# Patient Record
Sex: MALE | Race: BLACK OR AFRICAN AMERICAN | NOT HISPANIC OR LATINO | Employment: OTHER | URBAN - METROPOLITAN AREA
[De-identification: names, ages, dates, MRNs, and addresses within clinical notes are randomized per-mention and may not be internally consistent; named-entity substitution may affect disease eponyms.]

---

## 2017-02-03 ENCOUNTER — TELEPHONE (OUTPATIENT)
Dept: ENDOSCOPY | Facility: HOSPITAL | Age: 67
End: 2017-02-03

## 2017-02-03 NOTE — TELEPHONE ENCOUNTER
Received referral from [facility] to schedule the patient for a colonoscopy. He is currently taking Pradaxa. [facility] and spoke to [facility] Nandini Gasca. I asked her to fax back to me documentation from Dr. ROBERTA Sneed if the patient can stop Pradaxa for 2 days prior to colonoscopy. Provider her with my fax number and direct line.

## 2017-02-13 ENCOUNTER — TELEPHONE (OUTPATIENT)
Dept: ENDOSCOPY | Facility: HOSPITAL | Age: 67
End: 2017-02-13

## 2017-02-13 DIAGNOSIS — D64.9 ANEMIA, UNSPECIFIED TYPE: ICD-10-CM

## 2017-02-13 DIAGNOSIS — Z12.11 SPECIAL SCREENING FOR MALIGNANT NEOPLASMS, COLON: Primary | ICD-10-CM

## 2017-02-13 RX ORDER — DULOXETIN HYDROCHLORIDE 30 MG/1
30 CAPSULE, DELAYED RELEASE ORAL DAILY
COMMUNITY
End: 2017-08-30

## 2017-02-13 RX ORDER — ERGOCALCIFEROL 1.25 MG/1
50000 CAPSULE ORAL
Status: ON HOLD | COMMUNITY
End: 2017-09-21

## 2017-02-13 RX ORDER — MORPHINE SULFATE 30 MG/1
30 TABLET ORAL 2 TIMES DAILY
COMMUNITY
End: 2020-02-27

## 2017-02-13 RX ORDER — PREGABALIN 100 MG/1
100 CAPSULE ORAL 3 TIMES DAILY
COMMUNITY
End: 2017-08-30

## 2017-02-13 RX ORDER — TIOTROPIUM BROMIDE 18 UG/1
18 CAPSULE ORAL; RESPIRATORY (INHALATION) DAILY
COMMUNITY
End: 2023-06-23

## 2017-02-13 RX ORDER — AMITRIPTYLINE HYDROCHLORIDE 10 MG/1
10 TABLET, FILM COATED ORAL NIGHTLY PRN
COMMUNITY
End: 2017-08-30

## 2017-02-13 RX ORDER — CARVEDILOL 25 MG/1
25 TABLET ORAL 2 TIMES DAILY
COMMUNITY
End: 2023-06-23

## 2017-02-13 RX ORDER — DABIGATRAN ETEXILATE 150 MG/1
150 CAPSULE ORAL 2 TIMES DAILY
COMMUNITY
End: 2017-08-30

## 2017-02-13 RX ORDER — LIDOCAINE 50 MG/G
1 PATCH TOPICAL DAILY
COMMUNITY

## 2017-02-13 RX ORDER — PNV NO.95/FERROUS FUM/FOLIC AC 28MG-0.8MG
100 TABLET ORAL DAILY
COMMUNITY

## 2017-02-20 DIAGNOSIS — Z12.11 SPECIAL SCREENING FOR MALIGNANT NEOPLASMS, COLON: Primary | ICD-10-CM

## 2017-02-20 RX ORDER — POLYETHYLENE GLYCOL 3350, SODIUM SULFATE, SODIUM CHLORIDE, POTASSIUM CHLORIDE, SODIUM ASCORBATE, AND ASCORBIC ACID 7.5-2.691G
2000 KIT ORAL ONCE
Qty: 2000 ML | Refills: 0 | Status: SHIPPED | OUTPATIENT
Start: 2017-02-20 | End: 2017-02-20

## 2017-02-21 ENCOUNTER — TELEPHONE (OUTPATIENT)
Dept: ENDOSCOPY | Facility: HOSPITAL | Age: 67
End: 2017-02-21

## 2017-02-21 NOTE — TELEPHONE ENCOUNTER
Patient is scheduled for EGD & Colonoscopy 3/9/2017 with Dr. Trinidad.  Instructions sent via mail.  Prep used: Movi prep, rx mailed to pt.  Patient instructed hold hold Pradaxa x 3 days.

## 2017-03-09 ENCOUNTER — TELEPHONE (OUTPATIENT)
Dept: ENDOSCOPY | Facility: HOSPITAL | Age: 67
End: 2017-03-09

## 2017-03-09 NOTE — TELEPHONE ENCOUNTER
"[facility] reported that the [facility] "gave [facility] the wrong [facility] to take before procedure"  Patient removed from the scheduled for EGD & Colonoscopy @ 1300 on 3/9 with Dr. Trinidad. Attempted to contact patient in order to reschedule ordered Endoscopy procedure.  [phone] call back number provided.   "

## 2017-05-25 ENCOUNTER — TELEPHONE (OUTPATIENT)
Dept: ENDOSCOPY | Facility: HOSPITAL | Age: 67
End: 2017-05-25

## 2017-05-25 NOTE — TELEPHONE ENCOUNTER
Patient is rescheduled for EGD & Colonoscopy 7/25/2017 with Dr. Trinidad.  Instructions sent via mail.  Prep used: Moviprep.

## 2017-08-22 ENCOUNTER — TELEPHONE (OUTPATIENT)
Dept: GASTROENTEROLOGY | Facility: CLINIC | Age: 67
End: 2017-08-22

## 2017-08-22 NOTE — TELEPHONE ENCOUNTER
----- Message from Kaela Leary sent at 8/22/2017 12:00 PM CDT -----  Contact: [facility]ini Mendoza with [facility]  Ms. Delatorre would like to be called back regarding documents that she had sent to the office to have a colonoscopy scheduled for above patient.        Please call pt at [phone].        Thanks!

## 2017-08-30 ENCOUNTER — OFFICE VISIT (OUTPATIENT)
Dept: GASTROENTEROLOGY | Facility: CLINIC | Age: 67
End: 2017-08-30
Payer: OTHER GOVERNMENT

## 2017-08-30 VITALS
WEIGHT: 230.81 LBS | HEART RATE: 81 BPM | DIASTOLIC BLOOD PRESSURE: 72 MMHG | HEIGHT: 74 IN | SYSTOLIC BLOOD PRESSURE: 116 MMHG | BODY MASS INDEX: 29.62 KG/M2

## 2017-08-30 DIAGNOSIS — D50.0 IRON DEFICIENCY ANEMIA DUE TO CHRONIC BLOOD LOSS: Primary | ICD-10-CM

## 2017-08-30 PROCEDURE — 3008F BODY MASS INDEX DOCD: CPT | Mod: ,,, | Performed by: INTERNAL MEDICINE

## 2017-08-30 PROCEDURE — 1159F MED LIST DOCD IN RCRD: CPT | Mod: ,,, | Performed by: INTERNAL MEDICINE

## 2017-08-30 PROCEDURE — 1126F AMNT PAIN NOTED NONE PRSNT: CPT | Mod: ,,, | Performed by: INTERNAL MEDICINE

## 2017-08-30 PROCEDURE — 99213 OFFICE O/P EST LOW 20 MIN: CPT | Mod: PBBFAC | Performed by: INTERNAL MEDICINE

## 2017-08-30 PROCEDURE — 99999 PR PBB SHADOW E&M-EST. PATIENT-LVL III: CPT | Mod: PBBFAC,,, | Performed by: INTERNAL MEDICINE

## 2017-08-30 PROCEDURE — 3078F DIAST BP <80 MM HG: CPT | Mod: ,,, | Performed by: INTERNAL MEDICINE

## 2017-08-30 PROCEDURE — 99205 OFFICE O/P NEW HI 60 MIN: CPT | Mod: S$PBB,,, | Performed by: INTERNAL MEDICINE

## 2017-08-30 PROCEDURE — 3074F SYST BP LT 130 MM HG: CPT | Mod: ,,, | Performed by: INTERNAL MEDICINE

## 2017-08-30 NOTE — LETTER
August 30, 2017      Liu Padgett MD  [address] [facility] [address]           [facility] - Gastroenterology  [address]  [facility] [address]  Phone: [phone]  Fax: [phone]          Patient: Macario Dior Jr.   MR Number: [id number]   YOB: 1950   Date of Visit: 8/30/2017       Dear Dr. Liu Padgett:    Thank you for referring Macario Dior to me for evaluation. Attached you will find relevant portions of my assessment and plan of care.    If you have questions, please do not hesitate to call me. I look forward to following Macario Dior along with you.    Sincerely,    Stuart Trinidad MD    Enclosure  CC:  No Recipients    If you would like to receive this communication electronically, please contact [email or web address] or [phone] to request more information on [employer] access.    For providers and/or their staff who would like to refer a patient to [facility], please contact us through our one-stop-shop provider referral line, [facility], at [phone].    If you feel you have received this communication in error or would no longer like to receive these types of communications, please e-mail [email or web address]

## 2017-08-30 NOTE — PROGRESS NOTES
Subjective:       Patient ID: Macario Dior Jr. is a 67 y.o. male.    Chief Complaint: Other (discuss EGD/Colonoscopy)    HPI  Review of Systems   Constitutional: Negative for activity change, appetite change, chills, diaphoresis, fatigue, fever and unexpected weight change.   HENT: Negative for congestion, ear pain, mouth sores, rhinorrhea, sinus pressure, sneezing, sore throat, trouble swallowing and voice change.    Eyes: Negative for pain.   Respiratory: Negative for cough and shortness of breath.    Cardiovascular: Negative for chest pain, palpitations and leg swelling.   Endocrine: Positive for heat intolerance.   Genitourinary: Negative for difficulty urinating and flank pain.   Musculoskeletal: Positive for arthralgias, back pain and gait problem. Negative for joint swelling, myalgias and neck pain.   Skin: Negative for rash.   Neurological: Negative for dizziness, tremors, syncope, numbness and headaches.   Hematological: Negative for adenopathy. Does not bruise/bleed easily.   Psychiatric/Behavioral: Negative for agitation, behavioral problems, confusion, decreased concentration and dysphoric mood.       Objective:      Physical Exam   Constitutional: He is oriented to person, place, and time. He appears well-developed and well-nourished. No distress.   HENT:   Right Ear: External ear normal.   Left Ear: External ear normal.   Nose: Nose normal.   Mouth/Throat: Oropharynx is clear and moist. No oropharyngeal exudate.   Eyes: Conjunctivae are normal. Pupils are equal, round, and reactive to light. No scleral icterus.   Neck: Normal range of motion. Neck supple. No thyromegaly present.   Cardiovascular: Normal rate, normal heart sounds and intact distal pulses.  Exam reveals no gallop.    No murmur heard.  Pulmonary/Chest: Effort normal and breath sounds normal. He has no wheezes.   Abdominal: Soft. Normal appearance and bowel sounds are normal. He exhibits no distension, no fluid wave, no ascites and no  mass. There is no hepatosplenomegaly. There is no tenderness. There is no rigidity, no rebound, no guarding and no CVA tenderness.   Genitourinary:   Genitourinary Comments: recent   Musculoskeletal: Normal range of motion. He exhibits no edema or tenderness.   Lymphadenopathy:     He has no cervical adenopathy.   Neurological: He is alert and oriented to person, place, and time. No cranial nerve deficit.   Skin: Skin is warm. No rash noted. He is not diaphoretic.   Psychiatric: He has a normal mood and affect. His behavior is normal. Thought content normal.       Assessment:       1. Iron deficiency anemia due to chronic blood loss        Plan:         HISTORY OF PRESENT ILLNESS:  This is an urgent add-on appointment.  [facility].  It is my pleasure to find an add-on time for   him.  He is a 67-year-old gentleman with multiple significant medical problems.    He is referred for anemia.  Apparently, this is a new finding.  He says he has   never been anemic before.  July 20th hemoglobin was 12.4 with an MCV of 89.  I   do not see iron studies done during that time.  The patient denies any melena or   bright red blood per rectum.  He has never had any tarry stools.  He denies any   change in his bowel movements except for a spell in July.  Right now, his bowel   movements are normal and formed usually one per day.  He denies any abdominal   pain.  He does have occasional breakthrough reflux symptoms.    He has lost weight.  He lost about 20 pounds this summer, although that seems to   stabilize now.  He has a lot of back, hip and gait problems and he has noticed   new onset of sweating for no reason.    He was admitted to the hospital in June with diverticulitis, which presented   with abdominal pain.  He was treated with antibiotics.  In July, he had a 14-day   period where he had abdominal pain and diarrhea.  Apparently, he went to the   Emergency Room four [facility] at [facility] during these  14 days.  The pain and diarrhea   finally subsided.  He was not sure any particular treatment he got for that.    ASSESSMENT AND DECISION MAKING:  Anemia.  This is anemia of 12.4.  This is   normocytic.  I do not know for sure that this is iron deficiency anemia.  He   does have some degree of chronic kidney disease and he has multiple other   medical problems as noted in the chart, which could be factors for more of an   anemia of chronic disease.  However, given the abdominal pain, recent   diverticulitis, and weight loss, I think that a full GI workup should be done.    I recommended EGD and colonoscopy.  I reviewed the differential diagnosis of   things that can bleed in the GI tract, which includes some serious entities such   as gastric tumors, colon cancer, or peptic ulcer disease.  He will be higher   risk than usual because of his multiple medical problems and the past   diverticulitis and his chronic pain medicine use, although fortunately he is no   longer on anticoagulation.  He had been on that in the past for DVTs, but he is   off that now.    RECOMMENDATION:  1.  CBC, ferritin, iron, TIBC now.  2.  Proceed with EGD, colonoscopy.  I think it is safe to do the colonoscopy   now.  It is over two months after his diverticulitis episode.      DIANE  dd: 08/30/2017 13:54:35 (CDT)  td: 08/31/2017 04:45:41 (CDT)  Doc ID   #[id number]  Job ID #[id number]    CC:

## 2017-09-06 ENCOUNTER — TELEPHONE (OUTPATIENT)
Dept: ENDOSCOPY | Facility: HOSPITAL | Age: 67
End: 2017-09-06

## 2017-09-21 ENCOUNTER — ANESTHESIA EVENT (OUTPATIENT)
Dept: ENDOSCOPY | Facility: HOSPITAL | Age: 67
End: 2017-09-21
Payer: OTHER GOVERNMENT

## 2017-09-21 ENCOUNTER — ANESTHESIA (OUTPATIENT)
Dept: ENDOSCOPY | Facility: HOSPITAL | Age: 67
End: 2017-09-21
Payer: OTHER GOVERNMENT

## 2017-09-21 ENCOUNTER — SURGERY (OUTPATIENT)
Age: 67
End: 2017-09-21

## 2017-09-21 ENCOUNTER — HOSPITAL ENCOUNTER (OUTPATIENT)
Facility: HOSPITAL | Age: 67
Discharge: HOME | End: 2017-09-21
Attending: INTERNAL MEDICINE | Admitting: INTERNAL MEDICINE
Payer: OTHER GOVERNMENT

## 2017-09-21 VITALS
SYSTOLIC BLOOD PRESSURE: 150 MMHG | DIASTOLIC BLOOD PRESSURE: 69 MMHG | OXYGEN SATURATION: 98 % | RESPIRATION RATE: 16 BRPM | TEMPERATURE: 97 F | HEART RATE: 67 BPM | BODY MASS INDEX: 29.77 KG/M2 | WEIGHT: 232 LBS | RESPIRATION RATE: 21 BRPM | HEIGHT: 74 IN

## 2017-09-21 DIAGNOSIS — D62 ANEMIA ASSOCIATED WITH ACUTE BLOOD LOSS: Primary | ICD-10-CM

## 2017-09-21 LAB — POCT GLUCOSE: 98 MG/DL (ref 70–110)

## 2017-09-21 PROCEDURE — 27201012 HC FORCEPS, HOT/COLD, DISP: Performed by: INTERNAL MEDICINE

## 2017-09-21 PROCEDURE — C1773 RET DEV, INSERTABLE: HCPCS | Performed by: INTERNAL MEDICINE

## 2017-09-21 PROCEDURE — 45380 COLONOSCOPY AND BIOPSY: CPT | Performed by: INTERNAL MEDICINE

## 2017-09-21 PROCEDURE — 43239 EGD BIOPSY SINGLE/MULTIPLE: CPT | Mod: 51,,, | Performed by: INTERNAL MEDICINE

## 2017-09-21 PROCEDURE — 88342 IMHCHEM/IMCYTCHM 1ST ANTB: CPT | Mod: 26,,, | Performed by: PATHOLOGY

## 2017-09-21 PROCEDURE — 88305 TISSUE EXAM BY PATHOLOGIST: CPT | Mod: 26,,, | Performed by: PATHOLOGY

## 2017-09-21 PROCEDURE — 25000003 PHARM REV CODE 250: Performed by: INTERNAL MEDICINE

## 2017-09-21 PROCEDURE — 45380 COLONOSCOPY AND BIOPSY: CPT | Mod: ,,, | Performed by: INTERNAL MEDICINE

## 2017-09-21 PROCEDURE — D9220A PRA ANESTHESIA: Mod: ,,, | Performed by: ANESTHESIOLOGY

## 2017-09-21 PROCEDURE — 43239 EGD BIOPSY SINGLE/MULTIPLE: CPT | Performed by: INTERNAL MEDICINE

## 2017-09-21 PROCEDURE — 63600175 PHARM REV CODE 636 W HCPCS: Performed by: NURSE ANESTHETIST, CERTIFIED REGISTERED

## 2017-09-21 PROCEDURE — 37000008 HC ANESTHESIA 1ST 15 MINUTES: Performed by: INTERNAL MEDICINE

## 2017-09-21 PROCEDURE — 37000009 HC ANESTHESIA EA ADD 15 MINS: Performed by: INTERNAL MEDICINE

## 2017-09-21 PROCEDURE — 88305 TISSUE EXAM BY PATHOLOGIST: CPT | Performed by: PATHOLOGY

## 2017-09-21 RX ORDER — SODIUM CHLORIDE 9 MG/ML
INJECTION, SOLUTION INTRAVENOUS CONTINUOUS
Status: DISCONTINUED | OUTPATIENT
Start: 2017-09-21 | End: 2017-09-21 | Stop reason: HOSPADM

## 2017-09-21 RX ORDER — PROPOFOL 10 MG/ML
VIAL (ML) INTRAVENOUS CONTINUOUS PRN
Status: DISCONTINUED | OUTPATIENT
Start: 2017-09-21 | End: 2017-09-21

## 2017-09-21 RX ORDER — LIDOCAINE HCL/PF 100 MG/5ML
SYRINGE (ML) INTRAVENOUS
Status: DISCONTINUED | OUTPATIENT
Start: 2017-09-21 | End: 2017-09-21

## 2017-09-21 RX ORDER — PROPOFOL 10 MG/ML
VIAL (ML) INTRAVENOUS
Status: DISCONTINUED | OUTPATIENT
Start: 2017-09-21 | End: 2017-09-21

## 2017-09-21 RX ADMIN — SODIUM CHLORIDE: 0.9 INJECTION, SOLUTION INTRAVENOUS at 01:09

## 2017-09-21 RX ADMIN — SODIUM CHLORIDE: 900 INJECTION, SOLUTION INTRAVENOUS at 01:09

## 2017-09-21 RX ADMIN — PROPOFOL 20 MG: 10 INJECTION, EMULSION INTRAVENOUS at 01:09

## 2017-09-21 RX ADMIN — PROPOFOL 200 MCG/KG/MIN: 10 INJECTION, EMULSION INTRAVENOUS at 01:09

## 2017-09-21 RX ADMIN — PROPOFOL 100 MG: 10 INJECTION, EMULSION INTRAVENOUS at 01:09

## 2017-09-21 RX ADMIN — LIDOCAINE HYDROCHLORIDE 50 MG: 20 INJECTION, SOLUTION INTRAVENOUS at 01:09

## 2017-09-21 NOTE — ANESTHESIA RELEASE NOTE
"Anesthesia Release from PACU Note    Patient: Macario Dior Jr.    Procedure(s) Performed: Procedure(s) (LRB):  ESOPHAGOGASTRODUODENOSCOPY (EGD) (N/A)  COLONOSCOPY (N/A)    Anesthesia type: general    Post pain: Adequate analgesia    Post assessment: no apparent anesthetic complications, tolerated procedure well and no evidence of recall    Last Vitals:   Visit Vitals  BP (!) 150/69 (BP Location: Left arm, Patient Position: Sitting)   Pulse 67   Temp 36.2 °C (97.2 °F) (Temporal)   Resp 16   Ht 6' 2" (1.88 m)   Wt 105.2 kg (232 lb)   SpO2 98%   BMI 29.79 kg/m²       Post vital signs: stable    Level of consciousness: awake, alert  and oriented    Nausea/Vomiting: no nausea/no vomiting    Complications: none    Airway Patency: patent    Respiratory: unassisted    Cardiovascular: stable and blood pressure at baseline    Hydration: euvolemic  "

## 2017-09-21 NOTE — DISCHARGE INSTRUCTIONS
Upper GI Endoscopy     During endoscopy, a long, flexible tube is used to view the inside of your upper GI tract.      Upper GI endoscopy allows your healthcare provider to look directly into the beginning of your gastrointestinal (GI) tract. The esophagus, stomach, and duodenum (the first part of the small intestine) make up the upper GI tract.   Before the exam  Follow these and any other instructions you are given before your endoscopy. If you dont follow the healthcare providers instructions carefully, the test may need to be canceled or done over:  · Don't eat or drink anything after midnight the night before your exam. If your exam is in the afternoon, drink only clear liquids in the morning. Don't eat or drink anything for 8 hours before the exam. In some cases, you may be able to take medicines with sips of water until 2 hours before the procedure. Speak with your healthcare provider about this.   · Bring your X-rays and any other test results you have.  · Because you will be sedated, arrange for an adult to drive you home after the exam.  · Tell your healthcare provider before the exam if you are taking any medicines or have any medical problems.  The procedure  Here is what to expect:  · You will lie on the endoscopy table. Usually patients lie on the left side.  · You will be monitored and given oxygen.  · Your throat may be numbed with a spray or gargle. You are given medicine through an intravenous (IV) line that will help you relax and remain comfortable. You may be awake or asleep during the procedure.  · The healthcare provider will put the endoscope in your mouth and down your esophagus. It is thinner than most pieces of food that you swallow. It will not affect your breathing. The medicine helps keep you from gagging.  · Air is put into your GI tract to expand it. It can make you burp.  · During the procedure, the healthcare provider can take biopsies (tissue samples), remove abnormalities,  such as polyps, or treat abnormalities through a variety of devices placed through the endoscope. You will not feel this.   · The endoscope carries images of your upper GI tract to a video screen. If you are awake, you may be able to look at the images.  · After the procedure is done, you will rest for a time. An adult must drive you home.  When to call your healthcare provider  Contact your healthcare provider if you have:  · Black or tarry stools, or blood in your stool  · Fever  · Pain in your belly that does not go away  · Nausea and vomiting, or vomiting blood   Date Last Reviewed: 7/1/2016 © 2000-2017 [employer]. [address] [place] [address], [place] [address]. All rights reserved. This information is not intended as a substitute for professional medical care. Always follow your healthcare professional's instructions.        Colonoscopy     A camera attached to a flexible tube with a viewing lens is used to take video pictures.     Colonoscopy is a test to view the inside of your lower digestive tract (colon and rectum). Sometimes it can show the last part of the small intestine (ileum). During the test, small pieces of tissue may be removed for testing. This is called a biopsy. Small growths, such as polyps, may also be removed.   Why is colonoscopy done?  The test is done to help look for colon cancer. And it can help find the source of abdominal pain, bleeding, and changes in bowel habits. It may be needed once a year, depending on factors such as your:  · Age  · Health history  · Family health history  · Symptoms  · Results from any prior colonoscopy  Risks and possible complications  These include:  · Bleeding               · A puncture or tear in the colon   · Risks of anesthesia  · A cancer lesion not being seen  Getting ready   To prepare for the test:  · Talk with your healthcare provider about the risks of the test (see below). Also ask your healthcare provider about alternatives to the  test.  · Tell your healthcare provider about any medicines you take. Also tell him or her about any health conditions you may have.  · Make sure your rectum and colon are empty for the test. Follow the diet and bowel prep instructions exactly. If you dont, the test may need to be rescheduled.  · Plan for a friend or family member to drive you home after the test.     Colonoscopy provides an inside view of the entire colon.     You may discuss the results with your doctor right away or at a future visit.  During the test   The test is usually done in the hospital on an outpatient basis. This means you go home the same day. The procedure takes about 30 minutes. During that time:  · You are given relaxing (sedating) medicine through an IV line. You may be drowsy, or fully asleep.  · The healthcare provider will first give you a physical exam to check for anal and rectal problems.  · Then the anus is lubricated and the scope inserted.  · If you are awake, you may have a feeling similar to needing to have a bowel movement. You may also feel pressure as air is pumped into the colon. Its OK to pass gas during the procedure.  · Biopsy, polyp removal, or other treatments may be done during the test.  After the test   You may have gas right after the test. It can help to try to pass it to help prevent later bloating. Your healthcare provider may discuss the results with you right away. Or you may need to schedule a follow-up visit to talk about the results. After the test, you can go back to your normal eating and other activities. You may be tired from the sedation and need to rest for a few hours.  Date Last Reviewed: 11/1/2016 © 2000-2017 [employer]. [address] [place] [address]. All rights reserved. This information is not intended as a substitute for professional medical care. Always follow your healthcare professional's instructions.

## 2017-09-21 NOTE — ANESTHESIA PREPROCEDURE EVALUATION
09/21/2017  Macario Dior Jr. is a 67 y.o., male.    Pre-op Assessment    I have reviewed the Patient Summary Reports.      I have reviewed the Medications.     Review of Systems  Anesthesia Hx:  No problems with previous Anesthesia  History of prior surgery of interest to airway management or planning:  Denies Personal Hx of Anesthesia complications.   Social:  Non-Smoker, No Alcohol Use    Hematology/Oncology:     Oncology Normal    -- Anemia:   EENT/Dental:   Eyes: Eye Disease: Glaucoma:     Cardiovascular:   Hypertension  Denies Angina.  Deep Venous Thrombosis (DVT), Hx of DVT    Pulmonary:   Asthma Shortness of breath (with moderate exertion. Patient reports not worsening) Sleep Apnea, CPAP    Renal/:  Renal/ Normal   Kidney Function/Disease, Chronic Kidney Disease (CKD) , CKD Stage III (GFR 30-59)    Hepatic/GI:   Denies GERD. Denies Liver Disease.    Musculoskeletal:  Musculoskeletal General/Symptoms: limited ROM, joint pain.  Joint Disease:  Gout    Neurological:   Denies CVA. Denies Seizures.  Pain , onset is chronic , location of bilateral hips, GROIN, back, and neck , quality of aching/dull, throbbing, burning    Endocrine:  Endocrine Normal    Psych:  Psychiatric Normal           Physical Exam  General:  Well nourished Small beard    Airway/Jaw/Neck:  Airway Findings: Mouth Opening: Normal Tongue: Normal  General Airway Assessment: Adult  Mallampati: II  TM Distance: Normal, at least 6 cm  Jaw/Neck Findings:  Neck ROM: Decreased Lateral Motion, to the right      Dental:  Dental Findings: (multiple missing and broken teeth in gums. Nothing appears to be infected.) Periodontal disease, Severe   Chest/Lungs:  Chest/Lungs Findings: Clear to auscultation, Normal Respiratory Rate     Heart/Vascular:  Heart Findings: Rate: Normal  Rhythm: Regular Rhythm  Sounds: Normal        Mental Status:  Mental  Status Findings:  Alert and Oriented         ECHO 8/24/15-no report.Information listed in MD's note  Echo shows EF 55-60% with grade II diastolic dysfunction and mild pulmonic regurgitation.    DSE 8/24/15no report.Information listed in MD's note   bpm 85% of predicted, with normal resting BP and adequate respons to stress test.  Stress echo was normal after pharmacological stress.     Anesthesia Plan  Type of Anesthesia, risks & benefits discussed:  Anesthesia Type:  general  Patient's Preference: ga  Intra-op Monitoring Plan: standard ASA monitors  Intra-op Monitoring Plan Comments:   Post Op Pain Control Plan:   Post Op Pain Control Plan Comments:   Induction:   IV  Beta Blocker:  Patient is on a Beta-Blocker and has received one dose within the past 24 hours (No further documentation required).       Informed Consent: Patient understands risks and agrees with Anesthesia plan.  Questions answered. Anesthesia consent signed with patient.  ASA Score: 3     Day of Surgery Review of History & Physical:    H&P update referred to the provider.         Ready For Surgery From Anesthesia Perspective.

## 2017-09-21 NOTE — PATIENT INSTRUCTIONS
Discharge Summary/Instructions after an Endoscopic Procedure  Patient Name: Macario Dior  Patient MRN: [id number]  Patient YOB: 1950  Thursday, September 21, 2017  Stuart Trinidad MD  RESTRICTIONS:  During your procedure today, you received medications for sedation.  These   medications may affect your judgment, balance and coordination.  Therefore,   for 24 hours, you have the following restrictions:   - DO NOT drive a car, operate machinery, make legal/financial decisions,   sign important papers or drink alcohol.    ACTIVITY:  The following day: return to full activity including work, except no heavy   lifting, straining or running for 3 days if polyps were removed.  DIET:  Eat and drink normally unless instructed otherwise.  TREATMENT FOR COMMON SIDE EFFECTS:  - Mild abdominal pain, belching, bloating or excessive gas: rest, eat   lightly and use a heating pad.  - Sore Throat: treat with throat lozenges and/or gargle with warm salt   water.  SYMPTOMS TO WATCH FOR AND REPORT TO YOUR PHYSICIAN:  1. Abdominal pain or bloating, other than gas cramps.  2. Chest pain.  3. Back pain.  4. Chills or fever occurring within 24 hours after the procedure.  5. Rectal bleeding, which would show as bright red, maroon, or black stools.   (A tablespoon of blood from the rectum is not serious, especially if   hemorrhoids are present.)  6. Vomiting.  7. Weakness or dizziness.  8. Because air was used during the procedure, expelling large amounts of air   from your rectum or belching is normal.  9. If a bowel prep was taken, you may not have a bowel movement for 1-3   days.  This is normal.  GO DIRECTLY TO THE EMERGENCY ROOM IF YOU HAVE ANY OF THE FOLLOWING:   Difficulty breathing   Chills and/or fever over 101 F   Persistent vomiting and/or vomiting blood   Severe abdominal pain   Severe chest pain   Black, tarry stools   Bleeding- more than one tablespoon  Your doctor recommends these additional instructions:  If any  biopsies were taken, your doctors clinic will call you in 1 to 2   weeks with any results.  You have a contact number available for emergencies.  The signs and symptoms   of potential delayed complications were discussed with you.  You may return   to normal activities tomorrow.  Written discharge instructions were   provided to you.   You are being discharged to home.   Resume your previous diet.   Continue your present medications.   We are waiting for your pathology results.   Your physician has recommended a repeat colonoscopy in five years for   surveillance.   Return to your GI clinic at appointment to be scheduled.  For questions, problems or results please call your physician - Stuart Trinidad MD at Work:  [phone].  [facility], EMERGENCY ROOM PHONE NUMBER: [phone]  IF A COMPLICATION OR EMERGENCY SITUATION ARISES AND YOU ARE UNABLE TO REACH   YOUR PHYSICIAN - GO DIRECTLY TO THE EMERGENCY ROOM.  Stuart Trinidad MD  9/21/2017 2:12:02 PM  This report has been verified and signed electronically.

## 2017-09-21 NOTE — PATIENT INSTRUCTIONS
Discharge Summary/Instructions after an Endoscopic Procedure  Patient Name: Macario Dior  Patient MRN: [id number]  Patient YOB: 1950  Thursday, September 21, 2017  Stuart Trinidad MD  RESTRICTIONS:  During your procedure today, you received medications for sedation.  These   medications may affect your judgment, balance and coordination.  Therefore,   for 24 hours, you have the following restrictions:   - DO NOT drive a car, operate machinery, make legal/financial decisions,   sign important papers or drink alcohol.    ACTIVITY:  The following day: return to full activity including work, except no heavy   lifting, straining or running for 3 days if polyps were removed.  DIET:  Eat and drink normally unless instructed otherwise.  TREATMENT FOR COMMON SIDE EFFECTS:  - Mild abdominal pain, belching, bloating or excessive gas: rest, eat   lightly and use a heating pad.  - Sore Throat: treat with throat lozenges and/or gargle with warm salt   water.  SYMPTOMS TO WATCH FOR AND REPORT TO YOUR PHYSICIAN:  1. Abdominal pain or bloating, other than gas cramps.  2. Chest pain.  3. Back pain.  4. Chills or fever occurring within 24 hours after the procedure.  5. Rectal bleeding, which would show as bright red, maroon, or black stools.   (A tablespoon of blood from the rectum is not serious, especially if   hemorrhoids are present.)  6. Vomiting.  7. Weakness or dizziness.  8. Because air was used during the procedure, expelling large amounts of air   from your rectum or belching is normal.  9. If a bowel prep was taken, you may not have a bowel movement for 1-3   days.  This is normal.  GO DIRECTLY TO THE EMERGENCY ROOM IF YOU HAVE ANY OF THE FOLLOWING:   Difficulty breathing   Chills and/or fever over 101 F   Persistent vomiting and/or vomiting blood   Severe abdominal pain   Severe chest pain   Black, tarry stools   Bleeding- more than one tablespoon  Your doctor recommends these additional instructions:  If any  biopsies were taken, your doctors clinic will call you in 1 to 2   weeks with any results.  You have a contact number available for emergencies.  The signs and symptoms   of potential delayed complications were discussed with you.  You may return   to normal activities tomorrow.  Written discharge instructions were   provided to you.   You are being discharged to home.   Resume your previous diet.   Continue your present medications.   We are waiting for your pathology results.   Return to your GI clinic as previously scheduled.  For questions, problems or results please call your physician - Stuart Trinidad MD at Work:  [phone].  [facility], EMERGENCY ROOM PHONE NUMBER: [phone]  IF A COMPLICATION OR EMERGENCY SITUATION ARISES AND YOU ARE UNABLE TO REACH   YOUR PHYSICIAN - GO DIRECTLY TO THE EMERGENCY ROOM.  Stuart Trinidad MD  9/21/2017 1:53:54 PM  This report has been verified and signed electronically.

## 2017-09-21 NOTE — TRANSFER OF CARE
"Anesthesia Transfer of Care Note    Patient: Macario Dior Jr.    Procedure(s) Performed: Procedure(s) (LRB):  ESOPHAGOGASTRODUODENOSCOPY (EGD) (N/A)  COLONOSCOPY (N/A)    Patient location: [facility]    Anesthesia Type: general    Transport from OR: Transported from OR on room air with adequate spontaneous ventilation    Post pain: adequate analgesia    Post assessment: no apparent anesthetic complications and tolerated procedure well    Post vital signs: stable    Level of consciousness: sedated    Nausea/Vomiting: no nausea/vomiting    Complications: none    Transfer of care protocol was followed      Last vitals:   Visit Vitals  BP (!) 112/54 (BP Location: Left arm, Patient Position: Lying)   Pulse 70   Temp 36.2 °C (97.2 °F) (Temporal)   Resp 16   Ht 6' 2" (1.88 m)   Wt 105.2 kg (232 lb)   SpO2 97%   BMI 29.79 kg/m²     "

## 2017-09-21 NOTE — H&P (VIEW-ONLY)
Subjective:       Patient ID: Macario Dior Jr. is a 67 y.o. male.    Chief Complaint: Other (discuss EGD/Colonoscopy)    HPI  Review of Systems   Constitutional: Negative for activity change, appetite change, chills, diaphoresis, fatigue, fever and unexpected weight change.   HENT: Negative for congestion, ear pain, mouth sores, rhinorrhea, sinus pressure, sneezing, sore throat, trouble swallowing and voice change.    Eyes: Negative for pain.   Respiratory: Negative for cough and shortness of breath.    Cardiovascular: Negative for chest pain, palpitations and leg swelling.   Endocrine: Positive for heat intolerance.   Genitourinary: Negative for difficulty urinating and flank pain.   Musculoskeletal: Positive for arthralgias, back pain and gait problem. Negative for joint swelling, myalgias and neck pain.   Skin: Negative for rash.   Neurological: Negative for dizziness, tremors, syncope, numbness and headaches.   Hematological: Negative for adenopathy. Does not bruise/bleed easily.   Psychiatric/Behavioral: Negative for agitation, behavioral problems, confusion, decreased concentration and dysphoric mood.       Objective:      Physical Exam   Constitutional: He is oriented to person, place, and time. He appears well-developed and well-nourished. No distress.   HENT:   Right Ear: External ear normal.   Left Ear: External ear normal.   Nose: Nose normal.   Mouth/Throat: Oropharynx is clear and moist. No oropharyngeal exudate.   Eyes: Conjunctivae are normal. Pupils are equal, round, and reactive to light. No scleral icterus.   Neck: Normal range of motion. Neck supple. No thyromegaly present.   Cardiovascular: Normal rate, normal heart sounds and intact distal pulses.  Exam reveals no gallop.    No murmur heard.  Pulmonary/Chest: Effort normal and breath sounds normal. He has no wheezes.   Abdominal: Soft. Normal appearance and bowel sounds are normal. He exhibits no distension, no fluid wave, no ascites and no  mass. There is no hepatosplenomegaly. There is no tenderness. There is no rigidity, no rebound, no guarding and no CVA tenderness.   Genitourinary:   Genitourinary Comments: recent   Musculoskeletal: Normal range of motion. He exhibits no edema or tenderness.   Lymphadenopathy:     He has no cervical adenopathy.   Neurological: He is alert and oriented to person, place, and time. No cranial nerve deficit.   Skin: Skin is warm. No rash noted. He is not diaphoretic.   Psychiatric: He has a normal mood and affect. His behavior is normal. Thought content normal.       Assessment:       1. Iron deficiency anemia due to chronic blood loss        Plan:         HISTORY OF PRESENT ILLNESS:  This is an urgent add-on appointment.  [facility].  It is my pleasure to find an add-on time for   him.  He is a 67-year-old gentleman with multiple significant medical problems.    He is referred for anemia.  Apparently, this is a new finding.  He says he has   never been anemic before.  July 20th hemoglobin was 12.4 with an MCV of 89.  I   do not see iron studies done during that time.  The patient denies any melena or   bright red blood per rectum.  He has never had any tarry stools.  He denies any   change in his bowel movements except for a spell in July.  Right now, his bowel   movements are normal and formed usually one per day.  He denies any abdominal   pain.  He does have occasional breakthrough reflux symptoms.    He has lost weight.  He lost about 20 pounds this summer, although that seems to   stabilize now.  He has a lot of back, hip and gait problems and he has noticed   new onset of sweating for no reason.    He was admitted to the hospital in June with diverticulitis, which presented   with abdominal pain.  He was treated with antibiotics.  In July, he had a 14-day   period where he had abdominal pain and diarrhea.  Apparently, he went to the   Emergency Room four [facility] at [facility] during these  14 days.  The pain and diarrhea   finally subsided.  He was not sure any particular treatment he got for that.    ASSESSMENT AND DECISION MAKING:  Anemia.  This is anemia of 12.4.  This is   normocytic.  I do not know for sure that this is iron deficiency anemia.  He   does have some degree of chronic kidney disease and he has multiple other   medical problems as noted in the chart, which could be factors for more of an   anemia of chronic disease.  However, given the abdominal pain, recent   diverticulitis, and weight loss, I think that a full GI workup should be done.    I recommended EGD and colonoscopy.  I reviewed the differential diagnosis of   things that can bleed in the GI tract, which includes some serious entities such   as gastric tumors, colon cancer, or peptic ulcer disease.  He will be higher   risk than usual because of his multiple medical problems and the past   diverticulitis and his chronic pain medicine use, although fortunately he is no   longer on anticoagulation.  He had been on that in the past for DVTs, but he is   off that now.    RECOMMENDATION:  1.  CBC, ferritin, iron, TIBC now.  2.  Proceed with EGD, colonoscopy.  I think it is safe to do the colonoscopy   now.  It is over two months after his diverticulitis episode.      DIANE  dd: 08/30/2017 13:54:35 (CDT)  td: 08/31/2017 04:45:41 (CDT)  Doc ID   #[id number]  Job ID #[id number]    CC:

## 2017-09-21 NOTE — ANESTHESIA POSTPROCEDURE EVALUATION
"Anesthesia Post Evaluation    Patient: Macario Dior Jr.    Procedure(s) Performed: Procedure(s) (LRB):  ESOPHAGOGASTRODUODENOSCOPY (EGD) (N/A)  COLONOSCOPY (N/A)    Final Anesthesia Type: general  Patient location during evaluation: GI PACU  Patient participation: Yes- Able to Participate  Level of consciousness: awake and alert and oriented  Post-procedure vital signs: reviewed and stable  Pain management: adequate  Airway patency: patent  PONV status at discharge: No PONV  Anesthetic complications: no      Cardiovascular status: blood pressure returned to baseline and stable  Respiratory status: unassisted, spontaneous ventilation and room air  Hydration status: euvolemic  Follow-up not needed.        Visit Vitals  BP (!) 150/69 (BP Location: Left arm, Patient Position: Sitting)   Pulse 67   Temp 36.2 °C (97.2 °F) (Temporal)   Resp 16   Ht 6' 2" (1.88 m)   Wt 105.2 kg (232 lb)   SpO2 98%   BMI 29.79 kg/m²       Pain/Doris Score: Pain Assessment Performed: Yes (9/21/2017  2:45 PM)  Presence of Pain: denies (9/21/2017  2:45 PM)  Doris Score: 10 (9/21/2017  2:30 PM)      "

## 2017-09-28 ENCOUNTER — TELEPHONE (OUTPATIENT)
Dept: ENDOSCOPY | Facility: HOSPITAL | Age: 67
End: 2017-09-28

## 2017-09-28 ENCOUNTER — TELEPHONE (OUTPATIENT)
Dept: GASTROENTEROLOGY | Facility: CLINIC | Age: 67
End: 2017-09-28

## 2017-09-28 NOTE — TELEPHONE ENCOUNTER
----- Message from Nicole Angulo sent at 9/28/2017 12:53 PM CDT -----  Contact: Pt:[phone]  Pt received called from Fanta Parks and the pt states he would like to know what the call was in regards to. Pt states he would like to speak with the nurse.

## 2017-10-03 ENCOUNTER — TELEPHONE (OUTPATIENT)
Dept: GASTROENTEROLOGY | Facility: CLINIC | Age: 67
End: 2017-10-03

## 2017-10-03 NOTE — TELEPHONE ENCOUNTER
----- Message from Stuart Trinidad MD sent at 10/3/2017  3:01 PM CDT -----  Please call, everything was ok with the biopsies  The stomach had some mild inflammation, but no bacteria  The colon polyp wasn't even precancerous  Good news

## 2017-12-14 ENCOUNTER — TELEPHONE (OUTPATIENT)
Dept: ORTHOPEDICS | Facility: CLINIC | Age: 67
End: 2017-12-14

## 2017-12-14 NOTE — TELEPHONE ENCOUNTER
----- Message from Marianne Trinidad sent at 12/14/2017  2:00 PM CST -----  Contact: [facility] Dior pt [facility] nurse   Mar[facility] Dior pt [facility] nurse is requesting a call back in regards to scheduling an appt         Marium Dior pt [facility] nurse can be contacted at [phone] ext [phone]      We spoke  =ep Lo p/o THR needs follow up  appt booked for 1/11 @ 1 :20 pm

## 2018-01-11 ENCOUNTER — HOSPITAL ENCOUNTER (OUTPATIENT)
Dept: RADIOLOGY | Facility: HOSPITAL | Age: 68
Discharge: HOME | End: 2018-01-11
Attending: ORTHOPAEDIC SURGERY
Payer: COMMERCIAL

## 2018-01-11 ENCOUNTER — OFFICE VISIT (OUTPATIENT)
Dept: ORTHOPEDICS | Facility: CLINIC | Age: 68
End: 2018-01-11
Payer: COMMERCIAL

## 2018-01-11 VITALS — BODY MASS INDEX: 29.6 KG/M2 | HEIGHT: 74 IN | WEIGHT: 230.63 LBS

## 2018-01-11 DIAGNOSIS — Z96.649 HISTORY OF TOTAL HIP REPLACEMENT, UNSPECIFIED LATERALITY: ICD-10-CM

## 2018-01-11 DIAGNOSIS — Z96.649 HISTORY OF TOTAL HIP REPLACEMENT, UNSPECIFIED LATERALITY: Primary | ICD-10-CM

## 2018-01-11 PROCEDURE — 99999 PR PBB SHADOW E&M-EST. PATIENT-LVL III: CPT | Mod: PBBFAC,,, | Performed by: ORTHOPAEDIC SURGERY

## 2018-01-11 PROCEDURE — 99213 OFFICE O/P EST LOW 20 MIN: CPT | Mod: PBBFAC,25 | Performed by: ORTHOPAEDIC SURGERY

## 2018-01-11 PROCEDURE — 73502 X-RAY EXAM HIP UNI 2-3 VIEWS: CPT | Mod: 26,RT,, | Performed by: RADIOLOGY

## 2018-01-11 PROCEDURE — 99213 OFFICE O/P EST LOW 20 MIN: CPT | Mod: S$PBB,,, | Performed by: ORTHOPAEDIC SURGERY

## 2018-01-11 PROCEDURE — 73502 X-RAY EXAM HIP UNI 2-3 VIEWS: CPT | Mod: TC,RT

## 2018-01-11 NOTE — PROGRESS NOTES
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  He developed heterotopic ossification.  He's had pain that shoots up and down his leg.  It's from his buttocks to his heel.  Pain seems to be worse when he is walking.    [facility]    Review of systems:  No recent fevers.  No drainage from the wound.  No major sensory or motor changes.    Reviewed past medical history, medications and ALLERGIES.    Vital signs are stable.  Patient is alert and oriented ×3.  Patient walks with a slight antalgic gait.  There is some tenderness to the lower lumbar spine.  Despite the heterotopic bone the hips have a functional range of motion.  Both knees have no swelling.  Range of motion 0-130   Radiographs total hip in excellent position with moderate heterotopic bone in the abductors.    Impression I think the patient has some pain from his heterotopic bone.    He also has some component of sciatica.    He will continue to do physical therapy and work with this..

## 2019-01-17 ENCOUNTER — OFFICE VISIT (OUTPATIENT)
Dept: ORTHOPEDICS | Facility: CLINIC | Age: 69
End: 2019-01-17
Payer: OTHER GOVERNMENT

## 2019-01-17 ENCOUNTER — HOSPITAL ENCOUNTER (OUTPATIENT)
Dept: RADIOLOGY | Facility: HOSPITAL | Age: 69
Discharge: HOME | End: 2019-01-17
Attending: ORTHOPAEDIC SURGERY
Payer: OTHER GOVERNMENT

## 2019-01-17 VITALS — BODY MASS INDEX: 29.52 KG/M2 | WEIGHT: 230 LBS | HEIGHT: 74 IN

## 2019-01-17 DIAGNOSIS — Z96.641 HISTORY OF TOTAL RIGHT HIP REPLACEMENT: Primary | ICD-10-CM

## 2019-01-17 DIAGNOSIS — Z96.641 HISTORY OF TOTAL RIGHT HIP REPLACEMENT: ICD-10-CM

## 2019-01-17 PROCEDURE — 73502 X-RAY EXAM HIP UNI 2-3 VIEWS: CPT | Mod: TC,RT

## 2019-01-17 PROCEDURE — 99999 PR PBB SHADOW E&M-EST. PATIENT-LVL III: ICD-10-PCS | Mod: PBBFAC,,, | Performed by: ORTHOPAEDIC SURGERY

## 2019-01-17 PROCEDURE — 99213 OFFICE O/P EST LOW 20 MIN: CPT | Mod: PBBFAC,25 | Performed by: ORTHOPAEDIC SURGERY

## 2019-01-17 PROCEDURE — 99999 PR PBB SHADOW E&M-EST. PATIENT-LVL III: CPT | Mod: PBBFAC,,, | Performed by: ORTHOPAEDIC SURGERY

## 2019-01-17 PROCEDURE — 99213 PR OFFICE/OUTPT VISIT, EST, LEVL III, 20-29 MIN: ICD-10-PCS | Mod: S$PBB,,, | Performed by: ORTHOPAEDIC SURGERY

## 2019-01-17 PROCEDURE — 73502 XR HIP 2 VIEW RIGHT: ICD-10-PCS | Mod: 26,RT,, | Performed by: RADIOLOGY

## 2019-01-17 PROCEDURE — 73502 X-RAY EXAM HIP UNI 2-3 VIEWS: CPT | Mod: 26,RT,, | Performed by: RADIOLOGY

## 2019-01-17 PROCEDURE — 99213 OFFICE O/P EST LOW 20 MIN: CPT | Mod: S$PBB,,, | Performed by: ORTHOPAEDIC SURGERY

## 2019-01-17 RX ORDER — KETOCONAZOLE 20 MG/G
CREAM TOPICAL 2 TIMES DAILY
COMMUNITY

## 2019-01-17 RX ORDER — LATANOPROST 50 UG/ML
1 SOLUTION/ DROPS OPHTHALMIC NIGHTLY
COMMUNITY

## 2019-01-17 NOTE — PROGRESS NOTES
HPI:    Patient is here today for a chief complaint of right hip pain and the numbness in his feet.  He had a right total hip done by me in 2015.  No immediate complications.  Did heal with some heterotopic bone formation.  But patient has been able to weightbear.    Duration: 12 months  Intensity: mild  Character of pain: burning  Location: He reports that the pain is negative  For groin pain.    Past Medical History:   Diagnosis Date    Arthritis     Asthma     chronic    CKD (chronic kidney disease), stage III     patient denies    Demyelinating neuropathy     polyneuropathy    Diabetes     diet management    Gout     History of blood clots 2013    lower part of both legs    HLD (hyperlipidemia)     Hypertension     Iron deficiency anemia     BUZZ (obstructive sleep apnea)     CPAP          Current Outpatient Medications:     (Magic mouthwash) 1:1:1 Benadryl 12.5mg/5ml liq, aluminum & magnesium hydroxide-simehticone (Maalox), lidocaine viscous 2%, Swish and spit 10 mLs every 4 (four) hours as needed. for mouth sores, Disp: 1 Bottle, Rfl: 0    albuterol (PROVENTIL) 2.5 mg /3 mL (0.083 %) nebulizer solution, Take 2.5 mg by nebulization every 6 (six) hours as needed for Wheezing., Disp: , Rfl:     albuterol 90 mcg/actuation inhaler, Inhale 2 puffs into the lungs every 6 (six) hours as needed for Wheezing. , Disp: , Rfl:     amlodipine (NORVASC) 10 MG tablet, Take 10 mg by mouth once daily., Disp: , Rfl:     atorvastatin (LIPITOR) 40 MG tablet, Take 40 mg by mouth once daily. Take one-half tablet daily, Disp: , Rfl:     budesonide-formoterol 160-4.5 mcg (SYMBICORT) 160-4.5 mcg/actuation HFAA, Inhale 2 puffs into the lungs every 12 (twelve) hours. , Disp: , Rfl:     carvedilol (COREG) 25 MG tablet, Take 25 mg by mouth 2 (two) times daily., Disp: , Rfl:     cyanocobalamin (VITAMIN B-12) 100 MCG tablet, Take 100 mcg by mouth once daily. Take two tablets, Disp: , Rfl:     ketoconazole (NIZORAL) 2 %  "cream, Apply topically 2 (two) times daily., Disp: , Rfl:     latanoprost 0.005 % ophthalmic solution, Place 1 drop into both eyes every evening., Disp: , Rfl:     lidocaine (LIDODERM) 5 %, Place 1 patch onto the skin once daily. Wear for 12 hours, then remove. Do not apply a new patch for at least 12 hours., Disp: , Rfl:     lisinopril (PRINIVIL,ZESTRIL) 40 MG tablet, Take 40 mg by mouth once daily., Disp: , Rfl:     oxycodone (OXY-IR) 5 mg Cap, Take 2 capsules (10 mg total) by mouth every 6 (six) hours as needed., Disp: 120 capsule, Rfl: 0    tiotropium (SPIRIVA) 18 mcg inhalation capsule, Inhale 18 mcg into the lungs once daily. Controller, Disp: , Rfl:     allopurinol (ZYLOPRIM) 300 MG tablet, Take 300 mg by mouth once daily., Disp: , Rfl:     ferrous sulfate 325 mg (65 mg iron) Tab tablet, Take 325 mg by mouth once daily., Disp: , Rfl:     morphine (MSIR) 30 MG tablet, Take 30 mg by mouth 2 (two) times daily., Disp: , Rfl:      Review of patient's allergies indicates:   Allergen Reactions    Azithromycin      Muscles tense up    Acetaminophen Rash     Feels like needles are sticking him per pt        Exam:  Ht 6' 2" (1.88 m)   Wt 104.3 kg (230 lb)   BMI 29.53 kg/m²   Vital signs are stable.  Patient ambulates with a walker.  He has supple range of motion of both hips and knees.  Pedal pulses are strong.  Patient describes decreased sensation on the bottom of his feet.      Radiograph     Right total hip in excellent position with some heterotopic bone formation.    Plan:  Patient has diffuse neuropathy.  Right total hip is stable and there is no limitations to activities on the hip.  "

## 2019-01-18 ENCOUNTER — TELEPHONE (OUTPATIENT)
Dept: ORTHOPEDICS | Facility: CLINIC | Age: 69
End: 2019-01-18

## 2019-01-18 NOTE — TELEPHONE ENCOUNTER
----- Message from Argelia Winston sent at 1/18/2019  9:14 AM CST -----  Contact: [place]  Please call [place] at [phone]    Fax#[phone]    Need copies of the office notes and xray reports for 01/17/19    Thank you

## 2019-07-30 ENCOUNTER — LAB VISIT (OUTPATIENT)
Dept: LAB | Facility: HOSPITAL | Age: 69
End: 2019-07-30
Attending: ORTHOPAEDIC SURGERY
Payer: COMMERCIAL

## 2019-07-30 ENCOUNTER — OFFICE VISIT (OUTPATIENT)
Dept: ORTHOPEDICS | Facility: CLINIC | Age: 69
End: 2019-07-30
Payer: COMMERCIAL

## 2019-07-30 ENCOUNTER — TELEPHONE (OUTPATIENT)
Dept: ORTHOPEDICS | Facility: CLINIC | Age: 69
End: 2019-07-30

## 2019-07-30 VITALS — WEIGHT: 229.38 LBS | HEIGHT: 74 IN | BODY MASS INDEX: 29.44 KG/M2

## 2019-07-30 DIAGNOSIS — G89.29 CHRONIC HIP PAIN AFTER TOTAL REPLACEMENT OF HIP JOINT: ICD-10-CM

## 2019-07-30 DIAGNOSIS — Z96.649 CHRONIC HIP PAIN AFTER TOTAL REPLACEMENT OF HIP JOINT: ICD-10-CM

## 2019-07-30 DIAGNOSIS — M25.559 CHRONIC HIP PAIN AFTER TOTAL REPLACEMENT OF HIP JOINT: ICD-10-CM

## 2019-07-30 DIAGNOSIS — G89.29 CHRONIC HIP PAIN AFTER TOTAL REPLACEMENT OF HIP JOINT: Primary | ICD-10-CM

## 2019-07-30 DIAGNOSIS — Z96.649 CHRONIC HIP PAIN AFTER TOTAL REPLACEMENT OF HIP JOINT: Primary | ICD-10-CM

## 2019-07-30 DIAGNOSIS — M25.559 CHRONIC HIP PAIN AFTER TOTAL REPLACEMENT OF HIP JOINT: Primary | ICD-10-CM

## 2019-07-30 LAB
CRP SERPL-MCNC: 5.4 MG/L (ref 0–8.2)
ERYTHROCYTE [SEDIMENTATION RATE] IN BLOOD BY WESTERGREN METHOD: 42 MM/HR (ref 0–23)

## 2019-07-30 PROCEDURE — 99214 OFFICE O/P EST MOD 30 MIN: CPT | Mod: S$PBB,,, | Performed by: ORTHOPAEDIC SURGERY

## 2019-07-30 PROCEDURE — 86140 C-REACTIVE PROTEIN: CPT

## 2019-07-30 PROCEDURE — 99999 PR PBB SHADOW E&M-EST. PATIENT-LVL II: CPT | Mod: PBBFAC,,, | Performed by: ORTHOPAEDIC SURGERY

## 2019-07-30 PROCEDURE — 85652 RBC SED RATE AUTOMATED: CPT

## 2019-07-30 PROCEDURE — 36415 COLL VENOUS BLD VENIPUNCTURE: CPT

## 2019-07-30 PROCEDURE — 99214 PR OFFICE/OUTPT VISIT, EST, LEVL IV, 30-39 MIN: ICD-10-PCS | Mod: S$PBB,,, | Performed by: ORTHOPAEDIC SURGERY

## 2019-07-30 PROCEDURE — 99999 PR PBB SHADOW E&M-EST. PATIENT-LVL II: ICD-10-PCS | Mod: PBBFAC,,, | Performed by: ORTHOPAEDIC SURGERY

## 2019-07-30 PROCEDURE — 99212 OFFICE O/P EST SF 10 MIN: CPT | Mod: PBBFAC | Performed by: ORTHOPAEDIC SURGERY

## 2019-07-30 NOTE — TELEPHONE ENCOUNTER
----- Message from Candida Villagomez sent at 7/30/2019  3:55 PM CDT -----  Contact: [phone]  Pt stated that he would like for his MRI to be scheduled after 08/05 because he is out of town until that day         We spoke  He wants late at night  After 8/5  I will call he later     
no

## 2019-07-30 NOTE — PROGRESS NOTES
HPI:    Patient is here today for a chief complaint of right hip pain.   Status post right total hip done 2015 for avascular necrosis.  Patient has developed heterotopic bone.  Patient has difficulty weight-bearing.  Also has some low back issues.  The epidural injection seemed to help his back but hip pain is gotten worse.    Duration: 2 months  Intensity: severe  Character of pain: sharp  Location: He reports that the pain is positive  For groin pain.    Past Medical History:   Diagnosis Date    Arthritis     Asthma     chronic    CKD (chronic kidney disease), stage III     patient denies    Demyelinating neuropathy     polyneuropathy    Diabetes     diet management    Gout     History of blood clots 2013    lower part of both legs    HLD (hyperlipidemia)     Hypertension     Iron deficiency anemia     BUZZ (obstructive sleep apnea)     CPAP          Current Outpatient Medications:     (Magic mouthwash) 1:1:1 Benadryl 12.5mg/5ml liq, aluminum & magnesium hydroxide-simehticone (Maalox), lidocaine viscous 2%, Swish and spit 10 mLs every 4 (four) hours as needed. for mouth sores, Disp: 1 Bottle, Rfl: 0    albuterol (PROVENTIL) 2.5 mg /3 mL (0.083 %) nebulizer solution, Take 2.5 mg by nebulization every 6 (six) hours as needed for Wheezing., Disp: , Rfl:     albuterol 90 mcg/actuation inhaler, Inhale 2 puffs into the lungs every 6 (six) hours as needed for Wheezing. , Disp: , Rfl:     allopurinol (ZYLOPRIM) 300 MG tablet, Take 300 mg by mouth once daily., Disp: , Rfl:     amlodipine (NORVASC) 10 MG tablet, Take 10 mg by mouth once daily., Disp: , Rfl:     atorvastatin (LIPITOR) 40 MG tablet, Take 40 mg by mouth once daily. Take one-half tablet daily, Disp: , Rfl:     budesonide-formoterol 160-4.5 mcg (SYMBICORT) 160-4.5 mcg/actuation HFAA, Inhale 2 puffs into the lungs every 12 (twelve) hours. , Disp: , Rfl:     carvedilol (COREG) 25 MG tablet, Take 25 mg by mouth 2 (two) times daily., Disp: , Rfl:  "    cyanocobalamin (VITAMIN B-12) 100 MCG tablet, Take 100 mcg by mouth once daily. Take two tablets, Disp: , Rfl:     ferrous sulfate 325 mg (65 mg iron) Tab tablet, Take 325 mg by mouth once daily., Disp: , Rfl:     ketoconazole (NIZORAL) 2 % cream, Apply topically 2 (two) times daily., Disp: , Rfl:     latanoprost 0.005 % ophthalmic solution, Place 1 drop into both eyes every evening., Disp: , Rfl:     lidocaine (LIDODERM) 5 %, Place 1 patch onto the skin once daily. Wear for 12 hours, then remove. Do not apply a new patch for at least 12 hours., Disp: , Rfl:     lisinopril (PRINIVIL,ZESTRIL) 40 MG tablet, Take 40 mg by mouth once daily., Disp: , Rfl:     morphine (MSIR) 30 MG tablet, Take 30 mg by mouth 2 (two) times daily., Disp: , Rfl:     oxycodone (OXY-IR) 5 mg Cap, Take 2 capsules (10 mg total) by mouth every 6 (six) hours as needed., Disp: 120 capsule, Rfl: 0    tiotropium (SPIRIVA) 18 mcg inhalation capsule, Inhale 18 mcg into the lungs once daily. Controller, Disp: , Rfl:      Review of patient's allergies indicates:   Allergen Reactions    Azithromycin      Muscles tense up    Acetaminophen Rash     Feels like needles are sticking him per pt        Exam:  Ht 6' 2" (1.88 m)   Wt 104.1 kg (229 lb 6.2 oz)   BMI 29.45 kg/m²   Vital signs are stable.  Patient is alert and oriented x3.  Patient is ambulating with an antalgic gait.  Patient has tenderness in his lower lumbar spine.  He has slight decreased range of motion of the hip but the rotation of the hip is still good.  Patient has moderate edema in the lower extremities.  Radiograph right total hip in excellent position.  Moderately severe heterotopic bone formation.    Plan:  Will get a CRP and sed rate today to make sure there is no infection.  May need to start the patient on some strong anti-inflammatories.  May consider repeating the MRI of the lumbar spine.  "

## 2019-07-31 ENCOUNTER — TELEPHONE (OUTPATIENT)
Dept: ORTHOPEDICS | Facility: CLINIC | Age: 69
End: 2019-07-31

## 2019-07-31 DIAGNOSIS — M53.9 DORSOPATHIES: ICD-10-CM

## 2019-08-07 ENCOUNTER — HOSPITAL ENCOUNTER (OUTPATIENT)
Dept: RADIOLOGY | Facility: HOSPITAL | Age: 69
Discharge: HOME | End: 2019-08-07
Attending: ORTHOPAEDIC SURGERY
Payer: COMMERCIAL

## 2019-08-07 DIAGNOSIS — M53.9 DORSOPATHIES: ICD-10-CM

## 2019-08-07 PROCEDURE — 72148 MRI LUMBAR SPINE WITHOUT CONTRAST: ICD-10-PCS | Mod: 26,,, | Performed by: RADIOLOGY

## 2019-08-07 PROCEDURE — 72148 MRI LUMBAR SPINE W/O DYE: CPT | Mod: TC

## 2019-08-07 PROCEDURE — 72148 MRI LUMBAR SPINE W/O DYE: CPT | Mod: 26,,, | Performed by: RADIOLOGY

## 2019-08-15 ENCOUNTER — TELEPHONE (OUTPATIENT)
Dept: ORTHOPEDICS | Facility: CLINIC | Age: 69
End: 2019-08-15

## 2019-08-15 NOTE — TELEPHONE ENCOUNTER
----- Message from Argelia Winston sent at 8/15/2019 12:21 PM CDT -----  Contact: patient   Please call pt at [phone]    Patient would like to get his MRI scan results and a f/u [facility] app[facility]    Is there another [facility] authorization in [facility]?    Thank you   we spoke   He wants the MRI results  It was of the lumbar spine not hip , he wants Dr [facility] to call him   I have everything waiting for his return  On Monday  , Mr Dior aware

## 2019-08-19 ENCOUNTER — TELEPHONE (OUTPATIENT)
Dept: ORTHOPEDICS | Facility: CLINIC | Age: 69
End: 2019-08-19

## 2019-08-19 DIAGNOSIS — G89.29 CHRONIC LOW BACK PAIN, UNSPECIFIED BACK PAIN LATERALITY, WITH SCIATICA PRESENCE UNSPECIFIED: Primary | ICD-10-CM

## 2019-08-19 DIAGNOSIS — M54.5 CHRONIC LOW BACK PAIN, UNSPECIFIED BACK PAIN LATERALITY, WITH SCIATICA PRESENCE UNSPECIFIED: Primary | ICD-10-CM

## 2019-08-19 NOTE — TELEPHONE ENCOUNTER
Dr [facility] called & Spoke with Mr Barby  He wants him to have his L4=L5 injected    I will set up & confirm with the pt

## 2019-08-21 ENCOUNTER — TELEPHONE (OUTPATIENT)
Dept: ORTHOPEDICS | Facility: CLINIC | Age: 69
End: 2019-08-21

## 2019-08-21 NOTE — TELEPHONE ENCOUNTER
We spoke   I asked if he was taking any blood thinners   He said YES  Xarelto 20 mg  One every day he had forgotten to mention that last time he was here !

## 2019-08-21 NOTE — TELEPHONE ENCOUNTER
We spoke  [facility] wants pain management to help him   appt booked with Dr Vasquez for 9/17 @ 9:15 am   appt slip sent

## 2019-08-27 ENCOUNTER — TELEPHONE (OUTPATIENT)
Dept: PAIN MEDICINE | Facility: CLINIC | Age: 69
End: 2019-08-27

## 2019-08-27 NOTE — TELEPHONE ENCOUNTER
My name is Staff, I am contacting you from [facility] pain [facility] regarding your appointment scheduled for 08.28.19, with Dr. Vasquez, just confirming you will be able to make it.    If you feel you need to reschedule or canceled please give our office a call so we can better assist you.      Staff requesting patient to arrive 15 mins ahead of schedule appointment time.    Pt verbalized understanding and has confirmed appt

## 2019-08-28 ENCOUNTER — OFFICE VISIT (OUTPATIENT)
Dept: PAIN MEDICINE | Facility: CLINIC | Age: 69
End: 2019-08-28
Attending: ANESTHESIOLOGY
Payer: COMMERCIAL

## 2019-08-28 VITALS
BODY MASS INDEX: 29.29 KG/M2 | DIASTOLIC BLOOD PRESSURE: 109 MMHG | SYSTOLIC BLOOD PRESSURE: 196 MMHG | TEMPERATURE: 99 F | WEIGHT: 228.19 LBS | HEART RATE: 72 BPM | HEIGHT: 74 IN

## 2019-08-28 DIAGNOSIS — M25.551 RIGHT HIP PAIN: ICD-10-CM

## 2019-08-28 DIAGNOSIS — M87.059 AVASCULAR NECROSIS OF FEMORAL HEAD, UNSPECIFIED LATERALITY: Primary | ICD-10-CM

## 2019-08-28 DIAGNOSIS — Z96.641 HISTORY OF RIGHT HIP REPLACEMENT: ICD-10-CM

## 2019-08-28 PROCEDURE — 99999 PR PBB SHADOW E&M-EST. PATIENT-LVL III: CPT | Mod: PBBFAC,,, | Performed by: ANESTHESIOLOGY

## 2019-08-28 PROCEDURE — 99204 OFFICE O/P NEW MOD 45 MIN: CPT | Mod: S$PBB,,, | Performed by: ANESTHESIOLOGY

## 2019-08-28 PROCEDURE — 99999 PR PBB SHADOW E&M-EST. PATIENT-LVL III: ICD-10-PCS | Mod: PBBFAC,,, | Performed by: ANESTHESIOLOGY

## 2019-08-28 PROCEDURE — 99204 PR OFFICE/OUTPT VISIT, NEW, LEVL IV, 45-59 MIN: ICD-10-PCS | Mod: S$PBB,,, | Performed by: ANESTHESIOLOGY

## 2019-08-28 PROCEDURE — 99213 OFFICE O/P EST LOW 20 MIN: CPT | Mod: PBBFAC | Performed by: ANESTHESIOLOGY

## 2019-08-28 RX ORDER — ERGOCALCIFEROL 1.25 MG/1
50000 CAPSULE ORAL
COMMUNITY

## 2019-08-28 RX ORDER — WARFARIN SODIUM 5 MG/1
TABLET ORAL
COMMUNITY
End: 2020-02-27

## 2019-08-28 NOTE — PROGRESS NOTES
"Subjective:      Patient ID: Macario Dior Jr. is a 69 y.o. male.    Chief Complaint: No chief complaint on file.    Referred by: [facility], John L. Jr., MD     Macario Dior is a 69 y.o. Man with PMH DVT s/p right DHRUV that presents today as a new consult for low back pain.  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 Since 2015 DHRUV, he initially had improved but progressive pain in the right hip and low back.  In the last 6 months, he has had 4 back injections from Dr. De Los Santos that lasted for 2 weeks each.  1 month ago, he had a Lumbar ABIODUN that provided 100% relief that lasted 5 days for the low back but the pain changed to radiate to the right hip.  Since the ABIODUN relief stopped, the pain has "worked its way back up to the low back."  The pain is described as intense, sharp, and shoots up from the right hip into the low back to the right periscapular area. The shooting pain is worse than the low back pain. The pain is interfering with everything, including walking, sitting, and putting on his shoes.  Oxycodone 10mg Q6H provides relief. Lidocaine patches and TENS provide minimal relief when he wakes up.  He did physical therapy for 1 year with minimal relief.  He is unable to regularly exercise secondary to pain. He is radha to get 4 hours of uninterrupted sleep per night.  He wakes up with stiffness in his low back despite $4,000 mattresses.  He has weakness and buckling in the right lower extremity.  He endorses worsening falls due to weakness "seems like the leg is not there." He says " I need the bone removed" referring to his right hip. [facility] is a  [facility] He denies incontinence and saddle anesthesia.        Interventional Pain History  Lumbar ABIODUN 100% relief 5 days - Dr. De Los Santos   Lumbar TPIs 0% relief - Dr. De Los Santos     Past Medical History:   Diagnosis Date    Arthritis     Asthma     chronic    CKD (chronic kidney disease), stage III     patient denies    " Demyelinating neuropathy     polyneuropathy    Diabetes     diet management    Gout     History of blood clots 2013    lower part of both legs    HLD (hyperlipidemia)     Hypertension     Iron deficiency anemia     BUZZ (obstructive sleep apnea)     CPAP       Past Surgical History:   Procedure Laterality Date    COLONOSCOPY N/A 9/21/2017    Performed by Stuart [facility]idad [facility] at [facility] ENDO (4TH FLR)    COLONOSCOPY N/A 11/7/2016    Performed by Shar Weiss MD at [facility] ENDO (4TH FLR)    ESOPHAGOGASTRODUODENOSCOPY (EGD) N/A 9/21/2017    Performed by Stuart Trinidad [facility] at [facility] ENDO (4TH FLR)    ESOPHAGOGASTRODUODENOSCOPY (EGD) N/A 11/7/2016    Performed by Shar Weiss MD at [facility] ENDO (4TH FLR)    H[facility]GERY Right 2008    [facility]    HIP SURGERY Right 8-31-15    [facility]    LEG SURGERY Left 2012    fibula and tibia    REPLACEMENT-HIP-TOTAL WITH NAVIGATION Right 8/31/2015    Performed by [facility] at [facility] OR 2ND FLR    SHOULDER SURGERY Right 1989    torn ligament       Review of patient's allergies indicates:   Allergen Reactions    Azithromycin      Muscles tense up    Acetaminophen Rash     Feels like needles are sticking him per pt       Current Outpatient Medications   Medication Sig Dispense Refill    (Magic mouthwash) 1:1:1 Benadryl 12.5mg/5ml liq, aluminum & magnesium hydroxide-simehticone (Maalox), lidocaine viscous 2% Swish and spit 10 mLs every 4 (four) hours as needed. for mouth sores 1 Bottle 0    albuterol (PROVENTIL) 2.5 mg /3 mL (0.083 %) nebulizer solution Take 2.5 mg by nebulization every 6 (six) hours as needed for Wheezing.      albuterol 90 mcg/actuation inhaler Inhale 2 puffs into the lungs every 6 (six) hours as needed for Wheezing.       amlodipine (NORVASC) 10 MG tablet Take 10 mg by mouth once daily.      atorvastatin (LIPITOR) 40 MG tablet Take 40 mg by mouth once daily. Take one-half tablet daily      budesonide-formoterol 160-4.5 mcg  (SYMBICORT) 160-4.5 mcg/actuation HFAA Inhale 2 puffs into the lungs every 12 (twelve) hours.       carvedilol (COREG) 25 MG tablet Take 25 mg by mouth 2 (two) times daily.      cyanocobalamin (VITAMIN B-12) 100 MCG tablet Take 100 mcg by mouth once daily. Take two tablets      ketoconazole (NIZORAL) 2 % cream Apply topically 2 (two) times daily.      latanoprost 0.005 % ophthalmic solution Place 1 drop into both eyes every evening.      lidocaine (LIDODERM) 5 % Place 1 patch onto the skin once daily. Wear for 12 hours, then remove. Do not apply a new patch for at least 12 hours.      oxycodone (OXY-IR) 5 mg Cap Take 2 capsules (10 mg total) by mouth every 6 (six) hours as needed. 120 capsule 0    allopurinol (ZYLOPRIM) 300 MG tablet Take 300 mg by mouth once daily.      ergocalciferol (ERGOCALCIFEROL) 50,000 unit Cap Take 50,000 Units by mouth.      ferrous sulfate 325 mg (65 mg iron) Tab tablet Take 325 mg by mouth once daily.      lisinopril (PRINIVIL,ZESTRIL) 40 MG tablet Take 40 mg by mouth once daily.      morphine (MSIR) 30 MG tablet Take 30 mg by mouth 2 (two) times daily.      tiotropium (SPIRIVA) 18 mcg inhalation capsule Inhale 18 mcg into the lungs once daily. Controller      warfarin (COUMADIN) 5 MG tablet Take by mouth.       No current facility-administered medications for this visit.        Family History   Problem Relation Age of Onset    Cancer Mother         ovarian    Cancer Sister         breast    Cancer Sister         breast       Social History     Socioeconomic History    Marital status: Single     Spouse name: Not on file    Number of children: Not on file    Years of education: Not on file    Highest education level: Not on file   Occupational History    Not on file   Social Needs    Financial resource strain: Not on file    Food insecurity:     Worry: Not on file     Inability: Not on file    Transportation needs:     Medical: Not on file     Non-medical: Not on  "file   Tobacco Use    Smoking status: Former Smoker     Types: Cigars     Last attempt to quit: 8/13/2009     Years since quitting: 10.0    Smokeless tobacco: Never Used   Substance and Sexual Activity    Alcohol use: No     Alcohol/week: 0.0 oz    Drug use: No    Sexual activity: Not Currently   Lifestyle    Physical activity:     Days per week: Not on file     Minutes per session: Not on file    Stress: Not on file   Relationships    Social connections:     Talks on phone: Not on file     Gets together: Not on file     Attends Advent service: Not on file     Active member of club or organization: Not on file     Attends meetings of clubs or organizations: Not on file     Relationship status: Not on file   Other Topics Concern    Not on file   Social History Narrative    Not on file           Review of Systems   Constitution: Positive for weight loss. Negative for chills, decreased appetite, fever and night sweats.   Eyes: Negative for double vision, vision loss in left eye, vision loss in right eye and visual disturbance.   Cardiovascular: Negative for chest pain.   Respiratory: Negative for shortness of breath.    Hematologic/Lymphatic: Does not bruise/bleed easily.        Coumadin 5mg   Skin: Negative for rash.   Musculoskeletal: Positive for back pain, falls, muscle cramps, muscle weakness and stiffness. Negative for joint swelling, myalgias and neck pain.   Gastrointestinal: Negative for abdominal pain, constipation and diarrhea.   Genitourinary: Negative for bladder incontinence.   Neurological: Positive for focal weakness, headaches and numbness. Negative for paresthesias and weakness.   Psychiatric/Behavioral: Negative for altered mental status, depression and suicidal ideas. The patient is not nervous/anxious.            Objective:   BP (!) 196/109   Pulse 72   Temp 98.7 °F (37.1 °C)   Ht 6' 2" (1.88 m)   Wt 103.5 kg (228 lb 2.8 oz)   BMI 29.30 kg/m²   Pain Disability Index Review:  Last " 3 PDI Scores 2019   Pain Disability Index (PDI) 0     Normocephalic.  Atraumatic.  Affect appropriate.  Breathing unlabored.  Extra ocular muscles intact.         General    Constitutional: He appears well-developed and well-nourished.   HENT:   Head: Atraumatic.   Eyes: EOM are normal.   Cardiovascular: Intact distal pulses.    Pulmonary/Chest: Effort normal.   Neurological: He is alert.     General Musculoskeletal Exam   Gait: normal     Right Ankle/Foot Exam     Tests   Heel Walk: unable to perform  Tiptoe Walk: unable to perform    Left Ankle/Foot Exam     Tests   Heel Walk: able to perform  Tiptoe Walk: able to perform      Right Hip Exam     Inspection   Scars: present    Tenderness   The patient tender to palpation of the SI joint and piriformis.    Tests   Pain w/ forced internal rotation (LILIA): absent  Susan: negative  Log Roll: positive    Comments:  Pain with lumbar Flexion > extension, hip ER > IR  Special tests difficult to test due to diffuse right hip pain  Back (L-Spine & T-Spine) / Neck (C-Spine) Exam     Tenderness Right paramedian tenderness of the Lower L-Spine.     Back (L-Spine & T-Spine) Range of Motion   Extension: normal   Flexion: abnormal   Lateral bend right: abnormal   Lateral bend left: normal   Rotation right: abnormal   Rotation left: normal     Spinal Sensation   Right Side Sensation  L-Spine Level: normal  Left Side Sensation  L-Spine Level: normal    Back (L-Spine & T-Spine) Tests   Right Side Tests  Femoral Stretch: negative  Left Side Tests  Femoral Stretch: negative    Comments:  Myofascial pain right rhomboids      Muscle Strength   Right Lower Extremity   Hip Abduction: 4/5   Hip Flexion: 4/5   Quadriceps:  4/5   Hamstrin/5   Anterior tibial:  5/5/5  Gastrocsoleus:  5/5/5  EHL:  5/5  Left Lower Extremity   Hip Abduction: 5/5   Hip Flexion: 5/5   Quadriceps:  5/5   Hamstrin/5   Anterior tibial:  /5/5   Gastrocsoleus:  5/5/5  EHL:  5/5    Reflexes     Left  Side  Quadriceps:  1+  Achilles:  1+  Left Kathleen's Sign:  Absent  Babinski Sign:  absent  Ankle Clonus:  absent    Right Side   Quadriceps:  1+  Achilles:  1+  Right Kathleen's Sign:  absent  Babinski Sign:  absent  Ankle Clonus:  absent        Assessment:       Encounter Diagnoses   Name Primary?    Avascular necrosis of femoral head, unspecified laterality Yes    History of right hip replacement     Right hip pain          Plan:   We discussed with the patient the assessment and recommendations. The following is the plan we agreed on:        Diagnoses and all orders for this visit:    Avascular necrosis of femoral head, unspecified laterality    History of right hip replacement    Right hip pain    1. Scheduled right obturator and femoral nerve block fluoroscopy  2. Patient will call to schedule cooled RFA if blocks diagnostic  3. Discussed additional treatment options for Heterotopic ossification including radiation and surgery  4. Patient is on coumadin 5mg. Patient instructed to hold coumadin due to bleeding risk  5. Resume PT once hip pain is improved  6. RTC 2 weeks after last procedure    Jonny Garcia MD  [employer] PM&R PGY-2       I have personally taken the history and examined this patient and agree with the resident's note as stated above.

## 2019-08-28 NOTE — LETTER
August 30, 2019      [facility] L. [facility] Jr., MD  [address]  [facility] [address]           [facility] [place] [facility] [address]  [address] [place] [address]  [facility] [address]  Phone: [phone]  Fax: [phone]          Patient: Macario Dior Jr.   MR Number: [id number]   YOB: 1950   Date of Visit: 8/28/2019       Dear Dr. [facility] L. [facility] Jr.:    Thank you for referring Macario Dior to me for evaluation. Attached you will find relevant portions of my assessment and plan of care.    If you have questions, please do not hesitate to call me. I look forward to following Macario Dior along with you.    Sincerely,    Moy Vasquez MD    Enclosure  CC:  No Recipients    If you would like to receive this communication electronically, please contact [email or web address] or [phone] to request more information on [employer] access.    For providers and/or their staff who would like to refer a patient to [facility], please contact us through our one-stop-shop provider referral line, [facility], at [phone].    If you feel you have received this communication in error or would no longer like to receive these types of communications, please e-mail [email or web address]

## 2019-08-29 ENCOUNTER — TELEPHONE (OUTPATIENT)
Dept: PAIN MEDICINE | Facility: CLINIC | Age: 69
End: 2019-08-29

## 2019-08-29 NOTE — TELEPHONE ENCOUNTER
----- Message from Daly Garcia sent at 8/29/2019 10:48 AM CDT -----  Contact: ANTELMO FORBES JR. [[id number]]  Name of Who is Calling : ANTELMO FORBES JR. [[id number]]    What is the request in detail :     Patient is requesting a call from staff in regards to a procedure and talking with his PCP about his blood thinner medication  .....Please contact to further discuss and advise.    Can the clinic reply by [facility] : phone call only    What Number to Call Back : [phone]

## 2019-08-29 NOTE — TELEPHONE ENCOUNTER
Staff contacted and spoke with patient in regard to his message.    Staff informed patient that the scheduling department will contact him as soon as they get the clearance to hold off on his anticoagulant to be schedule to have injection with provider Dr. Vasquez      Pt verbalized understanding.

## 2019-09-03 ENCOUNTER — TELEPHONE (OUTPATIENT)
Dept: PAIN MEDICINE | Facility: CLINIC | Age: 69
End: 2019-09-03

## 2019-09-03 NOTE — TELEPHONE ENCOUNTER
Staff contacted and spoke with patient in regards to his message.    Staff tried to retrieve information of pt PCP who controls his anticoagulant     Patient stated he will give information to schedulers since she is on hold on the other line.    Staff informed pt they will make note of it.    Pt verbalized understanding.

## 2019-09-03 NOTE — TELEPHONE ENCOUNTER
----- Message from Beatrice Anders sent at 9/3/2019  9:16 AM CDT -----  Contact: ANTELMO FORBES JR. [[id number]]  Type:  Patient Returning Call    Who Called: ANTELMO FORBES JR. [[id number]]    Who Left Message for Patient:     Does the patient know what this is regarding?: yes    Best Call Back Number: [phone]    Additional Information:

## 2019-09-16 ENCOUNTER — TELEPHONE (OUTPATIENT)
Dept: PAIN MEDICINE | Facility: CLINIC | Age: 69
End: 2019-09-16

## 2019-09-16 NOTE — TELEPHONE ENCOUNTER
----- Message from Daly Garcia sent at 9/16/2019 10:56 AM CDT -----  Contact: ANTELMO FORBES JR. [[id number]]   Name of Who is Calling : ANTELMO FORBES JR. [[id number]]    What is the request in detail :     Patient is requesting a call from staff in regards to scheduling an appointment for injections for his back      ..... Please contact to further discuss and advise    Can the clinic reply by [facility] : phone call only    What Number to Call Back  : [phone]

## 2019-09-16 NOTE — TELEPHONE ENCOUNTER
Macario Watson Jr. [phone]  3 minutes ago (11:41 AM)     Returned pts call, pt stated pt would call back shortly, pt given direct # to scheduling [phone]. Clearance for Xarelto has not been received [facility].    Outgoing call

## 2019-10-22 RX ORDER — RIVAROXABAN 20 MG/1
20 TABLET, FILM COATED ORAL
COMMUNITY
End: 2023-06-23

## 2019-10-23 ENCOUNTER — HOSPITAL ENCOUNTER (OUTPATIENT)
Facility: OTHER | Age: 69
Discharge: HOME | End: 2019-10-23
Attending: ANESTHESIOLOGY | Admitting: ANESTHESIOLOGY
Payer: OTHER GOVERNMENT

## 2019-10-23 VITALS
RESPIRATION RATE: 16 BRPM | OXYGEN SATURATION: 97 % | DIASTOLIC BLOOD PRESSURE: 105 MMHG | HEART RATE: 60 BPM | SYSTOLIC BLOOD PRESSURE: 224 MMHG

## 2019-10-23 DIAGNOSIS — G89.29 CHRONIC PAIN: ICD-10-CM

## 2019-10-23 DIAGNOSIS — M16.9 OSTEOARTHRITIS OF HIP, UNSPECIFIED LATERALITY, UNSPECIFIED OSTEOARTHRITIS TYPE: ICD-10-CM

## 2019-10-23 DIAGNOSIS — G89.4 CHRONIC PAIN SYNDROME: Primary | ICD-10-CM

## 2019-10-23 LAB — POCT GLUCOSE: 92 MG/DL (ref 70–110)

## 2019-10-23 PROCEDURE — 25000003 PHARM REV CODE 250: Performed by: ANESTHESIOLOGY

## 2019-10-23 PROCEDURE — 64450 NJX AA&/STRD OTHER PN/BRANCH: CPT

## 2019-10-23 PROCEDURE — 64450 PR NERVE BLOCK INJ, ANES/STEROID, OTHER PERIPHERAL: ICD-10-PCS | Mod: RT,,, | Performed by: ANESTHESIOLOGY

## 2019-10-23 PROCEDURE — 64450 NJX AA&/STRD OTHER PN/BRANCH: CPT | Mod: RT,,, | Performed by: ANESTHESIOLOGY

## 2019-10-23 PROCEDURE — 64447 NJX AA&/STRD FEMORAL NRV IMG: CPT | Performed by: ANESTHESIOLOGY

## 2019-10-23 RX ORDER — BUPIVACAINE HYDROCHLORIDE 2.5 MG/ML
INJECTION, SOLUTION EPIDURAL; INFILTRATION; INTRACAUDAL
Status: DISCONTINUED | OUTPATIENT
Start: 2019-10-23 | End: 2019-10-23 | Stop reason: HOSPADM

## 2019-10-23 RX ORDER — LIDOCAINE HYDROCHLORIDE 10 MG/ML
INJECTION INFILTRATION; PERINEURAL
Status: DISCONTINUED | OUTPATIENT
Start: 2019-10-23 | End: 2019-10-23 | Stop reason: HOSPADM

## 2019-10-23 RX ORDER — SODIUM CHLORIDE 9 MG/ML
500 INJECTION, SOLUTION INTRAVENOUS CONTINUOUS
Status: DISCONTINUED | OUTPATIENT
Start: 2019-10-23 | End: 2019-10-23 | Stop reason: HOSPADM

## 2019-10-23 NOTE — H&P
HPI  Patient presenting for Procedure(s) (LRB):  BLOCK, NERVE, Obturator and Femoral cleared to hold xarelto 3 days pt. said he's not taking coumadin (Right)     Patient on Anti-coagulation No    No health changes since previous encounter    Past Medical History:   Diagnosis Date    Arthritis     Asthma     chronic    CKD (chronic kidney disease), stage III     patient denies    Demyelinating neuropathy     polyneuropathy    Diabetes     diet management    Gout     History of blood clots 2013    lower part of both legs    HLD (hyperlipidemia)     Hypertension     Iron deficiency anemia     BUZZ (obstructive sleep apnea)     CPAP     Past Surgical History:   Procedure Laterality Date    COLONOSCOPY N/A 11/7/2016    Procedure: COLONOSCOPY;  Surgeon: Shar Weiss MD;  Location: [facility] ENDO ([address] [place] [address]);  Service: Endoscopy;  Laterality: N/A;  [facility] Referral. Okay to hold Coumadin 5 days prior to procedure. See Referral scaned in media tab on 10/20/16.    COLONOSCOPY N/A 9/21/2017    Procedure: COLONOSCOPY;  Surgeon: Stuart Trinidad MD;  Location: [facility] ([address] [place] [address]);  Service: Endoscopy;  Laterality: N/A;  [facility] Olivarez-see scanned docs under media tab          9/6/17-[facility] and last [facility] visit scanned into chart    HIP SURGERY Right 2008    expl[facility]atory at [facility]    HIP SURGERY Right 8-31-15    [facility]    LEG SURGERY Left 2012    fibula and tibia    SHOULDER SURGERY Right 1989    torn ligament     Review of patient's allergies indicates:   Allergen Reactions    Azithromycin      Muscles tense up    Acetaminophen Rash     Feels like needles are sticking him per pt      No current facility-administered medications for this encounter.        PMHx, PSHx, Allergies, Medications reviewed in epic    ROS negative except pain complaints in HPI    OBJECTIVE:    There were no vitals taken for this visit.    PHYSICAL EXAMINATION:    GENERAL: Well appearing, in no  acute distress, alert and oriented x3.  PSYCH:  Mood and affect appropriate.  SKIN: Skin color, texture, turgor normal, no rashes or lesions which will impact the procedure.  CV: RRR with palpation of the radial artery.  PULM: No evidence of respiratory difficulty, symmetric chest rise. Clear to auscultation.  NEURO: Cranial nerves grossly intact.    Plan:    Proceed with procedure as planned Procedure(s) (LRB):  BLOCK, NERVE, Obturator and Femoral cleared to hold xarelto 3 days pt. said he's not taking coumadin (Right)    Waldemar Yung  10/23/2019

## 2019-10-23 NOTE — DISCHARGE INSTRUCTIONS

## 2019-10-23 NOTE — DISCHARGE SUMMARY
Discharge Note  Short Stay      SUMMARY     Admit Date: 10/23/2019    Attending Physician: Moy Vasquez      Discharge Physician: Moy Vasquez      Discharge Date: 10/23/2019 10:02 AM    Procedure(s) (LRB):  BLOCK, NERVE, Obturator and Femoral cleared to hold xarelto 3 days pt. said he's not taking coumadin (Right)    Final Diagnosis: Right hip pain [M25.551]    Disposition: Home or self care    Patient Instructions:   Current Discharge Medication List      CONTINUE these medications which have NOT CHANGED    Details   rivaroxaban (XARELTO ORAL) Take 10 mg by mouth.      (Magic mouthwash) 1:1:1 Benadryl 12.5mg/5ml liq, aluminum & magnesium hydroxide-simehticone (Maalox), lidocaine viscous 2% Swish and spit 10 mLs every 4 (four) hours as needed. for mouth sores  Qty: 1 Bottle, Refills: 0      albuterol (PROVENTIL) 2.5 mg /3 mL (0.083 %) nebulizer solution Take 2.5 mg by nebulization every 6 (six) hours as needed for Wheezing.      albuterol 90 mcg/actuation inhaler Inhale 2 puffs into the lungs every 6 (six) hours as needed for Wheezing.       allopurinol (ZYLOPRIM) 300 MG tablet Take 300 mg by mouth once daily.      amlodipine (NORVASC) 10 MG tablet Take 10 mg by mouth once daily.      atorvastatin (LIPITOR) 40 MG tablet Take 40 mg by mouth once daily. Take one-half tablet daily      budesonide-formoterol 160-4.5 mcg (SYMBICORT) 160-4.5 mcg/actuation HFAA Inhale 2 puffs into the lungs every 12 (twelve) hours.       carvedilol (COREG) 25 MG tablet Take 25 mg by mouth 2 (two) times daily.      cyanocobalamin (VITAMIN B-12) 100 MCG tablet Take 100 mcg by mouth once daily. Take two tablets      ergocalciferol (ERGOCALCIFEROL) 50,000 unit Cap Take 50,000 Units by mouth.      ferrous sulfate 325 mg (65 mg iron) Tab tablet Take 325 mg by mouth once daily.      ketoconazole (NIZORAL) 2 % cream Apply topically 2 (two) times daily.      latanoprost 0.005 % ophthalmic solution Place 1 drop into both eyes every evening.       lidocaine (LIDODERM) 5 % Place 1 patch onto the skin once daily. Wear for 12 hours, then remove. Do not apply a new patch for at least 12 hours.      lisinopril (PRINIVIL,ZESTRIL) 40 MG tablet Take 40 mg by mouth once daily.      morphine (MSIR) 30 MG tablet Take 30 mg by mouth 2 (two) times daily.      oxycodone (OXY-IR) 5 mg Cap Take 2 capsules (10 mg total) by mouth every 6 (six) hours as needed.  Qty: 120 capsule, Refills: 0      tiotropium (SPIRIVA) 18 mcg inhalation capsule Inhale 18 mcg into the lungs once daily. Controller      warfarin (COUMADIN) 5 MG tablet Take by mouth.                 Discharge Diagnosis: Right hip pain [M25.551]  Condition on Discharge: Stable with no complications to procedure   Diet on Discharge: Same as before.  Activity: as per instruction sheet.  Discharge to: Home with a responsible adult.  Follow up: 2-4 weeks       Please call my office or pager at [phone] if experienced any weakness or loss of sensation, fever > 101.5, pain uncontrolled with oral medications, persistent nausea/vomiting/or diarrhea, redness or drainage from the incisions, or any other worrisome concerns. If physician on call was not reached or could not communicate with our office for any reason please go to the nearest emergency department

## 2019-10-23 NOTE — OP NOTE
"Block of Femoral and Obturator Nerves Under Fluoroscopy   Time-out taken to identify patient and procedure side prior to starting the procedure.   I attest that I have reviewed the patient's home medications prior to the procedure and no contraindication have been identified. I  re-evaluated the patient after the patient was positioned for the procedure in the procedure room immediately before the procedural time-out. The vital signs are current and represent the current state of the patient which has not significantly changed since the preprocedure assessment.    Date of Service: 10/23/2019      PCP: [facility]  Referring Physician:     PROCEDURE:  Right  femoral and obturator nerve blocks    REASON FOR PROCEDURE: Right hip pain [M25.551]  1. Chronic pain syndrome    2. Osteoarthritis of hip, unspecified laterality, unspecified osteoarthritis type    3. Chronic pain      POSTPROCEDURE DIAGNOSIS:   Right hip pain [M25.551]    1. Chronic pain syndrome    2. Osteoarthritis of hip, unspecified laterality, unspecified osteoarthritis type    3. Chronic pain           PHYSICIAN: Moy Vasquez MD  ASSISTANTS: Waldemar Yung MD, fellow       MEDICATIONS INJECTED: Preservative free Bupivacaine 0.25%. Of that, 1ml injected at each nerve    LOCAL ANESTHETIC USED: Xylocaine 1% with Bupivacaine 0.25% and Sodium Bicarbonate 1ml.     SEDATION MEDICATIONS: None    ESTIMATED BLOOD LOSS: None    COMPLICATIONS: None.    TECHNIQUE: Laying in a supine position, the patient was prepped and draped in the usual sterile fashion using ChloraPrep and fenestrated drape. The hip joint was visualized under fluoroscopic guidance. Local anesthetic was given using the 27-gauge 1.5" needle after raising a wheal. The 3.5" 26-gauge needle was introduced from the lateral aspect utilizing live fluoroscopy and advanced to the femoral nerve superior to the acetabulum at the 12 o'clock position. Medication was then injected " "slowly. Another 3.5" 26-gauge needle was directed to the obturator nerve inferior and medial to the acetabulum directly lateral the ischial foramen. Medication was injected. The same was repeated on the contralateral side.The patient tolerated the procedure well.    PAIN BEFORE THE PROCEDURE: 5/10.    PAIN AFTER THE PROCEDURE: 0/10    The patient was monitored after the procedure. Sensory and motor exam in both lower extremities grossly intact for bilateral femoral and obturator nerves. Patient was given post procedure and discharge instructions to follow at home. We will see the patient back in two weeks or the patient may call to inform of status. The patient was discharged in a stable condition.     "

## 2020-01-23 ENCOUNTER — HOSPITAL ENCOUNTER (OUTPATIENT)
Dept: RADIOLOGY | Facility: HOSPITAL | Age: 70
Discharge: HOME | End: 2020-01-23
Attending: ORTHOPAEDIC SURGERY
Payer: OTHER GOVERNMENT

## 2020-01-23 ENCOUNTER — OFFICE VISIT (OUTPATIENT)
Dept: ORTHOPEDICS | Facility: CLINIC | Age: 70
End: 2020-01-23
Payer: OTHER GOVERNMENT

## 2020-01-23 VITALS
WEIGHT: 244.38 LBS | BODY MASS INDEX: 32.39 KG/M2 | SYSTOLIC BLOOD PRESSURE: 127 MMHG | DIASTOLIC BLOOD PRESSURE: 72 MMHG | HEART RATE: 72 BPM | HEIGHT: 73 IN

## 2020-01-23 DIAGNOSIS — Z96.641 HISTORY OF TOTAL RIGHT HIP REPLACEMENT: Primary | ICD-10-CM

## 2020-01-23 DIAGNOSIS — Z96.641 HISTORY OF TOTAL RIGHT HIP REPLACEMENT: ICD-10-CM

## 2020-01-23 DIAGNOSIS — M53.9 DORSOPATHIES: ICD-10-CM

## 2020-01-23 PROCEDURE — 1125F PR PAIN SEVERITY QUANTIFIED, PAIN PRESENT: ICD-10-PCS | Mod: ,,, | Performed by: ORTHOPAEDIC SURGERY

## 2020-01-23 PROCEDURE — 99213 OFFICE O/P EST LOW 20 MIN: CPT | Mod: PBBFAC,25 | Performed by: ORTHOPAEDIC SURGERY

## 2020-01-23 PROCEDURE — 99999 PR PBB SHADOW E&M-EST. PATIENT-LVL III: ICD-10-PCS | Mod: PBBFAC,,, | Performed by: ORTHOPAEDIC SURGERY

## 2020-01-23 PROCEDURE — 73502 X-RAY EXAM HIP UNI 2-3 VIEWS: CPT | Mod: 26,RT,, | Performed by: RADIOLOGY

## 2020-01-23 PROCEDURE — 99999 PR PBB SHADOW E&M-EST. PATIENT-LVL III: CPT | Mod: PBBFAC,,, | Performed by: ORTHOPAEDIC SURGERY

## 2020-01-23 PROCEDURE — 1125F AMNT PAIN NOTED PAIN PRSNT: CPT | Mod: ,,, | Performed by: ORTHOPAEDIC SURGERY

## 2020-01-23 PROCEDURE — 1159F MED LIST DOCD IN RCRD: CPT | Mod: ,,, | Performed by: ORTHOPAEDIC SURGERY

## 2020-01-23 PROCEDURE — 99214 PR OFFICE/OUTPT VISIT, EST, LEVL IV, 30-39 MIN: ICD-10-PCS | Mod: S$PBB,,, | Performed by: ORTHOPAEDIC SURGERY

## 2020-01-23 PROCEDURE — 1159F PR MEDICATION LIST DOCUMENTED IN MEDICAL RECORD: ICD-10-PCS | Mod: ,,, | Performed by: ORTHOPAEDIC SURGERY

## 2020-01-23 PROCEDURE — 99214 OFFICE O/P EST MOD 30 MIN: CPT | Mod: S$PBB,,, | Performed by: ORTHOPAEDIC SURGERY

## 2020-01-23 PROCEDURE — 73502 X-RAY EXAM HIP UNI 2-3 VIEWS: CPT | Mod: TC,RT

## 2020-01-23 PROCEDURE — 73502 XR HIP 2 VIEW RIGHT: ICD-10-PCS | Mod: 26,RT,, | Performed by: RADIOLOGY

## 2020-01-23 NOTE — PROGRESS NOTES
HPI:    Patient is here today for a chief complaint of right hip pain. And leg pain.  Patient had a right total hip done 08/31/2015 for avascular necrosis.  Patient did well for the 1st year and then started to have problems.  Some of the problems revolved around the fact that he developed heterotopic ossification of the hip.  But he is describing a lot of pain that sounds neuropathic and not related to the stiffness associated with heterotopic bone formation.  He has a pain that goes all the way from the heel all the way up to his buttocks and back.  Has a burning pain and problems around the hip itself.  He has pain in the low back itself.  He has had injections which help with different things.  The hip injection lasted for about 5 days and relieve some of the true hip pain.  That he developed a lot of back pain. Back injection helped the back for just a short period also.  He has some renal problems as well as a previous history of some neuropathy.  This patient has a very complicated medical history with some renal problems a vast occur necrosis and nerve issues.    Duration: 48 months  Intensity: severe  Character of pain: sharp  Location: He reports that the pain is negative  For groin pain.    Past Medical History:   Diagnosis Date    Arthritis     Asthma     chronic    CKD (chronic kidney disease), stage III     patient denies    Demyelinating neuropathy     polyneuropathy    Diabetes     diet management    Gout     History of blood clots 2013    lower part of both legs    HLD (hyperlipidemia)     Hypertension     Iron deficiency anemia     BUZZ (obstructive sleep apnea)     CPAP          Current Outpatient Medications:     (Magic mouthwash) 1:1:1 Benadryl 12.5mg/5ml liq, aluminum & magnesium hydroxide-simehticone (Maalox), lidocaine viscous 2%, Swish and spit 10 mLs every 4 (four) hours as needed. for mouth sores, Disp: 1 Bottle, Rfl: 0    albuterol (PROVENTIL) 2.5 mg /3 mL (0.083 %) nebulizer  solution, Take 2.5 mg by nebulization every 6 (six) hours as needed for Wheezing., Disp: , Rfl:     albuterol 90 mcg/actuation inhaler, Inhale 2 puffs into the lungs every 6 (six) hours as needed for Wheezing. , Disp: , Rfl:     allopurinol (ZYLOPRIM) 300 MG tablet, Take 300 mg by mouth once daily., Disp: , Rfl:     amlodipine (NORVASC) 10 MG tablet, Take 10 mg by mouth once daily., Disp: , Rfl:     atorvastatin (LIPITOR) 40 MG tablet, Take 40 mg by mouth once daily. Take one-half tablet daily, Disp: , Rfl:     budesonide-formoterol 160-4.5 mcg (SYMBICORT) 160-4.5 mcg/actuation HFAA, Inhale 2 puffs into the lungs every 12 (twelve) hours. , Disp: , Rfl:     carvedilol (COREG) 25 MG tablet, Take 25 mg by mouth 2 (two) times daily., Disp: , Rfl:     cyanocobalamin (VITAMIN B-12) 100 MCG tablet, Take 100 mcg by mouth once daily. Take two tablets, Disp: , Rfl:     ergocalciferol (ERGOCALCIFEROL) 50,000 unit Cap, Take 50,000 Units by mouth., Disp: , Rfl:     ferrous sulfate 325 mg (65 mg iron) Tab tablet, Take 325 mg by mouth once daily., Disp: , Rfl:     ketoconazole (NIZORAL) 2 % cream, Apply topically 2 (two) times daily., Disp: , Rfl:     latanoprost 0.005 % ophthalmic solution, Place 1 drop into both eyes every evening., Disp: , Rfl:     lidocaine (LIDODERM) 5 %, Place 1 patch onto the skin once daily. Wear for 12 hours, then remove. Do not apply a new patch for at least 12 hours., Disp: , Rfl:     lisinopril (PRINIVIL,ZESTRIL) 40 MG tablet, Take 40 mg by mouth once daily., Disp: , Rfl:     morphine (MSIR) 30 MG tablet, Take 30 mg by mouth 2 (two) times daily., Disp: , Rfl:     oxycodone (OXY-IR) 5 mg Cap, Take 2 capsules (10 mg total) by mouth every 6 (six) hours as needed., Disp: 120 capsule, Rfl: 0    rivaroxaban (XARELTO ORAL), Take 10 mg by mouth., Disp: , Rfl:     tiotropium (SPIRIVA) 18 mcg inhalation capsule, Inhale 18 mcg into the lungs once daily. Controller, Disp: , Rfl:     warfarin  "(COUMADIN) 5 MG tablet, Take by mouth., Disp: , Rfl:      Review of patient's allergies indicates:   Allergen Reactions    Azithromycin      Muscles tense up    Acetaminophen Rash     Feels like needles are sticking him per pt        Exam:  /72 (BP Location: Left arm, Patient Position: Sitting)   Pulse 72   Ht 6' 1" (1.854 m)   Wt 110.9 kg (244 lb 6.1 oz)   BMI 32.24 kg/m²   Vital signs are stable.  Patient ambulates without any walking aids.  He has guarded range of motion of his lumbar spine and back.  Straight leg raise is positive.  Patient has some discomfort with range of motion of the hip.  No major motor deficits.    Radiograph     Total hip in excellent position no signs of loosening or failure.  Moderate heterotopic bone formation in the abductors.    Plan:  The patient is having a lot of nerve issues that would not be explained by the heterotopic bone formation that we see in his hip.  I would like to work up his neuropathy with a repeat MRI of the lumbar spine and a neurology consult.  He may need actual EMG and nerve conduction tests.  "

## 2020-01-24 ENCOUNTER — TELEPHONE (OUTPATIENT)
Dept: ORTHOPEDICS | Facility: CLINIC | Age: 70
End: 2020-01-24

## 2020-01-24 NOTE — TELEPHONE ENCOUNTER
I spoke with Neuro regarding an appt , none available \through July  The  will message both  Aurelia Kauffman & Thomas Mandel , the Nurse will  call the patient with an  appointment ....  I will inform the patient of the above

## 2020-01-24 NOTE — TELEPHONE ENCOUNTER
----- Message from Tayla Montero sent at 1/24/2020  9:40 AM CST -----  Contact: gayathri   tel:   [phone]  Caller says he is returning your call.    Msg. Given to pt. Again.   Stated he understands.    Also having the MRI done tomorrow.      Did you get this approved?     Will be coming for this around 1:45 to the [facility] on [address].    Pt.says he would like to discuss this further with you ref. The preparations.                 We spoke  I had to cx the MRI for tomorrow    I cant have him seen YET  by neuro ,  I'm asking for an appointment , none are available via Epic,   No need to do the MRi so soon    He understands   [facility]

## 2020-01-29 DIAGNOSIS — G62.9 NEUROPATHY: Primary | ICD-10-CM

## 2020-02-27 ENCOUNTER — OFFICE VISIT (OUTPATIENT)
Dept: NEUROLOGY | Facility: CLINIC | Age: 70
End: 2020-02-27
Payer: COMMERCIAL

## 2020-02-27 VITALS — BODY MASS INDEX: 33.9 KG/M2 | WEIGHT: 255.75 LBS | HEIGHT: 73 IN

## 2020-02-27 DIAGNOSIS — M25.551 ACUTE PAIN OF RIGHT HIP: ICD-10-CM

## 2020-02-27 DIAGNOSIS — M79.605 PAIN IN BOTH LOWER EXTREMITIES: Primary | ICD-10-CM

## 2020-02-27 DIAGNOSIS — M79.604 PAIN IN BOTH LOWER EXTREMITIES: Primary | ICD-10-CM

## 2020-02-27 PROCEDURE — 99999 PR PBB SHADOW E&M-EST. PATIENT-LVL IV: ICD-10-PCS | Mod: PBBFAC,,, | Performed by: NEUROMUSCULOSKELETAL MEDICINE & OMM

## 2020-02-27 PROCEDURE — 99214 OFFICE O/P EST MOD 30 MIN: CPT | Mod: PBBFAC,PO | Performed by: NEUROMUSCULOSKELETAL MEDICINE & OMM

## 2020-02-27 PROCEDURE — 99205 OFFICE O/P NEW HI 60 MIN: CPT | Mod: S$PBB,,, | Performed by: NEUROMUSCULOSKELETAL MEDICINE & OMM

## 2020-02-27 PROCEDURE — 99205 PR OFFICE/OUTPT VISIT, NEW, LEVL V, 60-74 MIN: ICD-10-PCS | Mod: S$PBB,,, | Performed by: NEUROMUSCULOSKELETAL MEDICINE & OMM

## 2020-02-27 PROCEDURE — 99999 PR PBB SHADOW E&M-EST. PATIENT-LVL IV: CPT | Mod: PBBFAC,,, | Performed by: NEUROMUSCULOSKELETAL MEDICINE & OMM

## 2020-02-27 RX ORDER — CLONIDINE 0.1 MG/24H
1 PATCH, EXTENDED RELEASE TRANSDERMAL
COMMUNITY
End: 2022-12-19

## 2020-02-27 RX ORDER — LEVETIRACETAM 500 MG/1
500 TABLET ORAL NIGHTLY
Qty: 30 TABLET | Refills: 1 | OUTPATIENT
Start: 2020-02-27 | End: 2020-03-28

## 2020-02-27 RX ORDER — CARBOXYMETHYLCELLULOSE SODIUM 10 MG/ML
0.4 GEL OPHTHALMIC 4 TIMES DAILY PRN
COMMUNITY

## 2020-02-27 RX ORDER — VALSARTAN 160 MG/1
160 TABLET ORAL DAILY
COMMUNITY
End: 2022-12-19

## 2020-02-27 RX ORDER — LEVETIRACETAM 500 MG/1
500 TABLET ORAL NIGHTLY
Qty: 30 TABLET | Refills: 1 | Status: SHIPPED | OUTPATIENT
Start: 2020-02-27 | End: 2022-12-19

## 2020-02-27 RX ORDER — LEVETIRACETAM 500 MG/1
500 TABLET ORAL NIGHTLY
Qty: 30 TABLET | Refills: 1 | Status: SHIPPED | OUTPATIENT
Start: 2020-02-27 | End: 2020-03-28

## 2020-02-27 NOTE — LETTER
February 27, 2020      [facility]  [address] [address]           [place] - Neurology  [address]  [facility] [address]  Phone: [phone]          Patient: Macario Dior Jr.   MR Number: [id number]   YOB: 1950   Date of Visit: 2/27/2020       Dear Dr. [facility].:    Thank you for referring Macario Dior to me for evaluation. Attached you will find relevant portions of my assessment and plan of care.    If you have questions, please do not hesitate to call me. I look forward to following Macario Dior along with you.    Sincerely,    Prieto Cabrera MD    Enclosure  CC:  No Recipients    If you would like to receive this communication electronically, please contact [email or web address] or [phone] to request more information on [employer] access.    For providers and/or their staff who would like to refer a patient to [facility], please contact us through our one-stop-shop provider referral line, [facility], at [phone].    If you feel you have received this communication in error or would no longer like to receive these types of communications, please e-mail [email or web address]

## 2020-02-27 NOTE — PROGRESS NOTES
This note was dictated with   Macario Dior Jr.  1950  Review of patient's allergies indicates:   Allergen Reactions    Azithromycin      Muscles tense up    Acetaminophen Rash     Feels like needles are sticking him per pt     [unfilled]    Past Medical History:   Diagnosis Date    Arthritis     Asthma     chronic    CKD (chronic kidney disease), stage III     patient denies    Demyelinating neuropathy     polyneuropathy    Diabetes     diet management    Gout     History of blood clots 2013    lower part of both legs    HLD (hyperlipidemia)     Hypertension     Iron deficiency anemia     BUZZ (obstructive sleep apnea)     CPAP     Social History     Socioeconomic History    Marital status: Single     Spouse name: Not on file    Number of children: Not on file    Years of education: Not on file    Highest education level: Not on file   Occupational History    Not on file   Social Needs    Financial resource strain: Not on file    Food insecurity:     Worry: Not on file     Inability: Not on file    Transportation needs:     Medical: Not on file     Non-medical: Not on file   Tobacco Use    Smoking status: Former Smoker     Types: Cigars     Last attempt to quit: 8/13/2009     Years since quitting: 10.5    Smokeless tobacco: Never Used   Substance and Sexual Activity    Alcohol use: No     Alcohol/week: 0.0 standard drinks    Drug use: No    Sexual activity: Not Currently   Lifestyle    Physical activity:     Days per week: Not on file     Minutes per session: Not on file    Stress: Not on file   Relationships    Social connections:     Talks on phone: Not on file     Gets together: Not on file     Attends Gnosticism service: Not on file     Active member of club or organization: Not on file     Attends meetings of clubs or organizations: Not on file     Relationship status: Not on file   Other Topics Concern    Not on file   Social History Narrative    Not on file     Family  History   Problem Relation Age of Onset    Cancer Mother         ovarian    Cancer Sister         breast    Cancer Sister         breast       Review of systems:  Constitutional-negative  Eyes-negative  ENT, mouth-negative  Cardiovascular-negative  Respiratory-negative  GI-negative  - negative  Musculoskeletal-negative  Skin-negative  Neurologic-negative  Psychiatric-negative  Endocrine-negative  Hematology/lymph nodes-negative  Allergies/immunology-negative  Macario Barby Blankenship  1950  Review of patient's allergies indicates:   Allergen Reactions    Azithromycin      Muscles tense up    Acetaminophen Rash     Feels like needles are sticking him per pt     [unfilled]    Past Medical History:   Diagnosis Date    Arthritis     Asthma     chronic    CKD (chronic kidney disease), stage III     patient denies    Demyelinating neuropathy     polyneuropathy    Diabetes     diet management    Gout     History of blood clots 2013    lower part of both legs    HLD (hyperlipidemia)     Hypertension     Iron deficiency anemia     BUZZ (obstructive sleep apnea)     CPAP     Social History     Socioeconomic History    Marital status: Single     Spouse name: Not on file    Number of children: Not on file    Years of education: Not on file    Highest education level: Not on file   Occupational History    Not on file   Social Needs    Financial resource strain: Not on file    Food insecurity:     Worry: Not on file     Inability: Not on file    Transportation needs:     Medical: Not on file     Non-medical: Not on file   Tobacco Use    Smoking status: Former Smoker     Types: Cigars     Last attempt to quit: 8/13/2009     Years since quitting: 10.5    Smokeless tobacco: Never Used   Substance and Sexual Activity    Alcohol use: No     Alcohol/week: 0.0 standard drinks    Drug use: No    Sexual activity: Not Currently   Lifestyle    Physical activity:     Days per week: Not on file     Minutes  per session: Not on file    Stress: Not on file   Relationships    Social connections:     Talks on phone: Not on file     Gets together: Not on file     Attends Temple service: Not on file     Active member of club or organization: Not on file     Attends meetings of clubs or organizations: Not on file     Relationship status: Not on file   Other Topics Concern    Not on file   Social History Narrative    Not on file     Family History   Problem Relation Age of Onset    Cancer Mother         ovarian    Cancer Sister         breast    Cancer Sister         breast       Review of systems:  Constitutional-negative  Eyes-negative  ENT, mouth-negative  Cardiovascular-negative  Respiratory-negative  GI-negative  - negative  Musculoskeletal-negative  Skin-negative  Neurologic-negative  Psychiatric-negative  Endocrine-negative  Hematology/lymph nodes-negative  Allergies/immunology-negative  Gen. Appearance: Well-developed with no obvious deformities  Carotid arteries symmetrical pulses  Peripheral vascular shows symmetrical pulses with 1-2 +  edema     This note was dictated with Modal Fluency a word recognition program. There are occasionally word recognition errors which are missed on review.  Social History :  Patient is retired [employer]   Present history:    This is a 69-year-old  male who presents with a 3 year history of numbness, tingling, burning, and swelling of the legs and feet.  He had a right hip replacement 3 years ago for avascular necrosis and did well for 8 months.  He then developed pins and needles with burning pain in the feet as well as swelling in the legs. He describes the pain is 10 had a 10 with medication requirements of oxycodone 4 times a day.  Patient was tried on gabapentin however had allergic reaction to that.  He denies any diabetes or alcohol abuse.  He reportedly had venous studies which were negative. He had a left tibial/fibula fracture about 6 years  ago.  He does admit to balance problems.  Two months ago he was referred to Pain Management where he had multiple injections over.  It time he had MRI of lumbar spine on 8-7-19 which showed mild  degenerative arthritis and degenerative disc disease.  Patient has right hip pain.     Neurological Exam:  Mental status-alert and oriented to person, place, and time; attention span and concentration is good. Fund of knowledge-patient is aware of current events and able to give detailed history of the current problem.recent and remote memory seems intact. Language function is normal with no evidence of aphasia  Cranial nerves:Visual acuity to hand chart -normal; visual fields to confrontation normal;pupils were equal and reactive to light ;no evidence of ptosis ;  funduscopic examination was normal with sharp disc margins. external ocular movements were full with no nystagmus. Facial sensation to pinprick : normal ; corneal reflexes intact; Facial muscles were symmetrical. Hearing is unimpaired symmetrical finger rub; Tongue movements - normal ; palate movements - normal ;Swallowing unimpaired. Shoulder shrug was intact with good strength Speech was normal  Motor examination: Upper : normal                                      Lower extremities - Normal;muscle tone was normal ;                  Right-handed  Sensory examination:   Upper; normal pinprick and soft touch ;   Lower extremities - normal and symmetrical.   Vibration sense:  Absent @ toes  Deep tendon reflexes: upper extremities :0+ symmetrical ;     lower extremities KJ- 1 +; AJ - 0+ Both plantar responses were flexor  Cerebellar examination upper: Normal finger to nose and rapid alternating movements  Gait:  Limbs from hip pain;       Romberg test:  Positive       Tandem gait: Normal    Involuntary movements: Negative  TMJ - no tenderness  Cervical examination: Full range of motion with no pain Cervical tenderness :negative  Lumbar examination: Low back  tenderness-negative    ; hip pain on hip maneuvering              Sciatic notchtenderness-negative            Straight leg raising : negative    Impression:  Right hip pain status post hip replacement secondary to avascular necrosis; numbness with pain/pins and needles in the feet; peripheral edema in the legs    Recommendations/Plan :  Will try a course of Keppra 500 mg HS particularly since he is having trouble sleeping at night.  I have explained that the Keppra will make him drowsy at night.  This will be an attempt to reduce his narcotic use.  EMG nerve conduction study lower extremity

## 2020-03-10 ENCOUNTER — PROCEDURE VISIT (OUTPATIENT)
Dept: NEUROLOGY | Facility: CLINIC | Age: 70
End: 2020-03-10
Payer: COMMERCIAL

## 2020-03-10 DIAGNOSIS — M54.16 LUMBAR RADICULOPATHY, RIGHT: ICD-10-CM

## 2020-03-10 DIAGNOSIS — M79.605 PAIN IN BOTH LOWER EXTREMITIES: ICD-10-CM

## 2020-03-10 DIAGNOSIS — M79.604 PAIN IN BOTH LOWER EXTREMITIES: ICD-10-CM

## 2020-03-10 DIAGNOSIS — M79.606 LEG PAIN, ANTERIOR, UNSPECIFIED LATERALITY: ICD-10-CM

## 2020-03-10 PROCEDURE — 95909 NRV CNDJ TST 5-6 STUDIES: CPT | Mod: PBBFAC | Performed by: NEUROMUSCULOSKELETAL MEDICINE & OMM

## 2020-03-10 PROCEDURE — 95886 MUSC TEST DONE W/N TEST COMP: CPT | Mod: 26,S$PBB,, | Performed by: NEUROMUSCULOSKELETAL MEDICINE & OMM

## 2020-03-10 PROCEDURE — 95909 NRV CNDJ TST 5-6 STUDIES: CPT | Mod: 26,S$PBB,, | Performed by: NEUROMUSCULOSKELETAL MEDICINE & OMM

## 2020-03-10 PROCEDURE — 95909 PR NERVE CONDUCTION STUDY; 5-6 STUDIES: ICD-10-PCS | Mod: 26,S$PBB,, | Performed by: NEUROMUSCULOSKELETAL MEDICINE & OMM

## 2020-03-10 PROCEDURE — 95885 PR MUSC TST DONE W/NERV TST LIM: ICD-10-PCS | Mod: 26,59,S$PBB, | Performed by: NEUROMUSCULOSKELETAL MEDICINE & OMM

## 2020-03-10 PROCEDURE — 95885 MUSC TST DONE W/NERV TST LIM: CPT | Mod: 26,59,S$PBB, | Performed by: NEUROMUSCULOSKELETAL MEDICINE & OMM

## 2020-03-10 PROCEDURE — 95886 MUSC TEST DONE W/N TEST COMP: CPT | Mod: PBBFAC,PO | Performed by: NEUROMUSCULOSKELETAL MEDICINE & OMM

## 2020-03-10 PROCEDURE — 95886 PR EMG COMPLETE, W/ NERVE CONDUCTION STUDIES, 5+ MUSCLES: ICD-10-PCS | Mod: 26,S$PBB,, | Performed by: NEUROMUSCULOSKELETAL MEDICINE & OMM

## 2020-03-10 PROCEDURE — 95910 NRV CNDJ TEST 7-8 STUDIES: CPT | Mod: PBBFAC,PO | Performed by: NEUROMUSCULOSKELETAL MEDICINE & OMM

## 2020-04-08 ENCOUNTER — OFFICE VISIT (OUTPATIENT)
Dept: NEUROLOGY | Facility: CLINIC | Age: 70
End: 2020-04-08
Payer: COMMERCIAL

## 2020-04-08 DIAGNOSIS — G89.29 OTHER CHRONIC PAIN: Primary | ICD-10-CM

## 2020-04-08 DIAGNOSIS — R60.1 GENERALIZED EDEMA: ICD-10-CM

## 2020-04-08 DIAGNOSIS — M79.605 PAIN IN BOTH LOWER EXTREMITIES: ICD-10-CM

## 2020-04-08 DIAGNOSIS — Z96.641 STATUS POST RIGHT HIP REPLACEMENT: ICD-10-CM

## 2020-04-08 DIAGNOSIS — M54.16 LUMBAR RADICULOPATHY, RIGHT: ICD-10-CM

## 2020-04-08 DIAGNOSIS — M79.604 PAIN IN BOTH LOWER EXTREMITIES: ICD-10-CM

## 2020-04-08 PROCEDURE — 99442 PR PHYSICIAN TELEPHONE EVALUATION 11-20 MIN: CPT | Mod: 95,,, | Performed by: NEUROMUSCULOSKELETAL MEDICINE & OMM

## 2020-04-08 PROCEDURE — 99442 PR PHYSICIAN TELEPHONE EVALUATION 11-20 MIN: ICD-10-PCS | Mod: 95,,, | Performed by: NEUROMUSCULOSKELETAL MEDICINE & OMM

## 2020-04-08 NOTE — PROGRESS NOTES
This note was dictated with Modal Fluency a word recognition program. There are occasionally word recognition errors which are missed on review.    Telephone interview  Social History :  Patient is retired [employer]   Present history:  patient presents for follow-up for his leg pain.  EMG nerve conduction study was limited due to peripheral edema and absent sensory responses, however there was suspicion of chronic L5-S1 radiculopathy    MRI lumbar spine on has not been repeated yet with previous study 7-31-19 showing lumbar disc disease with evidence of progression.    Patient complains of persistent numbness in the feet with pain down the leg.  He questions whether the hip replacement surgery may have caused some pressure on 1 of the nerves.  Previous epidural injections have not been beneficial.  He tried Keppra 500 mg at night for the nerve pain with questionable results of improvement.  At this point patient wants to have a repeat MRI.  Previous note:  2-27-20  This is a 69-year-old  male who presents with a 3 year history of numbness, tingling, burning, and swelling of the legs and feet.  He had a right hip replacement 3 years ago for avascular necrosis and did well for 8 months.  He then developed pins and needles with burning pain in the feet as well as swelling in the legs. He describes the pain is 10 had a 10 with medication requirements of oxycodone 4 times a day.  Patient was tried on gabapentin however had allergic reaction to that.  He denies any diabetes or alcohol abuse.  He reportedly had venous studies which were negative. He had a left tibial/fibula fracture about 6 years ago.  He does admit to balance problems.  Two months ago he was referred to Pain Management where he had multiple injections over.  It time he had MRI of lumbar spine on 8-7-19 which showed mild  degenerative arthritis and degenerative disc disease.  Patient has right hip pain.      Neurological  Exam:       Impression:   lumbar disc disease; suspicion for L5-S1 lumbar radiculopathy on EMG; Right hip pain status post hip replacement secondary to avascular necrosis; numbness with pain/pins and needles in the feet; peripheral edema in the legs     Recommendations/Plan :   MRI lumbar spine; follow-up with pain management; follow-up 4 months

## 2020-05-18 ENCOUNTER — TELEPHONE (OUTPATIENT)
Dept: NEUROLOGY | Facility: CLINIC | Age: 70
End: 2020-05-18

## 2020-05-18 ENCOUNTER — HOSPITAL ENCOUNTER (OUTPATIENT)
Dept: RADIOLOGY | Facility: HOSPITAL | Age: 70
Discharge: HOME | End: 2020-05-18
Attending: NEUROMUSCULOSKELETAL MEDICINE & OMM
Payer: COMMERCIAL

## 2020-05-18 DIAGNOSIS — M79.604 PAIN IN BOTH LOWER EXTREMITIES: ICD-10-CM

## 2020-05-18 DIAGNOSIS — M54.16 LUMBAR RADICULOPATHY, RIGHT: ICD-10-CM

## 2020-05-18 DIAGNOSIS — M79.605 PAIN IN BOTH LOWER EXTREMITIES: ICD-10-CM

## 2020-05-18 PROCEDURE — 72148 MRI LUMBAR SPINE W/O DYE: CPT | Mod: TC

## 2020-05-18 PROCEDURE — 72148 MRI LUMBAR SPINE WITHOUT CONTRAST: ICD-10-PCS | Mod: 26,,, | Performed by: RADIOLOGY

## 2020-05-18 PROCEDURE — 72148 MRI LUMBAR SPINE W/O DYE: CPT | Mod: 26,,, | Performed by: RADIOLOGY

## 2020-05-18 NOTE — TELEPHONE ENCOUNTER
Spoke to Shala in the imaging center and she stated that the MRI Tech received the new orders they were requesting so patient is in the exam room.

## 2020-05-18 NOTE — TELEPHONE ENCOUNTER
----- Message from Kinza Ramon sent at 5/18/2020  2:07 PM CDT -----  Contact: pt @ [phone]  asking to speak with someone in Dr. Cabrera's office regarding the MRI orders that are there, says the additional MRI (other areas) are needed. Please call.

## 2020-05-19 ENCOUNTER — TELEPHONE (OUTPATIENT)
Dept: NEUROLOGY | Facility: CLINIC | Age: 70
End: 2020-05-19

## 2020-05-19 NOTE — TELEPHONE ENCOUNTER
----- Message from Prieto Cabrera MD sent at 5/19/2020  4:15 PM CDT -----   MRI lumbar spine shows significant degenerative arthritis  ----- Message -----  From: Interface, Rad Results In  Sent: 5/18/2020   5:01 PM CDT  To: Prieto Cabrera MD

## 2020-05-19 NOTE — TELEPHONE ENCOUNTER
Spoke with patient and informed him that his MRI of the Lumbar Spine shows significant degenerative arthritis per .

## 2020-05-21 ENCOUNTER — TELEPHONE (OUTPATIENT)
Dept: PAIN MEDICINE | Facility: CLINIC | Age: 70
End: 2020-05-21

## 2020-05-21 ENCOUNTER — TELEPHONE (OUTPATIENT)
Dept: ORTHOPEDICS | Facility: CLINIC | Age: 70
End: 2020-05-21

## 2020-05-21 DIAGNOSIS — M54.50 LOW BACK PAIN, UNSPECIFIED BACK PAIN LATERALITY, UNSPECIFIED CHRONICITY, UNSPECIFIED WHETHER SCIATICA PRESENT: Primary | ICD-10-CM

## 2020-05-21 NOTE — TELEPHONE ENCOUNTER
"Spoke with patient in regards to his appointment    This is Tico the RMA of Dr. Moy Vasquez MD at [facility].     This message is for patient in regards to his appointment 5/27/20 at 11:30a       Staff informed patient of the policy in place by [facility] for the safety of all patients and staff members.   Staff also informed patient that all visitors will not be allowed to accompany patient during their appointment with provider Dr. Vasquez and any visitors will have to remain inside his/her vehicle until patient appointment is over and patient must wear a face mask the whole time here at [facility].    Upon arriving patient must contact clinic at this number [phone] to notify staff that they have arrive, Staff will give the patient the "okay" to come up or wait inside their vehicle until clinic is ready for the next patient to enter inside the building      Pt verbalized understanding.  "

## 2020-05-21 NOTE — TELEPHONE ENCOUNTER
We spoke  [facility] L5-S1   I booked him to see Christina Wed. May 27 @ 11:30am

## 2020-05-27 ENCOUNTER — OFFICE VISIT (OUTPATIENT)
Dept: PAIN MEDICINE | Facility: CLINIC | Age: 70
End: 2020-05-27
Attending: ANESTHESIOLOGY
Payer: COMMERCIAL

## 2020-05-27 VITALS
RESPIRATION RATE: 18 BRPM | WEIGHT: 249 LBS | TEMPERATURE: 98 F | HEART RATE: 78 BPM | BODY MASS INDEX: 33 KG/M2 | DIASTOLIC BLOOD PRESSURE: 115 MMHG | OXYGEN SATURATION: 100 % | HEIGHT: 73 IN | SYSTOLIC BLOOD PRESSURE: 240 MMHG

## 2020-05-27 DIAGNOSIS — G89.29 OTHER CHRONIC PAIN: ICD-10-CM

## 2020-05-27 DIAGNOSIS — M47.26 OSTEOARTHRITIS OF SPINE WITH RADICULOPATHY, LUMBAR REGION: ICD-10-CM

## 2020-05-27 DIAGNOSIS — Z96.649 HIP JOINT REPLACEMENT BY OTHER MEANS: ICD-10-CM

## 2020-05-27 DIAGNOSIS — M54.16 LUMBAR RADICULOPATHY, RIGHT: Primary | ICD-10-CM

## 2020-05-27 PROCEDURE — 99214 OFFICE O/P EST MOD 30 MIN: CPT | Mod: S$PBB,,, | Performed by: ANESTHESIOLOGY

## 2020-05-27 PROCEDURE — 99999 PR PBB SHADOW E&M-EST. PATIENT-LVL III: ICD-10-PCS | Mod: PBBFAC,,, | Performed by: ANESTHESIOLOGY

## 2020-05-27 PROCEDURE — 99999 PR PBB SHADOW E&M-EST. PATIENT-LVL III: CPT | Mod: PBBFAC,,, | Performed by: ANESTHESIOLOGY

## 2020-05-27 PROCEDURE — 99213 OFFICE O/P EST LOW 20 MIN: CPT | Mod: PBBFAC | Performed by: ANESTHESIOLOGY

## 2020-05-27 PROCEDURE — 99214 PR OFFICE/OUTPT VISIT, EST, LEVL IV, 30-39 MIN: ICD-10-PCS | Mod: S$PBB,,, | Performed by: ANESTHESIOLOGY

## 2020-05-27 NOTE — PROGRESS NOTES
"Subjective:      Patient ID: Macario Dior Jr. is a 70 y.o. male.    Chief Complaint: No chief complaint on file.    Referred by: No ref. provider found     Interval History 05/27/2020  S/P Right Femoral / Obturator Nerve Block in 10/2019 -- 50% relief for 2 days --  lost to follow up after that  S/p Right DHRUV 08/2015 -- doing well for 1.5 years  Pain is located at right hip joint. Occasionally radiates to right thigh.   Aggravated standing, walking, laying down.   Alleviated by Oxycodone    Taking Oxycodone 5 mg QID -- with relief  Took Gabapentin with relief in the past but had adverse effects.  Never tried Lyrica     Macario Dior is a 69 y.o. Man with PMH DVT s/p right DHRUV that presents today as a new consult for low back pain.  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 Since 2015 DHRUV, he initially had improved but progressive pain in the right hip and low back.  In the last 6 months, he has had 4 back injections from Dr. De Los Santos that lasted for 2 weeks each.  1 month ago, he had a Lumbar ABIODUN that provided 100% relief that lasted 5 days for the low back but the pain changed to radiate to the right hip.  Since the ABIODUN relief stopped, the pain has "worked its way back up to the low back."  The pain is described as intense, sharp, and shoots up from the right hip into the low back to the right periscapular area. The shooting pain is worse than the low back pain. The pain is interfering with everything, including walking, sitting, and putting on his shoes.  Oxycodone 10mg Q6H provides relief. Lidocaine patches and TENS provide minimal relief when he wakes up.  He did physical therapy for 1 year with minimal relief.  He is unable to regularly exercise secondary to pain. He is radha to get 4 hours of uninterrupted sleep per night.  He wakes up with stiffness in his low back despite $4,000 mattresses.  He has weakness and buckling in the right lower extremity.  He endorses worsening falls due to " "weakness "seems like the leg is not there." He says " I need the bone removed" referring to his right hip. [facility] is a  [facility] He denies incontinence and saddle anesthesia.      Medications:    Interventional Pain History  Lumbar ABIODUN 100% relief 5 days - Dr. De Los Santos   Lumbar TPIs 0% relief - Dr. De Los Santos   Right Femoral / Obturator Nerve Block - 10/2019    Imaging:  LUMBAR MRI 04/2020  T12-L1: No significant spinal canal stenosis or neural foraminal narrowing.    L1-L2:  No significant spinal canal stenosis or neural foraminal narrowing.    L2-L3: Mild diffuse posterior disc bulge and bilateral facet arthropathy contribute to mild bilateral neural foraminal narrowing.  No significant spinal canal stenosis.    L3-L4: Mild diffuse posterior disc bulge, buckling of the ligamentum flavum, and bilateral facet arthropathy contribute moderate right/mild left neural foraminal narrowing.  No significant spinal canal stenosis.    L4-L5: Diffuse posterior disc bulge, buckling of the ligamentum flavum, and bilateral facet arthropathy contribute to mild effacement of the thecal sac without significant canal stenosis, however there is moderate right, mild left neural foraminal narrowing.    L5-S1: Mild diffuse posterior disc bulge and bilateral facet arthropathy contribute to mild bilateral neural foraminal narrowing.    Paraspinous soft tissues: Atrophic right kidney.  Few, poorly visualized T2 hyperintense foci at the left kidney possibly representing cysts.    Past Medical History:   Diagnosis Date    Arthritis     Asthma     chronic    CKD (chronic kidney disease), stage III     patient denies    Demyelinating neuropathy     polyneuropathy    Diabetes     diet management    Gout     History of blood clots 2013    lower part of both legs    HLD (hyperlipidemia)     Hypertension     Iron deficiency anemia     BUZZ (obstructive sleep apnea)     CPAP       Past Surgical History: "   Procedure Laterality Date    COLONOSCOPY N/A 11/7/2016    Procedure: COLONOSCOPY;  Surgeon: Shar Weiss MD;  Location: [facility] ENDO (4TH FLR);  Service: Endoscopy;  Laterality: N/A;  [facility] Referral. Okay to hold Coumadin 5 days prior to procedure. See Referral scaned in media tab on 10/20/16.    COLONOSCOPY N/A 9/21/2017    Procedure: COLONOSCOPY;  Surgeon: Stuart Trinidad MD;  Location: [facility] ENDO (4TH FLR);  Service: Endoscopy;  Laterality: N/A;  [facility]-see scanned docs under media tab          9/6/17-[facility] and last clinic visit scanned into chart    HIP SURGERY Right 2008    [facility]    HIP SURGERY Right 8-31-15    THR    INJECTION OF ANESTHETIC AGENT AROUND NERVE Right 10/23/2019    Procedure: BLOCK, NERVE, Obturator and Femoral cleared to hold xarelto 3 days pt. said he's not taking coumadin;  Surgeon: Moy Vasquez MD;  Location: [facility];  Service: Pain Management;  Laterality: Right;    LEG SURGERY Left 2012    fibula and tibia    SHOULDER SURGERY Right 1989    torn ligament       Review of patient's allergies indicates:   Allergen Reactions    Azithromycin      Muscles tense up    Acetaminophen Rash     Feels like needles are sticking him per pt       Current Outpatient Medications   Medication Sig Dispense Refill    (Magic mouthwash) 1:1:1 Benadryl 12.5mg/5ml liq, aluminum & magnesium hydroxide-simehticone (Maalox), lidocaine viscous 2% Swish and spit 10 mLs every 4 (four) hours as needed. for mouth sores 1 Bottle 0    albuterol (PROVENTIL) 2.5 mg /3 mL (0.083 %) nebulizer solution Take 2.5 mg by nebulization every 6 (six) hours as needed for Wheezing.      albuterol 90 mcg/actuation inhaler Inhale 2 puffs into the lungs every 6 (six) hours as needed for Wheezing.       amlodipine (NORVASC) 10 MG tablet Take 10 mg by mouth once daily.      atorvastatin (LIPITOR) 80 MG tablet Take 80 mg by mouth once daily. Take  one-half tablet daily      budesonide-formoterol 160-4.5 mcg (SYMBICORT) 160-4.5 mcg/actuation HFAA Inhale 2 puffs into the lungs every 12 (twelve) hours.       carboxymethyl-gly-vwvq00-MN 0.5-1-0.5 % Dpet Place 1 drop into both eyes 4 (four) times daily.      carboxymethylcellulose sodium 1 % DpGe Place 0.4 mLs into both eyes 4 (four) times daily as needed.      carvedilol (COREG) 25 MG tablet Take 25 mg by mouth 2 (two) times daily.      CLOBETASOL PROPIONATE, BULK, MISC Apply topically once daily.      cloNIDine 0.1 mg/24 hr td ptwk (CATAPRES) 0.1 mg/24 hr Place 1 patch onto the skin every 7 days.      cyanocobalamin (VITAMIN B-12) 100 MCG tablet Take 100 mcg by mouth once daily. Take two tablets      ergocalciferol (ERGOCALCIFEROL) 50,000 unit Cap Take 50,000 Units by mouth.      ketoconazole (NIZORAL) 2 % cream Apply topically 2 (two) times daily.      latanoprost 0.005 % ophthalmic solution Place 1 drop into both eyes every evening.      levETIRAcetam (KEPPRA) 500 MG Tab Take 1 tablet (500 mg total) by mouth nightly. 30 tablet 1    lidocaine (LIDODERM) 5 % Place 1 patch onto the skin once daily. Wear for 12 hours, then remove. Do not apply a new patch for at least 12 hours.      oxycodone (OXY-IR) 5 mg Cap Take 2 capsules (10 mg total) by mouth every 6 (six) hours as needed. 120 capsule 0    rivaroxaban (XARELTO) 20 mg Tab Take 20 mg by mouth.       tiotropium (SPIRIVA) 18 mcg inhalation capsule Inhale 18 mcg into the lungs once daily. Controller      valsartan (DIOVAN) 160 MG tablet Take 160 mg by mouth once daily.       No current facility-administered medications for this visit.        Family History   Problem Relation Age of Onset    Cancer Mother         ovarian    Cancer Sister         breast    Cancer Sister         breast       Social History     Socioeconomic History    Marital status: Single     Spouse name: Not on file    Number of children: Not on file    Years of education:  Not on file    Highest education level: Not on file   Occupational History    Not on file   Social Needs    Financial resource strain: Not on file    Food insecurity:     Worry: Not on file     Inability: Not on file    Transportation needs:     Medical: Not on file     Non-medical: Not on file   Tobacco Use    Smoking status: Former Smoker     Types: Cigars     Last attempt to quit: 8/13/2009     Years since quitting: 10.7    Smokeless tobacco: Never Used   Substance and Sexual Activity    Alcohol use: No     Alcohol/week: 0.0 standard drinks    Drug use: No    Sexual activity: Not Currently   Lifestyle    Physical activity:     Days per week: Not on file     Minutes per session: Not on file    Stress: Not on file   Relationships    Social connections:     Talks on phone: Not on file     Gets together: Not on file     Attends [employer] service: Not on file     Active member of club or organization: Not on file     Attends meetings of clubs or organizations: Not on file     Relationship status: Not on file   Other Topics Concern    Not on file   Social History Narrative    Not on file           Review of Systems   Constitution: Positive for weight loss. Negative for chills, decreased appetite, fever and night sweats.   Eyes: Negative for double vision, vision loss in left eye, vision loss in right eye and visual disturbance.   Cardiovascular: Negative for chest pain.   Respiratory: Negative for shortness of breath.    Hematologic/Lymphatic: Does not bruise/bleed easily.        Coumadin 5mg   Skin: Negative for rash.   Musculoskeletal: Positive for back pain, falls, muscle cramps, muscle weakness and stiffness. Negative for joint swelling, myalgias and neck pain.   Gastrointestinal: Negative for abdominal pain, constipation and diarrhea.   Genitourinary: Negative for bladder incontinence.   Neurological: Positive for focal weakness, headaches and numbness. Negative for paresthesias and weakness.    Psychiatric/Behavioral: Negative for altered mental status, depression and suicidal ideas. The patient is not nervous/anxious.            Objective:   There were no vitals taken for this visit.  Pain Disability Index Review:  Last 3 PDI Scores 8/28/2019   Pain Disability Index (PDI) 0     Normocephalic.  Atraumatic.  Affect appropriate.  Breathing unlabored.  Extra ocular muscles intact.      General    Constitutional: He appears well-developed and well-nourished.   HENT:   Head: Atraumatic.   Eyes: EOM are normal.   Neck:   Right TRAPEZIUS TRIGGER POINT PALPABLE  MYOFASCIAL BUNDLE   Cardiovascular: Intact distal pulses.    Pulmonary/Chest: Effort normal.   Neurological: He is alert.     General Musculoskeletal Exam   Gait: normal     Right Ankle/Foot Exam     Tests   Heel Walk: unable to perform  Tiptoe Walk: unable to perform    Left Ankle/Foot Exam     Tests   Heel Walk: able to perform  Tiptoe Walk: able to perform      Right Hip Exam     Inspection   Scars: present    Tenderness   The patient tender to palpation of the SI joint and piriformis.    Tests   Pain w/ forced internal rotation (LILIA): present  Pain w/ forced external rotation (FADIR): present  Susan: negative  Stinchfield test: positive  Log Roll: positive    Comments:   Tenderness with palpation of right ASIS and Right Lumbar facet joint    Pain with lumbar Flexion > extension, hip IR>>ER    Special tests difficult to test due to diffuse right hip pain  Back (L-Spine & T-Spine) / Neck (C-Spine) Exam     Tenderness Right paramedian tenderness of the Lower L-Spine.     Back (L-Spine & T-Spine) Range of Motion   Extension: normal   Flexion: abnormal   Lateral bend right: abnormal   Lateral bend left: normal   Rotation right: abnormal   Rotation left: normal     Spinal Sensation   Right Side Sensation  L-Spine Level: normal  Left Side Sensation  L-Spine Level: normal    Back (L-Spine & T-Spine) Tests   Right Side Tests  Femoral Stretch: negative  Left  Side Tests  Femoral Stretch: negative    Comments:  Myofascial pain right rhomboids      Muscle Strength   Right Lower Extremity   Hip Abduction: 4/5   Hip Flexion: 4/5   Quadriceps:  4/5   Hamstrin/5   Anterior tibial:  5/5/5  Gastrocsoleus:  5/5/5  EHL:  5/5  Left Lower Extremity   Hip Abduction: 5/5   Hip Flexion: 5/5   Quadriceps:  5/5   Hamstrin/5   Anterior tibial:  5/5/5   Gastrocsoleus:  5/5/5  EHL:  5/5    Reflexes     Left Side  Quadriceps:  1+  Achilles:  1+  Left Kathleen's Sign:  Absent  Babinski Sign:  absent  Ankle Clonus:  absent    Right Side   Quadriceps:  1+  Achilles:  1+  Right Kathleen's Sign:  absent  Babinski Sign:  absent  Ankle Clonus:  absent        Assessment:       Encounter Diagnoses   Name Primary?    Lumbar radiculopathy, right Yes    Other chronic pain     Hip joint replacement by other means     Osteoarthritis of spine with radiculopathy, lumbar region      Patient with multifocal disease processes of lumbar spine and right hip  Mild relief after Femoral / Obturator Block. Patient is not interested in performing RFA of right hip.  Discussed L3,L4,L5 MEDIAL BRANCH BLOCK for FACET ARTHROPATHY with aim of performing RFA in future  Also discussed TAP block.  Patient is amenable to trying MBB in near future and assess pain score afterwards.       Plan:   We discussed with the patient the assessment and recommendations. The following is the plan we agreed on:        Diagnoses and all orders for this visit:    Lumbar radiculopathy, right    Other chronic pain    Hip joint replacement by other means    Osteoarthritis of spine with radiculopathy, lumbar region         1. Schedule for L3, L4, L5 MBB -- If positive twice, would recommend RFA  2. Consider TAP block for Right Inguinal Pain  3. Consider hip nerves RFA  4. Patient is on Xarelto -- Patient instructed to hold Xarelto 2 days prior to procedure due to bleeding risk  5. I have stressed the importance of physical activity  and a home exercise plan to help with pain and improve health.  6. RTC 2 weeks after last procedure    Onel Ayala MD  05/27/2020  11:54 AM  I have personally taken the history and examined this patient and agree with the resident's note as stated above.

## 2020-05-28 ENCOUNTER — TELEPHONE (OUTPATIENT)
Dept: PAIN MEDICINE | Facility: CLINIC | Age: 70
End: 2020-05-28

## 2020-07-06 ENCOUNTER — HOSPITAL ENCOUNTER (OUTPATIENT)
Facility: OTHER | Age: 70
Discharge: HOME | End: 2020-07-06
Attending: ANESTHESIOLOGY | Admitting: ANESTHESIOLOGY
Payer: COMMERCIAL

## 2020-07-06 VITALS
HEIGHT: 73 IN | OXYGEN SATURATION: 99 % | DIASTOLIC BLOOD PRESSURE: 93 MMHG | WEIGHT: 243 LBS | BODY MASS INDEX: 32.2 KG/M2 | HEART RATE: 58 BPM | SYSTOLIC BLOOD PRESSURE: 221 MMHG | TEMPERATURE: 98 F | RESPIRATION RATE: 18 BRPM

## 2020-07-06 DIAGNOSIS — M47.816 LUMBAR SPONDYLOSIS: Primary | ICD-10-CM

## 2020-07-06 PROCEDURE — 25000003 PHARM REV CODE 250: Performed by: ANESTHESIOLOGY

## 2020-07-06 PROCEDURE — 64494 INJ PARAVERT F JNT L/S 2 LEV: CPT | Mod: RT,,, | Performed by: ANESTHESIOLOGY

## 2020-07-06 PROCEDURE — 64494 PR INJ DX/THER AGNT PARAVERT FACET JOINT,IMG GUIDE,LUMBAR/SAC, 2ND LEVEL: ICD-10-PCS | Mod: RT,,, | Performed by: ANESTHESIOLOGY

## 2020-07-06 PROCEDURE — 64493 INJ PARAVERT F JNT L/S 1 LEV: CPT | Mod: RT,,, | Performed by: ANESTHESIOLOGY

## 2020-07-06 PROCEDURE — 64493 INJ PARAVERT F JNT L/S 1 LEV: CPT

## 2020-07-06 PROCEDURE — 64494 INJ PARAVERT F JNT L/S 2 LEV: CPT

## 2020-07-06 PROCEDURE — 64493 PR INJ DX/THER AGNT PARAVERT FACET JOINT,IMG GUIDE,LUMBAR/SAC,1ST LVL: ICD-10-PCS | Mod: RT,,, | Performed by: ANESTHESIOLOGY

## 2020-07-06 RX ORDER — BUPIVACAINE HYDROCHLORIDE 2.5 MG/ML
INJECTION, SOLUTION EPIDURAL; INFILTRATION; INTRACAUDAL
Status: DISCONTINUED | OUTPATIENT
Start: 2020-07-06 | End: 2020-07-06 | Stop reason: HOSPADM

## 2020-07-06 RX ORDER — LIDOCAINE HYDROCHLORIDE 10 MG/ML
INJECTION INFILTRATION; PERINEURAL
Status: DISCONTINUED | OUTPATIENT
Start: 2020-07-06 | End: 2020-07-06 | Stop reason: HOSPADM

## 2020-07-06 NOTE — DISCHARGE SUMMARY
Discharge Note  Short Stay      SUMMARY     Admit Date: 7/6/2020    Attending Physician: Moy Vasquez      Discharge Physician: Moy Vasquez      Discharge Date: 7/6/2020 11:56 AM    Procedure(s) (LRB):  BLOCK, NERVE, RIGHT MBB L3,L4,L5 (Right)    Final Diagnosis: Lumbar spondylosis [M47.816]    Disposition: Home or self care    Patient Instructions:   Current Discharge Medication List      CONTINUE these medications which have NOT CHANGED    Details   (Magic mouthwash) 1:1:1 Benadryl 12.5mg/5ml liq, aluminum & magnesium hydroxide-simehticone (Maalox), lidocaine viscous 2% Swish and spit 10 mLs every 4 (four) hours as needed. for mouth sores  Qty: 1 Bottle, Refills: 0      albuterol (PROVENTIL) 2.5 mg /3 mL (0.083 %) nebulizer solution Take 2.5 mg by nebulization every 6 (six) hours as needed for Wheezing.      albuterol 90 mcg/actuation inhaler Inhale 2 puffs into the lungs every 6 (six) hours as needed for Wheezing.       amlodipine (NORVASC) 10 MG tablet Take 10 mg by mouth once daily.      atorvastatin (LIPITOR) 80 MG tablet Take 80 mg by mouth once daily. Take one-half tablet daily      budesonide-formoterol 160-4.5 mcg (SYMBICORT) 160-4.5 mcg/actuation HFAA Inhale 2 puffs into the lungs every 12 (twelve) hours.       carboxymethyl-gly-tsgi85-MQ 0.5-1-0.5 % Dpet Place 1 drop into both eyes 4 (four) times daily.      carboxymethylcellulose sodium 1 % DpGe Place 0.4 mLs into both eyes 4 (four) times daily as needed.      carvedilol (COREG) 25 MG tablet Take 25 mg by mouth 2 (two) times daily.      CLOBETASOL PROPIONATE, BULK, MISC Apply topically once daily.      cloNIDine 0.1 mg/24 hr td ptwk (CATAPRES) 0.1 mg/24 hr Place 1 patch onto the skin every 7 days.      cyanocobalamin (VITAMIN B-12) 100 MCG tablet Take 100 mcg by mouth once daily. Take two tablets      ergocalciferol (ERGOCALCIFEROL) 50,000 unit Cap Take 50,000 Units by mouth.      ketoconazole (NIZORAL) 2 % cream Apply topically 2 (two) times daily.       latanoprost 0.005 % ophthalmic solution Place 1 drop into both eyes every evening.      levETIRAcetam (KEPPRA) 500 MG Tab Take 1 tablet (500 mg total) by mouth nightly.  Qty: 30 tablet, Refills: 1      lidocaine (LIDODERM) 5 % Place 1 patch onto the skin once daily. Wear for 12 hours, then remove. Do not apply a new patch for at least 12 hours.      oxycodone (OXY-IR) 5 mg Cap Take 2 capsules (10 mg total) by mouth every 6 (six) hours as needed.  Qty: 120 capsule, Refills: 0      rivaroxaban (XARELTO) 20 mg Tab Take 20 mg by mouth.       tiotropium (SPIRIVA) 18 mcg inhalation capsule Inhale 18 mcg into the lungs once daily. Controller      valsartan (DIOVAN) 160 MG tablet Take 160 mg by mouth once daily.                 Discharge Diagnosis: Lumbar spondylosis [M47.816]  Condition on Discharge: Stable with no complications to procedure   Diet on Discharge: Same as before.  Activity: as per instruction sheet.  Discharge to: Home with a responsible adult.  Follow up: 2-4 weeks       Please call my office or pager at [phone] if experienced any weakness or loss of sensation, fever > 101.5, pain uncontrolled with oral medications, persistent nausea/vomiting/or diarrhea, redness or drainage from the incisions, or any other worrisome concerns. If physician on call was not reached or could not communicate with our office for any reason please go to the nearest emergency department

## 2020-07-06 NOTE — OP NOTE
LUMBAR Medial Branch Block Under Fluoroscopy  Time-out taken to identify patient and procedure side prior to starting the procedure.   I attest that I have reviewed the patient's home medications prior to the procedure and no contraindication have been identified. I  re-evaluated the patient after the patient was positioned for the procedure in the procedure room immediately before the procedural time-out. The vital signs are current and represent the current state of the patient which has not significantly changed since the preprocedure assessment.            Date of Service: 07/06/2020    PCP[facility]    Referring Physician:                                                           PROCEDURE:  Right  L3,4,5 medial branch block    REASON FOR PROCEDURE: Lumbar spondylosis [M47.816]  1. Lumbar spondylosis      POSTPROCEDURE DIAGNOSIS:   Lumbar spondylosis [M47.816]    1. Lumbar spondylosis           PHYSICIAN: Moy Vasquez MD  ASSISTANTS: Anil Lechuga MD Resident        MEDICATIONS INJECTED: Bupivicaine 25% 1.5ml at each level      LOCAL ANESTHETIC USED: Xylocaine 1% 9ml with Sodium Bicarbonate 1ml.  3ml each site.    SEDATION MEDICATIONS: None    ESTIMATED BLOOD LOSS:  None.    COMPLICATIONS:  None.    TECHNIQUE: Laying in a prone position, the patient was prepped and draped in the usual sterile fashion using ChloraPrep and fenestrated drape.  The level was determined under fluoroscopic guidance.  Local anesthetic was given by going down to the hub of the 27-gauge 1.25in needle and raising a wheel.  A 25-gauge 3.5inch needle was introduced to the anatomic local of the medial branch at the lateral mass of all levels as stated above utilizing live fluoroscopy. Medication was then injected slowly. The patient tolerated the procedure well.     PAIN BEFORE THE PROCEDURE:  6/10.    PAIN AFTER THE PROCEDURE:  0/10.    The patient was monitored after the procedure.  Patient was given  post procedure and discharge instructions to follow at home.  We will see the patient back in two weeks or the patient may call to inform of status. The patient was discharged in a stable condition

## 2020-07-06 NOTE — H&P
HPI  Patient presenting for Procedure(s) (LRB):  BLOCK, NERVE, RIGHT MBB L3,L5,L5 (Right)     Patient on Anti-coagulation: - Yes, took Xarelto yesterday    No health changes since previous encounter    Past Medical History:   Diagnosis Date    Arthritis     Asthma     chronic    CKD (chronic kidney disease), stage III     patient denies    Demyelinating neuropathy     polyneuropathy    Diabetes     diet management    Gout     History of blood clots 2013    lower part of both legs    HLD (hyperlipidemia)     Hypertension     Iron deficiency anemia     BUZZ (obstructive sleep apnea)     CPAP     Past Surgical History:   Procedure Laterality Date    COLONOSCOPY N/A 11/7/2016    Procedure: COLONOSCOPY;  Surgeon: Shar Weiss MD;  Location: [facility] ([address] [place] [address]);  Service: Endoscopy;  Laterality: N/A;  [facility] Referral. Okay to hold Coumadin 5 days prior to procedure. See Referral scaned in media tab on 10/20/16.    COLONOSCOPY N/A 9/21/2017    Procedure: COLONOSCOPY;  Surgeon: Stuart Trinidad MD;  Location: [address]);  Service: Endoscopy;  Laterality: N/A;  [facility] Olivarez-see scanned docs under media tab          9/6/17-current [facility] and last clinic visit scanned into chart    HIP SURGERY Right 2008    exploratory at [facility]    HIP SURGERY Right 8-31-15    THR    INJECTION OF ANESTHETIC AGENT AROUND NERVE Right 10/23/2019    Procedure: BLOCK, NERVE, Obturator and Femoral cleared to hold xarelto 3 days pt. said he's not taking coumadin;  Surgeon: Moy Vasquez MD;  Location: [facility] PAIN MGT;  Service: Pain Management;  Laterality: Right;    LEG SURGERY Left 2012    fibula and tibia    SHOULDER SURGERY Right 1989    torn ligament     Review of patient's allergies indicates:   Allergen Reactions    Azithromycin      Muscles tense up    Acetaminophen Rash     Feels like needles are sticking him per pt      No current facility-administered medications for  this encounter.        PMHx, PSHx, Allergies, Medications reviewed in epic    ROS negative except pain complaints in HPI    OBJECTIVE:    There were no vitals taken for this visit.    PHYSICAL EXAMINATION:    GENERAL: Well appearing, in no acute distress, alert and oriented x3.  PSYCH:  Mood and affect appropriate.  SKIN: Skin color, texture, turgor normal, no rashes or lesions which will impact the procedure.  CV: RRR with palpation of the radial artery.  PULM: No evidence of respiratory difficulty, symmetric chest rise. Clear to auscultation.  NEURO: Cranial nerves grossly intact.    Plan:    Proceed with procedure as planned Procedure(s) (LRB):  BLOCK, NERVE, RIGHT MBB L3,L5,L5 (Right)    *Use 25g needle    Anil Lechuga  07/06/2020

## 2020-07-06 NOTE — DISCHARGE INSTRUCTIONS

## 2020-07-16 ENCOUNTER — OFFICE VISIT (OUTPATIENT)
Dept: PAIN MEDICINE | Facility: CLINIC | Age: 70
End: 2020-07-16
Attending: ANESTHESIOLOGY
Payer: COMMERCIAL

## 2020-07-16 DIAGNOSIS — M47.816 FACET ARTHRITIS, DEGENERATIVE, LUMBAR SPINE: ICD-10-CM

## 2020-07-16 DIAGNOSIS — M87.059 AVASCULAR NECROSIS OF FEMORAL HEAD, UNSPECIFIED LATERALITY: ICD-10-CM

## 2020-07-16 DIAGNOSIS — M54.16 LUMBAR RADICULOPATHY, RIGHT: ICD-10-CM

## 2020-07-16 DIAGNOSIS — M47.816 LUMBAR SPONDYLOSIS: Primary | ICD-10-CM

## 2020-07-16 PROCEDURE — 99443 PR PHYSICIAN TELEPHONE EVALUATION 21-30 MIN: CPT | Mod: 95,,, | Performed by: ANESTHESIOLOGY

## 2020-07-16 PROCEDURE — 99443 PR PHYSICIAN TELEPHONE EVALUATION 21-30 MIN: ICD-10-PCS | Mod: 95,,, | Performed by: ANESTHESIOLOGY

## 2020-07-27 DIAGNOSIS — M54.16 LUMBAR RADICULOPATHY: Primary | ICD-10-CM

## 2020-07-30 ENCOUNTER — HOSPITAL ENCOUNTER (OUTPATIENT)
Facility: OTHER | Age: 70
Discharge: HOME | End: 2020-07-30
Attending: ANESTHESIOLOGY | Admitting: ANESTHESIOLOGY
Payer: COMMERCIAL

## 2020-07-30 VITALS
RESPIRATION RATE: 18 BRPM | HEIGHT: 73 IN | WEIGHT: 245 LBS | TEMPERATURE: 99 F | BODY MASS INDEX: 32.47 KG/M2 | OXYGEN SATURATION: 98 % | SYSTOLIC BLOOD PRESSURE: 208 MMHG | DIASTOLIC BLOOD PRESSURE: 97 MMHG | HEART RATE: 61 BPM

## 2020-07-30 DIAGNOSIS — G89.29 CHRONIC PAIN: ICD-10-CM

## 2020-07-30 DIAGNOSIS — M47.816 LUMBAR SPONDYLOSIS: ICD-10-CM

## 2020-07-30 PROCEDURE — 64494 PR INJ DX/THER AGNT PARAVERT FACET JOINT,IMG GUIDE,LUMBAR/SAC, 2ND LEVEL: ICD-10-PCS | Mod: RT,,, | Performed by: ANESTHESIOLOGY

## 2020-07-30 PROCEDURE — 64494 INJ PARAVERT F JNT L/S 2 LEV: CPT | Mod: RT | Performed by: ANESTHESIOLOGY

## 2020-07-30 PROCEDURE — 25000003 PHARM REV CODE 250: Performed by: ANESTHESIOLOGY

## 2020-07-30 PROCEDURE — 64493 INJ PARAVERT F JNT L/S 1 LEV: CPT | Mod: RT | Performed by: ANESTHESIOLOGY

## 2020-07-30 PROCEDURE — 64493 PR INJ DX/THER AGNT PARAVERT FACET JOINT,IMG GUIDE,LUMBAR/SAC,1ST LVL: ICD-10-PCS | Mod: RT,,, | Performed by: ANESTHESIOLOGY

## 2020-07-30 PROCEDURE — 64494 INJ PARAVERT F JNT L/S 2 LEV: CPT | Mod: RT,,, | Performed by: ANESTHESIOLOGY

## 2020-07-30 PROCEDURE — 64493 INJ PARAVERT F JNT L/S 1 LEV: CPT | Mod: RT,,, | Performed by: ANESTHESIOLOGY

## 2020-07-30 RX ORDER — SODIUM CHLORIDE 9 MG/ML
500 INJECTION, SOLUTION INTRAVENOUS CONTINUOUS
Status: DISCONTINUED | OUTPATIENT
Start: 2020-07-30 | End: 2020-07-30 | Stop reason: HOSPADM

## 2020-07-30 RX ORDER — BUPIVACAINE HYDROCHLORIDE 2.5 MG/ML
INJECTION, SOLUTION EPIDURAL; INFILTRATION; INTRACAUDAL
Status: DISCONTINUED | OUTPATIENT
Start: 2020-07-30 | End: 2020-07-30 | Stop reason: HOSPADM

## 2020-07-30 RX ORDER — LIDOCAINE HYDROCHLORIDE 10 MG/ML
INJECTION INFILTRATION; PERINEURAL
Status: DISCONTINUED | OUTPATIENT
Start: 2020-07-30 | End: 2020-07-30 | Stop reason: HOSPADM

## 2020-07-30 NOTE — H&P
HPI  Patient presenting for Procedure(s) (LRB):  BLOCK, NERVE RIGHT L3, L4, L5 2ND (Right)     Patient on Anti-coagulation Yes, Xarelto last dose 7/26/20    No health changes since previous encounter    Past Medical History:   Diagnosis Date    Arthritis     Asthma     chronic    CKD (chronic kidney disease), stage III     patient denies    Demyelinating neuropathy     polyneuropathy    Gout     History of blood clots 2013    lower part of both legs    HLD (hyperlipidemia)     Hypertension     Iron deficiency anemia     BUZZ (obstructive sleep apnea)     CPAP     Past Surgical History:   Procedure Laterality Date    COLONOSCOPY N/A 11/7/2016    Procedure: COLONOSCOPY;  Surgeon: Shar Weiss MD;  [facility]  Service: Endoscopy;  Laterality: N/A;  [facility] Referral. Okay to hold Coumadin 5 days prior to procedure. See Referral scaned in media tab on 10/20/16.    COLONOSCOPY N/A 9/21/2017    Procedure: COLONOSCOPY;  Surgeon: Stuart Trinidad MD;  Location: [facility] ENDO ([address] [place] [address]);  Service: Endoscopy;  Laterality: N/A;  [facility] Olivarez-see scanned docs under media tab          9/6/17-current [facility] authorization and last clinic visit scanned into chart    HIP SURGERY Right 2008    expl[facility]atory at [facility]    HIP SURGERY Right 8-31-15    THR    INJECTION OF ANESTHETIC AGENT AROUND NERVE Right 10/23/2019    Procedure: BLOCK, NERVE, Obturator and Femoral cleared to hold xarelto 3 days pt. said he's not taking coumadin;  Surgeon: Moy Vasquez MD;  Location: [facility]IN [facility]GT;  Service: Pain Management;  Laterality: Right;    INJECTION OF ANESTHETIC AGENT AROUND NERVE Right 7/6/2020    Procedure: BLOCK, NERVE, RIGHT MBB L3,L5,L5;  Surgeon: Moy Vasquez MD;  Location: [facility];  Service: Pain Management;  Laterality: Right;  BLOCK, NERVE, RIGHT MBB L3,L5,L5    LEG SURGERY Left 2012    fibula and tibia    SHOULDER SURGERY Right 1989    torn ligament     Review of  patient's allergies indicates:   Allergen Reactions    Azithromycin      Muscles tense up    Acetaminophen Rash     Feels like needles are sticking him per pt      Current Facility-Administered Medications   Medication    0.9%  NaCl infusion       PMHx, PSHx, Allergies, Medications reviewed in epic    ROS negative except pain complaints in HPI    OBJECTIVE:    There were no vitals taken for this visit.    PHYSICAL EXAMINATION:    GENERAL: Well appearing, in no acute distress, alert and oriented x3.  PSYCH:  Mood and affect appropriate.  SKIN: Skin color, texture, turgor normal, no rashes or lesions which will impact the procedure.  CV: RRR with palpation of the radial artery.  PULM: No evidence of respiratory difficulty, symmetric chest rise. Clear to auscultation.  NEURO: Cranial nerves grossly intact.    Plan:    Proceed with procedure as planned Procedure(s) (LRB):  BLOCK, NERVE RIGHT L3, L4, L5 2ND (Right)    Long Torres  07/30/2020

## 2020-07-30 NOTE — DISCHARGE SUMMARY
Discharge Note  Short Stay      SUMMARY     Admit Date: 7/30/2020    Attending Physician: Moy Vasquez      Discharge Physician: Moy Vasquez      Discharge Date: 7/30/2020 12:20 PM    Procedure(s) (LRB):  BLOCK, NERVE RIGHT L3, L4, L5 2ND (Right)    Final Diagnosis: Lumbar radiculopathy [M54.16]    Disposition: Home or self care    Patient Instructions:   Current Discharge Medication List      CONTINUE these medications which have NOT CHANGED    Details   (Magic mouthwash) 1:1:1 Benadryl 12.5mg/5ml liq, aluminum & magnesium hydroxide-simehticone (Maalox), lidocaine viscous 2% Swish and spit 10 mLs every 4 (four) hours as needed. for mouth sores  Qty: 1 Bottle, Refills: 0      albuterol (PROVENTIL) 2.5 mg /3 mL (0.083 %) nebulizer solution Take 2.5 mg by nebulization every 6 (six) hours as needed for Wheezing.      albuterol 90 mcg/actuation inhaler Inhale 2 puffs into the lungs every 6 (six) hours as needed for Wheezing.       amlodipine (NORVASC) 10 MG tablet Take 10 mg by mouth once daily.      atorvastatin (LIPITOR) 80 MG tablet Take 80 mg by mouth once daily. Take one-half tablet daily      budesonide-formoterol 160-4.5 mcg (SYMBICORT) 160-4.5 mcg/actuation HFAA Inhale 2 puffs into the lungs every 12 (twelve) hours.       carboxymethyl-gly-affv46-VP 0.5-1-0.5 % Dpet Place 1 drop into both eyes 4 (four) times daily.      carboxymethylcellulose sodium 1 % DpGe Place 0.4 mLs into both eyes 4 (four) times daily as needed.      carvedilol (COREG) 25 MG tablet Take 25 mg by mouth 2 (two) times daily.      CLOBETASOL PROPIONATE, BULK, MISC Apply topically once daily.      cloNIDine 0.1 mg/24 hr td ptwk (CATAPRES) 0.1 mg/24 hr Place 1 patch onto the skin every 7 days.      cyanocobalamin (VITAMIN B-12) 100 MCG tablet Take 100 mcg by mouth once daily. Take two tablets      ergocalciferol (ERGOCALCIFEROL) 50,000 unit Cap Take 50,000 Units by mouth.      ketoconazole (NIZORAL) 2 % cream Apply topically 2 (two) times daily.       latanoprost 0.005 % ophthalmic solution Place 1 drop into both eyes every evening.      levETIRAcetam (KEPPRA) 500 MG Tab Take 1 tablet (500 mg total) by mouth nightly.  Qty: 30 tablet, Refills: 1      lidocaine (LIDODERM) 5 % Place 1 patch onto the skin once daily. Wear for 12 hours, then remove. Do not apply a new patch for at least 12 hours.      oxycodone (OXY-IR) 5 mg Cap Take 2 capsules (10 mg total) by mouth every 6 (six) hours as needed.  Qty: 120 capsule, Refills: 0      rivaroxaban (XARELTO) 20 mg Tab Take 20 mg by mouth.       tiotropium (SPIRIVA) 18 mcg inhalation capsule Inhale 18 mcg into the lungs once daily. Controller      valsartan (DIOVAN) 160 MG tablet Take 160 mg by mouth once daily.                 Discharge Diagnosis: Lumbar radiculopathy [M54.16]  Condition on Discharge: Stable with no complications to procedure   Diet on Discharge: Same as before.  Activity: as per instruction sheet.  Discharge to: Home with a responsible adult.  Follow up: 2-4 weeks       Please call my office or pager at [phone] if experienced any weakness or loss of sensation, fever > 101.5, pain uncontrolled with oral medications, persistent nausea/vomiting/or diarrhea, redness or drainage from the incisions, or any other worrisome concerns. If physician on call was not reached or could not communicate with our office for any reason please go to the nearest emergency department

## 2020-07-30 NOTE — OP NOTE
LUMBAR Medial Branch Block Under Fluoroscopy  Time-out taken to identify patient and procedure side prior to starting the procedure.   I attest that I have reviewed the patient's home medications prior to the procedure and no contraindication have been identified. I  re-evaluated the patient after the patient was positioned for the procedure in the procedure room immediately before the procedural time-out. The vital signs are current and represent the current state of the patient which has not significantly changed since the preprocedure assessment.            Date of Service: 07/30/2020    PCP: [facility]    Referring Physician:                                                           PROCEDURE:  Right L3/L4/L5 medial branch block    REASON FOR PROCEDURE: Lumbar radiculopathy [M54.16]  1. Lumbar spondylosis    2. Chronic pain      POSTPROCEDURE DIAGNOSIS:   Lumbar radiculopathy [M54.16]    1. Lumbar spondylosis    2. Chronic pain           PHYSICIAN: Moy Vasquez MD  ASSISTANTS:   Long Torres MD Resident      MEDICATIONS INJECTED: Bupivicaine 25% 1.5ml at each level      LOCAL ANESTHETIC USED: Xylocaine 1% 9ml with Sodium Bicarbonate 1ml.  3ml each site.    SEDATION MEDICATIONS: None    ESTIMATED BLOOD LOSS:  None.    COMPLICATIONS:  None.    TECHNIQUE: Laying in a prone position, the patient was prepped and draped in the usual sterile fashion using ChloraPrep and fenestrated drape.  The level was determined under fluoroscopic guidance.  Local anesthetic was given by going down to the hub of the 27-gauge 1.25in needle and raising a wheel.  A 22-gauge 3.5inch needle was introduced to the anatomic local of the medial branch at the lateral mass of all levels as stated above utilizing live fluoroscopy. Medication was then injected slowly. The patient tolerated the procedure well.     PAIN BEFORE THE PROCEDURE:  4/10.    PAIN AFTER THE PROCEDURE:  0/10.    The patient was monitored  after the procedure.  Patient was given post procedure and discharge instructions to follow at home.  We will see the patient back in two weeks or the patient may call to inform of status. The patient was discharged in a stable condition

## 2020-07-30 NOTE — DISCHARGE INSTRUCTIONS

## 2020-08-07 ENCOUNTER — TELEPHONE (OUTPATIENT)
Dept: PAIN MEDICINE | Facility: CLINIC | Age: 70
End: 2020-08-07

## 2020-08-07 NOTE — TELEPHONE ENCOUNTER
Staff spoke with patient regarding appointment 8/10/20 at 2:20pm with Florentino Calderon Np as in office visit and to inform patient of direct line to call before entering the building also to arrive 15 minutes early but we ask that patient come alone unless its an essential visitor patient verbalized understanding.

## 2020-08-10 ENCOUNTER — OFFICE VISIT (OUTPATIENT)
Dept: PAIN MEDICINE | Facility: CLINIC | Age: 70
End: 2020-08-10
Payer: COMMERCIAL

## 2020-08-10 VITALS
WEIGHT: 249 LBS | SYSTOLIC BLOOD PRESSURE: 185 MMHG | RESPIRATION RATE: 18 BRPM | HEIGHT: 73 IN | HEART RATE: 73 BPM | DIASTOLIC BLOOD PRESSURE: 86 MMHG | BODY MASS INDEX: 33 KG/M2 | TEMPERATURE: 99 F

## 2020-08-10 DIAGNOSIS — Z96.641 HISTORY OF RIGHT HIP REPLACEMENT: ICD-10-CM

## 2020-08-10 DIAGNOSIS — M25.551 RIGHT HIP PAIN: Primary | ICD-10-CM

## 2020-08-10 DIAGNOSIS — M47.816 LUMBAR SPONDYLOSIS: ICD-10-CM

## 2020-08-10 PROCEDURE — 99214 OFFICE O/P EST MOD 30 MIN: CPT | Mod: PBBFAC | Performed by: NURSE PRACTITIONER

## 2020-08-10 PROCEDURE — 99999 PR PBB SHADOW E&M-EST. PATIENT-LVL IV: CPT | Mod: PBBFAC,,, | Performed by: NURSE PRACTITIONER

## 2020-08-10 PROCEDURE — 99999 PR PBB SHADOW E&M-EST. PATIENT-LVL IV: ICD-10-PCS | Mod: PBBFAC,,, | Performed by: NURSE PRACTITIONER

## 2020-08-10 PROCEDURE — 99213 OFFICE O/P EST LOW 20 MIN: CPT | Mod: S$PBB,,, | Performed by: NURSE PRACTITIONER

## 2020-08-10 PROCEDURE — 99213 PR OFFICE/OUTPT VISIT, EST, LEVL III, 20-29 MIN: ICD-10-PCS | Mod: S$PBB,,, | Performed by: NURSE PRACTITIONER

## 2020-08-10 RX ORDER — PREGABALIN 75 MG/1
75 CAPSULE ORAL 2 TIMES DAILY
Qty: 60 CAPSULE | Refills: 0 | Status: SHIPPED | OUTPATIENT
Start: 2020-08-10 | End: 2020-10-20 | Stop reason: SDUPTHER

## 2020-08-10 NOTE — PROGRESS NOTES
"Subjective:      Patient ID: Macario Dior Jr. is a 70 y.o. male.    Chief Complaint: No chief complaint on file.    Referred by: No ref. provider found     Interval History 8/11/2020:  The patient presents for follow up of lower back pain. Pt over interval had 2 MBB of right L3,4,5 with >90% relief of both procedures. Pt states + a.m. stiffness, states when he has MBB of lumbar he notices his hip pain more and vise versa when he has hip block. Lumbar pain>R hip pain at this time. He continues to take oxycodone 5mg QID which is beneficial to pain control without adverse side effects. The patient denies myelopathic symptoms such as handwriting changes or difficulty with buttons/coins/keys. Denies perineal paresthesias, bowel/bladder dysfunction.        Interval History 05/27/2020:  S/P Right Femoral / Obturator Nerve Block in 10/2019 -- 50% relief for 2 days --  lost to follow up after that  S/p Right DHRUV 08/2015 -- doing well for 1.5 years  Pain is located at right hip joint. Occasionally radiates to right thigh.   Aggravated standing, walking, laying down.   Alleviated by Oxycodone      HPI:  Macario Dior is a 69 y.o. Man with PMH DVT s/p right DHRUV that presents today as a new consult for low back pain.  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 Since 2015 DHRUV, he initially had improved but progressive pain in the right hip and low back.  In the last 6 months, he has had 4 back injections from Dr. De Los Santos that lasted for 2 weeks each.  1 month ago, he had a Lumbar ABIODUN that provided 100% relief that lasted 5 days for the low back but the pain changed to radiate to the right hip.  Since the ABIODUN relief stopped, the pain has "worked its way back up to the low back."  The pain is described as intense, sharp, and shoots up from the right hip into the low back to the right periscapular area. The shooting pain is worse than the low back pain. The pain is interfering with everything, including walking, " "sitting, and putting on his shoes.  Oxycodone 10mg Q6H provides relief. Lidocaine patches and TENS provide minimal relief when he wakes up.  He did physical therapy for 1 year with minimal relief.  He is unable to regularly exercise secondary to pain. He is radha to get 4 hours of uninterrupted sleep per night.  He wakes up with stiffness in his low back despite $4,000 mattresses.  He has weakness and buckling in the right lower extremity.  He endorses worsening falls due to weakness "seems like the leg is not there." He says " I need the bone removed" referring to his right hip. [facility] is a  [facility] He denies incontinence and saddle anesthesia.      Medications:    Taking Oxycodone 5 mg QID -- with relief  Took Gabapentin with relief in the past but had adverse effects.  Never tried Lyrica     Interventional Pain History  Lumbar ABIODUN 100% relief 5 days - Dr. De Los Santos   Lumbar TPIs 0% relief - Dr. De Los Santos   Right Femoral / Obturator Nerve Block - 10/2019  7/6/2020 Right L3,4,5 RFA  7/30/2020 Right L3,4,5 RFA    Imaging:  LUMBAR MRI 04/2020  T12-L1: No significant spinal canal stenosis or neural foraminal narrowing.    L1-L2:  No significant spinal canal stenosis or neural foraminal narrowing.    L2-L3: Mild diffuse posterior disc bulge and bilateral facet arthropathy contribute to mild bilateral neural foraminal narrowing.  No significant spinal canal stenosis.    L3-L4: Mild diffuse posterior disc bulge, buckling of the ligamentum flavum, and bilateral facet arthropathy contribute moderate right/mild left neural foraminal narrowing.  No significant spinal canal stenosis.    L4-L5: Diffuse posterior disc bulge, buckling of the ligamentum flavum, and bilateral facet arthropathy contribute to mild effacement of the thecal sac without significant canal stenosis, however there is moderate right, mild left neural foraminal narrowing.    L5-S1: Mild diffuse posterior disc bulge and bilateral " facet arthropathy contribute to mild bilateral neural foraminal narrowing.    Paraspinous soft tissues: Atrophic right kidney.  Few, poorly visualized T2 hyperintense foci at the left kidney possibly representing cysts.    Past Medical History:   Diagnosis Date    Arthritis     Asthma     chronic    CKD (chronic kidney disease), stage III     patient denies    Demyelinating neuropathy     polyneuropathy    Gout     History of blood clots 2013    lower part of both legs    HLD (hyperlipidemia)     Hypertension     Iron deficiency anemia     BUZZ (obstructive sleep apnea)     CPAP       Past Surgical History:   Procedure Laterality Date    COLONOSCOPY N/A 11/7/2016    Procedure: COLONOSCOPY;  Surgeon: Shar Weiss MD;  Location: [facility] ([address] [place] [address]);  Service: Endoscopy;  Laterality: N/A;  [facility] Referral. Okay to hold Coumadin 5 days prior to procedure. See Referral scaned in media tab on 10/20/16.    COLONOSCOPY N/A 9/21/2017    Procedure: COLONOSCOPY;  Surgeon: Stuart Trinidad MD;  [facility]  Service: Endoscopy;  Laterality: N/A;  [facility]-see scanned docs under media tab          9/6/17-current [facility] authorization and last clinic visit scanned into chart    HIP SURGERY Right 2008    exploratory at [facility]    HIP SURGERY Right 8-31-15    [facility]    INJECTION OF ANESTHETIC AGENT AROUND NERVE Right 10/23/2019    Procedure: BLOCK, NERVE, Obturator and Femoral cleared to hold xarelto 3 days pt. said he's not taking coumadin;  Surgeon: Moy Vasquez MD;  Location: [facility];  Service: Pain Management;  Laterality: Right;    INJECTION OF ANESTHETIC AGENT AROUND NERVE Right 7/6/2020    Procedure: BLOCK, NERVE, RIGHT MBB L3,L5,L5;  Surgeon: Moy Vasquez MD;  Location: [facility];  Service: Pain Management;  Laterality: Right;  BLOCK, NERVE, RIGHT MBB L3,L5,L5    INJECTION OF ANESTHETIC AGENT AROUND NERVE Right 7/30/2020    Procedure: BLOCK, NERVE  RIGHT L3, L4, L5 2ND;  Surgeon: Moy Vasquez MD;  Location: [facility];  Service: Pain Management;  Laterality: Right;  NEEDS CONSENT, XARELTO    LEG SURGERY Left 2012    fibula and tibia    SHOULDER SURGERY Right 1989    torn ligament       Review of patient's allergies indicates:   Allergen Reactions    Azithromycin      Muscles tense up    Acetaminophen Rash     Feels like needles are sticking him per pt       Current Outpatient Medications   Medication Sig Dispense Refill    (Magic mouthwash) 1:1:1 Benadryl 12.5mg/5ml liq, aluminum & magnesium hydroxide-simehticone (Maalox), lidocaine viscous 2% Swish and spit 10 mLs every 4 (four) hours as needed. for mouth sores 1 Bottle 0    albuterol (PROVENTIL) 2.5 mg /3 mL (0.083 %) nebulizer solution Take 2.5 mg by nebulization every 6 (six) hours as needed for Wheezing.      albuterol 90 mcg/actuation inhaler Inhale 2 puffs into the lungs every 6 (six) hours as needed for Wheezing.       amlodipine (NORVASC) 10 MG tablet Take 10 mg by mouth once daily.      atorvastatin (LIPITOR) 80 MG tablet Take 80 mg by mouth once daily. Take one-half tablet daily      budesonide-formoterol 160-4.5 mcg (SYMBICORT) 160-4.5 mcg/actuation HFAA Inhale 2 puffs into the lungs every 12 (twelve) hours.       carboxymethyl-gly-trun92-ZR 0.5-1-0.5 % Dpet Place 1 drop into both eyes 4 (four) times daily.      carboxymethylcellulose sodium 1 % DpGe Place 0.4 mLs into both eyes 4 (four) times daily as needed.      carvedilol (COREG) 25 MG tablet Take 25 mg by mouth 2 (two) times daily.      CLOBETASOL PROPIONATE, BULK, MISC Apply topically once daily.      cloNIDine 0.1 mg/24 hr td ptwk (CATAPRES) 0.1 mg/24 hr Place 1 patch onto the skin every 7 days.      cyanocobalamin (VITAMIN B-12) 100 MCG tablet Take 100 mcg by mouth once daily. Take two tablets      ergocalciferol (ERGOCALCIFEROL) 50,000 unit Cap Take 50,000 Units by mouth.      ketoconazole (NIZORAL) 2 % cream Apply  topically 2 (two) times daily.      latanoprost 0.005 % ophthalmic solution Place 1 drop into both eyes every evening.      levETIRAcetam (KEPPRA) 500 MG Tab Take 1 tablet (500 mg total) by mouth nightly. 30 tablet 1    lidocaine (LIDODERM) 5 % Place 1 patch onto the skin once daily. Wear for 12 hours, then remove. Do not apply a new patch for at least 12 hours.      oxycodone (OXY-IR) 5 mg Cap Take 2 capsules (10 mg total) by mouth every 6 (six) hours as needed. 120 capsule 0    rivaroxaban (XARELTO) 20 mg Tab Take 20 mg by mouth.       tiotropium (SPIRIVA) 18 mcg inhalation capsule Inhale 18 mcg into the lungs once daily. Controller      valsartan (DIOVAN) 160 MG tablet Take 160 mg by mouth once daily.       No current facility-administered medications for this visit.        Family History   Problem Relation Age of Onset    Cancer Mother         ovarian    Cancer Sister         breast    Cancer Sister         breast       Social History     Socioeconomic History    Marital status: Single     Spouse name: Not on file    Number of children: Not on file    Years of education: Not on file    Highest education level: Not on file   Occupational History    Not on file   Social Needs    Financial resource strain: Not on file    Food insecurity     Worry: Not on file     Inability: Not on file    Transportation needs     Medical: Not on file     Non-medical: Not on file   Tobacco Use    Smoking status: Former Smoker     Types: Cigars     Quit date: 2009     Years since quittin.0    Smokeless tobacco: Never Used   Substance and Sexual Activity    Alcohol use: No     Alcohol/week: 0.0 standard drinks    Drug use: No    Sexual activity: Not Currently   Lifestyle    Physical activity     Days per week: Not on file     Minutes per session: Not on file    Stress: Not on file   Relationships    Social connections     Talks on phone: Not on file     Gets together: Not on file     Attends  "Islam service: Not on file     Active member of club or organization: Not on file     Attends meetings of clubs or organizations: Not on file     Relationship status: Not on file   Other Topics Concern    Not on file   Social History Narrative    Not on file           Review of Systems   Constitution: Positive for weight loss. Negative for chills, decreased appetite, fever and night sweats.   Eyes: Negative for double vision, vision loss in left eye, vision loss in right eye and visual disturbance.   Cardiovascular: Negative for chest pain.   Respiratory: Negative for shortness of breath.    Hematologic/Lymphatic: Does not bruise/bleed easily.        Coumadin 5mg   Skin: Negative for rash.   Musculoskeletal: Positive for back pain, falls, muscle cramps, muscle weakness and stiffness. Negative for joint swelling, myalgias and neck pain.   Gastrointestinal: Negative for abdominal pain, constipation and diarrhea.   Genitourinary: Negative for bladder incontinence.   Neurological: Positive for focal weakness, headaches and numbness. Negative for paresthesias and weakness.   Psychiatric/Behavioral: Negative for altered mental status, depression and suicidal ideas. The patient is not nervous/anxious.            Objective:   BP (!) 185/86   Pulse 73   Temp 98.9 °F (37.2 °C)   Resp 18   Ht 6' 1" (1.854 m)   Wt 112.9 kg (249 lb)   BMI 32.85 kg/m²   Pain Disability Index Review:  Last 3 PDI Scores 8/28/2019   Pain Disability Index (PDI) 0     Normocephalic.  Atraumatic.  Affect appropriate.  Breathing unlabored.  Extra ocular muscles intact.      General    Constitutional: He appears well-developed and well-nourished.   HENT:   Head: Atraumatic.   Eyes: EOM are normal.   Neck:   Right TRAPEZIUS TRIGGER POINT PALPABLE  MYOFASCIAL BUNDLE   Cardiovascular: Intact distal pulses.    Pulmonary/Chest: Effort normal.   Neurological: He is alert.     General Musculoskeletal Exam   Gait: normal     Right Ankle/Foot Exam "     Tests   Heel Walk: unable to perform  Tiptoe Walk: unable to perform    Left Ankle/Foot Exam     Tests   Heel Walk: able to perform  Tiptoe Walk: able to perform      Right Hip Exam     Inspection   Scars: present    Tenderness   The patient tender to palpation of the SI joint and piriformis.    Tests   Pain w/ forced internal rotation (LILIA): present  Pain w/ forced external rotation (FADIR): present  Susan: negative  Stinchfield test: positive  Log Roll: positive    Comments:   Tenderness with palpation of right ASIS and Right Lumbar facet joint    Pain with lumbar Flexion > extension, hip IR>>ER    Special tests difficult to test due to diffuse right hip pain  Back (L-Spine & T-Spine) / Neck (C-Spine) Exam     Tenderness Right paramedian tenderness of the Lower L-Spine.     Back (L-Spine & T-Spine) Range of Motion   Extension: normal   Flexion: abnormal   Lateral bend right: abnormal   Lateral bend left: normal   Rotation right: abnormal   Rotation left: normal     Spinal Sensation   Right Side Sensation  L-Spine Level: normal  Left Side Sensation  L-Spine Level: normal    Back (L-Spine & T-Spine) Tests   Right Side Tests  Femoral Stretch: negative  Left Side Tests  Femoral Stretch: negative    Comments:  Myofascial pain right rhomboids      Muscle Strength   Right Lower Extremity   Hip Abduction: 4/5   Hip Flexion: 4/5   Quadriceps:  4/5   Hamstrin/5   Anterior tibial:  5/5/5  Gastrocsoleus:  5/5/5  EHL:  5/5  Left Lower Extremity   Hip Abduction: 5/5   Hip Flexion: 5/5   Quadriceps:  5/5   Hamstrin/5   Anterior tibial:  5/5/5   Gastrocsoleus:  5/5/5  EHL:  5/5    Reflexes     Left Side  Quadriceps:  1+  Achilles:  1+  Left Kathleen's Sign:  Absent  Babinski Sign:  absent  Ankle Clonus:  absent    Right Side   Quadriceps:  1+  Achilles:  1+  Right Kathleen's Sign:  absent  Babinski Sign:  absent  Ankle Clonus:  absent        Assessment:       Encounter Diagnoses   Name Primary?    Right hip pain Yes     Lumbar spondylosis     History of right hip replacement      Patient with multifocal disease processes of lumbar spine and right hip  Mild relief after Femoral / Obturator Block. Patient is not interested in performing RFA of right hip.  Discussed L3,L4,L5 MEDIAL BRANCH BLOCK for FACET ARTHROPATHY with aim of performing RFA in future  Also discussed TAP block.  Patient is amenable to trying MBB in near future and assess pain score afterwards.       Plan:   We discussed with the patient the assessment and recommendations. The following is the plan we agreed on:        Diagnoses and all orders for this visit:    Right hip pain    Lumbar spondylosis    History of right hip replacement    Other orders  -     pregabalin (LYRICA) 75 MG capsule; Take 1 capsule (75 mg total) by mouth 2 (two) times daily.       -Prior records reviewed  - He is s/p X2 MBB to Right L3,4,5 with >90% relief  - Schedule RFA of Right L3,4,5  - Start Lyrica 75mg BID  - Continue Oxycodone 5mg IR as it is beneficial without SE  - Consider R hip RFA next to address hip pain with + diagnostic Femoral/Obturator block.   - Consider TAP block for Right Inguinal Pain if needed  - Patient is on Xarelto -- Patient instructed to hold Xarelto 2 days prior to procedure due to bleeding risk  - I have stressed the importance of physical activity and a home exercise plan to help with pain and improve health.  - Patient can continue with medications for now since they are providing benefits, using them appropriately, and without side effects.  - Counseled patient regarding the importance of constant sleeping habits.    Florentino Calderon,  08/10/2020  11:54 AM

## 2020-08-25 ENCOUNTER — TELEPHONE (OUTPATIENT)
Dept: PAIN MEDICINE | Facility: CLINIC | Age: 70
End: 2020-08-25

## 2020-09-10 ENCOUNTER — TELEPHONE (OUTPATIENT)
Dept: PAIN MEDICINE | Facility: CLINIC | Age: 70
End: 2020-09-10

## 2020-09-10 NOTE — TELEPHONE ENCOUNTER
----- Message from Radha Montero sent at 9/10/2020 11:39 AM CDT -----   Name of Who is Calling:     What is the request in detail: patient request call back in reference to nerve procedure  scheduling or scheduled date Please contact to further discuss and advise      Can the clinic reply by [facility]: no     What Number to Call Back if not in [facility]:  [phone]

## 2020-10-05 ENCOUNTER — HOSPITAL ENCOUNTER (OUTPATIENT)
Facility: OTHER | Age: 70
Discharge: HOME | End: 2020-10-05
Attending: ANESTHESIOLOGY | Admitting: ANESTHESIOLOGY
Payer: OTHER GOVERNMENT

## 2020-10-05 VITALS
SYSTOLIC BLOOD PRESSURE: 177 MMHG | TEMPERATURE: 99 F | RESPIRATION RATE: 15 BRPM | DIASTOLIC BLOOD PRESSURE: 87 MMHG | WEIGHT: 240 LBS | OXYGEN SATURATION: 99 % | BODY MASS INDEX: 30.8 KG/M2 | HEART RATE: 64 BPM | HEIGHT: 74 IN

## 2020-10-05 DIAGNOSIS — M47.816 LUMBAR SPONDYLOSIS: Primary | ICD-10-CM

## 2020-10-05 DIAGNOSIS — G89.29 CHRONIC PAIN: ICD-10-CM

## 2020-10-05 PROCEDURE — 25000003 PHARM REV CODE 250: Performed by: ANESTHESIOLOGY

## 2020-10-05 PROCEDURE — 64635 DESTROY LUMB/SAC FACET JNT: CPT | Mod: RT,,, | Performed by: ANESTHESIOLOGY

## 2020-10-05 PROCEDURE — 64635 PR DESTROY LUMB/SAC FACET JNT: ICD-10-PCS | Mod: RT,,, | Performed by: ANESTHESIOLOGY

## 2020-10-05 PROCEDURE — 64635 DESTROY LUMB/SAC FACET JNT: CPT | Mod: RT | Performed by: ANESTHESIOLOGY

## 2020-10-05 PROCEDURE — 64636 DESTROY L/S FACET JNT ADDL: CPT | Mod: RT,,, | Performed by: ANESTHESIOLOGY

## 2020-10-05 PROCEDURE — 63600175 PHARM REV CODE 636 W HCPCS: Performed by: ANESTHESIOLOGY

## 2020-10-05 PROCEDURE — 64636 DESTROY L/S FACET JNT ADDL: CPT | Mod: RT | Performed by: ANESTHESIOLOGY

## 2020-10-05 PROCEDURE — 64636 PR DESTROY L/S FACET JNT ADDL: ICD-10-PCS | Mod: RT,,, | Performed by: ANESTHESIOLOGY

## 2020-10-05 RX ORDER — BUPIVACAINE HYDROCHLORIDE 2.5 MG/ML
INJECTION, SOLUTION EPIDURAL; INFILTRATION; INTRACAUDAL
Status: DISCONTINUED | OUTPATIENT
Start: 2020-10-05 | End: 2020-10-05 | Stop reason: HOSPADM

## 2020-10-05 RX ORDER — SODIUM CHLORIDE 9 MG/ML
500 INJECTION, SOLUTION INTRAVENOUS CONTINUOUS
Status: DISCONTINUED | OUTPATIENT
Start: 2020-10-05 | End: 2020-10-05 | Stop reason: HOSPADM

## 2020-10-05 RX ORDER — LIDOCAINE HYDROCHLORIDE 10 MG/ML
INJECTION INFILTRATION; PERINEURAL
Status: DISCONTINUED | OUTPATIENT
Start: 2020-10-05 | End: 2020-10-05 | Stop reason: HOSPADM

## 2020-10-05 RX ORDER — FENTANYL CITRATE 50 UG/ML
INJECTION, SOLUTION INTRAMUSCULAR; INTRAVENOUS
Status: DISCONTINUED | OUTPATIENT
Start: 2020-10-05 | End: 2020-10-05 | Stop reason: HOSPADM

## 2020-10-05 RX ORDER — TRIAMCINOLONE ACETONIDE 40 MG/ML
INJECTION, SUSPENSION INTRA-ARTICULAR; INTRAMUSCULAR
Status: DISCONTINUED | OUTPATIENT
Start: 2020-10-05 | End: 2020-10-05 | Stop reason: HOSPADM

## 2020-10-05 RX ORDER — MIDAZOLAM HYDROCHLORIDE 1 MG/ML
INJECTION INTRAMUSCULAR; INTRAVENOUS
Status: DISCONTINUED | OUTPATIENT
Start: 2020-10-05 | End: 2020-10-05 | Stop reason: HOSPADM

## 2020-10-05 RX ADMIN — SODIUM CHLORIDE 500 ML: 0.9 INJECTION, SOLUTION INTRAVENOUS at 01:10

## 2020-10-05 NOTE — OP NOTE
LUMBAR Medial Branch Radiofrequency Ablation Under Fluoroscopy  Time-out taken to identify patient and procedure side prior to starting the procedure.   I attest that I have reviewed the patient's home medications prior to the procedure and no contraindication have been identified. I  re-evaluated the patient after the patient was positioned for the procedure in the procedure room immediately before the procedural time-out. The vital signs are current and represent the current state of the patient which has not significantly changed since the preprocedure assessment.                   Date of Service: 10/05/2020    PCP: [facility]    Referring Physician:                                                PROCEDURE:  right l3, 4, & 5 medial branch radiofrequency ablation    REASON FOR PROCEDURE: Lumbar spondylosis [M47.816]  1. Lumbar spondylosis    2. Chronic pain      POSTPROCEDURE DIAGNOSIS:   Lumbar spondylosis [M47.816]    1. Lumbar spondylosis    2. Chronic pain           PHYSICIAN: Moy Vasquez MD  ASSISTANTS: Patric Alan MD [facility] pain fellow       MEDICATIONS INJECTED:  0.5 ml of Kenalog 40mg/ml, and Bupivacaine 0.25% 10ml. Of that, 1.5-3ml injected per level before & after the ablation.    LOCAL ANESTHETIC USED: Xylocaine 1% 9ml with Sodium Bicarbonate 1ml.  3ml each site.  SEDATION MEDICATIONS: local/IV sedation: Versed 2 mg and fentanyl 100 mcg IV.  Conscious sedation ordered by MD.  Patient reevaluated and sedation administered by MD and monitored by RN.  Total sedation time was less than 15 min. (See nurse documentation and case log for sedation time)    ESTIMATED BLOOD LOSS:  None.    COMPLICATIONS:  None.    TECHNIQUE:   Laying in a prone position, the patient was prepped and draped in the usual sterile fashion using ChloraPrep and fenestrated drape.  The level was determined under fluoroscopic guidance.  Local anesthetic was given by going down to the hub of the  27-gauge 1.25in needle and raising a wheel.  The 20-gauge needle was introduced to the anatomic local of the median branch at the lateral mass of all levels as stated above utilizing live fluoroscopy. Motor stimulation done to confirm no motor nerve ablation takes place up to 2 Volt 2Hz..  Sensory stimulation done to detect similarities in pain location 1.5 Volts 50Hz.. Medication was then injected slowly.  Ablation then done per level utilizing [employer] radiofrequency generator 80°C for 90 seconds. The patient tolerated the procedure well.     PAIN BEFORE THE PROCEDURE:  10/10.    PAIN AFTER THE PROCEDURE: 0/10.    The patient was monitored after the procedure.  Patient was given post procedure and discharge instructions to follow at home.  We will see the patient back in two weeks or the patient may call to inform of status. The patient was discharged in a stable condition

## 2020-10-05 NOTE — DISCHARGE INSTRUCTIONS

## 2020-10-05 NOTE — H&P
HPI  Patient presenting for Procedure(s) (LRB):  RADIOFREQUENCY ABLATION RIGHT L3,4,5 (Right)     Patient on Anti-coagulation Yes   Last dose of eliquis on 10/1/2020    No health changes since previous encounter    Past Medical History:   Diagnosis Date    Arthritis     Asthma     chronic    CKD (chronic kidney disease), stage III     patient denies    Demyelinating neuropathy     polyneuropathy    Gout     History of blood clots 2013    lower part of both legs    HLD (hyperlipidemia)     Hypertension     Iron deficiency anemia     BUZZ (obstructive sleep apnea)     CPAP     Past Surgical History:   Procedure Laterality Date    COLONOSCOPY N/A 11/7/2016    Procedure: COLONOSCOPY;  Surgeon: Shar Weiss MD;  [facility]  Service: Endoscopy;  Laterality: N/A;  [facility] Referral. Okay to hold Coumadin 5 days prior to procedure. See Referral scaned in media tab on 10/20/16.    COLONOSCOPY N/A 9/21/2017    Procedure: COLONOSCOPY;  Surgeon: Stuart Trinidad MD;  Location: [facility] ([address] [place] [address]);  Service: Endoscopy;  Laterality: N/A;  [facility] Olivarez-see scanned docs under media tab          9/6/17-current [facility] authorization and last clinic visit scanned into chart    HIP SURGERY Right 2008    expl[facility]atory at [facility]    HIP SURGERY Right 8-31-15    [facility]    INJECTION OF ANESTHETIC AGENT AROUND NERVE Right 10/23/2019    Procedure: BLOCK, NERVE, Obturator and Femoral cleared to hold xarelto 3 days pt. said he's not taking coumadin;  Surgeon: Moy Vasquez MD;  Location: [facility]IN [facility];  Service: Pain Management;  Laterality: Right;    INJECTION OF ANESTHETIC AGENT AROUND NERVE Right 7/6/2020    Procedure: BLOCK, NERVE, RIGHT MBB L3,L5,L5;  Surgeon: Moy Vasquez MD;  Location: [facility];  Service: Pain Management;  Laterality: Right;  BLOCK, NERVE, RIGHT MBB L3,L5,L5    INJECTION OF ANESTHETIC AGENT AROUND NERVE Right 7/30/2020    Procedure: BLOCK, NERVE RIGHT L3,  "L4, L5 2ND;  Surgeon: Moy Vasquez MD;  Location: [facility];  Service: Pain Management;  Laterality: Right;  NEEDS CONSENT, XARELTO    LEG SURGERY Left 2012    fibula and tibia    SHOULDER SURGERY Right 1989    torn ligament     Review of patient's allergies indicates:   Allergen Reactions    Azithromycin      Muscles tense up    Acetaminophen Rash     Feels like needles are sticking him per pt      Current Facility-Administered Medications   Medication    0.9%  NaCl infusion       PMHx, PSHx, Allergies, Medications reviewed in epic    ROS negative except pain complaints in HPI    OBJECTIVE:    BP (!) 196/79 (BP Location: Right arm, Patient Position: Sitting)   Pulse 71   Temp 98.6 °F (37 °C) (Oral)   Resp 20   Ht 6' 2" (1.88 m)   Wt 108.9 kg (240 lb)   BMI 30.81 kg/m²     PHYSICAL EXAMINATION:    GENERAL: Well appearing, in no acute distress, alert and oriented x3.  PSYCH:  Mood and affect appropriate.  SKIN: Skin color, texture, turgor normal, no rashes or lesions which will impact the procedure.  CV: RRR with palpation of the radial artery.  PULM: No evidence of respiratory difficulty, symmetric chest rise. Clear to auscultation.  NEURO: Cranial nerves grossly intact.    Plan:    Proceed with procedure as planned Procedure(s) (LRB):  RADIOFREQUENCY ABLATION RIGHT L3,4,5 (Right)    Patric Alan  10/05/2020            "

## 2020-10-05 NOTE — DISCHARGE SUMMARY
Discharge Note  Short Stay      SUMMARY     Admit Date: 10/5/2020    Attending Physician: Moy Vasquez      Discharge Physician: Moy Vasquez      Discharge Date: 10/5/2020 2:30 PM    Procedure(s) (LRB):  RADIOFREQUENCY ABLATION RIGHT L3,4,5 (Right)    Final Diagnosis: Lumbar spondylosis [M47.816]    Disposition: Home or self care    Patient Instructions:   Current Discharge Medication List      CONTINUE these medications which have NOT CHANGED    Details   (Magic mouthwash) 1:1:1 Benadryl 12.5mg/5ml liq, aluminum & magnesium hydroxide-simehticone (Maalox), lidocaine viscous 2% Swish and spit 10 mLs every 4 (four) hours as needed. for mouth sores  Qty: 1 Bottle, Refills: 0      albuterol (PROVENTIL) 2.5 mg /3 mL (0.083 %) nebulizer solution Take 2.5 mg by nebulization every 6 (six) hours as needed for Wheezing.      albuterol 90 mcg/actuation inhaler Inhale 2 puffs into the lungs every 6 (six) hours as needed for Wheezing.       amlodipine (NORVASC) 10 MG tablet Take 10 mg by mouth once daily.      atorvastatin (LIPITOR) 80 MG tablet Take 80 mg by mouth once daily. Take one-half tablet daily      budesonide-formoterol 160-4.5 mcg (SYMBICORT) 160-4.5 mcg/actuation HFAA Inhale 2 puffs into the lungs every 12 (twelve) hours.       carboxymethyl-[id number]-FI 0.5-1-0.5 % Dpet Place 1 drop into both eyes 4 (four) times daily.      carboxymethylcellulose sodium 1 % DpGe Place 0.4 mLs into both eyes 4 (four) times daily as needed.      carvedilol (COREG) 25 MG tablet Take 25 mg by mouth 2 (two) times daily.      CLOBETASOL PROPIONATE, BULK, MISC Apply topically once daily.      cloNIDine 0.1 mg/24 hr td ptwk (CATAPRES) 0.1 mg/24 hr Place 1 patch onto the skin every 7 days.      cyanocobalamin (VITAMIN B-12) 100 MCG tablet Take 100 mcg by mouth once daily. Take two tablets      ergocalciferol (ERGOCALCIFEROL) 50,000 unit Cap Take 50,000 Units by mouth.      ketoconazole (NIZORAL) 2 % cream Apply topically 2 (two) times daily.       latanoprost 0.005 % ophthalmic solution Place 1 drop into both eyes every evening.      levETIRAcetam (KEPPRA) 500 MG Tab Take 1 tablet (500 mg total) by mouth nightly.  Qty: 30 tablet, Refills: 1      lidocaine (LIDODERM) 5 % Place 1 patch onto the skin once daily. Wear for 12 hours, then remove. Do not apply a new patch for at least 12 hours.      oxycodone (OXY-IR) 5 mg Cap Take 2 capsules (10 mg total) by mouth every 6 (six) hours as needed.  Qty: 120 capsule, Refills: 0      pregabalin (LYRICA) 75 MG capsule Take 1 capsule (75 mg total) by mouth 2 (two) times daily.  Qty: 60 capsule, Refills: 0      rivaroxaban (XARELTO) 20 mg Tab Take 20 mg by mouth.       tiotropium (SPIRIVA) 18 mcg inhalation capsule Inhale 18 mcg into the lungs once daily. Controller      valsartan (DIOVAN) 160 MG tablet Take 160 mg by mouth once daily.                 Discharge Diagnosis: Lumbar spondylosis [M47.816]  Condition on Discharge: Stable with no complications to procedure   Diet on Discharge: Same as before.  Activity: as per instruction sheet.  Discharge to: Home with a responsible adult.  Follow up: 2-4 weeks       Please call my office or pager at [phone] if experienced any weakness or loss of sensation, fever > 101.5, pain uncontrolled with oral medications, persistent nausea/vomiting/or diarrhea, redness or drainage from the incisions, or any other worrisome concerns. If physician on call was not reached or could not communicate with our office for any reason please go to the nearest emergency department

## 2020-10-16 ENCOUNTER — TELEPHONE (OUTPATIENT)
Dept: PAIN MEDICINE | Facility: CLINIC | Age: 70
End: 2020-10-16

## 2020-10-16 NOTE — TELEPHONE ENCOUNTER
"This message is for patient in regards to his/her appointment 10/19/20 at 11 am .      [facility] Policy: For the safety of all patients and staff members.     Patient Visitor policy: Due to social distancing and limited seating staff are requesting patient to arrive to their schedule appointments alone. If patient do not need assistance with their visit, we're asking all visitors to remain outside the waiting area.    Upon arriving to facility; patient will have temperature taking and are required to wear a face mask, if patient do not have a face mask one will be provided. Upon arriving patient we ask patients to contact clinic at this number [phone] to notify staff that they have arrived or they may do do by utilizing the Mobile checked in [place](if patient have patient portal; clinical staff will send a message through there letting them know it's okay to proceed to their visit). Staff will give the patient the "okay" to come up or wait inside their vehicle until clinic is ready for patient to be seen by Florentino Calderon Np If you have any questions or concerns please contact [phone]        "

## 2020-10-19 ENCOUNTER — TELEPHONE (OUTPATIENT)
Dept: PAIN MEDICINE | Facility: CLINIC | Age: 70
End: 2020-10-19

## 2020-10-19 NOTE — TELEPHONE ENCOUNTER
"This message is for patient in regards to his/her appointment 10/20/20 at 3 pm.      [facility] Policy: For the safety of all patients and staff members.     Patient Visitor policy: Due to social distancing and limited seating staff are requesting patient to arrive to their schedule appointments alone. If patient do not need assistance with their visit, we're asking all visitors to remain outside the waiting area.    Upon arriving to facility; patient will have temperature taking and are required to wear a face mask, if patient do not have a face mask one will be provided. Upon arriving patient we ask patients to contact clinic at this number [phone] to notify staff that they have arrived or they may do do by utilizing the Mobile checked in [place](if patient have patient portal; clinical staff will send a message through there letting them know it's okay to proceed to their visit). Staff will give the patient the "okay" to come up or wait inside their vehicle until clinic is ready for patient to be seen by Florentino Calderon Np If you have any questions or concerns please contact [phone]        "

## 2020-10-20 ENCOUNTER — OFFICE VISIT (OUTPATIENT)
Dept: PAIN MEDICINE | Facility: CLINIC | Age: 70
End: 2020-10-20
Payer: OTHER GOVERNMENT

## 2020-10-20 VITALS
HEIGHT: 74 IN | HEART RATE: 67 BPM | DIASTOLIC BLOOD PRESSURE: 87 MMHG | OXYGEN SATURATION: 100 % | TEMPERATURE: 98 F | SYSTOLIC BLOOD PRESSURE: 157 MMHG | RESPIRATION RATE: 18 BRPM | BODY MASS INDEX: 32.25 KG/M2 | WEIGHT: 251.31 LBS

## 2020-10-20 DIAGNOSIS — M47.816 FACET ARTHRITIS, DEGENERATIVE, LUMBAR SPINE: ICD-10-CM

## 2020-10-20 DIAGNOSIS — M87.059 AVASCULAR NECROSIS OF FEMORAL HEAD, UNSPECIFIED LATERALITY: ICD-10-CM

## 2020-10-20 DIAGNOSIS — M54.16 LUMBAR RADICULOPATHY: Primary | ICD-10-CM

## 2020-10-20 DIAGNOSIS — M54.16 LUMBAR RADICULOPATHY, RIGHT: ICD-10-CM

## 2020-10-20 DIAGNOSIS — Z96.649 HIP JOINT REPLACEMENT BY OTHER MEANS: ICD-10-CM

## 2020-10-20 DIAGNOSIS — M47.26 OSTEOARTHRITIS OF SPINE WITH RADICULOPATHY, LUMBAR REGION: ICD-10-CM

## 2020-10-20 PROCEDURE — 99999 PR PBB SHADOW E&M-EST. PATIENT-LVL V: CPT | Mod: PBBFAC,,, | Performed by: NURSE PRACTITIONER

## 2020-10-20 PROCEDURE — 99999 PR PBB SHADOW E&M-EST. PATIENT-LVL V: ICD-10-PCS | Mod: PBBFAC,,, | Performed by: NURSE PRACTITIONER

## 2020-10-20 PROCEDURE — 99213 PR OFFICE/OUTPT VISIT, EST, LEVL III, 20-29 MIN: ICD-10-PCS | Mod: S$PBB,,, | Performed by: NURSE PRACTITIONER

## 2020-10-20 PROCEDURE — 99215 OFFICE O/P EST HI 40 MIN: CPT | Mod: PBBFAC | Performed by: NURSE PRACTITIONER

## 2020-10-20 PROCEDURE — 99213 OFFICE O/P EST LOW 20 MIN: CPT | Mod: S$PBB,,, | Performed by: NURSE PRACTITIONER

## 2020-10-20 RX ORDER — PREGABALIN 75 MG/1
75 CAPSULE ORAL 2 TIMES DAILY
Qty: 60 CAPSULE | Refills: 0 | Status: SHIPPED | OUTPATIENT
Start: 2020-10-20 | End: 2020-12-04

## 2020-10-20 NOTE — PROGRESS NOTES
Subjective:      Patient ID: Macario Dior Jr. is a 70 y.o. male.    Chief Complaint: No chief complaint on file.    Referred by: No ref. provider found     Interval History 10/20/2020:  The patient presents follow-up for lower back pain.  He has had near resolution since right-sided L 3, 4, 5 RFA.  He states he is doing well.  He continues to take minimum the medication to address pain.  He is currently taking oxycodone 5 mg q.i.d. as well as Lyrica 75 b.i.d..  He finds he is beneficial without adverse side effects.The patient denies myelopathic symptoms such as handwriting changes or difficulty with buttons/coins/keys. Denies perineal paresthesias, bowel/bladder dysfunction.    Interval History 8/11/2020:  The patient presents for follow up of lower back pain. Pt over interval had 2 MBB of right L3,4,5 with >90% relief of both procedures. Pt states + a.m. stiffness, states when he has MBB of lumbar he notices his hip pain more and vise versa when he has hip block. Lumbar pain>R hip pain at this time. He continues to take oxycodone 5mg QID which is beneficial to pain control without adverse side effects. The patient denies myelopathic symptoms such as handwriting changes or difficulty with buttons/coins/keys. Denies perineal paresthesias, bowel/bladder dysfunction.        Interval History 05/27/2020:  S/P Right Femoral / Obturator Nerve Block in 10/2019 -- 50% relief for 2 days --  lost to follow up after that  S/p Right DHRUV 08/2015 -- doing well for 1.5 years  Pain is located at right hip joint. Occasionally radiates to right thigh.   Aggravated standing, walking, laying down.   Alleviated by Oxycodone      HPI:  Macario Dior is a 69 y.o. Man with PMH DVT s/p right DHRUV that presents today as a new consult for low back pain.  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 Since 2015 DHRUV, he initially had improved but progressive pain in the right hip and low back.  In the last 6 months, he has had  "4 back injections from Dr. De Los Santos that lasted for 2 weeks each.  1 month ago, he had a Lumbar ABIODUN that provided 100% relief that lasted 5 days for the low back but the pain changed to radiate to the right hip.  Since the ABIODUN relief stopped, the pain has "worked its way back up to the low back."  The pain is described as intense, sharp, and shoots up from the right hip into the low back to the right periscapular area. The shooting pain is worse than the low back pain. The pain is interfering with everything, including walking, sitting, and putting on his shoes.  Oxycodone 10mg Q6H provides relief. Lidocaine patches and TENS provide minimal relief when he wakes up.  He did physical therapy for 1 year with minimal relief.  He is unable to regularly exercise secondary to pain. He is radha to get 4 hours of uninterrupted sleep per night.  He wakes up with stiffness in his low back despite $4,000 mattresses.  He has weakness and buckling in the right lower extremity.  He endorses worsening falls due to weakness "seems like the leg is not there." He says " I need the bone removed" referring to his right hip. [facility] is a  [facility] He denies incontinence and saddle anesthesia.      Medications:    Taking Oxycodone 5 mg QID -- with relief  Took Gabapentin with relief in the past but had adverse effects.  Never tried Lyrica     Interventional Pain History  Lumbar ABIODUN 100% relief 5 days - Dr. De Los Santos   Lumbar TPIs 0% relief - Dr. De Los Santos   Right Femoral / Obturator Nerve Block - 10/2019  7/6/2020 Right L3,4,5 RFA  7/30/2020 Right L3,4,5 RFA    Imaging:  LUMBAR MRI 04/2020  T12-L1: No significant spinal canal stenosis or neural foraminal narrowing.    L1-L2:  No significant spinal canal stenosis or neural foraminal narrowing.    L2-L3: Mild diffuse posterior disc bulge and bilateral facet arthropathy contribute to mild bilateral neural foraminal narrowing.  No significant spinal canal " stenosis.    L3-L4: Mild diffuse posterior disc bulge, buckling of the ligamentum flavum, and bilateral facet arthropathy contribute moderate right/mild left neural foraminal narrowing.  No significant spinal canal stenosis.    L4-L5: Diffuse posterior disc bulge, buckling of the ligamentum flavum, and bilateral facet arthropathy contribute to mild effacement of the thecal sac without significant canal stenosis, however there is moderate right, mild left neural foraminal narrowing.    L5-S1: Mild diffuse posterior disc bulge and bilateral facet arthropathy contribute to mild bilateral neural foraminal narrowing.    Paraspinous soft tissues: Atrophic right kidney.  Few, poorly visualized T2 hyperintense foci at the left kidney possibly representing cysts.    Past Medical History:   Diagnosis Date    Arthritis     Asthma     chronic    CKD (chronic kidney disease), stage III     patient denies    Demyelinating neuropathy     polyneuropathy    Gout     History of blood clots 2013    lower part of both legs    HLD (hyperlipidemia)     Hypertension     Iron deficiency anemia     BUZZ (obstructive sleep apnea)     CPAP       Past Surgical History:   Procedure Laterality Date    COLONOSCOPY N/A 11/7/2016    Procedure: COLONOSCOPY;  Surgeon: Shar Weiss MD;  Location: [facility] ([address] [place] [address]);  Service: Endoscopy;  Laterality: N/A;  [facility] Referral. Okay to hold Coumadin 5 days prior to procedure. See Referral scaned in media tab on 10/20/16.    COLONOSCOPY N/A 9/21/2017    Procedure: COLONOSCOPY;  Surgeon: Stuart Trinidad MD;  Location: [facility] ([address] [place] [address]);  Service: Endoscopy;  Laterality: N/A;  [facility] Olivarez-see scanned docs under media tab          9/6/17-current [facility] authorization and last clinic visit scanned into chart    HIP SURGERY Right 2008    exploratory at [facility]    HIP SURGERY Right 8-31-15    [facility]    INJECTION OF ANESTHETIC AGENT AROUND NERVE Right 10/23/2019     Procedure: BLOCK, NERVE, Obturator and Femoral cleared to hold xarelto 3 days pt. said he's not taking coumadin;  Surgeon: Moy Vasquez MD;  [facility]: [facility];  Service: Pain Management;  Laterality: Right;    INJECTION OF ANESTHETIC AGENT AROUND NERVE Right 7/6/2020    Procedure: BLOCK, NERVE, RIGHT MBB L3,L5,L5;  Surgeon: Moy Vasquez MD;  [facility]: [facility];  Service: Pain Management;  Laterality: Right;  BLOCK, NERVE, RIGHT MBB L3,L5,L5    INJECTION OF ANESTHETIC AGENT AROUND NERVE Right 7/30/2020    Procedure: BLOCK, NERVE RIGHT L3, L4, L5 2ND;  Surgeon: Moy Vasquez MD;  [facility]: [facility];  Service: Pain Management;  Laterality: Right;  NEEDS CONSENT, XARELTO    LEG SURGERY Left 2012    fibula and tibia    RADIOFREQUENCY ABLATION Right 10/5/2020    Procedure: RADIOFREQUENCY ABLATION RIGHT L3,4,5;  Surgeon: Moy Vasquez MD;  [facility];  Service: Pain Management;  Laterality: Right;  RADIOFREQUENCY ABLATION RIGHT L3,4,5  ok to hold xarelto, scanned in Media    SHOULDER SURGERY Right 1989    torn ligament       Review of patient's allergies indicates:   Allergen Reactions    Azithromycin      Muscles tense up    Acetaminophen Rash     Feels like needles are sticking him per pt       Current Outpatient Medications   Medication Sig Dispense Refill    (Magic mouthwash) 1:1:1 Benadryl 12.5mg/5ml liq, aluminum & magnesium hydroxide-simehticone (Maalox), lidocaine viscous 2% Swish and spit 10 mLs every 4 (four) hours as needed. for mouth sores 1 Bottle 0    albuterol (PROVENTIL) 2.5 mg /3 mL (0.083 %) nebulizer solution Take 2.5 mg by nebulization every 6 (six) hours as needed for Wheezing.      albuterol 90 mcg/actuation inhaler Inhale 2 puffs into the lungs every 6 (six) hours as needed for Wheezing.       amlodipine (NORVASC) 10 MG tablet Take 10 mg by mouth once daily.      atorvastatin (LIPITOR) 80 MG tablet Take 80 mg by mouth once daily. Take one-half tablet daily       budesonide-formoterol 160-4.5 mcg (SYMBICORT) 160-4.5 mcg/actuation HFAA Inhale 2 puffs into the lungs every 12 (twelve) hours.       carboxymethyl-gly-[id number]-CF 0.5-1-0.5 % Dpet Place 1 drop into both eyes 4 (four) times daily.      carboxymethylcellulose sodium 1 % DpGe Place 0.4 mLs into both eyes 4 (four) times daily as needed.      carvedilol (COREG) 25 MG tablet Take 25 mg by mouth 2 (two) times daily.      CLOBETASOL PROPIONATE, BULK, MISC Apply topically once daily.      cloNIDine 0.1 mg/24 hr td ptwk (CATAPRES) 0.1 mg/24 hr Place 1 patch onto the skin every 7 days.      ergocalciferol (ERGOCALCIFEROL) 50,000 unit Cap Take 50,000 Units by mouth.      ketoconazole (NIZORAL) 2 % cream Apply topically 2 (two) times daily.      latanoprost 0.005 % ophthalmic solution Place 1 drop into both eyes every evening.      lidocaine (LIDODERM) 5 % Place 1 patch onto the skin once daily. Wear for 12 hours, then remove. Do not apply a new patch for at least 12 hours.      oxycodone (OXY-IR) 5 mg Cap Take 2 capsules (10 mg total) by mouth every 6 (six) hours as needed. 120 capsule 0    rivaroxaban (XARELTO) 20 mg Tab Take 20 mg by mouth.       tiotropium (SPIRIVA) 18 mcg inhalation capsule Inhale 18 mcg into the lungs once daily. Controller      valsartan (DIOVAN) 160 MG tablet Take 160 mg by mouth once daily.      cyanocobalamin (VITAMIN B-12) 100 MCG tablet Take 100 mcg by mouth once daily. Take two tablets      levETIRAcetam (KEPPRA) 500 MG Tab Take 1 tablet (500 mg total) by mouth nightly. 30 tablet 1    pregabalin (LYRICA) 75 MG capsule Take 1 capsule (75 mg total) by mouth 2 (two) times daily. 60 capsule 0     No current facility-administered medications for this visit.        Family History   Problem Relation Age of Onset    Cancer Mother         ovarian    Cancer Sister         breast    Cancer Sister         breast       Social History     Socioeconomic History    Marital status: Single      Spouse name: Not on file    Number of children: Not on file    Years of education: Not on file    Highest education level: Not on file   Occupational History    Not on file   Social Needs    Financial resource strain: Not on file    Food insecurity     Worry: Not on file     Inability: Not on file    Transportation needs     Medical: Not on file     Non-medical: Not on file   Tobacco Use    Smoking status: Former Smoker     Types: Cigars     Quit date: 2009     Years since quittin.2    Smokeless tobacco: Never Used   Substance and Sexual Activity    Alcohol use: No     Alcohol/week: 0.0 standard drinks    Drug use: No    Sexual activity: Not Currently   Lifestyle    Physical activity     Days per week: Not on file     Minutes per session: Not on file    Stress: Not on file   Relationships    Social connections     Talks on phone: Not on file     Gets together: Not on file     Attends Denominational service: Not on file     Active member of club or organization: Not on file     Attends meetings of clubs or organizations: Not on file     Relationship status: Not on file   Other Topics Concern    Not on file   Social History Narrative    Not on file           Review of Systems   Constitution: Positive for weight loss. Negative for chills, decreased appetite, fever and night sweats.   Eyes: Negative for double vision, vision loss in left eye, vision loss in right eye and visual disturbance.   Cardiovascular: Negative for chest pain.   Respiratory: Negative for shortness of breath.    Hematologic/Lymphatic: Does not bruise/bleed easily.        Coumadin 5mg   Skin: Negative for rash.   Musculoskeletal: Positive for back pain, falls, muscle cramps, muscle weakness and stiffness. Negative for joint swelling, myalgias and neck pain.   Gastrointestinal: Negative for abdominal pain, constipation and diarrhea.   Genitourinary: Negative for bladder incontinence.   Neurological: Positive for focal weakness,  "headaches and numbness. Negative for paresthesias and weakness.   Psychiatric/Behavioral: Negative for altered mental status, depression and suicidal ideas. The patient is not nervous/anxious.            Objective:   BP (!) 157/87   Pulse 67   Temp 98 °F (36.7 °C)   Resp 18   Ht 6' 2" (1.88 m)   Wt 114 kg (251 lb 5.2 oz)   SpO2 100%   BMI 32.27 kg/m²   Pain Disability Index Review:  Last 3 PDI Scores 10/20/2020 8/10/2020 8/28/2019   Pain Disability Index (PDI) 16 57 0     PHYSICAL EXAMINATION:  Voice normal.  Normal affect without evidence of distress.      Prior Physical Exam  Normocephalic.  Atraumatic.  Affect appropriate.  Breathing unlabored.  Extra ocular muscles intact.      General    Constitutional: He appears well-developed and well-nourished.   HENT:   Head: Atraumatic.   Eyes: EOM are normal.   Neck:   Right TRAPEZIUS TRIGGER POINT PALPABLE  MYOFASCIAL BUNDLE   Cardiovascular: Intact distal pulses.    Pulmonary/Chest: Effort normal.   Neurological: He is alert.     General Musculoskeletal Exam   Gait: normal     Right Ankle/Foot Exam     Tests   Heel Walk: unable to perform  Tiptoe Walk: unable to perform    Left Ankle/Foot Exam     Tests   Heel Walk: able to perform  Tiptoe Walk: able to perform      Right Hip Exam     Inspection   Scars: present    Tenderness   The patient tender to palpation of the SI joint and piriformis.    Tests   Pain w/ forced internal rotation (LILIA): present  Pain w/ forced external rotation (FADIR): present  Susan: negative  Stinchfield test: positive  Log Roll: positive    Comments:   Tenderness with palpation of right ASIS and Right Lumbar facet joint    Pain with lumbar Flexion > extension, hip IR>>ER    Special tests difficult to test due to diffuse right hip pain  Back (L-Spine & T-Spine) / Neck (C-Spine) Exam     Tenderness Right paramedian tenderness of the Lower L-Spine.     Back (L-Spine & T-Spine) Range of Motion   Extension: normal   Flexion: abnormal "   Lateral bend right: abnormal   Lateral bend left: normal   Rotation right: abnormal   Rotation left: normal     Spinal Sensation   Right Side Sensation  L-Spine Level: normal  Left Side Sensation  L-Spine Level: normal    Back (L-Spine & T-Spine) Tests   Right Side Tests  Femoral Stretch: negative  Left Side Tests  Femoral Stretch: negative    Comments:  Myofascial pain right rhomboids      Muscle Strength   Right Lower Extremity   Hip Abduction: 4/5   Hip Flexion: 4/5   Quadriceps:  4/5   Hamstrin/5   Anterior tibial:  5/5   Gastrocsoleus:  5/5   EHL:  5/5  Left Lower Extremity   Hip Abduction: 5/5   Hip Flexion: 5/5   Quadriceps:  5/5   Hamstrin/5   Anterior tibial:  5/5   Gastrocsoleus:  5/5   EHL:  5/5    Reflexes     Left Side  Achilles:  1+  Left Kathleen's Sign:  Absent  Babinski Sign:  absent  Ankle Clonus:  absent  Quadriceps:  1+    Right Side   Achilles:  1+  Right Kathleen's Sign:  absent  Babinski Sign:  absent  Ankle Clonus:  absent  Quadriceps:  1+        Assessment:       Encounter Diagnoses   Name Primary?    Lumbar radiculopathy Yes    Avascular necrosis of femoral head, unspecified laterality     Osteoarthritis of spine with radiculopathy, lumbar region     Hip joint replacement by other means     Lumbar radiculopathy, right     Facet arthritis, degenerative, lumbar spine        Plan:   We discussed with the patient the assessment and recommendations. The following is the plan we agreed on:        Diagnoses and all orders for this visit:    Lumbar radiculopathy    Avascular necrosis of femoral head, unspecified laterality    Osteoarthritis of spine with radiculopathy, lumbar region    Hip joint replacement by other means    Lumbar radiculopathy, right    Facet arthritis, degenerative, lumbar spine       -Prior records reviewed  - He is most recently s/p   RFA of Right L3,4,5 with near resolution of pain, can repeat as needed.   - ContinueLyrica 75mg BID  - Continue Oxycodone 5mg IR  as it is beneficial without SE  - Consider R hip RFA next to address hip pain with + diagnostic Femoral/Obturator block.   - Consider TAP block for Right Inguinal Pain if needed  - Patient is on Xarelto -- Patient instructed to hold Xarelto 2 days prior to procedure due to bleeding risk  - I have stressed the importance of physical activity and a home exercise plan to help with pain and improve health.  - Patient can continue with medications for now since they are providing benefits, using them appropriately, and without side effects.  - Counseled patient regarding the importance of constant sleeping habits.  - RTC PRN  Florentino Calderon,  10/23/2020  11:54 AM

## 2020-11-16 ENCOUNTER — TELEPHONE (OUTPATIENT)
Dept: PAIN MEDICINE | Facility: CLINIC | Age: 70
End: 2020-11-16

## 2020-11-16 NOTE — TELEPHONE ENCOUNTER
"This message is for patient in regards to his/her appointment 11/17/20 at 2:20pm.      [facility] Policy: For the safety of all patients and staff members.     Patient Visitor policy: Due to social distancing and limited seating staff are requesting patient to arrive to their schedule appointments alone. If patient do not need assistance with their visit, we're asking all visitors to remain outside the waiting area.    Upon arriving to facility; patient will have temperature taking and are required to wear a face mask, if patient do not have a face mask one will be provided. Upon arriving patient we ask patients to contact clinic at this number [phone] to notify staff that they have arrived or they may do do by utilizing the Mobile checked in [place](if patient have patient portal; clinical staff will send a message through there letting them know it's okay to proceed to their visit). Staff will give the patient the "okay" to come up or wait inside their vehicle until clinic is ready for patient to be seen by Florentino Calderon Np If you have any questions or concerns please contact [phone]        "

## 2020-11-18 ENCOUNTER — TELEPHONE (OUTPATIENT)
Dept: PAIN MEDICINE | Facility: CLINIC | Age: 70
End: 2020-11-18

## 2020-11-19 NOTE — TELEPHONE ENCOUNTER
Staff spoke with patient appointment was rescheduled on 12/3/20 at 3 pm with Florentino sharp. Patient verbalized understanding .

## 2020-12-03 ENCOUNTER — TELEPHONE (OUTPATIENT)
Dept: PAIN MEDICINE | Facility: CLINIC | Age: 70
End: 2020-12-03

## 2020-12-03 NOTE — TELEPHONE ENCOUNTER
"This message is for patient in regards to his/her appointment 12/4/20 at 3 pm.      [facility] Policy: For the safety of all patients and staff members.     Patient Visitor policy: Due to social distancing and limited seating staff are requesting patient to arrive to their schedule appointments alone. If patient do not need assistance with their visit, we're asking all visitors to remain outside the waiting area.    Upon arriving to facility; patient will have temperature taking and are required to wear a face mask, if patient do not have a face mask one will be provided. Upon arriving patient we ask patients to contact clinic at this number [phone] to notify staff that they have arrived or they may do do by utilizing the Mobile checked in [place](if patient have patient portal; clinical staff will send a message through there letting them know it's okay to proceed to their visit). Staff will give the patient the "okay" to come up or wait inside their vehicle until clinic is ready for patient to be seen by Florentino Calderon Np If you have any questions or concerns please contact [phone]        "

## 2020-12-04 ENCOUNTER — OFFICE VISIT (OUTPATIENT)
Dept: PAIN MEDICINE | Facility: CLINIC | Age: 70
End: 2020-12-04
Payer: COMMERCIAL

## 2020-12-04 VITALS
BODY MASS INDEX: 33.1 KG/M2 | TEMPERATURE: 98 F | SYSTOLIC BLOOD PRESSURE: 137 MMHG | RESPIRATION RATE: 18 BRPM | WEIGHT: 257.94 LBS | HEART RATE: 67 BPM | DIASTOLIC BLOOD PRESSURE: 79 MMHG | OXYGEN SATURATION: 100 % | HEIGHT: 74 IN

## 2020-12-04 DIAGNOSIS — G89.29 OTHER CHRONIC PAIN: ICD-10-CM

## 2020-12-04 DIAGNOSIS — G89.4 CHRONIC PAIN SYNDROME: ICD-10-CM

## 2020-12-04 DIAGNOSIS — M47.816 LUMBAR SPONDYLOSIS: Primary | ICD-10-CM

## 2020-12-04 DIAGNOSIS — M54.16 LUMBAR RADICULOPATHY: ICD-10-CM

## 2020-12-04 DIAGNOSIS — Z96.649 HIP JOINT REPLACEMENT BY OTHER MEANS: ICD-10-CM

## 2020-12-04 DIAGNOSIS — M54.16 LUMBAR RADICULOPATHY, RIGHT: ICD-10-CM

## 2020-12-04 PROCEDURE — 99214 OFFICE O/P EST MOD 30 MIN: CPT | Mod: PBBFAC | Performed by: NURSE PRACTITIONER

## 2020-12-04 PROCEDURE — 99213 PR OFFICE/OUTPT VISIT, EST, LEVL III, 20-29 MIN: ICD-10-PCS | Mod: S$PBB,,, | Performed by: NURSE PRACTITIONER

## 2020-12-04 PROCEDURE — 99999 PR PBB SHADOW E&M-EST. PATIENT-LVL IV: CPT | Mod: PBBFAC,,, | Performed by: NURSE PRACTITIONER

## 2020-12-04 PROCEDURE — 99999 PR PBB SHADOW E&M-EST. PATIENT-LVL IV: ICD-10-PCS | Mod: PBBFAC,,, | Performed by: NURSE PRACTITIONER

## 2020-12-04 PROCEDURE — 99213 OFFICE O/P EST LOW 20 MIN: CPT | Mod: S$PBB,,, | Performed by: NURSE PRACTITIONER

## 2020-12-04 RX ORDER — BACLOFEN 10 MG/1
10 TABLET ORAL 3 TIMES DAILY
Qty: 90 TABLET | Refills: 1 | Status: SHIPPED | OUTPATIENT
Start: 2020-12-04 | End: 2022-12-19

## 2020-12-04 NOTE — H&P (VIEW-ONLY)
Subjective:      Patient ID: Macario Dior Jr. is a 70 y.o. male.    Chief Complaint: No chief complaint on file.    Referred by: No ref. provider found     Interval History 12/4/2020:  Pt presents for follow up of lower back pain. He continues to have benefit from RFA of right lower lumbar. He is doing well with med mgt of oxycodone and lyrica. He states night time leg cramping. The patient denies myelopathic symptoms such as handwriting changes or difficulty with buttons/coins/keys. Denies perineal paresthesias, bowel/bladder dysfunction.      Interval History 10/20/2020:  The patient presents follow-up for lower back pain.  He has had near resolution since right-sided L 3, 4, 5 RFA.  He states he is doing well.  He continues to take minimum the medication to address pain.  He is currently taking oxycodone 5 mg q.i.d. as well as Lyrica 75 b.i.d..  He finds he is beneficial without adverse side effects.The patient denies myelopathic symptoms such as handwriting changes or difficulty with buttons/coins/keys. Denies perineal paresthesias, bowel/bladder dysfunction.    Interval History 8/11/2020:  The patient presents for follow up of lower back pain. Pt over interval had 2 MBB of right L3,4,5 with >90% relief of both procedures. Pt states + a.m. stiffness, states when he has MBB of lumbar he notices his hip pain more and vise versa when he has hip block. Lumbar pain>R hip pain at this time. He continues to take oxycodone 5mg QID which is beneficial to pain control without adverse side effects. The patient denies myelopathic symptoms such as handwriting changes or difficulty with buttons/coins/keys. Denies perineal paresthesias, bowel/bladder dysfunction.        Interval History 05/27/2020:  S/P Right Femoral / Obturator Nerve Block in 10/2019 -- 50% relief for 2 days --  lost to follow up after that  S/p Right DHRUV 08/2015 -- doing well for 1.5 years  Pain is located at right hip joint. Occasionally radiates to right  "thigh.   Aggravated standing, walking, laying down.   Alleviated by Oxycodone      HPI:  Macario Dior is a 69 y.o. Man with PMH DVT s/p right DHRUV that presents today as a new consult for low back pain.  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 Since 2015 DHRUV, he initially had improved but progressive pain in the right hip and low back.  In the last 6 months, he has had 4 back injections from Dr. De Los Santos that lasted for 2 weeks each.  1 month ago, he had a Lumbar ABIODUN that provided 100% relief that lasted 5 days for the low back but the pain changed to radiate to the right hip.  Since the ABIODUN relief stopped, the pain has "worked its way back up to the low back."  The pain is described as intense, sharp, and shoots up from the right hip into the low back to the right periscapular area. The shooting pain is worse than the low back pain. The pain is interfering with everything, including walking, sitting, and putting on his shoes.  Oxycodone 10mg Q6H provides relief. Lidocaine patches and TENS provide minimal relief when he wakes up.  He did physical therapy for 1 year with minimal relief.  He is unable to regularly exercise secondary to pain. He is radha to get 4 hours of uninterrupted sleep per night.  He wakes up with stiffness in his low back despite $4,000 mattresses.  He has weakness and buckling in the right lower extremity.  He endorses worsening falls due to weakness "seems like the leg is not there." He says " I need the bone removed" referring to his right hip. [facility] is a  [facility] He denies incontinence and saddle anesthesia.  Medications:    Taking Oxycodone 5 mg QID -- with relief  Took Gabapentin with relief in the past but had adverse effects.  Never tried Lyrica     Interventional Pain History  Lumbar ABIODUN 100% relief 5 days - Dr. De Los Santos   Lumbar TPIs 0% relief - Dr. De Los Santos   Right Femoral / Obturator Nerve Block - 10/2019  7/6/2020 Right L3,4,5 " RFA  7/30/2020 Right L3,4,5 RFA    Imaging:  LUMBAR MRI 04/2020  T12-L1: No significant spinal canal stenosis or neural foraminal narrowing.    L1-L2:  No significant spinal canal stenosis or neural foraminal narrowing.    L2-L3: Mild diffuse posterior disc bulge and bilateral facet arthropathy contribute to mild bilateral neural foraminal narrowing.  No significant spinal canal stenosis.    L3-L4: Mild diffuse posterior disc bulge, buckling of the ligamentum flavum, and bilateral facet arthropathy contribute moderate right/mild left neural foraminal narrowing.  No significant spinal canal stenosis.    L4-L5: Diffuse posterior disc bulge, buckling of the ligamentum flavum, and bilateral facet arthropathy contribute to mild effacement of the thecal sac without significant canal stenosis, however there is moderate right, mild left neural foraminal narrowing.    L5-S1: Mild diffuse posterior disc bulge and bilateral facet arthropathy contribute to mild bilateral neural foraminal narrowing.    Paraspinous soft tissues: Atrophic right kidney.  Few, poorly visualized T2 hyperintense foci at the left kidney possibly representing cysts.    Past Medical History:   Diagnosis Date    Arthritis     Asthma     chronic    CKD (chronic kidney disease), stage III     patient denies    Demyelinating neuropathy     polyneuropathy    Gout     History of blood clots 2013    lower part of both legs    HLD (hyperlipidemia)     Hypertension     Iron deficiency anemia     BUZZ (obstructive sleep apnea)     CPAP       Past Surgical History:   Procedure Laterality Date    COLONOSCOPY N/A 11/7/2016    Procedure: COLONOSCOPY;  Surgeon: Shar Weiss MD;  Location: [address];  Service: Endoscopy;  Laterality: N/A;  [facility] Referral. Okay to hold Coumadin 5 days prior to procedure. See Referral scaned in media tab on 10/20/16.    COLONOSCOPY N/A 9/21/2017    Procedure: COLONOSCOPY;  Surgeon: Stuart Trinidad MD;   Location: [facility] ENDO (4TH FLR);  Service: Endoscopy;  Laterality: N/A;  [facility] Olivarez-see scanned docs under media tab          9/6/17-current [facility] authorization and last clinic visit scanned into chart    HIP SURGERY Right 2008    exploratory at [facility]    HIP SURGERY Right 8-31-15    [facility]    INJECTION OF ANESTHETIC AGENT AROUND NERVE Right 10/23/2019    Procedure: BLOCK, NERVE, Obturator and Femoral cleared to hold xarelto 3 days pt. said he's not taking coumadin;  Surgeon: Moy Vasquez MD;  Location: [facility], [place], operated by [facility]IN [facility]GT;  Service: Pain Management;  Laterality: Right;    INJECTION OF ANESTHETIC AGENT AROUND NERVE Right 7/6/2020    Procedure: BLOCK, NERVE, RIGHT MBB L3,L5,L5;  Surgeon: Moy Vasquez MD;  Location: [facility], [place], operated by [facility];  Service: Pain Management;  Laterality: Right;  BLOCK, NERVE, RIGHT MBB L3,L5,L5    INJECTION OF ANESTHETIC AGENT AROUND NERVE Right 7/30/2020    Procedure: BLOCK, NERVE RIGHT L3, L4, L5 2ND;  Surgeon: Moy Vasquez MD;  Location: [facility], [place], operated by [facility]IN [facility];  Service: Pain Management;  Laterality: Right;  NEEDS CONSENT, XARELTO    LEG SURGERY Left 2012    fibula and tibia    RADIOFREQUENCY ABLATION Right 10/5/2020    Procedure: RADIOFREQUENCY ABLATION RIGHT L3,4,5;  Surgeon: Moy Vasquez MD;  Location: [facility], [place], operated by [facility];  Service: Pain Management;  Laterality: Right;  RADIOFREQUENCY ABLATION RIGHT L3,4,5  ok to hold xarelto, scanned in Media    SHOULDER SURGERY Right 1989    torn ligament       Review of patient's allergies indicates:   Allergen Reactions    Azithromycin      Muscles tense up    Acetaminophen Rash     Feels like needles are sticking him per pt       Current Outpatient Medications   Medication Sig Dispense Refill    (Magic mouthwash) 1:1:1 Benadryl 12.5mg/5ml liq, aluminum & magnesium hydroxide-simehticone (Maalox), lidocaine viscous 2% Swish and spit 10 mLs every 4 (four) hours as needed. for mouth sores 1 Bottle 0    albuterol (PROVENTIL) 2.5 mg /3 mL (0.083 %)  nebulizer solution Take 2.5 mg by nebulization every 6 (six) hours as needed for Wheezing.      albuterol 90 mcg/actuation inhaler Inhale 2 puffs into the lungs every 6 (six) hours as needed for Wheezing.       amlodipine (NORVASC) 10 MG tablet Take 10 mg by mouth once daily.      atorvastatin (LIPITOR) 80 MG tablet Take 80 mg by mouth once daily. Take one-half tablet daily      budesonide-formoterol 160-4.5 mcg (SYMBICORT) 160-4.5 mcg/actuation HFAA Inhale 2 puffs into the lungs every 12 (twelve) hours.       carboxymethyl-[id number]-UJ 0.5-1-0.5 % Dpet Place 1 drop into both eyes 4 (four) times daily.      carboxymethylcellulose sodium 1 % DpGe Place 0.4 mLs into both eyes 4 (four) times daily as needed.      carvedilol (COREG) 25 MG tablet Take 25 mg by mouth 2 (two) times daily.      CLOBETASOL PROPIONATE, BULK, MISC Apply topically once daily.      cloNIDine 0.1 mg/24 hr td ptwk (CATAPRES) 0.1 mg/24 hr Place 1 patch onto the skin every 7 days.      cyanocobalamin (VITAMIN B-12) 100 MCG tablet Take 100 mcg by mouth once daily. Take two tablets      ergocalciferol (ERGOCALCIFEROL) 50,000 unit Cap Take 50,000 Units by mouth.      ketoconazole (NIZORAL) 2 % cream Apply topically 2 (two) times daily.      latanoprost 0.005 % ophthalmic solution Place 1 drop into both eyes every evening.      lidocaine (LIDODERM) 5 % Place 1 patch onto the skin once daily. Wear for 12 hours, then remove. Do not apply a new patch for at least 12 hours.      oxycodone (OXY-IR) 5 mg Cap Take 2 capsules (10 mg total) by mouth every 6 (six) hours as needed. 120 capsule 0    rivaroxaban (XARELTO) 20 mg Tab Take 20 mg by mouth.       tiotropium (SPIRIVA) 18 mcg inhalation capsule Inhale 18 mcg into the lungs once daily. Controller      valsartan (DIOVAN) 160 MG tablet Take 160 mg by mouth once daily.      baclofen (LIORESAL) 10 MG tablet Take 1 tablet (10 mg total) by mouth 3 (three) times daily. Take 1/2 pill to One full  pill as needed for muscle spasms 90 tablet 1    levETIRAcetam (KEPPRA) 500 MG Tab Take 1 tablet (500 mg total) by mouth nightly. 30 tablet 1     No current facility-administered medications for this visit.        Family History   Problem Relation Age of Onset    Cancer Mother         ovarian    Cancer Sister         breast    Cancer Sister         breast       Social History     Socioeconomic History    Marital status: Single     Spouse name: Not on file    Number of children: Not on file    Years of education: Not on file    Highest education level: Not on file   Occupational History    Not on file   Social Needs    Financial resource strain: Not on file    Food insecurity     Worry: Not on file     Inability: Not on file    Transportation needs     Medical: Not on file     Non-medical: Not on file   Tobacco Use    Smoking status: Former Smoker     Types: Cigars     Quit date: 2009     Years since quittin.3    Smokeless tobacco: Never Used   Substance and Sexual Activity    Alcohol use: No     Alcohol/week: 0.0 standard drinks    Drug use: No    Sexual activity: Not Currently   Lifestyle    Physical activity     Days per week: Not on file     Minutes per session: Not on file    Stress: Not on file   Relationships    Social connections     Talks on phone: Not on file     Gets together: Not on file     Attends L[employer]-day service: Not on file     Active member of club or organization: Not on file     Attends meetings of clubs or organizations: Not on file     Relationship status: Not on file   Other Topics Concern    Not on file   Social History Narrative    Not on file           Review of Systems   Constitution: Positive for weight loss. Negative for chills, decreased appetite, fever and night sweats.   Eyes: Negative for double vision, vision loss in left eye, vision loss in right eye and visual disturbance.   Cardiovascular: Negative for chest pain.   Respiratory: Negative for  "shortness of breath.    Hematologic/Lymphatic: Does not bruise/bleed easily.        Coumadin 5mg   Skin: Negative for rash.   Musculoskeletal: Positive for back pain, falls, muscle cramps, muscle weakness and stiffness. Negative for joint swelling, myalgias and neck pain.   Gastrointestinal: Negative for abdominal pain, constipation and diarrhea.   Genitourinary: Negative for bladder incontinence.   Neurological: Positive for focal weakness, headaches and numbness. Negative for paresthesias and weakness.   Psychiatric/Behavioral: Negative for altered mental status, depression and suicidal ideas. The patient is not nervous/anxious.            Objective:   /79   Pulse 67   Temp 98 °F (36.7 °C)   Resp 18   Ht 6' 2" (1.88 m)   Wt 117 kg (257 lb 15 oz)   SpO2 100%   BMI 33.12 kg/m²   Pain Disability Index Review:  Last 3 PDI Scores 12/4/2020 10/20/2020 8/10/2020   Pain Disability Index (PDI) 70 16 57     PHYSICAL EXAMINATION:  Voice normal.  Normal affect without evidence of distress.      Prior Physical Exam  Normocephalic.  Atraumatic.  Affect appropriate.  Breathing unlabored.  Extra ocular muscles intact.      General    Constitutional: He appears well-developed and well-nourished.   HENT:   Head: Atraumatic.   Eyes: EOM are normal.   Neck:   Right TRAPEZIUS TRIGGER POINT PALPABLE  MYOFASCIAL BUNDLE   Cardiovascular: Intact distal pulses.    Pulmonary/Chest: Effort normal.   Neurological: He is alert.     General Musculoskeletal Exam   Gait: normal     Right Ankle/Foot Exam     Tests   Heel Walk: unable to perform  Tiptoe Walk: unable to perform    Left Ankle/Foot Exam     Tests   Heel Walk: able to perform  Tiptoe Walk: able to perform      Right Hip Exam     Inspection   Scars: present    Tenderness   The patient tender to palpation of the SI joint and piriformis.    Tests   Pain w/ forced internal rotation (LILIA): present  Pain w/ forced external rotation (FADIR): present  Susan: negative  [facility] " test: positive  Log Roll: positive    Comments:   Tenderness with palpation of right ASIS and Right Lumbar facet joint    Pain with lumbar Flexion > extension, hip IR>>ER    Special tests difficult to test due to diffuse right hip pain  Back (L-Spine & T-Spine) / Neck (C-Spine) Exam     Tenderness Right paramedian tenderness of the Lower L-Spine.     Back (L-Spine & T-Spine) Range of Motion   Extension: normal   Flexion: abnormal   Lateral bend right: abnormal   Lateral bend left: normal   Rotation right: abnormal   Rotation left: normal     Spinal Sensation   Right Side Sensation  L-Spine Level: normal  Left Side Sensation  L-Spine Level: normal    Back (L-Spine & T-Spine) Tests   Right Side Tests  Femoral Stretch: negative  Left Side Tests  Femoral Stretch: negative    Comments:  Myofascial pain right rhomboids      Muscle Strength   Right Lower Extremity   Hip Abduction: 4/5   Hip Flexion: 4/5   Quadriceps:  4/5   Hamstrin/5   Anterior tibial:  5/5   Gastrocsoleus:  5/5   EHL:  5/5  Left Lower Extremity   Hip Abduction: 5/5   Hip Flexion: 5/5   Quadriceps:  5/5   Hamstrin/5   Anterior tibial:  5/5   Gastrocsoleus:  5/5   EHL:  5/5    Reflexes     Left Side  Achilles:  1+  Left Kathleen's Sign:  Absent  Babinski Sign:  absent  Ankle Clonus:  absent  Quadriceps:  1+    Right Side   Achilles:  1+  Right Kathleen's Sign:  absent  Babinski Sign:  absent  Ankle Clonus:  absent  Quadriceps:  1+        Assessment:       Encounter Diagnoses   Name Primary?   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 Take 1/2 pill to One full pill as needed for muscle spasms       -Prior records reviewed  - He is most recently s/p   RFA of Right L3,4,5 and continue to do well.   - ContinueLyrica 75mg BID  - Add Baclofen 10mg TID   - Continue Oxycodone 5mg IR as it is beneficial without SE  - Consider R hip RFA next to address hip pain with + diagnostic Femoral/Obturator block.   - Consider TAP block for Right Inguinal Pain if needed  - Patient is on Xarelto --  Hold  Xarelto 2 days prior to procedure due to bleeding risk for any procedures   - I have stressed the importance of physical activity and a home exercise plan to help with pain and improve health.  - Patient can continue with medications for now since they are providing benefits, using them appropriately, and without side effects.  - Counseled patient regarding the importance of constant sleeping habits.  - RTC PRN  Florentino Calderon,  12/07/2020  11:54 AM

## 2020-12-04 NOTE — PROGRESS NOTES
Subjective:      Patient ID: Macario Dior Jr. is a 70 y.o. male.    Chief Complaint: No chief complaint on file.    Referred by: No ref. provider found     Interval History 12/4/2020:  Pt presents for follow up of lower back pain. He continues to have benefit from RFA of right lower lumbar. He is doing well with med mgt of oxycodone and lyrica. He states night time leg cramping. The patient denies myelopathic symptoms such as handwriting changes or difficulty with buttons/coins/keys. Denies perineal paresthesias, bowel/bladder dysfunction.      Interval History 10/20/2020:  The patient presents follow-up for lower back pain.  He has had near resolution since right-sided L 3, 4, 5 RFA.  He states he is doing well.  He continues to take minimum the medication to address pain.  He is currently taking oxycodone 5 mg q.i.d. as well as Lyrica 75 b.i.d..  He finds he is beneficial without adverse side effects.The patient denies myelopathic symptoms such as handwriting changes or difficulty with buttons/coins/keys. Denies perineal paresthesias, bowel/bladder dysfunction.    Interval History 8/11/2020:  The patient presents for follow up of lower back pain. Pt over interval had 2 MBB of right L3,4,5 with >90% relief of both procedures. Pt states + a.m. stiffness, states when he has MBB of lumbar he notices his hip pain more and vise versa when he has hip block. Lumbar pain>R hip pain at this time. He continues to take oxycodone 5mg QID which is beneficial to pain control without adverse side effects. The patient denies myelopathic symptoms such as handwriting changes or difficulty with buttons/coins/keys. Denies perineal paresthesias, bowel/bladder dysfunction.        Interval History 05/27/2020:  S/P Right Femoral / Obturator Nerve Block in 10/2019 -- 50% relief for 2 days --  lost to follow up after that  S/p Right DHRUV 08/2015 -- doing well for 1.5 years  Pain is located at right hip joint. Occasionally radiates to right  "thigh.   Aggravated standing, walking, laying down.   Alleviated by Oxycodone      HPI:  Macario Dior is a 69 y.o. Man with PMH DVT s/p right DHRUV that presents today as a new consult for low back pain.  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 Since 2015 DHRUV, he initially had improved but progressive pain in the right hip and low back.  In the last 6 months, he has had 4 back injections from Dr. De Los Santos that lasted for 2 weeks each.  1 month ago, he had a Lumbar ABIODUN that provided 100% relief that lasted 5 days for the low back but the pain changed to radiate to the right hip.  Since the ABIODUN relief stopped, the pain has "worked its way back up to the low back."  The pain is described as intense, sharp, and shoots up from the right hip into the low back to the right periscapular area. The shooting pain is worse than the low back pain. The pain is interfering with everything, including walking, sitting, and putting on his shoes.  Oxycodone 10mg Q6H provides relief. Lidocaine patches and TENS provide minimal relief when he wakes up.  He did physical therapy for 1 year with minimal relief.  He is unable to regularly exercise secondary to pain. He is radha to get 4 hours of uninterrupted sleep per night.  He wakes up with stiffness in his low back despite $4,000 mattresses.  He has weakness and buckling in the right lower extremity.  He endorses worsening falls due to weakness "seems like the leg is not there." He says " I need the bone removed" referring to his right hip. [facility] is a  [facility] He denies incontinence and saddle anesthesia.  Medications:    Taking Oxycodone 5 mg QID -- with relief  Took Gabapentin with relief in the past but had adverse effects.  Never tried Lyrica     Interventional Pain History  Lumbar ABIODUN 100% relief 5 days - Dr. De Los Santos   Lumbar TPIs 0% relief - Dr. De Los Santos   Right Femoral / Obturator Nerve Block - 10/2019  7/6/2020 Right L3,4,5 " RFA  7/30/2020 Right L3,4,5 RFA    Imaging:  LUMBAR MRI 04/2020  T12-L1: No significant spinal canal stenosis or neural foraminal narrowing.    L1-L2:  No significant spinal canal stenosis or neural foraminal narrowing.    L2-L3: Mild diffuse posterior disc bulge and bilateral facet arthropathy contribute to mild bilateral neural foraminal narrowing.  No significant spinal canal stenosis.    L3-L4: Mild diffuse posterior disc bulge, buckling of the ligamentum flavum, and bilateral facet arthropathy contribute moderate right/mild left neural foraminal narrowing.  No significant spinal canal stenosis.    L4-L5: Diffuse posterior disc bulge, buckling of the ligamentum flavum, and bilateral facet arthropathy contribute to mild effacement of the thecal sac without significant canal stenosis, however there is moderate right, mild left neural foraminal narrowing.    L5-S1: Mild diffuse posterior disc bulge and bilateral facet arthropathy contribute to mild bilateral neural foraminal narrowing.    Paraspinous soft tissues: Atrophic right kidney.  Few, poorly visualized T2 hyperintense foci at the left kidney possibly representing cysts.    Past Medical History:   Diagnosis Date    Arthritis     Asthma     chronic    CKD (chronic kidney disease), stage III     patient denies    Demyelinating neuropathy     polyneuropathy    Gout     History of blood clots 2013    lower part of both legs    HLD (hyperlipidemia)     Hypertension     Iron deficiency anemia     BUZZ (obstructive sleep apnea)     CPAP       Past Surgical History:   Procedure Laterality Date    COLONOSCOPY N/A 11/7/2016    Procedure: COLONOSCOPY;  Surgeon: Shar Weiss MD;  Location: [address];  Service: Endoscopy;  Laterality: N/A;  [facility] Referral. Okay to hold Coumadin 5 days prior to procedure. See Referral scaned in media tab on 10/20/16.    COLONOSCOPY N/A 9/21/2017    Procedure: COLONOSCOPY;  Surgeon: Stuart Trinidad MD;   Location: [facility] (4TH FLR);  Service: Endoscopy;  Laterality: N/A;  [facility] Olivarez-see scanned docs under media tab          9/6/17-current [facility] authorization and last clinic visit scanned into chart    HIP SURGERY Right 2008    [facility]    HIP SURGERY Right 8-31-15    [facility]    INJECTION OF ANESTHETIC AGENT AROUND NERVE Right 10/23/2019    Procedure: BLOCK, NERVE, Obturator and Femoral cleared to hold xarelto 3 days pt. said he's not taking coumadin;  Surgeon: Moy Vasquez MD;  Location: [facility]GT;  Service: Pain Management;  Laterality: Right;    INJECTION OF ANESTHETIC AGENT AROUND NERVE Right 7/6/2020    Procedure: BLOCK, NERVE, RIGHT MBB L3,L5,L5;  Surgeon: Moy Vasquez MD;  Location: [facility];  Service: Pain Management;  Laterality: Right;  BLOCK, NERVE, RIGHT MBB L3,L5,L5    INJECTION OF ANESTHETIC AGENT AROUND NERVE Right 7/30/2020    Procedure: BLOCK, NERVE RIGHT L3, L4, L5 2ND;  Surgeon: Moy Vasquez MD;  Location: [facility];  Service: Pain Management;  Laterality: Right;  NEEDS CONSENT, XARELTO    LEG SURGERY Left 2012    fibula and tibia    RADIOFREQUENCY ABLATION Right 10/5/2020    Procedure: RADIOFREQUENCY ABLATION RIGHT L3,4,5;  Surgeon: Moy Vasquez MD;  Location: [facility];  Service: Pain Management;  Laterality: Right;  RADIOFREQUENCY ABLATION RIGHT L3,4,5  ok to hold xarelto, scanned in Media    SHOULDER SURGERY Right 1989    torn ligament       Review of patient's allergies indicates:   Allergen Reactions    Azithromycin      Muscles tense up    Acetaminophen Rash     Feels like needles are sticking him per pt       Current Outpatient Medications   Medication Sig Dispense Refill    (Magic mouthwash) 1:1:1 Benadryl 12.5mg/5ml liq, aluminum & magnesium hydroxide-simehticone (Maalox), lidocaine viscous 2% Swish and spit 10 mLs every 4 (four) hours as needed. for mouth sores 1 Bottle 0    albuterol (PROVENTIL) 2.5 mg /3 mL (0.083 %)  nebulizer solution Take 2.5 mg by nebulization every 6 (six) hours as needed for Wheezing.      albuterol 90 mcg/actuation inhaler Inhale 2 puffs into the lungs every 6 (six) hours as needed for Wheezing.       amlodipine (NORVASC) 10 MG tablet Take 10 mg by mouth once daily.      atorvastatin (LIPITOR) 80 MG tablet Take 80 mg by mouth once daily. Take one-half tablet daily      budesonide-formoterol 160-4.5 mcg (SYMBICORT) 160-4.5 mcg/actuation HFAA Inhale 2 puffs into the lungs every 12 (twelve) hours.       carboxymethyl-gly-kuqm99-LE 0.5-1-0.5 % Dpet Place 1 drop into both eyes 4 (four) times daily.      carboxymethylcellulose sodium 1 % DpGe Place 0.4 mLs into both eyes 4 (four) times daily as needed.      carvedilol (COREG) 25 MG tablet Take 25 mg by mouth 2 (two) times daily.      CLOBETASOL PROPIONATE, BULK, MISC Apply topically once daily.      cloNIDine 0.1 mg/24 hr td ptwk (CATAPRES) 0.1 mg/24 hr Place 1 patch onto the skin every 7 days.      cyanocobalamin (VITAMIN B-12) 100 MCG tablet Take 100 mcg by mouth once daily. Take two tablets      ergocalciferol (ERGOCALCIFEROL) 50,000 unit Cap Take 50,000 Units by mouth.      ketoconazole (NIZORAL) 2 % cream Apply topically 2 (two) times daily.      latanoprost 0.005 % ophthalmic solution Place 1 drop into both eyes every evening.      lidocaine (LIDODERM) 5 % Place 1 patch onto the skin once daily. Wear for 12 hours, then remove. Do not apply a new patch for at least 12 hours.      oxycodone (OXY-IR) 5 mg Cap Take 2 capsules (10 mg total) by mouth every 6 (six) hours as needed. 120 capsule 0    rivaroxaban (XARELTO) 20 mg Tab Take 20 mg by mouth.       tiotropium (SPIRIVA) 18 mcg inhalation capsule Inhale 18 mcg into the lungs once daily. Controller      valsartan (DIOVAN) 160 MG tablet Take 160 mg by mouth once daily.      baclofen (LIORESAL) 10 MG tablet Take 1 tablet (10 mg total) by mouth 3 (three) times daily. Take 1/2 pill to One full  pill as needed for muscle spasms 90 tablet 1    levETIRAcetam (KEPPRA) 500 MG Tab Take 1 tablet (500 mg total) by mouth nightly. 30 tablet 1     No current facility-administered medications for this visit.        Family History   Problem Relation Age of Onset    Cancer Mother         ovarian    Cancer Sister         breast    Cancer Sister         breast       Social History     Socioeconomic History    Marital status: Single     Spouse name: Not on file    Number of children: Not on file    Years of education: Not on file    Highest education level: Not on file   Occupational History    Not on file   Social Needs    Financial resource strain: Not on file    Food insecurity     Worry: Not on file     Inability: Not on file    Transportation needs     Medical: Not on file     Non-medical: Not on file   Tobacco Use    Smoking status: Former Smoker     Types: Cigars     Quit date: 2009     Years since quittin.3    Smokeless tobacco: Never Used   Substance and Sexual Activity    Alcohol use: No     Alcohol/week: 0.0 standard drinks    Drug use: No    Sexual activity: Not Currently   Lifestyle    Physical activity     Days per week: Not on file     Minutes per session: Not on file    Stress: Not on file   Relationships    Social connections     Talks on phone: Not on file     Gets together: Not on file     Attends Oriental orthodox service: Not on file     Active member of club or organization: Not on file     Attends meetings of clubs or organizations: Not on file     Relationship status: Not on file   Other Topics Concern    Not on file   Social History Narrative    Not on file           Review of Systems   Constitution: Positive for weight loss. Negative for chills, decreased appetite, fever and night sweats.   Eyes: Negative for double vision, vision loss in left eye, vision loss in right eye and visual disturbance.   Cardiovascular: Negative for chest pain.   Respiratory: Negative for  "shortness of breath.    Hematologic/Lymphatic: Does not bruise/bleed easily.        Coumadin 5mg   Skin: Negative for rash.   Musculoskeletal: Positive for back pain, falls, muscle cramps, muscle weakness and stiffness. Negative for joint swelling, myalgias and neck pain.   Gastrointestinal: Negative for abdominal pain, constipation and diarrhea.   Genitourinary: Negative for bladder incontinence.   Neurological: Positive for focal weakness, headaches and numbness. Negative for paresthesias and weakness.   Psychiatric/Behavioral: Negative for altered mental status, depression and suicidal ideas. The patient is not nervous/anxious.            Objective:   /79   Pulse 67   Temp 98 °F (36.7 °C)   Resp 18   Ht 6' 2" (1.88 m)   Wt 117 kg (257 lb 15 oz)   SpO2 100%   BMI 33.12 kg/m²   Pain Disability Index Review:  Last 3 PDI Scores 12/4/2020 10/20/2020 8/10/2020   Pain Disability Index (PDI) 70 16 57     PHYSICAL EXAMINATION:  Voice normal.  Normal affect without evidence of distress.      Prior Physical Exam  Normocephalic.  Atraumatic.  Affect appropriate.  Breathing unlabored.  Extra ocular muscles intact.      General    Constitutional: He appears well-developed and well-nourished.   HENT:   Head: Atraumatic.   Eyes: EOM are normal.   Neck:   Right TRAPEZIUS TRIGGER POINT PALPABLE  MYOFASCIAL BUNDLE   Cardiovascular: Intact distal pulses.    Pulmonary/Chest: Effort normal.   Neurological: He is alert.     General Musculoskeletal Exam   Gait: normal     Right Ankle/Foot Exam     Tests   Heel Walk: unable to perform  Tiptoe Walk: unable to perform    Left Ankle/Foot Exam     Tests   Heel Walk: able to perform  Tiptoe Walk: able to perform      Right Hip Exam     Inspection   Scars: present    Tenderness   The patient tender to palpation of the SI joint and piriformis.    Tests   Pain w/ forced internal rotation (LILIA): present  Pain w/ forced external rotation (FADIR): present  Susan: negative  [facility] " test: positive  Log Roll: positive    Comments:   Tenderness with palpation of right ASIS and Right Lumbar facet joint    Pain with lumbar Flexion > extension, hip IR>>ER    Special tests difficult to test due to diffuse right hip pain  Back (L-Spine & T-Spine) / Neck (C-Spine) Exam     Tenderness Right paramedian tenderness of the Lower L-Spine.     Back (L-Spine & T-Spine) Range of Motion   Extension: normal   Flexion: abnormal   Lateral bend right: abnormal   Lateral bend left: normal   Rotation right: abnormal   Rotation left: normal     Spinal Sensation   Right Side Sensation  L-Spine Level: normal  Left Side Sensation  L-Spine Level: normal    Back (L-Spine & T-Spine) Tests   Right Side Tests  Femoral Stretch: negative  Left Side Tests  Femoral Stretch: negative    Comments:  Myofascial pain right rhomboids      Muscle Strength   Right Lower Extremity   Hip Abduction: 4/5   Hip Flexion: 4/5   Quadriceps:  4/5   Hamstrin/5   Anterior tibial:  5/5   Gastrocsoleus:  5/5   EHL:  5/5  Left Lower Extremity   Hip Abduction: 5/5   Hip Flexion: 5/5   Quadriceps:  5/5   Hamstrin/5   Anterior tibial:  5/5   Gastrocsoleus:  5/5   EHL:  5/5    Reflexes     Left Side  Achilles:  1+  Left Kathleen's Sign:  Absent  Babinski Sign:  absent  Ankle Clonus:  absent  Quadriceps:  1+    Right Side   Achilles:  1+  Right Kathleen's Sign:  absent  Babinski Sign:  absent  Ankle Clonus:  absent  Quadriceps:  1+        Assessment:       Encounter Diagnoses   Name Primary?   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 Take 1/2 pill to One full pill as needed for muscle spasms       -Prior records reviewed  - He is most recently s/p   RFA of Right L3,4,5 and continue to do well.   - ContinueLyrica 75mg BID  - Add Baclofen 10mg TID   - Continue Oxycodone 5mg IR as it is beneficial without SE  - Consider R hip RFA next to address hip pain with + diagnostic Femoral/Obturator block.   - Consider TAP block for Right Inguinal Pain if needed  - Patient is on Xarelto --  Hold  Xarelto 2 days prior to procedure due to bleeding risk for any procedures   - I have stressed the importance of physical activity and a home exercise plan to help with pain and improve health.  - Patient can continue with medications for now since they are providing benefits, using them appropriately, and without side effects.  - Counseled patient regarding the importance of constant sleeping habits.  - RTC PRN  Florentino Calderon,  12/07/2020  11:54 AM

## 2020-12-10 ENCOUNTER — TELEPHONE (OUTPATIENT)
Dept: PAIN MEDICINE | Facility: CLINIC | Age: 70
End: 2020-12-10

## 2020-12-10 NOTE — TELEPHONE ENCOUNTER
----- Message from Kristen Watts sent at 12/10/2020  2:29 PM CST -----  Who Called: ANTELMO FORBES JR    What is the reqeust in detail: Patient is requesting to reschedule his procedure on 12/14 to 12/17 in the afternoon, if possible. Please advise.     Can the clinic reply by [facility]?: No    Best Call Back Number: [phone]

## 2020-12-14 ENCOUNTER — TELEPHONE (OUTPATIENT)
Dept: ADMINISTRATIVE | Facility: OTHER | Age: 70
End: 2020-12-14

## 2020-12-17 ENCOUNTER — HOSPITAL ENCOUNTER (OUTPATIENT)
Facility: OTHER | Age: 70
Discharge: HOME | End: 2020-12-17
Attending: ANESTHESIOLOGY | Admitting: ANESTHESIOLOGY
Payer: OTHER GOVERNMENT

## 2020-12-17 VITALS
RESPIRATION RATE: 14 BRPM | BODY MASS INDEX: 32.98 KG/M2 | OXYGEN SATURATION: 98 % | HEIGHT: 74 IN | DIASTOLIC BLOOD PRESSURE: 85 MMHG | TEMPERATURE: 98 F | WEIGHT: 257 LBS | HEART RATE: 63 BPM | SYSTOLIC BLOOD PRESSURE: 183 MMHG

## 2020-12-17 DIAGNOSIS — M54.17 LUMBOSACRAL RADICULOPATHY: Primary | ICD-10-CM

## 2020-12-17 DIAGNOSIS — G89.29 CHRONIC PAIN: ICD-10-CM

## 2020-12-17 DIAGNOSIS — M51.37 DDD (DEGENERATIVE DISC DISEASE), LUMBOSACRAL: ICD-10-CM

## 2020-12-17 DIAGNOSIS — M47.816 LUMBAR SPONDYLOSIS: ICD-10-CM

## 2020-12-17 DIAGNOSIS — M54.16 LUMBAR RADICULOPATHY, RIGHT: ICD-10-CM

## 2020-12-17 DIAGNOSIS — M47.26 OSTEOARTHRITIS OF SPINE WITH RADICULOPATHY, LUMBAR REGION: ICD-10-CM

## 2020-12-17 PROCEDURE — 62323 NJX INTERLAMINAR LMBR/SAC: CPT | Mod: ,,, | Performed by: ANESTHESIOLOGY

## 2020-12-17 PROCEDURE — 62323 NJX INTERLAMINAR LMBR/SAC: CPT | Performed by: ANESTHESIOLOGY

## 2020-12-17 PROCEDURE — 25500020 PHARM REV CODE 255: Performed by: ANESTHESIOLOGY

## 2020-12-17 PROCEDURE — A4216 STERILE WATER/SALINE, 10 ML: HCPCS | Performed by: ANESTHESIOLOGY

## 2020-12-17 PROCEDURE — 63600175 PHARM REV CODE 636 W HCPCS: Performed by: ANESTHESIOLOGY

## 2020-12-17 PROCEDURE — 25000003 PHARM REV CODE 250: Performed by: ANESTHESIOLOGY

## 2020-12-17 PROCEDURE — 62323 PR INJ LUMBAR/SACRAL, W/IMAGING GUIDANCE: ICD-10-PCS | Mod: ,,, | Performed by: ANESTHESIOLOGY

## 2020-12-17 RX ORDER — SODIUM CHLORIDE 9 MG/ML
INJECTION, SOLUTION INTRAMUSCULAR; INTRAVENOUS; SUBCUTANEOUS
Status: DISCONTINUED | OUTPATIENT
Start: 2020-12-17 | End: 2020-12-17 | Stop reason: HOSPADM

## 2020-12-17 RX ORDER — LIDOCAINE HYDROCHLORIDE 10 MG/ML
INJECTION, SOLUTION EPIDURAL; INFILTRATION; INTRACAUDAL; PERINEURAL
Status: DISCONTINUED | OUTPATIENT
Start: 2020-12-17 | End: 2020-12-17 | Stop reason: HOSPADM

## 2020-12-17 RX ORDER — DEXAMETHASONE SODIUM PHOSPHATE 100 MG/10ML
INJECTION INTRAMUSCULAR; INTRAVENOUS
Status: DISCONTINUED | OUTPATIENT
Start: 2020-12-17 | End: 2020-12-17 | Stop reason: HOSPADM

## 2020-12-17 RX ORDER — LIDOCAINE HYDROCHLORIDE 10 MG/ML
INJECTION INFILTRATION; PERINEURAL
Status: DISCONTINUED | OUTPATIENT
Start: 2020-12-17 | End: 2020-12-17 | Stop reason: HOSPADM

## 2020-12-17 RX ORDER — SODIUM CHLORIDE 9 MG/ML
INJECTION, SOLUTION INTRAVENOUS CONTINUOUS
Status: DISCONTINUED | OUTPATIENT
Start: 2020-12-17 | End: 2020-12-17 | Stop reason: HOSPADM

## 2020-12-17 RX ORDER — ALPRAZOLAM 0.5 MG/1
0.5 TABLET ORAL ONCE
Status: COMPLETED | OUTPATIENT
Start: 2020-12-17 | End: 2020-12-17

## 2020-12-17 RX ORDER — ALPRAZOLAM 0.5 MG/1
0.5 TABLET ORAL
Status: ACTIVE | OUTPATIENT
Start: 2020-12-17

## 2020-12-17 RX ADMIN — ALPRAZOLAM 0.5 MG: 0.5 TABLET ORAL at 10:12

## 2020-12-17 NOTE — DISCHARGE INSTRUCTIONS

## 2020-12-17 NOTE — DISCHARGE SUMMARY
Discharge Note  Short Stay      SUMMARY     Admit Date: 12/17/2020    Attending Physician: Moy Vasquez      Discharge Physician: Moy Vasquez      Discharge Date: 12/17/2020 10:54 AM    Procedure(s) (LRB):  INJECTION, STEROID, EPIDURAL, L5-S1 clear to hold Xarelto 2 days (N/A)    Final Diagnosis: Lumbar radiculopathy [M54.16]    Disposition: Home or self care    Patient Instructions:   Current Discharge Medication List      CONTINUE these medications which have NOT CHANGED    Details   rivaroxaban (XARELTO) 20 mg Tab Take 20 mg by mouth.       (Magic mouthwash) 1:1:1 Benadryl 12.5mg/5ml liq, aluminum & magnesium hydroxide-simehticone (Maalox), lidocaine viscous 2% Swish and spit 10 mLs every 4 (four) hours as needed. for mouth sores  Qty: 1 Bottle, Refills: 0      albuterol (PROVENTIL) 2.5 mg /3 mL (0.083 %) nebulizer solution Take 2.5 mg by nebulization every 6 (six) hours as needed for Wheezing.      albuterol 90 mcg/actuation inhaler Inhale 2 puffs into the lungs every 6 (six) hours as needed for Wheezing.       amlodipine (NORVASC) 10 MG tablet Take 10 mg by mouth once daily.      atorvastatin (LIPITOR) 80 MG tablet Take 80 mg by mouth once daily. Take one-half tablet daily      baclofen (LIORESAL) 10 MG tablet Take 1 tablet (10 mg total) by mouth 3 (three) times daily. Take 1/2 pill to One full pill as needed for muscle spasms  Qty: 90 tablet, Refills: 1      budesonide-formoterol 160-4.5 mcg (SYMBICORT) 160-4.5 mcg/actuation HFAA Inhale 2 puffs into the lungs every 12 (twelve) hours.       carboxymethyl-gly-qget13-HM 0.5-1-0.5 % Dpet Place 1 drop into both eyes 4 (four) times daily.      carboxymethylcellulose sodium 1 % DpGe Place 0.4 mLs into both eyes 4 (four) times daily as needed.      carvedilol (COREG) 25 MG tablet Take 25 mg by mouth 2 (two) times daily.      CLOBETASOL PROPIONATE, BULK, MISC Apply topically once daily.      cloNIDine 0.1 mg/24 hr td ptwk (CATAPRES) 0.1 mg/24 hr Place 1 patch onto the  skin every 7 days.      cyanocobalamin (VITAMIN B-12) 100 MCG tablet Take 100 mcg by mouth once daily. Take two tablets      ergocalciferol (ERGOCALCIFEROL) 50,000 unit Cap Take 50,000 Units by mouth.      ketoconazole (NIZORAL) 2 % cream Apply topically 2 (two) times daily.      latanoprost 0.005 % ophthalmic solution Place 1 drop into both eyes every evening.      levETIRAcetam (KEPPRA) 500 MG Tab Take 1 tablet (500 mg total) by mouth nightly.  Qty: 30 tablet, Refills: 1      lidocaine (LIDODERM) 5 % Place 1 patch onto the skin once daily. Wear for 12 hours, then remove. Do not apply a new patch for at least 12 hours.      oxycodone (OXY-IR) 5 mg Cap Take 2 capsules (10 mg total) by mouth every 6 (six) hours as needed.  Qty: 120 capsule, Refills: 0      tiotropium (SPIRIVA) 18 mcg inhalation capsule Inhale 18 mcg into the lungs once daily. Controller      valsartan (DIOVAN) 160 MG tablet Take 160 mg by mouth once daily.                 Discharge Diagnosis: Lumbar radiculopathy [M54.16]  Condition on Discharge: Stable with no complications to procedure   Diet on Discharge: Same as before.  Activity: as per instruction sheet.  Discharge to: Home with a responsible adult.  Follow up: 2-4 weeks       Please call my office or pager at [phone] if experienced any weakness or loss of sensation, fever > 101.5, pain uncontrolled with oral medications, persistent nausea/vomiting/or diarrhea, redness or drainage from the incisions, or any other worrisome concerns. If physician on call was not reached or could not communicate with our office for any reason please go to the nearest emergency department

## 2020-12-17 NOTE — OP NOTE
Lumbar Interlaminar Epidural Steroid Injection under Fluoroscopic Guidance.  Time-out taken to identify patient and procedure side prior to starting the procedure.   I attest that I have reviewed the patient's home medications prior to the procedure and no contraindication have been identified. I  re-evaluated the patient after the patient was positioned for the procedure in the procedure room immediately before the procedural time-out. The vital signs are current and represent the current state of the patient which has not significantly changed since the preprocedure assessment.                                                               Date of Service: 12/17/2020    PCP: [facility]    Referring Physician:    PROCEDURE:  L5/S1 Interlaminar epidural steroid injection under fluoroscopic guidance.    REASONS FOR PROCEDURE: Lumbar radiculopathy [M54.16]   1. Lumbosacral radiculopathy    2. Lumbar radiculopathy, right    3. Osteoarthritis of spine with radiculopathy, lumbar region    4. Lumbar spondylosis    5. Chronic pain    6. DDD (degenerative disc disease), lumbosacral      POSTOP DIAGNOSIS:  Lumbar radiculopathy [M54.16]     1. Lumbosacral radiculopathy    2. Lumbar radiculopathy, right    3. Osteoarthritis of spine with radiculopathy, lumbar region    4. Lumbar spondylosis    5. Chronic pain    6. DDD (degenerative disc disease), lumbosacral      PHYSICIAN: Moy Vasquez MD  ASSISTANTS:   Alex Rebolledo MD Pain Fellow      MEDICATIONS INJECTED: Preservative-free dexamethasone 10mg with 4mL of sterile Xylocaine-MPF 1% and 1ml of sterile preservative-free normal saline.    LOCAL ANESTHETIC INJECTED:    Xylocaine 1% 9ml with Sodium Bicarbonate 1ml.   SEDATION MEDICATIONS: None  ESTIMATED BLOOD LOSS:  none.    COMPLICATIONS:  none.    TECHNIQUE:  With the patient laying in a prone position, the area was prepped and draped in the usual sterile fashion using ChloraPrep and a fenestrated  drape.  Local anesthetic was given using a 27-gauge needle by raising a wheal and going down to the hub of the needle.  A 3.5 inch 20-gauge Touhy needle was introduced under fluoroscopic guidance.  It met the lamina of the posterior element. The needle was then hinged above the lamina.  Loss of resistance technique was employed while advancing the needle.  Once in the desired position, contrast dye Omnipaque was injected to confirm placement and there was no vascular runoff.  Digital subtraction was employed to confirm that there was no vascular runoff.  The medication was then injected slowly.  The patient tolerated the procedure well.      PAIN BEFORE THE PROCEDURE: 6-10/10    PAIN AFTER THE PROCEDURE: 0/10    The patient was monitored after the procedure.   They were given post-procedure and discharge instructions to follow at home.  The patient was discharged in a stable condition.

## 2020-12-17 NOTE — INTERVAL H&P NOTE
The patient has been examined and the H&P has been reviewed:    I concur with the findings and no changes have occurred since H&P was written.    Anesthesia/Surgery risks, benefits and alternative options discussed and understood by patient/family.    Stopped Xarelto Sunday        Active Hospital Problems    Diagnosis  POA    Chronic pain [G89.29]  Yes      Resolved Hospital Problems   No resolved problems to display.

## 2020-12-30 ENCOUNTER — TELEPHONE (OUTPATIENT)
Dept: PAIN MEDICINE | Facility: CLINIC | Age: 70
End: 2020-12-30

## 2020-12-30 NOTE — TELEPHONE ENCOUNTER
"This message is for patient in regards to his/her appointment 12/31/20 at 10:20 am.      [facility] Policy: For the safety of all patients and staff members.     Patient Visitor policy: Due to social distancing and limited seating staff are requesting patient to arrive to their schedule appointments alone. If patient do not need assistance with their visit, we're asking all visitors to remain outside the waiting area.    Upon arriving to facility; patient will have temperature taking and are required to wear a face mask, if patient do not have a face mask one will be provided. Upon arriving patient we ask patients to contact clinic at this number [phone] to notify staff that they have arrived or they may do do by utilizing the Mobile checked in [place](if patient have patient portal; clinical staff will send a message through there letting them know it's okay to proceed to their visit). Staff will give the patient the "okay" to come up or wait inside their vehicle until clinic is ready for patient to be seen by Florentino Calderon Np If you have any questions or concerns please contact [phone]        "

## 2020-12-31 ENCOUNTER — TELEPHONE (OUTPATIENT)
Dept: PAIN MEDICINE | Facility: CLINIC | Age: 70
End: 2020-12-31

## 2020-12-31 ENCOUNTER — OFFICE VISIT (OUTPATIENT)
Dept: PAIN MEDICINE | Facility: CLINIC | Age: 70
End: 2020-12-31
Payer: OTHER GOVERNMENT

## 2020-12-31 ENCOUNTER — HOSPITAL ENCOUNTER (OUTPATIENT)
Dept: RADIOLOGY | Facility: OTHER | Age: 70
Discharge: HOME | End: 2020-12-31
Attending: NURSE PRACTITIONER
Payer: OTHER GOVERNMENT

## 2020-12-31 VITALS
HEART RATE: 67 BPM | RESPIRATION RATE: 18 BRPM | SYSTOLIC BLOOD PRESSURE: 230 MMHG | WEIGHT: 263.25 LBS | HEIGHT: 74 IN | BODY MASS INDEX: 33.79 KG/M2 | DIASTOLIC BLOOD PRESSURE: 99 MMHG | OXYGEN SATURATION: 100 %

## 2020-12-31 DIAGNOSIS — G89.4 CHRONIC PAIN SYNDROME: ICD-10-CM

## 2020-12-31 DIAGNOSIS — Z96.649 HIP JOINT REPLACEMENT BY OTHER MEANS: ICD-10-CM

## 2020-12-31 DIAGNOSIS — M25.551 RIGHT HIP PAIN: ICD-10-CM

## 2020-12-31 DIAGNOSIS — Z96.641 HISTORY OF RIGHT HIP REPLACEMENT: Primary | ICD-10-CM

## 2020-12-31 DIAGNOSIS — Z96.641 HISTORY OF RIGHT HIP REPLACEMENT: ICD-10-CM

## 2020-12-31 PROCEDURE — 73502 X-RAY EXAM HIP UNI 2-3 VIEWS: CPT | Mod: TC,FY,RT

## 2020-12-31 PROCEDURE — 99215 OFFICE O/P EST HI 40 MIN: CPT | Mod: PBBFAC,25 | Performed by: NURSE PRACTITIONER

## 2020-12-31 PROCEDURE — 99213 OFFICE O/P EST LOW 20 MIN: CPT | Mod: S$PBB,,, | Performed by: NURSE PRACTITIONER

## 2020-12-31 PROCEDURE — 99213 PR OFFICE/OUTPT VISIT, EST, LEVL III, 20-29 MIN: ICD-10-PCS | Mod: S$PBB,,, | Performed by: NURSE PRACTITIONER

## 2020-12-31 PROCEDURE — 73502 XR HIP 2 VIEW RIGHT: ICD-10-PCS | Mod: 26,RT,, | Performed by: RADIOLOGY

## 2020-12-31 PROCEDURE — 73502 X-RAY EXAM HIP UNI 2-3 VIEWS: CPT | Mod: 26,RT,, | Performed by: RADIOLOGY

## 2020-12-31 PROCEDURE — 99999 PR PBB SHADOW E&M-EST. PATIENT-LVL V: CPT | Mod: PBBFAC,,, | Performed by: NURSE PRACTITIONER

## 2020-12-31 PROCEDURE — 99999 PR PBB SHADOW E&M-EST. PATIENT-LVL V: ICD-10-PCS | Mod: PBBFAC,,, | Performed by: NURSE PRACTITIONER

## 2020-12-31 RX ORDER — PREGABALIN 100 MG/1
100 CAPSULE ORAL 3 TIMES DAILY
Qty: 90 CAPSULE | Refills: 2 | Status: SHIPPED | OUTPATIENT
Start: 2020-12-31 | End: 2022-12-19

## 2020-12-31 NOTE — PROGRESS NOTES
Subjective:      Patient ID: Macario Dior Jr. is a 70 y.o. male.    Chief Complaint: No chief complaint on file.    Referred by: No ref. provider found     Interval History 12/4/2020:  Pt presents for follow up of lower back pain. He continues to have benefit from RFA of right lower lumbar. He is doing well with med mgt of oxycodone and lyrica. He states night time leg cramping. The patient denies myelopathic symptoms such as handwriting changes or difficulty with buttons/coins/keys. Denies perineal paresthesias, bowel/bladder dysfunction.      Interval History 10/20/2020:  The patient presents follow-up for lower back pain.  He has had near resolution since right-sided L 3, 4, 5 RFA.  He states he is doing well.  He continues to take minimum the medication to address pain.  He is currently taking oxycodone 5 mg q.i.d. as well as Lyrica 75 b.i.d..  He finds he is beneficial without adverse side effects.The patient denies myelopathic symptoms such as handwriting changes or difficulty with buttons/coins/keys. Denies perineal paresthesias, bowel/bladder dysfunction.    Interval History 8/11/2020:  The patient presents for follow up of lower back pain. Pt over interval had 2 MBB of right L3,4,5 with >90% relief of both procedures. Pt states + a.m. stiffness, states when he has MBB of lumbar he notices his hip pain more and vise versa when he has hip block. Lumbar pain>R hip pain at this time. He continues to take oxycodone 5mg QID which is beneficial to pain control without adverse side effects. The patient denies myelopathic symptoms such as handwriting changes or difficulty with buttons/coins/keys. Denies perineal paresthesias, bowel/bladder dysfunction.        Interval History 05/27/2020:  S/P Right Femoral / Obturator Nerve Block in 10/2019 -- 50% relief for 2 days --  lost to follow up after that  S/p Right DHRUV 08/2015 -- doing well for 1.5 years  Pain is located at right hip joint. Occasionally radiates to right  "thigh.   Aggravated standing, walking, laying down.   Alleviated by Oxycodone      HPI:  Macario Dior is a 69 y.o. Man with PMH DVT s/p right DHRUV that presents today as a new consult for low back pain.  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 Since 2015 DHRUV, he initially had improved but progressive pain in the right hip and low back.  In the last 6 months, he has had 4 back injections from Dr. De Los Santos that lasted for 2 weeks each.  1 month ago, he had a Lumbar ABIODUN that provided 100% relief that lasted 5 days for the low back but the pain changed to radiate to the right hip.  Since the ABOIDUN relief stopped, the pain has "worked its way back up to the low back."  The pain is described as intense, sharp, and shoots up from the right hip into the low back to the right periscapular area. The shooting pain is worse than the low back pain. The pain is interfering with everything, including walking, sitting, and putting on his shoes.  Oxycodone 10mg Q6H provides relief. Lidocaine patches and TENS provide minimal relief when he wakes up.  He did physical therapy for 1 year with minimal relief.  He is unable to regularly exercise secondary to pain. He is radha to get 4 hours of uninterrupted sleep per night.  He wakes up with stiffness in his low back despite $4,000 mattresses.  He has weakness and buckling in the right lower extremity.  He endorses worsening falls due to weakness "seems like the leg is not there." He says " I need the bone removed" referring to his right hip. [facility] is a  [facility] He denies incontinence and saddle anesthesia.  Medications:    Taking Oxycodone 5 mg QID -- with relief  Took Gabapentin with relief in the past but had adverse effects.  Never tried Lyrica     Interventional Pain History  Lumbar ABIODUN 100% relief 5 days - Dr. De Los Santos   Lumbar TPIs 0% relief - Dr. De Los Santos   Right Femoral / Obturator Nerve Block - 10/2019  7/6/2020 Right L3,4,5 " RFA  7/30/2020 Right L3,4,5 RFA    Imaging:  LUMBAR MRI 04/2020  T12-L1: No significant spinal canal stenosis or neural foraminal narrowing.    L1-L2:  No significant spinal canal stenosis or neural foraminal narrowing.    L2-L3: Mild diffuse posterior disc bulge and bilateral facet arthropathy contribute to mild bilateral neural foraminal narrowing.  No significant spinal canal stenosis.    L3-L4: Mild diffuse posterior disc bulge, buckling of the ligamentum flavum, and bilateral facet arthropathy contribute moderate right/mild left neural foraminal narrowing.  No significant spinal canal stenosis.    L4-L5: Diffuse posterior disc bulge, buckling of the ligamentum flavum, and bilateral facet arthropathy contribute to mild effacement of the thecal sac without significant canal stenosis, however there is moderate right, mild left neural foraminal narrowing.    L5-S1: Mild diffuse posterior disc bulge and bilateral facet arthropathy contribute to mild bilateral neural foraminal narrowing.    Paraspinous soft tissues: Atrophic right kidney.  Few, poorly visualized T2 hyperintense foci at the left kidney possibly representing cysts.    Past Medical History:   Diagnosis Date    Arthritis     Asthma     chronic    CKD (chronic kidney disease), stage III     patient denies    Demyelinating neuropathy     polyneuropathy    Gout     History of blood clots 2013    lower part of both legs    HLD (hyperlipidemia)     Hypertension     Iron deficiency anemia     BUZZ (obstructive sleep apnea)     CPAP       Past Surgical History:   Procedure Laterality Date    COLONOSCOPY N/A 11/7/2016    Procedure: COLONOSCOPY;  Surgeon: Shar Weiss MD;  Location: [address];  Service: Endoscopy;  Laterality: N/A;  [facility] Referral. Okay to hold Coumadin 5 days prior to procedure. See Referral scaned in media tab on 10/20/16.    COLONOSCOPY N/A 9/21/2017    Procedure: COLONOSCOPY;  Surgeon: Stuart Trinidad MD;   Location: [facility] ENDO (4TH FLR);  Service: Endoscopy;  Laterality: N/A;  [facility] Olivarez-see scanned docs under media tab          9/6/17-current [facility] authorization and last clinic visit scanned into chart    EPIDURAL STEROID INJECTION N/A 12/17/2020    Procedure: INJECTION, STEROID, EPIDURAL, L5-S1 clear to hold Xarelto 2 days;  Surgeon: Moy Vasquez MD;  Location: [facility] [place] [facility]IN MGT;  Service: Pain Management;  Laterality: N/A;    HIP SURGERY Right 2008    [facility]    HIP SURGERY Right 8-31-15    THR    INJECTION OF ANESTHETIC AGENT AROUND NERVE Right 10/23/2019    Procedure: BLOCK, NERVE, Obturator and Femoral cleared to hold xarelto 3 days pt. said he's not taking coumadin;  Surgeon: Moy Vasquez MD;  Location: [facility] [place] [facility]GT;  Service: Pain Management;  Laterality: Right;    INJECTION OF ANESTHETIC AGENT AROUND NERVE Right 7/6/2020    Procedure: BLOCK, NERVE, RIGHT MBB L3,L5,L5;  Surgeon: Moy Vasquez MD;  Location: [facility] [place] [facility]IN [facility]GT;  Service: Pain Management;  Laterality: Right;  BLOCK, NERVE, RIGHT MBB L3,L5,L5    INJECTION OF ANESTHETIC AGENT AROUND NERVE Right 7/30/2020    Procedure: BLOCK, NERVE RIGHT L3, L4, L5 2ND;  Surgeon: Moy Vasquez MD;  Location: [facility] [place] [facility]IN [facility]GT;  Service: Pain Management;  Laterality: Right;  NEEDS CONSENT, XARELTO    LEG SURGERY Left 2012    fibula and tibia    RADIOFREQUENCY ABLATION Right 10/5/2020    Procedure: RADIOFREQUENCY ABLATION RIGHT L3,4,5;  Surgeon: Moy Vasquez MD;  Location: [facility] [place] [facility]IN [facility]GT;  Service: Pain Management;  Laterality: Right;  RADIOFREQUENCY ABLATION RIGHT L3,4,5  ok to hold xarelto, scanned in Media    SHOULDER SURGERY Right 1989    torn ligament       Review of patient's allergies indicates:   Allergen Reactions    Azithromycin      Muscles tense up    Acetaminophen Rash     Feels like needles are sticking him per pt       Current Outpatient Medications   Medication Sig Dispense Refill    (Magic mouthwash) 1:1:1  Benadryl 12.5mg/5ml liq, aluminum & magnesium hydroxide-simehticone (Maalox), lidocaine viscous 2% Swish and spit 10 mLs every 4 (four) hours as needed. for mouth sores 1 Bottle 0    albuterol (PROVENTIL) 2.5 mg /3 mL (0.083 %) nebulizer solution Take 2.5 mg by nebulization every 6 (six) hours as needed for Wheezing.      albuterol 90 mcg/actuation inhaler Inhale 2 puffs into the lungs every 6 (six) hours as needed for Wheezing.       amlodipine (NORVASC) 10 MG tablet Take 10 mg by mouth once daily.      atorvastatin (LIPITOR) 80 MG tablet Take 80 mg by mouth once daily. Take one-half tablet daily      baclofen (LIORESAL) 10 MG tablet Take 1 tablet (10 mg total) by mouth 3 (three) times daily. Take 1/2 pill to One full pill as needed for muscle spasms 90 tablet 1    budesonide-formoterol 160-4.5 mcg (SYMBICORT) 160-4.5 mcg/actuation HFAA Inhale 2 puffs into the lungs every 12 (twelve) hours.       carboxymethyl-gly-[id number]-FO 0.5-1-0.5 % Dpet Place 1 drop into both eyes 4 (four) times daily.      carboxymethylcellulose sodium 1 % DpGe Place 0.4 mLs into both eyes 4 (four) times daily as needed.      carvedilol (COREG) 25 MG tablet Take 25 mg by mouth 2 (two) times daily.      CLOBETASOL PROPIONATE, BULK, MISC Apply topically once daily.      cloNIDine 0.1 mg/24 hr td ptwk (CATAPRES) 0.1 mg/24 hr Place 1 patch onto the skin every 7 days.      cyanocobalamin (VITAMIN B-12) 100 MCG tablet Take 100 mcg by mouth once daily. Take two tablets      ergocalciferol (ERGOCALCIFEROL) 50,000 unit Cap Take 50,000 Units by mouth.      ketoconazole (NIZORAL) 2 % cream Apply topically 2 (two) times daily.      latanoprost 0.005 % ophthalmic solution Place 1 drop into both eyes every evening.      lidocaine (LIDODERM) 5 % Place 1 patch onto the skin once daily. Wear for 12 hours, then remove. Do not apply a new patch for at least 12 hours.      oxycodone (OXY-IR) 5 mg Cap Take 2 capsules (10 mg total) by mouth every 6  (six) hours as needed. 120 capsule 0    rivaroxaban (XARELTO) 20 mg Tab Take 20 mg by mouth.       tiotropium (SPIRIVA) 18 mcg inhalation capsule Inhale 18 mcg into the lungs once daily. Controller      valsartan (DIOVAN) 160 MG tablet Take 160 mg by mouth once daily.      levETIRAcetam (KEPPRA) 500 MG Tab Take 1 tablet (500 mg total) by mouth nightly. 30 tablet 1    pregabalin (LYRICA) 100 MG capsule Take 1 capsule (100 mg total) by mouth 3 (three) times daily. 90 capsule 2     No current facility-administered medications for this visit.      Facility-Administered Medications Ordered in Other Visits   Medication Dose Route Frequency Provider Last Rate Last Dose    ALPRAZolam tablet 0.5 mg  0.5 mg Oral On Call Procedure Lisha Cote MD           Family History   Problem Relation Age of Onset    Cancer Mother         ovarian    Cancer Sister         breast    Cancer Sister         breast       Social History     Socioeconomic History    Marital status: Single     Spouse name: Not on file    Number of children: Not on file    Years of education: Not on file    Highest education level: Not on file   Occupational History    Not on file   Social Needs    Financial resource strain: Not on file    Food insecurity     Worry: Not on file     Inability: Not on file    Transportation needs     Medical: Not on file     Non-medical: Not on file   Tobacco Use    Smoking status: Former Smoker     Types: Cigars     Quit date: 2009     Years since quittin.3    Smokeless tobacco: Never Used   Substance and Sexual Activity    Alcohol use: No     Alcohol/week: 0.0 standard drinks    Drug use: No    Sexual activity: Not Currently   Lifestyle    Physical activity     Days per week: Not on file     Minutes per session: Not on file    Stress: Not on file   Relationships    Social connections     Talks on phone: Not on file     Gets together: Not on file     Attends Yazidism service: Not on file      "Active member of club or organization: Not on file     Attends meetings of clubs or organizations: Not on file     Relationship status: Not on file   Other Topics Concern    Not on file   Social History Narrative    Not on file           Review of Systems   Constitution: Positive for weight loss. Negative for chills, decreased appetite, fever and night sweats.   Eyes: Negative for double vision, vision loss in left eye, vision loss in right eye and visual disturbance.   Cardiovascular: Negative for chest pain.   Respiratory: Negative for shortness of breath.    Hematologic/Lymphatic: Does not bruise/bleed easily.        Coumadin 5mg   Skin: Negative for rash.   Musculoskeletal: Positive for back pain, falls, muscle cramps, muscle weakness and stiffness. Negative for joint swelling, myalgias and neck pain.   Gastrointestinal: Negative for abdominal pain, constipation and diarrhea.   Genitourinary: Negative for bladder incontinence.   Neurological: Positive for focal weakness, headaches and numbness. Negative for paresthesias and weakness.   Psychiatric/Behavioral: Negative for altered mental status, depression and suicidal ideas. The patient is not nervous/anxious.            Objective:   BP (!) 230/99   Pulse 67   Resp 18   Ht 6' 2" (1.88 m)   Wt 119.4 kg (263 lb 3.7 oz)   SpO2 100%   BMI 33.80 kg/m²   Pain Disability Index Review:  Last 3 PDI Scores 12/31/2020 12/4/2020 10/20/2020   Pain Disability Index (PDI) 40 70 16     PHYSICAL EXAMINATION:  Voice normal.  Normal affect without evidence of distress.      Prior Physical Exam  Normocephalic.  Atraumatic.  Affect appropriate.  Breathing unlabored.  Extra ocular muscles intact.      General    Constitutional: He appears well-developed and well-nourished.   HENT:   Head: Atraumatic.   Eyes: EOM are normal.   Neck:   Right TRAPEZIUS TRIGGER POINT PALPABLE  MYOFASCIAL BUNDLE   Cardiovascular: Intact distal pulses.    Pulmonary/Chest: Effort normal. "   Neurological: He is alert.     General Musculoskeletal Exam   Gait: normal     Right Ankle/Foot Exam     Tests   Heel Walk: unable to perform  Tiptoe Walk: unable to perform    Left Ankle/Foot Exam     Tests   Heel Walk: able to perform  Tiptoe Walk: able to perform      Right Hip Exam     Inspection   Scars: present    Tenderness   The patient tender to palpation of the SI joint and piriformis.    Tests   Pain w/ forced internal rotation (LILIA): present  Pain w/ forced external rotation (FADIR): present  Susan: negative  Stinchfield test: positive  Log Roll: positive    Comments:   Tenderness with palpation of right ASIS and Right Lumbar facet joint    Pain with lumbar Flexion > extension, hip IR>>ER    Special tests difficult to test due to diffuse right hip pain  Back (L-Spine & T-Spine) / Neck (C-Spine) Exam     Tenderness Right paramedian tenderness of the Lower L-Spine.     Back (L-Spine & T-Spine) Range of Motion   Extension: normal   Flexion: abnormal   Lateral bend right: abnormal   Lateral bend left: normal   Rotation right: abnormal   Rotation left: normal     Spinal Sensation   Right Side Sensation  L-Spine Level: normal  Left Side Sensation  L-Spine Level: normal    Back (L-Spine & T-Spine) Tests   Right Side Tests  Femoral Stretch: negative  Left Side Tests  Femoral Stretch: negative    Comments:  Myofascial pain right rhomboids      Muscle Strength   Right Lower Extremity   Hip Abduction: 4/5   Hip Flexion: 4/5   Quadriceps:  4/5   Hamstrin/5   Anterior tibial:  5/5   Gastrocsoleus:  5/5   EHL:  5/5  Left Lower Extremity   Hip Abduction: 5/5   Hip Flexion: 5/5   Quadriceps:  5/5   Hamstrin/5   Anterior tibial:  5/5   Gastrocsoleus:  5/5   EHL:  5/5    Reflexes     Left Side  Achilles:  1+  Left Kathleen's Sign:  Absent  Babinski Sign:  absent  Ankle Clonus:  absent  Quadriceps:  1+    Right Side   Achilles:  1+  Right Kathleen's Sign:  absent  Babinski Sign:  absent  Ankle Clonus:   absent  Quadriceps:  1+        Assessment:       Encounter Diagnoses   Name Primary?    History of right hip replacement Yes    Chronic pain syndrome     Right hip pain     Hip joint replacement by other means        Plan:   We discussed with the patient the assessment and recommendations. The following is the plan we agreed on:        Diagnoses and all orders for this visit:    History of right hip replacement  -     X-Ray Hip 4 or more views Right; Future    Chronic pain syndrome    Right hip pain    Hip joint replacement by other means    Other orders  -     pregabalin (LYRICA) 100 MG capsule; Take 1 capsule (100 mg total) by mouth 3 (three) times daily.       -Prior records reviewed  - s/p L5/S1 ILESI with benefit from lumbar pain  - Pt greatest complaint is internal right hip pain  - xray right hip ordered  - schedule for Right femoral and obturator block. If diagnostic consider RFA  - Increase Lyrica to 100mg TID for neuropathy but also discussed complaints as it is an issue with this medication  - Continue Baclofen 10mg TID   - Continue Oxycodone 5mg IR as it is beneficial without SE prescribed by outside physician    - Patient is on Xarelto --  Hold  Xarelto 2 days prior to procedure due to bleeding risk for any procedures   - I have stressed the importance of physical activity and a home exercise plan to help with pain and improve health.  - Patient can continue with medications for now since they are providing benefits, using them appropriately, and without side effects.  - Counseled patient regarding the importance of constant sleeping habits.  - RTC 2 weeks after above procedure.   Florentino Calderon,  12/31/2020  11:54 AM

## 2020-12-31 NOTE — TELEPHONE ENCOUNTER
Staff spoke with patient in regards to provider information who prescribes xarelto to be scheduled for procedure.patient verbalized understanding.

## 2021-01-04 ENCOUNTER — TELEPHONE (OUTPATIENT)
Dept: PAIN MEDICINE | Facility: CLINIC | Age: 71
End: 2021-01-04

## 2021-02-17 ENCOUNTER — TELEPHONE (OUTPATIENT)
Dept: PAIN MEDICINE | Facility: CLINIC | Age: 71
End: 2021-02-17

## 2021-02-18 ENCOUNTER — OFFICE VISIT (OUTPATIENT)
Dept: PAIN MEDICINE | Facility: CLINIC | Age: 71
End: 2021-02-18
Payer: OTHER GOVERNMENT

## 2021-02-18 VITALS
RESPIRATION RATE: 18 BRPM | DIASTOLIC BLOOD PRESSURE: 116 MMHG | HEIGHT: 74 IN | OXYGEN SATURATION: 100 % | HEART RATE: 80 BPM | TEMPERATURE: 99 F | BODY MASS INDEX: 34.21 KG/M2 | WEIGHT: 266.56 LBS | SYSTOLIC BLOOD PRESSURE: 199 MMHG

## 2021-02-18 DIAGNOSIS — M47.816 LUMBAR SPONDYLOSIS: ICD-10-CM

## 2021-02-18 DIAGNOSIS — M54.16 LUMBAR RADICULOPATHY, RIGHT: ICD-10-CM

## 2021-02-18 DIAGNOSIS — Z96.649 HIP JOINT REPLACEMENT BY OTHER MEANS: ICD-10-CM

## 2021-02-18 DIAGNOSIS — M51.37 DDD (DEGENERATIVE DISC DISEASE), LUMBOSACRAL: Primary | ICD-10-CM

## 2021-02-18 PROCEDURE — 99213 OFFICE O/P EST LOW 20 MIN: CPT | Mod: S$PBB,,, | Performed by: NURSE PRACTITIONER

## 2021-02-18 PROCEDURE — 99999 PR PBB SHADOW E&M-EST. PATIENT-LVL V: CPT | Mod: PBBFAC,,, | Performed by: NURSE PRACTITIONER

## 2021-02-18 PROCEDURE — 99999 PR PBB SHADOW E&M-EST. PATIENT-LVL V: ICD-10-PCS | Mod: PBBFAC,,, | Performed by: NURSE PRACTITIONER

## 2021-02-18 PROCEDURE — 99215 OFFICE O/P EST HI 40 MIN: CPT | Mod: PBBFAC | Performed by: NURSE PRACTITIONER

## 2021-02-18 PROCEDURE — 99213 PR OFFICE/OUTPT VISIT, EST, LEVL III, 20-29 MIN: ICD-10-PCS | Mod: S$PBB,,, | Performed by: NURSE PRACTITIONER

## 2021-05-13 ENCOUNTER — TELEPHONE (OUTPATIENT)
Dept: NEUROLOGY | Facility: CLINIC | Age: 71
End: 2021-05-13

## 2021-05-27 ENCOUNTER — TELEPHONE (OUTPATIENT)
Dept: NEUROLOGY | Facility: CLINIC | Age: 71
End: 2021-05-27

## 2021-06-01 ENCOUNTER — OFFICE VISIT (OUTPATIENT)
Dept: NEUROLOGY | Facility: CLINIC | Age: 71
End: 2021-06-01
Payer: OTHER GOVERNMENT

## 2021-06-01 VITALS
DIASTOLIC BLOOD PRESSURE: 76 MMHG | SYSTOLIC BLOOD PRESSURE: 159 MMHG | BODY MASS INDEX: 33.6 KG/M2 | WEIGHT: 261.69 LBS | HEART RATE: 77 BPM

## 2021-06-01 DIAGNOSIS — M79.605 LEG PAIN, BILATERAL: Primary | ICD-10-CM

## 2021-06-01 DIAGNOSIS — M79.604 LEG PAIN, BILATERAL: Primary | ICD-10-CM

## 2021-06-01 PROCEDURE — 99214 OFFICE O/P EST MOD 30 MIN: CPT | Mod: PBBFAC,PO | Performed by: NEUROMUSCULOSKELETAL MEDICINE & OMM

## 2021-06-01 PROCEDURE — 99999 PR PBB SHADOW E&M-EST. PATIENT-LVL IV: ICD-10-PCS | Mod: PBBFAC,,, | Performed by: NEUROMUSCULOSKELETAL MEDICINE & OMM

## 2021-06-01 PROCEDURE — 99214 OFFICE O/P EST MOD 30 MIN: CPT | Mod: S$PBB,,, | Performed by: NEUROMUSCULOSKELETAL MEDICINE & OMM

## 2021-06-01 PROCEDURE — 99214 PR OFFICE/OUTPT VISIT, EST, LEVL IV, 30-39 MIN: ICD-10-PCS | Mod: S$PBB,,, | Performed by: NEUROMUSCULOSKELETAL MEDICINE & OMM

## 2021-06-01 PROCEDURE — 99999 PR PBB SHADOW E&M-EST. PATIENT-LVL IV: CPT | Mod: PBBFAC,,, | Performed by: NEUROMUSCULOSKELETAL MEDICINE & OMM

## 2021-06-03 ENCOUNTER — HOSPITAL ENCOUNTER (OUTPATIENT)
Dept: RADIOLOGY | Facility: HOSPITAL | Age: 71
Discharge: HOME | End: 2021-06-03
Attending: ORTHOPAEDIC SURGERY
Payer: OTHER GOVERNMENT

## 2021-06-03 ENCOUNTER — TELEPHONE (OUTPATIENT)
Dept: ORTHOPEDICS | Facility: CLINIC | Age: 71
End: 2021-06-03

## 2021-06-03 DIAGNOSIS — M51.36 DDD (DEGENERATIVE DISC DISEASE), LUMBAR: ICD-10-CM

## 2021-06-03 DIAGNOSIS — M51.34 DDD (DEGENERATIVE DISC DISEASE), THORACIC: ICD-10-CM

## 2021-06-03 DIAGNOSIS — M50.30 DDD (DEGENERATIVE DISC DISEASE), CERVICAL: ICD-10-CM

## 2021-06-03 PROCEDURE — 72070 X-RAY EXAM THORAC SPINE 2VWS: CPT | Mod: 26,,, | Performed by: RADIOLOGY

## 2021-06-03 PROCEDURE — 72070 XR THORACIC SPINE AP LATERAL: ICD-10-PCS | Mod: 26,,, | Performed by: RADIOLOGY

## 2021-06-03 PROCEDURE — 72110 X-RAY EXAM L-2 SPINE 4/>VWS: CPT | Mod: 26,,, | Performed by: RADIOLOGY

## 2021-06-03 PROCEDURE — 72050 X-RAY EXAM NECK SPINE 4/5VWS: CPT | Mod: TC

## 2021-06-03 PROCEDURE — 72110 XR LUMBAR SPINE AP AND LAT WITH FLEX/EXT: ICD-10-PCS | Mod: 26,,, | Performed by: RADIOLOGY

## 2021-06-03 PROCEDURE — 72050 XR CERVICAL SPINE AP LAT WITH FLEX EXTEN: ICD-10-PCS | Mod: 26,,, | Performed by: RADIOLOGY

## 2021-06-03 PROCEDURE — 72110 X-RAY EXAM L-2 SPINE 4/>VWS: CPT | Mod: TC

## 2021-06-03 PROCEDURE — 72070 X-RAY EXAM THORAC SPINE 2VWS: CPT | Mod: TC

## 2021-06-03 PROCEDURE — 72050 X-RAY EXAM NECK SPINE 4/5VWS: CPT | Mod: 26,,, | Performed by: RADIOLOGY

## 2021-06-04 ENCOUNTER — OFFICE VISIT (OUTPATIENT)
Dept: ORTHOPEDICS | Facility: CLINIC | Age: 71
End: 2021-06-04
Payer: OTHER GOVERNMENT

## 2021-06-04 VITALS — BODY MASS INDEX: 33.49 KG/M2 | HEIGHT: 74 IN | WEIGHT: 260.94 LBS

## 2021-06-04 DIAGNOSIS — M79.604 LEG PAIN, BILATERAL: ICD-10-CM

## 2021-06-04 DIAGNOSIS — M79.605 LEG PAIN, BILATERAL: ICD-10-CM

## 2021-06-04 DIAGNOSIS — R26.89 BALANCE PROBLEMS: Primary | ICD-10-CM

## 2021-06-04 PROCEDURE — 99214 OFFICE O/P EST MOD 30 MIN: CPT | Mod: S$PBB,,, | Performed by: ORTHOPAEDIC SURGERY

## 2021-06-04 PROCEDURE — 99214 PR OFFICE/OUTPT VISIT, EST, LEVL IV, 30-39 MIN: ICD-10-PCS | Mod: S$PBB,,, | Performed by: ORTHOPAEDIC SURGERY

## 2021-06-04 PROCEDURE — 99999 PR PBB SHADOW E&M-EST. PATIENT-LVL IV: ICD-10-PCS | Mod: PBBFAC,,, | Performed by: ORTHOPAEDIC SURGERY

## 2021-06-04 PROCEDURE — 99214 OFFICE O/P EST MOD 30 MIN: CPT | Mod: PBBFAC | Performed by: ORTHOPAEDIC SURGERY

## 2021-06-04 PROCEDURE — 99999 PR PBB SHADOW E&M-EST. PATIENT-LVL IV: CPT | Mod: PBBFAC,,, | Performed by: ORTHOPAEDIC SURGERY

## 2021-06-07 ENCOUNTER — TELEPHONE (OUTPATIENT)
Dept: ORTHOPEDICS | Facility: CLINIC | Age: 71
End: 2021-06-07

## 2021-06-14 ENCOUNTER — HOSPITAL ENCOUNTER (OUTPATIENT)
Dept: RADIOLOGY | Facility: HOSPITAL | Age: 71
Discharge: HOME | End: 2021-06-14
Attending: ORTHOPAEDIC SURGERY
Payer: OTHER GOVERNMENT

## 2021-06-14 DIAGNOSIS — R26.89 BALANCE PROBLEMS: ICD-10-CM

## 2021-06-14 PROCEDURE — 72146 MRI CHEST SPINE W/O DYE: CPT | Mod: 26,,, | Performed by: RADIOLOGY

## 2021-06-14 PROCEDURE — 72141 MRI NECK SPINE W/O DYE: CPT | Mod: 26,,, | Performed by: RADIOLOGY

## 2021-06-14 PROCEDURE — 72146 MRI THORACIC SPINE WITHOUT CONTRAST: ICD-10-PCS | Mod: 26,,, | Performed by: RADIOLOGY

## 2021-06-14 PROCEDURE — 72141 MRI CERVICAL SPINE WITHOUT CONTRAST: ICD-10-PCS | Mod: 26,,, | Performed by: RADIOLOGY

## 2021-06-14 PROCEDURE — 72146 MRI CHEST SPINE W/O DYE: CPT | Mod: TC

## 2021-06-14 PROCEDURE — 72141 MRI NECK SPINE W/O DYE: CPT | Mod: TC

## 2021-06-15 ENCOUNTER — OFFICE VISIT (OUTPATIENT)
Dept: ORTHOPEDICS | Facility: CLINIC | Age: 71
End: 2021-06-15
Payer: OTHER GOVERNMENT

## 2021-06-15 VITALS — HEIGHT: 74 IN | WEIGHT: 260.81 LBS | BODY MASS INDEX: 33.47 KG/M2

## 2021-06-15 DIAGNOSIS — M79.604 LEG PAIN, BILATERAL: Primary | ICD-10-CM

## 2021-06-15 DIAGNOSIS — M79.605 LEG PAIN, BILATERAL: Primary | ICD-10-CM

## 2021-06-15 PROCEDURE — 99999 PR PBB SHADOW E&M-EST. PATIENT-LVL III: ICD-10-PCS | Mod: PBBFAC,,, | Performed by: ORTHOPAEDIC SURGERY

## 2021-06-15 PROCEDURE — 99213 PR OFFICE/OUTPT VISIT, EST, LEVL III, 20-29 MIN: ICD-10-PCS | Mod: S$PBB,,, | Performed by: ORTHOPAEDIC SURGERY

## 2021-06-15 PROCEDURE — 99213 OFFICE O/P EST LOW 20 MIN: CPT | Mod: PBBFAC | Performed by: ORTHOPAEDIC SURGERY

## 2021-06-15 PROCEDURE — 99999 PR PBB SHADOW E&M-EST. PATIENT-LVL III: CPT | Mod: PBBFAC,,, | Performed by: ORTHOPAEDIC SURGERY

## 2021-06-15 PROCEDURE — 99213 OFFICE O/P EST LOW 20 MIN: CPT | Mod: S$PBB,,, | Performed by: ORTHOPAEDIC SURGERY

## 2021-06-21 DIAGNOSIS — M79.604 PAIN IN BOTH LOWER EXTREMITIES: Primary | ICD-10-CM

## 2021-06-21 DIAGNOSIS — M79.605 PAIN IN BOTH LOWER EXTREMITIES: Primary | ICD-10-CM

## 2021-06-28 ENCOUNTER — OFFICE VISIT (OUTPATIENT)
Dept: VASCULAR SURGERY | Facility: CLINIC | Age: 71
End: 2021-06-28
Attending: SURGERY
Payer: OTHER GOVERNMENT

## 2021-06-28 ENCOUNTER — HOSPITAL ENCOUNTER (OUTPATIENT)
Dept: VASCULAR SURGERY | Facility: CLINIC | Age: 71
Discharge: HOME | End: 2021-06-28
Attending: SURGERY
Payer: OTHER GOVERNMENT

## 2021-06-28 VITALS
BODY MASS INDEX: 34.77 KG/M2 | HEIGHT: 73 IN | TEMPERATURE: 99 F | SYSTOLIC BLOOD PRESSURE: 179 MMHG | DIASTOLIC BLOOD PRESSURE: 81 MMHG | HEART RATE: 66 BPM | WEIGHT: 262.38 LBS

## 2021-06-28 DIAGNOSIS — I87.001 POST-THROMBOTIC SYNDROME OF RIGHT LOWER EXTREMITY: ICD-10-CM

## 2021-06-28 DIAGNOSIS — M79.605 PAIN IN BOTH LOWER EXTREMITIES: ICD-10-CM

## 2021-06-28 DIAGNOSIS — M79.605 PAIN IN BOTH LOWER EXTREMITIES: Primary | ICD-10-CM

## 2021-06-28 DIAGNOSIS — M79.604 PAIN IN BOTH LOWER EXTREMITIES: Primary | ICD-10-CM

## 2021-06-28 DIAGNOSIS — M79.604 PAIN IN BOTH LOWER EXTREMITIES: ICD-10-CM

## 2021-06-28 PROCEDURE — 99204 PR OFFICE/OUTPT VISIT, NEW, LEVL IV, 45-59 MIN: ICD-10-PCS | Mod: S$PBB,,, | Performed by: SURGERY

## 2021-06-28 PROCEDURE — 93923 UPR/LXTR ART STDY 3+ LVLS: CPT | Mod: PBBFAC | Performed by: SURGERY

## 2021-06-28 PROCEDURE — 93923 PR NON-INVASIVE PHYSIOLOGIC STUDY EXTREMITY 3 LEVELS: ICD-10-PCS | Mod: 26,S$PBB,, | Performed by: SURGERY

## 2021-06-28 PROCEDURE — 99999 PR PBB SHADOW E&M-EST. PATIENT-LVL IV: ICD-10-PCS | Mod: PBBFAC,,, | Performed by: SURGERY

## 2021-06-28 PROCEDURE — 93923 UPR/LXTR ART STDY 3+ LVLS: CPT | Mod: 26,S$PBB,, | Performed by: SURGERY

## 2021-06-28 PROCEDURE — 99214 OFFICE O/P EST MOD 30 MIN: CPT | Mod: PBBFAC,25 | Performed by: SURGERY

## 2021-06-28 PROCEDURE — 99204 OFFICE O/P NEW MOD 45 MIN: CPT | Mod: S$PBB,,, | Performed by: SURGERY

## 2021-06-28 PROCEDURE — 99999 PR PBB SHADOW E&M-EST. PATIENT-LVL IV: CPT | Mod: PBBFAC,,, | Performed by: SURGERY

## 2021-06-28 RX ORDER — CHLORTHALIDONE 25 MG/1
TABLET ORAL
COMMUNITY
Start: 2021-01-05 | End: 2022-12-19

## 2021-06-28 RX ORDER — LORATADINE 10 MG/1
TABLET ORAL
COMMUNITY
Start: 2021-05-25 | End: 2022-12-19

## 2021-06-28 RX ORDER — HYDRALAZINE HYDROCHLORIDE 50 MG/1
TABLET, FILM COATED ORAL
COMMUNITY
Start: 2021-03-18

## 2021-06-28 RX ORDER — CYCLOSPORINE 0.5 MG/ML
EMULSION OPHTHALMIC
COMMUNITY
Start: 2021-01-20

## 2021-06-28 RX ORDER — CHLORHEXIDINE GLUCONATE ORAL RINSE 1.2 MG/ML
SOLUTION DENTAL
COMMUNITY
Start: 2021-01-08

## 2021-06-28 RX ORDER — FINASTERIDE 5 MG/1
TABLET, FILM COATED ORAL
COMMUNITY
Start: 2021-01-05

## 2021-06-28 RX ORDER — ASPIRIN 81 MG/1
81 TABLET ORAL DAILY
COMMUNITY
Start: 2021-06-28 | End: 2022-12-19

## 2022-06-03 NOTE — TELEPHONE ENCOUNTER
[facility] and [facility]. (Scanned into chart under media tab along with last clinic visit).  Reviewed medical history, allergies and medications.  Scheduled for 9/21/17 at 1:30 pm with Dr Trinidad.  Patient informed me that he already had Moviprep from previously scheduled colonoscopy.  Went over prep instructions. Stated understanding.  Instructions mailed to address in chart.      
no

## 2022-08-29 ENCOUNTER — TELEPHONE (OUTPATIENT)
Dept: ORTHOPEDICS | Facility: CLINIC | Age: 72
End: 2022-08-29
Payer: OTHER GOVERNMENT

## 2022-08-29 DIAGNOSIS — M51.36 DDD (DEGENERATIVE DISC DISEASE), LUMBAR: Primary | ICD-10-CM

## 2022-08-29 NOTE — PROGRESS NOTES
"DATE: 8/30/2022  PATIENT: Macario Dior Jr.    Attending Physician: Brady Melo M.D.    HISTORY:  Macario Dior Jr. is a 72 y.o. male who returns to me today for follow up.  He was last seen by Katarina on 6/15/21.  Today he is doing well but notes continued right leg pain and bilateral foot numbness. He states the pain in his right leg has worsened over the past 6 months. The patient had a right DHRUV in 2015. He currently treats his pain with Oxycodone and Lyrica.    The patient denies myelopathic symptoms such as handwriting changes or difficulty with buttons/coins/keys. Denies perineal paresthesias, bowel/bladder dysfunction.    PMH/PSH/FamHx/SocHx:  Unchanged from prior visit    ROS:  REVIEW OF SYSTEMS:  Constitution: Negative. Negative for chills, fever and night sweats.   HENT: Negative for congestion and headaches.    Eyes: Negative for blurred vision, left vision loss and right vision loss.   Cardiovascular: Negative for chest pain and syncope.   Respiratory: Negative for cough and shortness of breath.    Endocrine: Negative for polydipsia, polyphagia and polyuria.   Hematologic/Lymphatic: Negative for bleeding problem. Does not bruise/bleed easily.   Skin: Negative for dry skin, itching and rash.   Musculoskeletal: Negative for falls and muscle weakness.   Gastrointestinal: Negative for abdominal pain and bowel incontinence.   Allergic/Immunologic: Negative for hives and persistent infections.  Genitourinary: Negative for urinary retention/incontinence and nocturia.   Neurological: negative for disturbances in coordination, no myelopathic symptoms such as handwriting changes or difficulty with buttons, coins, keys or small objects. No loss of balance and seizures.   Psychiatric/Behavioral: Negative for depression. The patient does not have insomnia.   Denies perineal paresthesias, bowel or bladder incontinence    EXAM:  Ht 6' 1" (1.854 m)   Wt 112.5 kg (247 lb 14.5 oz)   BMI 32.71 kg/m²     My " "physical examination was notable for the following findings:     Normal station and gait, no difficulty with toe or heel walk.   Dorsal lumbar skin negative for rashes, lesions, hairy patches and surgical scars. There is mild lumbar tenderness to palpation.  Lumbar range of motion is acceptable.  Global saggital and coronal spinal balance acceptable, not significant for scoliosis and kyphosis.  No pain with the range of motion of the bilateral hips. No trochanteric tenderness to palpation.  Bilateral lower extremities warm and well perfused, dorsalis pedis pulses 2+ bilaterally.  Normal strength and tone in all major motor groups in the bilateral lower extremities. Normal sensation to light touch in the L2-S1 dermatomes bilaterally.  Deep tendon reflexes symmetric 2+ in the bilateral lower extremities.  Negative Babinski bilaterally. Straight leg raise negative bilaterally.      IMAGING:    Today I personally reviewed AP, Lat and Flex/Ex  upright C-spine films that demonstrate moderate disc space narrowing and degenerative changes without instability     Thoracic AP lat films show mild dextro convex curvature.     AP, Lat and Flex/Ex upright lumbar spine films demonstrate mild levoconvex curvature. Mild disc space narrowing and degenerative changes without instability.     MRI Lumbar (5/18/20) demonstrates diffuse disc bulge at L4-L5 resulting in moderate right and mild left neural foraminal narrowing without stenosis. Mild disc bulge at L5-S1 resulting in mild bilateral neural foraminal narrowing.     MRI cervical and thoracic largely unremarkable, no significant spinal cord compression.      Right leg EMG "suggests right L5-S1 radiculopathy".    Body mass index is 32.71 kg/m².    No results found for: HGBA1C      ASSESSMENT/PLAN:    Macario was seen today for leg pain, low-back pain and shoulder pain.    Diagnoses and all orders for this visit:    Lumbar radiculopathy, chronic  -     MRI Lumbar Spine Without " Contrast; Future    History of total hip arthroplasty, right      Today we discussed at length all of the different treatment options including anti-inflammatories, acetaminophen, rest, ice, heat, physical therapy including strengthening and stretching exercises, home exercises, ROM, aerobic conditioning, aqua therapy, other modalities including ultrasound, massage, and dry needling, epidural steroid injections and finally surgical intervention.    Lumbar MRI ordered, I will call with results.  He is scheduled to see ortho joint for his bilateral hip pain.

## 2022-08-29 NOTE — TELEPHONE ENCOUNTER
Spoke with daughter and informed her of doctor appointment and xray times and she agreed verbally to an understanding.

## 2022-08-30 ENCOUNTER — OFFICE VISIT (OUTPATIENT)
Dept: ORTHOPEDICS | Facility: CLINIC | Age: 72
End: 2022-08-30
Payer: OTHER GOVERNMENT

## 2022-08-30 ENCOUNTER — HOSPITAL ENCOUNTER (OUTPATIENT)
Dept: RADIOLOGY | Facility: HOSPITAL | Age: 72
Discharge: HOME | End: 2022-08-30
Attending: REGISTERED NURSE
Payer: OTHER GOVERNMENT

## 2022-08-30 VITALS — BODY MASS INDEX: 32.86 KG/M2 | WEIGHT: 247.94 LBS | HEIGHT: 73 IN

## 2022-08-30 DIAGNOSIS — M51.36 DDD (DEGENERATIVE DISC DISEASE), LUMBAR: ICD-10-CM

## 2022-08-30 DIAGNOSIS — M54.16 LUMBAR RADICULOPATHY, CHRONIC: Primary | ICD-10-CM

## 2022-08-30 DIAGNOSIS — Z96.641 HISTORY OF TOTAL HIP ARTHROPLASTY, RIGHT: ICD-10-CM

## 2022-08-30 PROCEDURE — 99215 OFFICE O/P EST HI 40 MIN: CPT | Mod: PBBFAC | Performed by: REGISTERED NURSE

## 2022-08-30 PROCEDURE — 99999 PR PBB SHADOW E&M-EST. PATIENT-LVL V: ICD-10-PCS | Mod: PBBFAC,,, | Performed by: REGISTERED NURSE

## 2022-08-30 PROCEDURE — 72110 X-RAY EXAM L-2 SPINE 4/>VWS: CPT | Mod: 26,,, | Performed by: RADIOLOGY

## 2022-08-30 PROCEDURE — 99999 PR PBB SHADOW E&M-EST. PATIENT-LVL V: CPT | Mod: PBBFAC,,, | Performed by: REGISTERED NURSE

## 2022-08-30 PROCEDURE — 99213 OFFICE O/P EST LOW 20 MIN: CPT | Mod: S$PBB,,, | Performed by: REGISTERED NURSE

## 2022-08-30 PROCEDURE — 99213 PR OFFICE/OUTPT VISIT, EST, LEVL III, 20-29 MIN: ICD-10-PCS | Mod: S$PBB,,, | Performed by: REGISTERED NURSE

## 2022-08-30 PROCEDURE — 72110 X-RAY EXAM L-2 SPINE 4/>VWS: CPT | Mod: TC

## 2022-08-30 PROCEDURE — 72110 XR LUMBAR SPINE AP AND LAT WITH FLEX/EXT: ICD-10-PCS | Mod: 26,,, | Performed by: RADIOLOGY

## 2022-09-09 ENCOUNTER — OFFICE VISIT (OUTPATIENT)
Dept: ORTHOPEDICS | Facility: CLINIC | Age: 72
End: 2022-09-09
Payer: OTHER GOVERNMENT

## 2022-09-09 ENCOUNTER — TELEPHONE (OUTPATIENT)
Dept: ORTHOPEDICS | Facility: CLINIC | Age: 72
End: 2022-09-09

## 2022-09-09 ENCOUNTER — HOSPITAL ENCOUNTER (OUTPATIENT)
Dept: RADIOLOGY | Facility: HOSPITAL | Age: 72
Discharge: HOME | End: 2022-09-09
Attending: PHYSICIAN ASSISTANT
Payer: OTHER GOVERNMENT

## 2022-09-09 VITALS — WEIGHT: 251.31 LBS | HEIGHT: 73 IN | BODY MASS INDEX: 33.31 KG/M2

## 2022-09-09 DIAGNOSIS — Z96.641 STATUS POST RIGHT HIP REPLACEMENT: Primary | ICD-10-CM

## 2022-09-09 DIAGNOSIS — Z96.641 HISTORY OF TOTAL HIP ARTHROPLASTY, RIGHT: ICD-10-CM

## 2022-09-09 DIAGNOSIS — Z96.641 PRESENCE OF RIGHT ARTIFICIAL HIP JOINT: ICD-10-CM

## 2022-09-09 DIAGNOSIS — Z96.641 HISTORY OF TOTAL HIP ARTHROPLASTY, RIGHT: Primary | ICD-10-CM

## 2022-09-09 DIAGNOSIS — M89.8X9 HETEROTOPIC OSSIFICATION OF BONE: ICD-10-CM

## 2022-09-09 DIAGNOSIS — M25.551 RIGHT HIP PAIN: ICD-10-CM

## 2022-09-09 PROCEDURE — 73502 X-RAY EXAM HIP UNI 2-3 VIEWS: CPT | Mod: TC,RT

## 2022-09-09 PROCEDURE — 73502 X-RAY EXAM HIP UNI 2-3 VIEWS: CPT | Mod: 26,RT,, | Performed by: RADIOLOGY

## 2022-09-09 PROCEDURE — 99215 OFFICE O/P EST HI 40 MIN: CPT | Mod: PBBFAC | Performed by: PHYSICIAN ASSISTANT

## 2022-09-09 PROCEDURE — 99999 PR PBB SHADOW E&M-EST. PATIENT-LVL V: CPT | Mod: PBBFAC,,, | Performed by: PHYSICIAN ASSISTANT

## 2022-09-09 PROCEDURE — 99214 PR OFFICE/OUTPT VISIT, EST, LEVL IV, 30-39 MIN: ICD-10-PCS | Mod: S$PBB,,, | Performed by: PHYSICIAN ASSISTANT

## 2022-09-09 PROCEDURE — 73502 XR HIP WITH PELVIS WHEN PERFORMED, 2 OR 3  VIEWS RIGHT: ICD-10-PCS | Mod: 26,RT,, | Performed by: RADIOLOGY

## 2022-09-09 PROCEDURE — 99999 PR PBB SHADOW E&M-EST. PATIENT-LVL V: ICD-10-PCS | Mod: PBBFAC,,, | Performed by: PHYSICIAN ASSISTANT

## 2022-09-09 PROCEDURE — 99214 OFFICE O/P EST MOD 30 MIN: CPT | Mod: S$PBB,,, | Performed by: PHYSICIAN ASSISTANT

## 2022-09-09 NOTE — PROGRESS NOTES
"  SUBJECTIVE:     Chief Complaint : right hip pain    History of Present Illness:  Macario Dior Jr. is a 72 y.o. male with pmh significant for CKD 1.5/55, DVT on Xarelto, gout, buzz, htn seen in clinic today with a chief complaint of atraumatic right hip pain x 3 years. Pt had R DHRUV by Dr. [facility] in 2015 for AVN. Pain is located in groin and c-distribution. He c/o difficulty getting on shoes and socks secondary to stiffness and pain in hip. Pain is constant but worse with movement. He would like to play with grandchildren but cannot due to pain. He also has chronic LBP with neuropathy and has been followed by pain management. Has had several ESIs and nerve ablation. He was recently seen by ortho spine NP and MRI ordered. Pt takes oxycodone four times a day. He has also tried PT which made pain worse. Pt denies fevers/chills, recent infection. He uses cane for ambulation. Has difficulty walking more than 2 blocks.     Past Medical History:   Diagnosis Date    Arthritis     Asthma     chronic    CKD (chronic kidney disease), stage III     patient denies    Demyelinating neuropathy     polyneuropathy    Gout     History of blood clots 2013    lower part of both legs    HLD (hyperlipidemia)     Hypertension     Iron deficiency anemia     BUZZ (obstructive sleep apnea)     CPAP       Review of Systems:  Constitutional: no fever or chills  ENT: no nasal congestion or sore throat  Respiratory: no cough or shortness of breath  Cardiovascular: no chest pain or palpitations  Gastrointestinal: no nausea or vomiting, tolerating diet  Genitourinary: no hematuria or dysuria  Integument/Breast: no rash or pruritis  Hematologic/Lymphatic: no easy bruising or lymphadenopathy  Musculoskeletal: see HPI  Neurological: no seizures or tremors  Behavioral/Psych: no auditory or visual hallucinations    OBJECTIVE:     PHYSICAL EXAM:  Height 6' 1" (1.854 m), weight 114 kg (251 lb 4.8 oz).   General Appearance: WDWN, NAD  Gait: " Abnormal  Neuro/Psych: Mood & affect appropriate  Lungs: Respirations equal and unlabored.   CV: 2+ bilateral upper and lower extremity pulses.   Skin: Intact throughout LE  Extremities: No LE edema    Right Hip Examination:  Skin: intact with no abnormality, + scar   Tenderness:None  ROM: pain with PROM    Flexion:100   Internal Rotation:10    External Rotation:20      Muscle Strength:    Abduction:5/5    Adduction:5/5    Flexion: 5/5    Left Hip Examination:  Tenderness:None  ROM: minimal pain with PROM    Flexion:120   Internal Rotation:20    External Rotation:50     Muscle Strength:    Abduction:5/5    Adduction:5/5    Flexion: 5/5    RADIOGRAPHS: AP, lateral hip x-rays ordered and images reviewed today by me reveals right total hip replacement with heterotopic ossification , AVN of left femoral head     ASSESSMENT/PLAN:   Painful right total hip, presence of HO   AVN left hip   Lumbar radiculopathy   - Pt understands that his pain is multifactorial and a significant amount is coming from lumbar spine. He does have stiffness and pain related to hip replacement that he would like to have addressed. He will continue to f/u with ortho spine and pain management.   - CRP, ESR   - CT hip   - F/u with Dr. Sinha following CT   - I called pt with results of labs and plan but he did not answer. Left VM asking him to return my call.

## 2022-09-13 ENCOUNTER — HOSPITAL ENCOUNTER (OUTPATIENT)
Dept: RADIOLOGY | Facility: HOSPITAL | Age: 72
Discharge: HOME | End: 2022-09-13
Attending: PHYSICIAN ASSISTANT
Payer: OTHER GOVERNMENT

## 2022-09-13 DIAGNOSIS — Z96.641 PRESENCE OF RIGHT ARTIFICIAL HIP JOINT: ICD-10-CM

## 2022-09-13 DIAGNOSIS — Z96.641 STATUS POST RIGHT HIP REPLACEMENT: ICD-10-CM

## 2022-09-13 DIAGNOSIS — M25.551 RIGHT HIP PAIN: ICD-10-CM

## 2022-09-13 PROCEDURE — 73700 CT HIP WITHOUT CONTRAST RIGHT: ICD-10-PCS | Mod: 26,RT,, | Performed by: RADIOLOGY

## 2022-09-13 PROCEDURE — 73700 CT LOWER EXTREMITY W/O DYE: CPT | Mod: TC,RT

## 2022-09-13 PROCEDURE — 73700 CT LOWER EXTREMITY W/O DYE: CPT | Mod: 26,RT,, | Performed by: RADIOLOGY

## 2022-09-15 ENCOUNTER — TELEPHONE (OUTPATIENT)
Dept: BARIATRICS | Facility: CLINIC | Age: 72
End: 2022-09-15
Payer: OTHER GOVERNMENT

## 2022-09-15 NOTE — TELEPHONE ENCOUNTER
Notified patient of the date & time of financial phone call appt.  Pt aware appt is a telephone call.    Dashboard updated

## 2022-09-16 DIAGNOSIS — E66.9 OBESITY (BMI 30.0-34.9): Primary | ICD-10-CM

## 2022-09-16 NOTE — PROGRESS NOTES
Established Patient - Audio Only Telehealth Visit     The patient location is: home  The chief complaint leading to consultation is: MRI results  Visit type: Virtual visit with audio only (telephone)  Total time spent with patient: 10min     The reason for the audio only service rather than synchronous audio and video virtual visit was related to technical difficulties or patient preference/necessity.     Each patient to whom I provide medical services by telemedicine is:  (1) informed of the relationship between the physician and patient and the respective role of any other health care provider with respect to management of the patient; and (2) notified that they may decline to receive medical services by telemedicine and may withdraw from such care at any time. Patient verbally consented to receive this service via voice-only telephone call.    DATE: 9/19/2022  PATIENT: Macario Dior Jr.    Attending Physician: Brady Melo M.D.    HISTORY:  Macario Dior Jr. is a 72 y.o. male who returns to me today for MRI results.  He was last seen by me 8/30/2022.  Today he is doing well but notes continued low back pain with bilateral foot numbness.    The Patient denies myelopathic symptoms such as handwriting changes or difficulty with buttons/coins/keys. Denies perineal paresthesias, bowel/bladder dysfunction.    PMH/PSH/FamHx/SocHx:  Unchanged from prior visit    ROS:  REVIEW OF SYSTEMS:  Constitution: Negative. Negative for chills, fever and night sweats.   HENT: Negative for congestion and headaches.    Eyes: Negative for blurred vision, left vision loss and right vision loss.   Cardiovascular: Negative for chest pain and syncope.   Respiratory: Negative for cough and shortness of breath.    Endocrine: Negative for polydipsia, polyphagia and polyuria.   Hematologic/Lymphatic: Negative for bleeding problem. Does not bruise/bleed easily.   Skin: Negative for dry skin, itching and rash.   Musculoskeletal: Negative  "for falls and muscle weakness.   Gastrointestinal: Negative for abdominal pain and bowel incontinence.   Allergic/Immunologic: Negative for hives and persistent infections.  Genitourinary: Negative for urinary retention/incontinence and nocturia.   Neurological: negative for disturbances in coordination, no myelopathic symptoms such as handwriting changes or difficulty with buttons, coins, keys or small objects. No loss of balance and seizures.   Psychiatric/Behavioral: Negative for depression. The patient does not have insomnia.   Denies perineal paresthesias, bowel or bladder incontinence    EXAM:  Ht 6' 1" (1.854 m)   Wt 114 kg (251 lb 4.8 oz)   BMI 33.16 kg/m²     Neuro and physical exam stable.    IMAGING:    Today I personally reviewed AP, Lat and Flex/Ex  upright C-spine films that demonstrate moderate disc space narrowing and degenerative changes without instability     Thoracic AP lat films show mild dextro convex curvature.     AP, Lat and Flex/Ex upright lumbar spine films demonstrate mild levoconvex curvature. Mild disc space narrowing and degenerative changes without instability.     MRI Lumbar shows mild bilateral neural foraminal from L3 to S1.      MRI cervical and thoracic largely unremarkable, no significant spinal cord compression.     Body mass index is 33.16 kg/m².    No results found for: HGBA1C      ASSESSMENT/PLAN:    Macario was seen today for low-back pain, hip pain and establish care.    Diagnoses and all orders for this visit:    Type 2 diabetes mellitus with stage 3a chronic kidney disease, unspecified whether long term insulin use  -     HEMOGLOBIN A1C; Future    Today we discussed at length all of the different treatment options including anti-inflammatories, acetaminophen, rest, ice, heat, physical therapy including strengthening and stretching exercises, home exercises, ROM, aerobic conditioning, aqua therapy, other modalities including ultrasound, massage, and dry needling, " epidural steroid injections and finally surgical intervention.    The patient will be scheduled in the clinic with pain management for low back pain. He may follow up with me as needed.      This service was not originating from a related E/M service provided within the previous 7 days nor will  to an E/M service or procedure within the next 24 hours or my soonest available appointment.  Prevailing standard of care was able to be met in this audio-only visit.

## 2022-09-17 ENCOUNTER — HOSPITAL ENCOUNTER (OUTPATIENT)
Dept: RADIOLOGY | Facility: HOSPITAL | Age: 72
Discharge: HOME | End: 2022-09-17
Attending: REGISTERED NURSE
Payer: OTHER GOVERNMENT

## 2022-09-17 DIAGNOSIS — M54.16 LUMBAR RADICULOPATHY, CHRONIC: ICD-10-CM

## 2022-09-17 PROCEDURE — 72148 MRI LUMBAR SPINE W/O DYE: CPT | Mod: TC

## 2022-09-17 PROCEDURE — 72148 MRI LUMBAR SPINE WITHOUT CONTRAST: ICD-10-PCS | Mod: 26,,, | Performed by: RADIOLOGY

## 2022-09-17 PROCEDURE — 72148 MRI LUMBAR SPINE W/O DYE: CPT | Mod: 26,,, | Performed by: RADIOLOGY

## 2022-09-19 ENCOUNTER — TELEPHONE (OUTPATIENT)
Dept: BARIATRICS | Facility: CLINIC | Age: 72
End: 2022-09-19
Payer: OTHER GOVERNMENT

## 2022-09-19 ENCOUNTER — OFFICE VISIT (OUTPATIENT)
Dept: ORTHOPEDICS | Facility: CLINIC | Age: 72
End: 2022-09-19
Payer: OTHER GOVERNMENT

## 2022-09-19 ENCOUNTER — LAB VISIT (OUTPATIENT)
Dept: LAB | Facility: HOSPITAL | Age: 72
End: 2022-09-19
Payer: OTHER GOVERNMENT

## 2022-09-19 VITALS — HEIGHT: 73 IN | WEIGHT: 251.31 LBS | BODY MASS INDEX: 33.31 KG/M2

## 2022-09-19 DIAGNOSIS — E11.22 TYPE 2 DIABETES MELLITUS WITH STAGE 3A CHRONIC KIDNEY DISEASE, UNSPECIFIED WHETHER LONG TERM INSULIN USE: ICD-10-CM

## 2022-09-19 DIAGNOSIS — E11.22 TYPE 2 DIABETES MELLITUS WITH STAGE 3A CHRONIC KIDNEY DISEASE, UNSPECIFIED WHETHER LONG TERM INSULIN USE: Primary | ICD-10-CM

## 2022-09-19 DIAGNOSIS — N18.31 TYPE 2 DIABETES MELLITUS WITH STAGE 3A CHRONIC KIDNEY DISEASE, UNSPECIFIED WHETHER LONG TERM INSULIN USE: Primary | ICD-10-CM

## 2022-09-19 DIAGNOSIS — N18.31 TYPE 2 DIABETES MELLITUS WITH STAGE 3A CHRONIC KIDNEY DISEASE, UNSPECIFIED WHETHER LONG TERM INSULIN USE: ICD-10-CM

## 2022-09-19 LAB
ESTIMATED AVG GLUCOSE: 146 MG/DL (ref 68–131)
HBA1C MFR BLD: 6.7 % (ref 4–5.6)

## 2022-09-19 PROCEDURE — 36415 COLL VENOUS BLD VENIPUNCTURE: CPT | Performed by: REGISTERED NURSE

## 2022-09-19 PROCEDURE — 99213 OFFICE O/P EST LOW 20 MIN: CPT | Mod: 95,,, | Performed by: REGISTERED NURSE

## 2022-09-19 PROCEDURE — 99213 PR OFFICE/OUTPT VISIT, EST, LEVL III, 20-29 MIN: ICD-10-PCS | Mod: 95,,, | Performed by: REGISTERED NURSE

## 2022-09-19 PROCEDURE — 83036 HEMOGLOBIN GLYCOSYLATED A1C: CPT | Performed by: REGISTERED NURSE

## 2022-09-19 NOTE — TELEPHONE ENCOUNTER
----- Message from Khadijah Calderon sent at 9/19/2022  3:50 PM CDT -----  Contact: [phone]  Pt is calling to make next appt following his financial appt please call and adv [phone]

## 2022-09-22 ENCOUNTER — TELEPHONE (OUTPATIENT)
Dept: PAIN MEDICINE | Facility: CLINIC | Age: 72
End: 2022-09-22
Payer: OTHER GOVERNMENT

## 2022-09-22 NOTE — TELEPHONE ENCOUNTER
This message is for patient in regards to his/her appointment 9/23/22 at 1:20 p.m.   With Florentino Calderon, [facility] Policy: For the safety of all patients and staff members.   During this visit we're informing all patients and visitors to wear face mask at all time here at [facility].  If you have any questions or concerns please contact [phone]

## 2022-09-23 ENCOUNTER — TELEPHONE (OUTPATIENT)
Dept: ORTHOPEDICS | Facility: CLINIC | Age: 72
End: 2022-09-23
Payer: OTHER GOVERNMENT

## 2022-09-23 ENCOUNTER — OFFICE VISIT (OUTPATIENT)
Dept: PAIN MEDICINE | Facility: CLINIC | Age: 72
End: 2022-09-23
Payer: OTHER GOVERNMENT

## 2022-09-23 VITALS — RESPIRATION RATE: 18 BRPM | HEIGHT: 73 IN | TEMPERATURE: 97 F | BODY MASS INDEX: 29.82 KG/M2 | WEIGHT: 225 LBS

## 2022-09-23 DIAGNOSIS — M87.059 AVASCULAR NECROSIS OF FEMORAL HEAD, UNSPECIFIED LATERALITY: ICD-10-CM

## 2022-09-23 DIAGNOSIS — M54.16 LUMBAR RADICULOPATHY, RIGHT: ICD-10-CM

## 2022-09-23 DIAGNOSIS — G89.4 CHRONIC PAIN SYNDROME: ICD-10-CM

## 2022-09-23 DIAGNOSIS — M47.26 OSTEOARTHRITIS OF SPINE WITH RADICULOPATHY, LUMBAR REGION: Primary | ICD-10-CM

## 2022-09-23 PROCEDURE — 99214 PR OFFICE/OUTPT VISIT, EST, LEVL IV, 30-39 MIN: ICD-10-PCS | Mod: S$PBB,,, | Performed by: NURSE PRACTITIONER

## 2022-09-23 PROCEDURE — 99999 PR PBB SHADOW E&M-EST. PATIENT-LVL V: ICD-10-PCS | Mod: PBBFAC,,, | Performed by: NURSE PRACTITIONER

## 2022-09-23 PROCEDURE — 99999 PR PBB SHADOW E&M-EST. PATIENT-LVL V: CPT | Mod: PBBFAC,,, | Performed by: NURSE PRACTITIONER

## 2022-09-23 PROCEDURE — 99214 OFFICE O/P EST MOD 30 MIN: CPT | Mod: S$PBB,,, | Performed by: NURSE PRACTITIONER

## 2022-09-23 PROCEDURE — 99215 OFFICE O/P EST HI 40 MIN: CPT | Mod: PBBFAC | Performed by: NURSE PRACTITIONER

## 2022-09-23 RX ORDER — AMITRIPTYLINE HYDROCHLORIDE 25 MG/1
25 TABLET, FILM COATED ORAL NIGHTLY PRN
Qty: 30 TABLET | Refills: 5 | Status: SHIPPED | OUTPATIENT
Start: 2022-09-23 | End: 2022-09-23

## 2022-09-23 RX ORDER — AMITRIPTYLINE HYDROCHLORIDE 10 MG/1
10 TABLET, FILM COATED ORAL NIGHTLY PRN
Qty: 30 TABLET | Refills: 5 | Status: SHIPPED | OUTPATIENT
Start: 2022-09-23 | End: 2022-09-23

## 2022-09-23 RX ORDER — AMITRIPTYLINE HYDROCHLORIDE 10 MG/1
10 TABLET, FILM COATED ORAL NIGHTLY PRN
Qty: 30 TABLET | Refills: 5 | Status: SHIPPED | OUTPATIENT
Start: 2022-09-23 | End: 2022-12-16

## 2022-09-23 NOTE — PROGRESS NOTES
Subjective:      Patient ID: Macario Dior Jr. is a 72 y.o. male.    Chief Complaint: Low-back Pain, Hip Pain (right), and Leg Pain (Right leg weak)    Referred by: No ref. provider found   Interval History 9/23/2022:  The Pt presents for delayed follow He has continued lumbar complaints but focal right hip pain that continues. His R hip pain has continued and he was unaware he was supposed to see Dr Sinha today to address this. He can continue to see us at any time but will reach out to Dr Sinha's office to get him continued care regarding his greatest pain generator at this time.     Interval History 2/18/2021:  The patient presents for follow up of diffuse pain complaints. Pt canceled prior Hip block. State he states lyrica increase to 100mg has helped but neuropathy but also states he is also having stent placed to R leg where pain is greater due to insuffiencey. He continues to take Oxycodone QID. He denies any adverse SE of medications. Denies new areas of pain.      Interval History 12/4/2020:  Pt presents for follow up of lower back pain. He continues to have benefit from RFA of right lower lumbar. He is doing well with med mgt of oxycodone and lyrica. He states night time leg cramping. The patient denies myelopathic symptoms such as handwriting changes or difficulty with buttons/coins/keys. Denies perineal paresthesias, bowel/bladder dysfunction.      Interval History 10/20/2020:  The patient presents follow-up for lower back pain.  He has had near resolution since right-sided L 3, 4, 5 RFA.  He states he is doing well.  He continues to take minimum the medication to address pain.  He is currently taking oxycodone 5 mg q.i.d. as well as Lyrica 75 b.i.d..  He finds he is beneficial without adverse side effects.The patient denies myelopathic symptoms such as handwriting changes or difficulty with buttons/coins/keys. Denies perineal paresthesias, bowel/bladder dysfunction.    Interval History 8/11/2020:  The  "patient presents for follow up of lower back pain. Pt over interval had 2 MBB of right L3,4,5 with >90% relief of both procedures. Pt states + a.m. stiffness, states when he has MBB of lumbar he notices his hip pain more and vise versa when he has hip block. Lumbar pain>R hip pain at this time. He continues to take oxycodone 5mg QID which is beneficial to pain control without adverse side effects. The patient denies myelopathic symptoms such as handwriting changes or difficulty with buttons/coins/keys. Denies perineal paresthesias, bowel/bladder dysfunction.        Interval History 05/27/2020:  S/P Right Femoral / Obturator Nerve Block in 10/2019 -- 50% relief for 2 days --  lost to follow up after that  S/p Right DHRUV 08/2015 -- doing well for 1.5 years  Pain is located at right hip joint. Occasionally radiates to right thigh.   Aggravated standing, walking, laying down.   Alleviated by Oxycodone      HPI:  Macario Dior is a 69 y.o. Man with PMH DVT s/p right DHRUV that presents today as a new consult for low back pain.  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 Since 2015 DHRUV, he initially had improved but progressive pain in the right hip and low back.  In the last 6 months, he has had 4 back injections from Dr. De Los Santos that lasted for 2 weeks each.  1 month ago, he had a Lumbar ABIODUN that provided 100% relief that lasted 5 days for the low back but the pain changed to radiate to the right hip.  Since the ABIODUN relief stopped, the pain has "worked its way back up to the low back."  The pain is described as intense, sharp, and shoots up from the right hip into the low back to the right periscapular area. The shooting pain is worse than the low back pain. The pain is interfering with everything, including walking, sitting, and putting on his shoes.  Oxycodone 10mg Q6H provides relief. Lidocaine patches and TENS provide minimal relief when he wakes up.  He did physical therapy for 1 year with minimal " "relief.  He is unable to regularly exercise secondary to pain. He is radha to get 4 hours of uninterrupted sleep per night.  He wakes up with stiffness in his low back despite $4,000 mattresses.  He has weakness and buckling in the right lower extremity.  He endorses worsening falls due to weakness "seems like the leg is not there." He says " I need the bone removed" referring to his right hip. [facility] is a  [facility] He denies incontinence and saddle anesthesia.  Medications:    Taking Oxycodone 5 mg QID -- with relief  Took Gabapentin with relief in the past but had adverse effects.  Never tried Lyrica     Interventional Pain History  Lumbar ABIODUN 100% relief 5 days - Dr. De Los Santos   Lumbar TPIs 0% relief - Dr. De Los Santos   Right Femoral / Obturator Nerve Block - 10/2019  7/6/2020 Right L3,4,5 RFA  7/30/2020 Right L3,4,5 RFA    Imaging:  LUMBAR MRI 04/2020  T12-L1: No significant spinal canal stenosis or neural foraminal narrowing.    L1-L2:  No significant spinal canal stenosis or neural foraminal narrowing.    L2-L3: Mild diffuse posterior disc bulge and bilateral facet arthropathy contribute to mild bilateral neural foraminal narrowing.  No significant spinal canal stenosis.    L3-L4: Mild diffuse posterior disc bulge, buckling of the ligamentum flavum, and bilateral facet arthropathy contribute moderate right/mild left neural foraminal narrowing.  No significant spinal canal stenosis.    L4-L5: Diffuse posterior disc bulge, buckling of the ligamentum flavum, and bilateral facet arthropathy contribute to mild effacement of the thecal sac without significant canal stenosis, however there is moderate right, mild left neural foraminal narrowing.    L5-S1: Mild diffuse posterior disc bulge and bilateral facet arthropathy contribute to mild bilateral neural foraminal narrowing.    Paraspinous soft tissues: Atrophic right kidney.  Few, poorly visualized T2 hyperintense foci at the left kidney " possibly representing cysts.    Past Medical History:   Diagnosis Date    Arthritis     Asthma     chronic    CKD (chronic kidney disease), stage III     patient denies    Demyelinating neuropathy     polyneuropathy    Gout     History of blood clots 2013    lower part of both legs    HLD (hyperlipidemia)     Hypertension     Iron deficiency anemia     BUZZ (obstructive sleep apnea)     CPAP       Past Surgical History:   Procedure Laterality Date    COLONOSCOPY N/A 11/7/2016    Procedure: COLONOSCOPY;  Surgeon: Shar Weiss MD;  Location: [facility] ENDO (4TH FLR);  Service: Endoscopy;  Laterality: N/A;  [facility] Referral. Okay to hold Coumadin 5 days prior to procedure. See Referral scaned in media tab on 10/20/16.    COLONOSCOPY N/A 9/21/2017    Procedure: COLONOSCOPY;  Surgeon: Stuart Trinidad MD;  Location: [facility] ENDO (4TH FLR);  Service: Endoscopy;  Laterality: N/A;  [facility] Olivarez-see scanned docs under media tab          9/6/17-current [facility] authorization and last clinic visit scanned into chart    EPIDURAL STEROID INJECTION N/A 12/17/2020    Procedure: INJECTION, STEROID, EPIDURAL, L5-S1 clear to hold Xarelto 2 days;  Surgeon: Moy Vasquez MD;  Location: [facility] PAIN MGT;  Service: Pain Management;  Laterality: N/A;    HIP SURGERY Right 2008    exploratory at [facility]    HIP SURGERY Right 8-31-15    THR    INJECTION OF ANESTHETIC AGENT AROUND NERVE Right 10/23/2019    Procedure: BLOCK, NERVE, Obturator and Femoral cleared to hold xarelto 3 days pt. said he's not taking coumadin;  Surgeon: Moy Vasquez MD;  Location: [facility] PAIN MGT;  Service: Pain Management;  Laterality: Right;    INJECTION OF ANESTHETIC AGENT AROUND NERVE Right 7/6/2020    Procedure: BLOCK, NERVE, RIGHT MBB L3,L5,L5;  Surgeon: Moy Vasquez MD;  Location: [facility] PAIN MGT;  Service: Pain Management;  Laterality: Right;  BLOCK, NERVE, RIGHT MBB L3,L5,L5    INJECTION OF ANESTHETIC AGENT AROUND NERVE Right 7/30/2020    Procedure:  BLOCK, NERVE RIGHT L3, L4, L5 2ND;  Surgeon: Moy Vasquez MD;  Location: [facility] PAIN MGT;  Service: Pain Management;  Laterality: Right;  NEEDS CONSENT, XARELTO    LEG SURGERY Left 2012    fibula and tibia    RADIOFREQUENCY ABLATION Right 10/5/2020    Procedure: RADIOFREQUENCY ABLATION RIGHT L3,4,5;  Surgeon: Moy Vasquez MD;  Location: [facility] PAIN MGT;  Service: Pain Management;  Laterality: Right;  RADIOFREQUENCY ABLATION RIGHT L3,4,5  ok to hold xarelto, scanned in Media    SHOULDER SURGERY Right 1989    torn ligament       Review of patient's allergies indicates:   Allergen Reactions    Amlodipine Swelling    Lisinopril Tinitus and Swelling    Pregabalin Swelling    Azithromycin      Muscles tense up    Levetiracetam Other (See Comments)    Acetaminophen Rash     Feels like needles are sticking him per pt       Current Outpatient Medications   Medication Sig Dispense Refill    (Magic mouthwash) 1:1:1 Benadryl 12.5mg/5ml liq, aluminum & magnesium hydroxide-simehticone (Maalox), lidocaine viscous 2% Swish and spit 10 mLs every 4 (four) hours as needed. for mouth sores 1 Bottle 0    albuterol (PROVENTIL) 2.5 mg /3 mL (0.083 %) nebulizer solution Take 2.5 mg by nebulization every 6 (six) hours as needed for Wheezing.      albuterol 90 mcg/actuation inhaler Inhale 2 puffs into the lungs every 6 (six) hours as needed for Wheezing.       budesonide-formoterol 160-4.5 mcg (SYMBICORT) 160-4.5 mcg/actuation HFAA Inhale 2 puffs into the lungs every 12 (twelve) hours.       camphor-menthol 0.5-0.5% (SARNA) lotion APPLY MODERATE AMOUNT TOPICALLY TWICE A DAY      carboxymethyl-gly-cnrk29-QB 0.5-1-0.5 % Dpet Place 1 drop into both eyes 4 (four) times daily.      carboxymethylcellulose sodium 1 % DpGe Place 0.4 mLs into both eyes 4 (four) times daily as needed.      carvedilol (COREG) 25 MG tablet Take 25 mg by mouth 2 (two) times daily.      chlorhexidine (PERIDEX) 0.12 % solution RINSE 1 CAPFUL BY MOUTH ONCE DAILY AS NEEDED SWISH  FOR 30 SECONDS AFTER,AND SPIT OUT      chlorthalidone (HYGROTEN) 25 MG Tab TAKE ONE TABLET BY MOUTH ONCE DAILY FOR HIGH BLOOD PRESSURE.      cyanocobalamin (VITAMIN B-12) 100 MCG tablet Take 100 mcg by mouth once daily. Take two tablets      cycloSPORINE (RESTASIS) 0.05 % ophthalmic emulsion INSTILL 0.4ML IN EACH EYE EVERY 12 HOURS      ergocalciferol (ERGOCALCIFEROL) 50,000 unit Cap Take 50,000 Units by mouth.      ketoconazole (NIZORAL) 2 % cream Apply topically 2 (two) times daily.      latanoprost 0.005 % ophthalmic solution Place 1 drop into both eyes every evening.      lidocaine (LIDODERM) 5 % Place 1 patch onto the skin once daily. Wear for 12 hours, then remove. Do not apply a new patch for at least 12 hours.      mineral oil/petrolatum,white (WHITE PETROLATUM-MINERAL OIL OPHT) APPLY THIN RIBBON TO EACH EYE AT BEDTIME FOR DRY EYES      oxycodone (OXY-IR) 5 mg Cap Take 2 capsules (10 mg total) by mouth every 6 (six) hours as needed. 120 capsule 0    rivaroxaban (XARELTO) 20 mg Tab Take 20 mg by mouth.       valsartan (DIOVAN) 160 MG tablet Take 160 mg by mouth once daily.      amitriptyline (ELAVIL) 10 MG tablet Take 1 tablet (10 mg total) by mouth nightly as needed for Insomnia. 30 tablet 5    amlodipine (NORVASC) 10 MG tablet Take 10 mg by mouth once daily.      aspirin (ECOTRIN) 81 MG EC tablet Take 1 tablet (81 mg total) by mouth once daily. (Patient not taking: Reported on 9/23/2022)      atorvastatin (LIPITOR) 80 MG tablet Take 80 mg by mouth once daily. Take one-half tablet daily      baclofen (LIORESAL) 10 MG tablet Take 1 tablet (10 mg total) by mouth 3 (three) times daily. Take 1/2 pill to One full pill as needed for muscle spasms 90 tablet 1    CLOBETASOL PROPIONATE, BULK, MISC Apply topically once daily.      cloNIDine 0.1 mg/24 hr td ptwk (CATAPRES) 0.1 mg/24 hr Place 1 patch onto the skin every 7 days.      finasteride (PROSCAR) 5 mg tablet TAKE ONE TABLET BY MOUTH ONCE DAILY FOR PROSTATE       hydrALAZINE (APRESOLINE) 50 MG tablet TAKE ONE TABLET BY MOUTH THREE TIMES A DAY FOR HEART/BLOOD PRESSURE      levETIRAcetam (KEPPRA) 500 MG Tab Take 1 tablet (500 mg total) by mouth nightly. 30 tablet 1    loratadine (CLARITIN) 10 mg tablet TAKE ONE TABLET BY MOUTH ONCE DAILY FOR ALLERGIES BEFORE MORNING DOSE APIXABAN, FOR APIXABAN INDUCED ITCHING      pregabalin (LYRICA) 100 MG capsule Take 1 capsule (100 mg total) by mouth 3 (three) times daily. 90 capsule 2    tiotropium (SPIRIVA) 18 mcg inhalation capsule Inhale 18 mcg into the lungs once daily. Controller       No current facility-administered medications for this visit.     Facility-Administered Medications Ordered in Other Visits   Medication Dose Route Frequency Provider Last Rate Last Admin    ALPRAZolam tablet 0.5 mg  0.5 mg Oral On Call Procedure Lisha Cote MD           Family History   Problem Relation Age of Onset    Cancer Mother         ovarian    Cancer Sister         breast    Cancer Sister         breast       Social History     Socioeconomic History    Marital status: Single   Tobacco Use    Smoking status: Former     Types: Cigars     Quit date: 2009     Years since quittin.1    Smokeless tobacco: Never   Substance and Sexual Activity    Alcohol use: No     Alcohol/week: 0.0 standard drinks    Drug use: No    Sexual activity: Not Currently           Review of Systems   Constitution: Positive for weight loss. Negative for chills, decreased appetite, fever and night sweats.   Eyes: Negative for double vision, vision loss in left eye, vision loss in right eye and visual disturbance.   Cardiovascular: Negative for chest pain.   Respiratory: Negative for shortness of breath.    Hematologic/Lymphatic: Does not bruise/bleed easily.        Coumadin 5mg   Skin: Negative for rash.   Musculoskeletal: Positive for back pain, falls, muscle cramps, muscle weakness and stiffness. Negative for joint swelling, myalgias and neck pain.  "  Gastrointestinal: Negative for abdominal pain, constipation and diarrhea.   Genitourinary: Negative for bladder incontinence.   Neurological: Positive for focal weakness, headaches and numbness. Negative for paresthesias and weakness.   Psychiatric/Behavioral: Negative for altered mental status, depression and suicidal ideas. The patient is not nervous/anxious.            Objective:   Temp 97.4 °F (36.3 °C) (Oral)   Resp 18   Ht 6' 1" (1.854 m)   Wt 102.1 kg (225 lb)   BMI 29.69 kg/m²   Pain Disability Index Review:  Last 3 PDI Scores 9/23/2022 2/18/2021 12/31/2020   Pain Disability Index (PDI) 40 60 40     GEN:  Well developed, well nourished.  No acute distress.   HEENT:  No trauma.  Mucous membranes moist.  Nares patent bilaterally.  PSYCH: Normal affect. Thought content appropriate.  CHEST:  Breathing symmetric.  No audible wheezing.  ABD: Soft, non-distended.  SKIN:  Warm, pink, dry.  No rash on exposed areas.    EXT:  No cyanosis, clubbing, or edema.  No color change or changes in nail or hair growth.  NEURO/MUSCULOSKELETAL:  Fully alert, oriented, and appropriate. Speech normal eliceo. No cranial nerve deficits.   Gait: Antalgic.  No focal motor deficits.         Assessment:       Encounter Diagnoses   Name Primary?    Osteoarthritis of spine with radiculopathy, lumbar region Yes    Chronic pain syndrome     Lumbar radiculopathy, right     Avascular necrosis of femoral head, unspecified laterality          Plan:   We discussed with the patient the assessment and recommendations. The following is the plan we agreed on:        Macario was seen today for low-back pain, hip pain and leg pain.    Diagnoses and all orders for this visit:    Osteoarthritis of spine with radiculopathy, lumbar region    Chronic pain syndrome    Lumbar radiculopathy, right    Avascular necrosis of femoral head, unspecified laterality    Other orders  -     Discontinue: amitriptyline (ELAVIL) 25 MG tablet; Take 1 tablet (25 mg " total) by mouth nightly as needed for Insomnia.  -     Discontinue: amitriptyline (ELAVIL) 10 MG tablet; Take 1 tablet (10 mg total) by mouth nightly as needed for Insomnia.  -     amitriptyline (ELAVIL) 10 MG tablet; Take 1 tablet (10 mg total) by mouth nightly as needed for Insomnia.       -Prior records reviewed  - He has had delayed FU and continued diffuse pain.   - Greatest pain generator at this time is his hip and he was unaware he was scheduled to see Dr Sinha today  - Will reach out to Dr Sinha's office to coordinate rescheduling.   - Can return at any time, at one time he was s/f femoral/obturator block but was canceled, can consider this again.   - Can continue medications prescirbed  - Refilled Elavil 10mg for him today   - Patient is on Xarelto --  Hold  Xarelto 2 days prior to procedure due to bleeding risk for any procedures   - RTC PRN    Florentino Calderon,  9/23/2022      I spent a total of 30 minutes on the day of the visit.  This includes face to face time and non-face to face time preparing to see the patient by reviewing previous labs/imaging, obtaining and/or reviewing separately obtained history, documenting clinical information in the electronic or other health record, independently interpreting results and communicating results to the patient/family/caregiver.

## 2022-09-23 NOTE — TELEPHONE ENCOUNTER
Appt has already been rescheduled    ----- Message from Florentino Calderon NP sent at 9/23/2022  2:26 PM CDT -----  Good Afternoon. I have Mr Dior here in our office and he was unaware he had an apt with Dr Sinha today at 2:15. Can you please reschedule him with Dr Sinha. Thanks, Florentino

## 2022-09-26 ENCOUNTER — TELEPHONE (OUTPATIENT)
Dept: ORTHOPEDICS | Facility: CLINIC | Age: 72
End: 2022-09-26
Payer: OTHER GOVERNMENT

## 2022-09-27 ENCOUNTER — OFFICE VISIT (OUTPATIENT)
Dept: ORTHOPEDICS | Facility: CLINIC | Age: 72
End: 2022-09-27
Payer: OTHER GOVERNMENT

## 2022-09-27 VITALS — BODY MASS INDEX: 29.83 KG/M2 | WEIGHT: 225.06 LBS | HEIGHT: 73 IN

## 2022-09-27 DIAGNOSIS — M61.50 POSTOPERATIVE HETEROTOPIC OSSIFICATION: ICD-10-CM

## 2022-09-27 DIAGNOSIS — M96.89 POSTOPERATIVE HETEROTOPIC OSSIFICATION: ICD-10-CM

## 2022-09-27 DIAGNOSIS — Z96.641 STATUS POST RIGHT HIP REPLACEMENT: Primary | ICD-10-CM

## 2022-09-27 PROCEDURE — 99213 OFFICE O/P EST LOW 20 MIN: CPT | Mod: S$PBB,,, | Performed by: ORTHOPAEDIC SURGERY

## 2022-09-27 PROCEDURE — 99213 PR OFFICE/OUTPT VISIT, EST, LEVL III, 20-29 MIN: ICD-10-PCS | Mod: S$PBB,,, | Performed by: ORTHOPAEDIC SURGERY

## 2022-09-27 PROCEDURE — 99999 PR PBB SHADOW E&M-EST. PATIENT-LVL IV: ICD-10-PCS | Mod: PBBFAC,,, | Performed by: ORTHOPAEDIC SURGERY

## 2022-09-27 PROCEDURE — 99999 PR PBB SHADOW E&M-EST. PATIENT-LVL IV: CPT | Mod: PBBFAC,,, | Performed by: ORTHOPAEDIC SURGERY

## 2022-09-27 PROCEDURE — 99214 OFFICE O/P EST MOD 30 MIN: CPT | Mod: PBBFAC | Performed by: ORTHOPAEDIC SURGERY

## 2022-10-01 PROBLEM — M96.89 POSTOPERATIVE HETEROTOPIC OSSIFICATION: Status: ACTIVE | Noted: 2022-10-01

## 2022-10-01 PROBLEM — M61.50 POSTOPERATIVE HETEROTOPIC OSSIFICATION: Status: ACTIVE | Noted: 2022-10-01

## 2022-10-01 NOTE — PROGRESS NOTES
Subjective:      Patient ID: Macario Dior Jr. is a 72 y.o. male.    Chief Complaint:   Pain of the Right Hip    HPI  Macario Dior Jr. is a 72 y.o. male with pmh significant for CKD 1.5/55, DVT on Xarelto, gout, hattie, htn seen in clinic today with a chief complaint of atraumatic right hip pain x 3 years. Pt had R DHRUV by Dr. [facility] in 2015 for AVN. Pain is located in groin and c-distribution. He c/o difficulty getting on shoes and socks secondary to stiffness and pain in hip. Pain is constant but worse with movement. He would like to play with grandchildren but cannot due to pain. He also has chronic LBP with neuropathy and has been followed by pain management. Has had several ESIs and nerve ablation. He was recently seen by ortho spine NP and MRI ordered. Pt takes oxycodone four times a day. He has also tried PT which made pain worse. Pt denies fevers/chills, recent infection. He uses cane for ambulation. Has difficulty walking more than 2 blocks.       Objective:        Ortho/SPM Exam  Positive C sign, positive Stinchfield sign.  No tenderness at the greater trochanter or iliotibial band.  No tenderness at the SI joint.  The patient has pain in the groin with passive flexion and internal rotation of the hip which is very limited.  Knee benign without tenderness or effusion, with normal range of motion.  Skin intact.  Distal neurovascular intact.  Flip test negative.          IMAGING:  X-rays show grade 4 heterotopic ossification lateral to the hip joint.  Assessment:   Heterotopic ossification, postoperative, right DHRUV      Plan:   Excision of heterotopic bone.  The surgical process of HO excision was discussed in detail with the patient including a detailed discussion of the procedure itself (including visual model, x-ray review). The typical perioperative and postoperative course was discussed and perioperative risks were discussed to the patient's satisfaction.  Risks and complications discussed included but  were not limited to the risks of anesthetic complications, infection, bleeding, wound healing complications, instability,neurologic dysfunction including numbness,  DVT, pulmonary embolism, perioperative medical risks (cardiac, pulmonary, renal, neurologic), and death and the patient elects to proceed. We will initiate pre-operative medical evaluation and clearance and set a provisional date for surgical intervention according to the patient's schedule.  The patient expressed understanding and would like to proceed with surgery.       The patient's pathophysiology was explained in detail with reference to x-rays, models, other visual aids as appropriate.  Treatment options were discussed in detail.  Questions were invited and answered to the patient's satisfaction.      Prieto Sinha MD  Orthopedic Surgery

## 2022-10-14 ENCOUNTER — TELEPHONE (OUTPATIENT)
Dept: SPINE | Facility: CLINIC | Age: 72
End: 2022-10-14
Payer: OTHER GOVERNMENT

## 2022-10-14 NOTE — TELEPHONE ENCOUNTER
----- Message from Minerva Terrell sent at 10/14/2022 10:51 AM CDT -----  Regarding: injection  Name of Who is Calling:  ANTELMO FORBES JR. [[id number]]        What is the request in detail: Pt would like to schedule an appt to get an injection. Please assist   He would like to try to get scheduled between 10/17-20 bc [place] be going [place]. Please assist.         Can the clinic reply by [facility] no:          What Number to Call Back if not in [facility]:[phone]

## 2022-10-17 ENCOUNTER — TELEPHONE (OUTPATIENT)
Dept: PAIN MEDICINE | Facility: CLINIC | Age: 72
End: 2022-10-17
Payer: OTHER GOVERNMENT

## 2022-10-17 NOTE — TELEPHONE ENCOUNTER
----- Message from Lucien Sparks sent at 10/17/2022  1:15 PM CDT -----    Name of Who is Calling: ANTELMO FORBES JR. [[id number]]      What is the request in detail: Pt is requesting a call back because he hasn't heard anything back from office about his epidural. [place] He is trying to get this done by the end of the week.       Can the clinic reply by [facility]: yes      What Number to Call Back if not in [facility]: [phone]

## 2022-10-17 NOTE — TELEPHONE ENCOUNTER
Patient is requesting to be scheduled for a procedure but there are no orders.   Last treatment plan as follows:  Plan:   We discussed with the patient the assessment and recommendations. The following is the plan we agreed on:         Macario was seen today for low-back pain, hip pain and leg pain.     Diagnoses and all orders for this visit:     Osteoarthritis of spine with radiculopathy, lumbar region     Chronic pain syndrome     Lumbar radiculopathy, right     Avascular necrosis of femoral head, unspecified laterality

## 2022-11-02 DIAGNOSIS — M89.8X9 HETEROTOPIC OSSIFICATION OF BONE: Primary | ICD-10-CM

## 2022-11-02 DIAGNOSIS — Z96.641 HISTORY OF TOTAL HIP ARTHROPLASTY, RIGHT: ICD-10-CM

## 2022-11-14 ENCOUNTER — TELEPHONE (OUTPATIENT)
Dept: ORTHOPEDICS | Facility: CLINIC | Age: 72
End: 2022-11-14
Payer: OTHER GOVERNMENT

## 2022-11-14 NOTE — TELEPHONE ENCOUNTER
I called the patient today regarding his surgery. I left a message for the patient to call me back. I left my name and phone number.

## 2022-11-22 ENCOUNTER — OFFICE VISIT (OUTPATIENT)
Dept: PAIN MEDICINE | Facility: CLINIC | Age: 72
End: 2022-11-22
Payer: OTHER GOVERNMENT

## 2022-11-22 VITALS
WEIGHT: 255 LBS | HEART RATE: 77 BPM | DIASTOLIC BLOOD PRESSURE: 71 MMHG | RESPIRATION RATE: 20 BRPM | BODY MASS INDEX: 33.8 KG/M2 | HEIGHT: 73 IN | SYSTOLIC BLOOD PRESSURE: 122 MMHG | TEMPERATURE: 98 F

## 2022-11-22 DIAGNOSIS — M25.551 RIGHT HIP PAIN: ICD-10-CM

## 2022-11-22 DIAGNOSIS — G89.4 CHRONIC PAIN SYNDROME: ICD-10-CM

## 2022-11-22 DIAGNOSIS — M87.059 AVASCULAR NECROSIS OF FEMORAL HEAD, UNSPECIFIED LATERALITY: ICD-10-CM

## 2022-11-22 DIAGNOSIS — M47.26 OSTEOARTHRITIS OF SPINE WITH RADICULOPATHY, LUMBAR REGION: ICD-10-CM

## 2022-11-22 DIAGNOSIS — M79.18 MYOFASCIAL PAIN ON RIGHT SIDE: Primary | ICD-10-CM

## 2022-11-22 PROCEDURE — 99214 OFFICE O/P EST MOD 30 MIN: CPT | Mod: 25,S$PBB,, | Performed by: NURSE PRACTITIONER

## 2022-11-22 PROCEDURE — 99215 OFFICE O/P EST HI 40 MIN: CPT | Mod: PBBFAC,25 | Performed by: NURSE PRACTITIONER

## 2022-11-22 PROCEDURE — 20553 NJX 1/MLT TRIGGER POINTS 3/>: CPT | Mod: PBBFAC | Performed by: NURSE PRACTITIONER

## 2022-11-22 PROCEDURE — 99999 PR PBB SHADOW E&M-EST. PATIENT-LVL V: ICD-10-PCS | Mod: PBBFAC,,, | Performed by: NURSE PRACTITIONER

## 2022-11-22 PROCEDURE — 99999 PR PBB SHADOW E&M-EST. PATIENT-LVL V: CPT | Mod: PBBFAC,,, | Performed by: NURSE PRACTITIONER

## 2022-11-22 PROCEDURE — 99214 PR OFFICE/OUTPT VISIT, EST, LEVL IV, 30-39 MIN: ICD-10-PCS | Mod: 25,S$PBB,, | Performed by: NURSE PRACTITIONER

## 2022-11-22 PROCEDURE — 20553 NJX 1/MLT TRIGGER POINTS 3/>: CPT | Mod: S$PBB,,, | Performed by: NURSE PRACTITIONER

## 2022-11-22 PROCEDURE — 20553 PR INJECT TRIGGER POINTS, > 3: ICD-10-PCS | Mod: S$PBB,,, | Performed by: NURSE PRACTITIONER

## 2022-11-22 RX ORDER — BUPIVACAINE HYDROCHLORIDE 2.5 MG/ML
9 INJECTION, SOLUTION EPIDURAL; INFILTRATION; INTRACAUDAL
Status: COMPLETED | OUTPATIENT
Start: 2022-11-22 | End: 2022-11-22

## 2022-11-22 RX ORDER — DEXAMETHASONE SODIUM PHOSPHATE 4 MG/ML
4 INJECTION, SOLUTION INTRA-ARTICULAR; INTRALESIONAL; INTRAMUSCULAR; INTRAVENOUS; SOFT TISSUE
Status: COMPLETED | OUTPATIENT
Start: 2022-11-22 | End: 2022-11-22

## 2022-11-22 RX ADMIN — DEXAMETHASONE SODIUM PHOSPHATE 4 MG: 4 INJECTION, SOLUTION INTRAMUSCULAR; INTRAVENOUS at 12:11

## 2022-11-22 RX ADMIN — BUPIVACAINE HYDROCHLORIDE 22.5 MG: 2.5 INJECTION, SOLUTION EPIDURAL; INFILTRATION; INTRACAUDAL; PERINEURAL at 12:11

## 2022-11-22 NOTE — PROGRESS NOTES
Subjective:      Patient ID: Macario Dior Jr. is a 72 y.o. male.    Chief Complaint: Low-back Pain    Referred by: No ref. provider found     Interval History 11/22/2022:  Mr Dior presents for follow up evaluation of chronic R hip pain and awaiting surgery. He is having acute R sided lumbar myofascial pain and requesting TPI to address symptoms as this has provided benefit in past and will be going out of town. He has no s/s concerning for cauda equina syndrome. He denies SE of his medication regimen.     Interval History 9/23/2022:  The Pt presents for delayed follow He has continued lumbar complaints but focal right hip pain that continues. His R hip pain has continued and he was unaware he was supposed to see Dr Sinha today to address this. He can continue to see us at any time but will reach out to Dr Sinha's office to get him continued care regarding his greatest pain generator at this time.     Interval History 2/18/2021:  The patient presents for follow up of diffuse pain complaints. Pt canceled prior Hip block. State he states lyrica increase to 100mg has helped but neuropathy but also states he is also having stent placed to R leg where pain is greater due to insuffiencey. He continues to take Oxycodone QID. He denies any adverse SE of medications. Denies new areas of pain.      Interval History 12/4/2020:  Pt presents for follow up of lower back pain. He continues to have benefit from RFA of right lower lumbar. He is doing well with med mgt of oxycodone and lyrica. He states night time leg cramping. The patient denies myelopathic symptoms such as handwriting changes or difficulty with buttons/coins/keys. Denies perineal paresthesias, bowel/bladder dysfunction.      Interval History 10/20/2020:  The patient presents follow-up for lower back pain.  He has had near resolution since right-sided L 3, 4, 5 RFA.  He states he is doing well.  He continues to take minimum the medication to address pain.  He is  "currently taking oxycodone 5 mg q.i.d. as well as Lyrica 75 b.i.d..  He finds he is beneficial without adverse side effects.The patient denies myelopathic symptoms such as handwriting changes or difficulty with buttons/coins/keys. Denies perineal paresthesias, bowel/bladder dysfunction.    Interval History 8/11/2020:  The patient presents for follow up of lower back pain. Pt over interval had 2 MBB of right L3,4,5 with >90% relief of both procedures. Pt states + a.m. stiffness, states when he has MBB of lumbar he notices his hip pain more and vise versa when he has hip block. Lumbar pain>R hip pain at this time. He continues to take oxycodone 5mg QID which is beneficial to pain control without adverse side effects. The patient denies myelopathic symptoms such as handwriting changes or difficulty with buttons/coins/keys. Denies perineal paresthesias, bowel/bladder dysfunction.        Interval History 05/27/2020:  S/P Right Femoral / Obturator Nerve Block in 10/2019 -- 50% relief for 2 days --  lost to follow up after that  S/p Right DHRUV 08/2015 -- doing well for 1.5 years  Pain is located at right hip joint. Occasionally radiates to right thigh.   Aggravated standing, walking, laying down.   Alleviated by Oxycodone      HPI:  Macario Dior is a 69 y.o. Man with PMH DVT s/p right DHRUV that presents today as a new consult for low back pain.  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 Since 2015 DHRUV, he initially had improved but progressive pain in the right hip and low back.  In the last 6 months, he has had 4 back injections from Dr. De Los Santos that lasted for 2 weeks each.  1 month ago, he had a Lumbar ABIODUN that provided 100% relief that lasted 5 days for the low back but the pain changed to radiate to the right hip.  Since the ABIODUN relief stopped, the pain has "worked its way back up to the low back."  The pain is described as intense, sharp, and shoots up from the right hip into the low back to the " "right periscapular area. The shooting pain is worse than the low back pain. The pain is interfering with everything, including walking, sitting, and putting on his shoes.  Oxycodone 10mg Q6H provides relief. Lidocaine patches and TENS provide minimal relief when he wakes up.  He did physical therapy for 1 year with minimal relief.  He is unable to regularly exercise secondary to pain. He is radha to get 4 hours of uninterrupted sleep per night.  He wakes up with stiffness in his low back despite $4,000 mattresses.  He has weakness and buckling in the right lower extremity.  He endorses worsening falls due to weakness "seems like the leg is not there." He says " I need the bone removed" referring to his right hip. [facility] is a  [facility] He denies incontinence and saddle anesthesia.  Medications:    Taking Oxycodone 5 mg QID -- with relief  Took Gabapentin with relief in the past but had adverse effects.  Never tried Lyrica     Interventional Pain History  Lumbar ABIODUN 100% relief 5 days - Dr. De Los Santos   Lumbar TPIs 0% relief - Dr. De Los Santos   Right Femoral / Obturator Nerve Block - 10/2019  7/6/2020 Right L3,4,5 RFA  7/30/2020 Right L3,4,5 RFA    Imaging:  LUMBAR MRI 04/2020  T12-L1: No significant spinal canal stenosis or neural foraminal narrowing.    L1-L2:  No significant spinal canal stenosis or neural foraminal narrowing.    L2-L3: Mild diffuse posterior disc bulge and bilateral facet arthropathy contribute to mild bilateral neural foraminal narrowing.  No significant spinal canal stenosis.    L3-L4: Mild diffuse posterior disc bulge, buckling of the ligamentum flavum, and bilateral facet arthropathy contribute moderate right/mild left neural foraminal narrowing.  No significant spinal canal stenosis.    L4-L5: Diffuse posterior disc bulge, buckling of the ligamentum flavum, and bilateral facet arthropathy contribute to mild effacement of the thecal sac without significant canal " stenosis, however there is moderate right, mild left neural foraminal narrowing.    L5-S1: Mild diffuse posterior disc bulge and bilateral facet arthropathy contribute to mild bilateral neural foraminal narrowing.    Paraspinous soft tissues: Atrophic right kidney.  Few, poorly visualized T2 hyperintense foci at the left kidney possibly representing cysts.    Past Medical History:   Diagnosis Date    Arthritis     Asthma     chronic    CKD (chronic kidney disease), stage III     patient denies    Demyelinating neuropathy     polyneuropathy    Gout     History of blood clots 2013    lower part of both legs    HLD (hyperlipidemia)     Hypertension     Iron deficiency anemia     BUZZ (obstructive sleep apnea)     CPAP       Past Surgical History:   Procedure Laterality Date    COLONOSCOPY N/A 11/7/2016    Procedure: COLONOSCOPY;  Surgeon: Shar Weiss MD;  Location: [facility] ([address] [place] [address]);  Service: Endoscopy;  Laterality: N/A;  [facility] Referral. Okay to hold Coumadin 5 days prior to procedure. See Referral scaned in media tab on 10/20/16.    COLONOSCOPY N/A 9/21/2017    Procedure: COLONOSCOPY;  Surgeon: Stuart Trinidad MD;  Location: [facility] ([address] [place] [address]);  Service: Endoscopy;  Laterality: N/A;  [facility] Olivarez-see scanned docs under media tab          9/6/17-current [facility] authorization and last clinic visit scanned into chart    EPIDURAL STEROID INJECTION N/A 12/17/2020    Procedure: INJECTION, STEROID, EPIDURAL, L5-S1 clear to hold Xarelto 2 days;  Surgeon: Moy Vasquez MD;  Location: [facility];  Service: Pain Management;  Laterality: N/A;    HIP SURGERY Right 2008    exploratory at [facility]    HIP SURGERY Right 8-31-15    THR    INJECTION OF ANESTHETIC AGENT AROUND NERVE Right 10/23/2019    Procedure: BLOCK, NERVE, Obturator and Femoral cleared to hold xarelto 3 days pt. said he's not taking coumadin;  Surgeon: Moy Vasquez MD;  Location: [facility];  Service: Pain Management;   Laterality: Right;    INJECTION OF ANESTHETIC AGENT AROUND NERVE Right 7/6/2020    Procedure: BLOCK, NERVE, RIGHT MBB L3,L5,L5;  Surgeon: Moy Vasquez MD;  Location: [facility];  Service: Pain Management;  Laterality: Right;  BLOCK, NERVE, RIGHT MBB L3,L5,L5    INJECTION OF ANESTHETIC AGENT AROUND NERVE Right 7/30/2020    Procedure: BLOCK, NERVE RIGHT L3, L4, L5 2ND;  Surgeon: Moy Vasquez MD;  Location: [facility];  Service: Pain Management;  Laterality: Right;  NEEDS CONSENT, XARELTO    LEG SURGERY Left 2012    fibula and tibia    RADIOFREQUENCY ABLATION Right 10/5/2020    Procedure: RADIOFREQUENCY ABLATION RIGHT L3,4,5;  Surgeon: Moy Vasquez MD;  Location: [facility];  Service: Pain Management;  Laterality: Right;  RADIOFREQUENCY ABLATION RIGHT L3,4,5  ok to hold xarelto, scanned in Media    SHOULDER SURGERY Right 1989    torn ligament       Review of patient's allergies indicates:   Allergen Reactions    Amlodipine Swelling    Lisinopril Tinitus and Swelling    Pregabalin Swelling    Azithromycin      Muscles tense up    Levetiracetam Other (See Comments)    Acetaminophen Rash     Feels like needles are sticking him per pt       Current Outpatient Medications   Medication Sig Dispense Refill    (Magic mouthwash) 1:1:1 Benadryl 12.5mg/5ml liq, aluminum & magnesium hydroxide-simehticone (Maalox), lidocaine viscous 2% Swish and spit 10 mLs every 4 (four) hours as needed. for mouth sores 1 Bottle 0    albuterol (PROVENTIL) 2.5 mg /3 mL (0.083 %) nebulizer solution Take 2.5 mg by nebulization every 6 (six) hours as needed for Wheezing.      albuterol 90 mcg/actuation inhaler Inhale 2 puffs into the lungs every 6 (six) hours as needed for Wheezing.       amlodipine (NORVASC) 10 MG tablet Take 10 mg by mouth once daily.      atorvastatin (LIPITOR) 80 MG tablet Take 80 mg by mouth once daily. Take one-half tablet daily      carvedilol (COREG) 25 MG tablet Take 25 mg by mouth 2 (two) times daily.       chlorthalidone (HYGROTEN) 25 MG Tab TAKE ONE TABLET BY MOUTH ONCE DAILY FOR HIGH BLOOD PRESSURE.      oxycodone (OXY-IR) 5 mg Cap Take 2 capsules (10 mg total) by mouth every 6 (six) hours as needed. 120 capsule 0    rivaroxaban (XARELTO) 20 mg Tab Take 20 mg by mouth.       valsartan (DIOVAN) 160 MG tablet Take 160 mg by mouth once daily.      amitriptyline (ELAVIL) 10 MG tablet Take 1 tablet (10 mg total) by mouth nightly as needed for Insomnia. 30 tablet 5    aspirin (ECOTRIN) 81 MG EC tablet Take 1 tablet (81 mg total) by mouth once daily. (Patient not taking: Reported on 9/23/2022)      baclofen (LIORESAL) 10 MG tablet Take 1 tablet (10 mg total) by mouth 3 (three) times daily. Take 1/2 pill to One full pill as needed for muscle spasms 90 tablet 1    budesonide-formoterol 160-4.5 mcg (SYMBICORT) 160-4.5 mcg/actuation HFAA Inhale 2 puffs into the lungs every 12 (twelve) hours.       camphor-menthol 0.5-0.5% (SARNA) lotion APPLY MODERATE AMOUNT TOPICALLY TWICE A DAY      carboxymethyl-gly-bzxk99-QA 0.5-1-0.5 % Dpet Place 1 drop into both eyes 4 (four) times daily.      carboxymethylcellulose sodium 1 % DpGe Place 0.4 mLs into both eyes 4 (four) times daily as needed.      chlorhexidine (PERIDEX) 0.12 % solution RINSE 1 CAPFUL BY MOUTH ONCE DAILY AS NEEDED SWISH FOR 30 SECONDS AFTER,AND SPIT OUT      CLOBETASOL PROPIONATE, BULK, MISC Apply topically once daily.      cloNIDine 0.1 mg/24 hr td ptwk (CATAPRES) 0.1 mg/24 hr Place 1 patch onto the skin every 7 days.      cyanocobalamin (VITAMIN B-12) 100 MCG tablet Take 100 mcg by mouth once daily. Take two tablets      cycloSPORINE (RESTASIS) 0.05 % ophthalmic emulsion INSTILL 0.4ML IN EACH EYE EVERY 12 HOURS      ergocalciferol (ERGOCALCIFEROL) 50,000 unit Cap Take 50,000 Units by mouth.      finasteride (PROSCAR) 5 mg tablet TAKE ONE TABLET BY MOUTH ONCE DAILY FOR PROSTATE      hydrALAZINE (APRESOLINE) 50 MG tablet TAKE ONE TABLET BY MOUTH THREE TIMES A DAY FOR  HEART/BLOOD PRESSURE      ketoconazole (NIZORAL) 2 % cream Apply topically 2 (two) times daily.      latanoprost 0.005 % ophthalmic solution Place 1 drop into both eyes every evening.      levETIRAcetam (KEPPRA) 500 MG Tab Take 1 tablet (500 mg total) by mouth nightly. 30 tablet 1    lidocaine (LIDODERM) 5 % Place 1 patch onto the skin once daily. Wear for 12 hours, then remove. Do not apply a new patch for at least 12 hours.      loratadine (CLARITIN) 10 mg tablet TAKE ONE TABLET BY MOUTH ONCE DAILY FOR ALLERGIES BEFORE MORNING DOSE APIXABAN, FOR APIXABAN INDUCED ITCHING      mineral oil/petrolatum,white (WHITE PETROLATUM-MINERAL OIL OPHT) APPLY THIN RIBBON TO EACH EYE AT BEDTIME FOR DRY EYES      pregabalin (LYRICA) 100 MG capsule Take 1 capsule (100 mg total) by mouth 3 (three) times daily. 90 capsule 2    tiotropium (SPIRIVA) 18 mcg inhalation capsule Inhale 18 mcg into the lungs once daily. Controller       No current facility-administered medications for this visit.     Facility-Administered Medications Ordered in Other Visits   Medication Dose Route Frequency Provider Last Rate Last Admin    ALPRAZolam tablet 0.5 mg  0.5 mg Oral On Call Procedure Lisha Cote MD           Family History   Problem Relation Age of Onset    Cancer Mother         ovarian    Cancer Sister         breast    Cancer Sister         breast       Social History     Socioeconomic History    Marital status: Single   Tobacco Use    Smoking status: Former     Types: Cigars     Quit date: 2009     Years since quittin.2    Smokeless tobacco: Never   Substance and Sexual Activity    Alcohol use: No     Alcohol/week: 0.0 standard drinks    Drug use: No    Sexual activity: Not Currently           Review of Systems   Constitution: Positive for weight loss. Negative for chills, decreased appetite, fever and night sweats.   Eyes: Negative for double vision, vision loss in left eye, vision loss in right eye and visual disturbance.  "  Cardiovascular: Negative for chest pain.   Respiratory: Negative for shortness of breath.    Hematologic/Lymphatic: Does not bruise/bleed easily.        Coumadin 5mg   Skin: Negative for rash.   Musculoskeletal: Positive for back pain, falls, muscle cramps, muscle weakness and stiffness. Negative for joint swelling, myalgias and neck pain.   Gastrointestinal: Negative for abdominal pain, constipation and diarrhea.   Genitourinary: Negative for bladder incontinence.   Neurological: Positive for focal weakness, headaches and numbness. Negative for paresthesias and weakness.   Psychiatric/Behavioral: Negative for altered mental status, depression and suicidal ideas. The patient is not nervous/anxious.            Objective:   /71   Pulse 77   Temp 98.3 °F (36.8 °C) (Oral)   Resp 20   Ht 6' 1" (1.854 m)   Wt 115.7 kg (255 lb)   BMI 33.64 kg/m²   Pain Disability Index Review:  Last 3 PDI Scores 11/22/2022 9/23/2022 2/18/2021   Pain Disability Index (PDI) 55 40 60     GEN:  Well developed, well nourished.  No acute distress.   HEENT:  No trauma.  Mucous membranes moist.  Nares patent bilaterally.  PSYCH: Normal affect. Thought content appropriate.  CHEST:  Breathing symmetric.  No audible wheezing.  ABD: Soft, non-distended.  SKIN:  Warm, pink, dry.  No rash on exposed areas.    EXT:  No cyanosis, clubbing, or edema.  No color change or changes in nail or hair growth.  NEURO/MUSCULOSKELETAL:  Fully alert, oriented, and appropriate. Speech normal eliceo. No cranial nerve deficits.   Gait: Antalgic.  No focal motor deficits.         Assessment:       Encounter Diagnoses   Name Primary?    Myofascial pain on right side Yes    Avascular necrosis of femoral head, unspecified laterality     Osteoarthritis of spine with radiculopathy, lumbar region     Chronic pain syndrome     Right hip pain            Plan:   We discussed with the patient the assessment and recommendations. The following is the plan we agreed " on:        Macario was seen today for low-back pain.    Diagnoses and all orders for this visit:    Myofascial pain on right side    Avascular necrosis of femoral head, unspecified laterality    Osteoarthritis of spine with radiculopathy, lumbar region    Chronic pain syndrome    Right hip pain    Other orders  -     dexAMETHasone injection 4 mg  -     BUPivacaine (PF) 0.25% (2.5 mg/ml) injection 22.5 mg         -Prior records reviewed  - Pt having acute myofascial pain to R lumbar and requesting injection (TPI) to get him through trip he will be   - TPI done today to address acute pain  - Can return at any time, at one time he was s/f femoral/obturator block but was canceled, can consider this again.   - Can continue medications prescirbed   - Patient is on Xarelto --  Hold  Xarelto 2 days prior to procedure due to bleeding risk for any procedures   - RTC PRN    Florentino Calderon,  9/23/2022      I spent a total of 30 minutes on the day of the visit.  This includes face to face time and non-face to face time preparing to see the patient by reviewing previous labs/imaging, obtaining and/or reviewing separately obtained history, documenting clinical information in the electronic or other health record, independently interpreting results and communicating results to the patient/family/caregiver.    Trigger Point Injection:   The procedure was discussed with the patient including complications of nerve damage,  bleeding, infection, and failure of pain relief.   Trigger points were identified by palpation and marked. Alcohol prep of sites done. A mixture of 9mL 0.25% bupivacaine +4mg Dexamethasone was prepared (10 mL total).   A 27-gauge needle was advanced to the point of maximal tenderness, and medication was injected after negative aspiration. All sites done in the same manner. Patient tolerated the procedure well and without complications. Sites injected included: R lumbar paraspinal muscles (6 sites total). The  patient was observed for 30 minutes after the procedure.  enies any adverse effects including dizziness or shortness or breath.

## 2022-12-14 PROBLEM — G60.9 IDIOPATHIC PERIPHERAL NEUROPATHY: Status: ACTIVE | Noted: 2022-12-14

## 2022-12-14 PROBLEM — Z86.718 PERSONAL HISTORY OF OTHER VENOUS THROMBOSIS AND EMBOLISM: Status: ACTIVE | Noted: 2022-12-14

## 2022-12-14 PROBLEM — J45.30 MILD PERSISTENT ASTHMA, UNCOMPLICATED: Status: ACTIVE | Noted: 2022-12-14

## 2022-12-14 PROBLEM — E11.9 DIABETES MELLITUS TYPE 2 WITHOUT RETINOPATHY: Status: ACTIVE | Noted: 2022-12-14

## 2022-12-14 PROBLEM — D64.9 ANEMIA: Status: ACTIVE | Noted: 2022-12-14

## 2022-12-14 PROBLEM — K63.5 POLYP OF COLON: Status: ACTIVE | Noted: 2022-12-14

## 2022-12-16 ENCOUNTER — OFFICE VISIT (OUTPATIENT)
Dept: NEUROLOGY | Facility: CLINIC | Age: 72
End: 2022-12-16
Payer: OTHER GOVERNMENT

## 2022-12-16 ENCOUNTER — TELEPHONE (OUTPATIENT)
Dept: NEUROLOGY | Facility: CLINIC | Age: 72
End: 2022-12-16
Payer: OTHER GOVERNMENT

## 2022-12-16 VITALS
BODY MASS INDEX: 31.81 KG/M2 | HEIGHT: 73 IN | HEART RATE: 82 BPM | DIASTOLIC BLOOD PRESSURE: 76 MMHG | SYSTOLIC BLOOD PRESSURE: 136 MMHG | WEIGHT: 240 LBS

## 2022-12-16 DIAGNOSIS — M47.26 OSTEOARTHRITIS OF SPINE WITH RADICULOPATHY, LUMBAR REGION: ICD-10-CM

## 2022-12-16 DIAGNOSIS — I10 ESSENTIAL HYPERTENSION: ICD-10-CM

## 2022-12-16 PROCEDURE — 99999 PR PBB SHADOW E&M-EST. PATIENT-LVL V: CPT | Mod: PBBFAC,,, | Performed by: PHYSICIAN ASSISTANT

## 2022-12-16 PROCEDURE — 99214 PR OFFICE/OUTPT VISIT, EST, LEVL IV, 30-39 MIN: ICD-10-PCS | Mod: S$PBB,,, | Performed by: PHYSICIAN ASSISTANT

## 2022-12-16 PROCEDURE — 99999 PR PBB SHADOW E&M-EST. PATIENT-LVL V: ICD-10-PCS | Mod: PBBFAC,,, | Performed by: PHYSICIAN ASSISTANT

## 2022-12-16 PROCEDURE — 99215 OFFICE O/P EST HI 40 MIN: CPT | Mod: PBBFAC | Performed by: PHYSICIAN ASSISTANT

## 2022-12-16 PROCEDURE — 99214 OFFICE O/P EST MOD 30 MIN: CPT | Mod: S$PBB,,, | Performed by: PHYSICIAN ASSISTANT

## 2022-12-16 RX ORDER — EPLERENONE 25 MG/1
1 TABLET, FILM COATED ORAL DAILY
COMMUNITY
Start: 2022-11-17

## 2022-12-16 NOTE — PROGRESS NOTES
[facility] - NEUROLOGY 7TH FL [facility], [place]    Date: 12/16/22  Patient Name: Macario Dior Jr.   MRN: [id number]   PCP: [facility]  Referring Provider: No ref. provider found    Chief Complaint: Neuropathy  Subjective:       HPI:   Mr. Macario Dior Jr. is a 72 y.o. male with idiopathic peripheral neuropathy, lumbar radiculopathy, HTN, CKD stage 3, DM2, and DVT history  presenting for evaluation of neuropathy. His current therapy regimen is  Lyrica 100mg TID. He states that he has pain in bilateral feet and then notes that his pain runs up his right leg and into his low back. He also notes that he has pain in his right collarbone. He notes that he has had an EMG in the past which revealed L5-S1 radiculopathy done by Dr. Cabrera on 3/10/20. He previously saw pain management and received a steroid injection. He states this did not relieve his symptoms. He has previously had radiofrequency ablation as well which resolved his symptoms for approx. 2 weeks but then his symptoms returned. He states he has been dealing with this pain for 3 years.         Tried and Failed-   Amitriptyline  Gabapentin   Radiofrequency ablation      PAST MEDICAL HISTORY:  Past Medical History:   Diagnosis Date    Arthritis     Asthma     chronic    CKD (chronic kidney disease), stage III     patient denies    Demyelinating neuropathy     polyneuropathy    Diabetes mellitus type 2 without retinopathy 12/14/2022    Gout     History of blood clots 2013    lower part of both legs    HLD (hyperlipidemia)     Hypertension     Iron deficiency anemia     BUZZ (obstructive sleep apnea)     CPAP       PAST SURGICAL HISTORY:  Past Surgical History:   Procedure Laterality Date    COLONOSCOPY N/A 11/7/2016    Procedure: COLONOSCOPY;  Surgeon: Shar Weiss MD;  Location: [facility] ([address] [place] [address]);  Service: Endoscopy;  Laterality: N/A;  [facility] Referral. Okay to hold Coumadin 5 days  prior to procedure. See Referral scaned in media tab on 10/20/16.    COLONOSCOPY N/A 9/21/2017    Procedure: COLONOSCOPY;  Surgeon: Stuart Trinidad MD;  Location: [facility] ENDO (4TH FLR);  Service: Endoscopy;  Laterality: N/A;  [facility] Olivarez-see scanned docs under media tab          9/6/17-current [facility] authorization and last clinic visit scanned into chart    EPIDURAL STEROID INJECTION N/A 12/17/2020    Procedure: INJECTION, STEROID, EPIDURAL, L5-S1 clear to hold Xarelto 2 days;  Surgeon: Moy Vasquez MD;  Location: [facility] PAIN MGT;  Service: Pain Management;  Laterality: N/A;    HIP SURGERY Right 2008    exploratory at [facility]    HIP SURGERY Right 8-31-15    THR    INJECTION OF ANESTHETIC AGENT AROUND NERVE Right 10/23/2019    Procedure: BLOCK, NERVE, Obturator and Femoral cleared to hold xarelto 3 days pt. said he's not taking coumadin;  Surgeon: Moy Vasquez MD;  Location: [facility] PAIN MGT;  Service: Pain Management;  Laterality: Right;    INJECTION OF ANESTHETIC AGENT AROUND NERVE Right 7/6/2020    Procedure: BLOCK, NERVE, RIGHT MBB L3,L5,L5;  Surgeon: Moy Vasquez MD;  Location: [facility] PAIN MGT;  Service: Pain Management;  Laterality: Right;  BLOCK, NERVE, RIGHT MBB L3,L5,L5    INJECTION OF ANESTHETIC AGENT AROUND NERVE Right 7/30/2020    Procedure: BLOCK, NERVE RIGHT L3, L4, L5 2ND;  Surgeon: Moy Vasquez MD;  Location: [facility] PAIN MGT;  Service: Pain Management;  Laterality: Right;  NEEDS CONSENT, XARELTO    LEG SURGERY Left 2012    fibula and tibia    RADIOFREQUENCY ABLATION Right 10/5/2020    Procedure: RADIOFREQUENCY ABLATION RIGHT L3,4,5;  Surgeon: Moy Vasquez MD;  Location: [facility] PAIN MGT;  Service: Pain Management;  Laterality: Right;  RADIOFREQUENCY ABLATION RIGHT L3,4,5  ok to hold xarelto, scanned in Media    SHOULDER SURGERY Right 1989    torn ligament       CURRENT MEDS:  Current Outpatient Medications   Medication Sig Dispense Refill    (Magic mouthwash) 1:1:1 Benadryl 12.5mg/5ml liq,  aluminum & magnesium hydroxide-simehticone (Maalox), lidocaine viscous 2% Swish and spit 10 mLs every 4 (four) hours as needed. for mouth sores 1 Bottle 0    albuterol (PROVENTIL) 2.5 mg /3 mL (0.083 %) nebulizer solution Take 2.5 mg by nebulization every 6 (six) hours as needed for Wheezing.      albuterol 90 mcg/actuation inhaler Inhale 2 puffs into the lungs every 6 (six) hours as needed for Wheezing.       amlodipine (NORVASC) 10 MG tablet Take 10 mg by mouth once daily.      aspirin (ECOTRIN) 81 MG EC tablet Take 1 tablet (81 mg total) by mouth once daily. (Patient not taking: Reported on 9/23/2022)      atorvastatin (LIPITOR) 80 MG tablet Take 80 mg by mouth once daily. Take one-half tablet daily      baclofen (LIORESAL) 10 MG tablet Take 1 tablet (10 mg total) by mouth 3 (three) times daily. Take 1/2 pill to One full pill as needed for muscle spasms 90 tablet 1    budesonide-formoterol 160-4.5 mcg (SYMBICORT) 160-4.5 mcg/actuation HFAA Inhale 2 puffs into the lungs every 12 (twelve) hours.       camphor-menthol 0.5-0.5% (SARNA) lotion APPLY MODERATE AMOUNT TOPICALLY TWICE A DAY      carboxymethyl-gly-viia60-TB 0.5-1-0.5 % Dpet Place 1 drop into both eyes 4 (four) times daily.      carboxymethylcellulose sodium 1 % DpGe Place 0.4 mLs into both eyes 4 (four) times daily as needed.      carvedilol (COREG) 25 MG tablet Take 25 mg by mouth 2 (two) times daily.      chlorhexidine (PERIDEX) 0.12 % solution RINSE 1 CAPFUL BY MOUTH ONCE DAILY AS NEEDED SWISH FOR 30 SECONDS AFTER,AND SPIT OUT      chlorthalidone (HYGROTEN) 25 MG Tab TAKE ONE TABLET BY MOUTH ONCE DAILY FOR HIGH BLOOD PRESSURE.      CLOBETASOL PROPIONATE, BULK, MISC Apply topically once daily.      cloNIDine 0.1 mg/24 hr td ptwk (CATAPRES) 0.1 mg/24 hr Place 1 patch onto the skin every 7 days.      cyanocobalamin (VITAMIN B-12) 100 MCG tablet Take 100 mcg by mouth once daily. Take two tablets      cycloSPORINE (RESTASIS) 0.05 % ophthalmic emulsion  INSTILL 0.4ML IN EACH EYE EVERY 12 HOURS      eplerenone (INSPRA) 25 MG Tab Take 1 tablet by mouth once daily.      ergocalciferol (ERGOCALCIFEROL) 50,000 unit Cap Take 50,000 Units by mouth.      finasteride (PROSCAR) 5 mg tablet TAKE ONE TABLET BY MOUTH ONCE DAILY FOR PROSTATE      hydrALAZINE (APRESOLINE) 50 MG tablet TAKE ONE TABLET BY MOUTH THREE TIMES A DAY FOR HEART/BLOOD PRESSURE      ketoconazole (NIZORAL) 2 % cream Apply topically 2 (two) times daily.      latanoprost 0.005 % ophthalmic solution Place 1 drop into both eyes every evening.      levETIRAcetam (KEPPRA) 500 MG Tab Take 1 tablet (500 mg total) by mouth nightly. 30 tablet 1    lidocaine (LIDODERM) 5 % Place 1 patch onto the skin once daily. Wear for 12 hours, then remove. Do not apply a new patch for at least 12 hours.      loratadine (CLARITIN) 10 mg tablet TAKE ONE TABLET BY MOUTH ONCE DAILY FOR ALLERGIES BEFORE MORNING DOSE APIXABAN, FOR APIXABAN INDUCED ITCHING      mineral oil/petrolatum,white (WHITE PETROLATUM-MINERAL OIL OPHT) APPLY THIN RIBBON TO EACH EYE AT BEDTIME FOR DRY EYES      oxycodone (OXY-IR) 5 mg Cap Take 2 capsules (10 mg total) by mouth every 6 (six) hours as needed. 120 capsule 0    pregabalin (LYRICA) 100 MG capsule Take 1 capsule (100 mg total) by mouth 3 (three) times daily. 90 capsule 2    rivaroxaban (XARELTO) 20 mg Tab Take 20 mg by mouth.       tiotropium (SPIRIVA) 18 mcg inhalation capsule Inhale 18 mcg into the lungs once daily. Controller      valsartan (DIOVAN) 160 MG tablet Take 160 mg by mouth once daily.       No current facility-administered medications for this visit.     Facility-Administered Medications Ordered in Other Visits   Medication Dose Route Frequency Provider Last Rate Last Admin    ALPRAZolam tablet 0.5 mg  0.5 mg Oral On Call Procedure Lisha Cote MD           ALLERGIES:  Review of patient's allergies indicates:   Allergen Reactions    Amlodipine Swelling    Lisinopril Tinitus and Swelling  "   Pregabalin Swelling    Azithromycin      Muscles tense up    Levetiracetam Other (See Comments)    Acetaminophen Rash     Feels like needles are sticking him per pt       FAMILY HISTORY:  Family History   Problem Relation Age of Onset    Cancer Mother         ovarian    Cancer Sister         breast    Cancer Sister         breast       SOCIAL HISTORY:  Social History     Tobacco Use    Smoking status: Former     Types: Cigars     Quit date: 2009     Years since quittin.3    Smokeless tobacco: Never   Substance Use Topics    Alcohol use: No     Alcohol/week: 0.0 standard drinks    Drug use: No       Review of Systems:  ROS         Objective:     Vitals:    22 1519   BP: 136/76   Pulse: 82   Weight: 108.9 kg (240 lb)   Height: 6' 1" (1.854 m)         Lab Results   Component Value Date    WBC 8.11 2016    HGB 11.1 (L) 2016    HCT 34.9 (L) 2016     2016    ALT 13 2016    AST 17 2016     2016    K 5.0 2016     2016    CREATININE 1.5 (H) 2016    BUN 11 2016    CO2 22 (L) 2016    HGBA1C 6.7 (H) 2022          ? ?    Declined exam today       Assessment:   Macario Dior Jr. is a 72 y.o. male presenting for evaluation of neuropathy.   Plan:   The patient would like to follow up after his surgery and see how his symptoms are controlled following the surgery.     Set patient up for follow up after his surgery   Problem List Items Addressed This Visit          Neuro    Osteoarthritis of spine with radiculopathy, lumbar region    Current Assessment & Plan     Set to get orthopedic surgery in  will follow up after this            Cardiac/Vascular    Essential hypertension    Current Assessment & Plan     Discussed with patient importance of management of hypertension due to secondary stroke risk. Patient is on appropriate therapy currently managed by PCP.                 I spent a total of 30 minutes on the day " of the visit. This includes face to face time and non-face to face time preparing to see the patient (eg, review of tests), obtaining and/or reviewing separately obtained history, documenting clinical information in the electronic or other health record, independently interpreting results and communicating results to the patient/family/caregiver, or care coordinator.    Vijaya Davis PA-C  Supervising physician Corwin Kauffman MD was available for all questions during this exam.  [facility]

## 2022-12-16 NOTE — TELEPHONE ENCOUNTER
Called patient to offer earlier 12/16 appointment time of 2:00 PM per Vijaya's request. Patient stated he is not able to come earlier. I told patient his appointment will remain at 3:00 PM. Patient acknowledged.

## 2022-12-18 NOTE — ASSESSMENT & PLAN NOTE
Unprovoked event?? Right DVT in 2000, 2008, and 2013- per old Vascular surgery.  Reports previous vascular procedure to right leg?- [facility].   Treated with Xarelto 20 mg daily  [facility]  Increased risk of thrombosis in the perioperative period.

## 2022-12-18 NOTE — ASSESSMENT & PLAN NOTE
Location: back and right hip; bilateral foot R>L  Treated with: Oxycodone  Followed per pain management

## 2022-12-18 NOTE — ASSESSMENT & PLAN NOTE
Current BP not at goal today; 146/82.    Taking: hydralazine/carvedilol/amlodipine  Patient reports home BP readings of: 134-135/60s-70s - daily  Keeping a healthy weight/BMI can help with better BP control     Lifestyle changes to reduce systolic BP:   exercise 30 minutes per day,  5 days per week or 150 minutes weekly; sodium reduction and avoidance of high salt foods such as processed meats, frozen meals and  fast foods.       I recommend monitoring BP during perioperative period as uncontrolled pain can elevate blood pressure.

## 2022-12-18 NOTE — ASSESSMENT & PLAN NOTE
No loss of bladder or bowel control    Denies N/T to buttocks, perineum and inner surfaces of the thighs (saddle anesthesia)    History of multiple ESIs and RFAs  Followed per Pain Management   Recent imaging: Xray L-Spine 8/30/22  COMPARISON:  06/03/2021     FINDINGS:  Vertebral bodies are intact.  Disc spaces are maintained.  No instability in flexion and extension.  No significant bony abnormalities seen     Impression:     See above

## 2022-12-18 NOTE — ASSESSMENT & PLAN NOTE
Not currently on medications   Most recent A1c is   Average accuchecks are      . No regular accuchecks.       Reports peripheral neuropathy- bilateral foot.   Denies visual changes. Up to date with eye exams.  Micro changes: Denies- Retinopathy, Nephropathy   Macro changes: Denies-  Carotid, Coronary  Peripheral disease      Maintain healthy weight. Exercise at least 150 minutes weekly. Encouraged diet rich in nutrients such as fruits, vegetables, and whole grains; reduce sugar intake from cakes, candy, and sugared drinks.

## 2022-12-18 NOTE — ASSESSMENT & PLAN NOTE
Treated with  albuterol inhaler/Symbicort/Spiriva-- all prn ;  controlled.  Last episode: 4 years ago  Reports multiple hospitalizations; denies intubations  [facility]      I recommend possible use of inhaled bronchodilators if patient develops perioperative bronchospasm.

## 2022-12-18 NOTE — ASSESSMENT & PLAN NOTE
No changes in urine volume.   Most recent GFR:  31.2  BUN= 15    Cr = 2.2  Creatinine [facility] in September 2022- 1.9  Encouraged to avoid NSAIDs, reduce salt intake, maintain adequate hydration, and exercise.     Tylenol as needed for pain    I  suggest monitoring renal function, input and output status yulissa-operatively. I  suggest avoiding nephrotoxic medication including NSAIDs, COX2 inhibitors, intravenous contrast agent,avoiding hypotension to prevent further renal impairment.

## 2022-12-18 NOTE — ASSESSMENT & PLAN NOTE
Current labs:  Hgb-      Hct-      Symptoms of weakness, fatigue, exercise intolerance or CP/SOB?  Recommend monitoring during perioperative period.

## 2022-12-19 ENCOUNTER — OFFICE VISIT (OUTPATIENT)
Dept: INTERNAL MEDICINE | Facility: CLINIC | Age: 72
End: 2022-12-19
Payer: OTHER GOVERNMENT

## 2022-12-19 ENCOUNTER — OFFICE VISIT (OUTPATIENT)
Dept: ORTHOPEDICS | Facility: CLINIC | Age: 72
End: 2022-12-19
Payer: OTHER GOVERNMENT

## 2022-12-19 VITALS
DIASTOLIC BLOOD PRESSURE: 82 MMHG | SYSTOLIC BLOOD PRESSURE: 146 MMHG | WEIGHT: 250 LBS | HEART RATE: 72 BPM | HEIGHT: 73 IN | BODY MASS INDEX: 33.13 KG/M2 | TEMPERATURE: 98 F | OXYGEN SATURATION: 96 %

## 2022-12-19 VITALS — BODY MASS INDEX: 33.31 KG/M2 | HEIGHT: 73 IN | WEIGHT: 251.31 LBS

## 2022-12-19 DIAGNOSIS — Z79.01 LONG TERM (CURRENT) USE OF ANTICOAGULANTS: ICD-10-CM

## 2022-12-19 DIAGNOSIS — N18.30 STAGE 3 CHRONIC KIDNEY DISEASE, UNSPECIFIED WHETHER STAGE 3A OR 3B CKD: ICD-10-CM

## 2022-12-19 DIAGNOSIS — R60.1 GENERALIZED EDEMA: ICD-10-CM

## 2022-12-19 DIAGNOSIS — E11.9 DIABETES MELLITUS TYPE 2 WITHOUT RETINOPATHY: ICD-10-CM

## 2022-12-19 DIAGNOSIS — K63.5 POLYP OF COLON, UNSPECIFIED PART OF COLON, UNSPECIFIED TYPE: ICD-10-CM

## 2022-12-19 DIAGNOSIS — I10 ESSENTIAL HYPERTENSION: ICD-10-CM

## 2022-12-19 DIAGNOSIS — G89.29 OTHER CHRONIC PAIN: ICD-10-CM

## 2022-12-19 DIAGNOSIS — M79.604 PAIN OF RIGHT LOWER EXTREMITY: ICD-10-CM

## 2022-12-19 DIAGNOSIS — D64.9 ANEMIA, UNSPECIFIED TYPE: ICD-10-CM

## 2022-12-19 DIAGNOSIS — M89.8X9 HETEROTOPIC OSSIFICATION OF BONE: Primary | ICD-10-CM

## 2022-12-19 DIAGNOSIS — E66.9 CLASS 1 OBESITY WITH SERIOUS COMORBIDITY AND BODY MASS INDEX (BMI) OF 33.0 TO 33.9 IN ADULT, UNSPECIFIED OBESITY TYPE: ICD-10-CM

## 2022-12-19 DIAGNOSIS — M96.89 POSTOPERATIVE HETEROTOPIC OSSIFICATION: ICD-10-CM

## 2022-12-19 DIAGNOSIS — M61.50 POSTOPERATIVE HETEROTOPIC OSSIFICATION: ICD-10-CM

## 2022-12-19 DIAGNOSIS — Z01.818 PREOPERATIVE EXAMINATION: Primary | ICD-10-CM

## 2022-12-19 DIAGNOSIS — M47.26 OSTEOARTHRITIS OF SPINE WITH RADICULOPATHY, LUMBAR REGION: ICD-10-CM

## 2022-12-19 DIAGNOSIS — G47.33 OSA (OBSTRUCTIVE SLEEP APNEA): ICD-10-CM

## 2022-12-19 DIAGNOSIS — J45.30 MILD PERSISTENT ASTHMA, UNCOMPLICATED: ICD-10-CM

## 2022-12-19 PROBLEM — E66.811 CLASS 1 OBESITY WITH SERIOUS COMORBIDITY AND BODY MASS INDEX (BMI) OF 33.0 TO 33.9 IN ADULT: Status: ACTIVE | Noted: 2022-12-19

## 2022-12-19 PROCEDURE — 99214 OFFICE O/P EST MOD 30 MIN: CPT | Mod: PBBFAC,27 | Performed by: NURSE PRACTITIONER

## 2022-12-19 PROCEDURE — 99999 PR PBB SHADOW E&M-EST. PATIENT-LVL V: ICD-10-PCS | Mod: PBBFAC,,, | Performed by: NURSE PRACTITIONER

## 2022-12-19 PROCEDURE — 99999 PR PBB SHADOW E&M-EST. PATIENT-LVL IV: CPT | Mod: PBBFAC,,, | Performed by: NURSE PRACTITIONER

## 2022-12-19 PROCEDURE — 99213 PR OFFICE/OUTPT VISIT, EST, LEVL III, 20-29 MIN: ICD-10-PCS | Mod: S$PBB,,, | Performed by: NURSE PRACTITIONER

## 2022-12-19 PROCEDURE — 99215 OFFICE O/P EST HI 40 MIN: CPT | Mod: PBBFAC | Performed by: NURSE PRACTITIONER

## 2022-12-19 PROCEDURE — 99205 OFFICE O/P NEW HI 60 MIN: CPT | Mod: S$PBB,,, | Performed by: NURSE PRACTITIONER

## 2022-12-19 PROCEDURE — 99999 PR PBB SHADOW E&M-EST. PATIENT-LVL V: CPT | Mod: PBBFAC,,, | Performed by: NURSE PRACTITIONER

## 2022-12-19 PROCEDURE — 99213 OFFICE O/P EST LOW 20 MIN: CPT | Mod: S$PBB,,, | Performed by: NURSE PRACTITIONER

## 2022-12-19 PROCEDURE — 99205 PR OFFICE/OUTPT VISIT, NEW, LEVL V, 60-74 MIN: ICD-10-PCS | Mod: S$PBB,,, | Performed by: NURSE PRACTITIONER

## 2022-12-19 PROCEDURE — 99999 PR PBB SHADOW E&M-EST. PATIENT-LVL IV: ICD-10-PCS | Mod: PBBFAC,,, | Performed by: NURSE PRACTITIONER

## 2022-12-19 NOTE — OUTPATIENT SUBJECTIVE & OBJECTIVE
Outpatient Subjective & Objective      Chief Complaint: Preoperative evaulation, perioperative medical management, and complication reduction plan.     Functional Capacity: No regular exercise regimen. Parked in garage and walked to POC without CP/SOB.       Anesthesia issues: None    Difficulty mouth opening: No    Steroid use in the last 12 months:  No    Dental Issues: dentures- upper and lower    Family anesthesia difficulty: None       Family Hx of Thrombosis: None    Past Medical History:   Diagnosis Date    Arthritis     Asthma     chronic    CKD (chronic kidney disease), stage III     patient denies    Deep vein thrombosis     Demyelinating neuropathy     polyneuropathy    Diabetes mellitus type 2 without retinopathy 12/14/2022    Gout     History of blood clots 2013    lower part of both legs    HLD (hyperlipidemia)     Hypertension     Iron deficiency anemia     BUZZ (obstructive sleep apnea)     CPAP    Pulmonary embolism          Past Medical History Pertinent Negatives:   Diagnosis Date Noted    Anxiety 12/19/2022    CHF (congestive heart failure) 12/19/2022    Coronary artery disease 12/19/2022    Depression 12/19/2022    GERD (gastroesophageal reflux disease) 12/19/2022    Myocardial infarction 12/19/2022    Seizures 12/19/2022    Stroke 12/19/2022    Thyroid disease 12/19/2022         Past Surgical History:   Procedure Laterality Date    COLONOSCOPY N/A 11/7/2016    Procedure: COLONOSCOPY;  Surgeon: Shar Weiss MD;  Location: [address]);  Service: Endoscopy;  Laterality: N/A;  [facility] Referral. Okay to hold Coumadin 5 days prior to procedure. See Referral scaned in media tab on 10/20/16.    COLONOSCOPY N/A 9/21/2017    Procedure: COLONOSCOPY;  Surgeon: Stuart Trinidad MD;  Location: [address]);  Service: Endoscopy;  Laterality: N/A;  [facility] Olivarez-see scanned docs under media tab          9/6/17-current [facility] authorization and last clinic visit  scanned into chart    EPIDURAL STEROID INJECTION N/A 12/17/2020    Procedure: INJECTION, STEROID, EPIDURAL, L5-S1 clear to hold Xarelto 2 days;  Surgeon: Moy Vasquez MD;  Location: [facility];  Service: Pain Management;  Laterality: N/A;    HIP SURGERY Right 2008    exploratory at [facility]    HIP SURGERY Right 8-31-15    THR    INJECTION OF ANESTHETIC AGENT AROUND NERVE Right 10/23/2019    Procedure: BLOCK, NERVE, Obturator and Femoral cleared to hold xarelto 3 days pt. said he's not taking coumadin;  Surgeon: Moy Vasquez MD;  Location: [facility];  Service: Pain Management;  Laterality: Right;    INJECTION OF ANESTHETIC AGENT AROUND NERVE Right 7/6/2020    Procedure: BLOCK, NERVE, RIGHT MBB L3,L5,L5;  Surgeon: Moy Vasquez MD;  Location: [facility];  Service: Pain Management;  Laterality: Right;  BLOCK, NERVE, RIGHT MBB L3,L5,L5    INJECTION OF ANESTHETIC AGENT AROUND NERVE Right 7/30/2020    Procedure: BLOCK, NERVE RIGHT L3, L4, L5 2ND;  Surgeon: Moy Vasquez MD;  Location: [facility];  Service: Pain Management;  Laterality: Right;  NEEDS CONSENT, XARELTO    LEG SURGERY Left 2012    fibula and tibia    RADIOFREQUENCY ABLATION Right 10/5/2020    Procedure: RADIOFREQUENCY ABLATION RIGHT L3,4,5;  Surgeon: Moy Vasquez MD;  Location: [facility];  Service: Pain Management;  Laterality: Right;  RADIOFREQUENCY ABLATION RIGHT L3,4,5  ok to hold xarelto, scanned in Media    SHOULDER SURGERY Right 1989    torn ligament       Review of Systems   Constitutional:  Negative for chills, fatigue, fever and unexpected weight change.   HENT:  Negative for dental problem, hearing loss, postnasal drip, rhinorrhea, sore throat, tinnitus and trouble swallowing.    Eyes:  Negative for photophobia, pain, discharge and visual disturbance.   Respiratory:  Negative for apnea, cough, chest tightness, shortness of breath and wheezing.    Cardiovascular:  Negative for chest pain, palpitations and leg swelling.  "  Gastrointestinal:  Negative for abdominal pain, blood in stool, constipation, nausea and vomiting.        Denies Fatty liver, Hepatitis   Endocrine: Negative for cold intolerance and heat intolerance.   Genitourinary:  Negative for decreased urine volume, difficulty urinating, dysuria, frequency, hematuria and urgency.   Musculoskeletal:  Positive for arthralgias (right hip) and back pain. Negative for neck pain and neck stiffness.   Skin:  Negative for rash and wound.   Neurological:  Positive for numbness (bilateral foot). Negative for dizziness, tremors, seizures, syncope, weakness and headaches.   Hematological:  Negative for adenopathy. Does not bruise/bleed easily.   Psychiatric/Behavioral:  Negative for confusion, sleep disturbance and suicidal ideas.             VITALS  Visit Vitals  BP (!) 146/82 (BP Location: Right arm, Patient Position: Sitting)   Pulse 72   Temp 98 °F (36.7 °C) (Oral)   Ht 6' 1" (1.854 m)   Wt 113.4 kg (250 lb)   SpO2 96%   BMI 32.98 kg/m²          Physical Exam  Vitals reviewed.   Constitutional:       General: He is not in acute distress.     Appearance: He is well-developed. He is obese.   HENT:      Head: Normocephalic.      Nose: Nose normal.      Mouth/Throat:      Pharynx: No oropharyngeal exudate.   Eyes:      General:         Right eye: No discharge.         Left eye: No discharge.      Conjunctiva/sclera: Conjunctivae normal.      Pupils: Pupils are equal, round, and reactive to light.   Neck:      Thyroid: No thyromegaly.      Vascular: No carotid bruit or JVD.      Trachea: No tracheal deviation.   Cardiovascular:      Rate and Rhythm: Normal rate and regular rhythm.      Pulses:           Carotid pulses are 2+ on the right side and 2+ on the left side.       Dorsalis pedis pulses are 2+ on the right side and 2+ on the left side.        Posterior tibial pulses are 2+ on the right side and 2+ on the left side.      Heart sounds: Normal heart sounds. No murmur heard.     " Comments: compression stockings - BLE  Pulmonary:      Effort: Pulmonary effort is normal. No respiratory distress.      Breath sounds: Normal breath sounds. No stridor. No wheezing, rhonchi or rales.   Abdominal:      General: Bowel sounds are normal. There is no distension.      Palpations: Abdomen is soft.      Tenderness: There is no abdominal tenderness. There is no guarding.      Hernia: A hernia is present. Hernia is present in the umbilical area (small).   Musculoskeletal:      Cervical back: Normal range of motion. No pain with movement.      Right lower le+ Pitting Edema present.      Left lower le+ Pitting Edema present.   Lymphadenopathy:      Cervical: No cervical adenopathy.   Skin:     General: Skin is warm and dry.      Capillary Refill: Capillary refill takes less than 2 seconds.      Findings: No erythema or rash.   Neurological:      Mental Status: He is alert and oriented to person, place, and time.      Coordination: Coordination normal.        Significant Labs:  Lab Results   Component Value Date    WBC 6.04 2022    HGB 14.0 2022    HCT 43.1 2022     2022    ALT 13 2016    AST 17 2016     2022    K 4.5 2022     2022    CREATININE 2.2 (H) 2022    BUN 15 2022    CO2 24 2022    INR 1.1 2022    HGBA1C 6.7 (H) 2022       Diagnostic Studies: No relevant studies.    EKG:   Normal sinus rhythm   Left anterior fascicular block   T wave abnormality, consider anterolateral ischemia   Abnormal ECG   When compared with ECG of 20-AUG-2015 11:28,   No significant change was found   Confirmed by Rhea Marcos MD ([id number]) on 2022 4:33:38 PM     2D ECHO: 3/23/21  TTE:      Nuclear Stress Test 2020              Imaging   MRI L-Spine 22  FINDINGS:  Alignment: Normal.     Vertebrae: Normal vertebral body heights.  Several osseous vertebral body hemangiomas.  No abnormal signal..     Discs: Normal  height and signal.     Cord: Normal cord signal.  Conus terminates at L1-L2     Degenerative findings:     T12-L1: No significant spinal canal stenosis neural foraminal narrowing     L1-L2: No significant spinal canal stenosis or neural foraminal narrowing.     L2-L3: Mild diffuse disc bulge, bilateral facet arthropathy, and ligamentum flavum buckling.  Partial effacement of the thecal sac without significant spinal canal narrowing.  Mild bilateral neural foraminal narrowing     L3-L4: Mild diffuse disc bulge, bilateral facet arthropathy, and ligamentum flavum buckling.  Flattening of the anterior thecal sac with no more than mild spinal canal stenosis.  Mild bilateral neural foraminal narrowing.     L4-L5: Mild diffuse disc bulge, bilateral facet arthropathy, and ligamentum flavum hypertrophy.  Mild spinal canal stenosis.  Mild bilateral neural foraminal narrowing.     L5-S1: Bilateral facet arthropathy.  Mild bilateral foraminal narrowing.     Paraspinal muscles & soft tissues: Atrophic right kidney.  T2 hypertense signal within left kidney could represent a cyst.  Mild posterior subcutaneous tissue edema.     Impression:     Multilevel lumbar spondylosis as detailed level by level above, mild bilateral neural foraminal from L3 to S1.  No more than mild spinal canal stenosis at L3-L4 and L4-L5.     Electronically signed by resident: Stacey House  Date:                                            09/17/2022  Time:                                           12:53     Electronically signed by: Prabhu Rodriguez MD  Date:                                            09/17/2022  Time:                                           13:25        DESTINY 8/11/21        MRI C-Spine 6/14/21    FINDINGS:  Skull base and craniocervical junction: Normal.     C1-C2: Dens is intact.  Pre dens space is maintained.     Alignment: Normal.     Vertebrae: Vertebral heights are well maintained with no evidence of infiltrative process or acute  fractures.     Discs: Multilevel degenerative disease.     Cord: No signal abnormality is identified.     Degenerative findings:     C2-C3: No spinal canal stenosis or neural foraminal narrowing.     C3-C4: Posterior disc osteophyte complex and bilateral uncovertebral spurring resulting in mild spinal canal stenosis and moderate to severe bilateral neural foraminal narrowing.     C4-C5: Posterior disc osteophyte complex and bilateral uncovertebral spurring resulting in mild spinal canal stenosis and moderate left mild right neural foraminal narrowing     C5-C6: Posterior disc osteophyte complex and bilateral uncovertebral spurring resulting in mild spinal canal stenosis and mild bilateral neural foraminal narrowing.     C6-C7: Posterior disc osteophyte complex and bilateral uncovertebral spurring resulting in mild spinal canal stenosis and mild bilateral neural foraminal narrowing, left more than right.     C7-T1: No spinal canal stenosis or neural foraminal narrowing.     No spinal canal stenosis or neural foraminal narrowing throughout the thoracic spine.     Paraspinal muscles & soft tissues: Unremarkable.     Impression:     Multilevel spondylosis of the cervical spine, most prominent at the level of C3-C4, as above.     Electronically signed by resident: Tae Akers  Date:                                            06/14/2021  Time:                                           15:00     Electronically signed by: Prabhu Rodriguez MD  Date:                                            06/14/2021  Time:                                           15:40      Carotid US 8/11/21          Active Cardiac Conditions: None      Revised Cardiac Risk Index   High -Risk Surgery  Intraperitoneal; Intrathoracic; suprainguinal vascular Yes- + 1 No- 0   History of Ischemic Heart Disease   (Hx of MI/positive exercise test/current chest pain due to ischemia/use of nitrate therapy/EKG with pathological Q waves) Yes- + 1 No- 0    History of CHF  (Pulmonary edema/bilateral rales or S3 gallop/PND/CXR showing pulmonary vascular redistribution) Yes- + 1 No- 0   History of CVA   (Prior stroke or TIA) Yes- + 1 No- 0   Pre-operative treatment with insulin Yes- + 1 No- 0   Pre-operative creatinine > 2mg/dl Yes- + 1 No- 0   Total: 1      Risk Status:  Estimated risk of cardiac complications after non-cardiac surgery using the Revised Cardiac Risk Index for Preoperative risk is 3.9 %      ARISCAT ([place]) risk index: Low: 1.6% risk of post-op pulmonary complications; Intermediate: 13.3% risk of post-op pulmonary complications if surgery is > 3 hours.     American Society of Anesthesiologists Physical Status classification (ASA): 3    [place] respiratory failure index: 0.5 %         No further cardiac workup needed prior to surgery.    Outpatient Subjective & Objective

## 2022-12-19 NOTE — DISCHARGE INSTRUCTIONS
Your surgery has been scheduled for:______1/4/22____________________________________    You should report to:  ____[facility], located on the [place] side of the first floor of the           [facility] ([phone])  __X__[facility], located on the [facility] side of the            Second floor of the [facility] ([phone])  ____3rd Floor [facility] located on the [facility] side of the [facility] [phone]  ____[facility]: located at [address] [facility] [place] [address]. [address].     Please Note   Tell your doctor if you take Aspirin, products containing Aspirin, herbal medications  or blood thinners, such as Coumadin, Ticlid, or Plavix.  (Consult your provider regarding holding or stopping before surgery).  Arrange for someone to drive you home following surgery.  You will not be allowed to leave the surgical facility alone or drive yourself home following sedation and anesthesia.    Before Surgery  Stop taking all herbal medications, vitamins, and supplements 7 days prior to surgery  No Motrin/Advil (Ibuprofen) 7 days before surgery  No Aleve (Naproxen) 7 days before surgery  Stop Taking Asprin, products containing Aspirin __7___days before surgery   No Goody's/BC Powder 7 days before surgery  Refrain from drinking alcoholic beverages for 24 hours before and after surgery  Stop or limit smoking at least 24 hours prior to surgery  You may take Tylenol for pain    Night before Surgery  Do not eat or drink after midnight  Take a shower or bath (shower is recommended).  Bathe with Hibiclens soap or an antibacterial soap from the neck down.  If not supplied by your surgeon, hibiclens soap will need to be purchased over the counter in pharmacy.  Rinse soap off thoroughly.  Shampoo your hair with your regular shampoo    The Day of Surgery  Take another bath or shower with hibiclens or  any antibacterial soap, to reduce the chance of infection.  Take heart and blood pressure medications with a small sip of water, as advised by the perioperative team.  Do not take fluid pills  You may brush your teeth and rinse your mouth, but do not swallow any additional water.   Do not apply perfumes, powder, body lotions or deodorant on the day of surgery.  Nail polish should be removed.  Do not wear makeup or moisturizer  Wear comfortable clothes, such as a button front shirt and loose fitting pants.  Leave all jewelry, including body piercings, and valuables at home.    Bring any devices you will neeed after surgery such as crutches or canes.  If you have sleep apnea, please bring your CPAP machine  In the event that your physical condition changes including the onset of a cold or respiratory illness, or if you have to delay or cancel your surgery, please notify your surgeon.

## 2022-12-19 NOTE — PRE-PROCEDURE INSTRUCTIONS
Hard copy of medication and surgery instructions given to patient. Will call patient to see if he has any questions about instructions.

## 2022-12-19 NOTE — PROGRESS NOTES
[facility]  Progress Note    Patient Name: Macario Dior Jr.  MRN: [id number]  Date of Evaluation- 01/03/2023  PCP- [facility] cases for Macario Dior Jr. [[id number]]       Case ID Status Date Time Anselmo Procedure Provider Location    [id number] [facility] 1/4/2023  8:00  EXCISION, LESION, FEMUR: POSTERIOR APPROACH: RIGHT HIP: EXISION OF HETERTOPIC BONE: NO IMPLANTS NEEDED Prieto Sinha MD [[id number]] NOM OR 2ND FLR            HPI:  This is a 72 y.o. male  who presents today for a preoperative evaluation in preparation for an Orthopedic  procedure.  Scheduled for  excision of right femur lesion.  Surgery is indicated for heterotopic ossification of bone.   Patient is new to me.  Details of current problem: The duration of problem is  3 years. 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 Pain is becoming progressively worse. He has tried PT without relief.   Reports symptoms of  constant aching pain to right lateral hip radiating around to right lower back.   Aggravating factors include:  walking, laying down, sitting, and decreased ADLs.    Relieving factors are  Oxycodone daily and lidocaine patch prn.   Reports pain: 10/10 to right hip ; uses cane for assistance with mobility.    The history has been obtained by speaking with the patient and reviewing the electronic medical record and/or outside health information. Significant health conditions for the perioperative period are discussed below in assessment and plan.     Patient reports current health status to be Good.  Denies any new symptoms before surgery.        Subjective/ Objective:     Chief Complaint: Preoperative evaulation, perioperative medical management, and complication reduction plan.     Functional Capacity: No regular exercise regimen. Parked in garage and walked to POC without CP/SOB.       Anesthesia issues: None    Difficulty mouth opening: No    Steroid use in the last 12 months:  No    Dental  Issues: dentures- upper and lower    Family anesthesia difficulty: None       Family Hx of Thrombosis: None    Past Medical History:   Diagnosis Date    Arthritis     Asthma     chronic    CKD (chronic kidney disease), stage III     patient denies    Deep vein thrombosis     Demyelinating neuropathy     polyneuropathy    Diabetes mellitus type 2 without retinopathy 12/14/2022    Gout     History of blood clots 2013    lower part of both legs    HLD (hyperlipidemia)     Hypertension     Iron deficiency anemia     BUZZ (obstructive sleep apnea)     CPAP    Pulmonary embolism          Past Medical History Pertinent Negatives:   Diagnosis Date Noted    Anxiety 12/19/2022    CHF (congestive heart failure) 12/19/2022    Coronary artery disease 12/19/2022    Depression 12/19/2022    GERD (gastroesophageal reflux disease) 12/19/2022    Myocardial infarction 12/19/2022    Seizures 12/19/2022    Stroke 12/19/2022    Thyroid disease 12/19/2022         Past Surgical History:   Procedure Laterality Date    COLONOSCOPY N/A 11/7/2016    Procedure: COLONOSCOPY;  Surgeon: Shar Weiss MD;  Location: [address]);  Service: Endoscopy;  Laterality: N/A;  [facility] Referral. Okay to hold Coumadin 5 days prior to procedure. See Referral scaned in media tab on 10/20/16.    COLONOSCOPY N/A 9/21/2017    Procedure: COLONOSCOPY;  Surgeon: Stuart Trinidad MD;  Location: [facility] ([address] [place] [address]);  Service: Endoscopy;  Laterality: N/A;  [facility] Olivarez-see scanned docs under media tab          9/6/17-current [facility] authorization and last clinic visit scanned into chart    EPIDURAL STEROID INJECTION N/A 12/17/2020    Procedure: INJECTION, STEROID, EPIDURAL, L5-S1 clear to hold Xarelto 2 days;  Surgeon: Moy Vasquez MD;  Location: [facility];  Service: Pain Management;  Laterality: N/A;    HIP SURGERY Right 2008    exploratory at [facility]    HIP SURGERY Right 8-31-15    THR    INJECTION OF ANESTHETIC AGENT AROUND  NERVE Right 10/23/2019    Procedure: BLOCK, NERVE, Obturator and Femoral cleared to hold xarelto 3 days pt. said he's not taking coumadin;  Surgeon: Moy Vasquez MD;  Location: [facility] PAIN [facility]GT;  Service: Pain Management;  Laterality: Right;    INJECTION OF ANESTHETIC AGENT AROUND NERVE Right 7/6/2020    Procedure: BLOCK, NERVE, RIGHT MBB L3,L5,L5;  Surgeon: Moy Vasquez MD;  Location: [facility] PAIN [facility]GT;  Service: Pain Management;  Laterality: Right;  BLOCK, NERVE, RIGHT MBB L3,L5,L5    INJECTION OF ANESTHETIC AGENT AROUND NERVE Right 7/30/2020    Procedure: BLOCK, NERVE RIGHT L3, L4, L5 2ND;  Surgeon: Moy Vasquez MD;  Location: [facility] PAIN [facility]GT;  Service: Pain Management;  Laterality: Right;  NEEDS CONSENT, XARELTO    LEG SURGERY Left 2012    fibula and tibia    RADIOFREQUENCY ABLATION Right 10/5/2020    Procedure: RADIOFREQUENCY ABLATION RIGHT L3,4,5;  Surgeon: Moy Vasquez MD;  Location: [facility] PAIN [facility]GT;  Service: Pain Management;  Laterality: Right;  RADIOFREQUENCY ABLATION RIGHT L3,4,5  ok to hold xarelto, scanned in Media    SHOULDER SURGERY Right 1989    torn ligament       Review of Systems   Constitutional:  Negative for chills, fatigue, fever and unexpected weight change.   HENT:  Negative for dental problem, hearing loss, postnasal drip, rhinorrhea, sore throat, tinnitus and trouble swallowing.    Eyes:  Negative for photophobia, pain, discharge and visual disturbance.   Respiratory:  Negative for apnea, cough, chest tightness, shortness of breath and wheezing.    Cardiovascular:  Negative for chest pain, palpitations and leg swelling.   Gastrointestinal:  Negative for abdominal pain, blood in stool, constipation, nausea and vomiting.        Denies Fatty liver, Hepatitis   Endocrine: Negative for cold intolerance and heat intolerance.   Genitourinary:  Negative for decreased urine volume, difficulty urinating, dysuria, frequency, hematuria and urgency.   Musculoskeletal:  Positive for arthralgias (right hip) and  "back pain. Negative for neck pain and neck stiffness.   Skin:  Negative for rash and wound.   Neurological:  Positive for numbness (bilateral foot). Negative for dizziness, tremors, seizures, syncope, weakness and headaches.   Hematological:  Negative for adenopathy. Does not bruise/bleed easily.   Psychiatric/Behavioral:  Negative for confusion, sleep disturbance and suicidal ideas.             VITALS  Visit Vitals  BP (!) 146/82 (BP Location: Right arm, Patient Position: Sitting)   Pulse 72   Temp 98 °F (36.7 °C) (Oral)   Ht 6' 1" (1.854 m)   Wt 113.4 kg (250 lb)   SpO2 96%   BMI 32.98 kg/m²          Physical Exam  Vitals reviewed.   Constitutional:       General: He is not in acute distress.     Appearance: He is well-developed. He is obese.   HENT:      Head: Normocephalic.      Nose: Nose normal.      Mouth/Throat:      Pharynx: No oropharyngeal exudate.   Eyes:      General:         Right eye: No discharge.         Left eye: No discharge.      Conjunctiva/sclera: Conjunctivae normal.      Pupils: Pupils are equal, round, and reactive to light.   Neck:      Thyroid: No thyromegaly.      Vascular: No carotid bruit or JVD.      Trachea: No tracheal deviation.   Cardiovascular:      Rate and Rhythm: Normal rate and regular rhythm.      Pulses:           Carotid pulses are 2+ on the right side and 2+ on the left side.       Dorsalis pedis pulses are 2+ on the right side and 2+ on the left side.        Posterior tibial pulses are 2+ on the right side and 2+ on the left side.      Heart sounds: Normal heart sounds. No murmur heard.     Comments: compression stockings - BLE  Pulmonary:      Effort: Pulmonary effort is normal. No respiratory distress.      Breath sounds: Normal breath sounds. No stridor. No wheezing, rhonchi or rales.   Abdominal:      General: Bowel sounds are normal. There is no distension.      Palpations: Abdomen is soft.      Tenderness: There is no abdominal tenderness. There is no guarding.      " Hernia: A hernia is present. Hernia is present in the umbilical area (small).   Musculoskeletal:      Cervical back: Normal range of motion. No pain with movement.      Right lower le+ Pitting Edema present.      Left lower le+ Pitting Edema present.   Lymphadenopathy:      Cervical: No cervical adenopathy.   Skin:     General: Skin is warm and dry.      Capillary Refill: Capillary refill takes less than 2 seconds.      Findings: No erythema or rash.   Neurological:      Mental Status: He is alert and oriented to person, place, and time.      Coordination: Coordination normal.        Significant Labs:  Lab Results   Component Value Date    WBC 6.04 2022    HGB 14.0 2022    HCT 43.1 2022     2022    ALT 13 2016    AST 17 2016     2022    K 4.5 2022     2022    CREATININE 2.2 (H) 2022    BUN 15 2022    CO2 24 2022    INR 1.1 2022    HGBA1C 6.7 (H) 2022       Diagnostic Studies: No relevant studies.    EKG:   Normal sinus rhythm   Left anterior fascicular block   T wave abnormality, consider anterolateral ischemia   Abnormal ECG   When compared with ECG of 20-AUG-2015 11:28,   No significant change was found   Confirmed by Rhea Marcos MD ([id number]) on 2022 4:33:38 PM     2D ECHO: 3/23/21  TTE:      Nuclear Stress Test 2020              Imaging   MRI L-Spine 22  FINDINGS:  Alignment: Normal.     Vertebrae: Normal vertebral body heights.  Several osseous vertebral body hemangiomas.  No abnormal signal..     Discs: Normal height and signal.     Cord: Normal cord signal.  Conus terminates at L1-L2     Degenerative findings:     T12-L1: No significant spinal canal stenosis neural foraminal narrowing     L1-L2: No significant spinal canal stenosis or neural foraminal narrowing.     L2-L3: Mild diffuse disc bulge, bilateral facet arthropathy, and ligamentum flavum buckling.  Partial effacement of the thecal  sac without significant spinal canal narrowing.  Mild bilateral neural foraminal narrowing     L3-L4: Mild diffuse disc bulge, bilateral facet arthropathy, and ligamentum flavum buckling.  Flattening of the anterior thecal sac with no more than mild spinal canal stenosis.  Mild bilateral neural foraminal narrowing.     L4-L5: Mild diffuse disc bulge, bilateral facet arthropathy, and ligamentum flavum hypertrophy.  Mild spinal canal stenosis.  Mild bilateral neural foraminal narrowing.     L5-S1: Bilateral facet arthropathy.  Mild bilateral foraminal narrowing.     Paraspinal muscles & soft tissues: Atrophic right kidney.  T2 hypertense signal within left kidney could represent a cyst.  Mild posterior subcutaneous tissue edema.     Impression:     Multilevel lumbar spondylosis as detailed level by level above, mild bilateral neural foraminal from L3 to S1.  No more than mild spinal canal stenosis at L3-L4 and L4-L5.     Electronically signed by resident: Stacey House  Date:                                            09/17/2022  Time:                                           12:53     Electronically signed by: Prabhu Rodriguez MD  Date:                                            09/17/2022  Time:                                           13:25        DESTINY 8/11/21        MRI C-Spine 6/14/21    FINDINGS:  Skull base and craniocervical junction: Normal.     C1-C2: Dens is intact.  Pre dens space is maintained.     Alignment: Normal.     Vertebrae: Vertebral heights are well maintained with no evidence of infiltrative process or acute fractures.     Discs: Multilevel degenerative disease.     Cord: No signal abnormality is identified.     Degenerative findings:     C2-C3: No spinal canal stenosis or neural foraminal narrowing.     C3-C4: Posterior disc osteophyte complex and bilateral uncovertebral spurring resulting in mild spinal canal stenosis and moderate to severe bilateral neural foraminal narrowing.     C4-C5:  Posterior disc osteophyte complex and bilateral uncovertebral spurring resulting in mild spinal canal stenosis and moderate left mild right neural foraminal narrowing     C5-C6: Posterior disc osteophyte complex and bilateral uncovertebral spurring resulting in mild spinal canal stenosis and mild bilateral neural foraminal narrowing.     C6-C7: Posterior disc osteophyte complex and bilateral uncovertebral spurring resulting in mild spinal canal stenosis and mild bilateral neural foraminal narrowing, left more than right.     C7-T1: No spinal canal stenosis or neural foraminal narrowing.     No spinal canal stenosis or neural foraminal narrowing throughout the thoracic spine.     Paraspinal muscles & soft tissues: Unremarkable.     Impression:     Multilevel spondylosis of the cervical spine, most prominent at the level of C3-C4, as above.     Electronically signed by resident: Tae Akers  Date:                                            06/14/2021  Time:                                           15:00     Electronically signed by: Prabhu Rodriguez MD  Date:                                            06/14/2021  Time:                                           15:40      Carotid  8/11/21          Active Cardiac Conditions: None      Revised Cardiac Risk Index   High -Risk Surgery  Intraperitoneal; Intrathoracic; suprainguinal vascular Yes- + 1 No- 0   History of Ischemic Heart Disease   (Hx of MI/positive exercise test/current chest pain due to ischemia/use of nitrate therapy/EKG with pathological Q waves) Yes- + 1 No- 0   History of CHF  (Pulmonary edema/bilateral rales or S3 gallop/PND/CXR showing pulmonary vascular redistribution) Yes- + 1 No- 0   History of CVA   (Prior stroke or TIA) Yes- + 1 No- 0   Pre-operative treatment with insulin Yes- + 1 No- 0   Pre-operative creatinine > 2mg/dl Yes- + 1 No- 0   Total: 1      Risk Status:  Estimated risk of cardiac complications after non-cardiac surgery using the  Revised Cardiac Risk Index for Preoperative risk is 3.9 %      ARISCAT (Canet) risk index: Low: 1.6% risk of post-op pulmonary complications; Intermediate: 13.3% risk of post-op pulmonary complications if surgery is > 3 hours.     American Society of Anesthesiologists Physical Status classification (ASA): 3    Bill respiratory failure index: 0.5 %         No further cardiac workup needed prior to surgery.          Orders Placed This Encounter    Basic Metabolic Panel    EKG 12-lead           Assessment/Plan:     Chronic pain  Location: back and right hip; bilateral foot R>L  Treated with: Oxycodone  Followed per pain management    Osteoarthritis of spine with radiculopathy, lumbar region  No loss of bladder or bowel control    Denies N/T to buttocks, perineum and inner surfaces of the thighs (saddle anesthesia)    History of multiple ESIs and RFAs  Followed per Pain Management   Recent imaging: Xray L-Spine 8/30/22  COMPARISON:  06/03/2021     FINDINGS:  Vertebral bodies are intact.  Disc spaces are maintained.  No instability in flexion and extension.  No significant bony abnormalities seen     Impression:     See above       Mild persistent asthma, uncomplicated  Treated with  albuterol inhaler/Symbicort/Spiriva-- all prn ;  controlled.  Last episode: 4 years ago  Reports multiple hospitalizations; denies intubations  [facility]      I recommend possible use of inhaled bronchodilators if patient develops perioperative bronchospasm.     Essential hypertension  Current BP not at goal today; 146/82.    Taking: hydralazine/carvedilol/amlodipine  Patient reports home BP readings of: 134-135/60s-70s - daily  Keeping a healthy weight/BMI can help with better BP control     Lifestyle changes to reduce systolic BP:   exercise 30 minutes per day,  5 days per week or 150 minutes weekly; sodium reduction and avoidance of high salt foods such as processed meats, frozen meals and  fast foods.       I  recommend monitoring BP during perioperative period as uncontrolled pain can elevate blood pressure.           CKD (chronic kidney disease) stage 3, GFR 30-59 ml/min   No changes in urine volume.   Most recent GFR:  31.2  BUN= 15    Cr = 2.2  Creatinine [facility] records in September 2022- 1.9  Encouraged to avoid NSAIDs, reduce salt intake, maintain adequate hydration, and exercise.     Tylenol as needed for pain    I  suggest monitoring renal function, input and output status yulissa-operatively. I  suggest avoiding nephrotoxic medication including NSAIDs, COX2 inhibitors, intravenous contrast agent,avoiding hypotension to prevent further renal impairment.         Long term (current) use of anticoagulants  Currently on Xarelto for history of DVT  Will request [facility] from [facility]    DVT (deep venous thrombosis)  Unprovoked event?? Right DVT in 2000, 2008, and 2013- per old Vascular surgery.  Reports previous vascular procedure to right leg?- will request [facility] notes.   Treated with Xarelto 20 mg daily  [facility]  Increased risk of thrombosis in the perioperative period.     Anemia  Current labs:  Hgb-      Hct-      Symptoms of weakness, fatigue, exercise intolerance or CP/SOB?  Recommend monitoring during perioperative period.     Diabetes mellitus type 2 without retinopathy  Not currently on medications   Most recent A1c is   Average accuchecks are      . No regular accuchecks.       Reports peripheral neuropathy- bilateral foot.   Denies visual changes. Up to date with eye exams.  Micro changes: Denies- Retinopathy, Nephropathy   Macro changes: Denies-  Carotid, Coronary  Peripheral disease      Maintain healthy weight. Exercise at least 150 minutes weekly. Encouraged diet rich in nutrients such as fruits, vegetables, and whole grains; reduce sugar intake from cakes, candy, and sugared drinks.    Polyp of colon  Colonoscopy in 2017- benign polyp      Postoperative  heterotopic ossification  Scheduled with Dr. Sinha on 1/4/22 for excision of right hip lesion on 1/4/23.    Edema   I suggest avoidance of added salt and voidance of NSAID's- unless advised or ordered. I recommend limb elevation and mario hose use during perioperative period.    BUZZ (obstructive sleep apnea)  CPAP compliance: Yes.     Encouraged weight loss, increased exercise.  Recommend caution with sedating medication that can cause respiratory depression.         Class 1 obesity with serious comorbidity and body mass index (BMI) of 33.0 to 33.9 in adult  Patient would benefit from weight loss and has not set goals to achieve success.   Lifestyle changes should be made by eating healthy, exercising at least 150 minutes weekly, and avoiding sedentary behavior.         Discussion/Management of Perioperative Care    Thromboembolic prophylaxis (VTE) Care: Risk factors for thrombosis include: age, decreased mobility, obesity, previous history of thrombosis, and surgical procedure.  I recommend prophylaxis of thromboembolism with the use of compression stockings/pneumatic devices, and/or pharmacologic agents. The benefits should outweigh the risks for pharmacologic prophylaxis in the perioperative period. I also encourage early ambulation if not contraindicated during the post-operative period.    Risk factors for post-operative pulmonary complications include:age > 65 years, diabetes mellitus, HTN, pre-existing renal disease, and vascular disease. To reduce the risk of pulmonary complications, prophylactic recommendations include: incentive spirometry use/deep breathing, end tidal carbon dioxide monitoring, and pain control.    Risk factors for renal complications include: Pre-existing renal disease, age, diabetes mellitus, HTN, and vascular disease. To reduce the risk of postoperative renal complications, I recommend the patient maintain adequate fluid volume status by drinking 2 liters of water daily.  Avoid/reduce  NSAIDS and FISHER-2 inhibitors use as well as IV contrast for renal protection.    I recommend the use of appropriate prophylactic antibiotics to reduce the risk of surgical site infections.    Delirium risk factors include advanced age, decreased mobility, and opioid use. I recommend to avoid/reduce use of benzodiazepine use (not for patients who take on a regular basis), anticholinergics, Benadryl,  and agents that may cause postoperative serotonin syndrome.  Controlled pain can decrease the risk for postop delirium and since opioids are used for postoperative pain control, I suggest using the lowest dose for the shortest amount of time necessary for pain management.     The patient is at an increased risk for urinary retention due to : possible regional anesthesia and advanced age. I recommend to avoid/decrease the use of benzodiazepines, anticholinergics, and Benadryl in the perioperative period. I also recommend using opioids for the shortest period of time if possible.          This visit was focused on Preoperative evaluation, Perioperative Medical management, complication reduction plans. I suggest that the patient follows up with primary care or relevant sub specialists for ongoing health care.    I appreciate the opportunity to be involved in this patients care. Please feel free to contact me if there were any questions about this consultation.    Patient is ready for surgery.   [facility]im[facility]- note scanned in media along with EKG/Stress test/Echo/DESTINY results/old labs.  Cr- 2.2 - discussed with patient- admits to not drinking enough water. He prefers tea/juices. Patient will maintain adequate hydration with water today. He denies NSAID use. [facility] and has upcoming visit soon.     Emeli Hampton NP  Perioperative Medicine  [facility]

## 2022-12-19 NOTE — ASSESSMENT & PLAN NOTE
CPAP compliance: Yes.     Encouraged weight loss, increased exercise.  Recommend caution with sedating medication that can cause respiratory depression.

## 2022-12-19 NOTE — ASSESSMENT & PLAN NOTE
I suggest avoidance of added salt and voidance of NSAID's- unless advised or ordered. I recommend limb elevation and mario hose use during perioperative period.

## 2022-12-19 NOTE — HPI
This is a 72 y.o. male  who presents today for a preoperative evaluation in preparation for an Orthopedic  procedure.  Scheduled for  excision of right femur lesion.  Surgery is indicated for heterotopic ossification of bone.   Patient is new to me.  Details of current problem: The duration of problem is  3 years. 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 Pain is becoming progressively worse. He has tried PT without relief.   Reports symptoms of  constant aching pain to right lateral hip radiating around to right lower back.   Aggravating factors include:  walking, laying down, sitting, and decreased ADLs.    Relieving factors are  Oxycodone daily and lidocaine patch prn.   Reports pain: 10/10 to right hip ; uses cane for assistance with mobility.    The history has been obtained by speaking with the patient and reviewing the electronic medical record and/or outside health information. Significant health conditions for the perioperative period are discussed below in assessment and plan.     Patient reports current health status to be Good.  Denies any new symptoms before surgery.

## 2022-12-21 ENCOUNTER — HOSPITAL ENCOUNTER (OUTPATIENT)
Dept: CARDIOLOGY | Facility: CLINIC | Age: 72
Discharge: HOME | End: 2022-12-21
Payer: OTHER GOVERNMENT

## 2022-12-21 DIAGNOSIS — M79.604 PAIN OF RIGHT LOWER EXTREMITY: Primary | ICD-10-CM

## 2022-12-21 DIAGNOSIS — I10 ESSENTIAL HYPERTENSION: ICD-10-CM

## 2022-12-21 PROCEDURE — 93005 ELECTROCARDIOGRAM TRACING: CPT | Mod: PBBFAC | Performed by: INTERNAL MEDICINE

## 2022-12-21 PROCEDURE — 93010 EKG 12-LEAD: ICD-10-PCS | Mod: S$PBB,,, | Performed by: INTERNAL MEDICINE

## 2022-12-21 PROCEDURE — 93010 ELECTROCARDIOGRAM REPORT: CPT | Mod: S$PBB,,, | Performed by: INTERNAL MEDICINE

## 2022-12-21 RX ORDER — MUPIROCIN 20 MG/G
OINTMENT TOPICAL
Status: CANCELLED | OUTPATIENT
Start: 2022-12-21

## 2022-12-21 RX ORDER — SODIUM CHLORIDE 9 MG/ML
INJECTION, SOLUTION INTRAVENOUS CONTINUOUS
Status: CANCELLED | OUTPATIENT
Start: 2022-12-21

## 2022-12-21 NOTE — H&P
CC:  Right hip pain    Macario Dior Jr. is a 72 y.o. male with Right hip pain.  Pain is worse with activity and weight bearing.  Patient has experienced interference of activities of daily living due to increased pain and decreased range of motion. Xray reveals heterotopic bone around the right hip. Patient has failed non-operative treatment including NSAIDs, as well as greater than 3 months of activity modification. Macario Dior Jr. ambulates independently.     Relevant medical conditions of significance in perioperative period:  S/p hip replacement with heterotopic ossification of bone  DVT- on medication  CKD- monitored by primary care  HTN- no medication managed by pcp  Lumbar radiculopathy- on medication managed by pcp  DM- on medication managed by pcp      Past Medical History:   Diagnosis Date    Arthritis     Asthma     chronic    CKD (chronic kidney disease), stage III     patient denies    Deep vein thrombosis     Demyelinating neuropathy     polyneuropathy    Diabetes mellitus type 2 without retinopathy 12/14/2022    Gout     History of blood clots 2013    lower part of both legs    HLD (hyperlipidemia)     Hypertension     Iron deficiency anemia     BUZZ (obstructive sleep apnea)     CPAP    Pulmonary embolism        Past Surgical History:   Procedure Laterality Date    COLONOSCOPY N/A 11/7/2016    Procedure: COLONOSCOPY;  Surgeon: Shar Weiss MD;  Location: [address]);  Service: Endoscopy;  Laterality: N/A;  [facility] Referral. Okay to hold Coumadin 5 days prior to procedure. See Referral scaned in media tab on 10/20/16.    COLONOSCOPY N/A 9/21/2017    Procedure: COLONOSCOPY;  Surgeon: Stuart Trinidad MD;  Location: [address]);  Service: Endoscopy;  Laterality: N/A;  [facility] Olivarez-see scanned docs under media tab          9/6/17-current [facility] authorization and last clinic visit scanned into chart    EPIDURAL STEROID INJECTION N/A 12/17/2020     Procedure: INJECTION, STEROID, EPIDURAL, L5-S1 clear to hold Xarelto 2 days;  Surgeon: Moy Vasquez MD;  Location: [facility] PA[facility];  Service: Pain Management;  Laterality: N/A;    HIP SURGERY Right 2008    exploratory at [facility]    HIP SURGERY Right 8-31-15    THR    INJECTION OF ANESTHETIC AGENT AROUND NERVE Right 10/23/2019    Procedure: BLOCK, NERVE, Obturator and Femoral cleared to hold xarelto 3 days pt. said he's not taking coumadin;  Surgeon: Moy Vasquez MD;  Location: [facility]GT;  Service: Pain Management;  Laterality: Right;    INJECTION OF ANESTHETIC AGENT AROUND NERVE Right 7/6/2020    Procedure: BLOCK, NERVE, RIGHT MBB L3,L5,L5;  Surgeon: Moy Vasquez MD;  Location: [facility]GT;  Service: Pain Management;  Laterality: Right;  BLOCK, NERVE, RIGHT MBB L3,L5,L5    INJECTION OF ANESTHETIC AGENT AROUND NERVE Right 7/30/2020    Procedure: BLOCK, NERVE RIGHT L3, L4, L5 2ND;  Surgeon: Moy Vasquez MD;  Location: [facility]IN [facility]GT;  Service: Pain Management;  Laterality: Right;  NEEDS CONSENT, XARELTO    LEG SURGERY Left 2012    fibula and tibia    RADIOFREQUENCY ABLATION Right 10/5/2020    Procedure: RADIOFREQUENCY ABLATION RIGHT L3,4,5;  Surgeon: Moy Vasquez MD;  Location: [facility];  Service: Pain Management;  Laterality: Right;  RADIOFREQUENCY ABLATION RIGHT L3,4,5  ok to hold xarelto, scanned in Media    SHOULDER SURGERY Right 1989    torn ligament       Family History   Problem Relation Age of Onset    Cancer Mother         ovarian    Cancer Father     Cancer Sister         breast    Cancer Sister         breast    No Known Problems Brother     No Known Problems Daughter     No Known Problems Daughter     No Known Problems Daughter     No Known Problems Son     No Known Problems Son        Review of patient's allergies indicates:   Allergen Reactions    Amlodipine Swelling    Lisinopril Tinitus and Swelling    Pregabalin Swelling    Azithromycin      Muscles tense up    Levetiracetam Other (See  Comments)    Acetaminophen Rash     Feels like needles are sticking him per pt         Current Outpatient Medications:     albuterol (PROVENTIL) 2.5 mg /3 mL (0.083 %) nebulizer solution, Take 2.5 mg by nebulization every 6 (six) hours as needed for Wheezing., Disp: , Rfl:     albuterol 90 mcg/actuation inhaler, Inhale 2 puffs into the lungs every 6 (six) hours as needed for Wheezing. , Disp: , Rfl:     amlodipine (NORVASC) 10 MG tablet, Take 10 mg by mouth once daily., Disp: , Rfl:     atorvastatin (LIPITOR) 80 MG tablet, Take 80 mg by mouth once daily. Take one-half tablet daily, Disp: , Rfl:     budesonide-formoterol 160-4.5 mcg (SYMBICORT) 160-4.5 mcg/actuation HFAA, Inhale 2 puffs into the lungs every 12 (twelve) hours. as needed, Disp: , Rfl:     camphor-menthol 0.5-0.5% (SARNA) lotion, APPLY MODERATE AMOUNT TOPICALLY TWICE A DAY, Disp: , Rfl:     carboxymethyl-gly-mbhu45-N[place] 0.5-1-0.5 % Dpet, Place 1 drop into both eyes 4 (four) times daily., Disp: , Rfl:     carboxymethylcellulose sodium 1 % DpGe, Place 0.4 mLs into both eyes 4 (four) times daily as needed., Disp: , Rfl:     carvedilol (COREG) 25 MG tablet, Take 25 mg by mouth 2 (two) times daily., Disp: , Rfl:     chlorhexidine (PERIDEX) 0.12 % solution, RINSE 1 CAPFUL BY MOUTH ONCE DAILY AS NEEDED SWISH FOR 30 SECONDS AFTER,AND SPIT OUT, Disp: , Rfl:     CLOBETASOL PROPIONATE, BULK, MISC, Apply topically once daily., Disp: , Rfl:     cyanocobalamin (VITAMIN B-12) 100 MCG tablet, Take 100 mcg by mouth once daily. Take two tablets, Disp: , Rfl:     cycloSPORINE (RESTASIS) 0.05 % ophthalmic emulsion, INSTILL 0.4ML IN EACH EYE EVERY 12 HOURS, Disp: , Rfl:     eplerenone (INSPRA) 25 MG Tab, Take 1 tablet by mouth once daily., Disp: , Rfl:     ergocalciferol (ERGOCALCIFEROL) 50,000 unit Cap, Take 50,000 Units by mouth., Disp: , Rfl:     finasteride (PROSCAR) 5 mg tablet, TAKE ONE TABLET BY MOUTH ONCE DAILY FOR PROSTATE, Disp: , Rfl:     hydrALAZINE (APRESOLINE)  "50 MG tablet, TAKE ONE TABLET BY MOUTH THREE TIMES A DAY FOR HEART/BLOOD PRESSURE, Disp: , Rfl:     ketoconazole (NIZORAL) 2 % cream, Apply topically 2 (two) times daily., Disp: , Rfl:     latanoprost 0.005 % ophthalmic solution, Place 1 drop into both eyes every evening., Disp: , Rfl:     lidocaine (LIDODERM) 5 %, Place 1 patch onto the skin once daily. Wear for 12 hours, then remove. Do not apply a new patch for at least 12 hours., Disp: , Rfl:     mineral oil/petrolatum,white (WHITE PETROLATUM-MINERAL OIL OPHT), APPLY THIN RIBBON TO EACH EYE AT BEDTIME FOR DRY EYES, Disp: , Rfl:     oxycodone (OXY-IR) 5 mg Cap, Take 2 capsules (10 mg total) by mouth every 6 (six) hours as needed., Disp: 120 capsule, Rfl: 0    rivaroxaban (XARELTO) 20 mg Tab, Take 20 mg by mouth. , Disp: , Rfl:     tiotropium (SPIRIVA) 18 mcg inhalation capsule, Inhale 18 mcg into the lungs once daily. Controller  prn, Disp: , Rfl:   No current facility-administered medications for this visit.    Facility-Administered Medications Ordered in Other Visits:     ALPRAZolam tablet 0.5 mg, 0.5 mg, Oral, On Call Procedure, Lisha Cote MD    Review of Systems:   Constitutional: no fever or chills  Eyes: no visual changes  ENT: no nasal congestion or sore throat  Respiratory: no cough or shortness of breath  Cardiovascular: no chest pain or palpitations  Gastrointestinal: no nausea or vomiting, tolerating diet  Genitourinary: no hematuria or dysuria  Integument/Breast: no rash or pruritis  Hematologic/Lymphatic: no easy bruising or lymphadenopathy  Musculoskeletal: positive for hip pain  Neurological: no seizures or tremors  Behavioral/Psych: no auditory or visual hallucinations  Endocrine: no heat or cold intolerance    PE:  Ht 6' 1" (1.854 m)   Wt 114 kg (251 lb 4.8 oz)   BMI 33.16 kg/m²   General: Pleasant, cooperative, NAD   Gait: antalgic  HEENT: NCAT, sclera nonicteric   Lungs: Respirations are clear, equal and unlabored.   CV: S1S2; 2+ " bilateral upper and lower extremity pulses.   Skin: Intact throughout LE with no rashes, erythema, or lesions  Extremities: No LE edema, NVI lower extremities    Right Hip Exam:  90 degrees flexion  0 degrees extension   20 degrees internal rotation  20 degrees external rotation  30 degrees abduction  30 degrees adduction  There is pain with passive range of motion.     Radiographs: Radiographs reveal right hip prosthesis in proper position and alignment.     Diagnosis: heterotopic ossification, right hip    Plan: excision of heterotopic bone    Due to the serious nature of total joint infection and the high prevalence of community acquired MRSA, vancomycin will be used perioperatively.

## 2022-12-21 NOTE — PROGRESS NOTES
Macario Dior Jr. is a 72 y.o. year old here today for pre surgery optimization visit  in preparation for a Right hip excision of heterotopic bone to be performed by Dr. Sinha  on 1/4/2023.  he was last seen and treated in the clinic on 9/27/2022. he will be medically optimized by the pre op center. There has been no significant change in medical status since last visit. No fever, chills, malaise, or unexplained weight change.      Allergies, Medications, past medical and surgical history reviewed.    Focused examination performed.    Patient declined to see surgeon today. All questions answered. Patient encouraged to call with questions. Contact information given.     Pre, yulissa, and post operative procedures and expectations discussed. Goals of successful surgery reviewed and include manageable pain levels, surgical site free of infection, medication management, and ambulation with PT/OT assistance. Healthy weight management discussed with patient and caregiver who were receptive to eduction of healthy diet and activity. No other necessary lifestyle changes identified. Educated patient about signs and symptoms of infection, medication management, anticoagulation therapy, risk of tobacco and alcohol use, and self-care to promote healing. Surgical guide given for future reference. Hibiclens given to patient with instructions. All questions were answered.     Macario Dior Jr. verbalized an understanding to the education and goals. Patient has displayed readiness to engage in care and is ready to proceed with surgery.  Patient reports his family is able and ready to provide assistance at home after discharge.    Surgical consents signed.    Macario Dior JrJillian will contact us if there are any questions, concerns, or changes in medical status prior to surgery.         Patient has discussed discharge planning with surgeon. Patient will be discharged to home following surgery.       20 minutes of time was spent on  patient education, review of records, templating, H&P, , appointment scheduling and optimizing patient for surgery.

## 2022-12-28 ENCOUNTER — TELEPHONE (OUTPATIENT)
Dept: ORTHOPEDICS | Facility: CLINIC | Age: 72
End: 2022-12-28
Payer: OTHER GOVERNMENT

## 2022-12-28 NOTE — TELEPHONE ENCOUNTER
Spoke with patient who states that his medical equipment was delivered to his home last week. Patient states that someone called [facility]. Explained to him that I didn't know who that could be as I didn't see that anyone documented that they had called.     ----- Message from Larisa Rasmussen sent at 12/28/2022 10:31 AM CST -----  Contact: pt  Pt calling in regards to [facility] info for his Records , Freddy  [phone] for Pt records       Confirmed patient's contact info below:  Contact Name: Macario Dior  Phone Number: [phone]

## 2022-12-29 ENCOUNTER — ANESTHESIA EVENT (OUTPATIENT)
Dept: SURGERY | Facility: HOSPITAL | Age: 72
End: 2022-12-29
Payer: OTHER GOVERNMENT

## 2022-12-29 ENCOUNTER — TELEPHONE (OUTPATIENT)
Dept: PREADMISSION TESTING | Facility: HOSPITAL | Age: 72
End: 2022-12-29
Payer: OTHER GOVERNMENT

## 2022-12-29 NOTE — TELEPHONE ENCOUNTER
Spoke to patient, scheduled BMP lab 12/30/22@3pm . Also, informed patient the nurse at his Cardiologist office is aware he needs Xarelto instructions for upcoming surgery tomorrow at his appointment. Also, told him we need the instructions in writing and they are to fax them to pre op. Patient verbalized understanding.

## 2022-12-30 ENCOUNTER — LAB VISIT (OUTPATIENT)
Dept: LAB | Facility: HOSPITAL | Age: 72
End: 2022-12-30
Payer: OTHER GOVERNMENT

## 2022-12-30 DIAGNOSIS — I10 ESSENTIAL HYPERTENSION: ICD-10-CM

## 2022-12-30 LAB
ANION GAP SERPL CALC-SCNC: 12 MMOL/L (ref 8–16)
BUN SERPL-MCNC: 15 MG/DL (ref 8–23)
CALCIUM SERPL-MCNC: 8.7 MG/DL (ref 8.7–10.5)
CHLORIDE SERPL-SCNC: 105 MMOL/L (ref 95–110)
CO2 SERPL-SCNC: 24 MMOL/L (ref 23–29)
CREAT SERPL-MCNC: 2.2 MG/DL (ref 0.5–1.4)
EST. GFR  (NO RACE VARIABLE): 31.2 ML/MIN/1.73 M^2
GLUCOSE SERPL-MCNC: 103 MG/DL (ref 70–110)
POTASSIUM SERPL-SCNC: 4.5 MMOL/L (ref 3.5–5.1)
SODIUM SERPL-SCNC: 141 MMOL/L (ref 136–145)

## 2022-12-30 PROCEDURE — 36415 COLL VENOUS BLD VENIPUNCTURE: CPT | Performed by: NURSE PRACTITIONER

## 2022-12-30 PROCEDURE — 80048 BASIC METABOLIC PNL TOTAL CA: CPT | Performed by: NURSE PRACTITIONER

## 2023-01-03 ENCOUNTER — TELEPHONE (OUTPATIENT)
Dept: ORTHOPEDICS | Facility: CLINIC | Age: 73
End: 2023-01-03
Payer: OTHER GOVERNMENT

## 2023-01-03 NOTE — ANESTHESIA PREPROCEDURE EVALUATION
[facility]  Anesthesia Pre-Operative Evaluation         Patient Name: Macario Dior Jr.  YOB: 1950  MRN: [id number]    SUBJECTIVE:     Pre-operative evaluation for Procedure(s) (LRB):  EXCISION, LESION, FEMUR: POSTERIOR APPROACH: RIGHT HIP: EXISION OF HETERTOPIC BONE: NO IMPLANTS NEEDED (Right)     01/03/2023    Macario Dior Jr. is a 72 y.o. male w/ a significant PMHx of  HTN, DVT on xarelto, CKD3, DM2, asthma, BUZZ. Who presents with R hip heterotrophic ossification prompting above procedure.     Patient now presents for the above procedure(s).      LDA: None documented.       Prev airway: None documented.    Drips: None documented.      Patient Active Problem List   Diagnosis    Aseptic necrosis of bone, site unspecified    Hip joint replacement by other means    Long term (current) use of anticoagulants    Deep vein thrombosis prophylaxis    Status post hip replacement right 8/31/2015    DVT (deep venous thrombosis)    Periorbital cellulitis of left eye    Preseptal cellulitis of left eye    Essential hypertension    CKD (chronic kidney disease) stage 3, GFR 30-59 ml/min    Edema    Anemia associated with acute blood loss    Chronic pain    Pain in both lower extremities    Leg pain, anterior, unspecified laterality    Lumbar radiculopathy, right    Osteoarthritis of spine with radiculopathy, lumbar region    Lumbar spondylosis    Postoperative heterotopic ossification    Anemia    Diabetes mellitus type 2 without retinopathy    Idiopathic peripheral neuropathy    Mild persistent asthma, uncomplicated    Personal history of other venous thrombosis and embolism    Polyp of colon    BUZZ (obstructive sleep apnea)    Class 1 obesity with serious comorbidity and body mass index (BMI) of 33.0 to 33.9 in adult       Review of patient's allergies indicates:   Allergen Reactions    Amlodipine Swelling    Lisinopril Tinitus and Swelling    Pregabalin Swelling     Azithromycin      Muscles tense up    Levetiracetam Other (See Comments)    Acetaminophen Rash     Feels like needles are sticking him per pt       Current Inpatient Medications:      Current Facility-Administered Medications on File Prior to Encounter   Medication Dose Route Frequency Provider Last Rate Last Admin    ALPRAZolam tablet 0.5 mg  0.5 mg Oral On Call Procedure Lisha Cote MD         Current Outpatient Medications on File Prior to Encounter   Medication Sig Dispense Refill    albuterol (PROVENTIL) 2.5 mg /3 mL (0.083 %) nebulizer solution Take 2.5 mg by nebulization every 6 (six) hours as needed for Wheezing.      albuterol 90 mcg/actuation inhaler Inhale 2 puffs into the lungs every 6 (six) hours as needed for Wheezing.       amlodipine (NORVASC) 10 MG tablet Take 10 mg by mouth once daily.      atorvastatin (LIPITOR) 80 MG tablet Take 80 mg by mouth once daily. Take one-half tablet daily      budesonide-formoterol 160-4.5 mcg (SYMBICORT) 160-4.5 mcg/actuation HFAA Inhale 2 puffs into the lungs every 12 (twelve) hours. as needed      camphor-menthol 0.5-0.5% (SARNA) lotion APPLY MODERATE AMOUNT TOPICALLY TWICE A DAY      carboxymethyl-gly-phzc27-WS 0.5-1-0.5 % Dpet Place 1 drop into both eyes 4 (four) times daily.      carboxymethylcellulose sodium 1 % DpGe Place 0.4 mLs into both eyes 4 (four) times daily as needed.      carvedilol (COREG) 25 MG tablet Take 25 mg by mouth 2 (two) times daily.      chlorhexidine (PERIDEX) 0.12 % solution RINSE 1 CAPFUL BY MOUTH ONCE DAILY AS NEEDED SWISH FOR 30 SECONDS AFTER,AND SPIT OUT      CLOBETASOL PROPIONATE, BULK, MISC Apply topically once daily.      cyanocobalamin (VITAMIN B-12) 100 MCG tablet Take 100 mcg by mouth once daily. Take two tablets      cycloSPORINE (RESTASIS) 0.05 % ophthalmic emulsion INSTILL 0.4ML IN EACH EYE EVERY 12 HOURS      ergocalciferol (ERGOCALCIFEROL) 50,000 unit Cap Take 50,000 Units by mouth.       finasteride (PROSCAR) 5 mg tablet TAKE ONE TABLET BY MOUTH ONCE DAILY FOR PROSTATE      hydrALAZINE (APRESOLINE) 50 MG tablet TAKE ONE TABLET BY MOUTH THREE TIMES A DAY FOR HEART/BLOOD PRESSURE      ketoconazole (NIZORAL) 2 % cream Apply topically 2 (two) times daily.      latanoprost 0.005 % ophthalmic solution Place 1 drop into both eyes every evening.      lidocaine (LIDODERM) 5 % Place 1 patch onto the skin once daily. Wear for 12 hours, then remove. Do not apply a new patch for at least 12 hours.      mineral oil/petrolatum,white (WHITE PETROLATUM-MINERAL OIL OPHT) APPLY THIN RIBBON TO EACH EYE AT BEDTIME FOR DRY EYES      oxycodone (OXY-IR) 5 mg Cap Take 2 capsules (10 mg total) by mouth every 6 (six) hours as needed. 120 capsule 0    rivaroxaban (XARELTO) 20 mg Tab Take 20 mg by mouth.       tiotropium (SPIRIVA) 18 mcg inhalation capsule Inhale 18 mcg into the lungs once daily. Controller   prn         Past Surgical History:   Procedure Laterality Date    COLONOSCOPY N/A 11/7/2016    Procedure: COLONOSCOPY;  Surgeon: Shar Weiss MD;  Location: [address]);  Service: Endoscopy;  Laterality: N/A;  [facility] Referral. Okay to hold Coumadin 5 days prior to procedure. See Referral scaned in media tab on 10/20/16.    COLONOSCOPY N/A 9/21/2017    Procedure: COLONOSCOPY;  Surgeon: Stuart Trinidad MD;  Location: [address]);  Service: Endoscopy;  Laterality: N/A;  [facility] Olivarez-see scanned docs under media tab          9/6/17-current [facility] authorization and last clinic visit scanned into chart    EPIDURAL STEROID INJECTION N/A 12/17/2020    Procedure: INJECTION, STEROID, EPIDURAL, L5-S1 clear to hold Xarelto 2 days;  Surgeon: Moy Vasquez MD;  Location: [facility] PAIN T;  Service: Pain Management;  Laterality: N/A;    HIP SURGERY Right 2008    expl[facility]atory at [facility]    HIP SURGERY Right 8-31-15    THR    INJECTION OF ANESTHETIC AGENT AROUND NERVE Right  10/23/2019    Procedure: BLOCK, NERVE, Obturator and Femoral cleared to hold xarelto 3 days pt. said he's not taking coumadin;  Surgeon: Moy Vasquez MD;  Location: [facility] PAIN MGT;  Service: Pain Management;  Laterality: Right;    INJECTION OF ANESTHETIC AGENT AROUND NERVE Right 2020    Procedure: BLOCK, NERVE, RIGHT MBB L3,L5,L5;  Surgeon: Moy Vasquez MD;  Location: [facility] PAIN MGT;  Service: Pain Management;  Laterality: Right;  BLOCK, NERVE, RIGHT MBB L3,L5,L5    INJECTION OF ANESTHETIC AGENT AROUND NERVE Right 2020    Procedure: BLOCK, NERVE RIGHT L3, L4, L5 2ND;  Surgeon: Moy Vasquez MD;  Location: [facility] PAIN MGT;  Service: Pain Management;  Laterality: Right;  NEEDS CONSENT, XARELTO    LEG SURGERY Left     fibula and tibia    RADIOFREQUENCY ABLATION Right 10/5/2020    Procedure: RADIOFREQUENCY ABLATION RIGHT L3,4,5;  Surgeon: Moy Vasquez MD;  Location: [facility]IN MGT;  Service: Pain Management;  Laterality: Right;  RADIOFREQUENCY ABLATION RIGHT L3,4,5  ok to hold xarelto, scanned in Media    SHOULDER SURGERY Right     torn ligament       Social History     Socioeconomic History    Marital status: Single   Tobacco Use    Smoking status: Former     Types: Cigars     Quit date: 2009     Years since quittin.4    Smokeless tobacco: Never   Substance and Sexual Activity    Alcohol use: No     Alcohol/week: 0.0 standard drinks    Drug use: No    Sexual activity: Not Currently       OBJECTIVE:     Vital Signs Range (Last 24H):  BP: ()/()   Arterial Line BP: ()/()       Significant Labs:  Lab Results   Component Value Date    WBC 6.04 2022    HGB 14.0 2022    HCT 43.1 2022     2022    ALT 13 2016    AST 17 2016     2022    K 4.5 2022     2022    CREATININE 2.2 (H) 2022    BUN 15 2022    CO2 24 2022    INR 1.1 2022    HGBA1C 6.7 (H) 2022       Diagnostic Studies: No relevant  studies.    EKG:   Results for orders placed or performed during the hospital encounter of 12/21/22   EKG 12-lead    Collection Time: 12/21/22  3:17 PM    Narrative    Test Reason : I10,    Vent. Rate : 072 BPM     Atrial Rate : 072 BPM     P-R Int : 196 ms          QRS Dur : 102 ms      QT Int : 372 ms       P-R-T Axes : 064 -54 037 degrees     QTc Int : 407 ms    Normal sinus rhythm  Left anterior fascicular block  T wave abnormality, consider anterolateral ischemia  Abnormal ECG  When compared with ECG of 20-AUG-2015 11:28,  No significant change was found  Confirmed by Rhea Marcos MD ([id number]) on 12/21/2022 4:33:38 PM    Referred By: GUS BYRNES           Confirmed By:Rhea Marcos MD       2D ECHO:  TTE:  No results found for this or any previous visit.    LESLY:  No results found for this or any previous visit.    ASSESSMENT/PLAN:         Pre-op Assessment    I have reviewed the Patient Summary Reports.       I have reviewed the Medications.     Review of Systems  Anesthesia Hx:  No problems with previous Anesthesia  History of prior surgery of interest to airway management or planning:  Denies Personal Hx of Anesthesia complications.   Social:  Non-Smoker, No Alcohol Use    Hematology/Oncology:     Oncology Normal    -- Anemia:   EENT/Dental:   Eyes: Eye Disease: Glaucoma:     Cardiovascular:   Hypertension  Denies Angina.  Deep Venous Thrombosis (DVT), Hx of DVT    Pulmonary:   Asthma Shortness of breath (with moderate exertion. Patient reports not worsening) Sleep Apnea, CPAP    Renal/:  Renal/ Normal   Kidney Function/Disease, Chronic Kidney Disease (CKD) , CKD Stage III (GFR 30-59)    Hepatic/GI:   Denies GERD. Denies Liver Disease.    Musculoskeletal:  Musculoskeletal General/Symptoms: limited ROM, joint pain.  Joint Disease:  Gout    Neurological:   Denies CVA. Denies Seizures.  Pain , onset is chronic , location of bilateral hips, GROIN, back, and neck , quality of aching/dull, throbbing, burning     Endocrine:  Endocrine Normal    Psych:  Psychiatric Normal              Anesthesia Plan  Type of Anesthesia, risks & benefits discussed:    Anesthesia Type: CSE, Regional, Gen Supraglottic Airway  Intra-op Monitoring Plan: Standard ASA Monitors  Post Op Pain Control Plan: multimodal analgesia and IV/PO Opioids PRN  Induction:  IV  Airway Plan: Direct, Post-Induction  Informed Consent: Informed consent signed with the Patient and all parties understand the risks and agree with anesthesia plan.  All questions answered.   ASA Score: 3  Day of Surgery Review of History & Physical: H&P Update referred to the surgeon/provider.    Ready For Surgery From Anesthesia Perspective.     .

## 2023-01-03 NOTE — TELEPHONE ENCOUNTER
I called the patient today regarding surgery on 01/04/2023 with Dr. Prieto Sinha. I informed the patient that his surgery will be at  [facility] ([address] [place] [id number]). I informed the patient they must arrive at 9:00am and their surgery will start at 11:00am.     I reminded the patient about his COVID-19 swab test. The patient has received the COVID-19 vaccine and records of the vaccine are in the patient's chart. Therefore this patient does not need to complete a pre-procedural Covid-19 test..    I reminded the patient that they cannot eat or drink after midnight, the night before surgery.     I reminded the patient to be careful of their skin over the next few days to make sure they do not get any cuts, scratches or scrapes.     I informed the patient that he will do a myChart Virtual Visit - Audio with our Orthopedic Nurse Navigator on 01/06/2023. I informed the patient to log into their virtual appointment 15 minutes before their scheduled time.    The patient verbalized understanding and has no further questions.

## 2023-01-04 ENCOUNTER — ANESTHESIA (OUTPATIENT)
Dept: SURGERY | Facility: HOSPITAL | Age: 73
End: 2023-01-04
Payer: OTHER GOVERNMENT

## 2023-01-04 ENCOUNTER — HOSPITAL ENCOUNTER (OUTPATIENT)
Facility: HOSPITAL | Age: 73
Discharge: HOME | End: 2023-01-05
Attending: ORTHOPAEDIC SURGERY | Admitting: ORTHOPAEDIC SURGERY
Payer: OTHER GOVERNMENT

## 2023-01-04 DIAGNOSIS — M96.89 POSTOPERATIVE HETEROTOPIC OSSIFICATION: Primary | ICD-10-CM

## 2023-01-04 DIAGNOSIS — M89.8X9 HETEROTOPIC OSSIFICATION OF BONE: ICD-10-CM

## 2023-01-04 DIAGNOSIS — M61.50 POSTOPERATIVE HETEROTOPIC OSSIFICATION: Primary | ICD-10-CM

## 2023-01-04 LAB — POCT GLUCOSE: 165 MG/DL (ref 70–110)

## 2023-01-04 PROCEDURE — 63600175 PHARM REV CODE 636 W HCPCS: Performed by: STUDENT IN AN ORGANIZED HEALTH CARE EDUCATION/TRAINING PROGRAM

## 2023-01-04 PROCEDURE — 01210 ANES OPEN PX HIP JOINT NOS: CPT | Performed by: ORTHOPAEDIC SURGERY

## 2023-01-04 PROCEDURE — 25000003 PHARM REV CODE 250

## 2023-01-04 PROCEDURE — 25000003 PHARM REV CODE 250: Performed by: STUDENT IN AN ORGANIZED HEALTH CARE EDUCATION/TRAINING PROGRAM

## 2023-01-04 PROCEDURE — 71000033 HC RECOVERY, INTIAL HOUR: Performed by: ORTHOPAEDIC SURGERY

## 2023-01-04 PROCEDURE — 63600175 PHARM REV CODE 636 W HCPCS: Performed by: ANESTHESIOLOGY

## 2023-01-04 PROCEDURE — 71000016 HC POSTOP RECOV ADDL HR: Performed by: ORTHOPAEDIC SURGERY

## 2023-01-04 PROCEDURE — 63600175 PHARM REV CODE 636 W HCPCS: Performed by: NURSE PRACTITIONER

## 2023-01-04 PROCEDURE — 71000039 HC RECOVERY, EACH ADD'L HOUR: Performed by: ORTHOPAEDIC SURGERY

## 2023-01-04 PROCEDURE — 63600175 PHARM REV CODE 636 W HCPCS: Performed by: ORTHOPAEDIC SURGERY

## 2023-01-04 PROCEDURE — 63600175 PHARM REV CODE 636 W HCPCS

## 2023-01-04 PROCEDURE — 37000009 HC ANESTHESIA EA ADD 15 MINS: Performed by: ORTHOPAEDIC SURGERY

## 2023-01-04 PROCEDURE — D9220A PRA ANESTHESIA: ICD-10-PCS | Mod: ,,, | Performed by: ANESTHESIOLOGY

## 2023-01-04 PROCEDURE — 94761 N-INVAS EAR/PLS OXIMETRY MLT: CPT

## 2023-01-04 PROCEDURE — 36000706: Performed by: ORTHOPAEDIC SURGERY

## 2023-01-04 PROCEDURE — 25000003 PHARM REV CODE 250: Performed by: NURSE PRACTITIONER

## 2023-01-04 PROCEDURE — D9220A PRA ANESTHESIA: Mod: ,,, | Performed by: ANESTHESIOLOGY

## 2023-01-04 PROCEDURE — 25000003 PHARM REV CODE 250: Performed by: ANESTHESIOLOGY

## 2023-01-04 PROCEDURE — 37000008 HC ANESTHESIA 1ST 15 MINUTES: Performed by: ORTHOPAEDIC SURGERY

## 2023-01-04 PROCEDURE — 27299 UNLISTED PX PELVIS/HIP JOINT: CPT | Mod: RT,,, | Performed by: ORTHOPAEDIC SURGERY

## 2023-01-04 PROCEDURE — 71000015 HC POSTOP RECOV 1ST HR: Performed by: ORTHOPAEDIC SURGERY

## 2023-01-04 PROCEDURE — 27299: ICD-10-PCS | Mod: RT,,, | Performed by: ORTHOPAEDIC SURGERY

## 2023-01-04 PROCEDURE — 27201423 OPTIME MED/SURG SUP & DEVICES STERILE SUPPLY: Performed by: ORTHOPAEDIC SURGERY

## 2023-01-04 PROCEDURE — 36000707: Performed by: ORTHOPAEDIC SURGERY

## 2023-01-04 RX ORDER — HYDROMORPHONE HYDROCHLORIDE 1 MG/ML
0.2 INJECTION, SOLUTION INTRAMUSCULAR; INTRAVENOUS; SUBCUTANEOUS EVERY 5 MIN PRN
Status: COMPLETED | OUTPATIENT
Start: 2023-01-04 | End: 2023-01-04

## 2023-01-04 RX ORDER — KETAMINE HCL IN 0.9 % NACL 50 MG/5 ML
SYRINGE (ML) INTRAVENOUS
Status: DISCONTINUED | OUTPATIENT
Start: 2023-01-04 | End: 2023-01-04

## 2023-01-04 RX ORDER — METHOCARBAMOL 500 MG/1
500 TABLET, FILM COATED ORAL 4 TIMES DAILY
Status: DISCONTINUED | OUTPATIENT
Start: 2023-01-04 | End: 2023-01-05 | Stop reason: HOSPADM

## 2023-01-04 RX ORDER — DIPHENHYDRAMINE HYDROCHLORIDE 50 MG/ML
25 INJECTION INTRAMUSCULAR; INTRAVENOUS EVERY 6 HOURS PRN
Status: DISCONTINUED | OUTPATIENT
Start: 2023-01-04 | End: 2023-01-05 | Stop reason: HOSPADM

## 2023-01-04 RX ORDER — OXYCODONE HYDROCHLORIDE 10 MG/1
10 TABLET ORAL EVERY 6 HOURS PRN
Status: DISCONTINUED | OUTPATIENT
Start: 2023-01-04 | End: 2023-01-04

## 2023-01-04 RX ORDER — MUPIROCIN 20 MG/G
OINTMENT TOPICAL
Status: DISCONTINUED | OUTPATIENT
Start: 2023-01-04 | End: 2023-01-04 | Stop reason: HOSPADM

## 2023-01-04 RX ORDER — MIDAZOLAM HYDROCHLORIDE 1 MG/ML
INJECTION, SOLUTION INTRAMUSCULAR; INTRAVENOUS
Status: DISCONTINUED | OUTPATIENT
Start: 2023-01-04 | End: 2023-01-04

## 2023-01-04 RX ORDER — NICARDIPINE HYDROCHLORIDE 2.5 MG/ML
INJECTION INTRAVENOUS
Status: DISCONTINUED | OUTPATIENT
Start: 2023-01-04 | End: 2023-01-04

## 2023-01-04 RX ORDER — ONDANSETRON 8 MG/1
8 TABLET, ORALLY DISINTEGRATING ORAL EVERY 8 HOURS PRN
Status: DISCONTINUED | OUTPATIENT
Start: 2023-01-04 | End: 2023-01-05 | Stop reason: HOSPADM

## 2023-01-04 RX ORDER — LABETALOL HYDROCHLORIDE 5 MG/ML
20 INJECTION, SOLUTION INTRAVENOUS ONCE
Status: DISCONTINUED | OUTPATIENT
Start: 2023-01-04 | End: 2023-01-04

## 2023-01-04 RX ORDER — ONDANSETRON 4 MG/1
4 TABLET, ORALLY DISINTEGRATING ORAL 2 TIMES DAILY
Qty: 14 TABLET | Refills: 0 | Status: SHIPPED | OUTPATIENT
Start: 2023-01-04 | End: 2023-06-23

## 2023-01-04 RX ORDER — DIPHENHYDRAMINE HYDROCHLORIDE 50 MG/ML
25 INJECTION INTRAMUSCULAR; INTRAVENOUS EVERY 6 HOURS
Status: DISCONTINUED | OUTPATIENT
Start: 2023-01-04 | End: 2023-01-04

## 2023-01-04 RX ORDER — METHOCARBAMOL 500 MG/1
500 TABLET, FILM COATED ORAL ONCE
Status: COMPLETED | OUTPATIENT
Start: 2023-01-04 | End: 2023-01-04

## 2023-01-04 RX ORDER — AMLODIPINE BESYLATE 10 MG/1
10 TABLET ORAL DAILY
Status: DISCONTINUED | OUTPATIENT
Start: 2023-01-04 | End: 2023-01-05 | Stop reason: HOSPADM

## 2023-01-04 RX ORDER — DEXMEDETOMIDINE HYDROCHLORIDE 100 UG/ML
INJECTION, SOLUTION INTRAVENOUS
Status: DISCONTINUED | OUTPATIENT
Start: 2023-01-04 | End: 2023-01-04

## 2023-01-04 RX ORDER — ASPIRIN 81 MG/1
81 TABLET ORAL 2 TIMES DAILY
Qty: 60 TABLET | Refills: 0 | Status: SHIPPED | OUTPATIENT
Start: 2023-01-04 | End: 2023-06-23

## 2023-01-04 RX ORDER — NICARDIPINE HYDROCHLORIDE 0.2 MG/ML
INJECTION INTRAVENOUS CONTINUOUS PRN
Status: DISCONTINUED | OUTPATIENT
Start: 2023-01-04 | End: 2023-01-04

## 2023-01-04 RX ORDER — SODIUM CHLORIDE 0.9 % (FLUSH) 0.9 %
10 SYRINGE (ML) INJECTION
Status: DISCONTINUED | OUTPATIENT
Start: 2023-01-04 | End: 2023-01-04 | Stop reason: HOSPADM

## 2023-01-04 RX ORDER — DOCUSATE SODIUM 100 MG/1
100 CAPSULE, LIQUID FILLED ORAL 2 TIMES DAILY
Qty: 60 CAPSULE | Refills: 0 | Status: SHIPPED | OUTPATIENT
Start: 2023-01-04 | End: 2023-06-23

## 2023-01-04 RX ORDER — LABETALOL HCL 20 MG/4 ML
SYRINGE (ML) INTRAVENOUS
Status: COMPLETED
Start: 2023-01-04 | End: 2023-01-04

## 2023-01-04 RX ORDER — ATORVASTATIN CALCIUM 40 MG/1
80 TABLET, FILM COATED ORAL DAILY
Status: DISCONTINUED | OUTPATIENT
Start: 2023-01-04 | End: 2023-01-05 | Stop reason: HOSPADM

## 2023-01-04 RX ORDER — TRANEXAMIC ACID 100 MG/ML
INJECTION, SOLUTION INTRAVENOUS
Status: DISCONTINUED | OUTPATIENT
Start: 2023-01-04 | End: 2023-01-04

## 2023-01-04 RX ORDER — OXYCODONE HYDROCHLORIDE 10 MG/1
10 TABLET ORAL EVERY 4 HOURS PRN
Qty: 42 TABLET | Refills: 0 | Status: SHIPPED | OUTPATIENT
Start: 2023-01-04

## 2023-01-04 RX ORDER — DEXAMETHASONE SODIUM PHOSPHATE 4 MG/ML
INJECTION, SOLUTION INTRA-ARTICULAR; INTRALESIONAL; INTRAMUSCULAR; INTRAVENOUS; SOFT TISSUE
Status: DISCONTINUED | OUTPATIENT
Start: 2023-01-04 | End: 2023-01-04

## 2023-01-04 RX ORDER — LIDOCAINE HYDROCHLORIDE 10 MG/ML
1 INJECTION, SOLUTION EPIDURAL; INFILTRATION; INTRACAUDAL; PERINEURAL ONCE AS NEEDED
Status: DISCONTINUED | OUTPATIENT
Start: 2023-01-04 | End: 2023-01-05 | Stop reason: HOSPADM

## 2023-01-04 RX ORDER — ALBUTEROL SULFATE 90 UG/1
2 AEROSOL, METERED RESPIRATORY (INHALATION) EVERY 6 HOURS PRN
Status: DISCONTINUED | OUTPATIENT
Start: 2023-01-04 | End: 2023-01-05 | Stop reason: HOSPADM

## 2023-01-04 RX ORDER — ACETAMINOPHEN 325 MG/1
650 TABLET ORAL EVERY 8 HOURS PRN
Status: DISCONTINUED | OUTPATIENT
Start: 2023-01-04 | End: 2023-01-04

## 2023-01-04 RX ORDER — OXYCODONE HYDROCHLORIDE 5 MG/1
5 TABLET ORAL EVERY 6 HOURS PRN
Status: DISCONTINUED | OUTPATIENT
Start: 2023-01-04 | End: 2023-01-04

## 2023-01-04 RX ORDER — FENTANYL CITRATE 50 UG/ML
INJECTION, SOLUTION INTRAMUSCULAR; INTRAVENOUS
Status: DISCONTINUED | OUTPATIENT
Start: 2023-01-04 | End: 2023-01-04

## 2023-01-04 RX ORDER — HYDRALAZINE HYDROCHLORIDE 20 MG/ML
10 INJECTION INTRAMUSCULAR; INTRAVENOUS EVERY 10 MIN PRN
Status: DISCONTINUED | OUTPATIENT
Start: 2023-01-04 | End: 2023-01-05

## 2023-01-04 RX ORDER — LABETALOL HYDROCHLORIDE 5 MG/ML
10 INJECTION, SOLUTION INTRAVENOUS
Status: COMPLETED | OUTPATIENT
Start: 2023-01-04 | End: 2023-01-04

## 2023-01-04 RX ORDER — LABETALOL HCL 20 MG/4 ML
10 SYRINGE (ML) INTRAVENOUS EVERY 10 MIN PRN
Status: DISCONTINUED | OUTPATIENT
Start: 2023-01-04 | End: 2023-01-05 | Stop reason: HOSPADM

## 2023-01-04 RX ORDER — ACETAMINOPHEN 500 MG
1000 TABLET ORAL EVERY 6 HOURS
Status: DISCONTINUED | OUTPATIENT
Start: 2023-01-04 | End: 2023-01-05 | Stop reason: HOSPADM

## 2023-01-04 RX ORDER — OXYCODONE HYDROCHLORIDE 5 MG/1
5 TABLET ORAL EVERY 6 HOURS PRN
Status: DISCONTINUED | OUTPATIENT
Start: 2023-01-04 | End: 2023-01-05 | Stop reason: HOSPADM

## 2023-01-04 RX ORDER — CARVEDILOL 25 MG/1
25 TABLET ORAL 2 TIMES DAILY
Status: DISCONTINUED | OUTPATIENT
Start: 2023-01-04 | End: 2023-01-05 | Stop reason: HOSPADM

## 2023-01-04 RX ORDER — FENTANYL CITRATE 50 UG/ML
INJECTION, SOLUTION INTRAMUSCULAR; INTRAVENOUS
Status: COMPLETED
Start: 2023-01-04 | End: 2023-01-04

## 2023-01-04 RX ORDER — BUDESONIDE AND FORMOTEROL FUMARATE DIHYDRATE 160; 4.5 UG/1; UG/1
2 AEROSOL RESPIRATORY (INHALATION) EVERY 12 HOURS
Status: DISCONTINUED | OUTPATIENT
Start: 2023-01-04 | End: 2023-01-05

## 2023-01-04 RX ORDER — FENTANYL CITRATE 50 UG/ML
25 INJECTION, SOLUTION INTRAMUSCULAR; INTRAVENOUS EVERY 5 MIN PRN
Status: COMPLETED | OUTPATIENT
Start: 2023-01-04 | End: 2023-01-04

## 2023-01-04 RX ORDER — SODIUM CHLORIDE 9 MG/ML
INJECTION, SOLUTION INTRAVENOUS CONTINUOUS
Status: DISCONTINUED | OUTPATIENT
Start: 2023-01-04 | End: 2023-01-05 | Stop reason: HOSPADM

## 2023-01-04 RX ORDER — PROPOFOL 10 MG/ML
VIAL (ML) INTRAVENOUS
Status: DISCONTINUED | OUTPATIENT
Start: 2023-01-04 | End: 2023-01-04

## 2023-01-04 RX ORDER — LABETALOL HYDROCHLORIDE 5 MG/ML
10 INJECTION, SOLUTION INTRAVENOUS ONCE
Status: COMPLETED | OUTPATIENT
Start: 2023-01-04 | End: 2023-01-04

## 2023-01-04 RX ORDER — CEFAZOLIN SODIUM 1 G/3ML
INJECTION, POWDER, FOR SOLUTION INTRAMUSCULAR; INTRAVENOUS
Status: DISCONTINUED | OUTPATIENT
Start: 2023-01-04 | End: 2023-01-04

## 2023-01-04 RX ORDER — TALC
6 POWDER (GRAM) TOPICAL NIGHTLY PRN
Status: DISCONTINUED | OUTPATIENT
Start: 2023-01-04 | End: 2023-01-05 | Stop reason: HOSPADM

## 2023-01-04 RX ORDER — CARVEDILOL 12.5 MG/1
25 TABLET ORAL 2 TIMES DAILY
Status: DISCONTINUED | OUTPATIENT
Start: 2023-01-04 | End: 2023-01-04

## 2023-01-04 RX ORDER — LABETALOL HCL 20 MG/4 ML
SYRINGE (ML) INTRAVENOUS
Status: DISPENSED
Start: 2023-01-04 | End: 2023-01-05

## 2023-01-04 RX ORDER — HYDRALAZINE HYDROCHLORIDE 20 MG/ML
10 INJECTION INTRAMUSCULAR; INTRAVENOUS EVERY 6 HOURS PRN
Status: DISCONTINUED | OUTPATIENT
Start: 2023-01-04 | End: 2023-01-05

## 2023-01-04 RX ORDER — ACETAMINOPHEN 500 MG
1000 TABLET ORAL EVERY 8 HOURS PRN
Qty: 60 TABLET | Refills: 0 | Status: SHIPPED | OUTPATIENT
Start: 2023-01-04 | End: 2023-06-23

## 2023-01-04 RX ORDER — SODIUM CHLORIDE 0.9 % (FLUSH) 0.9 %
10 SYRINGE (ML) INJECTION
Status: DISCONTINUED | OUTPATIENT
Start: 2023-01-04 | End: 2023-01-05 | Stop reason: HOSPADM

## 2023-01-04 RX ORDER — LABETALOL HYDROCHLORIDE 5 MG/ML
10 INJECTION, SOLUTION INTRAVENOUS ONCE
Status: DISCONTINUED | OUTPATIENT
Start: 2023-01-04 | End: 2023-01-04

## 2023-01-04 RX ORDER — METHOCARBAMOL 750 MG/1
750 TABLET, FILM COATED ORAL 3 TIMES DAILY
Qty: 30 TABLET | Refills: 0 | Status: SHIPPED | OUTPATIENT
Start: 2023-01-04 | End: 2023-02-16 | Stop reason: SDUPTHER

## 2023-01-04 RX ORDER — PROPOFOL 10 MG/ML
VIAL (ML) INTRAVENOUS CONTINUOUS PRN
Status: DISCONTINUED | OUTPATIENT
Start: 2023-01-04 | End: 2023-01-04

## 2023-01-04 RX ORDER — LIDOCAINE HYDROCHLORIDE AND EPINEPHRINE 15; 5 MG/ML; UG/ML
INJECTION, SOLUTION EPIDURAL
Status: DISCONTINUED | OUTPATIENT
Start: 2023-01-04 | End: 2023-01-04

## 2023-01-04 RX ORDER — LABETALOL HYDROCHLORIDE 5 MG/ML
20 INJECTION, SOLUTION INTRAVENOUS ONCE
Status: COMPLETED | OUTPATIENT
Start: 2023-01-04 | End: 2023-01-04

## 2023-01-04 RX ORDER — OXYCODONE HYDROCHLORIDE 10 MG/1
10 TABLET ORAL EVERY 6 HOURS PRN
Status: DISCONTINUED | OUTPATIENT
Start: 2023-01-04 | End: 2023-01-05 | Stop reason: HOSPADM

## 2023-01-04 RX ORDER — VANCOMYCIN HYDROCHLORIDE 1 G/20ML
INJECTION, POWDER, LYOPHILIZED, FOR SOLUTION INTRAVENOUS
Status: DISCONTINUED | OUTPATIENT
Start: 2023-01-04 | End: 2023-01-04 | Stop reason: HOSPADM

## 2023-01-04 RX ADMIN — MUPIROCIN: 20 OINTMENT TOPICAL at 11:01

## 2023-01-04 RX ADMIN — NICARDIPINE HYDROCHLORIDE 400 MCG: 25 INJECTION INTRAVENOUS at 01:01

## 2023-01-04 RX ADMIN — HYDROMORPHONE HYDROCHLORIDE 0.2 MG: 1 INJECTION, SOLUTION INTRAMUSCULAR; INTRAVENOUS; SUBCUTANEOUS at 03:01

## 2023-01-04 RX ADMIN — OXYCODONE HYDROCHLORIDE 10 MG: 10 TABLET ORAL at 06:01

## 2023-01-04 RX ADMIN — RIVAROXABAN 20 MG: 20 TABLET, FILM COATED ORAL at 07:01

## 2023-01-04 RX ADMIN — HYDRALAZINE HYDROCHLORIDE 10 MG: 20 INJECTION, SOLUTION INTRAMUSCULAR; INTRAVENOUS at 05:01

## 2023-01-04 RX ADMIN — DEXAMETHASONE SODIUM PHOSPHATE 8 MG: 4 INJECTION, SOLUTION INTRAMUSCULAR; INTRAVENOUS at 01:01

## 2023-01-04 RX ADMIN — LABETALOL HYDROCHLORIDE 10 MG: 5 INJECTION, SOLUTION INTRAVENOUS at 05:01

## 2023-01-04 RX ADMIN — FENTANYL CITRATE 25 MCG: 50 INJECTION INTRAMUSCULAR; INTRAVENOUS at 04:01

## 2023-01-04 RX ADMIN — CARVEDILOL 25 MG: 25 TABLET, FILM COATED ORAL at 05:01

## 2023-01-04 RX ADMIN — MIDAZOLAM HYDROCHLORIDE 2 MG: 1 INJECTION, SOLUTION INTRAMUSCULAR; INTRAVENOUS at 12:01

## 2023-01-04 RX ADMIN — CEFAZOLIN 2 G: 2 INJECTION, POWDER, FOR SOLUTION INTRAMUSCULAR; INTRAVENOUS at 10:01

## 2023-01-04 RX ADMIN — FENTANYL CITRATE 25 MCG: 50 INJECTION INTRAMUSCULAR; INTRAVENOUS at 05:01

## 2023-01-04 RX ADMIN — SODIUM CHLORIDE: 0.9 INJECTION, SOLUTION INTRAVENOUS at 12:01

## 2023-01-04 RX ADMIN — FENTANYL CITRATE 25 MCG: 50 INJECTION INTRAMUSCULAR; INTRAVENOUS at 02:01

## 2023-01-04 RX ADMIN — VANCOMYCIN HYDROCHLORIDE 1500 MG: 1.5 INJECTION, POWDER, LYOPHILIZED, FOR SOLUTION INTRAVENOUS at 11:01

## 2023-01-04 RX ADMIN — ATORVASTATIN CALCIUM 80 MG: 40 TABLET, FILM COATED ORAL at 06:01

## 2023-01-04 RX ADMIN — MEPIVACAINE HYDROCHLORIDE 3 ML: 15 INJECTION, SOLUTION EPIDURAL; INFILTRATION at 12:01

## 2023-01-04 RX ADMIN — SODIUM CHLORIDE, SODIUM GLUCONATE, SODIUM ACETATE, POTASSIUM CHLORIDE, MAGNESIUM CHLORIDE, SODIUM PHOSPHATE, DIBASIC, AND POTASSIUM PHOSPHATE: .53; .5; .37; .037; .03; .012; .00082 INJECTION, SOLUTION INTRAVENOUS at 01:01

## 2023-01-04 RX ADMIN — LIDOCAINE HYDROCHLORIDE,EPINEPHRINE BITARTRATE 5 ML: 15; .005 INJECTION, SOLUTION EPIDURAL; INFILTRATION; INTRACAUDAL; PERINEURAL at 01:01

## 2023-01-04 RX ADMIN — METHOCARBAMOL 500 MG: 500 TABLET ORAL at 07:01

## 2023-01-04 RX ADMIN — Medication 20 MG: at 01:01

## 2023-01-04 RX ADMIN — CEFAZOLIN 2 G: 330 INJECTION, POWDER, FOR SOLUTION INTRAMUSCULAR; INTRAVENOUS at 12:01

## 2023-01-04 RX ADMIN — HYDRALAZINE HYDROCHLORIDE 10 MG: 20 INJECTION, SOLUTION INTRAMUSCULAR; INTRAVENOUS at 04:01

## 2023-01-04 RX ADMIN — PROPOFOL 100 MCG/KG/MIN: 10 INJECTION, EMULSION INTRAVENOUS at 12:01

## 2023-01-04 RX ADMIN — Medication 50 MG: at 12:01

## 2023-01-04 RX ADMIN — OXYCODONE 5 MG: 5 TABLET ORAL at 03:01

## 2023-01-04 RX ADMIN — FENTANYL CITRATE 25 MCG: 50 INJECTION INTRAMUSCULAR; INTRAVENOUS at 06:01

## 2023-01-04 RX ADMIN — HYDRALAZINE HYDROCHLORIDE 10 MG: 20 INJECTION INTRAMUSCULAR; INTRAVENOUS at 07:01

## 2023-01-04 RX ADMIN — Medication 20 MG: at 12:01

## 2023-01-04 RX ADMIN — DEXMEDETOMIDINE HYDROCHLORIDE 12 MCG: 100 INJECTION, SOLUTION INTRAVENOUS at 01:01

## 2023-01-04 RX ADMIN — NICARDIPINE HYDROCHLORIDE 200 MCG: 25 INJECTION INTRAVENOUS at 01:01

## 2023-01-04 RX ADMIN — NICARDIPINE HYDROCHLORIDE 2.5 MG/HR: 0.2 INJECTION, SOLUTION INTRAVENOUS at 01:01

## 2023-01-04 RX ADMIN — LABETALOL HYDROCHLORIDE 20 MG: 5 INJECTION, SOLUTION INTRAVENOUS at 06:01

## 2023-01-04 RX ADMIN — ACETAMINOPHEN 1000 MG: 500 TABLET ORAL at 04:01

## 2023-01-04 RX ADMIN — Medication 10 MG: at 01:01

## 2023-01-04 RX ADMIN — TRANEXAMIC ACID 1000 MG: 100 INJECTION, SOLUTION INTRAVENOUS at 12:01

## 2023-01-04 RX ADMIN — SODIUM CHLORIDE: 9 INJECTION, SOLUTION INTRAVENOUS at 11:01

## 2023-01-04 RX ADMIN — GLYCOPYRROLATE 0.2 MG: 0.2 INJECTION, SOLUTION INTRAMUSCULAR; INTRAVENOUS at 12:01

## 2023-01-04 RX ADMIN — FENTANYL CITRATE 50 MCG: 50 INJECTION, SOLUTION INTRAMUSCULAR; INTRAVENOUS at 12:01

## 2023-01-04 RX ADMIN — METHOCARBAMOL 500 MG: 500 TABLET ORAL at 04:01

## 2023-01-04 RX ADMIN — SODIUM CHLORIDE, SODIUM GLUCONATE, SODIUM ACETATE, POTASSIUM CHLORIDE, MAGNESIUM CHLORIDE, SODIUM PHOSPHATE, DIBASIC, AND POTASSIUM PHOSPHATE: .53; .5; .37; .037; .03; .012; .00082 INJECTION, SOLUTION INTRAVENOUS at 12:01

## 2023-01-04 NOTE — ANESTHESIA PROCEDURE NOTES
CSE    Patient location during procedure: OR  Start time: 1/4/2023 12:30 PM  Timeout: 1/4/2023 12:30 PM  End time: 1/4/2023 12:40 PM      Staffing  Authorizing Provider: Sondra Fitzpatrick MD  Performing Provider: Nabeel Alcaraz MD    Preanesthetic Checklist  Completed: patient identified, IV checked, site marked, risks and benefits discussed, surgical consent, monitors and equipment checked, pre-op evaluation and timeout performed  CSE  Patient position: sitting  Prep: ChloraPrep  Patient monitoring: heart rate, continuous pulse ox and frequent blood pressure checks  Approach: midline  Spinal Needle  Needle type: Yoana   Needle gauge: 25 G  Needle length: 5 in  Epidural Needle  Injection technique: SANAM air  Needle type: Tuohy   Needle gauge: 17 G  Needle length: 3.5 in  Needle insertion depth: 8 cm  Location: L3-4  Needle localization: anatomical landmarks   Catheter  Catheter type: Digital Folio  Catheter size: 20 G  Catheter at skin depth: 12 cm  Test dose: lidocaine 1.5% with Epi 1-to-200,000  Test dose: 5 mL  Additional Documentation: incremental injection, negative aspiration for heme and negative test dose  Assessment  Intrathecal Medications:   administered: primary anesthetic mcg of    Medications:    Medications: Intrathecal - mepivacaine 15 mg/mL (1.5 %) injection - Intrathecal   3 mL - 1/4/2023 12:41:00 PM

## 2023-01-04 NOTE — TRANSFER OF CARE
"Anesthesia Transfer of Care Note    Patient: Macario Dior Jr.    Procedure(s) Performed: Procedure(s) (LRB):  EXCISION, LESION, FEMUR: POSTERIOR APPROACH (Right)    Patient location: PACU    Anesthesia Type: CSE    Transport from OR: Transported from OR on 6-10 L/min O2 by face mask with adequate spontaneous ventilation    Post pain: adequate analgesia    Post assessment: no apparent anesthetic complications and tolerated procedure well    Post vital signs: stable    Level of consciousness: awake and responds to stimulation    Nausea/Vomiting: no nausea/vomiting    Complications: none    Transfer of care protocol was followed      Last vitals:   Visit Vitals  BP (!) 166/79 (BP Location: Left arm, Patient Position: Lying)   Pulse 60   Temp 36.7 °C (98.1 °F) (Oral)   Resp 16   Ht 6' 1" (1.854 m)   Wt 113.4 kg (250 lb)   SpO2 98%   BMI 32.98 kg/m²     "

## 2023-01-04 NOTE — PT/OT/SLP PROGRESS
Occupational Therapy      Patient Name:  Macario Dior JrJillian   MRN:  [id number]    Patient not seen today secondary to /91 with RN reporting a systolic goal of <150. Will follow-up on 1/5 for evaluation.    1/4/2023

## 2023-01-04 NOTE — PLAN OF CARE
[facility] (2nd Fl)    HOME HEALTH ORDERS  FACE TO FACE ENCOUNTER    Patient Name: Macario Dior Jr.  YOB: 1950    PCP: [facility]   PCP Address: [address] [place] [address] / [facility] [address]  PCP Phone Number: [phone]  PCP Fax: [phone]    Encounter Date: 01/04/2023    Admit to Home Health    Diagnoses:  Active Hospital Problems    Diagnosis  POA    *Postoperative heterotopic ossification [M96.89, M61.50]  Yes      Resolved Hospital Problems   No resolved problems to display.       Future Appointments   Date Time Provider Department Center   1/5/2023 12:00 PM SIMULATION, RADIATION [facility]   1/6/2023  2:00 PM TELEMED NURSE, [facility] ORTHO [facility]TH[facility] [address]   1/11/2023 10:00 AM Marianne Astorga MD [facility]   1/19/2023  4:15 PM Jenise Charles NP [facility] ORTHO [address]           I have seen and examined this patient face to face today. My clinical findings that support the need for the home health skilled services and home bound status are the following:  Weakness/numbness causing balance and gait disturbance due to Joint Replacement making it taxing to leave home.  Requiring assistive device to leave home due to unsteady gait caused by heterotopic ossification around DHRUV.    Allergies:  Review of patient's allergies indicates:   Allergen Reactions    Amlodipine Swelling    Lisinopril Tinitus and Swelling    Pregabalin Swelling    Azithromycin      Muscles tense up    Levetiracetam Other (See Comments)    Acetaminophen Rash     Feels like needles are sticking him per pt       Diet: renal diet    Activities: activity as tolerated    Home Health Admitting Clinician:   SN/PT to complete comprehensive assessment including routine vital signs. Instruct on disease process and s/s of complications to report to MD. Follow specific home health arthoplasty protocol. Review/verify medication list sent home with the patient at time of  discharge  and instruct patient/caregiver as needed. If coumadin ordered, coumadin clinic to manage INR with INR draws 2x per week with a goal to maintain INR between 1.8 and 2.2. Frequency may be adjusted depending on start of care date.    Notify MD if SBP > 160 or < 90; DBP > 90 or < 50; HR > 120 or < 50; Temp > 101    Home Medical Equipment:  Walker, 3-1 bedside commode, transfer tub bench    CONSULTS:    Physical Therapy may admit if patient not on coumadin, PT to perform comprehensive assessment if performing admit visit and generate therapy plan of care. Evaluate for home safety and equipment needs; Establish/upgrade home exercise program. Perform/instruct on therapeutic exercises, gait training, transfer training, and Range of Motion.      OTHER: (only select if patient needs other therapy disciplines)  Occupational Therapy to evaluate and treat. Evaluate home environment for safety and equipment needs. Perform/Instruct on transfers, ADL training, ROM, and therapeutic exercises.    MISCELLANEOUS CARE:  Routine Skin for Bedridden Patients: Instruct patient/caregiver to apply moisture barrier cream to all skin folds and wet areas in perineal area daily and after baths and all bowel movements.    WOUND CARE ORDERS:  Assess Surgical Incision/DSRG each TX  Aquacel AG drsg applied post-op leave on 14 days post op. Call MD if any drainage reaches border to border of drsg horizontally, s/s of infection, temp >101, induration, swelling or redness.  If dressing is removed per MD order, then apply island dressing, change/teach caregiver to perform daily dressing change if island dressing present.    Medications: Review discharge medications with patient and family and provide education.      Current Discharge Medication List        CONTINUE these medications which have NOT CHANGED    Details   amlodipine (NORVASC) 10 MG tablet Take 10 mg by mouth once daily.      atorvastatin (LIPITOR) 80 MG tablet Take 80 mg by mouth  once daily. Take one-half tablet daily      carvedilol (COREG) 25 MG tablet Take 25 mg by mouth 2 (two) times daily.      finasteride (PROSCAR) 5 mg tablet TAKE ONE TABLET BY MOUTH ONCE DAILY FOR PROSTATE      hydrALAZINE (APRESOLINE) 50 MG tablet TAKE ONE TABLET BY MOUTH THREE TIMES A DAY FOR HEART/BLOOD PRESSURE      oxycodone (OXY-IR) 5 mg Cap Take 2 capsules (10 mg total) by mouth every 6 (six) hours as needed.  Qty: 120 capsule, Refills: 0      albuterol (PROVENTIL) 2.5 mg /3 mL (0.083 %) nebulizer solution Take 2.5 mg by nebulization every 6 (six) hours as needed for Wheezing.      albuterol 90 mcg/actuation inhaler Inhale 2 puffs into the lungs every 6 (six) hours as needed for Wheezing.       budesonide-formoterol 160-4.5 mcg (SYMBICORT) 160-4.5 mcg/actuation HFAA Inhale 2 puffs into the lungs every 12 (twelve) hours. as needed      camphor-menthol 0.5-0.5% (SARNA) lotion APPLY MODERATE AMOUNT TOPICALLY TWICE A DAY      carboxymethyl-[id number]-OP 0.5-1-0.5 % Dpet Place 1 drop into both eyes 4 (four) times daily.      carboxymethylcellulose sodium 1 % DpGe Place 0.4 mLs into both eyes 4 (four) times daily as needed.      chlorhexidine (PERIDEX) 0.12 % solution RINSE 1 CAPFUL BY MOUTH ONCE DAILY AS NEEDED SWISH FOR 30 SECONDS AFTER,AND SPIT OUT      CLOBETASOL PROPIONATE, BULK, MISC Apply topically once daily.      cyanocobalamin (VITAMIN B-12) 100 MCG tablet Take 100 mcg by mouth once daily. Take two tablets      cycloSPORINE (RESTASIS) 0.05 % ophthalmic emulsion INSTILL 0.4ML IN EACH EYE EVERY 12 HOURS      eplerenone (INSPRA) 25 MG Tab Take 1 tablet by mouth once daily.      ergocalciferol (ERGOCALCIFEROL) 50,000 unit Cap Take 50,000 Units by mouth.      ketoconazole (NIZORAL) 2 % cream Apply topically 2 (two) times daily.      latanoprost 0.005 % ophthalmic solution Place 1 drop into both eyes every evening.      lidocaine (LIDODERM) 5 % Place 1 patch onto the skin once daily. Wear for 12 hours, then  remove. Do not apply a new patch for at least 12 hours.      mineral oil/petrolatum,white (WHITE PETROLATUM-MINERAL OIL OPHT) APPLY THIN RIBBON TO EACH EYE AT BEDTIME FOR DRY EYES      rivaroxaban (XARELTO) 20 mg Tab Take 20 mg by mouth.       tiotropium (SPIRIVA) 18 mcg inhalation capsule Inhale 18 mcg into the lungs once daily. Controller   prn             I certify that this patient is confined to his home and needs physical therapy and occupational therapy.

## 2023-01-04 NOTE — HPI
CC:  Right hip pain     Macario Dior Jr. is a 72 y.o. male with Right hip pain.  Pain is worse with activity and weight bearing.  Patient has experienced interference of activities of daily living due to increased pain and decreased range of motion. Xray reveals heterotopic bone around the right hip. Patient has failed non-operative treatment including NSAIDs, as well as greater than 3 months of activity modification. Macario Dior Jr. ambulates independently.      Relevant medical conditions of significance in perioperative period:  S/p hip replacement with heterotopic ossification of bone  DVT- on medication  CKD- monitored by primary care  HTN- no medication managed by pcp  Lumbar radiculopathy- on medication managed by pcp  DM- on medication managed by pcp

## 2023-01-04 NOTE — PT/OT/SLP PROGRESS
Physical Therapy      Patient Name:  Macario Dior Jr.   MRN:  [id number]    Patient not seen today secondary to Nurse/ ELEAZAR hold (Attempted to see pt but /91 w/ RN hold 2/2 pt BP parameters SBP <150). Will follow-up as appropriate.  Vanita Cason, PT

## 2023-01-04 NOTE — H&P
CC:  Right hip pain     Macario Dior Jr. is a 72 y.o. male with Right hip pain.  Pain is worse with activity and weight bearing.  Patient has experienced interference of activities of daily living due to increased pain and decreased range of motion. Xray reveals heterotopic bone around the right hip. Patient has failed non-operative treatment including NSAIDs, as well as greater than 3 months of activity modification. Macario Dior Jr. ambulates independently.      Relevant medical conditions of significance in perioperative period:  S/p hip replacement with heterotopic ossification of bone  DVT- on medication  CKD- monitored by primary care  HTN- no medication managed by pcp  Lumbar radiculopathy- on medication managed by pcp  DM- on medication managed by pcp             Past Medical History:   Diagnosis Date    Arthritis      Asthma       chronic    CKD (chronic kidney disease), stage III       patient denies    Deep vein thrombosis      Demyelinating neuropathy       polyneuropathy    Diabetes mellitus type 2 without retinopathy 12/14/2022    Gout      History of blood clots 2013     lower part of both legs    HLD (hyperlipidemia)      Hypertension      Iron deficiency anemia      BUZZ (obstructive sleep apnea)       CPAP    Pulmonary embolism                 Past Surgical History:   Procedure Laterality Date    COLONOSCOPY N/A 11/7/2016     Procedure: COLONOSCOPY;  Surgeon: Shar Weiss MD;  Location: [address]);  Service: Endoscopy;  Laterality: N/A;  [facility] Referral. Okay to hold Coumadin 5 days prior to procedure. See Referral scaned in media tab on 10/20/16.    COLONOSCOPY N/A 9/21/2017     Procedure: COLONOSCOPY;  Surgeon: Stuart Trinidad MD;  Location: [address]);  Service: Endoscopy;  Laterality: N/A;  [facility] Olivarez-see scanned docs under media tab          9/6/17-current [facility] authorization and last clinic visit scanned into chart    EPIDURAL STEROID  INJECTION N/A 12/17/2020     Procedure: INJECTION, STEROID, EPIDURAL, L5-S1 clear to hold Xarelto 2 days;  Surgeon: Moy Vasquez MD;  Location: [facility];  Service: Pain Management;  Laterality: N/A;    HIP SURGERY Right 2008     expl[facility] at [facility]    HIP SURGERY Right 8-31-15     THR    INJECTION OF ANESTHETIC AGENT AROUND NERVE Right 10/23/2019     Procedure: BLOCK, NERVE, Obturator and Femoral cleared to hold xarelto 3 days pt. said he's not taking coumadin;  Surgeon: Moy Vasquez MD;  Location: [facility];  Service: Pain Management;  Laterality: Right;    INJECTION OF ANESTHETIC AGENT AROUND NERVE Right 7/6/2020     Procedure: BLOCK, NERVE, RIGHT MBB L3,L5,L5;  Surgeon: Moy Vasquez MD;  Location: [facility];  Service: Pain Management;  Laterality: Right;  BLOCK, NERVE, RIGHT MBB L3,L5,L5    INJECTION OF ANESTHETIC AGENT AROUND NERVE Right 7/30/2020     Procedure: BLOCK, NERVE RIGHT L3, L4, L5 2ND;  Surgeon: Moy Vasquez MD;  Location: [facility];  Service: Pain Management;  Laterality: Right;  NEEDS CONSENT, XARELTO    LEG SURGERY Left 2012     fibula and tibia    RADIOFREQUENCY ABLATION Right 10/5/2020     Procedure: RADIOFREQUENCY ABLATION RIGHT L3,4,5;  Surgeon: Moy Vasquez MD;  Location: [facility];  Service: Pain Management;  Laterality: Right;  RADIOFREQUENCY ABLATION RIGHT L3,4,5  ok to hold xarelto, scanned in Media    SHOULDER SURGERY Right 1989     torn ligament               Family History   Problem Relation Age of Onset    Cancer Mother           ovarian    Cancer Father      Cancer Sister           breast    Cancer Sister           breast    No Known Problems Brother      No Known Problems Daughter      No Known Problems Daughter      No Known Problems Daughter      No Known Problems Son      No Known Problems Son                 Review of patient's allergies indicates:   Allergen Reactions    Amlodipine Swelling    Lisinopril Tinitus and Swelling    Pregabalin Swelling     Azithromycin         Muscles tense up    Levetiracetam Other (See Comments)    Acetaminophen Rash       Feels like needles are sticking him per pt            Current Outpatient Medications:     albuterol (PROVENTIL) 2.5 mg /3 mL (0.083 %) nebulizer solution, Take 2.5 mg by nebulization every 6 (six) hours as needed for Wheezing., Disp: , Rfl:     albuterol 90 mcg/actuation inhaler, Inhale 2 puffs into the lungs every 6 (six) hours as needed for Wheezing. , Disp: , Rfl:     amlodipine (NORVASC) 10 MG tablet, Take 10 mg by mouth once daily., Disp: , Rfl:     atorvastatin (LIPITOR) 80 MG tablet, Take 80 mg by mouth once daily. Take one-half tablet daily, Disp: , Rfl:     budesonide-formoterol 160-4.5 mcg (SYMBICORT) 160-4.5 mcg/actuation HFAA, Inhale 2 puffs into the lungs every 12 (twelve) hours. as needed, Disp: , Rfl:     camphor-menthol 0.5-0.5% (SARNA) lotion, APPLY MODERATE AMOUNT TOPICALLY TWICE A DAY, Disp: , Rfl:     carboxymethyl-gly-truo53-KK 0.5-1-0.5 % Dpet, Place 1 drop into both eyes 4 (four) times daily., Disp: , Rfl:     carboxymethylcellulose sodium 1 % DpGe, Place 0.4 mLs into both eyes 4 (four) times daily as needed., Disp: , Rfl:     carvedilol (COREG) 25 MG tablet, Take 25 mg by mouth 2 (two) times daily., Disp: , Rfl:     chlorhexidine (PERIDEX) 0.12 % solution, RINSE 1 CAPFUL BY MOUTH ONCE DAILY AS NEEDED SWISH FOR 30 SECONDS AFTER,AND SPIT OUT, Disp: , Rfl:     CLOBETASOL PROPIONATE, BULK, MISC, Apply topically once daily., Disp: , Rfl:     cyanocobalamin (VITAMIN B-12) 100 MCG tablet, Take 100 mcg by mouth once daily. Take two tablets, Disp: , Rfl:     cycloSPORINE (RESTASIS) 0.05 % ophthalmic emulsion, INSTILL 0.4ML IN EACH EYE EVERY 12 HOURS, Disp: , Rfl:     eplerenone (INSPRA) 25 MG Tab, Take 1 tablet by mouth once daily., Disp: , Rfl:     ergocalciferol (ERGOCALCIFEROL) 50,000 unit Cap, Take 50,000 Units by mouth., Disp: , Rfl:     finasteride (PROSCAR) 5 mg tablet, TAKE ONE TABLET BY  "MOUTH ONCE DAILY FOR PROSTATE, Disp: , Rfl:     hydrALAZINE (APRESOLINE) 50 MG tablet, TAKE ONE TABLET BY MOUTH THREE TIMES A DAY FOR HEART/BLOOD PRESSURE, Disp: , Rfl:     ketoconazole (NIZORAL) 2 % cream, Apply topically 2 (two) times daily., Disp: , Rfl:     latanoprost 0.005 % ophthalmic solution, Place 1 drop into both eyes every evening., Disp: , Rfl:     lidocaine (LIDODERM) 5 %, Place 1 patch onto the skin once daily. Wear for 12 hours, then remove. Do not apply a new patch for at least 12 hours., Disp: , Rfl:     mineral oil/petrolatum,white (WHITE PETROLATUM-MINERAL OIL OPHT), APPLY THIN RIBBON TO EACH EYE AT BEDTIME FOR DRY EYES, Disp: , Rfl:     oxycodone (OXY-IR) 5 mg Cap, Take 2 capsules (10 mg total) by mouth every 6 (six) hours as needed., Disp: 120 capsule, Rfl: 0    rivaroxaban (XARELTO) 20 mg Tab, Take 20 mg by mouth. , Disp: , Rfl:     tiotropium (SPIRIVA) 18 mcg inhalation capsule, Inhale 18 mcg into the lungs once daily. Controller  prn, Disp: , Rfl:   No current facility-administered medications for this visit.     Facility-Administered Medications Ordered in Other Visits:     ALPRAZolam tablet 0.5 mg, 0.5 mg, Oral, On Call Procedure, Lisha Cote MD     Review of Systems:   Constitutional: no fever or chills  Eyes: no visual changes  ENT: no nasal congestion or sore throat  Respiratory: no cough or shortness of breath  Cardiovascular: no chest pain or palpitations  Gastrointestinal: no nausea or vomiting, tolerating diet  Genitourinary: no hematuria or dysuria  Integument/Breast: no rash or pruritis  Hematologic/Lymphatic: no easy bruising or lymphadenopathy  Musculoskeletal: positive for hip pain  Neurological: no seizures or tremors  Behavioral/Psych: no auditory or visual hallucinations  Endocrine: no heat or cold intolerance     PE:  Ht 6' 1" (1.854 m)   Wt 114 kg (251 lb 4.8 oz)   BMI 33.16 kg/m²   General: Pleasant, cooperative, NAD   Gait: antalgic  HEENT: NCAT, sclera " nonicteric   Lungs: Respirations are clear, equal and unlabored.   CV: S1S2; 2+ bilateral upper and lower extremity pulses.   Skin: Intact throughout LE with no rashes, erythema, or lesions  Extremities: No LE edema, NVI lower extremities     Right Hip Exam:  90 degrees flexion  0 degrees extension   0 degrees internal rotation  10 degrees external rotation  20 degrees abduction  10 degrees adduction  There is pain with passive range of motion.      Radiographs: Radiographs reveal right hip prosthesis in proper position and alignment.      Diagnosis: heterotopic ossification, right hip     Plan: excision of heterotopic bone     Due to the serious nature of total joint infection and the high prevalence of community acquired MRSA, vancomycin will be used perioperatively.

## 2023-01-04 NOTE — ASSESSMENT & PLAN NOTE
Macario Dior is a 73 y/o male s/p HO resection from right hip on 01/04/23.      Pain control: multimodal  PT/OT: WBAT RLE, needs to be evaluated today prior to DC  DVT PPx: restarted on home xarelto 20mg QD, SCDs at all times when not ambulating  Abx: postop Ancef  Labs: AM Hbg      Dispo: dc home with outpatient PT

## 2023-01-04 NOTE — OP NOTE
Bharath Right DHRUV posterior  OPERATIVE NOTE    Date of Operation:   1/4/2023    Providers Performing:   Surgeon:  Prieto Sinha MD  Assistant: Raghavendra Talbot MD    Operative Procedure:   Excision of postoperative heterotopic ossification, right hip    Preoperative Diagnosis:   Postoperative heterotopic ossification, right DHRUV    Postoperative Diagnosis:   SAME    Components Used:     * No implants in log *    Anesthesia:   Spinal+catheter  PENG    Estimated Blood Loss:   200 cc    Specimens:   None    Complications:   None    Indications:   The patient has longstanding hip pain secondary to the above diagnosis. They have failed conservative management which includes anti-inflammatory medications and activity modification. We reviewed the risks and benefits of the  hip replacement with the patient prior to surgery including risk of infection, blood clot, dislocation, continued pain, limping, and injury to nerves and vessels.  After discussing the risks and benefits of the procedure they would like to proceed with surgery.     Operative Notes:   The patient was identified in the pre-operative holding area and the operative site was marked.  Prophylactic antibiotics were administered intravenously prior to skin incision.  The patient was positioned operative side up on the operating table.  The pelvis was secured to the operating table between hip positioners.  A time out was performed and the operative lower extremity was prepped and draped in the usual sterile fashion.     A posterior approach was utilized to expose the hip joint.  The skin was sharply incised down to and through the iliotibial band and gluteus hill fascia.  Deep Charnley retractors were placed and the trochanteric bursa was incised.  The posterior aspect of the greater trochanter and abductor mechanism were identified and heterotopic bone was subperiosteally exposed from posterior to anterior.  We used a combination of osteotomes and rongeurs  to remove the abnormal bone deep to the greater trochanter and up to the acetabular rim.  We found improved range of motion after this bone was removed.  Hemostasis was maintained with Bovie and Aquamantys.  The wound was thoroughly irrigated with pulse lavage, and soft tissue posteriorly was reapproximated to close dead space, again checking the tension on the sciatic nerve.  The sciatic nerve was palpated and it did not feel to be under undue tension.  Vancomycin powder 1 g was placed in the joint.  The iliotibial band and the gluteus hill fascia were closed with interrupted figure-8 sutures of #1 vicryl.  The skin was then closed with interrupted inverted 2-0 vicryl and staples.  The final instrument count was correct and a sterile Aquacel bandage applied.  The patient was taken to recovery in a satisfactory condition.    There were no complications or evidence of intraoperative fracture. All counts were correct.    The first-assistant was critical to all steps of the operation, including retraction and leg stabilization during exposure and bone preparation, as well as the deep and superficial wound closure.  Dr. Sinha performed and/or supervised the key portions of this surgical procedure,.    Postoperative Plan:  -patient will be weightbearing as tolerated with a walker with posterior hip precautions  -restart preoperative Xarelto for DVT prophylaxis along with SCDs and early ambulation  -24 hours of IV antibiotics    Signed by: Prieto Sinha MD

## 2023-01-05 ENCOUNTER — APPOINTMENT (OUTPATIENT)
Dept: RADIATION THERAPY | Facility: HOSPITAL | Age: 73
End: 2023-01-05
Payer: OTHER GOVERNMENT

## 2023-01-05 VITALS
RESPIRATION RATE: 18 BRPM | SYSTOLIC BLOOD PRESSURE: 180 MMHG | BODY MASS INDEX: 33.13 KG/M2 | TEMPERATURE: 98 F | HEIGHT: 73 IN | HEART RATE: 78 BPM | DIASTOLIC BLOOD PRESSURE: 81 MMHG | OXYGEN SATURATION: 96 % | WEIGHT: 250 LBS

## 2023-01-05 LAB
HGB BLD-MCNC: 12.9 G/DL (ref 14–18)
POCT GLUCOSE: 199 MG/DL (ref 70–110)

## 2023-01-05 PROCEDURE — 97535 SELF CARE MNGMENT TRAINING: CPT

## 2023-01-05 PROCEDURE — 77300 RADIATION THERAPY DOSE PLAN: CPT | Mod: 26,,, | Performed by: RADIOLOGY

## 2023-01-05 PROCEDURE — 25000003 PHARM REV CODE 250

## 2023-01-05 PROCEDURE — G6002 STEREOSCOPIC X-RAY GUIDANCE: HCPCS | Mod: 26,,, | Performed by: RADIOLOGY

## 2023-01-05 PROCEDURE — 77295 PR 3D RADIOTHERAPY PLAN: ICD-10-PCS | Mod: 26,,, | Performed by: RADIOLOGY

## 2023-01-05 PROCEDURE — 77387 GUIDANCE FOR RADJ TX DLVR: CPT | Mod: TC | Performed by: RADIOLOGY

## 2023-01-05 PROCEDURE — 77014 HC CT GUIDANCE RADIATION THERAPY FLDS PLACEMENT: CPT | Mod: TC | Performed by: RADIOLOGY

## 2023-01-05 PROCEDURE — G6002 PR STEREOSCOPIC XRAY GUIDE FOR RADIATION TX DELIV: ICD-10-PCS | Mod: 26,,, | Performed by: RADIOLOGY

## 2023-01-05 PROCEDURE — 85018 HEMOGLOBIN: CPT

## 2023-01-05 PROCEDURE — 77295 3-D RADIOTHERAPY PLAN: CPT | Mod: TC | Performed by: RADIOLOGY

## 2023-01-05 PROCEDURE — 77412 RADIATION TX DELIVERY LVL 3: CPT | Performed by: RADIOLOGY

## 2023-01-05 PROCEDURE — 36415 COLL VENOUS BLD VENIPUNCTURE: CPT

## 2023-01-05 PROCEDURE — 77334 RADIATION TREATMENT AID(S): CPT | Mod: 26,,, | Performed by: RADIOLOGY

## 2023-01-05 PROCEDURE — 77263 THER RADIOLOGY TX PLNG CPLX: CPT | Mod: ,,, | Performed by: RADIOLOGY

## 2023-01-05 PROCEDURE — 77295 3-D RADIOTHERAPY PLAN: CPT | Mod: 26,,, | Performed by: RADIOLOGY

## 2023-01-05 PROCEDURE — 77290 THER RAD SIMULAJ FIELD CPLX: CPT | Mod: TC | Performed by: RADIOLOGY

## 2023-01-05 PROCEDURE — 99900031 HC PATIENT EDUCATION (STAT)

## 2023-01-05 PROCEDURE — 77334 RADIATION TREATMENT AID(S): CPT | Mod: TC | Performed by: RADIOLOGY

## 2023-01-05 PROCEDURE — 94761 N-INVAS EAR/PLS OXIMETRY MLT: CPT

## 2023-01-05 PROCEDURE — 63600175 PHARM REV CODE 636 W HCPCS

## 2023-01-05 PROCEDURE — 97161 PT EVAL LOW COMPLEX 20 MIN: CPT

## 2023-01-05 PROCEDURE — 97116 GAIT TRAINING THERAPY: CPT

## 2023-01-05 PROCEDURE — 97165 OT EVAL LOW COMPLEX 30 MIN: CPT

## 2023-01-05 PROCEDURE — 77334 PR  RADN TREATMENT AID(S) COMPLX: ICD-10-PCS | Mod: 26,,, | Performed by: RADIOLOGY

## 2023-01-05 PROCEDURE — 77417 THER RADIOLOGY PORT IMAGE(S): CPT | Performed by: RADIOLOGY

## 2023-01-05 PROCEDURE — 77263 PR  RADIATION THERAPY PLAN COMPLEX: ICD-10-PCS | Mod: ,,, | Performed by: RADIOLOGY

## 2023-01-05 PROCEDURE — 77300 RADIATION THERAPY DOSE PLAN: CPT | Mod: TC | Performed by: RADIOLOGY

## 2023-01-05 PROCEDURE — 99900035 HC TECH TIME PER 15 MIN (STAT)

## 2023-01-05 PROCEDURE — 77300 PR RADIATION THERAPY,DOSIMETRY PLAN: ICD-10-PCS | Mod: 26,,, | Performed by: RADIOLOGY

## 2023-01-05 RX ORDER — CARVEDILOL 25 MG/1
25 TABLET ORAL 2 TIMES DAILY
Qty: 60 TABLET | Refills: 11 | Status: SHIPPED | OUTPATIENT
Start: 2023-01-05 | End: 2024-01-05

## 2023-01-05 RX ORDER — HYDRALAZINE HYDROCHLORIDE 20 MG/ML
20 INJECTION INTRAMUSCULAR; INTRAVENOUS EVERY 6 HOURS PRN
Status: DISCONTINUED | OUTPATIENT
Start: 2023-01-05 | End: 2023-01-05

## 2023-01-05 RX ORDER — HYDRALAZINE HYDROCHLORIDE 20 MG/ML
20 INJECTION INTRAMUSCULAR; INTRAVENOUS EVERY 6 HOURS PRN
Status: DISCONTINUED | OUTPATIENT
Start: 2023-01-05 | End: 2023-01-05 | Stop reason: HOSPADM

## 2023-01-05 RX ADMIN — OXYCODONE HYDROCHLORIDE 10 MG: 10 TABLET ORAL at 09:01

## 2023-01-05 RX ADMIN — METHOCARBAMOL 500 MG: 500 TABLET ORAL at 12:01

## 2023-01-05 RX ADMIN — RIVAROXABAN 20 MG: 20 TABLET, FILM COATED ORAL at 04:01

## 2023-01-05 RX ADMIN — AMLODIPINE BESYLATE 10 MG: 10 TABLET ORAL at 09:01

## 2023-01-05 RX ADMIN — OXYCODONE HYDROCHLORIDE 10 MG: 10 TABLET ORAL at 01:01

## 2023-01-05 RX ADMIN — OXYCODONE HYDROCHLORIDE 10 MG: 10 TABLET ORAL at 03:01

## 2023-01-05 RX ADMIN — CEFAZOLIN 2 G: 2 INJECTION, POWDER, FOR SOLUTION INTRAMUSCULAR; INTRAVENOUS at 06:01

## 2023-01-05 RX ADMIN — METHOCARBAMOL 500 MG: 500 TABLET ORAL at 09:01

## 2023-01-05 RX ADMIN — CARVEDILOL 25 MG: 25 TABLET, FILM COATED ORAL at 09:01

## 2023-01-05 RX ADMIN — METHOCARBAMOL 500 MG: 500 TABLET ORAL at 04:01

## 2023-01-05 RX ADMIN — ATORVASTATIN CALCIUM 80 MG: 40 TABLET, FILM COATED ORAL at 09:01

## 2023-01-05 NOTE — ANESTHESIA POSTPROCEDURE EVALUATION
Anesthesia Post Evaluation    Patient: Macario Dior Jr.    Procedure(s) Performed: Procedure(s) (LRB):  EXCISION, LESION, FEMUR: POSTERIOR APPROACH (Right)    Final Anesthesia Type: CSE      Patient location during evaluation: PACU  Patient participation: Yes- Able to Participate  Level of consciousness: awake and alert and oriented  Post-procedure vital signs: reviewed and stable  Pain management: adequate  Airway patency: patent    PONV status at discharge: No PONV  Anesthetic complications: no      Cardiovascular status: blood pressure returned to baseline, hemodynamically stable and stable  Respiratory status: unassisted, room air and spontaneous ventilation  Hydration status: euvolemic  Follow-up not needed.          Vitals Value Taken Time   /88 01/05/23 0620   Temp 36.2 °C (97.1 °F) 01/05/23 0505   Pulse 78 01/05/23 0505   Resp 18 01/05/23 0505   SpO2 96 % 01/05/23 0505         Event Time   Out of Recovery 19:00:00         Pain/Doris Score: Pain Rating Prior to Med Admin: 8 (1/5/2023  1:45 AM)  Pain Rating Post Med Admin: 0 (1/4/2023  2:45 PM)  Doris Score: 9 (1/4/2023  7:00 PM)

## 2023-01-05 NOTE — PLAN OF CARE
PT/OT herbie completed and recommending a RW at d/c. Spoke to patient's daughter Oxana who reports he already has a rollator and Rolling walker in the home. Equipment declined at this time. Will continue to follow for needs.

## 2023-01-05 NOTE — DISCHARGE SUMMARY
[facility] - Surgery  Orthopedics  Discharge Summary      Patient Name: Macario Dior Jr.  MRN: [id number]  Admission Date: 1/4/2023  Hospital Length of Stay: 0 days  Discharge Date and Time:  01/05/2023 2:41 PM  Attending Physician: Prieto Edmonds MD   Discharging Provider: Bertrand Valdovinos MD  Primary Care Provider: [facility]    HPI: Macario Dior Jr. is a 72 y.o. male with Right hip pain.  Pain is worse with activity and weight bearing.  Patient has experienced interference of activities of daily living due to increased pain and decreased range of motion. Xray reveals heterotopic bone around the right hip. Patient has failed non-operative treatment including NSAIDs, as well as greater than 3 months of activity modification. Macario Dior Jr. ambulates independently.      Relevant medical conditions of significance in perioperative period:  S/p hip replacement with heterotopic ossification of bone  DVT- on medication  CKD- monitored by primary care  HTN- no medication managed by pcp  Lumbar radiculopathy- on medication managed by pcp  DM- on medication managed by pcp       Procedure(s) (LRB):  EXCISION, LESION, FEMUR: POSTERIOR APPROACH (Right)      Hospital Course: Patient presented for above procedure.  Tolerated it well and was discharged home POD1 after voiding, tolerating diet, ambulating, pain controlled. Pt underwent RT today. Discharge instructions, follow-up appointment, and med rec are below.       Consults (From admission, onward)          Status Ordering Provider     Inpatient consult to Radiation Oncology  Once        Provider:  (Not yet assigned)    Completed PRIETO EDMONDS            Significant Diagnostic Studies: No pertinent studies.    Pending Diagnostic Studies:       None          Final Active Diagnoses:    Diagnosis Date Noted POA    PRINCIPAL PROBLEM:  Postoperative heterotopic ossification [M96.89, M61.50] 10/01/2022 Yes      Problems Resolved During  this Admission:      Discharged Condition: good    Disposition: Home-Health Care [facility]    Follow Up:    Patient Instructions:      Diet general     Call MD for:  temperature >100.4     Call MD for:  persistent nausea and vomiting     Call MD for:  severe uncontrolled pain     Call MD for:  difficulty breathing, headache or visual disturbances     Call MD for:  redness, tenderness, or signs of infection (pain, swelling, redness, odor or green/yellow discharge around incision site)     Call MD for:  hives     Call MD for:  persistent dizziness or light-headedness     Call MD for:  extreme fatigue     Leave dressing on - Keep it clean, dry, and intact until clinic visit     Activity as tolerated     Medications:  Reconciled Home Medications:      Medication List        START taking these medications      acetaminophen 500 MG tablet  Commonly known as: TYLENOL  Take 2 tablets (1,000 mg total) by mouth every 8 (eight) hours as needed for Pain.     aspirin 81 MG EC tablet  Commonly known as: ECOTRIN  Take 1 tablet (81 mg total) by mouth 2 (two) times a day.     docusate sodium 100 MG capsule  Commonly known as: COLACE  Take 1 capsule (100 mg total) by mouth 2 (two) times daily.     methocarbamoL 750 MG Tab  Commonly known as: ROBAXIN  Take 1 tablet (750 mg total) by mouth 3 (three) times daily. for 10 days     ondansetron 4 MG Tbdl  Commonly known as: ZOFRAN-ODT  Dissolve 1 tablet (4 mg total) by mouth 2 (two) times daily.            CHANGE how you take these medications      * carvediloL 25 MG tablet  Commonly known as: COREG  Take 25 mg by mouth 2 (two) times daily.  What changed: Another medication with the same name was added. Make sure you understand how and when to take each.     * carvediloL 25 MG tablet  Commonly known as: COREG  Take 1 tablet (25 mg total) by mouth 2 (two) times daily.  What changed: You were already taking a medication with the same name, and this prescription was added. Make sure you understand  how and when to take each.     * oxyCODONE 5 mg Cap  Commonly known as: OXY-IR  Take 2 capsules (10 mg total) by mouth every 6 (six) hours as needed.  What changed: Another medication with the same name was added. Make sure you understand how and when to take each.     * oxyCODONE 10 mg Tab immediate release tablet  Commonly known as: ROXICODONE  Take 1 tablet (10 mg total) by mouth every 4 (four) hours as needed for Pain.  What changed: You were already taking a medication with the same name, and this prescription was added. Make sure you understand how and when to take each.           * This list has 4 medication(s) that are the same as other medications prescribed for you. Read the directions carefully, and ask your doctor or other care provider to review them with you.                CONTINUE taking these medications      * albuterol 90 mcg/actuation inhaler  Commonly known as: PROVENTIL/VENTOLIN HFA  Inhale 2 puffs into the lungs every 6 (six) hours as needed for Wheezing.     * albuterol 2.5 mg /3 mL (0.083 %) nebulizer solution  Commonly known as: PROVENTIL  Take 2.5 mg by nebulization every 6 (six) hours as needed for Wheezing.     amLODIPine 10 MG tablet  Commonly known as: NORVASC  Take 10 mg by mouth once daily.     atorvastatin 80 MG tablet  Commonly known as: LIPITOR  Take 80 mg by mouth once daily. Take one-half tablet daily     budesonide-formoterol 160-4.5 mcg 160-4.5 mcg/actuation Hfaa  Commonly known as: SYMBICORT  Inhale 2 puffs into the lungs every 12 (twelve) hours. as needed     camphor-menthol 0.5-0.5% lotion  Commonly known as: SARNA  APPLY MODERATE AMOUNT TOPICALLY TWICE A DAY     carboxymethyl-gly-xeuz48-ON 0.5-1-0.5 % Dpet  Place 1 drop into both eyes 4 (four) times daily.     carboxymethylcellulose sodium 1 % Dpge  Place 0.4 mLs into both eyes 4 (four) times daily as needed.     chlorhexidine 0.12 % solution  Commonly known as: PERIDEX  RINSE 1 CAPFUL BY MOUTH ONCE DAILY AS NEEDED SWISH  FOR 30 SECONDS AFTER,AND SPIT OUT     CLOBETASOL PROPIONATE (BULK) MISC  Apply topically once daily.     cyanocobalamin 100 MCG tablet  Commonly known as: VITAMIN B-12  Take 100 mcg by mouth once daily. Take two tablets     cycloSPORINE 0.05 % ophthalmic emulsion  Commonly known as: RESTASIS  INSTILL 0.4ML IN EACH EYE EVERY 12 HOURS     eplerenone 25 MG Tab  Commonly known as: INSPRA  Take 1 tablet by mouth once daily.     ergocalciferol 50,000 unit Cap  Commonly known as: ERGOCALCIFEROL  Take 50,000 Units by mouth.     finasteride 5 mg tablet  Commonly known as: PROSCAR  TAKE ONE TABLET BY MOUTH ONCE DAILY FOR PROSTATE     hydrALAZINE 50 MG tablet  Commonly known as: APRESOLINE  TAKE ONE TABLET BY MOUTH THREE TIMES A DAY FOR HEART/BLOOD PRESSURE     ketoconazole 2 % cream  Commonly known as: NIZORAL  Apply topically 2 (two) times daily.     latanoprost 0.005 % ophthalmic solution  Place 1 drop into both eyes every evening.     LIDOcaine 5 %  Commonly known as: LIDODERM  Place 1 patch onto the skin once daily. Wear for 12 hours, then remove. Do not apply a new patch for at least 12 hours.     tiotropium 18 mcg inhalation capsule  Commonly known as: SPIRIVA  Inhale 18 mcg into the lungs once daily. Controller   prn     WHITE PETROLATUM-MINERAL OIL OPHT  APPLY THIN RIBBON TO EACH EYE AT BEDTIME FOR DRY EYES     XARELTO 20 mg Tab  Generic drug: rivaroxaban  Take 20 mg by mouth.           * This list has 2 medication(s) that are the same as other medications prescribed for you. Read the directions carefully, and ask your doctor or other care provider to review them with you.                  Bertrand Valdovinos MD  Orthopedics  [facility] - Surgery

## 2023-01-05 NOTE — CONSULTS
PATIENT IDENTIFICATION:  Patient Name: Macario Dior Jr.  MRN: [id number]  : 1950    Reason for Visit: Evaluate for post op radiation to prevent heterotopic ossification    Diagnosis:   1. Postoperative heterotopic ossification    2. Heterotopic ossification of bone        HISTORY OF PRESENT ILLNESS: he is a 72 y.o. male with a history of hip replacement in .  He now presents with heterotopic ossification and failed medical therapy with NSAIDs.  The patient reports increasing pain over the past 3 months and decreased mobility and range of motion.  He was taken to the OR yesterday for removal of heterotopic ossification and has been referred for consideration of radiation.      Past Medical History:   Diagnosis Date    Arthritis     Asthma     chronic    CKD (chronic kidney disease), stage III     patient denies    Deep vein thrombosis     Demyelinating neuropathy     polyneuropathy    Diabetes mellitus type 2 without retinopathy 2022    Gout     History of blood clots 2013    lower part of both legs    HLD (hyperlipidemia)     Hypertension     Iron deficiency anemia     BUZZ (obstructive sleep apnea)     CPAP    Pulmonary embolism          Past Surgical History:   Procedure Laterality Date    COLONOSCOPY N/A 2016    Procedure: COLONOSCOPY;  Surgeon: Shar Weiss MD;  Location: [address]);  Service: Endoscopy;  Laterality: N/A;  [facility] Referral. Okay to hold Coumadin 5 days prior to procedure. See Referral scaned in media tab on 10/20/16.    COLONOSCOPY N/A 2017    Procedure: COLONOSCOPY;  Surgeon: Stuart Trinidad MD;  Location: [address]);  Service: Endoscopy;  Laterality: N/A;  [facility] Olivarez-see scanned docs under media tab          17-current [facility] authorization and last clinic visit scanned into chart    EPIDURAL STEROID INJECTION N/A 2020    Procedure: INJECTION, STEROID, EPIDURAL, L5-S1 clear to hold Xarelto 2 days;  Surgeon:  Moy Vasquez MD;  Location: [facility];  Service: Pain Management;  Laterality: N/A;    EXCISION OF LESION OF FEMUR Right 1/4/2023    Procedure: EXCISION, LESION, FEMUR: POSTERIOR APPROACH;  Surgeon: Prieto Sinha MD;  [facility];  Service: Orthopedics;  Laterality: Right;    HIP SURGERY Right 2008    exploratory at [facility]    HIP SURGERY Right 8-31-15    [facility]    INJECTION OF ANESTHETIC AGENT AROUND NERVE Right 10/23/2019    Procedure: BLOCK, NERVE, Obturator and Femoral cleared to hold xarelto 3 days pt. said he's not taking coumadin;  Surgeon: Moy Vasquez MD;  Location: [facility];  Service: Pain Management;  Laterality: Right;    INJECTION OF ANESTHETIC AGENT AROUND NERVE Right 7/6/2020    Procedure: BLOCK, NERVE, RIGHT MBB L3,L5,L5;  Surgeon: Moy Vasquez MD;  Location: [facility];  Service: Pain Management;  Laterality: Right;  BLOCK, NERVE, RIGHT MBB L3,L5,L5    INJECTION OF ANESTHETIC AGENT AROUND NERVE Right 7/30/2020    Procedure: BLOCK, NERVE RIGHT L3, L4, L5 2ND;  Surgeon: Moy Vasquez MD;  Location: [facility];  Service: Pain Management;  Laterality: Right;  NEEDS CONSENT, XARELTO    LEG SURGERY Left 2012    fibula and tibia    RADIOFREQUENCY ABLATION Right 10/5/2020    Procedure: RADIOFREQUENCY ABLATION RIGHT L3,4,5;  Surgeon: Moy Vasquez MD;  Location: [facility];  Service: Pain Management;  Laterality: Right;  RADIOFREQUENCY ABLATION RIGHT L3,4,5  ok to hold xarelto, scanned in Media    SHOULDER SURGERY Right 1989    torn ligament         Family History   Problem Relation Age of Onset    Cancer Mother         ovarian    Cancer Father     Cancer Sister         breast    Cancer Sister         breast    No Known Problems Brother     No Known Problems Daughter     No Known Problems Daughter     No Known Problems Daughter     No Known Problems Son     No Known Problems Son        MEDICATIONS:    Current Discharge Medication List        START taking these medications     Details   acetaminophen (TYLENOL) 500 MG tablet Take 2 tablets (1,000 mg total) by mouth every 8 (eight) hours as needed for Pain.  Qty: 60 tablet, Refills: 0      aspirin (ECOTRIN) 81 MG EC tablet Take 1 tablet (81 mg total) by mouth 2 (two) times a day.  Qty: 60 tablet, Refills: 0      docusate sodium (COLACE) 100 MG capsule Take 1 capsule (100 mg total) by mouth 2 (two) times daily.  Qty: 60 capsule, Refills: 0      methocarbamoL (ROBAXIN) 750 MG Tab Take 1 tablet (750 mg total) by mouth 3 (three) times daily. for 10 days  Qty: 30 tablet, Refills: 0      ondansetron (ZOFRAN-ODT) 4 MG TbDL Dissolve 1 tablet (4 mg total) by mouth 2 (two) times daily.  Qty: 14 tablet, Refills: 0      oxyCODONE (ROXICODONE) 10 mg Tab immediate release tablet Take 1 tablet (10 mg total) by mouth every 4 (four) hours as needed for Pain.  Qty: 42 tablet, Refills: 0    Comments: Quantity prescribed more than 7 day supply? No Bedside delivery           CONTINUE these medications which have NOT CHANGED    Details   amlodipine (NORVASC) 10 MG tablet Take 10 mg by mouth once daily.      atorvastatin (LIPITOR) 80 MG tablet Take 80 mg by mouth once daily. Take one-half tablet daily      carvedilol (COREG) 25 MG tablet Take 25 mg by mouth 2 (two) times daily.      finasteride (PROSCAR) 5 mg tablet TAKE ONE TABLET BY MOUTH ONCE DAILY FOR PROSTATE      hydrALAZINE (APRESOLINE) 50 MG tablet TAKE ONE TABLET BY MOUTH THREE TIMES A DAY FOR HEART/BLOOD PRESSURE      oxycodone (OXY-IR) 5 mg Cap Take 2 capsules (10 mg total) by mouth every 6 (six) hours as needed.  Qty: 120 capsule, Refills: 0      albuterol (PROVENTIL) 2.5 mg /3 mL (0.083 %) nebulizer solution Take 2.5 mg by nebulization every 6 (six) hours as needed for Wheezing.      albuterol 90 mcg/actuation inhaler Inhale 2 puffs into the lungs every 6 (six) hours as needed for Wheezing.       budesonide-formoterol 160-4.5 mcg (SYMBICORT) 160-4.5 mcg/actuation HFAA Inhale 2 puffs into the lungs  every 12 (twelve) hours. as needed      camphor-menthol 0.5-0.5% (SARNA) lotion APPLY MODERATE AMOUNT TOPICALLY TWICE A DAY      carboxymethyl-gly-berm69-CP 0.5-1-0.5 % Dpet Place 1 drop into both eyes 4 (four) times daily.      carboxymethylcellulose sodium 1 % DpGe Place 0.4 mLs into both eyes 4 (four) times daily as needed.      chlorhexidine (PERIDEX) 0.12 % solution RINSE 1 CAPFUL BY MOUTH ONCE DAILY AS NEEDED SWISH FOR 30 SECONDS AFTER,AND SPIT OUT      CLOBETASOL PROPIONATE, BULK, MISC Apply topically once daily.      cyanocobalamin (VITAMIN B-12) 100 MCG tablet Take 100 mcg by mouth once daily. Take two tablets      cycloSPORINE (RESTASIS) 0.05 % ophthalmic emulsion INSTILL 0.4ML IN EACH EYE EVERY 12 HOURS      eplerenone (INSPRA) 25 MG Tab Take 1 tablet by mouth once daily.      ergocalciferol (ERGOCALCIFEROL) 50,000 unit Cap Take 50,000 Units by mouth.      ketoconazole (NIZORAL) 2 % cream Apply topically 2 (two) times daily.      latanoprost 0.005 % ophthalmic solution Place 1 drop into both eyes every evening.      lidocaine (LIDODERM) 5 % Place 1 patch onto the skin once daily. Wear for 12 hours, then remove. Do not apply a new patch for at least 12 hours.      mineral oil/petrolatum,white (WHITE PETROLATUM-MINERAL OIL OPHT) APPLY THIN RIBBON TO EACH EYE AT BEDTIME FOR DRY EYES      rivaroxaban (XARELTO) 20 mg Tab Take 20 mg by mouth.       tiotropium (SPIRIVA) 18 mcg inhalation capsule Inhale 18 mcg into the lungs once daily. Controller   prn             ALLERGIES:    Review of patient's allergies indicates:   Allergen Reactions    Amlodipine Swelling    Lisinopril Tinitus and Swelling    Pregabalin Swelling    Azithromycin      Muscles tense up    Levetiracetam Other (See Comments)    Acetaminophen Rash     Feels like needles are sticking him per pt         Social History     Socioeconomic History    Marital status: Single   Tobacco Use    Smoking status: Former     Types: Cigars     Quit date:  "2009     Years since quittin.4    Smokeless tobacco: Never   Substance and Sexual Activity    Alcohol use: No     Alcohol/week: 0.0 standard drinks    Drug use: No    Sexual activity: Not Currently       REVIEW OF SYSTEMS:  Review of Systems   Eyes:  Negative for eye problems.   Respiratory:  Negative for chest tightness.    Cardiovascular:  Negative for chest pain.   Genitourinary:  Negative for difficulty urinating.    Musculoskeletal:  Positive for arthralgias and gait problem.   Skin:  Negative for rash.   Neurological:  Positive for gait problem. Negative for headaches and seizures.   Psychiatric/Behavioral:  The patient is not nervous/anxious.        Physical Exam:  Body mass index is 32.98 kg/m².  ECO   height is 6' 1" (1.854 m) and weight is 113.4 kg (250 lb). His oral temperature is 97.1 °F (36.2 °C). His blood pressure is 175/88 (abnormal) and his pulse is 78. His respiration is 18 and oxygen saturation is 96%.   The patient is a pleasant man in no acute distress.  He is sitting up in the hospital bed.  He is well developed and well nourished.  Physical Exam  Constitutional:       Appearance: Normal appearance.   HENT:      Head: Normocephalic and atraumatic.      Mouth/Throat:      Mouth: Mucous membranes are moist.   Eyes:      Extraocular Movements: Extraocular movements intact.   Pulmonary:      Effort: Pulmonary effort is normal.   Abdominal:      Palpations: Abdomen is soft.   Musculoskeletal:         General: Tenderness present.   Skin:     General: Skin is warm and dry.   Neurological:      Mental Status: He is alert and oriented to person, place, and time. Mental status is at baseline.   Psychiatric:         Mood and Affect: Mood normal.         Behavior: Behavior normal.       ASSESSMENT:  In summary, Mr. Dior is a 72 y.o. male status post right hip replacement and HO.  He underwent resection of HO yesterday and has been referred for consideration of post-op " radiation.    PLAN:  The patient is recommended post-op radiation to the hip to prevent heterotopic ossification.  He will come down to the department today for radiation treatment.  The patient is planned to receive a dose of 8 Gy in a single fraction to the right hip.  All of the patient's concerns were addressed, and all of his questions were answered to his apparent satisfaction.    SUBMITTED BY:  Savi Gallego M.D.  1/5/2023, 9:19 AM

## 2023-01-05 NOTE — HOSPITAL COURSE
CC:  Right hip pain     Macario Dior Jr. is a 72 y.o. male with Right hip pain.  Pain is worse with activity and weight bearing.  Patient has experienced interference of activities of daily living due to increased pain and decreased range of motion. Xray reveals heterotopic bone around the right hip. Patient has failed non-operative treatment including NSAIDs, as well as greater than 3 months of activity modification. Macario Dior Jr. ambulates independently.      Relevant medical conditions of significance in perioperative period:  S/p hip replacement with heterotopic ossification of bone  DVT- on medication  CKD- monitored by primary care  HTN- no medication managed by pcp  Lumbar radiculopathy- on medication managed by pcp  DM- on medication managed by pcp    Patient is s/p right HO resection on 01/04/23 and s/p radiation therapy to prevent recurrent 01/05/23. He has HH PT in place as well as follow up in clinic scheduled for 01/19/23.   POD 0 he was hypertensive with some improvement once his home medications were restarted.   Per PT eval, he is safe for discharge.

## 2023-01-05 NOTE — NURSING TRANSFER
Nursing Transfer Note      1/4/2023     Reason patient is being transferred: post op    Transfer : 508    Transfer via bed    Transfer with [facility]    Transported by transport    Medicines sent: [facility]cef    Any special needs or follow-up needed: no    Chart send with patient: yes    Notified: daughter    Patient reassessed at: 01/04/2022 @ 2000

## 2023-01-05 NOTE — PT/OT/SLP EVAL
Physical Therapy Co-Evaluation with OT and Treatment     Patient Name:  Macario Dior Jr.  MRN: [id number]    Admit Date: 1/4/2023  Admitting Diagnosis:  Postoperative heterotopic ossification  Length of Stay: 0 days  Recent Surgery: Procedure(s) (LRB):  EXCISION, LESION, FEMUR: POSTERIOR APPROACH (Right) 1 Day Post-Op    Recommendations:     Discharge Recommendations: Other  Equipment recommendations: rolling walker and hip kit  Barriers to discharge: None Identified     Assessment:     Macario Dior Jr. is a 72 y.o. male admitted to [facility] on 1/4/2023 with medical diagnosis of Postoperative heterotopic ossification. Pt presents with weakness, impaired endurance, impaired functional mobility, decreased coordination, decreased lower extremity function, pain, orthopedic precautions. These deficits effect their roles and responsibilities in which they were able to complete prior to admit.   PTA, pt was ambulating with SPC for short distances and rollator for long distances. Daughter is going to be home to assist with pt at discharge. Pt able to ambulate >300 ft with SBA with RW and navigate 6 inch curb step with RW SBA. If pt does not d/c then will continue to assess functional mobility.  Macario Dior Jr. would benefit from acute PT intervention to improve quality of life, focus on recovery of impairments, provide patient/caregiver education, reduce fall risk, and maximize (I) and safety with functional mobility. Once medically stable, recommending pt discharge to home health PT.    Rehab Prognosis: Good    Plan:     During this hospitalization, patient to be seen daily to address the identified rehab impairments via therapeutic activities, gait training, therapeutic exercises, neuromuscular re-education and progress towards stated goals.     Plan of Care Expires:  02/04/23  Plan of Care reviewed with: patient    This plan of care has been discussed with the patient/caregiver, who was included in its development and is  "in agreement with the identified goals and treatment plan.     Subjective     Communicated with RN prior to session.  Patient found supine upon PT entry to room, agreeable to evaluation. Pt alone during session.    Chief Complaint: R hip pain    Patient/Family Comments/goals: "I am used to this because I had my hip replaced before"    Pain/Comfort:  Pain Rating 1: 7/10  Location - Side 1: Right  Location - Orientation 1: generalized  Location 1: knee  Pain Addressed 1: Reposition, Distraction, Cessation of Activity  Pain Rating Post-Intervention 1:  (pt did not rate)    Patients cultural, spiritual, Yazdanism conflicts given the current situation: None identified     Patient History: information obtained from pt     Living Environment: Pt lives in 2 SH apartment with 0 YANA. Bedroom is on 2nd level with 12-15 steps with chiqui HR (can reach both). Bed can be moved downstairs. Bathroom set-up: tub/shower combo on 1st floor  Prior Level of Function: modified (I) for mobility using SPC for short distances and rollator for long distances  DME owned: single point cane, bedside commode, shower chair, and rollator  Support Available/Caregiver Assistance:  daughter    Objective:   OT present for coeval due to pt's multiple medical comorbidities and functional/cognition deficits requiring two skilled therapists to appropriately progress pt's musculoskeletal strength, neuromuscular control, and endurance while taking into consideration medical acuity and pt safety.    Patient found with: telemetry    Recent Surgery: Procedure(s) (LRB):  EXCISION, LESION, FEMUR: POSTERIOR APPROACH (Right) 1 Day Post-Op  General Precautions: Standard, fall   Orthopedic Precautions:RLE posterior precautions, RLE weight bearing as tolerated   Braces: N/A   Oxygen Device: room air      Exams:    Cognition:  Alert  Command following: Follows multistep verbal commands  Communication: clear/fluent    Sensation:   Light touch sensation: Intact " BLEs    Gross Motor Coordination: No deficits noted during functional mobility tasks     Edema/Skin Integrity: None noted; Visible skin intact    Postural examination/scapula alignment: Rounded shoulder and Head forward    Lower Extremity Range of Motion:  Right Lower Extremity: WFL  Left Lower Extremity: WFL    Lower Extremity Strength:  Right Lower Extremity: WFL  Left Lower Extremity: WFL    Functional Mobility:    Bed Mobility:  Supine > Sit with stand by assistance    Transfers:   Sit <> Stand Transfer: Moderate Assistance x 1 trials from eob with RW AD              Gait:  Distance: > 300 ft  Assistance level: Stand-by Assistance  Assistive Device: rolling walker  Gait Assessment: decreased step length , decreased gait speed, and antalgic gait pattern; step-to pattern    Balance:  Dynamic Sitting: FAIR+: Maintains balance through MINIMAL excursions of active trunk motion  Standing:  Static: FAIR+: Takes MINIMAL challenges from all directions   Dynamic: GOOD: Needs SUPERVISION only during gait and able to self right with moderate LOB    Outcome Measure: AM-PAC 6 CLICK MOBILITY  Total Score:19     Patient/Caregiver Education:     Therapist educated pt/caregiver regarding:   PT POC and goals for therapy   Safety with mobility and fall risk   Instruction on use of call button and importance of calling nursing staff for assistance with mobility   Time provided for therapeutic counseling and discussion of current health disposition. All questions answered to satisfaction, within scope of PT practice     Patient/caregiver able to verbalize understanding and expressed no further questions this visit; will follow-up with pt/caregiver during current admit for additional questions/concerns within scope of practice.     White board updated.     Patient left up in chair with all lines intact and call button in reach.    GOALS:   Multidisciplinary Problems       Physical Therapy Goals          Problem: Physical Therapy     Goal Priority Disciplines Outcome Goal Variances Interventions   Physical Therapy Goal     PT, PT/OT Ongoing, Progressing     Description: Patient does not require acute PT services at this time due to being at (I) PLOF and demonstrating safety with functional mobility, including transfers and ambulation, thus no goals created.                           History:     Past Medical History:   Diagnosis Date    Arthritis     Asthma     chronic    CKD (chronic kidney disease), stage III     patient denies    Deep vein thrombosis     Demyelinating neuropathy     polyneuropathy    Diabetes mellitus type 2 without retinopathy 12/14/2022    Gout     History of blood clots 2013    lower part of both legs    HLD (hyperlipidemia)     Hypertension     Iron deficiency anemia     BUZZ (obstructive sleep apnea)     CPAP    Pulmonary embolism        Past Surgical History:   Procedure Laterality Date    COLONOSCOPY N/A 11/7/2016    Procedure: COLONOSCOPY;  Surgeon: Shar Weiss MD;  Location: [facility] ([address] [place] [address]);  Service: Endoscopy;  Laterality: N/A;  [facility] Referral. Okay to hold Coumadin 5 days prior to procedure. See Referral scaned in media tab on 10/20/16.    COLONOSCOPY N/A 9/21/2017    Procedure: COLONOSCOPY;  Surgeon: Stuart Trinidad MD;  Location: [facility] (4TH FLR);  Service: Endoscopy;  Laterality: N/A;  referral from [facility] from Dr. Olivarez-see scanned docs under media tab          9/6/17-current [facility] authorization and last clinic visit scanned into chart    EPIDURAL STEROID INJECTION N/A 12/17/2020    Procedure: INJECTION, STEROID, EPIDURAL, L5-S1 clear to hold Xarelto 2 days;  Surgeon: Moy Vasquez MD;  Location: [facility] PAIN MGT;  Service: Pain Management;  Laterality: N/A;    EXCISION OF LESION OF FEMUR Right 1/4/2023    Procedure: EXCISION, LESION, FEMUR: POSTERIOR APPROACH;  Surgeon: Prieto Sinha MD;  Location: [facility] FLR;  Service: Orthopedics;  Laterality: Right;    HIP SURGERY Right 2008     exploratory at [facility]    HIP SURGERY Right 8-31-15    THR    INJECTION OF ANESTHETIC AGENT AROUND NERVE Right 10/23/2019    Procedure: BLOCK, NERVE, Obturator and Femoral cleared to hold xarelto 3 days pt. said he's not taking coumadin;  Surgeon: Moy Vasquez MD;  Location: [facility] PAIN MGT;  Service: Pain Management;  Laterality: Right;    INJECTION OF ANESTHETIC AGENT AROUND NERVE Right 7/6/2020    Procedure: BLOCK, NERVE, RIGHT MBB L3,L5,L5;  Surgeon: Moy Vasquez MD;  Location: [facility] PAIN MGT;  Service: Pain Management;  Laterality: Right;  BLOCK, NERVE, RIGHT MBB L3,L5,L5    INJECTION OF ANESTHETIC AGENT AROUND NERVE Right 7/30/2020    Procedure: BLOCK, NERVE RIGHT L3, L4, L5 2ND;  Surgeon: Moy Vasquez MD;  Location: [facility] PAIN MGT;  Service: Pain Management;  Laterality: Right;  NEEDS CONSENT, XARELTO    LEG SURGERY Left 2012    fibula and tibia    RADIOFREQUENCY ABLATION Right 10/5/2020    Procedure: RADIOFREQUENCY ABLATION RIGHT L3,4,5;  Surgeon: Moy Vasquez MD;  Location: [facility] PAIN MGT;  Service: Pain Management;  Laterality: Right;  RADIOFREQUENCY ABLATION RIGHT L3,4,5  ok to hold xarelto, scanned in Media    SHOULDER SURGERY Right 1989    torn ligament       Time Tracking:     PT Received On: 01/05/23  PT Start Time: 1122     PT Stop Time: 1152  PT Total Time (min): 30 min     Billable Minutes: Evaluation 7 and Gait Training 23 01/05/2023

## 2023-01-05 NOTE — PLAN OF CARE
Pt is safe to discharge home.    Problem: Physical Therapy  Goal: Physical Therapy Goal  Description: Patient does not require acute PT services at this time due to being at (I) PLOF and demonstrating safety with functional mobility, including transfers and ambulation, thus no goals created.    Outcome: Ongoing, Progressing     1/5/2023

## 2023-01-05 NOTE — SUBJECTIVE & OBJECTIVE
Principal Problem:Postoperative heterotopic ossification    Principal Orthopedic Problem: Heterotopic Ossification, right hip s/p resection 01/04/23    Interval History:   Overnight events: NAEON    Vitals:   Hypertensive 158//92  Otherwise vitals wnl.   Pain 5/10.     PT/OT update: Pt not evaluated tomorrow, needs to be evaluated today.     Need to know: Pt states that his pain has improved overnight.        Review of patient's allergies indicates:   Allergen Reactions    Amlodipine Swelling    Lisinopril Tinitus and Swelling    Pregabalin Swelling    Azithromycin      Muscles tense up    Levetiracetam Other (See Comments)    Acetaminophen Rash     Feels like needles are sticking him per pt       Current Facility-Administered Medications   Medication    0.9%  NaCl infusion    acetaminophen tablet 1,000 mg    albuterol inhaler 2 puff    amLODIPine tablet 10 mg    atorvastatin tablet 80 mg    budesonide-formoterol 160-4.5 mcg inhaler 2 puff    carvediloL tablet 25 mg    ceFAZolin 2 g in dextrose 5 % in water (D5W) 5 % 50 mL IVPB (MB+)    diphenhydrAMINE injection 25 mg    hydrALAZINE injection 10 mg    hydrALAZINE injection 10 mg    labetalol 20 mg/4 mL (5 mg/mL) IV syring    labetaloL 20 mg/4 mL (5 mg/mL) IV syringe Syrg    LIDOcaine (PF) 10 mg/ml (1%) injection 10 mg    melatonin tablet 6 mg    methocarbamoL tablet 500 mg    ondansetron disintegrating tablet 8 mg    oxyCODONE immediate release tablet 5 mg    oxyCODONE immediate release tablet Tab 10 mg    rivaroxaban tablet 20 mg    sodium chloride 0.9% flush 10 mL    tiotropium bromide 2.5 mcg/actuation inhaler 2 puff     Facility-Administered Medications Ordered in Other Encounters   Medication    ALPRAZolam tablet 0.5 mg     Objective:     Vital Signs (Most Recent):  Temp: 97.1 °F (36.2 °C) (01/05/23 0505)  Pulse: 78 (01/05/23 0505)  Resp: 18 (01/05/23 0505)  BP: (!) 198/89 (01/05/23 0505)  SpO2: 96 % (01/05/23 0505)   Vital Signs (24h Range):  Temp:   "[97.1 °F (36.2 °C)-98.1 °F (36.7 °C)] 97.1 °F (36.2 °C)  Pulse:  [60-84] 78  Resp:  [11-26] 18  SpO2:  [91 %-100 %] 96 %  BP: (125-214)/(56-98) 198/89     Weight: 113.4 kg (250 lb)  Height: 6' 1" (185.4 cm)  Body mass index is 32.98 kg/m².      Intake/Output Summary (Last 24 hours) at 1/5/2023 0547  Last data filed at 1/4/2023 2037  Gross per 24 hour   Intake 2460 ml   Output 2200 ml   Net 260 ml       Ortho/SPM Exam    General appearance - no acute distress, conversant  Musculoskeletal -  Dressing C/D/I  Fires quad/TA/EHL/GSC  SILT SP/DP/TN  WWP distally, 2+ pedal pulses  Calves soft, nontender bilateral      Significant Labs: All pertinent labs within the past 24 hours have been reviewed.    Hgb 12.9    Significant Imaging: I have reviewed and interpreted all pertinent imaging results/findings.    Hip radiograph:     Right hip arthroplasty appears intact.  There is been interval excision of exostosis.  No dislocation.  "

## 2023-01-05 NOTE — PROGRESS NOTES
[facility]  Orthopedics  Progress Note    Patient Name: Macario Dior Jr.  MRN: [id number]  Admission Date: 1/4/2023  Hospital Length of Stay: 0 days  Attending Provider: Prieto Sinha MD  Primary Care Provider: [facility]  Follow-up For: Procedure(s) (LRB):  EXCISION, LESION, FEMUR: POSTERIOR APPROACH (Right)    Post-Operative Day: 1 Day Post-Op  Subjective:     Principal Problem:Postoperative heterotopic ossification    Principal Orthopedic Problem: Heterotopic Ossification, right hip s/p resection 01/04/23    Interval History:   Overnight events: NAEON    Vitals:   Hypertensive 158//92  Otherwise vitals wnl.   Pain 5/10.     PT/OT update: Pt not evaluated tomorrow, needs to be evaluated today.     Need to know:   - Pt states that his pain has improved overnight.   - Hbg appears stable  - POCT glucose elevated, 199. Pt does not take insulin at home and has not been diagnosed with DM. However, prior Ha1c 6.7 on 12/21.        Review of patient's allergies indicates:   Allergen Reactions    Amlodipine Swelling    Lisinopril Tinitus and Swelling    Pregabalin Swelling    Azithromycin      Muscles tense up    Levetiracetam Other (See Comments)    Acetaminophen Rash     Feels like needles are sticking him per pt       Current Facility-Administered Medications   Medication    0.9%  NaCl infusion    acetaminophen tablet 1,000 mg    albuterol inhaler 2 puff    amLODIPine tablet 10 mg    atorvastatin tablet 80 mg    budesonide-formoterol 160-4.5 mcg inhaler 2 puff    carvediloL tablet 25 mg    ceFAZolin 2 g in dextrose 5 % in water (D5W) 5 % 50 mL IVPB (MB+)    diphenhydrAMINE injection 25 mg    hydrALAZINE injection 10 mg    hydrALAZINE injection 10 mg    labetalol 20 mg/4 mL (5 mg/mL) IV syring    labetaloL 20 mg/4 mL (5 mg/mL) IV syringe Syrg    LIDOcaine (PF) 10 mg/ml (1%) injection 10 mg    melatonin tablet 6 mg    methocarbamoL tablet 500 mg    ondansetron disintegrating  "tablet 8 mg    oxyCODONE immediate release tablet 5 mg    oxyCODONE immediate release tablet Tab 10 mg    rivaroxaban tablet 20 mg    sodium chloride 0.9% flush 10 mL    tiotropium bromide 2.5 mcg/actuation inhaler 2 puff     Facility-Administered Medications Ordered in Other Encounters   Medication    ALPRAZolam tablet 0.5 mg     Objective:     Vital Signs (Most Recent):  Temp: 97.1 °F (36.2 °C) (01/05/23 0505)  Pulse: 78 (01/05/23 0505)  Resp: 18 (01/05/23 0505)  BP: (!) 198/89 (01/05/23 0505)  SpO2: 96 % (01/05/23 0505)   Vital Signs (24h Range):  Temp:  [97.1 °F (36.2 °C)-98.1 °F (36.7 °C)] 97.1 °F (36.2 °C)  Pulse:  [60-84] 78  Resp:  [11-26] 18  SpO2:  [91 %-100 %] 96 %  BP: (125-214)/(56-98) 198/89     Weight: 113.4 kg (250 lb)  Height: 6' 1" (185.4 cm)  Body mass index is 32.98 kg/m².      Intake/Output Summary (Last 24 hours) at 1/5/2023 0547  Last data filed at 1/4/2023 2037  Gross per 24 hour   Intake 2460 ml   Output 2200 ml   Net 260 ml       Ortho/SPM Exam    General appearance - no acute distress, conversant  Musculoskeletal -  Dressing C/D/I  Fires quad/TA/EHL/GSC  SILT SP/DP/TN  WWP distally, 2+ pedal pulses  Calves soft, nontender bilateral      Significant Labs: All pertinent labs within the past 24 hours have been reviewed.    Hgb 12.9    Significant Imaging: I have reviewed and interpreted all pertinent imaging results/findings.    Hip radiograph:     Right hip arthroplasty appears intact.  There is been interval excision of exostosis.  No dislocation.    Assessment/Plan:     * Postoperative heterotopic ossification  Macario Dior is a 73 y/o male s/p HO resection from right hip on 01/04/23.      Pain control: multimodal  PT/OT: WBAT RLE, needs to be evaluated today prior to DC  DVT PPx: restarted on home xarelto 20mg QD, SCDs at all times when not ambulating  Abx: postop Ancef  Labs: AM Hbg 12.9, POCT glucose 199      Dispo: dc home with  PT            AIXA LANE, " MD  Orthopedics  [facility]

## 2023-01-05 NOTE — ANESTHESIA RELEASE NOTE
"Anesthesia Release from PACU Note    Patient: Macario Dior Jr.    Procedure(s) Performed: Procedure(s) (LRB):  EXCISION, LESION, FEMUR: POSTERIOR APPROACH (Right)    Anesthesia type: CSE    Post pain: Adequate analgesia    Post assessment: no apparent anesthetic complications    Last Vitals:   Visit Vitals  BP (!) 161/60   Pulse 71   Temp 36.2 °C (97.2 °F) (Temporal)   Resp 11   Ht 6' 1" (1.854 m)   Wt 113.4 kg (250 lb)   SpO2 100%   BMI 32.98 kg/m²       Post vital signs: stable    Level of consciousness: awake, alert  and oriented    Nausea/Vomiting: no nausea/no vomiting    Complications: none    Airway Patency: patent    Respiratory: unassisted, spontaneous ventilation, room air    Cardiovascular: stable and blood pressure at baseline    Hydration: euvolemic     Darwin Garza MD  Anesthesiology Resident CA1/PGY2  [facility]    "

## 2023-01-05 NOTE — PLAN OF CARE
[facility] - [facility]  Initial Discharge Assessment       Primary Care Provider: [facility]    Admission Diagnosis: Heterotopic ossification of bone [M89.8X9]  History of total hip arthroplasty, right [Z96.641]  Postoperative heterotopic ossification [M96.89, M61.50]    Admission Date: 1/4/2023  Expected Discharge Date: 1/5/2023    Discharge Barriers Identified: None    Payor: [employer] / Plan: [facility] CCN OPTUM / Product Type: Government /     Extended Emergency Contact Information  Primary Emergency Contact: Heron Dior   [facility]  Home Phone: [phone]  Relation: Daughter    Discharge Plan A: Home Health  Discharge Plan B: Home with family      [id number] - [place], [place] - [address] AT [facility] [address]  [address]  [facility] [address]  Phone: [phone] Fax: [phone]    [facility] - [place], [place] - [address]  [address] [facility] [address]  Phone: [phone] Fax: [phone]      Initial Assessment (most recent)       Adult Discharge Assessment - 01/05/23 1118          Discharge Assessment    Assessment Type Discharge Planning Assessment     Confirmed/corrected address, phone number and insurance Yes     Confirmed Demographics Correct on Facesheet     Source of Information patient     Communicated GERRI with patient/caregiver Yes     People in Home alone     Do you expect to return to your current living situation? No     Do you have help at home or someone to help you manage your care at home? No   Patient plans to discharge to daughter--Zafar Dior home: [address] [place], [place].    Prior to hospitilization cognitive status: Alert/Oriented     Current cognitive status: Alert/Oriented     Walking or Climbing Stairs --   Patient lives on the second floor with over 12 plus steps--bed room/bathroom on second level--plans to d/c to daughter's home.     Equipment Currently Used at Home walker, standard;bedside commode;wheelchair   Had Home Health in the past after hip surgery    Readmission within 30 days? No     Patient currently being followed by outpatient case management? No     Do you currently have service(s) that help you manage your care at home? No   Plans to discharge to daughter's home/support provided    Do you take prescription medications? Yes     Do you have prescription coverage? Yes     Is the patient taking medications as prescribed? yes     Who is going to help you get home at discharge? Daughter--Zafar Dior [phone]     How do you get to doctors appointments? car, drives self     Are you on dialysis? No     Do you take coumadin? No     Discharge Plan A Home Health     Discharge Plan B Home with family     DME Needed Upon Discharge  other (see comments)   TBD    Discharge Plan discussed with: Patient     Discharge Barriers Identified None        Social Connections    Are you , , , , never , or living with a partner?                    S.W. met with patient in room 508.  Completed discharge planning assessment.  Patient indicated he plans to discharge to--Daughter--Zafar Dior [phone] home--located [address] [place] [facility].  Pt. Expressed daughter will provide transportation and support/help after discharge.  Patient has: Walker, BSC & W/C & Previous home health services from a previous surgery.  Patient does not have any oxygen, currently not on dialysis & takes Eliquis.  Pt. Would like to utilize [facility] bed side medication delivery if covered by his insurance--if not he has his preferred pharmacy he is able to utilize.  Will continue to follow throughout this hospitalization.    Angi Gastelum [facility] [place] [place]  Case Management Department  Ext. [phone]

## 2023-01-05 NOTE — PLAN OF CARE
Problem: Occupational Therapy  Goal: Occupational Therapy Goal  Description: Goals to be met by: 1/19/23     Patient will increase functional independence with ADLs by performing:    UE Dressing with Modified [place].  LE Dressing with Modified [place] and Assistive Devices as needed.  Grooming while standing at sink with Modified [place].  Toileting from bedside commode with Modified [place] for hygiene and clothing management.   Bathing from  shower chair/bench with Modified [place].  Toilet transfer to bedside commode with Modified [place].    Outcome: Ongoing, Progressing

## 2023-01-05 NOTE — PLAN OF CARE
01/05/23 1118   Post-Acute Status   Post-Acute Authorization Home Health   Home Health Status Referrals Sent   Hospital Resources/Appts/Education Provided Appointments scheduled and added to AVS   Discharge Plan   Discharge Plan A Home with family;Home Health   Discharge Plan B Home with family;Home Health     Referral faxed to  Sri @ [facility] [phone]. Patient requested [facility] at d/c. [facility] aware.

## 2023-01-05 NOTE — ASSESSMENT & PLAN NOTE
aMcario Dior is a 71 y/o male s/p HO resection from right hip on 01/04/23 and post-op radiation therapy 01/05/23.      Pain control: multimodal  PT/OT: WBAT RLE  DVT PPx: restarted on home xarelto 20mg QD, SCDs at all times when not ambulating  Abx: postop Ancef received      Dispo: dc home with  PT

## 2023-01-05 NOTE — PT/OT/SLP EVAL
Occupational Therapy  Co- Evaluation    Name: Macario Dior Jr.  MRN: [id number]  Admitting Diagnosis: Postoperative heterotopic ossification  Recent Surgery: Procedure(s) (LRB):  EXCISION, LESION, FEMUR: POSTERIOR APPROACH (Right) 1 Day Post-Op    Recommendations:     Discharge Recommendations: home  Discharge Equipment Recommendations:  hip kit, walker, rolling  Barriers to discharge:  Inaccessible home environment    Assessment:     Macario Dior Jr. is a 72 y.o. male with a medical diagnosis of Postoperative heterotopic ossification.  He presents with HO R femur.  Able to perform supine/sit, sit/stand, and was able to walk out into hallway c CGA and RW.  Pt was able to perform LB dressing c mod I and AE.  Educated pt on bathing, car T/F's, and hip precautions. Performance deficits affecting function: impaired self care skills, impaired functional mobility, orthopedic precautions.      Rehab Prognosis: Good; patient would benefit from acute skilled OT services to address these deficits and reach maximum level of function.       Plan:     Patient to be seen 4 x/week to address the above listed problems via self-care/home management, therapeutic activities, therapeutic exercises  Plan of Care Expires: 01/06/23  Plan of Care Reviewed with: patient  Co-tx d/t pt medical complexity    Subjective     Chief Complaint: OA hip resection and is WBAT L LE c post hip precautions  Patient/Family Comments/goals: I want to stop hurting    Occupational Profile:  Living Environment: Pt lives in an apt complex and has a bedroom on the second level of apartment.  Has 1 YANA and 15 steps inside apartment c B hand rails.  Pt has a tub/shower combo.  Previous level of function: I PTA  Roles and Routines: Retired   Equipment Used at Home: bedside commode, cane, straight, shower chair, rollator  Assistance upon Discharge: Pt lives c adult daughter    Pain/Comfort:  Pain Rating 1: 7/10    Patients cultural, spiritual, Muslim  conflicts given the current situation: no    Objective:     Communicated with: RN prior to session.  Patient found supine with telemetry upon OT entry to room.    General Precautions: Standard, fall  Orthopedic Precautions: LLE weight bearing as tolerated, LLE posterior precautions  Braces: N/A  Respiratory Status: Room air    Occupational Performance:    Bed Mobility:    Patient completed Supine to Sit with stand by assistance    Functional Mobility/Transfers:  Patient completed Sit <> Stand Transfer with contact guard assistance  with  rolling walker   Patient completed Bed <> Chair Transfer using Stand Pivot technique with contact guard assistance with rolling walker  Functional Mobility: Pt was able to walk out into hallway c CGA and RW.    Activities of Daily Living:  Upper Body Dressing: minimum assistance to don hospital gown.  Lower Body Dressing: stand by assistance to don/doff shorts and socks c reacher, sock-aid, and long handled shoe horn      Physical Exam:  Upper Extremity Range of Motion:     -       Right Upper Extremity: WFL  -       Left Upper Extremity: WFL  Upper Extremity Strength:    -       Right Upper Extremity: WFL  -       Left Upper Extremity: WFL    AMPAC 6 Click ADL:  AMPAC Total Score: 13    Patient left up in chair with all lines intact, call button in reach, and RN notified    GOALS:   Multidisciplinary Problems       Occupational Therapy Goals          Problem: Occupational Therapy    Goal Priority Disciplines Outcome Interventions   Occupational Therapy Goal     OT, PT/OT Ongoing, Progressing    Description: Goals to be met by: 1/19/23     Patient will increase functional independence with ADLs by performing:    UE Dressing with Modified [place].  LE Dressing with Modified [place] and Assistive Devices as needed.  Grooming while standing at sink with Modified [place].  Toileting from bedside commode with Modified [place] for hygiene and clothing management.    Bathing from  shower chair/bench with Modified [place].  Toilet transfer to bedside commode with Modified [place].                         History:     Past Medical History:   Diagnosis Date    Arthritis     Asthma     chronic    CKD (chronic kidney disease), stage III     patient denies    Deep vein thrombosis     Demyelinating neuropathy     polyneuropathy    Diabetes mellitus type 2 without retinopathy 12/14/2022    Gout     History of blood clots 2013    lower part of both legs    HLD (hyperlipidemia)     Hypertension     Iron deficiency anemia     BUZZ (obstructive sleep apnea)     CPAP    Pulmonary embolism          Past Surgical History:   Procedure Laterality Date    COLONOSCOPY N/A 11/7/2016    Procedure: COLONOSCOPY;  Surgeon: Shar Weiss MD;  Location: [facility] (4TH FLR);  Service: Endoscopy;  Laterality: N/A;  [facility] Referral. Okay to hold Coumadin 5 days prior to procedure. See Referral scaned in media tab on 10/20/16.    COLONOSCOPY N/A 9/21/2017    Procedure: COLONOSCOPY;  Surgeon: Stuart Trinidad MD;  Location: [facility] (4TH FLR);  Service: Endoscopy;  Laterality: N/A;  [facility] from Dr. Olivarez-see scanned docs under media tab          9/6/17-current [facility] authorization and last clinic visit scanned into chart    EPIDURAL STEROID INJECTION N/A 12/17/2020    Procedure: INJECTION, STEROID, EPIDURAL, L5-S1 clear to hold Xarelto 2 days;  Surgeon: Moy Vasquez MD;  Location: [facility] PAIN MGT;  Service: Pain Management;  Laterality: N/A;    EXCISION OF LESION OF FEMUR Right 1/4/2023    Procedure: EXCISION, LESION, FEMUR: POSTERIOR APPROACH;  Surgeon: Prieto Sinha MD;  [facility];  Service: Orthopedics;  Laterality: Right;    HIP SURGERY Right 2008    exploratory at [facility]    HIP SURGERY Right 8-31-15    THR    INJECTION OF ANESTHETIC AGENT AROUND NERVE Right 10/23/2019    Procedure: BLOCK, NERVE, Obturator and Femoral cleared to hold xarelto 3 days pt.  said he's not taking coumadin;  Surgeon: Moy Vasquez MD;  Location: [facility] PAIN MGT;  Service: Pain Management;  Laterality: Right;    INJECTION OF ANESTHETIC AGENT AROUND NERVE Right 7/6/2020    Procedure: BLOCK, NERVE, RIGHT MBB L3,L5,L5;  Surgeon: Moy Vasquez MD;  Location: [facility] PAIN MGT;  Service: Pain Management;  Laterality: Right;  BLOCK, NERVE, RIGHT MBB L3,L5,L5    INJECTION OF ANESTHETIC AGENT AROUND NERVE Right 7/30/2020    Procedure: BLOCK, NERVE RIGHT L3, L4, L5 2ND;  Surgeon: Moy Vasquez MD;  Location: [facility] PAIN MGT;  Service: Pain Management;  Laterality: Right;  NEEDS CONSENT, XARELTO    LEG SURGERY Left 2012    fibula and tibia    RADIOFREQUENCY ABLATION Right 10/5/2020    Procedure: RADIOFREQUENCY ABLATION RIGHT L3,4,5;  Surgeon: Moy Vasquez MD;  Location: [facility] PAIN MGT;  Service: Pain Management;  Laterality: Right;  RADIOFREQUENCY ABLATION RIGHT L3,4,5  ok to hold xarelto, scanned in Media    SHOULDER SURGERY Right 1989    torn ligament       Time Tracking:     OT Date of Treatment: 01/05/23  OT Start Time: 1125  OT Stop Time: 1150  OT Total Time (min): 25 min    Billable Minutes:Evaluation 15  Self Care/Home Management 10    1/5/2023

## 2023-01-06 ENCOUNTER — TELEPHONE (OUTPATIENT)
Dept: ORTHOPEDICS | Facility: CLINIC | Age: 73
End: 2023-01-06
Payer: OTHER GOVERNMENT

## 2023-01-06 ENCOUNTER — CLINICAL SUPPORT (OUTPATIENT)
Dept: ORTHOPEDICS | Facility: CLINIC | Age: 73
End: 2023-01-06
Payer: OTHER GOVERNMENT

## 2023-01-06 ENCOUNTER — NURSE TRIAGE (OUTPATIENT)
Dept: ADMINISTRATIVE | Facility: CLINIC | Age: 73
End: 2023-01-06
Payer: OTHER GOVERNMENT

## 2023-01-06 PROCEDURE — 99499 NO LOS: ICD-10-PCS | Mod: 95,,, | Performed by: ORTHOPAEDIC SURGERY

## 2023-01-06 PROCEDURE — 99499 UNLISTED E&M SERVICE: CPT | Mod: 95,,, | Performed by: ORTHOPAEDIC SURGERY

## 2023-01-06 NOTE — PLAN OF CARE
[facility] - Surgery  Discharge Final Note    Primary Care Provider: [facility]    Expected Discharge Date: 1/5/2023    Final Discharge Note (most recent)       Final Note - 01/06/23 0845          Final Note    Assessment Type Final Discharge Note     Anticipated Discharge Disposition Home-Health Care [facility]     What phone number can be called within the next 1-3 days to see how you are doing after discharge? --   [phone]    Hospital Resources/Appts/Education Provided Appointments scheduled and added to AVS        Post-Acute Status    Post-Acute Authorization Home Health     Home Health Status Set-up Complete/Auth obtained                     Important Message from Medicare           Future Appointments   Date Time Provider Department Center   1/6/2023  2:00 PM TELEMED NURSE, [facility] [address]   1/11/2023 10:00 AM MD NATACHA Choi   1/19/2023  4:15 PM Jenise Tell, NP [facility] [address]     Patient discharged home to care of family and [facility] on 1/5/23.          Millicent Johnson RNCM  Case Management  [facility]  [phone]

## 2023-01-06 NOTE — PROGRESS NOTES
Established Patient - Audio Only Telehealth Visit     The patient location is: home  The chief complaint leading to consultation is: post-op call  Visit type: Virtual visit with audio only (telephone)  Total time spent with patient: 5 min       The reason for the audio only service rather than synchronous audio and video virtual visit was related to technical difficulties or patient preference/necessity.     Each patient to whom I provide medical services by telemedicine is:  (1) informed of the relationship between the physician and patient and the respective role of any other health care provider with respect to management of the patient; and (2) notified that they may decline to receive medical services by telemedicine and may withdraw from such care at any time. Patient verbally consented to receive this service via voice-only telephone call.       HPI: s/p hip excision of lesion     Assessment and plan:  see below    I called the patient today regarding his surgery with Dr. Prieto Sinha . The patient had a right anterior DHRUV excision of lesion on 1/4.     Pain Scale: 7 / 10    Any issues with Fever: No.    Any issues with medications (specifically DVT prophylaxis): No. ASA 81 BID    Any issues with bowel movements:  Passing shari: No.                                                                 Urination: No.                                                                 Constipation: No.    Completing at home exercises: Yes:     Any concerns regarding their dressing/bandage:  No.    Patient confirmed first HH-PT appointment:  HHPT on  1/6 at am.     Any other concerns: No. Pt c/o of ache, asked if he could use heat or ice. Instructed pt to use ice.        The patient was informed that if they have any urgent issues with their bandage, medications or any other health concerns regarding their surgery to call the 24/7 Orthopedic Post-op Hot Line at [phone]. The patient was reminded that if they have  any chest pain or shortness of breath to call 911 or go to the ER.    The patient verbalized understanding and does not have any other questions                                    This service was not originating from a related E/M service provided within the previous 7 days nor will  to an E/M service or procedure within the next 24 hours or my soonest available appointment.  Prevailing standard of care was able to be met in this audio-only visit.

## 2023-01-06 NOTE — TELEPHONE ENCOUNTER
Reason for Disposition   SEVERE post-op pain (e.g., excruciating, pain scale 8-10) that is not controlled with pain medications     Provider has to answer    Additional Information   Negative: Sounds like a life-threatening emergency to the triager   Negative: Chest pain   Negative: Difficulty breathing   Negative: Acting confused (e.g., disoriented, slurred speech) or excessively sleepy   Negative: Surgical incision symptoms and questions   Negative: Pain or burning with passing urine (urination) and male   Negative: Pain or burning with passing urine (urination) and female   Negative: Constipation   Negative: New or worsening leg (calf, thigh) pain   Negative: New or worsening leg swelling   Negative: Dizziness is severe, or persists > 24 hours after surgery   Negative: Symptoms arising from use of a urinary catheter (Damon or Coude)   Negative: Cast problems or questions   Negative: Medication question   Negative: Bright red, wide-spread, sunburn-like rash   Negative: SEVERE headache and after spinal (epidural) anesthesia   Negative: Vomiting and persists > 4 hours   Negative: Vomiting and abdomen looks much more swollen than usual   Negative: Drinking very little and dehydration suspected (e.g., no urine > 12 hours, very dry mouth, very lightheaded)   Negative: Patient sounds very sick or weak to the triager   Negative: Sounds like a serious complication to the triager   Negative: Fever > 100.4 F (38.0 C)   Negative: Caller has URGENT question and triager unable to answer question    Protocols used: Post-Op Symptoms and [id number]    Caller is a pt of Dr. Sinha. States he had surgery on his hip yesterday. Pt states he needs advice on what he can do to stop the aching.    Pt advised a message will be sent to the Provider and office staff to contact him today. Instructed to call OOC back if symptoms worsen. Pt verbalized understanding.

## 2023-01-10 ENCOUNTER — TELEPHONE (OUTPATIENT)
Dept: ORTHOPEDICS | Facility: CLINIC | Age: 73
End: 2023-01-10
Payer: OTHER GOVERNMENT

## 2023-01-10 NOTE — PROGRESS NOTES
Subjective:       Patient ID: Macario Dior Jr. is a 72 y.o. male.    Chief Complaint: Consult    CC:    New pt to me, referred by Aaareferral Self  No address on file , with Patient Active Problem List:     Aseptic necrosis of bone, site unspecified     Hip joint replacement by other means     Long term (current) use of anticoagulants     Deep vein thrombosis prophylaxis     Status post hip replacement right 8/31/2015     DVT (deep venous thrombosis)     Periorbital cellulitis of left eye     Preseptal cellulitis of left eye     Essential hypertension     CKD (chronic kidney disease) stage 3, GFR 30-59 ml/min     Edema     Anemia associated with acute blood loss     Chronic pain     Pain in both lower extremities     Leg pain, anterior, unspecified laterality     Lumbar radiculopathy, right     Osteoarthritis of spine with radiculopathy, lumbar region     Lumbar spondylosis     Postoperative heterotopic ossification     Anemia     Diabetes mellitus type 2 without retinopathy     Idiopathic peripheral neuropathy     Mild persistent asthma, uncomplicated     Personal history of other venous thrombosis and embolism     Polyp of colon     BUZZ (obstructive sleep apnea)     Class 1 obesity with serious comorbidity and body mass index (BMI) of 33.0 to 33.9 in adult       Lab Results       Component                Value               Date                       HGBA1C                   6.7 (H)             12/21/2022                 HGBA1C                   6.7 (H)             09/19/2022            Lab Results       Component                Value               Date                       CREATININE               2.2 (H)             12/30/2022                   Current attempts at weight loss: Just had total hip arthroplasty for AVN right hip last week. Doing PT at home. Not eating much     Previous diet attempts:  cutting carbs. IF. Diabetic ed.     History of medication for loss:    checked today. On chronic narcotics.      Heaviest weight:  255#    Lightest weight: 210#    Goal weight: 220#      Last eye exam:    last month. No glaucoma or retinopathy per pt.       Provider:    Typical eating patterns:  Retired. Lives with daughter. No cooking. At home.   Breakfast: coffee with cream and honey.     lunch: may skip. Turkey and liver cheese sandwich     dinner: bean, no rice. Cabbage. Steak, tuna.    snacks: cookies.     Beverages: coffee as above. Juice. Water. ETOH- denies.     Willingness to change:  10/10    Cardiac studies:   Normal sinus rhythm   Left anterior fascicular block   T wave abnormality, consider anterolateral ischemia   Abnormal ECG   When compared with ECG of 20-AUG-2015 11:28,   No significant change was found       Inbody deferred 2/2 pt unable to stand for test.     BMR:    PBF:      Review of Systems      Objective:    BP (!) 145/67 (BP Location: Right arm, Patient Position: Sitting)   Pulse 69   Wt 114.9 kg (253 lb 4.9 oz)   SpO2 96%   BMI 33.42 kg/m²    Physical Exam  Vitals reviewed.   Constitutional:       General: He is not in acute distress.     Appearance: He is well-developed.      Comments: Seated in WC   HENT:      Head: Normocephalic and atraumatic.   Eyes:      General: No scleral icterus.     Pupils: Pupils are equal, round, and reactive to light.   Neck:      Thyroid: No thyromegaly.   Cardiovascular:      Rate and Rhythm: Normal rate and regular rhythm.      Heart sounds: Normal heart sounds. No murmur heard.    No friction rub. No gallop.   Pulmonary:      Effort: Pulmonary effort is normal. No respiratory distress.      Breath sounds: Normal breath sounds. No wheezing.   Abdominal:      General: Bowel sounds are normal. There is no distension.      Palpations: Abdomen is soft.      Tenderness: There is no abdominal tenderness.   Musculoskeletal:      Cervical back: Normal range of motion and neck supple.   Skin:     General: Skin is warm and dry.   Neurological:      Mental Status: He is  alert and oriented to person, place, and time.   Psychiatric:         Behavior: Behavior normal.         Judgment: Judgment normal.       Assessment:       Problem List Items Addressed This Visit       CKD (chronic kidney disease) stage 3, GFR 30-59 ml/min    Diabetes mellitus type 2 without retinopathy    Relevant Medications    dulaglutide (TRULICITY) 0.75 mg/0.5 mL pen injector    dulaglutide (TRULICITY) 1.5 mg/0.5 mL pen injector (Start on 2/11/2023)    Osteoarthritis of spine with radiculopathy, lumbar region     Other Visit Diagnoses       Obesity (BMI 30.0-34.9)    -  Primary    S/P hip replacement, right                Plan:         1. Obesity (BMI 30.0-34.9)    - Ambulatory referral/consult to Bariatric Medicine    No soda, sweet tea, juices or lemonade. All drinks should be 5 calories or less.    Activity per Physical therapy.      1200 murali pb meal panner and meal ideas given.   2. Diabetes mellitus type 2 without retinopathy    - dulaglutide (TRULICITY) 0.75 mg/0.5 mL pen injector; Inject 0.75 mg into the skin every 7 days.  Dispense: 4 pen; Refill: 0  - dulaglutide (TRULICITY) 1.5 mg/0.5 mL pen injector; Inject 1.5 mg into the skin every 7 days.  Dispense: 4 pen; Refill: 2  Start Trulicity 0.75mg once a week x 4 weeks. Then 1.5 mg mg once a week.      Decrease portions as soon as you start Trulicity. Some nausea in the first 2 weeks is not unusual.     If you get pain across the upper abdomen and around to your back, please call the office.       3. Osteoarthritis of spine with radiculopathy, lumbar region  Optimize BMI     4. Stage 3 chronic kidney disease, unspecified whether stage 3a or 3b CKD  Optimize BMI     5. S/P hip replacement, right  Activity per PT. Optimize BMI

## 2023-01-10 NOTE — TELEPHONE ENCOUNTER
CARMEN message to Dr Astorga staff to better assist  for pt.   ----- Message from Larisa Rasmussen sent at 1/10/2023  1:40 PM CST -----  V.A calling in regards to pt being seen in Bariatrics , needs a reason on why he needs to be seen         Confirmed patient's contact info below:  Contact Name: Mayela lucia   Phone Number:[phone]

## 2023-01-11 ENCOUNTER — OFFICE VISIT (OUTPATIENT)
Dept: BARIATRICS | Facility: CLINIC | Age: 73
End: 2023-01-11
Payer: OTHER GOVERNMENT

## 2023-01-11 VITALS
WEIGHT: 253.31 LBS | SYSTOLIC BLOOD PRESSURE: 145 MMHG | OXYGEN SATURATION: 96 % | DIASTOLIC BLOOD PRESSURE: 67 MMHG | HEART RATE: 69 BPM | BODY MASS INDEX: 33.42 KG/M2

## 2023-01-11 DIAGNOSIS — M47.26 OSTEOARTHRITIS OF SPINE WITH RADICULOPATHY, LUMBAR REGION: ICD-10-CM

## 2023-01-11 DIAGNOSIS — N18.30 STAGE 3 CHRONIC KIDNEY DISEASE, UNSPECIFIED WHETHER STAGE 3A OR 3B CKD: ICD-10-CM

## 2023-01-11 DIAGNOSIS — E11.9 DIABETES MELLITUS TYPE 2 WITHOUT RETINOPATHY: ICD-10-CM

## 2023-01-11 DIAGNOSIS — E66.9 OBESITY (BMI 30.0-34.9): Primary | ICD-10-CM

## 2023-01-11 DIAGNOSIS — Z96.641 S/P HIP REPLACEMENT, RIGHT: ICD-10-CM

## 2023-01-11 PROCEDURE — 99205 OFFICE O/P NEW HI 60 MIN: CPT | Mod: S$PBB,,, | Performed by: INTERNAL MEDICINE

## 2023-01-11 PROCEDURE — 99205 PR OFFICE/OUTPT VISIT, NEW, LEVL V, 60-74 MIN: ICD-10-PCS | Mod: S$PBB,,, | Performed by: INTERNAL MEDICINE

## 2023-01-11 PROCEDURE — 99999 PR PBB SHADOW E&M-EST. PATIENT-LVL III: ICD-10-PCS | Mod: PBBFAC,,, | Performed by: INTERNAL MEDICINE

## 2023-01-11 PROCEDURE — 77336 RADIATION PHYSICS CONSULT: CPT | Performed by: RADIOLOGY

## 2023-01-11 PROCEDURE — 99213 OFFICE O/P EST LOW 20 MIN: CPT | Mod: PBBFAC | Performed by: INTERNAL MEDICINE

## 2023-01-11 PROCEDURE — 99999 PR PBB SHADOW E&M-EST. PATIENT-LVL III: CPT | Mod: PBBFAC,,, | Performed by: INTERNAL MEDICINE

## 2023-01-11 RX ORDER — DULAGLUTIDE 1.5 MG/.5ML
1.5 INJECTION, SOLUTION SUBCUTANEOUS
Qty: 4 PEN | Refills: 2 | Status: SHIPPED | OUTPATIENT
Start: 2023-02-11 | End: 2023-05-02

## 2023-01-11 RX ORDER — DULAGLUTIDE 0.75 MG/.5ML
0.75 INJECTION, SOLUTION SUBCUTANEOUS
Qty: 4 PEN | Refills: 0 | Status: SHIPPED | OUTPATIENT
Start: 2023-01-11 | End: 2023-05-02

## 2023-01-11 NOTE — PATIENT INSTRUCTIONS
"Start Trulicity 0.75mg once a week x 4 weeks. Then 1.5 mg mg once a week.      Decrease portions as soon as you start Trulicity. Some nausea in the first 2 weeks is not unusual.     If you get pain across the upper abdomen and around to your back, please call the office.     No soda, sweet tea, juices or lemonade. All drinks should be 5 calories or less.    Activity per Physical therapy.                    1200 calorie  Meal Plan  STARCHES 80 CALORIES PER SERVING 15g CARB, 3g PROTEIN, 1g FAT  Servings per day   bread   tortilla   crackers   cooked cereals   dry cereals   pasta   rice   corn   popcorn   potato (small)   potato, mashed   sweet potato   squash, winter   cooked beans, peas, lentils (add 1 meat exchange)   1 slice   1 (6")   4-6 (3/4 oz)   1/2 cup   3/4 cup   1/2 cup   1/3 cup  1/2 cup   1 small light bag  1 (3 oz)   1/2 cup   1/3 cup   1 cup   1/2 cup Most starches are a good source of B vitamins   Choose whole grain foods such as 100% whole wheat bread and flour, brown rice, tortillas, etc. for nutrients and fiber.   Combine beans (starch & meat) with grains (starch) for their complimentary proteins and fiber   Combine grains (starch) with milk (milk) or cheese (meat) to compliment proteins     3   FRUIT 60 CALORIES PER SERVING 15g CARB    fresh fruit   banana  melon (cubes)   berries  canned fruit   dried fruit    1 small   1/2  ½ cup   ¾ cup  ½ cup   ¼ cup    Choose whole fruits for fiber   No fruit juices   2   DAIRY  CALORIES PER SERVING 12g CARB, 8g PROTEIN, 0-8g FAT    milk   yogurt  Protein soy or almond milk 1 cup   1 cup   1 cup Use unsweetened almond or soy milk with added protein  Avoid chocolate or flavored milk  Avoid yogurt with more than 8 gms of sugar. Gms of protein should be higher than grams of sugar               2   MEAT AND SUBSTITUES  CALORIES PER SERVING 7g Protein, 0-13g FAT    Meat,Seafood and fish   cheese   cottage cheese   egg   peanut butter   tofu or " tempeh  cooked beans, peas, lentils (add 1 starch)  Quinoa (add 1 starch)   Nuts and seeds (½ serving protein + 1 fat)  Nutritional yeast  Morning Star grillers 1 oz     1 oz  1/4 cup     1   1.5 Tbsp   4 oz (1/2 cup)   1/2 cup              ¼ cup            2 tablespoons    1 soha or ½ cup      Choose fish, seafood and lower fat cheeses  Limit frying or adding fat.      9   FATS 45 CALORIES PER SERVING     oil   mayonnaise   cream cheese   salad dressing   peanuts   avocado   butter or margarine    1 tsp   1 tsp   1 Tbsp   1 Tbsp   10   1/8   1 tsp Eat less fat.   Eat less saturated fat such as animal fat found in fatter meat, cheese, and butter. Also eat less hydrogenated fat.      2-3   VEGETABLES 25 CALORIES PER SERVING 5 g CARB, 2g PROTEIN    raw vegetables   cooked vegetables   tomato or vegetable juice 1 cup   1/2 cup     1/2 cup Choose dark green leafy and deep yellow vegetables such as spinach, zuchinni, squash, mushrooms, cauliflower ,broccoli, carrots, and peppers.   Unlimited          1200 CALORIE MEAL PLAN  Eat 3 small meals per day with 1-2 small snacks to keep you full throughout the day  Aim for at least 15 gms of protein at breakfast, at least 25 grams at lunch and at least 25 grams of protein at dinner.  Snacks should contain at least 5 grams of protein  Limit starches to 1 serving per meal or snack.  Keep your carbs whole grain or whole wheat  Limit your intake of refined sugar including sugary beverages ie sweet tea, lemonade, fruit punch             Fruit Protein Dairy Starch Fats Calories   Breakfast 1 2 1 1  340   Lunch  3  1 1 290   Dinner 1 4  1 1 405   Snack   1 1  170   Total 2 9 2 4 2 1205     Sample breakfasts:  2 scrambled eggs (use spray), 1 cup Protein Silk soy milk, 1 slice whole wheat toast, 1 small orange  Omelet made with 1 egg, 1-ounce low fat cheese and non-starchy veggies, 1 low fat plain yogurt with ½ banana  Plain yogurt with ¾ cup unsweetened cold cereal and ½ cup fresh  fruit salad, 2 hardboiled eggs  Sample lunches:  ½ whole wheat bagel topped with 3 ounces of low fat cheese melted in oven with a wild green salad with 1 TBSP dressing  ¾ cup cooked beans with 6 whole grain crackers, 10 roasted peanuts  ½ medium baked potato with 3 ounces of low fat cheddar cheese and 1 TBSP  sour cream  1 small wheat tortilla brushed with 1 tsp olive oil then brushed with pizza sauce and 4 ounces low fat cheese, sliced mushrooms and green peppers broiled until cheese is melted.  ½ cup chopped melon    Sample Dinners:  4 ounces of tuna pan seared in 1 tsp olive oil, ½ baked small sweet potato and 2 small plums  ½ cup chick peas, 1 cup cauliflower sauteed with 1 tbsp chopped onion, 1 tsp oil, and 2 tsp powell powder. Add low sodium vegetable stock to desired consistency. Serve with ½ cup brown rice  Red beans seasoned with onions and bell peppers served over cauliflower rice  1 cup cooked green or brown lentils served with ½ cup cooked quinoa and chopped tomatoes and cucumbers and 1 tbsp feta cheese  Sample Snacks:  1 cup of Protein Silk Soy milk with 3 squares reji crackers  3/4 ounce Triscuits with 1 ounce cubed cheese  Chobani Triple Zero yogurt with ¾ cup unsweetened cereal  ½ cup edamame (shelled)          Meal Ideas for Regular Bariatric Diet  *Recipes and products available at [email or web address]      Breakfast: (15-20g protein)    - Egg white omelet: 2 egg whites or ½ cup Egg Beaters. (Optional proteins: cheese, shrimp, black beans, chicken, sliced turkey) (Optional veggies: tomatoes, salsa, spinach, mushrooms, onions, green peppers, or small slice avocado)     - Egg and sausage: 1 egg or ¼ cup Egg Beaters (any variety), with 1 soha or 2 links of Turkey sausage or Veggie breakfast sausage (Rita Farms or Boca)    - Crust-less breakfast quiche: To make a glass pie dish, mix 4oz part skim Ricotta, 1 cup skim milk, and 2 eggs as your base. Add protein: shredded cheese, sliced lean  ham or turkey, turkey ding/sausage. Add veggies: tomato, onion, green onion, mushroom, green pepper, spinach, etc.    - Yogurt parfait: Mix 1 - 6oz container Dannon Light N Fit vanilla yogurt, with ¼ cup Kashi Go Lean cereal    - Cottage cheese and fruit: ½ cup part-skim cottage cheese or ricotta cheese topped with fresh fruit or sugar free preserves     - Venessa Tucker's Vanilla Egg custard* (add 2 Tbsp instant coffee granules to make Cappuccino Custard*)    - Hi-Protein café latte (skim milk, decaf coffee, 1 scoop protein powder). Optional to add Sugar free syrup or extract flavoring.    Lunch: (20-30g protein)    - ½ cup Black bean soup (Homemade or Progresso), with ¼ cup shredded low-fat cheese. Top with chopped tomato or fresh salsa.     - Lean deli turkey breast and low-fat sliced cheese, mustard or light corrales to moisten, rolled up together, or wrapped in a Anibal lettuce leaf    - Chicken salad made from dinner leftovers, moisten with low-fat salad dressing or light corrales. Also try leftover salmon, shrimp, tuna or boiled eggs. Serve ½ cup over dark green salad    - Fat-free canned refried beans, topped with ¼ cup shredded low-fat cheese. Top with chopped tomato or fresh salsa.     - Greek salad: Top mixed greens with 1-2oz grilled chicken, tomatoes, red onions, 2-3 kalamata olives, and sprinkle lightly with feta cheese. Spritz with Balsamic vinegar to taste.     - Crust-less lunch quiche: To make a glass pie dish, mix 4oz part skim Ricotta, 1 cup skim milk, and 2 eggs as your base. Add protein: shredded cheese, sliced lean ham or turkey, shrimp, chicken. Add veggies: tomato, onion, green onion, mushroom, green pepper, spinach, artichoke, broccoli, etc.    - Pizza bake: tomato sauce, low-fat shredded mozzarella and turkey pepperoni or Refugio ding. Add any veggies.    - Cucumber crab bites: Spread ¼ cup crab dip (lump crabmeat + light cream cheese and green onions) over sliced cucumber.     - Chicken with  light spinach and artichoke dip*: Puree in : 6oz cooked and drained spinach, 2 cloves garlic, 1 can cannelloni beans, ½ cup chopped green onions, 1 can drained artichoke hearts (not marinated in oil), lemon juice and basil. Mix in 2oz chopped up chicken.    Supper: (20-30g protein)    - Serve grilled fish over dark green salad tossed with low-fat dressing, served with grilled asparagus wright     - Rotisserie chicken salad: served with sliced strawberries, walnuts, fat-free feta cheese crumbles and 1 tbsp Melendezs Own Light Raspberry Warwick Vinaigrette    - Shrimp cocktail: Dip cold boiled shrimp in homemade low-sugar cocktail sauce (1/2 cup Stephanie One Carb ketchup, 2 tbsp horseradish, 1/4 tsp hot sauce, 1 tsp Worcestershire sauce, 1 tbsp freshly-squeezed lemon juice). Serve with dark green salad, walnuts, and crumbled blue cheese drizzled with olive oil and Balsamic vinegar    - Tuna Melt: Spread tuna salad onto 2 thick slices of tomato. Top with low-fat cheese and broil until cheese is melted. May also be made with chicken salad of shrimp salad. [place] with different types of cheeses.    - Homemade low-fat Chili using extra lean ground beef or ground turkey. Top with shredded cheese and salsa as desired. May add dollop fat-free sour cream if desired    - Dinner Omelet with shrimp or chicken and onion, green peppers and chives.    - No noodle lasagna: Use sliced zucchini or eggplant in place of noodles.  Layer with part skim ricotta cheese and low sugar meat sauce (use very lean ground beef or ground turkey).    - Mexican chicken bake: Bake chunks of chicken breast or thigh with taco seasoning, Pace brand enchilada sauce, green onions and low-fat cheese. Serve with ¼ cup black beans or fat free refried beans topped with chopped tomatoes or salsa.    - Godwin frozen meatballs, simmered in Classico Marinara sauce. Different flavors of salsa or spaghetti sauce create different dishes! Sprinkle  with parmesan cheese. Serve with grilled or steamed veggies, or a dark green salad.    - Simmer boneless skinless chicken thigh chunks in Classico Marinara sauce or roasted salsa until tender with chopped onion, bell pepper, garlic, mushrooms, spinach, etc.     - Hamburger, without the bun, dressed the way you like. Served with grilled or steamed veggies.    - Eggplant parmesan: Bake slices of eggplant at 350 degrees for 15 minutes. Layer tomato sauce, sliced eggplant and low-fat mozzarella cheese in a baking dish and cover with foil. Bake 30-40 more minutes or until bubbly. Uncover and bake at 400 degrees for about 15 more minutes, or until top is slightly crisp.    - Fish tacos: grilled/baked white fish, wrapped in Anibal lettuce leaf, topped with salsa, shredded low-fat cheese, and light coleslaw.    Snacks: (100-200 calories; >5g protein)    - 1 low-fat cheese stick with 8 cherry tomatoes or 1 serving fresh fruit  - 4 thin slices fat-free turkey breast and 1 slice low-fat cheese  - 4 thin slices fat-free honey ham with wedge of melon  - 1/4 cup unsalted nuts with ½ cup fruit  - 6-oz container Dannon Light n Fit vanilla yogurt, topped with 1oz unsalted nuts         - apple, celery or baby carrots spread with 2 Tbsp natural peanut butter or almond butter   - apple slices with 1 oz slice low-fat cheese  - celery, cucumber, bell pepper or baby carrots dipped in ¼ cup hummus bean spread or light spinach and artichoke dip (*recipe in lunch section)  - 100 calorie bag microwave light popcorn with 3 tbsp grated parmesan cheese  - Greyson Links Beef Steak - 14g protein! (similar to beef jerky)  - 2 wedges Laughing Cow - Light Herb & Garlic Cheese with sliced cucumber or green bell pepper  - 1/2 cup low-fat cottage cheese with ¼ cup fruit or ¼ cup salsa  - RTD Protein drinks: Atkins, Low Carb Slim Fast, EAS light, Muscle Milk Light, etc.  - Homemade Protein drinks: GNC Soy95, Isopure, Nectar, UNJURY, Whey Gourmet, etc.  Mix 1 scoop powder with 8oz skim/1% milk or light soymilk.  - Protein bars: Atkins, EAS, Pure Protein, Think Thin, Detour, etc. Must have 0-4 grams sugar - Read the label.    Takeout Options: No more than twice/week  Deli - Salads (no pasta or rice), meats, cheeses. Roasted chicken. Lox (salmon)    Mexican - Platters which don't include tortillas, chips, or rice. Go easy on the beans. Example: Fajitas without the tortillas. Ask the  not to bring chips to the table if they are too tempting.    Greek - Meat or fish and vegetable, but no bread or rice. Including hummus, baba ganoush, etc, is OK. Most sit-down Greek restaurants can provide you with cucumber slices for dipping instead of milton bread.    Fast Food (Avoid as much as possible) - Salads (no croutons and limit salad dressing to 2 tbsp), grilled chicken sandwich without the bun and ask for no corrales. Heathers low fat chili or Taco Bell pintos and cheese.    BBQ - The meats are fine if you ask for sauces on the side, but most of the traditional side dishes are loaded with carbs. Ralph slaw, baked beans and BBQ sauce are typically made with sugar.    Chinese - Nothing deep-fried, no rice or noodles. Many Chinese sauces have starch and sugar in them, so you'll have to use your judgement. If you find that these sauces trigger cravings, or cause Dumping, you can ask for the sauce to be made without sugar or just use soy sauce.

## 2023-01-19 ENCOUNTER — OFFICE VISIT (OUTPATIENT)
Dept: ORTHOPEDICS | Facility: CLINIC | Age: 73
End: 2023-01-19
Payer: OTHER GOVERNMENT

## 2023-01-19 VITALS — WEIGHT: 253 LBS | BODY MASS INDEX: 33.53 KG/M2 | HEIGHT: 73 IN

## 2023-01-19 DIAGNOSIS — M89.9 LESION OF RIGHT FEMUR: Primary | ICD-10-CM

## 2023-01-19 PROCEDURE — 99214 OFFICE O/P EST MOD 30 MIN: CPT | Mod: PBBFAC | Performed by: NURSE PRACTITIONER

## 2023-01-19 PROCEDURE — 99999 PR PBB SHADOW E&M-EST. PATIENT-LVL IV: CPT | Mod: PBBFAC,,, | Performed by: NURSE PRACTITIONER

## 2023-01-19 PROCEDURE — 99999 PR PBB SHADOW E&M-EST. PATIENT-LVL IV: ICD-10-PCS | Mod: PBBFAC,,, | Performed by: NURSE PRACTITIONER

## 2023-01-19 PROCEDURE — 99024 PR POST-OP FOLLOW-UP VISIT: ICD-10-PCS | Mod: ,,, | Performed by: NURSE PRACTITIONER

## 2023-01-19 PROCEDURE — 99024 POSTOP FOLLOW-UP VISIT: CPT | Mod: ,,, | Performed by: NURSE PRACTITIONER

## 2023-01-19 NOTE — PROGRESS NOTES
"Macario Dior Jr. presents for initial post-operative visit following a right total hip arthroplasty performed by Dr. Sinha on 1/4/2023.       Exam:  Height 6' 1" (1.854 m), weight 114.8 kg (253 lb).   Patient is ambulating well with a cane   Incision is clean and dry without drainage or erythema. Staples removed without difficulty.     Initial post-operative radiographs reviewed today revealing satisfactory position of the prosthesis.    A/P  2 weeks s/p right hip heterotopic bone excision  - Patient advised to keep the incision clean and dry for the next 24 hours after which he  may wash the area with antibacterial soap in the shower, but will not submerge incision until one month post op.  - Continue eliquis for 1 month post op  - Follow up in 4 weeks with surgeon. Pt will call clinic immediately with problems/concerns.      "

## 2023-02-16 ENCOUNTER — HOSPITAL ENCOUNTER (OUTPATIENT)
Dept: RADIOLOGY | Facility: HOSPITAL | Age: 73
Discharge: HOME | End: 2023-02-16
Attending: ORTHOPAEDIC SURGERY
Payer: OTHER GOVERNMENT

## 2023-02-16 ENCOUNTER — OFFICE VISIT (OUTPATIENT)
Dept: ORTHOPEDICS | Facility: CLINIC | Age: 73
End: 2023-02-16
Payer: OTHER GOVERNMENT

## 2023-02-16 VITALS — HEIGHT: 73 IN | BODY MASS INDEX: 33.59 KG/M2 | WEIGHT: 253.44 LBS

## 2023-02-16 DIAGNOSIS — Z96.641 STATUS POST RIGHT HIP REPLACEMENT: ICD-10-CM

## 2023-02-16 DIAGNOSIS — M61.50 POSTOPERATIVE HETEROTOPIC OSSIFICATION: ICD-10-CM

## 2023-02-16 DIAGNOSIS — M96.89 POSTOPERATIVE HETEROTOPIC OSSIFICATION: ICD-10-CM

## 2023-02-16 DIAGNOSIS — Z96.641 STATUS POST RIGHT HIP REPLACEMENT: Primary | ICD-10-CM

## 2023-02-16 PROCEDURE — 99024 POSTOP FOLLOW-UP VISIT: CPT | Mod: ,,, | Performed by: ORTHOPAEDIC SURGERY

## 2023-02-16 PROCEDURE — 99999 PR PBB SHADOW E&M-EST. PATIENT-LVL III: CPT | Mod: PBBFAC,,, | Performed by: ORTHOPAEDIC SURGERY

## 2023-02-16 PROCEDURE — 73502 X-RAY EXAM HIP UNI 2-3 VIEWS: CPT | Mod: TC,RT

## 2023-02-16 PROCEDURE — 73502 XR HIP WITH PELVIS WHEN PERFORMED, 2 OR 3  VIEWS RIGHT: ICD-10-PCS | Mod: 26,RT,, | Performed by: RADIOLOGY

## 2023-02-16 PROCEDURE — 99213 OFFICE O/P EST LOW 20 MIN: CPT | Mod: PBBFAC | Performed by: ORTHOPAEDIC SURGERY

## 2023-02-16 PROCEDURE — 73502 X-RAY EXAM HIP UNI 2-3 VIEWS: CPT | Mod: 26,RT,, | Performed by: RADIOLOGY

## 2023-02-16 PROCEDURE — 99999 PR PBB SHADOW E&M-EST. PATIENT-LVL III: ICD-10-PCS | Mod: PBBFAC,,, | Performed by: ORTHOPAEDIC SURGERY

## 2023-02-16 PROCEDURE — 99024 PR POST-OP FOLLOW-UP VISIT: ICD-10-PCS | Mod: ,,, | Performed by: ORTHOPAEDIC SURGERY

## 2023-02-16 RX ORDER — METHOCARBAMOL 750 MG/1
750 TABLET, FILM COATED ORAL 2 TIMES DAILY PRN
Qty: 30 TABLET | Refills: 0 | OUTPATIENT
Start: 2023-02-16 | End: 2023-02-16

## 2023-02-16 RX ORDER — METHOCARBAMOL 750 MG/1
750 TABLET, FILM COATED ORAL 2 TIMES DAILY PRN
Qty: 30 TABLET | Refills: 0 | Status: SHIPPED | OUTPATIENT
Start: 2023-02-16 | End: 2023-02-26

## 2023-02-16 NOTE — PROGRESS NOTES
Subjective:      Patient ID: Macario Dior Jr. is a 72 y.o. male.    Chief Complaint:   Post-op Evaluation of the Right Hip    HPI  Presenting for follow up of R hip HO excision on 1/4/2023. Doing well. Incision has healed well. Using a cane for distances.        Objective:        Ortho/SPM Exam  RLE:  No gross deformities, wounds  No crepitus, No TTP  Motor intact hip flex, quad, Tib Ant, gastroc, EHL, FHL.  Sensation intact saphenous, sural, deep/superficial peroneal, tibial nerves  Palp DP/PT pulse, BCR  ROM: IR 15, ER 25, Flexion 0-100        IMAGING:  Xray with R DHRUV in place with recently excision of heterotopic ossification.     Assessment:     S/p excision of HO around R DHRUV    Plan:   Doing well   Follow up in 6 weeks     The patient's pathophysiology was explained in detail with reference to x-rays, models, other visual aids as appropriate.  Treatment options were discussed in detail.  Questions were invited and answered to the patient's satisfaction.      Prieto Sinha MD  Orthopedic Surgery

## 2023-03-31 ENCOUNTER — HOSPITAL ENCOUNTER (OUTPATIENT)
Dept: RADIOLOGY | Facility: HOSPITAL | Age: 73
Discharge: HOME | End: 2023-03-31
Attending: ORTHOPAEDIC SURGERY
Payer: OTHER GOVERNMENT

## 2023-03-31 ENCOUNTER — OFFICE VISIT (OUTPATIENT)
Dept: ORTHOPEDICS | Facility: CLINIC | Age: 73
End: 2023-03-31
Payer: OTHER GOVERNMENT

## 2023-03-31 VITALS — WEIGHT: 262.38 LBS | HEIGHT: 73 IN | BODY MASS INDEX: 34.77 KG/M2

## 2023-03-31 DIAGNOSIS — M61.50 POSTOPERATIVE HETEROTOPIC OSSIFICATION: ICD-10-CM

## 2023-03-31 DIAGNOSIS — Z96.641 STATUS POST RIGHT HIP REPLACEMENT: ICD-10-CM

## 2023-03-31 DIAGNOSIS — M96.89 POSTOPERATIVE HETEROTOPIC OSSIFICATION: ICD-10-CM

## 2023-03-31 DIAGNOSIS — Z96.641 STATUS POST RIGHT HIP REPLACEMENT: Primary | ICD-10-CM

## 2023-03-31 PROCEDURE — 73502 X-RAY EXAM HIP UNI 2-3 VIEWS: CPT | Mod: 26,RT,, | Performed by: RADIOLOGY

## 2023-03-31 PROCEDURE — 99999 PR PBB SHADOW E&M-EST. PATIENT-LVL V: ICD-10-PCS | Mod: PBBFAC,,, | Performed by: ORTHOPAEDIC SURGERY

## 2023-03-31 PROCEDURE — 99024 PR POST-OP FOLLOW-UP VISIT: ICD-10-PCS | Mod: ,,, | Performed by: ORTHOPAEDIC SURGERY

## 2023-03-31 PROCEDURE — 99215 OFFICE O/P EST HI 40 MIN: CPT | Mod: PBBFAC | Performed by: ORTHOPAEDIC SURGERY

## 2023-03-31 PROCEDURE — 99999 PR PBB SHADOW E&M-EST. PATIENT-LVL V: CPT | Mod: PBBFAC,,, | Performed by: ORTHOPAEDIC SURGERY

## 2023-03-31 PROCEDURE — 73502 XR HIP WITH PELVIS WHEN PERFORMED, 2 OR 3  VIEWS RIGHT: ICD-10-PCS | Mod: 26,RT,, | Performed by: RADIOLOGY

## 2023-03-31 PROCEDURE — 73502 X-RAY EXAM HIP UNI 2-3 VIEWS: CPT | Mod: TC,RT

## 2023-03-31 PROCEDURE — 99024 POSTOP FOLLOW-UP VISIT: CPT | Mod: ,,, | Performed by: ORTHOPAEDIC SURGERY

## 2023-03-31 RX ORDER — METHYLPREDNISOLONE 4 MG/1
TABLET ORAL
Qty: 1 EACH | Refills: 0 | Status: SHIPPED | OUTPATIENT
Start: 2023-03-31 | End: 2023-06-23 | Stop reason: SDUPTHER

## 2023-03-31 NOTE — PROGRESS NOTES
Subjective:      Patient ID: Macario Dior Jr. is a 73 y.o. male.    Chief Complaint:   Post-op Evaluation of the Right Knee    HPI  Presenting for follow up of R hip HO excision on 1/4/2023. He reports feeling a pulling tearing sensation last week and has since had severe pain and trouble walking.         Objective:        Ortho/SPM Exam  RLE:  No gross deformities, wounds  No crepitus, TTP around the incision   Motor intact hip flex, quad, Tib Ant, gastroc, EHL, FHL.  Sensation intact saphenous, sural, deep/superficial peroneal, tibial nerves  Palp DP/PT pulse, BCR  Posterolateral Pain with internal rotation of the hip         IMAGING:  Xray with R DHRUV in place with recently excision of heterotopic ossification. No significant changes since the last exam.     Assessment:     S/p excision of HO around R DHRUV    Plan:   Will trial medrol dose pack   Fu in 3 weeks     The patient's pathophysiology was explained in detail with reference to x-rays, models, other visual aids as appropriate.  Treatment options were discussed in detail.  Questions were invited and answered to the patient's satisfaction.      Prieto Sinha MD  Orthopedic Surgery

## 2023-05-02 ENCOUNTER — TELEPHONE (OUTPATIENT)
Dept: BARIATRICS | Facility: CLINIC | Age: 73
End: 2023-05-02
Payer: OTHER GOVERNMENT

## 2023-05-02 DIAGNOSIS — E11.9 DIABETES MELLITUS TYPE 2 WITHOUT RETINOPATHY: ICD-10-CM

## 2023-05-02 RX ORDER — DULAGLUTIDE 1.5 MG/.5ML
1.5 INJECTION, SOLUTION SUBCUTANEOUS
Qty: 12 PEN | Refills: 0 | Status: SHIPPED | OUTPATIENT
Start: 2023-05-02 | End: 2023-05-26 | Stop reason: SDUPTHER

## 2023-05-02 NOTE — TELEPHONE ENCOUNTER
Patient arrived at appointment late. Inquired bout a refill on medication. I informed patient ill send MD. Astorga a refill on medication. Patient stated he would like medication sent to [facility] pharmacy.     Also rescheduled 3 month f/u

## 2023-05-23 ENCOUNTER — TELEPHONE (OUTPATIENT)
Dept: NEUROLOGY | Facility: CLINIC | Age: 73
End: 2023-05-23
Payer: OTHER GOVERNMENT

## 2023-05-23 NOTE — TELEPHONE ENCOUNTER
----- Message from Tali Trinidad sent at 5/15/2023  9:20 AM CDT -----  Good Afternoon,     The [facility] would like to refer the following patient to Neurology  department. The patients diagnosis is Other idiopathic peripheral autonomic neuropathy . I have scanned the patients referral and records into . The patient has been seen by Dr. Cabrera before.     Thank you,    Tali Blum

## 2023-05-23 NOTE — TELEPHONE ENCOUNTER
----- Message from Daniela Hernández MA sent at 5/15/2023  5:20 PM CDT -----    ----- Message -----  From: Tali Trinidad  Sent: 5/15/2023   9:20 AM CDT  To: Rick Merrill    Good Afternoon,     The [facility] would like to refer the following patient to Neurology  department. The patients diagnosis is Other idiopathic peripheral autonomic neuropathy . I have scanned the patients referral and records into . The patient has been seen by Dr. Cabrera before.     Thank you,    Tali Talbertierge

## 2023-05-29 ENCOUNTER — TELEPHONE (OUTPATIENT)
Dept: NEUROLOGY | Facility: CLINIC | Age: 73
End: 2023-05-29
Payer: OTHER GOVERNMENT

## 2023-05-29 NOTE — TELEPHONE ENCOUNTER
----- Message from Tali Trinidad sent at 5/29/2023 12:12 PM CDT -----  Good Morning,        I would like to follow up on my previous message sent on 5/15 to schedule patient . Please schedule     The [facility] would like to refer the following patient to Neurology  department. The patients diagnosis is Other idiopathic peripheral autonomic neuropathy . I have scanned the patients referral and records into . The patient has been seen by Dr. Cabrera before.      Thank you,    Tali Blum

## 2023-05-31 ENCOUNTER — TELEPHONE (OUTPATIENT)
Dept: BARIATRICS | Facility: CLINIC | Age: 73
End: 2023-05-31
Payer: OTHER GOVERNMENT

## 2023-06-14 ENCOUNTER — TELEPHONE (OUTPATIENT)
Dept: NEUROLOGY | Facility: CLINIC | Age: 73
End: 2023-06-14
Payer: OTHER GOVERNMENT

## 2023-06-14 NOTE — TELEPHONE ENCOUNTER
----- Message from Tali Trinidad sent at 6/14/2023  1:36 PM CDT -----  Good Afternoon,         I would like to follow up on my previous 2 messages  sent on 5/15 and 5/29  to schedule patient . [facility] being [facility] . Please schedule     The [facility] would like to refer the following patient to Neurology  department. The patients diagnosis is Other idiopathic peripheral autonomic neuropathy . I have scanned the patients referral and records into . The patient has been seen by Dr. Cabrera before.       Thank you,    [facility]

## 2023-06-20 ENCOUNTER — TELEPHONE (OUTPATIENT)
Dept: ORTHOPEDICS | Facility: CLINIC | Age: 73
End: 2023-06-20
Payer: OTHER GOVERNMENT

## 2023-06-20 ENCOUNTER — TELEPHONE (OUTPATIENT)
Dept: NEUROLOGY | Facility: CLINIC | Age: 73
End: 2023-06-20
Payer: OTHER GOVERNMENT

## 2023-06-20 NOTE — TELEPHONE ENCOUNTER
Called and spoke to pt in regards to message below. Offered an appt 6/22 or 6/23 pt advised 6/23 would be best and an afternoon appt. Pt was pleasant and verbalized understandings.   ----- Message from Chancellor Velásquez sent at 6/20/2023 11:16 AM CDT -----  Type:  Needs Medical Advice    Who Called: Pt  Would the patient rather a call back or a response via [facility]? callback  Best Call Back Number: [phone]  Additional Information: Pt requesting callback from office to discuss setting up follow up appt regarding hip issues. Pt is complaining of back pain and difficulty walking. Pt is unwilling to wait for next available appt in Epic and wants to be seen before the end of the week.

## 2023-06-23 ENCOUNTER — HOSPITAL ENCOUNTER (OUTPATIENT)
Dept: RADIOLOGY | Facility: HOSPITAL | Age: 73
Discharge: HOME | End: 2023-06-23
Attending: STUDENT IN AN ORGANIZED HEALTH CARE EDUCATION/TRAINING PROGRAM
Payer: OTHER GOVERNMENT

## 2023-06-23 ENCOUNTER — OFFICE VISIT (OUTPATIENT)
Dept: ORTHOPEDICS | Facility: CLINIC | Age: 73
End: 2023-06-23
Payer: OTHER GOVERNMENT

## 2023-06-23 VITALS — WEIGHT: 254.5 LBS | BODY MASS INDEX: 33.73 KG/M2 | HEIGHT: 73 IN

## 2023-06-23 DIAGNOSIS — Z96.641 STATUS POST RIGHT HIP REPLACEMENT: Primary | ICD-10-CM

## 2023-06-23 DIAGNOSIS — Z96.641 STATUS POST RIGHT HIP REPLACEMENT: ICD-10-CM

## 2023-06-23 PROCEDURE — 73502 X-RAY EXAM HIP UNI 2-3 VIEWS: CPT | Mod: TC,RT

## 2023-06-23 PROCEDURE — 99214 OFFICE O/P EST MOD 30 MIN: CPT | Mod: PBBFAC | Performed by: ORTHOPAEDIC SURGERY

## 2023-06-23 PROCEDURE — 73502 X-RAY EXAM HIP UNI 2-3 VIEWS: CPT | Mod: 26,RT,, | Performed by: RADIOLOGY

## 2023-06-23 PROCEDURE — 99213 OFFICE O/P EST LOW 20 MIN: CPT | Mod: S$PBB,,, | Performed by: ORTHOPAEDIC SURGERY

## 2023-06-23 PROCEDURE — 99999 PR PBB SHADOW E&M-EST. PATIENT-LVL IV: ICD-10-PCS | Mod: PBBFAC,,, | Performed by: ORTHOPAEDIC SURGERY

## 2023-06-23 PROCEDURE — 99999 PR PBB SHADOW E&M-EST. PATIENT-LVL IV: CPT | Mod: PBBFAC,,, | Performed by: ORTHOPAEDIC SURGERY

## 2023-06-23 PROCEDURE — 73502 XR HIP WITH PELVIS WHEN PERFORMED, 2 OR 3  VIEWS RIGHT: ICD-10-PCS | Mod: 26,RT,, | Performed by: RADIOLOGY

## 2023-06-23 PROCEDURE — 99213 PR OFFICE/OUTPT VISIT, EST, LEVL III, 20-29 MIN: ICD-10-PCS | Mod: S$PBB,,, | Performed by: ORTHOPAEDIC SURGERY

## 2023-06-23 RX ORDER — METHYLPREDNISOLONE 4 MG/1
TABLET ORAL
Qty: 1 EACH | Refills: 0 | Status: SHIPPED | OUTPATIENT
Start: 2023-06-23 | End: 2023-07-14

## 2023-06-23 RX ORDER — CHLORTHALIDONE 25 MG/1
25 TABLET ORAL DAILY
COMMUNITY
Start: 2022-11-17

## 2023-06-23 RX ORDER — NALOXONE HYDROCHLORIDE 4 MG/.1ML
SPRAY NASAL
COMMUNITY
Start: 2022-10-18

## 2023-06-23 RX ORDER — PREGABALIN 75 MG/1
75 CAPSULE ORAL 3 TIMES DAILY
COMMUNITY
Start: 2023-05-10 | End: 2023-07-19

## 2023-06-23 NOTE — PROGRESS NOTES
Subjective:      Patient ID: Macario Dior Jr. is a 73 y.o. male.    Chief Complaint:   Pain of the Right Hip    HPI  Presenting for follow up of R hip HO excision on 1/4/2023. He reports feeling a pulling tearing sensation last week and has since had severe pain and trouble walking.     06/23/2023 update:  Patient presents for follow-up of right hip pain.  He describes right low back pain radiating down into his leg over the last 2 weeks.  He was prescribed a Medrol Dosepak at last visit which provided some relief.  He denies any falls or recent traumas.        Objective:        Ortho/SPM Exam  RLE:  No gross deformities, wounds  No crepitus, TTP around the incision   Motor intact hip flex, quad, Tib Ant, gastroc, EHL, FHL.  Sensation intact saphenous, sural, deep/superficial peroneal, tibial nerves  Palp DP/PT pulse, BCR  Posterolateral Pain with internal rotation of the hip         IMAGING:  Xray with R DHRUV in place with recently excision of heterotopic ossification. No significant changes since the last exam.     Assessment:     S/p excision of HO around R DHRUV    Plan:   His symptoms and pain seem related to low back issues.  He has struggled with these in the past.  We will start with a Medrol Dosepak at this time and follow up evaluation with the spine team.  I have abundance of caution infection markers have been ordered.

## 2023-07-14 DIAGNOSIS — M51.36 DDD (DEGENERATIVE DISC DISEASE), LUMBAR: Primary | ICD-10-CM

## 2023-07-18 ENCOUNTER — HOSPITAL ENCOUNTER (OUTPATIENT)
Dept: RADIOLOGY | Facility: HOSPITAL | Age: 73
Discharge: HOME | End: 2023-07-18
Attending: REGISTERED NURSE
Payer: OTHER GOVERNMENT

## 2023-07-18 DIAGNOSIS — M51.36 DDD (DEGENERATIVE DISC DISEASE), LUMBAR: ICD-10-CM

## 2023-07-18 PROCEDURE — 72110 X-RAY EXAM L-2 SPINE 4/>VWS: CPT | Mod: TC

## 2023-07-18 PROCEDURE — 72050 X-RAY EXAM NECK SPINE 4/5VWS: CPT | Mod: TC

## 2023-07-18 PROCEDURE — 72050 X-RAY EXAM NECK SPINE 4/5VWS: CPT | Mod: 26,,, | Performed by: RADIOLOGY

## 2023-07-18 PROCEDURE — 72110 XR LUMBAR SPINE AP AND LAT WITH FLEX/EXT: ICD-10-PCS | Mod: 26,,, | Performed by: RADIOLOGY

## 2023-07-18 PROCEDURE — 72110 X-RAY EXAM L-2 SPINE 4/>VWS: CPT | Mod: 26,,, | Performed by: RADIOLOGY

## 2023-07-18 PROCEDURE — 72050 XR CERVICAL SPINE AP LAT WITH FLEX EXTEN: ICD-10-PCS | Mod: 26,,, | Performed by: RADIOLOGY

## 2023-07-19 ENCOUNTER — OFFICE VISIT (OUTPATIENT)
Dept: ORTHOPEDICS | Facility: CLINIC | Age: 73
End: 2023-07-19
Payer: OTHER GOVERNMENT

## 2023-07-19 VITALS — WEIGHT: 250 LBS | HEIGHT: 73 IN | BODY MASS INDEX: 33.13 KG/M2

## 2023-07-19 DIAGNOSIS — M54.16 LUMBAR RADICULOPATHY: Primary | ICD-10-CM

## 2023-07-19 DIAGNOSIS — M89.9 LESION OF RIGHT FEMUR: ICD-10-CM

## 2023-07-19 PROCEDURE — 99214 OFFICE O/P EST MOD 30 MIN: CPT | Mod: PBBFAC | Performed by: REGISTERED NURSE

## 2023-07-19 PROCEDURE — 99999 PR PBB SHADOW E&M-EST. PATIENT-LVL IV: CPT | Mod: PBBFAC,,, | Performed by: REGISTERED NURSE

## 2023-07-19 PROCEDURE — 99999 PR PBB SHADOW E&M-EST. PATIENT-LVL IV: ICD-10-PCS | Mod: PBBFAC,,, | Performed by: REGISTERED NURSE

## 2023-07-19 PROCEDURE — 99213 PR OFFICE/OUTPT VISIT, EST, LEVL III, 20-29 MIN: ICD-10-PCS | Mod: S$PBB,,, | Performed by: REGISTERED NURSE

## 2023-07-19 PROCEDURE — 99213 OFFICE O/P EST LOW 20 MIN: CPT | Mod: S$PBB,,, | Performed by: REGISTERED NURSE

## 2023-07-19 RX ORDER — PREGABALIN 150 MG/1
150 CAPSULE ORAL 2 TIMES DAILY
Qty: 60 CAPSULE | Refills: 5 | Status: SHIPPED | OUTPATIENT
Start: 2023-07-19 | End: 2024-01-17

## 2023-07-19 RX ORDER — TIZANIDINE 4 MG/1
4 TABLET ORAL EVERY 8 HOURS PRN
Qty: 60 TABLET | Refills: 2 | Status: SHIPPED | OUTPATIENT
Start: 2023-07-19

## 2023-07-19 NOTE — PROGRESS NOTES
"DATE: 7/19/2023  PATIENT: Macario Dior Jr.    Attending Physician: rBady Melo M.D.    HISTORY:  Macario Dior Jr. is a 73 y.o. male who returns to me today for follow up.  He was last seen by me 9/19/2022.  Today he is doing well but notes low back and right leg pain.    The Patient denies myelopathic symptoms such as handwriting changes or difficulty with buttons/coins/keys. Denies perineal paresthesias, bowel/bladder dysfunction.    PMH/PSH/FamHx/SocHx:  Unchanged from prior visit    ROS:  REVIEW OF SYSTEMS:  Constitution: Negative. Negative for chills, fever and night sweats.   HENT: Negative for congestion and headaches.    Eyes: Negative for blurred vision, left vision loss and right vision loss.   Cardiovascular: Negative for chest pain and syncope.   Respiratory: Negative for cough and shortness of breath.    Endocrine: Negative for polydipsia, polyphagia and polyuria.   Hematologic/Lymphatic: Negative for bleeding problem. Does not bruise/bleed easily.   Skin: Negative for dry skin, itching and rash.   Musculoskeletal: Negative for falls and muscle weakness.   Gastrointestinal: Negative for abdominal pain and bowel incontinence.   Allergic/Immunologic: Negative for hives and persistent infections.  Genitourinary: Negative for urinary retention/incontinence and nocturia.   Neurological: negative for disturbances in coordination, no myelopathic symptoms such as handwriting changes or difficulty with buttons, coins, keys or small objects. No loss of balance and seizures.   Psychiatric/Behavioral: Negative for depression. The patient does not have insomnia.   Denies perineal paresthesias, bowel or bladder incontinence    EXAM:  Ht 6' 0.5" (1.842 m)   Wt 113.4 kg (250 lb)   BMI 33.44 kg/m²   Stable.     IMAGING:    Today I personally reviewed AP, Lat and Flex/Ex  upright C-spine films that demonstrate moderate disc space narrowing and degenerative changes without instability     Thoracic AP lat films " show mild dextro convex curvature.     AP, Lat and Flex/Ex upright lumbar spine films demonstrate mild levoconvex curvature. Mild disc space narrowing and degenerative changes without instability.     MRI Lumbar shows mild bilateral neural foraminal from L3 to S1.      MRI cervical and thoracic largely unremarkable, no significant spinal cord compression.     Body mass index is 33.44 kg/m².    Hemoglobin A1C   Date Value Ref Range Status   12/21/2022 6.7 (H) 4.0 - 5.6 % Final     Comment:     ADA Screening Guidelines:  5.7-6.4%  Consistent with prediabetes  >or=6.5%  Consistent with diabetes    High levels of fetal hemoglobin interfere with the HbA1C  assay. Heterozygous hemoglobin variants (HbS, HgC, etc)do  not significantly interfere with this assay.   However, presence of multiple variants may affect accuracy.     09/19/2022 6.7 (H) 4.0 - 5.6 % Final     Comment:     ADA Screening Guidelines:  5.7-6.4%  Consistent with prediabetes  >or=6.5%  Consistent with diabetes    High levels of fetal hemoglobin interfere with the HbA1C  assay. Heterozygous hemoglobin variants (HbS, HgC, etc)do  not significantly interfere with this assay.   However, presence of multiple variants may affect accuracy.           ASSESSMENT/PLAN:    Macario was seen today for low-back pain, hip pain, neck pain and shoulder pain.    Diagnoses and all orders for this visit:    Lumbar radiculopathy  -     pregabalin (LYRICA) 150 MG capsule; Take 1 capsule (150 mg total) by mouth 2 (two) times daily.  -     tiZANidine (ZANAFLEX) 4 MG tablet; Take 1 tablet (4 mg total) by mouth every 8 (eight) hours as needed (muscle spasms).  -     Ambulatory referral/consult to Physical/Occupational Therapy; Future  -     Procedure Order to Pain Management; Future    Lesion of right femur  -     Ambulatory referral/consult to Physical/Occupational Therapy; Future        PT orders placed  TF ABIODUN ordered, he may follow up 2 weeks after if his pain  persists.  Medications sent to the pharmacy

## 2023-07-24 ENCOUNTER — TELEPHONE (OUTPATIENT)
Dept: BARIATRICS | Facility: CLINIC | Age: 73
End: 2023-07-24
Payer: OTHER GOVERNMENT

## 2023-07-24 DIAGNOSIS — M54.16 LUMBAR RADICULOPATHY: Primary | ICD-10-CM

## 2023-07-24 NOTE — TELEPHONE ENCOUNTER
Contacted the patient. Patient was rescheduled due to being in the hospital. Patient was rescheduled.

## 2023-07-24 NOTE — TELEPHONE ENCOUNTER
----- Message from Sonny Beckwith MA sent at 7/24/2023 10:22 AM CDT -----  Regarding: wilda goncalves  Contact: pt [phone]  Pt is calling to speak with someone in provider office is asking for a return call  stated that he  is currently in the hospital and is not sure  when he is getting discharges asking to move his appt   to sometime  next week please call pt at  [phone]

## 2023-07-24 NOTE — TELEPHONE ENCOUNTER
I haven't seen pt since January, and would need to know more details about his hospitalization before making changes. I can rf so he has time to reschedule.

## 2023-07-24 NOTE — TELEPHONE ENCOUNTER
----- Message from Jeanne Rg sent at 7/24/2023  3:28 PM CDT -----  Regarding: Pt advice / Missed Call  Contact: Cody [phone]  Pt returning callback from missed call. Requesting to speak with somebody in provider office. Please call.

## 2023-08-01 ENCOUNTER — OFFICE VISIT (OUTPATIENT)
Dept: BARIATRICS | Facility: CLINIC | Age: 73
End: 2023-08-01
Payer: OTHER GOVERNMENT

## 2023-08-01 VITALS
DIASTOLIC BLOOD PRESSURE: 52 MMHG | OXYGEN SATURATION: 98 % | HEART RATE: 58 BPM | SYSTOLIC BLOOD PRESSURE: 108 MMHG | WEIGHT: 248.44 LBS | BODY MASS INDEX: 33.23 KG/M2

## 2023-08-01 DIAGNOSIS — E11.9 DIABETES MELLITUS TYPE 2 WITHOUT RETINOPATHY: ICD-10-CM

## 2023-08-01 DIAGNOSIS — I63.9 CEREBELLAR STROKE: ICD-10-CM

## 2023-08-01 DIAGNOSIS — E66.9 OBESITY (BMI 30.0-34.9): Primary | ICD-10-CM

## 2023-08-01 PROCEDURE — 99999 PR PBB SHADOW E&M-EST. PATIENT-LVL V: ICD-10-PCS | Mod: PBBFAC,,, | Performed by: INTERNAL MEDICINE

## 2023-08-01 PROCEDURE — 99214 OFFICE O/P EST MOD 30 MIN: CPT | Mod: S$PBB,,, | Performed by: INTERNAL MEDICINE

## 2023-08-01 PROCEDURE — 99215 OFFICE O/P EST HI 40 MIN: CPT | Mod: PBBFAC | Performed by: INTERNAL MEDICINE

## 2023-08-01 PROCEDURE — 99999 PR PBB SHADOW E&M-EST. PATIENT-LVL V: CPT | Mod: PBBFAC,,, | Performed by: INTERNAL MEDICINE

## 2023-08-01 PROCEDURE — 99214 PR OFFICE/OUTPT VISIT, EST, LEVL IV, 30-39 MIN: ICD-10-PCS | Mod: S$PBB,,, | Performed by: INTERNAL MEDICINE

## 2023-08-01 RX ORDER — DULAGLUTIDE 3 MG/.5ML
3 INJECTION, SOLUTION SUBCUTANEOUS
Qty: 12 PEN | Refills: 1 | Status: SHIPPED | OUTPATIENT
Start: 2023-08-01 | End: 2023-11-27

## 2023-08-01 RX ORDER — DULAGLUTIDE 3 MG/.5ML
3 INJECTION, SOLUTION SUBCUTANEOUS
Qty: 12 PEN | Refills: 1 | OUTPATIENT
Start: 2023-08-01 | End: 2023-08-01 | Stop reason: SDUPTHER
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
## 2023-09-07 ENCOUNTER — OFFICE VISIT (OUTPATIENT)
Dept: NEUROLOGY | Facility: CLINIC | Age: 73
End: 2023-09-07
Payer: OTHER GOVERNMENT

## 2023-09-07 ENCOUNTER — LAB VISIT (OUTPATIENT)
Dept: LAB | Facility: HOSPITAL | Age: 73
End: 2023-09-07
Attending: PSYCHIATRY & NEUROLOGY
Payer: OTHER GOVERNMENT

## 2023-09-07 VITALS
HEART RATE: 63 BPM | WEIGHT: 247.69 LBS | DIASTOLIC BLOOD PRESSURE: 80 MMHG | BODY MASS INDEX: 32.83 KG/M2 | SYSTOLIC BLOOD PRESSURE: 141 MMHG | HEIGHT: 73 IN

## 2023-09-07 DIAGNOSIS — N18.30 STAGE 3 CHRONIC KIDNEY DISEASE, UNSPECIFIED WHETHER STAGE 3A OR 3B CKD: ICD-10-CM

## 2023-09-07 DIAGNOSIS — E11.42 SENSORY POLYNEUROPATHY DUE TO DIABETES MELLITUS: ICD-10-CM

## 2023-09-07 DIAGNOSIS — E51.9 THIAMINE DEFICIENCY: ICD-10-CM

## 2023-09-07 DIAGNOSIS — E11.42 SENSORY POLYNEUROPATHY DUE TO DIABETES MELLITUS: Primary | ICD-10-CM

## 2023-09-07 DIAGNOSIS — M47.26 OSTEOARTHRITIS OF SPINE WITH RADICULOPATHY, LUMBAR REGION: ICD-10-CM

## 2023-09-07 LAB
FOLATE SERPL-MCNC: 7 NG/ML (ref 4–24)
HIV 1+2 AB+HIV1 P24 AG SERPL QL IA: NORMAL

## 2023-09-07 PROCEDURE — 99417 PROLNG OP E/M EACH 15 MIN: CPT | Mod: S$PBB,,, | Performed by: PSYCHIATRY & NEUROLOGY

## 2023-09-07 PROCEDURE — 82607 VITAMIN B-12: CPT | Performed by: PSYCHIATRY & NEUROLOGY

## 2023-09-07 PROCEDURE — 86592 SYPHILIS TEST NON-TREP QUAL: CPT | Performed by: PSYCHIATRY & NEUROLOGY

## 2023-09-07 PROCEDURE — 99417 PR PROLONGED SVC, OUTPT, W/WO DIRECT PT CONTACT,  EA ADDTL 15 MIN: ICD-10-PCS | Mod: S$PBB,,, | Performed by: PSYCHIATRY & NEUROLOGY

## 2023-09-07 PROCEDURE — 99215 PR OFFICE/OUTPT VISIT, EST, LEVL V, 40-54 MIN: ICD-10-PCS | Mod: S$PBB,,, | Performed by: PSYCHIATRY & NEUROLOGY

## 2023-09-07 PROCEDURE — 82746 ASSAY OF FOLIC ACID SERUM: CPT | Performed by: PSYCHIATRY & NEUROLOGY

## 2023-09-07 PROCEDURE — 86038 ANTINUCLEAR ANTIBODIES: CPT | Performed by: PSYCHIATRY & NEUROLOGY

## 2023-09-07 PROCEDURE — 86334 IMMUNOFIX E-PHORESIS SERUM: CPT | Performed by: PSYCHIATRY & NEUROLOGY

## 2023-09-07 PROCEDURE — 84165 PATHOLOGIST INTERPRETATION SPE: ICD-10-PCS | Mod: 26,,, | Performed by: PATHOLOGY

## 2023-09-07 PROCEDURE — 36415 COLL VENOUS BLD VENIPUNCTURE: CPT | Performed by: PSYCHIATRY & NEUROLOGY

## 2023-09-07 PROCEDURE — 86334 PATHOLOGIST INTERPRETATION IFE: ICD-10-PCS | Mod: 26,,, | Performed by: PATHOLOGY

## 2023-09-07 PROCEDURE — 84165 PROTEIN E-PHORESIS SERUM: CPT | Performed by: PSYCHIATRY & NEUROLOGY

## 2023-09-07 PROCEDURE — 99215 OFFICE O/P EST HI 40 MIN: CPT | Mod: PBBFAC | Performed by: PSYCHIATRY & NEUROLOGY

## 2023-09-07 PROCEDURE — 87389 HIV-1 AG W/HIV-1&-2 AB AG IA: CPT | Performed by: PSYCHIATRY & NEUROLOGY

## 2023-09-07 PROCEDURE — 99215 OFFICE O/P EST HI 40 MIN: CPT | Mod: S$PBB,,, | Performed by: PSYCHIATRY & NEUROLOGY

## 2023-09-07 PROCEDURE — 99999 PR PBB SHADOW E&M-EST. PATIENT-LVL V: CPT | Mod: PBBFAC,,, | Performed by: PSYCHIATRY & NEUROLOGY

## 2023-09-07 PROCEDURE — 84165 PROTEIN E-PHORESIS SERUM: CPT | Mod: 26,,, | Performed by: PATHOLOGY

## 2023-09-07 PROCEDURE — 99999 PR PBB SHADOW E&M-EST. PATIENT-LVL V: ICD-10-PCS | Mod: PBBFAC,,, | Performed by: PSYCHIATRY & NEUROLOGY

## 2023-09-07 PROCEDURE — 86334 IMMUNOFIX E-PHORESIS SERUM: CPT | Mod: 26,,, | Performed by: PATHOLOGY

## 2023-09-07 PROCEDURE — 84425 ASSAY OF VITAMIN B-1: CPT | Performed by: PSYCHIATRY & NEUROLOGY

## 2023-09-07 RX ORDER — CAPSAICIN 0 G/G
CREAM TOPICAL
Qty: 15 G | Refills: 6 | Status: SHIPPED | OUTPATIENT
Start: 2023-09-07

## 2023-09-07 NOTE — PROGRESS NOTES
[facility] - NEUROLOGY 7TH [place] [facility], [place]    Date: 9/7/23  Patient Name: Macario Dior Jr.   MRN: [id number]   Referring Provider: No ref. provider found    Thank you so much No ref. provider found for your patient referral to Neuromuscular team at [facility]. We take pride in our care coordination and look forward to your feedback and questions.    Assessment:   Macario Dior Jr. is a 73 y.o. male is a very pleasant patient with probable diabetic polyneuropathy, CKD stage 3 and lumbar spondylosis and inability to walk due to pain and paresthesia for evaluation.  I discussed in detail with the patient about age related changes as well as pain coming from different structures including joints, nerves and muscles.  He is already on multiple medications for different reasons.  I will complete his workup for peripheral neuropathy and also guided him to control his diabetes better.  I will start local application capsaicin for paresthesia but additionally guided him about massage therapy for back pain.  I referred him to see endocrinologist for better control of diabetes and also to nutritionist for improvement of his dietary intake.  NCS/EMG can be done for further clarification about main pain generator in his case either lumbar radiculopathy or polyneuropathy.    I discussed about fall precautions and need for Physiotherapy. I addressed his complaints. I provided information about fall precautions and healthy lifestyle.    I would wish him very best for improvement/recovery in his condition.    Future direction based on feedback:    Plan:     Problem List Items Addressed This Visit          Neuro    Osteoarthritis of spine with radiculopathy, lumbar region    Sensory polyneuropathy due to diabetes mellitus - Primary    Relevant Medications    capsaicin 0.1 % Crea    Other Relevant Orders    RANDALL by IFA, w/Rflx    Vitamin B12 Deficiency Panel    FOLATE     IMMUNOFIXATION ELECTROPHORESIS, SERUM    PROTEIN ELECTROPHORESIS, SERUM    EMG W/ ULTRASOUND AND NERVE CONDUCTION TEST 2 Extremities    RPR    VITAMIN B1    HIV 1/2 Ag/Ab (4th Gen)    Ambulatory referral/consult to Nutrition Services    Ambulatory referral/consult to Endocrinology       Renal/    CKD (chronic kidney disease) stage 3, GFR 30-59 ml/min       Tomer Malik MD    This evaluation was completed in >90  Minutes over 50% of the time spent on education & counseling. This includes face to face time and non-face to face time preparing to see the patient (eg, review of tests), obtaining and/or reviewing separately obtained history, documenting clinical information in the electronic or other health record, independently interpreting results and communicating results to the patient/family/caregiver, or care coordinator.    Patient note was created using MModal Dictation.  Any errors in syntax or even information may not have been identified and edited on initial review prior to signing this note.    Details provided by:    Patient  Family- Friend    Chief complaint today:  Pain and paresthesia in lower extremities as well as back    History of Present Illness:      Mr. Macario Dior Jr. is a 73 y.o. male presenting for evaluation of  pain and paresthesia in lower extremities as well as back.  He has PMH of probable diabetic peripheral neuropathy, lumbar radiculopathy, HTN, CKD stage 3, DM2, and DVT history     The detail was confirmed with the patient who mentioned severe exacerbation of lower back pain progressively resulting in an inability to walk for the last six months. Patient states that the problems began after his R Hip replacement he had around [id number]. Since then, he has had chronic low back pain, initially confined to the R side, that has no spread to the entirety of his lower back. The pain travels down his lower extremities bilaterally. There is a stabbing pain in his back that turns into a  dull ache traveling posteriorly to his feet. The pain is present when standing, laying flat, or sitting, but is worst when he walks. The patient follows with pain management and has tried various medications and procedures, but currently takes oxycodone to manage the pain. The patient states that the oxycodone does not relieve the pain, but makes the pain more tolerable.     He is also complaining of bilateral burning and numbness in his feet for past 3 years. Patient states that he has previously been told this is diabetic neuropathy, but patient does not believe he is diabetic. The patient takes pregabalin for this pain and states that it has reduced the burning sensation by approximately 50%.  He is unable to walk because of pain as well as paresthesia.  The patient denies nausea/vomiting/diarrhea/constipation or loss of bladder control. The patient is a former smoker and does not consume alcohol. The patient does not have a consistent diet.      Chart Review:  Neurology note on 12/16/2022.    He has PMH of idiopathic peripheral neuropathy, lumbar radiculopathy, HTN, CKD stage 3, DM2, and DVT history. His current therapy regimen is  Lyrica 100mg TID. 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  He has left tibial/fibula fracture almost 10 years ago.  He had injections in the back by pain management which he states did not help.  He is retired [employer]     Pertinent work up based on chart review for current condition:  CBC with hemoglobin 14, hematocrit 43, and MCV 98  Hemoglobin A1c 6.7  Basic metabolic panel with creatinine 2.2 and BUN 15 and GFR of 31%  ESR 22.    C-reactive protein 7.5     MRI lumbar spine on 09/17/2022  Multilevel lumbar spondylosis as detailed level by level above, mild bilateral neural foraminal from L3 to S1.  No more than mild spinal canal stenosis at  L3-L4 and L4-L5.    NCS/EMG on 03/10/2020   suspicion for L5-S1 lumbar radiculopathy on EMG      Last CBC Results:   Lab Results   Component Value Date    WBC 6.04 12/21/2022    HGB 12.9 (L) 01/05/2023    HCT 43.1 12/21/2022     12/21/2022       Last CMP Results  Lab Results   Component Value Date     12/30/2022    K 4.5 12/30/2022     12/30/2022    CO2 24 12/30/2022    BUN 15 12/30/2022    CREATININE 2.2 (H) 12/30/2022    CALCIUM 8.7 12/30/2022    ALBUMIN 3.0 (L) 04/13/2016    AST 17 04/13/2016    ALT 13 04/13/2016       Last A1C  Lab Results   Component Value Date    HGBA1C 6.7 (H) 12/21/2022       Review of Systems:  12 system review of systems is negative except for the symptoms mentioned in HPI.       PAST MEDICAL HISTORY:  Past Medical History:   Diagnosis Date    Arthritis     Asthma     chronic    CKD (chronic kidney disease), stage III     patient denies    Deep vein thrombosis     Demyelinating neuropathy     polyneuropathy    Diabetes mellitus type 2 without retinopathy 12/14/2022    Gout     History of blood clots 2013    lower part of both legs    HLD (hyperlipidemia)     Hypertension     Iron deficiency anemia     BUZZ (obstructive sleep apnea)     CPAP    Pulmonary embolism        PAST SURGICAL HISTORY:  Past Surgical History:   Procedure Laterality Date    COLONOSCOPY N/A 11/7/2016    Procedure: COLONOSCOPY;  Surgeon: Shar Weiss MD;  Location: [address]);  Service: Endoscopy;  Laterality: N/A;  [facility] Referral. Okay to hold Coumadin 5 days prior to procedure. See Referral scaned in media tab on 10/20/16.    COLONOSCOPY N/A 9/21/2017    Procedure: COLONOSCOPY;  Surgeon: Stuart Trinidad MD;  Location: [address]);  Service: Endoscopy;  Laterality: N/A;  [facility] Olivarez-see scanned docs under media tab          9/6/17-[facility] and last clinic visit scanned into chart    EPIDURAL STEROID INJECTION N/A 12/17/2020     Procedure: INJECTION, STEROID, EPIDURAL, L5-S1 clear to hold Xarelto 2 days;  Surgeon: Moy Vasquez MD;  Location: [facility], [place], [facility];  Service: Pain Management;  Laterality: N/A;    EXCISION OF LESION OF FEMUR Right 1/4/2023    Procedure: EXCISION, LESION, FEMUR: POSTERIOR APPROACH;  Surgeon: Prieto Sinha MD;  [facility];  Service: Orthopedics;  Laterality: Right;    HIP SURGERY Right 2008    exploratory at [facility]    HIP SURGERY Right 8-31-15    THR    INJECTION OF ANESTHETIC AGENT AROUND NERVE Right 10/23/2019    Procedure: BLOCK, NERVE, Obturator and Femoral cleared to hold xarelto 3 days pt. said he's not taking coumadin;  Surgeon: Moy Vasquez MD;  Location: [facility], [place];  Service: Pain Management;  Laterality: Right;    INJECTION OF ANESTHETIC AGENT AROUND NERVE Right 7/6/2020    Procedure: BLOCK, NERVE, RIGHT MBB L3,L5,L5;  Surgeon: Moy Vasquez MD;  Location: [facility], [place];  Service: Pain Management;  Laterality: Right;  BLOCK, NERVE, RIGHT MBB L3,L5,L5    INJECTION OF ANESTHETIC AGENT AROUND NERVE Right 7/30/2020    Procedure: BLOCK, NERVE RIGHT L3, L4, L5 2ND;  Surgeon: Moy Vasquez MD;  Location: [facility], [place];  Service: Pain Management;  Laterality: Right;  NEEDS CONSENT, XARELTO    LEG SURGERY Left 2012    fibula and tibia    RADIOFREQUENCY ABLATION Right 10/5/2020    Procedure: RADIOFREQUENCY ABLATION RIGHT L3,4,5;  Surgeon: Moy Vasquez MD;  Location: [facility], [place];  Service: Pain Management;  Laterality: Right;  RADIOFREQUENCY ABLATION RIGHT L3,4,5  ok to hold xarelto, scanned in Media    SHOULDER SURGERY Right 1989    torn ligament       CURRENT MEDS:  I have reconciled the patient's home medications and discharge medications with the patient/family. I have updated all changes.  Refer to After-Visit Medication List.    Current Outpatient Medications   Medication Sig Dispense Refill    albuterol 90 mcg/actuation inhaler Inhale 2 puffs into the lungs every 6 (six) hours as needed for  Wheezing.       atorvastatin (LIPITOR) 80 MG tablet Take 80 mg by mouth once daily. Take one-half tablet daily      carboxymethyl-gly-juvv65-XH 0.5-1-0.5 % Dpet Place 1 drop into both eyes 4 (four) times daily.      carboxymethylcellulose sodium 1 % DpGe Place 0.4 mLs into both eyes 4 (four) times daily as needed.      carvediloL (COREG) 25 MG tablet Take 1 tablet (25 mg total) by mouth 2 (two) times daily. 60 tablet 11    chlorhexidine (PERIDEX) 0.12 % solution RINSE 1 CAPFUL BY MOUTH ONCE DAILY AS NEEDED SWISH FOR 30 SECONDS AFTER,AND SPIT OUT      chlorthalidone (HYGROTEN) 25 MG Tab Take 25 mg by mouth once daily.      CLOBETASOL PROPIONATE, BULK, MISC Apply topically once daily.      cyanocobalamin (VITAMIN B-12) 100 MCG tablet Take 100 mcg by mouth once daily. Take two tablets      cycloSPORINE (RESTASIS) 0.05 % ophthalmic emulsion INSTILL 0.4ML IN EACH EYE EVERY 12 HOURS      dulaglutide (TRULICITY) 3 mg/0.5 mL pen injector Inject 3 mg into the skin every 7 days. 12 pen 1    eplerenone (INSPRA) 25 MG Tab Take 1 tablet by mouth once daily.      ergocalciferol (ERGOCALCIFEROL) 50,000 unit Cap Take 50,000 Units by mouth.      finasteride (PROSCAR) 5 mg tablet TAKE ONE TABLET BY MOUTH ONCE DAILY FOR PROSTATE      hydrALAZINE (APRESOLINE) 50 MG tablet TAKE ONE TABLET BY MOUTH THREE TIMES A DAY FOR HEART/BLOOD PRESSURE      ketoconazole (NIZORAL) 2 % cream Apply topically 2 (two) times daily.      latanoprost 0.005 % ophthalmic solution Place 1 drop into both eyes every evening.      lidocaine (LIDODERM) 5 % Place 1 patch onto the skin once daily. Wear for 12 hours, then remove. Do not apply a new patch for at least 12 hours.      naloxone (NARCAN) 4 mg/actuation Spry as needed.      omega-3/dha/epa/fish oil (OMEGA-3 FISH OIL ORAL) 1,000 mg once daily.      oxyCODONE (ROXICODONE) 10 mg Tab immediate release tablet Take 1 tablet (10 mg total) by mouth every 4 (four) hours as needed for Pain. 42 tablet 0    pregabalin  "(LYRICA) 150 MG capsule Take 1 capsule (150 mg total) by mouth 2 (two) times daily. 60 capsule 5    tiZANidine (ZANAFLEX) 4 MG tablet Take 1 tablet (4 mg total) by mouth every 8 (eight) hours as needed (muscle spasms). 60 tablet 2     No current facility-administered medications for this visit.     Facility-Administered Medications Ordered in Other Visits   Medication Dose Route Frequency Provider Last Rate Last Admin    ALPRAZolam tablet 0.5 mg  0.5 mg Oral On Call Procedure Lisha Cote MD           ALLERGIES:  Review of patient's allergies indicates:   Allergen Reactions    Amlodipine Swelling    Lisinopril Tinitus and Swelling    Pregabalin Swelling    Azithromycin      Muscles tense up    Levetiracetam Other (See Comments)    Acetaminophen Rash     Feels like needles are sticking him per pt       FAMILY HISTORY:  Family History   Problem Relation Age of Onset    Cancer Mother         ovarian    Cancer Father     Cancer Sister         breast    Cancer Sister         breast    No Known Problems Brother     No Known Problems Daughter     No Known Problems Daughter     No Known Problems Daughter     No Known Problems Son     No Known Problems Son        SOCIAL HISTORY:  Social History     Tobacco Use    Smoking status: Former     Types: Cigars     Quit date: 2009     Years since quittin.0    Smokeless tobacco: Never   Substance Use Topics    Alcohol use: No     Alcohol/week: 0.0 standard drinks of alcohol    Drug use: No         Objective:     Vitals:    23 0924   BP: (!) 141/80   Pulse: 63   Weight: 112.4 kg (247 lb 11 oz)   Height: 6' 1" (1.854 m)     Wt Readings from Last 3 Encounters:   23 1524 112.7 kg (248 lb 7.3 oz)   23 1142 113.4 kg (250 lb)   23 1522 115.5 kg (254 lb 8.3 oz)     Body mass index is 32.68 kg/m².       Eyes: no tearing, discharge, no erythema   ENT: moist mucous membranes of the oral cavity, nares patent    Neck: Supple, full range of " motion  Cardiovascular: Warm and well perfused, pulses equal and symmetrical  Lungs: Normal work of breathing, normal chest wall excursions  Skin: No rash, lesions, or breakdown on exposed skin  Psychiatry: Mood and affect are appropriate   Extremeties: No cyanosis, clubbing or edema.    GENERAL/CONSTITUTIONAL:    -Well appearing; well nourished    HIGHER INTEGRATIVE FUNCTIONS:   -Attention & concentration: Normal   -Orientation: Oriented to person, place & time  -Memory: Normal  -Language: Normal   -Fund of Knowledge: Normal     CRANIAL NERVES:   -CN 2: Visual fields full  -CN 2,3: PERRL  -CN 3,4,6: EOMI  -CN 5: Facial sensation intact bilaterally  -CN 7: Facial strength/movement intact bilaterally  -CN 8: Hearing normal bilaterally  -CN 9,10: Palate elevates symmetrically  -CN 11: Normal shoulder shrug and head turn  -CN 12: Tongue protrudes midline     MOTOR:   -Tone: normal in upper and lower extremities  -UE/LE motor: 5/5 throughout, no pronator drift      Upper Ext Right Left Lower Ext Right Left   Shoulder Abd 5 5 Hip flexion 5 5   Elbow flexion 5 5 Knee extension 5 5   Elbow extension 5 5 Knee flexion 5 5   Fingers abduction 5 5 Ankle dorsiflexion 5 5   Wrist extension   Ankle plantar flexion     Wrist flexion   Great toe dorsiflexion     Finger extension   Thigh adduction     Finger flexion   Thigh abduction     Thumb abduction            REFLEXES:      R L  R L   Triceps 2 2 Knee 2 2   Biceps 2 2 Ankle 2 2   BR 2 2        -extensor plantar reflex on the left side     SENSATION:   -decreased vibration bilaterally in lower extremities up to the knees   -decreased sharp sensation to pinprick bilaterally in lower extremities up to mid shin.      COORDINATION:   -FNF normal bilaterally    GAIT:   -Normal casual gait. Able to walk on heels and toes without difficulty.  Came in wheelchair due to pain  -he is able to do squat without difficulty  -SLR negative bilaterally    Scheduled Follow-up :  Future  Appointments   Date Time Provider Department Center   9/7/2023  9:00 AM Tomer Malik MD [facility]   11/27/2023  3:10 PM Marianne Astorga MD [facility]       After Visit Medication List :     Medication List            Accurate as of September 7, 2023  8:23 AM. If you have any questions, ask your nurse or doctor.                CONTINUE taking these medications      albuterol 90 mcg/actuation inhaler  Commonly known as: PROVENTIL/VENTOLIN HFA     atorvastatin 80 MG tablet  Commonly known as: LIPITOR     carboxymethyl-gly-helz52-PQ 0.5-1-0.5 % Dpet     carboxymethylcellulose sodium 1 % Dpge     carvediloL 25 MG tablet  Commonly known as: COREG  Take 1 tablet (25 mg total) by mouth 2 (two) times daily.     chlorhexidine 0.12 % solution  Commonly known as: PERIDEX     chlorthalidone 25 MG Tab  Commonly known as: HYGROTEN     CLOBETASOL PROPIONATE (BULK) MISC     cyanocobalamin 100 MCG tablet  Commonly known as: VITAMIN B-12     cycloSPORINE 0.05 % ophthalmic emulsion  Commonly known as: RESTASIS     eplerenone 25 MG Tab  Commonly known as: INSPRA     ergocalciferol 50,000 unit Cap  Commonly known as: ERGOCALCIFEROL     finasteride 5 mg tablet  Commonly known as: PROSCAR     hydrALAZINE 50 MG tablet  Commonly known as: APRESOLINE     ketoconazole 2 % cream  Commonly known as: NIZORAL     latanoprost 0.005 % ophthalmic solution     LIDOcaine 5 %  Commonly known as: LIDODERM     naloxone 4 mg/actuation Spry  Commonly known as: NARCAN     OMEGA-3 FISH OIL ORAL     oxyCODONE 10 mg Tab immediate release tablet  Commonly known as: ROXICODONE  Take 1 tablet (10 mg total) by mouth every 4 (four) hours as needed for Pain.     pregabalin 150 MG capsule  Commonly known as: LYRICA  Take 1 capsule (150 mg total) by mouth 2 (two) times daily.     tiZANidine 4 MG tablet  Commonly known as: ZANAFLEX  Take 1 tablet (4 mg total) by mouth every 8 (eight) hours as needed (muscle spasms).     TRULICITY 3 mg/0.5  mL pen injector  Generic drug: dulaglutide  Inject 3 mg into the skin every 7 days.              Signing Physician:          Tomer Malik MD  , [employer]  Neurology Consultant. [facility].   [address],  [facility]. [address]th [place] [address].

## 2023-09-07 NOTE — PATIENT INSTRUCTIONS
Walk and exercise  Physiotherapy  Massage therapy  Fall precautions  In case of any question, plz contact through [facility].

## 2023-09-08 LAB
ALBUMIN SERPL ELPH-MCNC: 3.97 G/DL (ref 3.35–5.55)
ALPHA1 GLOB SERPL ELPH-MCNC: 0.37 G/DL (ref 0.17–0.41)
ALPHA2 GLOB SERPL ELPH-MCNC: 0.84 G/DL (ref 0.43–0.99)
ANA SER-ACNC: NORMAL
B-GLOBULIN SERPL ELPH-MCNC: 1.19 G/DL (ref 0.5–1.1)
GAMMA GLOB SERPL ELPH-MCNC: 1.23 G/DL (ref 0.67–1.58)
INTERPRETATION SERPL IFE-IMP: NORMAL
PROT SERPL-MCNC: 7.6 G/DL (ref 6–8.4)
RPR SER QL: NORMAL
VIT B12 SERPL-MCNC: 522 NG/L (ref 180–914)

## 2023-09-11 LAB
PATHOLOGIST INTERPRETATION IFE: NORMAL
PATHOLOGIST INTERPRETATION SPE: NORMAL

## 2023-09-12 LAB — VIT B1 BLD-MCNC: 40 UG/L (ref 38–122)

## 2023-09-15 ENCOUNTER — TELEPHONE (OUTPATIENT)
Dept: NEUROLOGY | Facility: CLINIC | Age: 73
End: 2023-09-15
Payer: OTHER GOVERNMENT

## 2023-09-15 RX ORDER — LANOLIN ALCOHOL/MO/W.PET/CERES
50 CREAM (GRAM) TOPICAL DAILY
Qty: 60 TABLET | Refills: 11 | Status: SHIPPED | OUTPATIENT
Start: 2023-09-15

## 2023-09-15 NOTE — TELEPHONE ENCOUNTER
----- Message from Tomer Malik MD sent at 9/15/2023  8:57 AM CDT -----  Good morning, please update the patient about low Thiamine 1 level so I have sent prescription for supplementation..  Thank you

## 2023-11-01 ENCOUNTER — TELEPHONE (OUTPATIENT)
Dept: ADMINISTRATIVE | Facility: OTHER | Age: 73
End: 2023-11-01
Payer: OTHER GOVERNMENT

## 2023-11-02 ENCOUNTER — TELEPHONE (OUTPATIENT)
Dept: PAIN MEDICINE | Facility: CLINIC | Age: 73
End: 2023-11-02
Payer: OTHER GOVERNMENT

## 2023-11-02 NOTE — TELEPHONE ENCOUNTER
----- Message from Mary Colorado sent at 11/1/2023 11:46 AM CDT -----  Regarding: Injection  Name of Who is Calling:  Patient          What is the request in detail:  Patient stated he has been calling for 2 months about an injection            Can the clinic reply by [facility]: No            What Number to Call Back if not in [facility]: [phone]

## 2023-11-02 NOTE — TELEPHONE ENCOUNTER
Staff spoke to patient who wants to reschedule his procedure that he had to cancel on 08/24/23. Message was sent to the scheduling dept.

## 2023-11-07 ENCOUNTER — TELEPHONE (OUTPATIENT)
Dept: PAIN MEDICINE | Facility: CLINIC | Age: 73
End: 2023-11-07
Payer: OTHER GOVERNMENT

## 2023-11-07 NOTE — TELEPHONE ENCOUNTER
----- Message from Radha Montero sent at 11/7/2023 10:43 AM CST -----   Name of Who is Calling:     What is the request in detail:  patient request call back from dr hathaway in reference to pain / patient state he is unable to walk due to pain in back /leg / hip / patient state he has been calling every week once a week and injection keep getting canceled and no one calls back to reschedule Please contact to further discuss and advise      Can the clinic reply by [facility]:     What Number to Call Back if not in [facility]:  [phone]

## 2023-11-09 DIAGNOSIS — M54.16 LUMBAR RADICULOPATHY: Primary | ICD-10-CM

## 2023-11-13 ENCOUNTER — TELEPHONE (OUTPATIENT)
Dept: PAIN MEDICINE | Facility: CLINIC | Age: 73
End: 2023-11-13
Payer: OTHER GOVERNMENT

## 2023-11-13 NOTE — TELEPHONE ENCOUNTER
----- Message from Nancy De MA sent at 11/13/2023 11:23 AM CST -----  Name of Who is Calling:ANTELMO FORBES JR. [[id number]]                What is the request in detail: Pt is requesting a call back to discuss a pain shot he is suppose to get for surgery. Please assist.                Can the clinic reply by [facility]: No                [facility]: [phone]

## 2023-11-24 ENCOUNTER — TELEPHONE (OUTPATIENT)
Dept: PAIN MEDICINE | Facility: CLINIC | Age: 73
End: 2023-11-24
Payer: OTHER GOVERNMENT

## 2023-11-24 NOTE — TELEPHONE ENCOUNTER
----- Message from Radha Montero sent at 11/24/2023  9:14 AM CST -----   Name of Who is Calling:     What is the request in detail: patient request call back in reference to procedure / patient state has been trying to schedule procedure about 2 months / unsuccessfully / patient want to know if dr hathaway is unable to service with chronic pain before 12/04/23  please notify him so he can get another provider  /  Please contact to further discuss and advise  / patient want a response from office today     Can the clinic reply by [facility]:     What Number to Call Back if not in [facility]:   [phone]

## 2023-11-27 ENCOUNTER — OFFICE VISIT (OUTPATIENT)
Dept: BARIATRICS | Facility: CLINIC | Age: 73
End: 2023-11-27
Payer: OTHER GOVERNMENT

## 2023-11-27 ENCOUNTER — TELEPHONE (OUTPATIENT)
Dept: PAIN MEDICINE | Facility: CLINIC | Age: 73
End: 2023-11-27
Payer: OTHER GOVERNMENT

## 2023-11-27 VITALS
WEIGHT: 256.19 LBS | HEART RATE: 78 BPM | OXYGEN SATURATION: 98 % | BODY MASS INDEX: 33.8 KG/M2 | SYSTOLIC BLOOD PRESSURE: 148 MMHG | DIASTOLIC BLOOD PRESSURE: 76 MMHG

## 2023-11-27 DIAGNOSIS — E11.9 DIABETES MELLITUS TYPE 2 WITHOUT RETINOPATHY: ICD-10-CM

## 2023-11-27 DIAGNOSIS — E66.9 OBESITY, CLASS I, BMI 30.0-34.9 (SEE ACTUAL BMI): Primary | ICD-10-CM

## 2023-11-27 PROCEDURE — 99213 OFFICE O/P EST LOW 20 MIN: CPT | Mod: S$PBB,,, | Performed by: INTERNAL MEDICINE

## 2023-11-27 PROCEDURE — 99999 PR PBB SHADOW E&M-EST. PATIENT-LVL V: ICD-10-PCS | Mod: PBBFAC,,, | Performed by: INTERNAL MEDICINE

## 2023-11-27 PROCEDURE — 99213 PR OFFICE/OUTPT VISIT, EST, LEVL III, 20-29 MIN: ICD-10-PCS | Mod: S$PBB,,, | Performed by: INTERNAL MEDICINE

## 2023-11-27 PROCEDURE — 99215 OFFICE O/P EST HI 40 MIN: CPT | Mod: PBBFAC | Performed by: INTERNAL MEDICINE

## 2023-11-27 PROCEDURE — 99999 PR PBB SHADOW E&M-EST. PATIENT-LVL V: CPT | Mod: PBBFAC,,, | Performed by: INTERNAL MEDICINE

## 2023-11-27 RX ORDER — DULAGLUTIDE 4.5 MG/.5ML
4.5 INJECTION, SOLUTION SUBCUTANEOUS
Qty: 12 PEN | Refills: 3 | Status: SHIPPED | OUTPATIENT
Start: 2023-11-27 | End: 2023-11-27 | Stop reason: SDUPTHER

## 2023-11-27 RX ORDER — DULAGLUTIDE 4.5 MG/.5ML
4.5 INJECTION, SOLUTION SUBCUTANEOUS
Qty: 12 PEN | Refills: 3 | Status: SHIPPED | OUTPATIENT
Start: 2023-11-27

## 2023-11-27 NOTE — PROGRESS NOTES
Subjective:       Patient ID: Macario Dior Jr. is a 73 y.o. male.    Chief Complaint: Follow-up    CC:    Pt here today for follow-up. Has gained 8 lbs, net  pos 3 lbs. Was to start 1200 murali pb meal planner and started trulicity, currently on 3 mg weekly. Denies SE. Does not feel much decrease in appetite. He has been trying to not skip meals. Eating oatmeal or grits, half a sandwich, green veg with chicken, fish or steak. Has tried different ways of eating based on different advice he has been getting.   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 [facility] Is in WC mostly now 2/2 to his balance.       Inbody deferred 2/2 pt unable to stand for test.     BMR:    PBF:        Review of Systems      Objective:    BP (!) 148/76   Pulse 78   Wt 116.2 kg (256 lb 2.8 oz)   SpO2 98%   BMI 33.80 kg/m²    Physical Exam  Vitals reviewed.   Constitutional:       General: He is not in acute distress.     Appearance: He is well-developed.      Comments: Seated in WC   HENT:      Head: Normocephalic and atraumatic.   Eyes:      General: No scleral icterus.     Pupils: Pupils are equal, round, and reactive to light.   Cardiovascular:      Rate and Rhythm: Normal rate.   Pulmonary:      Effort: Pulmonary effort is normal. No respiratory distress.   Skin:     General: Skin is warm and dry.   Neurological:      Mental Status: He is alert and oriented to person, place, and time.   Psychiatric:         Behavior: Behavior normal.         Judgment: Judgment normal.         Assessment:       Problem List Items Addressed This Visit       Diabetes mellitus type 2 without retinopathy    Relevant Medications    dulaglutide (TRULICITY) 4.5 mg/0.5 mL pen injector     Other Visit Diagnoses       Obesity, Class I, BMI 30.0-34.9 (see actual BMI)    -  Primary                Plan:           Macario was seen today for follow-up.    Diagnoses and all orders for this visit:    Obesity, Class I, BMI 30.0-34.9 (see actual  BMI)    Diabetes mellitus type 2 without retinopathy  -     Discontinue: dulaglutide (TRULICITY) 4.5 mg/0.5 mL pen injector; Inject 4.5 mg into the skin every 7 days.  -     dulaglutide (TRULICITY) 4.5 mg/0.5 mL pen injector; Inject 4.5 mg into the skin every 7 days.               1200 murali pb meal panner and meal ideas given again. Asked pt to follow consistently for 3 months, and hopefully we will see better progress.

## 2023-11-27 NOTE — PATIENT INSTRUCTIONS
"Start Trulicity 4.5 mg once a week   Decrease portions as soon as you start Trulicity. Some nausea in the first 2 weeks is not unusual.     If you get pain across the upper abdomen and around to your back, please call the office.     No soda, sweet tea, juices or lemonade. All drinks should be 5 calories or less.    Activity per Physical therapy.                    1200 calorie  Meal Plan  STARCHES 80 CALORIES PER SERVING 15g CARB, 3g PROTEIN, 1g FAT  Servings per day   bread   tortilla   crackers   cooked cereals   dry cereals   pasta   rice   corn   popcorn   potato (small)   potato, mashed   sweet potato   squash, winter   cooked beans, peas, lentils (add 1 meat exchange)   1 slice   1 (6")   4-6 (3/4 oz)   1/2 cup   3/4 cup   1/2 cup   1/3 cup  1/2 cup   1 small light bag  1 (3 oz)   1/2 cup   1/3 cup   1 cup   1/2 cup Most starches are a good source of B vitamins   Choose whole grain foods such as 100% whole wheat bread and flour, brown rice, tortillas, etc. for nutrients and fiber.   Combine beans (starch & meat) with grains (starch) for their complimentary proteins and fiber   Combine grains (starch) with milk (milk) or cheese (meat) to compliment proteins     3   FRUIT 60 CALORIES PER SERVING 15g CARB    fresh fruit   banana  melon (cubes)   berries  canned fruit   dried fruit    1 small   1/2  ½ cup   ¾ cup  ½ cup   ¼ cup    Choose whole fruits for fiber   No fruit juices   2   DAIRY  CALORIES PER SERVING 12g CARB, 8g PROTEIN, 0-8g FAT    milk   yogurt  Protein soy or almond milk 1 cup   1 cup   1 cup Use unsweetened almond or soy milk with added protein  Avoid chocolate or flavored milk  Avoid yogurt with more than 8 gms of sugar. Gms of protein should be higher than grams of sugar               2   MEAT AND SUBSTITUES  CALORIES PER SERVING 7g Protein, 0-13g FAT    Meat,Seafood and fish   cheese   cottage cheese   egg   peanut butter   tofu or tempeh  cooked beans, peas, lentils (add 1 " starch)  Quinoa (add 1 starch)   Nuts and seeds (½ serving protein + 1 fat)  Nutritional yeast  [employer]ning [employer] grillers 1 oz       1 oz  1/4 cup     1   1.5 Tbsp   4 oz (1/2 cup)   1/2 cup              ¼ cup            2 tablespoons    1 soha or ½ cup      Choose fish, seafood and lower fat cheeses  Limit frying or adding fat.      9   FATS 45 CALORIES PER SERVING     oil   mayonnaise   cream cheese   salad dressing   peanuts   avocado   butter or margarine    1 tsp   1 tsp   1 Tbsp   1 Tbsp   10   1/8   1 tsp Eat less fat.   Eat less saturated fat such as animal fat found in fatter meat, cheese, and butter. Also eat less hydrogenated fat.      2-3   VEGETABLES 25 CALORIES PER SERVING 5 g CARB, 2g PROTEIN    raw vegetables   cooked vegetables   tomato or vegetable juice 1 cup   1/2 cup     1/2 cup Choose dark green leafy and deep yellow vegetables such as spinach, zuchinni, squash, mushrooms, cauliflower ,broccoli, carrots, and peppers.   Unlimited       1200 CALORIE MEAL PLAN  Eat 3 small meals per day with 1-2 small snacks to keep you full throughout the day  Aim for at least 15 gms of protein at breakfast, at least 25 grams at lunch and at least 25 grams of protein at dinner.  Snacks should contain at least 5 grams of protein  Limit starches to 1 serving per meal or snack.  Keep your carbs whole grain or whole wheat  Limit your intake of refined sugar including sugary beverages ie sweet tea, lemonade, fruit punch             Fruit Protein Dairy Starch Fats Calories   Breakfast 1 2 1 1  340   Lunch  3  1 1 290   Dinner 1 4  1 1 405   Snack   1 1  170   Total 2 9 2 4 2 1205     Sample breakfasts:  2 scrambled eggs (use spray), 1 cup Protein Silk soy milk, 1 slice whole wheat toast, 1 small orange  Omelet made with 1 egg, 1-ounce low fat cheese and non-starchy veggies, 1 low fat plain yogurt with ½ banana  Plain yogurt with ¾ cup unsweetened cold cereal and ½ cup fresh fruit salad, 2 hardboiled eggs  Sample  lunches:  ½ whole wheat bagel topped with 3 ounces of low fat cheese melted in oven with a wild green salad with 1 TBSP dressing  ¾ cup cooked beans with 6 whole grain crackers, 10 roasted peanuts  ½ medium baked potato with 3 ounces of low fat cheddar cheese and 1 TBSP  sour cream  1 small wheat tortilla brushed with 1 tsp olive oil then brushed with pizza sauce and 4 ounces low fat cheese, sliced mushrooms and green peppers broiled until cheese is melted.  ½ cup chopped melon    Sample Dinners:  4 ounces of tuna pan seared in 1 tsp olive oil, ½ baked small sweet potato and 2 small plums  ½ cup chick peas, 1 cup cauliflower sauteed with 1 tbsp chopped onion, 1 tsp oil, and 2 tsp powell powder. Add low sodium vegetable stock to desired consistency. Serve with ½ cup brown rice  Red beans seasoned with onions and bell peppers served over cauliflower rice  1 cup cooked green or brown lentils served with ½ cup cooked quinoa and chopped tomatoes and cucumbers and 1 tbsp feta cheese  Sample Snacks:  1 cup of Protein Silk Soy milk with 3 squares reji crackers  3/4 ounce Triscuits with 1 ounce cubed cheese  Chobani Triple Zero yogurt with ¾ cup unsweetened cereal  ½ cup edamame (shelled)                          Vegetarian Breakfast recipe      Delicious and Healthy Breakfast Egg Muffins. Simple recipe, great taste. Low carb and high in protein. Perfect as a full meal or filling snack.    Ready in: 25 minutes  Recipe by: Luis Daniel    Low Carb Egg Breakfast Muffins (25 Minutes, Vegetarian)  Delicious and Healthy Breakfast Egg Muffins. Simple recipe, great taste. Low carb and high in protein. Perfect as a full meal or filling snack.   Course Breakfast   Cuisine Vegetarian   Prep Time 5 minutes   Cook Time 20 minutes   Total Time 25 minutes   Servings 3 muffins   Calories 259 kcal   Author Luis Daniel  Ingredients  1 bell pepper (your favorite color)  3 spring onions  4 little cherry tomatoes/one normal tomato  6  eggs  1 handful spinach/ green leaves  2 slices cheddar (2 slices = around 50g; you can use different cheese too)  ½-1 tsp salt  4-5 splashes hot sauce (or powell powder)  Equipment  6 slot muffin tin  Baking paper or muffin cups (only if you don't have a nonstick muffin tin)  Instructions  Preheat the oven to 400°F.  Wash and dice the pepper, onions and tomatoes. and put them in a large mixing bowl.  Wash the spinach, lightly chop it and add it to the bowl as well.  Add the eggs and salt. Mix well. Pro tip - crack the eggs separately before adding. That way if you get a dodgy one, it wont ruin the whole meal.  Optionally add some hot sauce, powell powder...whatever you like. Hot sauce is great!  Grease the muffin tin with oil and kitchen paper/baking brush and pour the egg mixture evenly into the muffin slots. (If you think they might still stick to the pan use some muffin cups or cut out some baking paper and to use as cups - definitely saves time on doing the washing up   If youre so inclined then layering some cheese over the top of each muffin before they go into the oven is a delicious addition! You can also mix in the cheese to the batter.  Pop the tray into the oven for 15-18 minutes or until the tops are firm to the touch.  Bon Appetit!!  COOKING TIME: How do you like your eggs? Rather soft? Stick to 20 minutes baking time. If you like them well done, almost crunchy, go for 25 minutes!  Nutrition Facts  Low Carb Egg Breakfast Muffins (25 Minutes, Vegetarian)  Amount Per Serving (461 g) 3 muffins  Calories 259 Calories from Fat 126  Total Fat 14g  Saturated Fat 4.2g  Cholesterol 491mg  Sodium 493mg  Potassium 948mg  Total Carbohydrates 16g  Dietary Fiber 4.8g  Sugars 10.5g  Protein 20g  1 serving = 3 muffins

## 2023-11-27 NOTE — TELEPHONE ENCOUNTER
----- Message from Pamela Martin sent at 11/24/2023  4:56 PM CST -----  Regarding: referral  Good evening,    An [facility] has been received for pt. Current scheduling need is for an injection. Please contact him to schedule.    Referral has been scanned in to Media.    Thanks,    Pamela NARANJO  [facility]

## 2023-12-04 ENCOUNTER — HOSPITAL ENCOUNTER (OUTPATIENT)
Facility: OTHER | Age: 73
Discharge: HOME | End: 2023-12-04
Attending: ANESTHESIOLOGY | Admitting: ANESTHESIOLOGY
Payer: OTHER GOVERNMENT

## 2023-12-04 VITALS
HEART RATE: 76 BPM | WEIGHT: 254 LBS | OXYGEN SATURATION: 97 % | BODY MASS INDEX: 33.66 KG/M2 | TEMPERATURE: 98 F | SYSTOLIC BLOOD PRESSURE: 172 MMHG | RESPIRATION RATE: 16 BRPM | HEIGHT: 73 IN | DIASTOLIC BLOOD PRESSURE: 78 MMHG

## 2023-12-04 DIAGNOSIS — M47.26 OSTEOARTHRITIS OF SPINE WITH RADICULOPATHY, LUMBAR REGION: ICD-10-CM

## 2023-12-04 DIAGNOSIS — M47.816 LUMBAR SPONDYLOSIS: Primary | ICD-10-CM

## 2023-12-04 DIAGNOSIS — G89.29 CHRONIC PAIN: ICD-10-CM

## 2023-12-04 DIAGNOSIS — M54.16 LUMBAR RADICULOPATHY: ICD-10-CM

## 2023-12-04 DIAGNOSIS — M54.16 LUMBAR RADICULOPATHY, RIGHT: ICD-10-CM

## 2023-12-04 PROCEDURE — 64483 NJX AA&/STRD TFRM EPI L/S 1: CPT | Mod: 50 | Performed by: ANESTHESIOLOGY

## 2023-12-04 PROCEDURE — 64483 PR EPIDURAL INJ, ANES/STEROID, TRANSFORAMINAL, LUMB/SACR, SNGL LEVL: ICD-10-PCS | Mod: 50,,, | Performed by: ANESTHESIOLOGY

## 2023-12-04 PROCEDURE — 25000003 PHARM REV CODE 250: Performed by: ANESTHESIOLOGY

## 2023-12-04 PROCEDURE — 25500020 PHARM REV CODE 255: Performed by: ANESTHESIOLOGY

## 2023-12-04 PROCEDURE — 63600175 PHARM REV CODE 636 W HCPCS: Performed by: ANESTHESIOLOGY

## 2023-12-04 PROCEDURE — 64483 NJX AA&/STRD TFRM EPI L/S 1: CPT | Mod: 50,,, | Performed by: ANESTHESIOLOGY

## 2023-12-04 RX ORDER — DEXAMETHASONE SODIUM PHOSPHATE 10 MG/ML
INJECTION INTRAMUSCULAR; INTRAVENOUS
Status: DISCONTINUED | OUTPATIENT
Start: 2023-12-04 | End: 2023-12-04 | Stop reason: HOSPADM

## 2023-12-04 RX ORDER — FENTANYL CITRATE 50 UG/ML
INJECTION, SOLUTION INTRAMUSCULAR; INTRAVENOUS
Status: DISCONTINUED | OUTPATIENT
Start: 2023-12-04 | End: 2023-12-04 | Stop reason: HOSPADM

## 2023-12-04 RX ORDER — SODIUM CHLORIDE 9 MG/ML
INJECTION, SOLUTION INTRAVENOUS CONTINUOUS
Status: DISCONTINUED | OUTPATIENT
Start: 2023-12-04 | End: 2023-12-04 | Stop reason: HOSPADM

## 2023-12-04 RX ORDER — MIDAZOLAM HYDROCHLORIDE 1 MG/ML
INJECTION INTRAMUSCULAR; INTRAVENOUS
Status: DISCONTINUED | OUTPATIENT
Start: 2023-12-04 | End: 2023-12-04 | Stop reason: HOSPADM

## 2023-12-04 RX ORDER — LIDOCAINE HYDROCHLORIDE 20 MG/ML
INJECTION, SOLUTION INFILTRATION; PERINEURAL
Status: DISCONTINUED | OUTPATIENT
Start: 2023-12-04 | End: 2023-12-04 | Stop reason: HOSPADM

## 2023-12-04 RX ORDER — LIDOCAINE HYDROCHLORIDE 10 MG/ML
INJECTION, SOLUTION EPIDURAL; INFILTRATION; INTRACAUDAL; PERINEURAL
Status: DISCONTINUED | OUTPATIENT
Start: 2023-12-04 | End: 2023-12-04 | Stop reason: HOSPADM

## 2023-12-04 NOTE — OP NOTE
Lumbar Transforaminal Epidural Steroid Injection under Fluoroscopic Guidance    The procedure, risks, benefits, and options were discussed with the patient. There are no contraindications to the procedure. The patent expressed understanding and agreed to the procedure. Informed written consent was obtained prior to the start of the procedure and can be found in the patient's chart.    PATIENT NAME: Macario Dior Jr.   MRN: [id number]     DATE OF PROCEDURE: 12/04/2023    PROCEDURE:  Bilateral  L4/5 Lumbar Transforaminal Epidural Steroid Injection under Fluoroscopic Guidance    PRE-OP DIAGNOSIS: Lumbar radiculopathy [M54.16] Lumbar radiculopathy [M54.16]    POST-OP DIAGNOSIS: Same    PHYSICIAN: Moy Vasquez MD    ASSISTANTS: Blake Fechtel, M.D. [facility] Fellow       MEDICATIONS INJECTED: Preservative-free Decadron 10mg with 5cc of Lidocaine 1% MPF     LOCAL ANESTHETIC INJECTED: Xylocaine 2%     SEDATION: Versed 2mg and Fentanyl 50mcg                                                                                                                                                                                     Conscious sedation ordered by M.D. Patient re-evaluation prior to administration of conscious sedation. No changes noted in patient's status from initial evaluation. The patient's vital signs were monitored by RN and patient remained hemodynamically stable throughout the procedure.    Event Time In   Sedation Start 1140   Sedation End 1147       ESTIMATED BLOOD LOSS: None    COMPLICATIONS: None    TECHNIQUE: Time-out was performed to identify the patient and procedure to be performed. With the patient laying in a prone position, the surgical area was prepped and draped in the usual sterile fashion using ChloraPrep and a fenestrated drape.The levels were determined under fluoroscopy guidance. Skin anesthesia was achieved by injecting Lidocaine 2% over the injection sites. The transforaminal spaces were then  approached with a 22 gauge, 3.5 inch spinal quinke needle that was introduced under fluoroscopic guidance in the AP and Lateral views. Once the needle tip was in the area of the transforaminal space, and there was no blood, CSF or paraesthesias, contrast dye Omnipaque (300mg/mL) was injected to confirm placement and there was no vascular runoff. Fluoroscopic imaging in the AP and lateral views revealed a clear outline of the spinal nerve with proximal spread of agent through the neural foramen into the epidural space. 1 mL of the medication mixture listed above was injected slowly at each site. Displacement of the radio opaque contrast after injection of the medication confirmed that the medication went into the area of the transforaminal spaces. The needles were removed and bleeding was nil. A sterile dressing was applied. No specimens collected. The patient tolerated the procedure well.     PRE-PROCEDURE PAIN SCORE: 10/10    POST-PROCEDURE PAIN SCORE: 0/10    The patient was monitored after the procedure in the recovery area. They were given post-procedure and discharge instructions to follow at home. The patient was discharged in a stable condition.    Jason Mendoza MD    I reviewed and edited the fellow's note. I conducted my own interview and physical examination. I agree with the findings. I was present and supervising all critical portions of the procedure.

## 2023-12-04 NOTE — DISCHARGE SUMMARY
Discharge Note  Short Stay      SUMMARY     Admit Date: 12/4/2023    Attending Physician: Jason Mendoza      Discharge Physician: Jason Mendoza      Discharge Date: 12/4/2023 11:54 AM    Procedure(s) (LRB):  LUMBAR TRANSFORAMINAL BILATERAL L4/5 DIRECT REFERRAL (Bilateral)    Final Diagnosis: Lumbar radiculopathy [M54.16]    Disposition: Home or self care    Patient Instructions:   Current Discharge Medication List        CONTINUE these medications which have NOT CHANGED    Details   albuterol 90 mcg/actuation inhaler Inhale 2 puffs into the lungs every 6 (six) hours as needed for Wheezing.       atorvastatin (LIPITOR) 80 MG tablet Take 80 mg by mouth once daily. Take one-half tablet daily      capsaicin 0.1 % Crea Apply twice to area of maximum paresthesia  Qty: 15 g, Refills: 6    Comments: Apply twice to area of maximum paresthesia  Associated Diagnoses: Sensory polyneuropathy due to diabetes mellitus      carboxymethyl-gly-lhyp68-IU 0.5-1-0.5 % Dpet Place 1 drop into both eyes 4 (four) times daily.      carboxymethylcellulose sodium 1 % DpGe Place 0.4 mLs into both eyes 4 (four) times daily as needed.      carvediloL (COREG) 25 MG tablet Take 1 tablet (25 mg total) by mouth 2 (two) times daily.  Qty: 60 tablet, Refills: 11    Comments: .      chlorhexidine (PERIDEX) 0.12 % solution RINSE 1 CAPFUL BY MOUTH ONCE DAILY AS NEEDED SWISH FOR 30 SECONDS AFTER,AND SPIT OUT      chlorthalidone (HYGROTEN) 25 MG Tab Take 25 mg by mouth once daily.      CLOBETASOL PROPIONATE, BULK, MISC Apply topically once daily.      cyanocobalamin (VITAMIN B-12) 100 MCG tablet Take 100 mcg by mouth once daily. Take two tablets      cycloSPORINE (RESTASIS) 0.05 % ophthalmic emulsion INSTILL 0.4ML IN EACH EYE EVERY 12 HOURS      dulaglutide (TRULICITY) 4.5 mg/0.5 mL pen injector Inject 4.5 mg into the skin every 7 days.  Qty: 12 pen , Refills: 3    Associated Diagnoses: Diabetes mellitus type 2 without retinopathy      eplerenone (INSPRA)  25 MG Tab Take 1 tablet by mouth once daily.      ergocalciferol (ERGOCALCIFEROL) 50,000 unit Cap Take 50,000 Units by mouth.      finasteride (PROSCAR) 5 mg tablet TAKE ONE TABLET BY MOUTH ONCE DAILY FOR PROSTATE      hydrALAZINE (APRESOLINE) 50 MG tablet TAKE ONE TABLET BY MOUTH THREE TIMES A DAY FOR HEART/BLOOD PRESSURE      ketoconazole (NIZORAL) 2 % cream Apply topically 2 (two) times daily.      latanoprost 0.005 % ophthalmic solution Place 1 drop into both eyes every evening.      lidocaine (LIDODERM) 5 % Place 1 patch onto the skin once daily. Wear for 12 hours, then remove. Do not apply a new patch for at least 12 hours.      naloxone (NARCAN) 4 mg/actuation Spry as needed.      omega-3/dha/epa/fish oil (OMEGA-3 FISH OIL ORAL) 1,000 mg once daily.      oxyCODONE (ROXICODONE) 10 mg Tab immediate release tablet Take 1 tablet (10 mg total) by mouth every 4 (four) hours as needed for Pain.  Qty: 42 tablet, Refills: 0    Comments: Quantity prescribed more than 7 day supply? No Bedside delivery      pregabalin (LYRICA) 150 MG capsule Take 1 capsule (150 mg total) by mouth 2 (two) times daily.  Qty: 60 capsule, Refills: 5    Associated Diagnoses: Lumbar radiculopathy      thiamine 100 MG tablet Take 0.5 tablets (50 mg total) by mouth once daily.  Qty: 60 tablet, Refills: 11    Associated Diagnoses: Thiamine deficiency      tiZANidine (ZANAFLEX) 4 MG tablet Take 1 tablet (4 mg total) by mouth every 8 (eight) hours as needed (muscle spasms).  Qty: 60 tablet, Refills: 2    Associated Diagnoses: Lumbar radiculopathy                 Discharge Diagnosis: Lumbar radiculopathy [M54.16]  Condition on Discharge: Stable with no complications to procedure   Diet on Discharge: Same as before.  Activity: as per instruction sheet.  Discharge to: Home with a responsible adult.  Follow up: 2-4 weeks       Please call my office or pager at [phone] if experienced any weakness or loss of sensation, fever > 101.5, pain  uncontrolled with oral medications, persistent nausea/vomiting/or diarrhea, redness or drainage from the incisions, or any other worrisome concerns. If physician on call was not reached or could not communicate with our office for any reason please go to the nearest emergency department

## 2023-12-04 NOTE — H&P
HPI  Patient presenting for Procedure(s) (LRB):  LUMBAR TRANSFORAMINAL BILATERAL L4/5 DIRECT REFERRAL (Bilateral)     Patient on Anti-coagulation No    No health changes since previous encounter    Past Medical History:   Diagnosis Date    Arthritis     Asthma     chronic    CKD (chronic kidney disease), stage III     patient denies    Deep vein thrombosis     Demyelinating neuropathy     polyneuropathy    Diabetes mellitus type 2 without retinopathy 12/14/2022    Gout     History of blood clots 2013    lower part of both legs    HLD (hyperlipidemia)     Hypertension     Iron deficiency anemia     BUZZ (obstructive sleep apnea)     CPAP    Pulmonary embolism      Past Surgical History:   Procedure Laterality Date    COLONOSCOPY N/A 11/7/2016    Procedure: COLONOSCOPY;  Surgeon: Shar Weiss MD;  [facility]  Service: Endoscopy;  Laterality: N/A;  [facility] Referral. Okay to hold Coumadin 5 days prior to procedure. See Referral scaned in media tab on 10/20/16.    COLONOSCOPY N/A 9/21/2017    Procedure: COLONOSCOPY;  Surgeon: Stuart Trinidad MD;  Location: [facility] (4TH FLR);  Service: Endoscopy;  Laterality: N/A;  [facility] Olivarez-see scanned docs under media tab          9/6/17-current [facility] authorization and last clinic visit scanned into chart    EPIDURAL STEROID INJECTION N/A 12/17/2020    Procedure: INJECTION, STEROID, EPIDURAL, L5-S1 clear to hold Xarelto 2 days;  Surgeon: Moy Vasquez MD;  Location: [facility];  Service: Pain Management;  Laterality: N/A;    EXCISION OF LESION OF FEMUR Right 1/4/2023    Procedure: EXCISION, LESION, FEMUR: POSTERIOR APPROACH;  Surgeon: Prieto Sinha MD;  [address]  Service: Orthopedics;  Laterality: Right;    HIP SURGERY Right 2008    exploratory at [facility]    HIP SURGERY Right 8-31-15    [facility]    INJECTION OF ANESTHETIC AGENT AROUND NERVE Right 10/23/2019    Procedure: BLOCK, NERVE, Obturator and Femoral  cleared to hold xarelto 3 days pt. said he's not taking coumadin;  Surgeon: Moy Vasquez MD;  Location: [facility];  Service: Pain Management;  Laterality: Right;    INJECTION OF ANESTHETIC AGENT AROUND NERVE Right 7/6/2020    Procedure: BLOCK, NERVE, RIGHT MBB L3,L5,L5;  Surgeon: Moy Vasquez MD;  Location: [facility];  Service: Pain Management;  Laterality: Right;  BLOCK, NERVE, RIGHT MBB L3,L5,L5    INJECTION OF ANESTHETIC AGENT AROUND NERVE Right 7/30/2020    Procedure: BLOCK, NERVE RIGHT L3, L4, L5 2ND;  Surgeon: Moy Vasquez MD;  Location: [facility];  Service: Pain Management;  Laterality: Right;  NEEDS CONSENT, XARELTO    LEG SURGERY Left 2012    fibula and tibia    RADIOFREQUENCY ABLATION Right 10/5/2020    Procedure: RADIOFREQUENCY ABLATION RIGHT L3,4,5;  Surgeon: Moy Vasquez MD;  Location: [facility];  Service: Pain Management;  Laterality: Right;  RADIOFREQUENCY ABLATION RIGHT L3,4,5  ok to hold xarelto, scanned in Media    SHOULDER SURGERY Right 1989    torn ligament     Review of patient's allergies indicates:   Allergen Reactions    Amlodipine Swelling    Lisinopril Tinitus and Swelling    Pregabalin Swelling    Azithromycin      Muscles tense up    Levetiracetam Other (See Comments)    Acetaminophen Rash     Feels like needles are sticking him per pt      Current Facility-Administered Medications   Medication    0.9%  NaCl infusion     Facility-Administered Medications Ordered in Other Encounters   Medication    ALPRAZolam tablet 0.5 mg       PMHx, PSHx, Allergies, Medications reviewed in epic    ROS negative except pain complaints in HPI    OBJECTIVE:    There were no vitals taken for this visit.    PHYSICAL EXAMINATION:    GENERAL: Well appearing, in no acute distress, alert and oriented x3.  PSYCH:  Mood and affect appropriate.  SKIN: Skin color, texture, turgor normal, no rashes or lesions which will impact the procedure.  CV: RRR with palpation of the radial artery.  PULM: No  evidence of respiratory difficulty, symmetric chest rise. Clear to auscultation.  NEURO: Cranial nerves grossly intact.    Plan:    Proceed with procedure as planned Procedure(s) (LRB):  LUMBAR TRANSFORAMINAL BILATERAL L4/5 DIRECT REFERRAL (Bilateral)    Mateus Tang  12/04/2023

## 2023-12-04 NOTE — DISCHARGE INSTRUCTIONS

## 2023-12-08 ENCOUNTER — TELEPHONE (OUTPATIENT)
Dept: NEUROLOGY | Facility: CLINIC | Age: 73
End: 2023-12-08
Payer: OTHER GOVERNMENT

## 2023-12-09 NOTE — TELEPHONE ENCOUNTER
----- Message from Negar Curry sent at 12/8/2023  2:22 PM CST -----  Regarding: appt  Contact: [phone]  Pt states he had a double shot on Monday and the pt states he still unable to walk and is very painful. Please call would like to discuss an appt.

## 2023-12-13 ENCOUNTER — TELEPHONE (OUTPATIENT)
Dept: PAIN MEDICINE | Facility: CLINIC | Age: 73
End: 2023-12-13
Payer: OTHER GOVERNMENT

## 2023-12-13 ENCOUNTER — TELEPHONE (OUTPATIENT)
Dept: PAIN MEDICINE | Facility: CLINIC | Age: 73
End: 2023-12-13

## 2023-12-13 ENCOUNTER — OFFICE VISIT (OUTPATIENT)
Dept: PAIN MEDICINE | Facility: CLINIC | Age: 73
End: 2023-12-13
Payer: OTHER GOVERNMENT

## 2023-12-13 VITALS
WEIGHT: 264.13 LBS | SYSTOLIC BLOOD PRESSURE: 173 MMHG | RESPIRATION RATE: 12 BRPM | OXYGEN SATURATION: 100 % | HEIGHT: 73 IN | DIASTOLIC BLOOD PRESSURE: 77 MMHG | HEART RATE: 78 BPM | BODY MASS INDEX: 35.01 KG/M2

## 2023-12-13 DIAGNOSIS — M25.551 RIGHT HIP PAIN: ICD-10-CM

## 2023-12-13 DIAGNOSIS — M47.26 OSTEOARTHRITIS OF SPINE WITH RADICULOPATHY, LUMBAR REGION: ICD-10-CM

## 2023-12-13 DIAGNOSIS — M54.17 LUMBOSACRAL RADICULOPATHY: ICD-10-CM

## 2023-12-13 DIAGNOSIS — M51.36 DDD (DEGENERATIVE DISC DISEASE), LUMBAR: ICD-10-CM

## 2023-12-13 DIAGNOSIS — G89.4 CHRONIC PAIN SYNDROME: ICD-10-CM

## 2023-12-13 DIAGNOSIS — M47.816 LUMBAR SPONDYLOSIS: Primary | ICD-10-CM

## 2023-12-13 DIAGNOSIS — M79.18 MYOFASCIAL PAIN ON RIGHT SIDE: ICD-10-CM

## 2023-12-13 PROCEDURE — 99999 PR PBB SHADOW E&M-EST. PATIENT-LVL V: CPT | Mod: PBBFAC,,, | Performed by: NURSE PRACTITIONER

## 2023-12-13 PROCEDURE — 99999 PR PBB SHADOW E&M-EST. PATIENT-LVL V: ICD-10-PCS | Mod: PBBFAC,,, | Performed by: NURSE PRACTITIONER

## 2023-12-13 PROCEDURE — 99214 PR OFFICE/OUTPT VISIT, EST, LEVL IV, 30-39 MIN: ICD-10-PCS | Mod: S$PBB,,, | Performed by: NURSE PRACTITIONER

## 2023-12-13 PROCEDURE — 99215 OFFICE O/P EST HI 40 MIN: CPT | Mod: PBBFAC | Performed by: NURSE PRACTITIONER

## 2023-12-13 PROCEDURE — 99214 OFFICE O/P EST MOD 30 MIN: CPT | Mod: S$PBB,,, | Performed by: NURSE PRACTITIONER

## 2023-12-13 NOTE — TELEPHONE ENCOUNTER
Medial Branch Block Diary   Lumbar   Procedure Laterality Anesthesia   LUMBAR TRANSFORAMINAL BILATERAL L4/5 DIRECT REFERRAL Bilateral RN IV Sedation     1st    Pre procedure pain level 10-10  Percentage of relief within 24 hours of procedure- 80%-100%  Post procedure level 2-10  Any Improvements in ADL: Patient reports that he was fine up until 12/08/23. Not he is bent over and can barely walk. Patient [place] and would like another procedure or injection so he will be able to travel. Diary was sent to be uploaded into patients chart.

## 2023-12-13 NOTE — TELEPHONE ENCOUNTER
----- Message from Malorie Desai sent at 12/13/2023  8:21 AM CST -----  Regarding: pt return call  Patient Returning Call Who Called: pt      Who Left Message for Patient: Ace Pereyra MA        Does the patient know what this is regarding?       Best Call Back Number: [phone]    Additional Information: pt has been calling for  two days straight , received a call and as soon as he went to answer it, the phone hung up, pt would like someone to call him as soon as possible, says it is urgent , please advise.

## 2023-12-13 NOTE — TELEPHONE ENCOUNTER
----- Message from Jennifer Wiley sent at 12/12/2023  3:32 PM CST -----  Regarding: Patient Advice                Name of Who is Calling:  Macario Dior Jr.    Who Left The Message:  Macario Dior Jr.      What is the request in detail:  Patient called requesting a call. Patient wasn't specific as to what his call pertains to. Please further advise. Thank you      Reply by [facility]: NO      Preferred Call Back  :  [phone] (T)

## 2023-12-13 NOTE — TELEPHONE ENCOUNTER
----- Message from Jennifer Wiley sent at 12/12/2023  3:32 PM CST -----  Regarding: Patient Advice                Name of Who is Calling:  Macario Dior Jr.    Who Left The Message:  Macario Dior Jr.      What is the request in detail:  Patient called requesting a call. Patient wasn't specific as to what his call pertains to. Please further advise. Thank you      Reply by [facility]: NO      Preferred Call Back  :  [phone] (P)

## 2023-12-13 NOTE — PROGRESS NOTES
Subjective:      Patient ID: Macario Dior Jr. is a 73 y.o. male.    Chief Complaint: Back Pain and Leg Pain      Referred by: No ref. provider found     Interval history 12/13/2023:  71-year-old male presents today he is status post a bilateral TF ABIODUN as 5 on 12/03 reporting minimal relief.  He continues to report primarily axial low back pain with intermittent right leg.  Pain with extension today also with walking, standing.  Facetogenic pain upon palpation of lower lumbar spine.  He did report more axial low back pain bandlike distribution versus state that the right leg hurts.  He would like to be considered for other pain interventions.    Interval History 11/22/2022:  Mr Dior presents for follow up evaluation of chronic R hip pain and awaiting surgery. He is having acute R sided lumbar myofascial pain and requesting TPI to address symptoms as this has provided benefit in past and will be going out of town. He has no s/s concerning for cauda equina syndrome. He denies SE of his medication regimen.     Interval History 9/23/2022:  The Pt presents for delayed follow He has continued lumbar complaints but focal right hip pain that continues. His R hip pain has continued and he was unaware he was supposed to see Dr Sinha today to address this. He can continue to see us at any time but will reach out to Dr Sinha's office to get him continued care regarding his greatest pain generator at this time.     Interval History 2/18/2021:  The patient presents for follow up of diffuse pain complaints. Pt canceled prior Hip block. State he states lyrica increase to 100mg has helped but neuropathy but also states he is also having stent placed to R leg where pain is greater due to insuffiencey. He continues to take Oxycodone QID. He denies any adverse SE of medications. Denies new areas of pain.      Interval History 12/4/2020:  Pt presents for follow up of lower back pain. He continues to have benefit from RFA of right  lower lumbar. He is doing well with med mgt of oxycodone and lyrica. He states night time leg cramping. The patient denies myelopathic symptoms such as handwriting changes or difficulty with buttons/coins/keys. Denies perineal paresthesias, bowel/bladder dysfunction.      Interval History 10/20/2020:  The patient presents follow-up for lower back pain.  He has had near resolution since right-sided L 3, 4, 5 RFA.  He states he is doing well.  He continues to take minimum the medication to address pain.  He is currently taking oxycodone 5 mg q.i.d. as well as Lyrica 75 b.i.d..  He finds he is beneficial without adverse side effects.The patient denies myelopathic symptoms such as handwriting changes or difficulty with buttons/coins/keys. Denies perineal paresthesias, bowel/bladder dysfunction.    Interval History 8/11/2020:  The patient presents for follow up of lower back pain. Pt over interval had 2 MBB of right L3,4,5 with >90% relief of both procedures. Pt states + a.m. stiffness, states when he has MBB of lumbar he notices his hip pain more and vise versa when he has hip block. Lumbar pain>R hip pain at this time. He continues to take oxycodone 5mg QID which is beneficial to pain control without adverse side effects. The patient denies myelopathic symptoms such as handwriting changes or difficulty with buttons/coins/keys. Denies perineal paresthesias, bowel/bladder dysfunction.        Interval History 05/27/2020:  S/P Right Femoral / Obturator Nerve Block in 10/2019 -- 50% relief for 2 days --  lost to follow up after that  S/p Right DHRUV 08/2015 -- doing well for 1.5 years  Pain is located at right hip joint. Occasionally radiates to right thigh.   Aggravated standing, walking, laying down.   Alleviated by Oxycodone      HPI:  Macario Dior is a 69 y.o. Man with PMH DVT s/p right DHRUV that presents today as a new consult for low back pain.  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 Since 2015  "DHRUV, he initially had improved but progressive pain in the right hip and low back.  In the last 6 months, he has had 4 back injections from Dr. De Los Santos that lasted for 2 weeks each.  1 month ago, he had a Lumbar ABIODUN that provided 100% relief that lasted 5 days for the low back but the pain changed to radiate to the right hip.  Since the ABIODUN relief stopped, the pain has "worked its way back up to the low back."  The pain is described as intense, sharp, and shoots up from the right hip into the low back to the right periscapular area. The shooting pain is worse than the low back pain. The pain is interfering with everything, including walking, sitting, and putting on his shoes.  Oxycodone 10mg Q6H provides relief. Lidocaine patches and TENS provide minimal relief when he wakes up.  He did physical therapy for 1 year with minimal relief.  He is unable to regularly exercise secondary to pain. He is radha to get 4 hours of uninterrupted sleep per night.  He wakes up with stiffness in his low back despite $4,000 mattresses.  He has weakness and buckling in the right lower extremity.  He endorses worsening falls due to weakness "seems like the leg is not there." He says " I need the bone removed" referring to his right hip. [facility] is a  [facility] He denies incontinence and saddle anesthesia.  Medications:    Taking Oxycodone 5 mg QID -- with relief  Took Gabapentin with relief in the past but had adverse effects.  Never tried Lyrica     Interventional Pain History  12/04/2023 Bilateral  L4/5 Lumbar Transforaminal Epidural short term relief  Lumbar ABIODUN 100% relief 5 days - Dr. De Los Santos   Lumbar TPIs 0% relief - Dr. De Los Santos   Right Femoral / Obturator Nerve Block - 10/2019  7/6/2020 Right L3,4,5 RFA  7/30/2020 Right L3,4,5 RFA    Imaging:  LUMBAR MRI 04/2020  T12-L1: No significant spinal canal stenosis or neural foraminal narrowing.    L1-L2:  No significant spinal canal stenosis or neural " foraminal narrowing.    L2-L3: Mild diffuse posterior disc bulge and bilateral facet arthropathy contribute to mild bilateral neural foraminal narrowing.  No significant spinal canal stenosis.    L3-L4: Mild diffuse posterior disc bulge, buckling of the ligamentum flavum, and bilateral facet arthropathy contribute moderate right/mild left neural foraminal narrowing.  No significant spinal canal stenosis.    L4-L5: Diffuse posterior disc bulge, buckling of the ligamentum flavum, and bilateral facet arthropathy contribute to mild effacement of the thecal sac without significant canal stenosis, however there is moderate right, mild left neural foraminal narrowing.    L5-S1: Mild diffuse posterior disc bulge and bilateral facet arthropathy contribute to mild bilateral neural foraminal narrowing.    Paraspinous soft tissues: Atrophic right kidney.  Few, poorly visualized T2 hyperintense foci at the left kidney possibly representing cysts.    Past Medical History:   Diagnosis Date    Arthritis     Asthma     chronic    CKD (chronic kidney disease), stage III     patient denies    Deep vein thrombosis     Demyelinating neuropathy     polyneuropathy    Diabetes mellitus type 2 without retinopathy 12/14/2022    Gout     History of blood clots 2013    lower part of both legs    HLD (hyperlipidemia)     Hypertension     Iron deficiency anemia     BUZZ (obstructive sleep apnea)     CPAP    Pulmonary embolism        Past Surgical History:   Procedure Laterality Date    COLONOSCOPY N/A 11/7/2016    Procedure: COLONOSCOPY;  Surgeon: Shar Weiss MD;  Location: [facility] ([address] [place] [address]);  Service: Endoscopy;  Laterality: N/A;  [facility] Referral. Okay to hold Coumadin 5 days prior to procedure. See Referral scaned in media tab on 10/20/16.    COLONOSCOPY N/A 9/21/2017    Procedure: COLONOSCOPY;  Surgeon: Stuart Trinidad MD;  Location: [facility] ([address] [place] [address]);  Service: Endoscopy;  Laterality: N/A;  [facility] from   [facility]-see scanned docs under media tab          9/6/17-current [facility] authorization and last clinic visit scanned into chart    EPIDURAL STEROID INJECTION N/A 12/17/2020    Procedure: INJECTION, STEROID, EPIDURAL, L5-S1 clear to hold Xarelto 2 days;  Surgeon: Moy Vasquez MD;  Location: [facility];  Service: Pain Management;  Laterality: N/A;    EXCISION OF LESION OF FEMUR Right 1/4/2023    Procedure: EXCISION, LESION, FEMUR: POSTERIOR APPROACH;  Surgeon: Prieto Sinha MD;  Location: [facility] OR 2ND FLR;  Service: Orthopedics;  Laterality: Right;    HIP SURGERY Right 2008    [facility]    HIP SURGERY Right 8-31-15    THR    INJECTION OF ANESTHETIC AGENT AROUND NERVE Right 10/23/2019    Procedure: BLOCK, NERVE, Obturator and Femoral cleared to hold xarelto 3 days pt. said he's not taking coumadin;  Surgeon: Moy Vasquez MD;  Location: [facility];  Service: Pain Management;  Laterality: Right;    INJECTION OF ANESTHETIC AGENT AROUND NERVE Right 7/6/2020    Procedure: BLOCK, NERVE, RIGHT MBB L3,L5,L5;  Surgeon: Moy Vasquez MD;  Location: [facility];  Service: Pain Management;  Laterality: Right;  BLOCK, NERVE, RIGHT MBB L3,L5,L5    INJECTION OF ANESTHETIC AGENT AROUND NERVE Right 7/30/2020    Procedure: BLOCK, NERVE RIGHT L3, L4, L5 2ND;  Surgeon: Moy Vasquez MD;  Location: [facility];  Service: Pain Management;  Laterality: Right;  NEEDS CONSENT, XARELTO    LEG SURGERY Left 2012    fibula and tibia    RADIOFREQUENCY ABLATION Right 10/5/2020    Procedure: RADIOFREQUENCY ABLATION RIGHT L3,4,5;  Surgeon: Moy Vasquez MD;  Location: [facility];  Service: Pain Management;  Laterality: Right;  RADIOFREQUENCY ABLATION RIGHT L3,4,5  ok to hold xarelto, scanned in Media    SHOULDER SURGERY Right 1989    torn ligament    TRANSFORAMINAL EPIDURAL INJECTION OF STEROID Bilateral 12/4/2023    Procedure: LUMBAR TRANSFORAMINAL BILATERAL L4/5 DIRECT REFERRAL;  Surgeon: Moy Vasquez MD;  Location: [facility];  Service: Pain Management;  Laterality: Bilateral;       Review of patient's allergies indicates:   Allergen Reactions    Amlodipine Swelling    Lisinopril Tinitus and Swelling    Pregabalin Swelling    Azithromycin      Muscles tense up    Levetiracetam Other (See Comments)    Acetaminophen Rash     Feels like needles are sticking him per pt       Current Outpatient Medications   Medication Sig Dispense Refill    albuterol 90 mcg/actuation inhaler Inhale 2 puffs into the lungs every 6 (six) hours as needed for Wheezing.       atorvastatin (LIPITOR) 80 MG tablet Take 80 mg by mouth once daily. Take one-half tablet daily      capsaicin 0.1 % Crea Apply twice to area of maximum paresthesia 15 g 6    carboxymethyl-[id number]-UB 0.5-1-0.5 % Dpet Place 1 drop into both eyes 4 (four) times daily.      carboxymethylcellulose sodium 1 % DpGe Place 0.4 mLs into both eyes 4 (four) times daily as needed.      carvediloL (COREG) 25 MG tablet Take 1 tablet (25 mg total) by mouth 2 (two) times daily. 60 tablet 11    chlorhexidine (PERIDEX) 0.12 % solution RINSE 1 CAPFUL BY MOUTH ONCE DAILY AS NEEDED SWISH FOR 30 SECONDS AFTER,AND SPIT OUT      chlorthalidone (HYGROTEN) 25 MG Tab Take 25 mg by mouth once daily.      CLOBETASOL PROPIONATE, BULK, MISC Apply topically once daily.      cyanocobalamin (VITAMIN B-12) 100 MCG tablet Take 100 mcg by mouth once daily. Take two tablets      cycloSPORINE (RESTASIS) 0.05 % ophthalmic emulsion INSTILL 0.4ML IN EACH EYE EVERY 12 HOURS      dulaglutide (TRULICITY) 4.5 mg/0.5 mL pen injector Inject 4.5 mg into the skin every 7 days. 12 pen 3    eplerenone (INSPRA) 25 MG Tab Take 1 tablet by mouth once daily.      ergocalciferol (ERGOCALCIFEROL) 50,000 unit Cap Take 50,000 Units by mouth.      finasteride (PROSCAR) 5 mg tablet TAKE ONE TABLET BY MOUTH ONCE DAILY FOR PROSTATE      hydrALAZINE (APRESOLINE) 50 MG tablet TAKE ONE TABLET BY MOUTH THREE TIMES A DAY FOR HEART/BLOOD PRESSURE       ketoconazole (NIZORAL) 2 % cream Apply topically 2 (two) times daily.      latanoprost 0.005 % ophthalmic solution Place 1 drop into both eyes every evening.      lidocaine (LIDODERM) 5 % Place 1 patch onto the skin once daily. Wear for 12 hours, then remove. Do not apply a new patch for at least 12 hours.      naloxone (NARCAN) 4 mg/actuation Spry as needed.      omega-3/dha/epa/fish oil (OMEGA-3 FISH OIL ORAL) 1,000 mg once daily.      oxyCODONE (ROXICODONE) 10 mg Tab immediate release tablet Take 1 tablet (10 mg total) by mouth every 4 (four) hours as needed for Pain. 42 tablet 0    pregabalin (LYRICA) 150 MG capsule Take 1 capsule (150 mg total) by mouth 2 (two) times daily. 60 capsule 5    thiamine 100 MG tablet Take 0.5 tablets (50 mg total) by mouth once daily. 60 tablet 11    tiZANidine (ZANAFLEX) 4 MG tablet Take 1 tablet (4 mg total) by mouth every 8 (eight) hours as needed (muscle spasms). 60 tablet 2     No current facility-administered medications for this visit.     Facility-Administered Medications Ordered in Other Visits   Medication Dose Route Frequency Provider Last Rate Last Admin    ALPRAZolam tablet 0.5 mg  0.5 mg Oral On Call Procedure Lisha Cote MD           Family History   Problem Relation Age of Onset    Cancer Mother         ovarian    Cancer Father     Cancer Sister         breast    Cancer Sister         breast    No Known Problems Brother     No Known Problems Daughter     No Known Problems Daughter     No Known Problems Daughter     No Known Problems Son     No Known Problems Son        Social History     Socioeconomic History    Marital status: Single   Tobacco Use    Smoking status: Former     Types: Cigars     Quit date: 2009     Years since quittin.3    Smokeless tobacco: Never   Substance and Sexual Activity    Alcohol use: No     Alcohol/week: 0.0 standard drinks of alcohol    Drug use: No    Sexual activity: Not Currently     Social Determinants of Health  "    Social Connections: Unknown (1/5/2023)    Social Connection and Isolation Panel [NHANES]     Marital Status:            Review of Systems   Constitution: Positive for weight loss. Negative for chills, decreased appetite, fever and night sweats.   Eyes: Negative for double vision, vision loss in left eye, vision loss in right eye and visual disturbance.   Cardiovascular: Negative for chest pain.   Respiratory: Negative for shortness of breath.    Hematologic/Lymphatic: Does not bruise/bleed easily.        Coumadin 5mg   Skin: Negative for rash.   Musculoskeletal: Positive for back pain, falls, muscle cramps, muscle weakness and stiffness. Negative for joint swelling, myalgias and neck pain.   Gastrointestinal: Negative for abdominal pain, constipation and diarrhea.   Genitourinary: Negative for bladder incontinence.   Neurological: Positive for focal weakness, headaches and numbness. Negative for paresthesias and weakness.   Psychiatric/Behavioral: Negative for altered mental status, depression and suicidal ideas. The patient is not nervous/anxious.    Back-positive pain with extension, and flexion, facetogenic pain upon palpation        Objective:   BP (!) 173/77 (BP Location: Left arm, Patient Position: Sitting, BP Method: Medium (Automatic))   Pulse 78   Resp 12   Ht 6' 1" (1.854 m)   Wt 119.8 kg (264 lb 1.8 oz)   SpO2 100%   BMI 34.85 kg/m²   Pain Disability Index Review:      12/13/2023     2:25 PM 11/22/2022    12:11 PM 9/23/2022     1:49 PM   Last 3 PDI Scores   Pain Disability Index (PDI) 50 55 40     GEN:  Well developed, well nourished.  No acute distress.   HEENT:  No trauma.  Mucous membranes moist.  Nares patent bilaterally.  PSYCH: Normal affect. Thought content appropriate.  CHEST:  Breathing symmetric.  No audible wheezing.  ABD: Soft, non-distended.  SKIN:  Warm, pink, dry.  No rash on exposed areas.    EXT:  No cyanosis, clubbing, or edema.  No color change or changes in nail or " hair growth.  NEURO/MUSCULOSKELETAL:  Fully alert, oriented, and appropriate. Speech normal eliceo. No cranial nerve deficits.   Gait: Antalgic.  No focal motor deficits.         Assessment:       Encounter Diagnoses   Name Primary?    Lumbar spondylosis Yes    Osteoarthritis of spine with radiculopathy, lumbar region     DDD (degenerative disc disease), lumbar     Myofascial pain on right side     Lumbosacral radiculopathy     Chronic pain syndrome     Right hip pain              Plan:   We discussed with the patient the assessment and recommendations. The following is the plan we agreed on:        Macario was seen today for back pain and leg pain.    Diagnoses and all orders for this visit:    Lumbar spondylosis    Osteoarthritis of spine with radiculopathy, lumbar region    DDD (degenerative disc disease), lumbar    Myofascial pain on right side    Lumbosacral radiculopathy    Chronic pain syndrome    Right hip pain             -Prior records reviewed  - patient has continued pain primarily low back pain he does still have right leg pain following ABIODUN  -like to be considered for other pain interventions  - considered for diagnostic lumbar MBB L3, 4 and L5 we will consult Dr. Vasquez   - Can continue medications prescirbed   - Patient is on Xarelto --  Hold  Xarelto 2 days prior to procedure due to bleeding risk for any procedures   - Will call with plan of care        MO Vargas  Interventional Pain Management    12/13/2023    I spent a total of 30 minutes on the day of the visit.  This includes face to face time and non-face to face time preparing to see the patient by reviewing previous labs/imaging, obtaining and/or reviewing separately obtained history, documenting clinical information in the electronic or other health record, independently interpreting results and communicating results to the patient/family/caregiver.

## 2023-12-14 DIAGNOSIS — M47.816 LUMBAR SPONDYLOSIS: Primary | ICD-10-CM

## 2023-12-14 DIAGNOSIS — M47.26 OSTEOARTHRITIS OF SPINE WITH RADICULOPATHY, LUMBAR REGION: ICD-10-CM

## 2023-12-22 ENCOUNTER — TELEPHONE (OUTPATIENT)
Dept: NEUROLOGY | Facility: CLINIC | Age: 73
End: 2023-12-22
Payer: OTHER GOVERNMENT

## 2024-03-06 ENCOUNTER — OFFICE VISIT (OUTPATIENT)
Dept: BARIATRICS | Facility: CLINIC | Age: 74
End: 2024-03-06
Payer: OTHER GOVERNMENT

## 2024-03-06 VITALS
OXYGEN SATURATION: 97 % | BODY MASS INDEX: 31.41 KG/M2 | SYSTOLIC BLOOD PRESSURE: 147 MMHG | HEART RATE: 71 BPM | WEIGHT: 238.13 LBS | DIASTOLIC BLOOD PRESSURE: 70 MMHG

## 2024-03-06 DIAGNOSIS — E11.9 DIABETES MELLITUS TYPE 2 WITHOUT RETINOPATHY: ICD-10-CM

## 2024-03-06 DIAGNOSIS — E66.9 OBESITY, CLASS I, BMI 30.0-34.9 (SEE ACTUAL BMI): Primary | ICD-10-CM

## 2024-03-06 PROCEDURE — 99215 OFFICE O/P EST HI 40 MIN: CPT | Mod: PBBFAC | Performed by: INTERNAL MEDICINE

## 2024-03-06 PROCEDURE — 99999 PR PBB SHADOW E&M-EST. PATIENT-LVL V: CPT | Mod: PBBFAC,,, | Performed by: INTERNAL MEDICINE

## 2024-03-06 PROCEDURE — 99213 OFFICE O/P EST LOW 20 MIN: CPT | Mod: S$PBB,,, | Performed by: INTERNAL MEDICINE

## 2024-03-06 NOTE — PATIENT INSTRUCTIONS
Trulicity 4.5 mg once a week.     Decrease portions as soon as you start Trulicity. Some nausea in the first 2 weeks is not unusual.     If you get pain across the upper abdomen and around to your back, please call the office.          1200 murali pb meal panner and meal ideas given previously.       SEATED RESISTANCE BAND EXERCISES     If you do not have a resistance band, or do not feel comfortable using a resistance band, these exercises can also be done holding a light hand weight or water bottle.  If you are just starting to exercise, you may want to go through the motions without any weights or resistance till you become comfortable with the movements.     Do each of the movements shown 10 times (10 repetitions). You can repeat the exercises a second or  third time as well for greater benefit. The amount of tension on the resistance bands should be adjusted so  you can complete one set of 10 repetitions with effort. Increase the tension every few weeks. Do these  exercises 2 or 3 times a week.     * If an exercise hurts your back or joints, stop doing that particular exercise, but keep doing all the others *        CHEST EXERCISE          Start Position:   Sit tall, with feet shoulder width apart and feet in front of knees.   Belly pulled in.   Place the band around your upper back, grasp band in each hand, knuckles (rings) facing front. Arms  should be bent so that knuckles are in front of elbows   Slide shoulder blades down your back and slightly together (as if making a V).   Relax your neck.     To Perform This Exercise:   Press hands forward to lengthen arms using chest muscles (not arms!).   Dont arch your back!)   Return to start position and repeat 10 times.   Breathe!           BACK EXERCISE          Start Position:   Sit tall, with feet shoulder width apart and feet in front of knees.   Belly pulled in.   Grasp band in each hand and raise overhead. Arms should be slightly wider than shoulder  width and no  slack in the band.   Slide shoulder blades down your back and slightly together (as if making a V).   Relax your neck.     To Perform This Exercise:   Open arms pulling down towards chest using upper back muscles (not arms!).   Squeeze through shoulder blades at the bottom of the movement (dont arch your back!).   Return to start position and repeat 10 times.   Breathe!           SHOULDER EXERCISE          Start Position:   Sit tall, with feet shoulder width apart and feet in front of knees.   Belly pulled in.   Sit on band so that you can grasp band in one hand with tension on the band, but not so much tension that  you cannot straighten the arm. It may take a few tries to find the right amount of tension.   Slide shoulder blades down your back and slightly together (as if making a V).   Relax your neck.     To Perform This Exercise:   Press fist to the ceiling, slightly in front of the body.   SLOWLY return to start position and repeat 10 times.   Switch sides and repeat on the other side.   Breathe!           TRICEPS EXERCISE          Start Position:   Sit tall, with feet shoulder width apart and feet in front of knees.   Belly pulled in.   Sit on one end of the band. Grasp other end of band in one hand.   Point elbow directly toward the ceiling (if this is difficult, you may support the upper arm with the opposite  hand)   Be sure there is no slack in the band in the starting position.   Slide shoulder blades down your back and slightly together (as if making a V).   Relax your neck.     To Perform This Exercise:   Extend your arm up to the ceiling, as shown.   Squeeze through shoulder blades at the bottom of the movement (dont arch your back!).   SLOWLY return to start position and repeat 10 times.   Repeat on the other side.   Breathe!           BICEP EXERCISE          Start Position:   Sit tall, with feet shoulder width apart and feet in front of knees.   Belly pulled in.   Place center  of exercise band under one foot and step the end of the band under the other foot.   Grasp each end of the band with one hand. Make sure there is no slack between your foot and the hand  that holds the band.   Hold your elbows to your sides.   Pull abdominals in, lift chest, press shoulders down and back.     To Perform This Exercise:   As you curl up, keep your wrist from changing position in relation to your forearm and your arm stable  from the shoulder to the elbow.   Bend and straighten your elbow in a slow and controlled movement. Repeat this motion 10 times.   Repeat on the other side.   Breathe!

## 2024-03-06 NOTE — PROGRESS NOTES
Subjective:       Patient ID: Macario Dior Jr. is a 73 y.o. male.    Chief Complaint: Follow-up     CC:    Pt here today for follow-up. Has lost 18 lbs, net  neg 15 lbs. Was to start 1200 murali pb meal planner and started trulicity, currently on 4.5 mg weekly. Denies SE. Does decrease in appetite. He has been trying to not skip meals. Eating oatmeal or grits, half a sandwich, green veg with chicken, fish or steak. Has tried different ways of eating based on different advice he has been getting.   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 [facility]. His back pain is also getting better. He is still doing exercise on ball and riding.     Inbody deferred 2/2 pt unable to stand for test.     BMR:    PBF:        Review of Systems      Objective:    BP (!) 147/70   Pulse 71   Wt 108 kg (238 lb 1.6 oz)   SpO2 97%   BMI 31.41 kg/m²    Physical Exam  Vitals reviewed.   Constitutional:       General: He is not in acute distress.     Appearance: He is well-developed.      Comments: Seated in WC   HENT:      Head: Normocephalic and atraumatic.   Eyes:      General: No scleral icterus.     Pupils: Pupils are equal, round, and reactive to light.   Cardiovascular:      Rate and Rhythm: Normal rate.   Pulmonary:      Effort: Pulmonary effort is normal. No respiratory distress.   Skin:     General: Skin is warm and dry.   Neurological:      Mental Status: He is alert and oriented to person, place, and time.   Psychiatric:         Behavior: Behavior normal.         Judgment: Judgment normal.         Assessment:       Problem List Items Addressed This Visit       Diabetes mellitus type 2 without retinopathy     Other Visit Diagnoses       Obesity, Class I, BMI 30.0-34.9 (see actual BMI)    -  Primary                  Plan:           Diagnoses and all orders for this visit:    Obesity, Class I, BMI 30.0-34.9 (see actual BMI)    Diabetes mellitus type 2 without retinopathy          Trulicity 4.5 mg once a week.      Decrease portions as soon as you start Trulicity. Some nausea in the first 2 weeks is not unusual.     If you get pain across the upper abdomen and around to your back, please call the office.          1200 murali pb meal panner and meal ideas given previously.     Seated exercise handouts given

## 2024-05-09 ENCOUNTER — TELEPHONE (OUTPATIENT)
Dept: ORTHOPEDICS | Facility: CLINIC | Age: 74
End: 2024-05-09
Payer: OTHER GOVERNMENT

## 2024-05-09 NOTE — TELEPHONE ENCOUNTER
Called and spoke to pt regarding apt with Dr Sinha. Pt ask for an apt next week. I was able to get pt in per request. Pt understood conversation with no further questions.     ----- Message from Sonny Beckwith MA sent at 5/9/2024 10:40 AM CDT -----  Regarding: sooner appt  Contact: pt [phone]  Patient Requesting Sooner Appointment.  Reason for sooner appt.: for  right  hip pain   When is the first available appointment? May 31st patient declined   Communication Preference:[phone]  Additional Information:  patient that  is was  urgentt

## 2024-05-14 ENCOUNTER — TELEPHONE (OUTPATIENT)
Dept: ORTHOPEDICS | Facility: CLINIC | Age: 74
End: 2024-05-14
Payer: OTHER GOVERNMENT

## 2024-05-14 ENCOUNTER — TELEPHONE (OUTPATIENT)
Dept: PAIN MEDICINE | Facility: CLINIC | Age: 74
End: 2024-05-14
Payer: OTHER GOVERNMENT

## 2024-05-14 ENCOUNTER — HOSPITAL ENCOUNTER (OUTPATIENT)
Dept: RADIOLOGY | Facility: HOSPITAL | Age: 74
Discharge: HOME | End: 2024-05-14
Attending: ORTHOPAEDIC SURGERY
Payer: OTHER GOVERNMENT

## 2024-05-14 ENCOUNTER — OFFICE VISIT (OUTPATIENT)
Dept: ORTHOPEDICS | Facility: CLINIC | Age: 74
End: 2024-05-14
Payer: OTHER GOVERNMENT

## 2024-05-14 DIAGNOSIS — M54.9 DORSALGIA, UNSPECIFIED: ICD-10-CM

## 2024-05-14 DIAGNOSIS — N18.31 TYPE 2 DIABETES MELLITUS WITH STAGE 3A CHRONIC KIDNEY DISEASE, UNSPECIFIED WHETHER LONG TERM INSULIN USE: ICD-10-CM

## 2024-05-14 DIAGNOSIS — B99.9 INFECTION: ICD-10-CM

## 2024-05-14 DIAGNOSIS — Z96.641 PRESENCE OF RIGHT ARTIFICIAL HIP JOINT: ICD-10-CM

## 2024-05-14 DIAGNOSIS — E11.22 TYPE 2 DIABETES MELLITUS WITH STAGE 3A CHRONIC KIDNEY DISEASE, UNSPECIFIED WHETHER LONG TERM INSULIN USE: ICD-10-CM

## 2024-05-14 DIAGNOSIS — Z96.641 PRESENCE OF RIGHT ARTIFICIAL HIP JOINT: Primary | ICD-10-CM

## 2024-05-14 DIAGNOSIS — Z96.641 STATUS POST RIGHT HIP REPLACEMENT: Primary | ICD-10-CM

## 2024-05-14 DIAGNOSIS — Z96.641 STATUS POST RIGHT HIP REPLACEMENT: ICD-10-CM

## 2024-05-14 PROCEDURE — 99213 OFFICE O/P EST LOW 20 MIN: CPT | Mod: S$PBB,,, | Performed by: ORTHOPAEDIC SURGERY

## 2024-05-14 PROCEDURE — 99215 OFFICE O/P EST HI 40 MIN: CPT | Mod: PBBFAC,25 | Performed by: ORTHOPAEDIC SURGERY

## 2024-05-14 PROCEDURE — 99999 PR PBB SHADOW E&M-EST. PATIENT-LVL V: CPT | Mod: PBBFAC,,, | Performed by: ORTHOPAEDIC SURGERY

## 2024-05-14 PROCEDURE — 73502 X-RAY EXAM HIP UNI 2-3 VIEWS: CPT | Mod: TC,RT

## 2024-05-14 PROCEDURE — 73502 X-RAY EXAM HIP UNI 2-3 VIEWS: CPT | Mod: 26,RT,, | Performed by: RADIOLOGY

## 2024-05-14 RX ORDER — METHYLPREDNISOLONE 4 MG/1
TABLET ORAL
Qty: 1 EACH | Refills: 0 | Status: SHIPPED | OUTPATIENT
Start: 2024-05-14 | End: 2024-06-04

## 2024-05-14 NOTE — TELEPHONE ENCOUNTER
Called and spoke to pt regarding lab results. Advised pt to reach out to PCP or if able to provide us the information to reach them regarding Kidney function levels. Pt understood conversation and advised he would inform the his kidney

## 2024-05-15 ENCOUNTER — OFFICE VISIT (OUTPATIENT)
Dept: SPINE | Facility: CLINIC | Age: 74
End: 2024-05-15
Payer: OTHER GOVERNMENT

## 2024-05-15 VITALS
HEIGHT: 73 IN | WEIGHT: 238.13 LBS | BODY MASS INDEX: 31.56 KG/M2 | OXYGEN SATURATION: 100 % | RESPIRATION RATE: 18 BRPM | SYSTOLIC BLOOD PRESSURE: 150 MMHG | DIASTOLIC BLOOD PRESSURE: 69 MMHG | HEART RATE: 76 BPM

## 2024-05-15 DIAGNOSIS — M25.551 CHRONIC HIP PAIN AFTER TOTAL REPLACEMENT OF RIGHT HIP JOINT: ICD-10-CM

## 2024-05-15 DIAGNOSIS — G89.29 CHRONIC HIP PAIN AFTER TOTAL REPLACEMENT OF RIGHT HIP JOINT: ICD-10-CM

## 2024-05-15 DIAGNOSIS — M47.816 LUMBAR SPONDYLOSIS: ICD-10-CM

## 2024-05-15 DIAGNOSIS — M51.36 DDD (DEGENERATIVE DISC DISEASE), LUMBAR: ICD-10-CM

## 2024-05-15 DIAGNOSIS — Z96.641 CHRONIC HIP PAIN AFTER TOTAL REPLACEMENT OF RIGHT HIP JOINT: ICD-10-CM

## 2024-05-15 DIAGNOSIS — M54.16 LUMBAR RADICULOPATHY: Primary | ICD-10-CM

## 2024-05-15 PROCEDURE — 99214 OFFICE O/P EST MOD 30 MIN: CPT | Mod: S$PBB,,, | Performed by: NURSE PRACTITIONER

## 2024-05-15 PROCEDURE — 99999 PR PBB SHADOW E&M-EST. PATIENT-LVL V: CPT | Mod: PBBFAC,,, | Performed by: NURSE PRACTITIONER

## 2024-05-15 PROCEDURE — 99215 OFFICE O/P EST HI 40 MIN: CPT | Mod: PBBFAC | Performed by: NURSE PRACTITIONER

## 2024-05-15 NOTE — PROGRESS NOTES
Chronic patient Established Note (Follow up visit)      SUBJECTIVE:    Interval History 5/15/2024:  Macario Dior Jr. presents to the clinic for a follow-up appointment for hip pain. He continues to report right hip pain. He does have radiating pain into the groin and lateral thigh. He also notes pain that radiates into his back. His right leg feels week. His feet are numb. He is frustrated with his pain. He has tried ABIODUN and RFAs with short term relief. He was taking Lyrica but is out. He did recently see Orthopedics who obtained lab work and ordered imaging. He denies any other health changes. His pain today is 10/10.    Interval history 12/13/2023:  71-year-old male presents today he is status post a bilateral TF ABIODUN as 5 on 12/03 reporting minimal relief.  He continues to report primarily axial low back pain with intermittent right leg.  Pain with extension today also with walking, standing.  Facetogenic pain upon palpation of lower lumbar spine.  He did report more axial low back pain bandlike distribution versus state that the right leg hurts.  He would like to be considered for other pain interventions.     Interval History 11/22/2022:  Mr Dior presents for follow up evaluation of chronic R hip pain and awaiting surgery. He is having acute R sided lumbar myofascial pain and requesting TPI to address symptoms as this has provided benefit in past and will be going out of town. He has no s/s concerning for cauda equina syndrome. He denies SE of his medication regimen.      Interval History 9/23/2022:  The Pt presents for delayed follow He has continued lumbar complaints but focal right hip pain that continues. His R hip pain has continued and he was unaware he was supposed to see Dr Sinha today to address this. He can continue to see us at any time but will reach out to Dr Sinha's office to get him continued care regarding his greatest pain generator at this time.      Interval History 2/18/2021:  The  patient presents for follow up of diffuse pain complaints. Pt canceled prior Hip block. State he states lyrica increase to 100mg has helped but neuropathy but also states he is also having stent placed to R leg where pain is greater due to insuffiencey. He continues to take Oxycodone QID. He denies any adverse SE of medications. Denies new areas of pain.       Interval History 12/4/2020:  Pt presents for follow up of lower back pain. He continues to have benefit from RFA of right lower lumbar. He is doing well with med mgt of oxycodone and lyrica. He states night time leg cramping. The patient denies myelopathic symptoms such as handwriting changes or difficulty with buttons/coins/keys. Denies perineal paresthesias, bowel/bladder dysfunction.     Interval History 10/20/2020:  The patient presents follow-up for lower back pain.  He has had near resolution since right-sided L 3, 4, 5 RFA.  He states he is doing well.  He continues to take minimum the medication to address pain.  He is currently taking oxycodone 5 mg q.i.d. as well as Lyrica 75 b.i.d..  He finds he is beneficial without adverse side effects.The patient denies myelopathic symptoms such as handwriting changes or difficulty with buttons/coins/keys. Denies perineal paresthesias, bowel/bladder dysfunction.     Interval History 8/11/2020:  The patient presents for follow up of lower back pain. Pt over interval had 2 MBB of right L3,4,5 with >90% relief of both procedures. Pt states + a.m. stiffness, states when he has MBB of lumbar he notices his hip pain more and vise versa when he has hip block. Lumbar pain>R hip pain at this time. He continues to take oxycodone 5mg QID which is beneficial to pain control without adverse side effects. The patient denies myelopathic symptoms such as handwriting changes or difficulty with buttons/coins/keys. Denies perineal paresthesias, bowel/bladder dysfunction.     Interval History 05/27/2020:  S/P Right Femoral /  "Obturator Nerve Block in 10/2019 -- 50% relief for 2 days --  lost to follow up after that  S/p Right DHRUV 08/2015 -- doing well for 1.5 years  Pain is located at right hip joint. Occasionally radiates to right thigh.   Aggravated standing, walking, laying down.   Alleviated by Oxycodone     HPI:  Macario Dior is a 69 y.o. Man with PMH DVT s/p right DHRUV that presents today as a new consult for low back pain.  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 Since 2015 DHRUV, he initially had improved but progressive pain in the right hip and low back.  In the last 6 months, he has had 4 back injections from Dr. De Los Santos that lasted for 2 weeks each.  1 month ago, he had a Lumbar ABIODUN that provided 100% relief that lasted 5 days for the low back but the pain changed to radiate to the right hip.  Since the ABIODUN relief stopped, the pain has "worked its way back up to the low back."  The pain is described as intense, sharp, and shoots up from the right hip into the low back to the right periscapular area. The shooting pain is worse than the low back pain. The pain is interfering with everything, including walking, sitting, and putting on his shoes.  Oxycodone 10mg Q6H provides relief. Lidocaine patches and TENS provide minimal relief when he wakes up.  He did physical therapy for 1 year with minimal relief.  He is unable to regularly exercise secondary to pain. He is radha to get 4 hours of uninterrupted sleep per night.  He wakes up with stiffness in his low back despite $4,000 mattresses.  He has weakness and buckling in the right lower extremity.  He endorses worsening falls due to weakness "seems like the leg is not there." He says " I need the bone removed" referring to his right hip. He is a  [facility] He denies incontinence and saddle anesthesia.    Pain Disability Index Review:      12/13/2023     2:25 PM 11/22/2022    12:11 PM 9/23/2022     1:49 PM   Last 3 PDI Scores "   Pain Disability Index (PDI) 50 55 40       Pain Medications:  None    Opioid Contract: not applicable     report:  Not applicable    Pain Procedures:   Lumbar ABIODUN 100% relief 5 days - Dr. De Los Santos   Lumbar TPIs 0% relief - Dr. De Los Santos   10/23/2019- Right femoral and obturator nerve block  7/6/2020 Right L3,4,5 MBB  7/30/2020 Right L3,4,5 MBB  10/5/2020- Right L3,4,5 RFA  12/17/2020- L5/S1 IL ABIODUN  12/4/2023- Bilateral L4/5 TF ABIODUN      Physical Therapy/Home Exercise: yes    Imaging:   Xray Hip 5/14/2024:  COMPARISON:  06/23/2023     FINDINGS:  Postoperative changes of right total hip arthroplasty with stable hardware positioning and alignment.  No acute fracture, dislocation or bone destruction.  Patchy areas of sclerosis involving the left femoral head suggesting avascular necrosis with mild left hip joint space narrowing.  There are surgical clips in the right inguinal region.     Impression:     Postoperative changes of right total hip arthroplasty.    MRI Lumbar Spine 9/17/2022:  COMPARISON:  MRI 05/18/2020     FINDINGS:  Alignment: Normal.     Vertebrae: Normal vertebral body heights.  Several osseous vertebral body hemangiomas.  No abnormal signal..     Discs: Normal height and signal.     Cord: Normal cord signal.  Conus terminates at L1-L2     Degenerative findings:     T12-L1: No significant spinal canal stenosis neural foraminal narrowing     L1-L2: No significant spinal canal stenosis or neural foraminal narrowing.     L2-L3: Mild diffuse disc bulge, bilateral facet arthropathy, and ligamentum flavum buckling.  Partial effacement of the thecal sac without significant spinal canal narrowing.  Mild bilateral neural foraminal narrowing     L3-L4: Mild diffuse disc bulge, bilateral facet arthropathy, and ligamentum flavum buckling.  Flattening of the anterior thecal sac with no more than mild spinal canal stenosis.  Mild bilateral neural foraminal narrowing.     L4-L5: Mild diffuse disc bulge, bilateral  facet arthropathy, and ligamentum flavum hypertrophy.  Mild spinal canal stenosis.  Mild bilateral neural foraminal narrowing.     L5-S1: Bilateral facet arthropathy.  Mild bilateral foraminal narrowing.     Paraspinal muscles & soft tissues: Atrophic right kidney.  T2 hypertense signal within left kidney could represent a cyst.  Mild posterior subcutaneous tissue edema.     Impression:     Multilevel lumbar spondylosis as detailed level by level above, mild bilateral neural foraminal from L3 to S1.  No more than mild spinal canal stenosis at L3-L4 and L4-L5.    Allergies:   Review of patient's allergies indicates:   Allergen Reactions    Amlodipine Swelling    Lisinopril Tinitus and Swelling    Pregabalin Swelling    Azithromycin      Muscles tense up    Levetiracetam Other (See Comments)    Acetaminophen Rash     Feels like needles are sticking him per pt       Current Medications:   Current Outpatient Medications   Medication Sig Dispense Refill    albuterol 90 mcg/actuation inhaler Inhale 2 puffs into the lungs every 6 (six) hours as needed for Wheezing.       atorvastatin (LIPITOR) 80 MG tablet Take 80 mg by mouth once daily. Take one-half tablet daily      capsaicin 0.1 % Crea Apply twice to area of maximum paresthesia 15 g 6    carboxymethyl-[id number]-ML 0.5-1-0.5 % Dpet Place 1 drop into both eyes 4 (four) times daily.      carboxymethylcellulose sodium 1 % DpGe Place 0.4 mLs into both eyes 4 (four) times daily as needed.      chlorhexidine (PERIDEX) 0.12 % solution RINSE 1 CAPFUL BY MOUTH ONCE DAILY AS NEEDED SWISH FOR 30 SECONDS AFTER,AND SPIT OUT      chlorthalidone (HYGROTEN) 25 MG Tab Take 25 mg by mouth once daily.      CLOBETASOL PROPIONATE, BULK, MISC Apply topically once daily.      cyanocobalamin (VITAMIN B-12) 100 MCG tablet Take 100 mcg by mouth once daily. Take two tablets      cycloSPORINE (RESTASIS) 0.05 % ophthalmic emulsion INSTILL 0.4ML IN EACH EYE EVERY 12 HOURS      dulaglutide  (TRULICITY) 4.5 mg/0.5 mL pen injector Inject 4.5 mg into the skin every 7 days. 12 pen 3    eplerenone (INSPRA) 25 MG Tab Take 1 tablet by mouth once daily.      ergocalciferol (ERGOCALCIFEROL) 50,000 unit Cap Take 50,000 Units by mouth.      finasteride (PROSCAR) 5 mg tablet TAKE ONE TABLET BY MOUTH ONCE DAILY FOR PROSTATE      hydrALAZINE (APRESOLINE) 50 MG tablet TAKE ONE TABLET BY MOUTH THREE TIMES A DAY FOR HEART/BLOOD PRESSURE      ketoconazole (NIZORAL) 2 % cream Apply topically 2 (two) times daily.      latanoprost 0.005 % ophthalmic solution Place 1 drop into both eyes every evening.      lidocaine (LIDODERM) 5 % Place 1 patch onto the skin once daily. Wear for 12 hours, then remove. Do not apply a new patch for at least 12 hours.      methylPREDNISolone (MEDROL DOSEPACK) 4 mg tablet use as directed 1 each 0    naloxone (NARCAN) 4 mg/actuation Spry as needed.      omega-3/dha/epa/fish oil (OMEGA-3 FISH OIL ORAL) 1,000 mg once daily.      oxyCODONE (ROXICODONE) 10 mg Tab immediate release tablet Take 1 tablet (10 mg total) by mouth every 4 (four) hours as needed for Pain. 42 tablet 0    thiamine 100 MG tablet Take 0.5 tablets (50 mg total) by mouth once daily. 60 tablet 11    tiZANidine (ZANAFLEX) 4 MG tablet Take 1 tablet (4 mg total) by mouth every 8 (eight) hours as needed (muscle spasms). 60 tablet 2    carvediloL (COREG) 25 MG tablet Take 1 tablet (25 mg total) by mouth 2 (two) times daily. 60 tablet 11    pregabalin (LYRICA) 150 MG capsule Take 1 capsule (150 mg total) by mouth 2 (two) times daily. 60 capsule 5     No current facility-administered medications for this visit.     Facility-Administered Medications Ordered in Other Visits   Medication Dose Route Frequency Provider Last Rate Last Admin    ALPRAZolam tablet 0.5 mg  0.5 mg Oral On Call Procedure Lisha Cote MD           REVIEW OF SYSTEMS:    GENERAL:  No weight loss, malaise or fevers.  HEENT:  Negative for frequent or significant  headaches.  NECK:  Negative for lumps, goiter, pain and significant neck swelling.  RESPIRATORY:  Negative for cough, wheezing or shortness of breath. BUZZ, asthma  CARDIOVASCULAR:  Negative for chest pain, leg swelling or palpitations. HTN  GI:  Negative for abdominal discomfort, blood in stools or black stools or change in bowel habits.  MUSCULOSKELETAL:  See HPI.  SKIN:  Negative for lesions, rash, and itching.  RENAL: CKD  ENDO: Diabetes  PSYCH:  Negative for sleep disturbance, mood disorder and recent psychosocial stressors.  HEMATOLOGY/LYMPHOLOGY:  Negative for prolonged bleeding, bruising easily or swollen nodes.  NEURO:   No history of headaches, syncope, paralysis, seizures or tremors.  All other reviewed and negative other than HPI.    Past Medical History:  Past Medical History:   Diagnosis Date    Arthritis     Asthma     chronic    CKD (chronic kidney disease), stage III     patient denies    Deep vein thrombosis     Demyelinating neuropathy     polyneuropathy    Diabetes mellitus type 2 without retinopathy 12/14/2022    Gout     History of blood clots 2013    lower part of both legs    HLD (hyperlipidemia)     Hypertension     Iron deficiency anemia     BUZZ (obstructive sleep apnea)     CPAP    Pulmonary embolism        Past Surgical History:  Past Surgical History:   Procedure Laterality Date    COLONOSCOPY N/A 11/7/2016    Procedure: COLONOSCOPY;  Surgeon: Shar Weiss MD;  Location: [address]);  Service: Endoscopy;  Laterality: N/A;  [facility] Referral. Okay to hold Coumadin 5 days prior to procedure. See Referral scaned in media tab on 10/20/16.    COLONOSCOPY N/A 9/21/2017    Procedure: COLONOSCOPY;  Surgeon: Stuart Trinidad MD;  Location: [address]);  Service: Endoscopy;  Laterality: N/A;  [facility] Olivarez-see scanned docs under media tab          9/6/17-[facility] and last [facility] visit scanned into chart    EPIDURAL STEROID INJECTION  N/A 12/17/2020    Procedure: INJECTION, STEROID, EPIDURAL, L5-S1 clear to hold Xarelto 2 days;  Surgeon: Moy Vasquez MD;  Location: [facility];  Service: Pain Management;  Laterality: N/A;    EXCISION OF LESION OF FEMUR Right 1/4/2023    Procedure: EXCISION, LESION, FEMUR: POSTERIOR APPROACH;  Surgeon: Prieto Sinha MD;  [facility];  Service: Orthopedics;  Laterality: Right;    HIP SURGERY Right 2008    exploratory at [facility]    HIP SURGERY Right 8-31-15    [facility]R    INJECTION OF ANESTHETIC AGENT AROUND NERVE Right 10/23/2019    Procedure: BLOCK, NERVE, Obturator and Femoral cleared to hold xarelto 3 days pt. said he's not taking coumadin;  Surgeon: Moy Vasquez MD;  Location: [facility];  Service: Pain Management;  Laterality: Right;    INJECTION OF ANESTHETIC AGENT AROUND NERVE Right 7/6/2020    Procedure: BLOCK, NERVE, RIGHT MBB L3,L5,L5;  Surgeon: Moy Vasquez MD;  Location: [facility];  Service: Pain Management;  Laterality: Right;  BLOCK, NERVE, RIGHT MBB L3,L5,L5    INJECTION OF ANESTHETIC AGENT AROUND NERVE Right 7/30/2020    Procedure: BLOCK, NERVE RIGHT L3, L4, L5 2ND;  Surgeon: Moy Vasquez MD;  Location: [facility];  Service: Pain Management;  Laterality: Right;  NEEDS CONSENT, XARELTO    LEG SURGERY Left 2012    fibula and tibia    RADIOFREQUENCY ABLATION Right 10/5/2020    Procedure: RADIOFREQUENCY ABLATION RIGHT L3,4,5;  Surgeon: Moy Vasquez MD;  Location: [facility];  Service: Pain Management;  Laterality: Right;  RADIOFREQUENCY ABLATION RIGHT L3,4,5  ok to hold xarelto, scanned in Media    SHOULDER SURGERY Right 1989    torn ligament    TRANSFORAMINAL EPIDURAL INJECTION OF STEROID Bilateral 12/4/2023    Procedure: LUMBAR TRANSFORAMINAL BILATERAL L4/5 DIRECT REFERRAL;  Surgeon: Moy Vasquez MD;  Location: [facility];  Service: Pain Management;  Laterality: Bilateral;       Family History:  Family History   Problem Relation Name Age of Onset    Cancer Mother          " ovarian    Cancer Father      Cancer Sister          breast    Cancer Sister          breast    No Known Problems Brother      No Known Problems Daughter      No Known Problems Daughter      No Known Problems Daughter      No Known Problems Son      No Known Problems Son         Social History:  Social History     Socioeconomic History    Marital status: Single   Tobacco Use    Smoking status: Former     Types: Cigars     Quit date: 2009     Years since quittin.7    Smokeless tobacco: Never   Substance and Sexual Activity    Alcohol use: No     Alcohol/week: 0.0 standard drinks of alcohol    Drug use: No    Sexual activity: Not Currently       OBJECTIVE:    BP (!) 150/69 (BP Location: Right arm, Patient Position: Sitting, BP Method: Medium (Automatic))   Pulse 76   Resp 18   Ht 6' 1" (1.854 m)   Wt 108 kg (238 lb 1.6 oz)   SpO2 100%   BMI 31.41 kg/m²     PHYSICAL EXAMINATION:    General appearance: Well appearing, in no acute distress, alert and oriented x3.  Psych:  Mood and affect appropriate.  Skin: Skin color, texture, turgor normal, no rashes or lesions, in both upper and lower body.  Head/face:  Atraumatic, normocephalic.   Cor: RRR  Pulm: Symmetric chest rise, no respiratory distress noted.   Back: Straight leg raising in the sitting positive is positive for radicular pain on the right. There is pain with palpation over paraspinals and facet joints on the right. Limited ROM with pain on extension.   Extremities: No deformities or skin discoloration. Good capillary refill. +1 edema to BLE  Musculoskeletal: Bilateral lower extremity strength is normal and symmetric.  No atrophy or tone abnormalities are noted.  Neuro:  No loss of sensation is noted.  Gait: Antalgic- ambulates without assistance.     ASSESSMENT: 74 y.o. year old male with low back and hip pain, consistent with the followin. Lumbar radiculopathy        2. Lumbar spondylosis        3. DDD (degenerative disc disease), " lumbar        4. Chronic hip pain after total replacement of right hip joint              PLAN:     - Previous imaging reviewed.     - Labs reviewed, significant change in BUN and Creatinine. He does have a follow up with PCP tomorrow.     - Agree with updated lumbar imaging per Dr. Sinha. Scheduled this today.     - I have stressed the importance of physical activity and a home exercise plan to help with pain and improve health.    - Consider restarting Lyrica, will be cautious with renal function.     - Consider repeat right femoral and obturator nerve block. Consider right L2/3 TF ABIODUN for pain as well.     - RTC after MRI.     - Counseled patient regarding the importance of activity modification, constant sleeping habits, and physical therapy.    The above plan and management options were discussed at length with patient. Patient is in agreement with the above and verbalized understanding.    Margarita Luo  05/15/2024    I spent a total of 30 minutes on the day of the visit.  This includes face to face time and non-face to face time preparing to see the patient by reviewing previous labs/imaging, obtaining and/or reviewing separately obtained history, documenting clinical information in the electronic or other health record, independently interpreting results and communicating results to the patient/family/caregiver.

## 2024-05-17 NOTE — PROGRESS NOTES
Subjective:      Patient ID: Macario Dior Jr. is a 74 y.o. male.    Chief Complaint:   Pain of the Right Hip    HPI  He returns for 10/10 pain involving the right hip area, causing giving way and falling.  His pain involves the entire right hip girdle up towards the back.  It is only bothering him on the right side.  01/2023 we did an operation on him to remove heterotopic ossification from around his total hip implants.  This seemed to be very effective, but he now has increasing pain.  No distal numbness or tingling.      Objective:        Ortho/SPM Exam  He has diffuse tenderness in the paraspinous muscles of the lumbar spine and buttock area including the abductor muscles.  Negative Stinchfield, negative C sign.  He stands with a list reflecting spinal deformity.  Knee benign without tenderness or effusion.  Distal neurovascular intact.        IMAGING:  X-rays show well-fixed and positioned total hip arthroplasty on the right without signs of loosening or other complications.  There has been no recurrence of his excised heterotopic bone.  Assessment:   Lumbar spondylosis.      Plan:   I think his pain is likely originating from his lumbar spine, but we will obtain MRI to complete the picture as his x-rays last year did not show severe disease.  We will obtain MRI of the hip as well, with metal artifact reduction.  We will avoid contrast as his kidney function is compromised and worsening.  We will check infection labs.  We will contact him with results of imaging.    The patient's pathophysiology was explained in detail with reference to x-rays, models, other visual aids as appropriate.  Treatment options were discussed in detail.  Questions were invited and answered to the patient's satisfaction.      Prieto Sinha MD  Orthopedic Surgery

## 2024-05-21 ENCOUNTER — TELEPHONE (OUTPATIENT)
Dept: ORTHOPEDICS | Facility: CLINIC | Age: 74
End: 2024-05-21
Payer: OTHER GOVERNMENT

## 2024-05-28 ENCOUNTER — TELEPHONE (OUTPATIENT)
Dept: PAIN MEDICINE | Facility: CLINIC | Age: 74
End: 2024-05-28
Payer: OTHER GOVERNMENT

## 2024-05-28 NOTE — TELEPHONE ENCOUNTER
----- Message from Mary Colorado sent at 5/28/2024  2:18 PM CDT -----  Regarding: MRI Orders Urgent  Name of Who is Calling:  Patient          What is the request in detail:  Patient stated KATHARINE Luo has to send orders for his MRI not Dr. Sinha. Patient stated he was told to have KATHARINE Luo send in the orders.             Can the clinic reply by [facility]: No      What Number to Call Back if not in [facility]: [phone]

## 2024-05-29 ENCOUNTER — TELEPHONE (OUTPATIENT)
Dept: PAIN MEDICINE | Facility: CLINIC | Age: 74
End: 2024-05-29
Payer: OTHER GOVERNMENT

## 2024-05-29 ENCOUNTER — HOSPITAL ENCOUNTER (OUTPATIENT)
Dept: RADIOLOGY | Facility: OTHER | Age: 74
Discharge: HOME | End: 2024-05-29
Attending: ORTHOPAEDIC SURGERY
Payer: OTHER GOVERNMENT

## 2024-05-29 DIAGNOSIS — M54.17 LUMBOSACRAL RADICULOPATHY: Primary | ICD-10-CM

## 2024-05-29 DIAGNOSIS — M54.16 LUMBAR RADICULOPATHY, CHRONIC: ICD-10-CM

## 2024-05-29 DIAGNOSIS — Z96.641 PRESENCE OF RIGHT ARTIFICIAL HIP JOINT: ICD-10-CM

## 2024-05-29 DIAGNOSIS — Z96.641 PRESENCE OF RIGHT ARTIFICIAL HIP JOINT: Primary | ICD-10-CM

## 2024-05-29 PROCEDURE — 72148 MRI LUMBAR SPINE W/O DYE: CPT | Mod: TC

## 2024-05-29 PROCEDURE — 72148 MRI LUMBAR SPINE W/O DYE: CPT | Mod: 26,,, | Performed by: RADIOLOGY

## 2024-05-29 NOTE — TELEPHONE ENCOUNTER
Staff spoke with patient. Patient states in order for his insurance to pay for his MRI Margarita would have to put the orders in. Staff explained to patient that Margarita didn't treat him for that and the orders were already put in by another provider.

## 2024-05-29 NOTE — TELEPHONE ENCOUNTER
----- Message from Bren Auguste sent at 5/29/2024  8:55 AM CDT -----  Regarding: MRI orders  Type:  Patient Returning Call      Name of who is calling:patient        What is request in detail:Patient is requesting a call back, the patient states that the MRI and CT scan orders placed by  has to be in your name and placed by you. He needs them done ASAP for his appt later today.        Can clinic reply by [facility]:no        [facility][phone]

## 2024-05-30 ENCOUNTER — TELEPHONE (OUTPATIENT)
Dept: PAIN MEDICINE | Facility: CLINIC | Age: 74
End: 2024-05-30
Payer: OTHER GOVERNMENT

## 2024-05-30 NOTE — TELEPHONE ENCOUNTER
----- Message from Martina Villagomez sent at 5/29/2024 12:09 PM CDT -----  Name of Who is Calling: ANTELMO FORBES JR. [[id number]]              What is the request in detail: Patient requesting a call back from Margarita Luo ONLY              Can the clinic reply by [facility]: No              [facility]  [phone]

## 2024-06-12 ENCOUNTER — TELEPHONE (OUTPATIENT)
Dept: PAIN MEDICINE | Facility: CLINIC | Age: 74
End: 2024-06-12
Payer: OTHER GOVERNMENT

## 2024-06-12 ENCOUNTER — OFFICE VISIT (OUTPATIENT)
Dept: PAIN MEDICINE | Facility: CLINIC | Age: 74
End: 2024-06-12
Attending: ANESTHESIOLOGY
Payer: OTHER GOVERNMENT

## 2024-06-12 VITALS
RESPIRATION RATE: 12 BRPM | SYSTOLIC BLOOD PRESSURE: 141 MMHG | DIASTOLIC BLOOD PRESSURE: 79 MMHG | HEIGHT: 73 IN | BODY MASS INDEX: 30.68 KG/M2 | HEART RATE: 81 BPM | OXYGEN SATURATION: 100 % | WEIGHT: 231.5 LBS

## 2024-06-12 DIAGNOSIS — G57.81 NEUROPATHY OF RIGHT ILIOINGUINAL NERVE: Primary | ICD-10-CM

## 2024-06-12 DIAGNOSIS — R10.31 RIGHT LOWER QUADRANT PAIN: ICD-10-CM

## 2024-06-12 PROCEDURE — 99999 PR PBB SHADOW E&M-EST. PATIENT-LVL V: CPT | Mod: PBBFAC,,, | Performed by: ANESTHESIOLOGY

## 2024-06-12 PROCEDURE — 99215 OFFICE O/P EST HI 40 MIN: CPT | Mod: PBBFAC | Performed by: ANESTHESIOLOGY

## 2024-06-12 PROCEDURE — 99213 OFFICE O/P EST LOW 20 MIN: CPT | Mod: S$PBB,,, | Performed by: ANESTHESIOLOGY

## 2024-06-12 NOTE — PROGRESS NOTES
Chronic patient Established Note (Follow up visit)      SUBJECTIVE:  Interval History 6/12/24  Pt presents with same R hip pain he has been having for years. He was unable to get the MRI hip due to a lack of equipment at the imaging center. He still complains of pain in a QL/Iliopsoas distribution. Still has numbness into the R leg and into the groin.     Interval History 5/15/2024:  Macario Dior Jr. presents to the clinic for a follow-up appointment for hip pain. He continues to report right hip pain. He does have radiating pain into the groin and lateral thigh. He also notes pain that radiates into his back. His right leg feels week. His feet are numb. He is frustrated with his pain. He has tried ABIODUN and RFAs with short term relief. He was taking Lyrica but is out. He did recently see Orthopedics who obtained lab work and ordered imaging. He denies any other health changes. His pain today is 10/10.    Interval history 12/13/2023:  71-year-old male presents today he is status post a bilateral TF ABIODUN as 5 on 12/03 reporting minimal relief.  He continues to report primarily axial low back pain with intermittent right leg.  Pain with extension today also with walking, standing.  Facetogenic pain upon palpation of lower lumbar spine.  He did report more axial low back pain bandlike distribution versus state that the right leg hurts.  He would like to be considered for other pain interventions.     Interval History 11/22/2022:  Mr Dior presents for follow up evaluation of chronic R hip pain and awaiting surgery. He is having acute R sided lumbar myofascial pain and requesting TPI to address symptoms as this has provided benefit in past and will be going out of town. He has no s/s concerning for cauda equina syndrome. He denies SE of his medication regimen.      Interval History 9/23/2022:  The Pt presents for delayed follow He has continued lumbar complaints but focal right hip pain that continues. His R hip pain  has continued and he was unaware he was supposed to see Dr Sinha today to address this. He can continue to see us at any time but will reach out to Dr Sinha's office to get him continued care regarding his greatest pain generator at this time.      Interval History 2/18/2021:  The patient presents for follow up of diffuse pain complaints. Pt canceled prior Hip block. State he states lyrica increase to 100mg has helped but neuropathy but also states he is also having stent placed to R leg where pain is greater due to insuffiencey. He continues to take Oxycodone QID. He denies any adverse SE of medications. Denies new areas of pain.       Interval History 12/4/2020:  Pt presents for follow up of lower back pain. He continues to have benefit from RFA of right lower lumbar. He is doing well with med mgt of oxycodone and lyrica. He states night time leg cramping. The patient denies myelopathic symptoms such as handwriting changes or difficulty with buttons/coins/keys. Denies perineal paresthesias, bowel/bladder dysfunction.     Interval History 10/20/2020:  The patient presents follow-up for lower back pain.  He has had near resolution since right-sided L 3, 4, 5 RFA.  He states he is doing well.  He continues to take minimum the medication to address pain.  He is currently taking oxycodone 5 mg q.i.d. as well as Lyrica 75 b.i.d..  He finds he is beneficial without adverse side effects.The patient denies myelopathic symptoms such as handwriting changes or difficulty with buttons/coins/keys. Denies perineal paresthesias, bowel/bladder dysfunction.     Interval History 8/11/2020:  The patient presents for follow up of lower back pain. Pt over interval had 2 MBB of right L3,4,5 with >90% relief of both procedures. Pt states + a.m. stiffness, states when he has MBB of lumbar he notices his hip pain more and vise versa when he has hip block. Lumbar pain>R hip pain at this time. He continues to take oxycodone 5mg QID which is  "beneficial to pain control without adverse side effects. The patient denies myelopathic symptoms such as handwriting changes or difficulty with buttons/coins/keys. Denies perineal paresthesias, bowel/bladder dysfunction.     Interval History 05/27/2020:  S/P Right Femoral / Obturator Nerve Block in 10/2019 -- 50% relief for 2 days --  lost to follow up after that  S/p Right DHRUV 08/2015 -- doing well for 1.5 years  Pain is located at right hip joint. Occasionally radiates to right thigh.   Aggravated standing, walking, laying down.   Alleviated by Oxycodone     HPI:  Macario Dior is a 69 y.o. Man with PMH DVT s/p right DHRUV that presents today as a new consult for low back pain.  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 Since 2015 DHRUV, he initially had improved but progressive pain in the right hip and low back.  In the last 6 months, he has had 4 back injections from Dr. De Los Santos that lasted for 2 weeks each.  1 month ago, he had a Lumbar ABIODUN that provided 100% relief that lasted 5 days for the low back but the pain changed to radiate to the right hip.  Since the ABIODUN relief stopped, the pain has "worked its way back up to the low back."  The pain is described as intense, sharp, and shoots up from the right hip into the low back to the right periscapular area. The shooting pain is worse than the low back pain. The pain is interfering with everything, including walking, sitting, and putting on his shoes.  Oxycodone 10mg Q6H provides relief. Lidocaine patches and TENS provide minimal relief when he wakes up.  He did physical therapy for 1 year with minimal relief.  He is unable to regularly exercise secondary to pain. He is radha to get 4 hours of uninterrupted sleep per night.  He wakes up with stiffness in his low back despite $4,000 mattresses.  He has weakness and buckling in the right lower extremity.  He endorses worsening falls due to weakness "seems like the leg is not there." He says " I " "need the bone removed" referring to his right hip. [facility] is a  [facility] He denies incontinence and saddle anesthesia.    Pain Disability Index Review:      6/12/2024    10:55 AM 12/13/2023     2:25 PM 11/22/2022    12:11 PM   Last 3 PDI Scores   Pain Disability Index (PDI) 70 50 55       Pain Medications:  None    Opioid Contract: not applicable     report:  Not applicable    Pain Procedures:   Lumbar ABIODUN 100% relief 5 days - Dr. De Los Santos   Lumbar TPIs 0% relief - Dr. De Los Santos   10/23/2019- Right femoral and obturator nerve block  7/6/2020 Right L3,4,5 MBB  7/30/2020 Right L3,4,5 MBB  10/5/2020- Right L3,4,5 RFA  12/17/2020- L5/S1 IL ABIODUN  12/4/2023- Bilateral L4/5 TF ABIODUN      Physical Therapy/Home Exercise: yes    Imaging:   Xray Hip 5/14/2024:  COMPARISON:  06/23/2023     FINDINGS:  Postoperative changes of right total hip arthroplasty with stable hardware positioning and alignment.  No acute fracture, dislocation or bone destruction.  Patchy areas of sclerosis involving the left femoral head suggesting avascular necrosis with mild left hip joint space narrowing.  There are surgical clips in the right inguinal region.     Impression:     Postoperative changes of right total hip arthroplasty.    MRI LUMBAR SPINE WITHOUT CONTRAST 5/29/24     CLINICAL HISTORY:  Lumbar radiculopathy, symptoms persist with conservative treatment; Radiculopathy, lumbar region     TECHNIQUE:  Multiplanar, multisequence MR images were acquired from the thoracolumbar junction to the sacrum without the administration of contrast.     COMPARISON:  9/17/2022     FINDINGS:  The marrow demonstrates homogeneous signal.  Vertebral body heights are maintained.     Disc spaces are maintained.  Disc desiccation L2-3 and L3-4.     Conus terminates appropriately at L1.     Multilevel degenerative change as diesel below:     T12-L1: No focal disc abnormality.  No significant canal or neural foraminal narrowing.     L1-2: " Facet arthropathy, right greater than left with mild right neural foraminal narrowing.     L2-3: Small posterior circumferential disc bulge, facet arthropathy (right greater than left), and thickening of the ligamentum flavum.  Prominent posterior epidural fat.  Findings contribute to mild canal narrowing.  Mild bilateral neural foraminal narrowing.     L3-4: Small posterior circumferential disc bulge.  Facet arthropathy with thickening of the ligamentum flavum (left greater than right).  Mild canal and mild bilateral neural foraminal narrowing, left greater than right.     L4-5: Small posterior circumferential disc bulge, facet arthropathy, and thickening of the ligamentum flavum.  Mild canal narrowing.  Mild bilateral neural foraminal narrowing, right greater than left.     L5-S1: Facet arthropathy resulting in mild left neural foraminal narrowing.     Atrophic right kidney.     Impression:     Overall, stable exam compared to prior.  Multilevel degenerative change contributes to multilevel mild neural foraminal narrowing.  Mild canal narrowing as given in detail level by level as above.    Allergies:   Review of patient's allergies indicates:   Allergen Reactions    Amlodipine Swelling    Lisinopril Tinitus and Swelling    Pregabalin Swelling    Azithromycin      Muscles tense up    Levetiracetam Other (See Comments)    Acetaminophen Rash     Feels like needles are sticking him per pt       Current Medications:   Current Outpatient Medications   Medication Sig Dispense Refill    albuterol 90 mcg/actuation inhaler Inhale 2 puffs into the lungs every 6 (six) hours as needed for Wheezing.       atorvastatin (LIPITOR) 80 MG tablet Take 80 mg by mouth once daily. Take one-half tablet daily      capsaicin 0.1 % Crea Apply twice to area of maximum paresthesia 15 g 6    [id number]-IK 0.5-1-0.5 % Dpet Place 1 drop into both eyes 4 (four) times daily.      carboxymethylcellulose sodium 1 % DpGe Place 0.4 mLs  into both eyes 4 (four) times daily as needed.      chlorhexidine (PERIDEX) 0.12 % solution RINSE 1 CAPFUL BY MOUTH ONCE DAILY AS NEEDED SWISH FOR 30 SECONDS AFTER,AND SPIT OUT      chlorthalidone (HYGROTEN) 25 MG Tab Take 25 mg by mouth once daily.      CLOBETASOL PROPIONATE, BULK, MISC Apply topically once daily.      cyanocobalamin (VITAMIN B-12) 100 MCG tablet Take 100 mcg by mouth once daily. Take two tablets      cycloSPORINE (RESTASIS) 0.05 % ophthalmic emulsion INSTILL 0.4ML IN EACH EYE EVERY 12 HOURS      dulaglutide (TRULICITY) 4.5 mg/0.5 mL pen injector Inject 4.5 mg into the skin every 7 days. 12 pen 3    eplerenone (INSPRA) 25 MG Tab Take 1 tablet by mouth once daily.      ergocalciferol (ERGOCALCIFEROL) 50,000 unit Cap Take 50,000 Units by mouth.      finasteride (PROSCAR) 5 mg tablet TAKE ONE TABLET BY MOUTH ONCE DAILY FOR PROSTATE      hydrALAZINE (APRESOLINE) 50 MG tablet TAKE ONE TABLET BY MOUTH THREE TIMES A DAY FOR HEART/BLOOD PRESSURE      ketoconazole (NIZORAL) 2 % cream Apply topically 2 (two) times daily.      latanoprost 0.005 % ophthalmic solution Place 1 drop into both eyes every evening.      lidocaine (LIDODERM) 5 % Place 1 patch onto the skin once daily. Wear for 12 hours, then remove. Do not apply a new patch for at least 12 hours.      naloxone (NARCAN) 4 mg/actuation Spry as needed.      omega-3/dha/epa/fish oil (OMEGA-3 FISH OIL ORAL) 1,000 mg once daily.      oxyCODONE (ROXICODONE) 10 mg Tab immediate release tablet Take 1 tablet (10 mg total) by mouth every 4 (four) hours as needed for Pain. 42 tablet 0    thiamine 100 MG tablet Take 0.5 tablets (50 mg total) by mouth once daily. 60 tablet 11    carvediloL (COREG) 25 MG tablet Take 1 tablet (25 mg total) by mouth 2 (two) times daily. 60 tablet 11     No current facility-administered medications for this visit.       REVIEW OF SYSTEMS:    GENERAL:  No weight loss, malaise or fevers.  HEENT:  Negative for frequent or significant  headaches.  NECK:  Negative for lumps, goiter, pain and significant neck swelling.  RESPIRATORY:  Negative for cough, wheezing or shortness of breath. BUZZ, asthma  CARDIOVASCULAR:  Negative for chest pain, leg swelling or palpitations. HTN  GI:  Negative for abdominal discomfort, blood in stools or black stools or change in bowel habits.  MUSCULOSKELETAL:  See HPI.  SKIN:  Negative for lesions, rash, and itching.  RENAL: CKD  ENDO: Diabetes  PSYCH:  Negative for sleep disturbance, mood disorder and recent psychosocial stressors.  HEMATOLOGY/LYMPHOLOGY:  Negative for prolonged bleeding, bruising easily or swollen nodes.  NEURO:   No history of headaches, syncope, paralysis, seizures or tremors.  All other reviewed and negative other than HPI.    Past Medical History:  Past Medical History:   Diagnosis Date    Arthritis     Asthma     chronic    CKD (chronic kidney disease), stage III     patient denies    Deep vein thrombosis     Demyelinating neuropathy     polyneuropathy    Diabetes mellitus type 2 without retinopathy 12/14/2022    Gout     History of blood clots 2013    lower part of both legs    HLD (hyperlipidemia)     Hypertension     Iron deficiency anemia     BUZZ (obstructive sleep apnea)     CPAP    Pulmonary embolism        Past Surgical History:  Past Surgical History:   Procedure Laterality Date    COLONOSCOPY N/A 11/7/2016    Procedure: COLONOSCOPY;  Surgeon: Shar Weiss MD;  Location: [address]);  Service: Endoscopy;  Laterality: N/A;  [facility] Referral. Okay to hold Coumadin 5 days prior to procedure. See Referral scaned in media tab on 10/20/16.    COLONOSCOPY N/A 9/21/2017    Procedure: COLONOSCOPY;  Surgeon: Stuart Trinidad MD;  Location: [address]);  Service: Endoscopy;  Laterality: N/A;  ref[facility] from [facility] Olivarez-see scanned docs under media tab          9/6/17-current [facility] and last clinic visit scanned into chart    EPIDURAL STEROID INJECTION  N/A 12/17/2020    Procedure: INJECTION, STEROID, EPIDURAL, L5-S1 clear to hold Xarelto 2 days;  Surgeon: Moy Vasquez MD;  Location: [facility];  Service: Pain Management;  Laterality: N/A;    EXCISION OF LESION OF FEMUR Right 1/4/2023    Procedure: EXCISION, LESION, FEMUR: POSTERIOR APPROACH;  Surgeon: Prieto Sinha MD;  [facility];  Service: Orthopedics;  Laterality: Right;    HIP SURGERY Right 2008    exploratory at [facility]    HIP SURGERY Right 8-31-15    THR    INJECTION OF ANESTHETIC AGENT AROUND NERVE Right 10/23/2019    Procedure: BLOCK, NERVE, Obturator and Femoral cleared to hold xarelto 3 days pt. said he's not taking coumadin;  Surgeon: Moy Vasquez MD;  Location: [facility];  Service: Pain Management;  Laterality: Right;    INJECTION OF ANESTHETIC AGENT AROUND NERVE Right 7/6/2020    Procedure: BLOCK, NERVE, RIGHT MBB L3,L5,L5;  Surgeon: Moy Vasquez MD;  Location: [facility];  Service: Pain Management;  Laterality: Right;  BLOCK, NERVE, RIGHT MBB L3,L5,L5    INJECTION OF ANESTHETIC AGENT AROUND NERVE Right 7/30/2020    Procedure: BLOCK, NERVE RIGHT L3, L4, L5 2ND;  Surgeon: Moy Vasquez MD;  Location: [facility];  Service: Pain Management;  Laterality: Right;  NEEDS CONSENT, XARELTO    LEG SURGERY Left 2012    fibula and tibia    RADIOFREQUENCY ABLATION Right 10/5/2020    Procedure: RADIOFREQUENCY ABLATION RIGHT L3,4,5;  Surgeon: Moy Vasquez MD;  Location: [facility];  Service: Pain Management;  Laterality: Right;  RADIOFREQUENCY ABLATION RIGHT L3,4,5  ok to hold xarelto, scanned in Media    SHOULDER SURGERY Right 1989    torn ligament    TRANSFORAMINAL EPIDURAL INJECTION OF STEROID Bilateral 12/4/2023    Procedure: LUMBAR TRANSFORAMINAL BILATERAL L4/5 DIRECT REFERRAL;  Surgeon: Moy Vasquez MD;  Location: [facility];  Service: Pain Management;  Laterality: Bilateral;       Family History:  Family History   Problem Relation Name Age of Onset    Cancer Mother          " ovarian    Cancer Father      Cancer Sister          breast    Cancer Sister          breast    No Known Problems Brother      No Known Problems Daughter      No Known Problems Daughter      No Known Problems Daughter      No Known Problems Son      No Known Problems Son         Social History:  Social History     Socioeconomic History    Marital status: Single   Tobacco Use    Smoking status: Former     Types: Cigars     Quit date: 2009     Years since quittin.8    Smokeless tobacco: Never   Substance and Sexual Activity    Alcohol use: No     Alcohol/week: 0.0 standard drinks of alcohol    Drug use: No    Sexual activity: Not Currently       OBJECTIVE:    BP (!) 141/79 (BP Location: Left arm, Patient Position: Sitting, BP Method: Medium (Automatic))   Pulse 81   Resp 12   Ht 6' 1" (1.854 m)   Wt 105 kg (231 lb 7.7 oz)   SpO2 100%   BMI 30.54 kg/m²     PHYSICAL EXAMINATION:    General appearance: Well appearing, in no acute distress, alert and oriented x3.  Psych:  Mood and affect appropriate.  Skin: Skin color, texture, turgor normal, no rashes or lesions, in both upper and lower body.  Head/face:  Atraumatic, normocephalic.   Cor: RRR  Pulm: Symmetric chest rise, no respiratory distress noted.   Back: Straight leg raising in the sitting positive is positive for radicular pain on the right. There is pain with palpation over paraspinals and facet joints on the right. Limited ROM with pain on extension.   Extremities: No deformities or skin discoloration. Good capillary refill. +1 edema to BLE  Musculoskeletal: 4/5 strength with hip flexion Right. All other strength normal and symmetric. No atrophy or tone abnormalities are noted.  Neuro:  No loss of sensation is noted.  Gait: Antalgic- ambulates without assistance.     ASSESSMENT: 74 y.o. year old male with low back and hip pain, consistent with the followin. Neuropathy of right ilioinguinal nerve  CT Abdomen Pelvis  Without Contrast    "   2. Right lower quadrant pain  CT Abdomen Pelvis  Without Contrast              PLAN:   We discussed with the patient the assessment and recommendations. The following is the plan we agreed on:   - Previous imaging reviewed.   - Labs reviewed, significant change in BUN and Creatinine.   - CT Abdomen/Pelvis  - Schedule Hip MRI at [facility]  - I have stressed the importance of physical activity and a home exercise plan to help with pain and improve health.  - TAP block after imaing  - RTC 6 weeks after TAP block  - Counseled patient regarding the importance of activity modification, constant sleeping habits, and physical therapy.    The above plan and management options were discussed at length with patient. Patient is in agreement with the above and verbalized understanding.    Clark Fletcher, PGY2  Department of Anesthesiology  [facility]    I spent a total of 30 minutes on the day of the visit.  This includes face to face time and non-face to face time preparing to see the patient by reviewing previous labs/imaging, obtaining and/or reviewing separately obtained history, documenting clinical information in the electronic or other health record, independently interpreting results and communicating results to the patient/family/caregiver.  I have personally taken the history and examined this patient and agree with the resident's note as stated above.

## 2024-06-19 ENCOUNTER — TELEPHONE (OUTPATIENT)
Dept: NEUROLOGY | Facility: CLINIC | Age: 74
End: 2024-06-19
Payer: OTHER GOVERNMENT

## 2024-06-19 NOTE — TELEPHONE ENCOUNTER
Note Regarding Appointment Scheduling    Date: 6/19/2024  I called the patient to schedule an appointment. The patient agreed to the proposed appointment date and time, and it was confirmed by the patient.  Appointment Details: Np consult   Appointment Date: 8/12/2024  Appointment Time: 9am

## 2024-07-01 ENCOUNTER — HOSPITAL ENCOUNTER (OUTPATIENT)
Dept: RADIOLOGY | Facility: HOSPITAL | Age: 74
Discharge: HOME | End: 2024-07-01
Attending: NURSE PRACTITIONER
Payer: OTHER GOVERNMENT

## 2024-07-01 DIAGNOSIS — Z96.641 PRESENCE OF RIGHT ARTIFICIAL HIP JOINT: ICD-10-CM

## 2024-07-01 PROCEDURE — 73721 MRI JNT OF LWR EXTRE W/O DYE: CPT | Mod: 26,RT,, | Performed by: RADIOLOGY

## 2024-07-01 PROCEDURE — 73721 MRI JNT OF LWR EXTRE W/O DYE: CPT | Mod: TC,RT

## 2024-07-09 ENCOUNTER — OFFICE VISIT (OUTPATIENT)
Dept: SPINE | Facility: CLINIC | Age: 74
End: 2024-07-09
Attending: ANESTHESIOLOGY
Payer: OTHER GOVERNMENT

## 2024-07-09 VITALS
HEART RATE: 80 BPM | SYSTOLIC BLOOD PRESSURE: 170 MMHG | HEIGHT: 73 IN | BODY MASS INDEX: 30.68 KG/M2 | WEIGHT: 231.5 LBS | DIASTOLIC BLOOD PRESSURE: 80 MMHG | OXYGEN SATURATION: 100 % | RESPIRATION RATE: 12 BRPM | TEMPERATURE: 96 F

## 2024-07-09 DIAGNOSIS — R10.31 RLQ ABDOMINAL PAIN: Primary | ICD-10-CM

## 2024-07-09 PROCEDURE — 99999 PR PBB SHADOW E&M-EST. PATIENT-LVL V: CPT | Mod: PBBFAC,,, | Performed by: ANESTHESIOLOGY

## 2024-07-09 PROCEDURE — 64486 TAP BLOCK UNIL BY INJECTION: CPT | Mod: PBBFAC,GC | Performed by: ANESTHESIOLOGY

## 2024-07-09 PROCEDURE — 99999PBSHW PR PBB SHADOW TECHNICAL ONLY FILED TO HB: Mod: PBBFAC,,,

## 2024-07-09 PROCEDURE — 99215 OFFICE O/P EST HI 40 MIN: CPT | Mod: PBBFAC | Performed by: ANESTHESIOLOGY

## 2024-07-09 RX ORDER — TRIAMCINOLONE ACETONIDE 40 MG/ML
40 INJECTION, SUSPENSION INTRA-ARTICULAR; INTRAMUSCULAR
Status: COMPLETED | OUTPATIENT
Start: 2024-07-09 | End: 2024-07-09

## 2024-07-09 RX ADMIN — TRIAMCINOLONE ACETONIDE 40 MG: 40 INJECTION, SUSPENSION INTRA-ARTICULAR; INTRAMUSCULAR at 02:07

## 2024-07-09 NOTE — PROGRESS NOTES
PROCEDURE: R TAP block under ultrasound    Pre-Op Diagnosis:   1. RLQ abdominal pain  triamcinolone acetonide injection 40 mg           Post-Op Diagnosis: Same    EBL: None    Complications: None    Specimens: None    Description of procedure:    After risks and benefits were explained to the patient and consent was obtained, the patient was placed in the supine position with the area of the left abdomen exposed.  Ultrasound was used to identify the area of internal oblique and transversus muscles.  Once identified and marked, the area overlying the skin was prepped in usual sterile fashion using chlorhexidine.  Then a 26 gauge needle was passed into plane between IOM and Transversus muscles.  After negative aspiration for blood, and no paresthesias, a mixture of 10 mL of 0.25% bupivicaine + 0.5 ml Triamcinolone 40 mg/ml was injected into the area with spread of the medication into the area being visualized under ultrasound.      Patient tolerated the procedure well, there were no complications, and the patient was discharged to home.

## 2024-07-10 ENCOUNTER — OFFICE VISIT (OUTPATIENT)
Dept: ORTHOPEDICS | Facility: CLINIC | Age: 74
End: 2024-07-10
Payer: OTHER GOVERNMENT

## 2024-07-10 DIAGNOSIS — M54.16 LUMBAR RADICULOPATHY: ICD-10-CM

## 2024-07-10 DIAGNOSIS — Z96.641 PRESENCE OF RIGHT ARTIFICIAL HIP JOINT: Primary | ICD-10-CM

## 2024-07-10 DIAGNOSIS — M54.16 LUMBAR RADICULOPATHY, RIGHT: ICD-10-CM

## 2024-07-10 PROCEDURE — 99213 OFFICE O/P EST LOW 20 MIN: CPT | Mod: S$PBB,,, | Performed by: ORTHOPAEDIC SURGERY

## 2024-07-10 PROCEDURE — 99999 PR PBB SHADOW E&M-EST. PATIENT-LVL IV: CPT | Mod: PBBFAC,,, | Performed by: ORTHOPAEDIC SURGERY

## 2024-07-10 PROCEDURE — 99214 OFFICE O/P EST MOD 30 MIN: CPT | Mod: PBBFAC | Performed by: ORTHOPAEDIC SURGERY

## 2024-07-15 NOTE — PROGRESS NOTES
Subjective:      Patient ID: Macario Dior Jr. is a 74 y.o. male.    Chief Complaint:   Pain of the Right Hip    HPI  He returns for 10/10 pain involving the right hip area, causing giving way and falling.  His pain involves the entire right hip girdle up towards the back.  It is only bothering him on the right side.  01/2023 we did an operation on him to remove heterotopic ossification from around his total hip implants.  This seemed to be very effective, but he now has increasing pain.  No distal numbness or tingling.        Objective:      Objective  Ortho/SPM Exam  He has diffuse tenderness in the paraspinous muscles of the lumbar spine and buttock area including the abductor muscles.  Negative Stinchfield, negative C sign.  He stands with a list reflecting spinal deformity.  Knee benign without tenderness or effusion.  Distal neurovascular intact.           IMAGING:  X-rays show well-fixed and positioned total hip arthroplasty on the right without signs of loosening or other complications.  There has been no recurrence of his excised heterotopic bone.  Recent MRI of the hip showed no explanation for any pain in that area without recurrence of the excised bone.  Recent MRI of the lumbar spine showed advanced multilevel degenerative changes.  Assessment:   Lumbar spondylosis.        Plan:   I think his pain is likely originating from his lumbar spine.  He will continue to follow-up with pain management.      The patient's pathophysiology was explained in detail with reference to x-rays, models, other visual aids as appropriate.  Treatment options were discussed in detail.  Questions were invited and answered to the patient's satisfaction.      Prieto Sinha MD  Orthopedic Surgery

## 2024-07-17 ENCOUNTER — TELEPHONE (OUTPATIENT)
Dept: BARIATRICS | Facility: CLINIC | Age: 74
End: 2024-07-17
Payer: OTHER GOVERNMENT

## 2024-07-17 NOTE — TELEPHONE ENCOUNTER
----- Message from Karen Pelletier sent at 7/17/2024 11:33 AM CDT -----  Type:  Patient Returning Call    Who Called: pt  Who Left Message for Patient: pt   Does the patient know what this is regarding?: pt want to get a call for an appt he missed   Would the patient rather a call back or a response via [facility]? call  Best Call Back Number:[phone]  Additional Information: call back

## 2024-08-02 ENCOUNTER — TELEPHONE (OUTPATIENT)
Dept: NEUROLOGY | Facility: CLINIC | Age: 74
End: 2024-08-02
Payer: OTHER GOVERNMENT

## 2024-08-05 ENCOUNTER — TELEPHONE (OUTPATIENT)
Dept: NEUROLOGY | Facility: CLINIC | Age: 74
End: 2024-08-05
Payer: OTHER GOVERNMENT

## 2024-08-20 ENCOUNTER — OFFICE VISIT (OUTPATIENT)
Dept: PAIN MEDICINE | Facility: CLINIC | Age: 74
End: 2024-08-20
Attending: ANESTHESIOLOGY
Payer: OTHER GOVERNMENT

## 2024-08-20 VITALS
DIASTOLIC BLOOD PRESSURE: 79 MMHG | HEART RATE: 75 BPM | WEIGHT: 226 LBS | BODY MASS INDEX: 29.81 KG/M2 | SYSTOLIC BLOOD PRESSURE: 157 MMHG

## 2024-08-20 DIAGNOSIS — M54.17 LUMBOSACRAL RADICULOPATHY: Primary | ICD-10-CM

## 2024-08-20 DIAGNOSIS — M51.36 DDD (DEGENERATIVE DISC DISEASE), LUMBAR: ICD-10-CM

## 2024-08-20 DIAGNOSIS — R10.31 RIGHT LOWER QUADRANT PAIN: ICD-10-CM

## 2024-08-20 DIAGNOSIS — Z96.641 PRESENCE OF RIGHT ARTIFICIAL HIP JOINT: ICD-10-CM

## 2024-08-20 DIAGNOSIS — M87.059 AVASCULAR NECROSIS OF FEMORAL HEAD, UNSPECIFIED LATERALITY: ICD-10-CM

## 2024-08-20 DIAGNOSIS — G89.4 CHRONIC PAIN SYNDROME: ICD-10-CM

## 2024-08-20 DIAGNOSIS — G57.81 NEUROPATHY OF RIGHT ILIOINGUINAL NERVE: ICD-10-CM

## 2024-08-20 DIAGNOSIS — G89.4 CHRONIC PAIN DISORDER: ICD-10-CM

## 2024-08-20 DIAGNOSIS — M79.18 MYOFASCIAL PAIN ON RIGHT SIDE: ICD-10-CM

## 2024-08-20 DIAGNOSIS — M47.816 LUMBAR SPONDYLOSIS: ICD-10-CM

## 2024-08-20 PROCEDURE — 99999 PR PBB SHADOW E&M-EST. PATIENT-LVL IV: CPT | Mod: PBBFAC,,,

## 2024-08-20 PROCEDURE — 99214 OFFICE O/P EST MOD 30 MIN: CPT | Mod: PBBFAC

## 2024-08-20 PROCEDURE — 99214 OFFICE O/P EST MOD 30 MIN: CPT | Mod: S$PBB,,,

## 2024-08-20 RX ORDER — VALSARTAN 320 MG/1
320 TABLET ORAL
COMMUNITY
Start: 2024-08-13

## 2024-08-20 NOTE — PROGRESS NOTES
Chronic patient Established Note (Follow up visit)      SUBJECTIVE:        Interval History 8/20/2024:  Macario Dior Jr. Returns to clinic for follow-up of right hip and abdominal pain.He had right TAP block on 7/9/2024. He states this helped with abdominal and mid back pain, but he continues to complain of right-sided pain over the right iliac crest to the groin. He states the pain is worse when he places pressure on his right heel while ambulating. He also reports numbness and altered sensation to bilateral feet right>left. He takes Oxycodone as needed which allows him to have fun with his grandchildren. He had an EMG/NCS ordered previously, but it was never completed. He denies recent falls or trauma. He denies new onset fever/night sweats, urinary incontinence, bowel incontinence, significant weight changes, significant motor weakness or changes, or loss of sensations. His pain today is 8/10.   Interval History 6/12/24  Pt presents with same R hip pain he has been having for years. He was unable to get the MRI hip due to a lack of equipment at the imaging center. He still complains of pain in a QL/Iliopsoas distribution. Still has numbness into the R leg and into the groin.     Interval History 5/15/2024:  Macario Dior Jr. presents to the clinic for a follow-up appointment for hip pain. He continues to report right hip pain. He does have radiating pain into the groin and lateral thigh. He also notes pain that radiates into his back. His right leg feels week. His feet are numb. He is frustrated with his pain. He has tried ABIODUN and RFAs with short term relief. He was taking Lyrica but is out. He did recently see Orthopedics who obtained lab work and ordered imaging. He denies any other health changes. His pain today is 10/10.    Interval history 12/13/2023:  71-year-old male presents today he is status post a bilateral TF ABIODUN as 5 on 12/03 reporting minimal relief.  He continues to report primarily axial low  back pain with intermittent right leg.  Pain with extension today also with walking, standing.  Facetogenic pain upon palpation of lower lumbar spine.  He did report more axial low back pain bandlike distribution versus state that the right leg hurts.  He would like to be considered for other pain interventions.     Interval History 11/22/2022:  Mr Dior presents for follow up evaluation of chronic R hip pain and awaiting surgery. He is having acute R sided lumbar myofascial pain and requesting TPI to address symptoms as this has provided benefit in past and will be going out of town. He has no s/s concerning for cauda equina syndrome. He denies SE of his medication regimen.      Interval History 9/23/2022:  The Pt presents for delayed follow He has continued lumbar complaints but focal right hip pain that continues. His R hip pain has continued and he was unaware he was supposed to see Dr Sinha today to address this. He can continue to see us at any time but will reach out to Dr Sinha's office to get him continued care regarding his greatest pain generator at this time.      Interval History 2/18/2021:  The patient presents for follow up of diffuse pain complaints. Pt canceled prior Hip block. State he states lyrica increase to 100mg has helped but neuropathy but also states he is also having stent placed to R leg where pain is greater due to insuffiencey. He continues to take Oxycodone QID. He denies any adverse SE of medications. Denies new areas of pain.       Interval History 12/4/2020:  Pt presents for follow up of lower back pain. He continues to have benefit from RFA of right lower lumbar. He is doing well with med mgt of oxycodone and lyrica. He states night time leg cramping. The patient denies myelopathic symptoms such as handwriting changes or difficulty with buttons/coins/keys. Denies perineal paresthesias, bowel/bladder dysfunction.     Interval History 10/20/2020:  The patient presents follow-up for  lower back pain.  He has had near resolution since right-sided L 3, 4, 5 RFA.  He states he is doing well.  He continues to take minimum the medication to address pain.  He is currently taking oxycodone 5 mg q.i.d. as well as Lyrica 75 b.i.d..  He finds he is beneficial without adverse side effects.The patient denies myelopathic symptoms such as handwriting changes or difficulty with buttons/coins/keys. Denies perineal paresthesias, bowel/bladder dysfunction.     Interval History 8/11/2020:  The patient presents for follow up of lower back pain. Pt over interval had 2 MBB of right L3,4,5 with >90% relief of both procedures. Pt states + a.m. stiffness, states when he has MBB of lumbar he notices his hip pain more and vise versa when he has hip block. Lumbar pain>R hip pain at this time. He continues to take oxycodone 5mg QID which is beneficial to pain control without adverse side effects. The patient denies myelopathic symptoms such as handwriting changes or difficulty with buttons/coins/keys. Denies perineal paresthesias, bowel/bladder dysfunction.     Interval History 05/27/2020:  S/P Right Femoral / Obturator Nerve Block in 10/2019 -- 50% relief for 2 days --  lost to follow up after that  S/p Right DHRUV 08/2015 -- doing well for 1.5 years  Pain is located at right hip joint. Occasionally radiates to right thigh.   Aggravated standing, walking, laying down.   Alleviated by Oxycodone     HPI:  Macario Dior is a 69 y.o. Man with PMH DVT s/p right DHRUV that presents today as a new consult for low back pain.  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 Since 2015 DHRUV, he initially had improved but progressive pain in the right hip and low back.  In the last 6 months, he has had 4 back injections from Dr. De Los Santos that lasted for 2 weeks each.  1 month ago, he had a Lumbar ABIODUN that provided 100% relief that lasted 5 days for the low back but the pain changed to radiate to the right hip.  Since the ABIODUN  "relief stopped, the pain has "worked its way back up to the low back."  The pain is described as intense, sharp, and shoots up from the right hip into the low back to the right periscapular area. The shooting pain is worse than the low back pain. The pain is interfering with everything, including walking, sitting, and putting on his shoes.  Oxycodone 10mg Q6H provides relief. Lidocaine patches and TENS provide minimal relief when he wakes up.  He did physical therapy for 1 year with minimal relief.  He is unable to regularly exercise secondary to pain. He is radha to get 4 hours of uninterrupted sleep per night.  He wakes up with stiffness in his low back despite $4,000 mattresses.  He has weakness and buckling in the right lower extremity.  He endorses worsening falls due to weakness "seems like the leg is not there." He says " I need the bone removed" referring to his right hip. [facility] is a  [facility] He denies incontinence and saddle anesthesia.    Pain Disability Index Review:      7/9/2024     2:19 PM 6/12/2024    10:55 AM 12/13/2023     2:25 PM   Last 3 PDI Scores   Pain Disability Index (PDI) 70 70 50       Pain Medications:  None    Opioid Contract: not applicable     report:  Not applicable    Pain Procedures:   Lumbar ABIODUN 100% relief 5 days - Dr. De Los Santos   Lumbar TPIs 0% relief - Dr. De Los Santos   10/23/2019- Right femoral and obturator nerve block  7/6/2020 Right L3,4,5 MBB  7/30/2020 Right L3,4,5 MBB  10/5/2020- Right L3,4,5 RFA  12/17/2020- L5/S1 IL ABIODUN  12/4/2023- Bilateral L4/5 TF ABIODUN  7/9/2024 - Right TAP block under U/S - 90% relief of back pain      Physical Therapy/Home Exercise: yes    Imaging:     XR HIP WITH PELVIS WHEN PERFORMED, 2 OR 3  VIEWS RIGHT     CLINICAL HISTORY:  RM 3; Presence of right artificial hip joint     TECHNIQUE:  AP view of the pelvis and frog leg lateral view of the right hip were performed.     COMPARISON:  06/23/2023   "   FINDINGS:  Postoperative changes of right total hip arthroplasty with stable hardware positioning and alignment.  No acute fracture, dislocation or bone destruction.  Patchy areas of sclerosis involving the left femoral head suggesting avascular necrosis with mild left hip joint space narrowing.  There are surgical clips in the right inguinal region.     Impression:     Postoperative changes of right total hip arthroplasty.        Electronically signed by:Brady Brown MD  Date:                                            05/14/2024  Time:                                           13:15    MRI HIP WITHOUT CONTRAST RIGHT     CLINICAL HISTORY:  Hip replacement, loosening suspected;  Presence of right artificial hip joint     TECHNIQUE:  Multisequence, multiplanar MRI of the right hip performed without contrast.     COMPARISON:  Hip radiograph 05/14/2024, CT head 09/13/2022     FINDINGS:  Bones: Postoperative changes of right total hip arthroplasty.  No periprosthetic fracture or osteolysis.  Mild heterotopic ossification posteriorly, unchanged from prior CT.  No infiltrative process.  Bone marrow signal is maintained.     Pelvic joints: Sacroiliac joints and pubic symphysis are unremarkable.     Muscles/tendons: Regional muscles and tendons appear unremarkable.  No evidence for trochanteric or iliopsoas bursitis.  No evidence for ischiofemoral impingement.     Miscellaneous: No periprosthetic fluid collections or masses.  Limited evaluation of pelvic soft tissues is unremarkable.  Postoperative changes of the right groin.     Impression:     1. Postoperative changes of right total hip arthroplasty with unchanged mild heterotopic calcification.  No evidence for loosening or osteolysis.     Electronically signed by resident: Nisa Murillo  Date:                                            07/01/2024  Time:                                           13:32     Electronically signed by:Jonas Kumar MD  Date:                                             07/01/2024  Time:                                           16:05       MRI LUMBAR SPINE WITHOUT CONTRAST     CLINICAL HISTORY:  Lumbar radiculopathy, symptoms persist with conservative treatment; Radiculopathy, lumbar region     TECHNIQUE:  Multiplanar, multisequence MR images were acquired from the thoracolumbar junction to the sacrum without the administration of contrast.     COMPARISON:  9/17/2022     FINDINGS:  The marrow demonstrates homogeneous signal.  Vertebral body heights are maintained.     Disc spaces are maintained.  Disc desiccation L2-3 and L3-4.     Conus terminates appropriately at L1.     Multilevel degenerative change as diesel below:     T12-L1: No focal disc abnormality.  No significant canal or neural foraminal narrowing.     L1-2: Facet arthropathy, right greater than left with mild right neural foraminal narrowing.     L2-3: Small posterior circumferential disc bulge, facet arthropathy (right greater than left), and thickening of the ligamentum flavum.  Prominent posterior epidural fat.  Findings contribute to mild canal narrowing.  Mild bilateral neural foraminal narrowing.     L3-4: Small posterior circumferential disc bulge.  Facet arthropathy with thickening of the ligamentum flavum (left greater than right).  Mild canal and mild bilateral neural foraminal narrowing, left greater than right.     L4-5: Small posterior circumferential disc bulge, facet arthropathy, and thickening of the ligamentum flavum.  Mild canal narrowing.  Mild bilateral neural foraminal narrowing, right greater than left.     L5-S1: Facet arthropathy resulting in mild left neural foraminal narrowing.     Atrophic right kidney.     Impression:     Overall, stable exam compared to prior.  Multilevel degenerative change contributes to multilevel mild neural foraminal narrowing.  Mild canal narrowing as given in detail level by level as above.        Electronically signed by:Shazia  Maria Luisater  Date:                                            05/30/2024  Time:                                           08:59    Allergies:   Review of patient's allergies indicates:   Allergen Reactions    Amlodipine Swelling    Lisinopril Tinitus and Swelling    Pregabalin Swelling    Azithromycin      Muscles tense up    Levetiracetam Other (See Comments)    Acetaminophen Rash     Feels like needles are sticking him per pt       Current Medications:   Current Outpatient Medications   Medication Sig Dispense Refill    albuterol 90 mcg/actuation inhaler Inhale 2 puffs into the lungs every 6 (six) hours as needed for Wheezing.       atorvastatin (LIPITOR) 80 MG tablet Take 80 mg by mouth once daily. Take one-half tablet daily      capsaicin 0.1 % Crea Apply twice to area of maximum paresthesia 15 g 6    carboxymethyl-gly-pktb72-PD 0.5-1-0.5 % Dpet Place 1 drop into both eyes 4 (four) times daily.      carboxymethylcellulose sodium 1 % DpGe Place 0.4 mLs into both eyes 4 (four) times daily as needed.      carvediloL (COREG) 25 MG tablet Take 1 tablet (25 mg total) by mouth 2 (two) times daily. 60 tablet 11    chlorhexidine (PERIDEX) 0.12 % solution RINSE 1 CAPFUL BY MOUTH ONCE DAILY AS NEEDED SWISH FOR 30 SECONDS AFTER,AND SPIT OUT      chlorthalidone (HYGROTEN) 25 MG Tab Take 25 mg by mouth once daily.      CLOBETASOL PROPIONATE, BULK, MISC Apply topically once daily.      cyanocobalamin (VITAMIN B-12) 100 MCG tablet Take 100 mcg by mouth once daily. Take two tablets      cycloSPORINE (RESTASIS) 0.05 % ophthalmic emulsion INSTILL 0.4ML IN EACH EYE EVERY 12 HOURS      dulaglutide (TRULICITY) 4.5 mg/0.5 mL pen injector Inject 4.5 mg into the skin every 7 days. 12 pen 3    eplerenone (INSPRA) 25 MG Tab Take 1 tablet by mouth once daily.      ergocalciferol (ERGOCALCIFEROL) 50,000 unit Cap Take 50,000 Units by mouth.      finasteride (PROSCAR) 5 mg tablet TAKE ONE TABLET BY MOUTH ONCE DAILY FOR PROSTATE      hydrALAZINE  (APRESOLINE) 50 MG tablet TAKE ONE TABLET BY MOUTH THREE TIMES A DAY FOR HEART/BLOOD PRESSURE      ketoconazole (NIZORAL) 2 % cream Apply topically 2 (two) times daily.      latanoprost 0.005 % ophthalmic solution Place 1 drop into both eyes every evening.      lidocaine (LIDODERM) 5 % Place 1 patch onto the skin once daily. Wear for 12 hours, then remove. Do not apply a new patch for at least 12 hours.      naloxone (NARCAN) 4 mg/actuation Spry as needed.      omega-3/dha/epa/fish oil (OMEGA-3 FISH OIL ORAL) 1,000 mg once daily.      oxyCODONE (ROXICODONE) 10 mg Tab immediate release tablet Take 1 tablet (10 mg total) by mouth every 4 (four) hours as needed for Pain. 42 tablet 0    thiamine 100 MG tablet Take 0.5 tablets (50 mg total) by mouth once daily. 60 tablet 11     No current facility-administered medications for this visit.       REVIEW OF SYSTEMS:    GENERAL:  No weight loss, malaise or fevers.  HEENT:  Negative for frequent or significant headaches.  NECK:  Negative for lumps, goiter, pain and significant neck swelling.  RESPIRATORY:  Negative for cough, wheezing or shortness of breath. BUZZ, asthma  CARDIOVASCULAR:  Negative for chest pain, leg swelling or palpitations. HTN  GI:  Negative for abdominal discomfort, blood in stools or black stools or change in bowel habits.  MUSCULOSKELETAL:  See HPI.  SKIN:  Negative for lesions, rash, and itching.  RENAL: CKD  ENDO: Diabetes  PSYCH:  Negative for sleep disturbance, mood disorder and recent psychosocial stressors.  HEMATOLOGY/LYMPHOLOGY:  Negative for prolonged bleeding, bruising easily or swollen nodes.  NEURO:   No history of headaches, syncope, paralysis, seizures or tremors.  All other reviewed and negative other than HPI.    Past Medical History:  Past Medical History:   Diagnosis Date    Arthritis     Asthma     chronic    CKD (chronic kidney disease), stage III     patient denies    Deep vein thrombosis     Demyelinating neuropathy      polyneuropathy    Diabetes mellitus type 2 without retinopathy 12/14/2022    Gout     History of blood clots 2013    lower part of both legs    HLD (hyperlipidemia)     Hypertension     Iron deficiency anemia     BUZZ (obstructive sleep apnea)     CPAP    Pulmonary embolism        Past Surgical History:  Past Surgical History:   Procedure Laterality Date    COLONOSCOPY N/A 11/7/2016    Procedure: COLONOSCOPY;  Surgeon: Shar Weiss MD;  Location: [facility] ENDO (4TH FLR);  Service: Endoscopy;  Laterality: N/A;  [facility] Referral. Okay to hold Coumadin 5 days prior to procedure. See Referral scaned in media tab on 10/20/16.    COLONOSCOPY N/A 9/21/2017    Procedure: COLONOSCOPY;  Surgeon: Stuart Trinidad MD;  Location: [facility] ENDO (4TH FLR);  Service: Endoscopy;  Laterality: N/A;  [facility] Olivarez-see scanned docs under media tab          9/6/17-current [facility] and last clinic visit scanned into chart    EPIDURAL STEROID INJECTION N/A 12/17/2020    Procedure: INJECTION, STEROID, EPIDURAL, L5-S1 clear to hold Xarelto 2 days;  Surgeon: Moy Vasquez MD;  Location: [facility] PAIN MGT;  Service: Pain Management;  Laterality: N/A;    EXCISION OF LESION OF FEMUR Right 1/4/2023    Procedure: EXCISION, LESION, FEMUR: POSTERIOR APPROACH;  Surgeon: Prieto Sinha MD;  Location: [facility] OR 2ND FLR;  Service: Orthopedics;  Laterality: Right;    HIP SURGERY Right 2008    exploratory at [facility]    HIP SURGERY Right 8-31-15    THR    INJECTION OF ANESTHETIC AGENT AROUND NERVE Right 10/23/2019    Procedure: BLOCK, NERVE, Obturator and Femoral cleared to hold xarelto 3 days pt. said he's not taking coumadin;  Surgeon: Moy Vasquez MD;  Location: [facility] PAIN MGT;  Service: Pain Management;  Laterality: Right;    INJECTION OF ANESTHETIC AGENT AROUND NERVE Right 7/6/2020    Procedure: BLOCK, NERVE, RIGHT MBB L3,L5,L5;  Surgeon: Moy Vasquez MD;  Location: [facility] PAIN MGT;  Service: Pain Management;  Laterality:  Right;  BLOCK, NERVE, RIGHT MBB L3,L5,L5    INJECTION OF ANESTHETIC AGENT AROUND NERVE Right 7/30/2020    Procedure: BLOCK, NERVE RIGHT L3, L4, L5 2ND;  Surgeon: Moy Vasquez MD;  Location: [facility] PAIN MGT;  Service: Pain Management;  Laterality: Right;  NEEDS CONSENT, XARELTO    LEG SURGERY Left 2012    fibula and tibia    RADIOFREQUENCY ABLATION Right 10/5/2020    Procedure: RADIOFREQUENCY ABLATION RIGHT L3,4,5;  Surgeon: Moy Vasquez MD;  Location: [facility] PAIN MGT;  Service: Pain Management;  Laterality: Right;  RADIOFREQUENCY ABLATION RIGHT L3,4,5  ok to hold xarelto, scanned in Media    SHOULDER SURGERY Right 1989    torn ligament    TRANSFORAMINAL EPIDURAL INJECTION OF STEROID Bilateral 12/4/2023    Procedure: LUMBAR TRANSFORAMINAL BILATERAL L4/5 DIRECT REFERRAL;  Surgeon: Moy Vasquez MD;  Location: [facility] PAIN MGT;  Service: Pain Management;  Laterality: Bilateral;       Family History:  Family History   Problem Relation Name Age of Onset    Cancer Mother          ovarian    Cancer Father      Cancer Sister          breast    Cancer Sister          breast    No Known Problems Brother      No Known Problems Daughter      No Known Problems Daughter      No Known Problems Daughter      No Known Problems Son      No Known Problems Son         Social History:  Social History     Socioeconomic History    Marital status: Single   Tobacco Use    Smoking status: Former     Types: Cigars     Quit date: 8/13/2009     Years since quitting: 15.0    Smokeless tobacco: Never   Substance and Sexual Activity    Alcohol use: No     Alcohol/week: 0.0 standard drinks of alcohol    Drug use: No    Sexual activity: Not Currently       OBJECTIVE:    BP (!) 157/79 (BP Location: Left arm, Patient Position: Sitting, BP Method: X-Large (Automatic))   Pulse 75   Wt 102.5 kg (225 lb 15.5 oz)   BMI 29.81 kg/m²     PHYSICAL EXAMINATION:    General appearance: Well appearing, in no acute distress, alert and oriented x3.  Psych:  Mood and  affect appropriate.  Skin: Skin color, texture, turgor normal, no rashes or lesions, in both upper and lower body. Well-healed scar over right iliac crest. Pain when scar is palpated.   Head/face:  Atraumatic, normocephalic.   Cor: Rate regular.   Pulm: Symmetric chest rise, no respiratory distress noted.   Back: Straight leg raising in the sitting positive is positive for radicular pain on the right in an L2 distribution. There is pain with palpation over paraspinals and facet joints on the right. Limited ROM with pain on extension.   Extremities: No deformities or skin discoloration. Good capillary refill. +1 edema to BLE  Musculoskeletal: 4/5 strength with hip flexion Right. All other strength normal and symmetric. Right hip: pain to lateral hip with external rotation with log roll. No atrophy or tone abnormalities are noted.  Neuro:  No loss of sensation is noted.  Gait: Antalgic- ambulates without assistance.     ASSESSMENT: 74 y.o. year old male with low back and hip pain, consistent with the followin. Lumbosacral radiculopathy  EMG W/ ULTRASOUND AND NERVE CONDUCTION TEST 2 Extremities      2. Neuropathy of right ilioinguinal nerve        3. Right lower quadrant pain        4. Presence of right artificial hip joint        5. Lumbar spondylosis        6. DDD (degenerative disc disease), lumbar        7. Myofascial pain on right side        8. Chronic pain syndrome        9. Avascular necrosis of femoral head, unspecified laterality        10. Chronic pain disorder            PLAN:   We discussed with the patient the assessment and recommendations. The following is the plan we agreed on:   - He is s/p Right TAP block with 90% relief in mid abdominal and back pain  - Previous imaging reviewed.   - Labs reviewed, significant change in BUN and Creatinine.   - Reschedule previously ordered CT Abdomen/Pelvis  - New orders for EMG/NCS of lower extremities  - I have stressed the importance of physical activity  and a home exercise plan to help with pain and improve health.  - Counseled patient regarding the importance of activity modification, constant sleeping habits, and physical therapy.  - RTC pending CT abdomen/pelvis and EMG results    The above plan and management options were discussed at length with patient. Patient is in agreement with the above and verbalized understanding.    Krysta Spain NP  08/20/2024

## 2024-08-27 ENCOUNTER — TELEPHONE (OUTPATIENT)
Dept: PAIN MEDICINE | Facility: CLINIC | Age: 74
End: 2024-08-27
Payer: OTHER GOVERNMENT

## 2024-08-27 NOTE — TELEPHONE ENCOUNTER
----- Message from Krysta Spain NP sent at 8/27/2024  3:15 PM CDT -----  Hey Ladies,   Can we see if we can help this patient r/s his CT that Dr Vasquez had previously ordered. Also can we give him the phone number to neurology so he can schedule his EMG?    Thanks,  Krysta

## 2024-08-27 NOTE — TELEPHONE ENCOUNTER
Staff tried calling pt, there was no answer, staff left voicemail stating to give call back to get emg number and to reschedule the CT scan.

## 2024-09-20 ENCOUNTER — TELEPHONE (OUTPATIENT)
Dept: NEUROLOGY | Facility: CLINIC | Age: 74
End: 2024-09-20
Payer: OTHER GOVERNMENT

## 2024-09-20 NOTE — TELEPHONE ENCOUNTER
----- Message from Tali Trinidad sent at 9/17/2024  3:00 PM CDT -----  Good Afternoon,     The following patient had an appointment in Aug, it was cancelled by the provider. Can someone reach out to the patient to reschedule.           Thank you,    Tali Blum

## 2024-10-01 ENCOUNTER — TELEPHONE (OUTPATIENT)
Dept: PAIN MEDICINE | Facility: CLINIC | Age: 74
End: 2024-10-01
Payer: OTHER GOVERNMENT

## 2024-10-01 NOTE — TELEPHONE ENCOUNTER
----- Message from Bren sent at 10/1/2024  3:22 PM CDT -----  Regarding: nerve conductor test  Type:  Patient Returning Call      Name of who is calling:pt        What is request in detail:Pt is requesting a call back in regards to having his nerve conductor test scheduled ( not sure if that is correct name, its a nerve test).        Can clinic reply by [facility]:no        [facility]

## 2024-10-02 ENCOUNTER — TELEPHONE (OUTPATIENT)
Dept: PAIN MEDICINE | Facility: CLINIC | Age: 74
End: 2024-10-02
Payer: OTHER GOVERNMENT

## 2024-10-02 NOTE — TELEPHONE ENCOUNTER
----- Message from Mimi sent at 10/2/2024  2:24 PM CDT -----  Regarding: Concerns  Name of Who is Calling:Macario            What is the request in detail:Pt is requesting a call back concerning a nerve test ASAP            Can the clinic reply by [facility]:No            [facility]: [phone]

## 2024-10-02 NOTE — TELEPHONE ENCOUNTER
Staff spoke to patient and informed that our next available is December. Patient want was very upset. He stated he did not want to wait that long and will contact the pain clinic to put in as a stat. There are no stat orders for EMG. Staff did not not explain that to patient, but staff did give patient another clinic location so he can get scheduled.

## 2024-10-03 ENCOUNTER — TELEPHONE (OUTPATIENT)
Dept: PAIN MEDICINE | Facility: CLINIC | Age: 74
End: 2024-10-03
Payer: OTHER GOVERNMENT

## 2024-10-03 NOTE — TELEPHONE ENCOUNTER
----- Message from Kyung sent at 10/3/2024  1:16 PM CDT -----  Regarding: call back  Type: Patient Call Back    Who called: pt     What is the request in detail: requesting call back from Shayan to discuss upcoming EMG     Can the clinic reply by [facility]?no    Would the patient rather a call back or a response via [facility]? call    Best call back number: [phone]     Additional Information:

## 2024-10-03 NOTE — TELEPHONE ENCOUNTER
----- Message from Kyung sent at 10/3/2024 10:09 AM CDT -----  Regarding: call back  Type: Patient Call Back    Who called: pt     What is the request in detail: requesting call to discuss care     Can the clinic reply by [facility]?no    Would the patient rather a call back or a response via [facility]? call    Best call back number:  [phone]    Additional Information:

## 2024-10-03 NOTE — TELEPHONE ENCOUNTER
----- Message from Kyung sent at 10/3/2024  1:16 PM CDT -----  Regarding: call back  Type: Patient Call Back    Who called: pt     What is the request in detail: requesting call back from Shayan to discuss upcoming EMG     Can the clinic reply by [facility]?no    Would the patient rather a call back or a response via [facility]? call    Best call back number: [phone]     Additional Information:

## 2024-10-03 NOTE — TELEPHONE ENCOUNTER
Staff spoke with patient. Patient states he would like for us to send another message to EMG scheduling team to be scheduled for the December appointment and to just put his appointment on the wait list. Staff informed patient that we will send a message to EMG. Patient verbalized understanding.

## 2024-10-03 NOTE — TELEPHONE ENCOUNTER
----- Message from Mary sent at 10/3/2024 11:51 AM CDT -----  Regarding: Telephone Call  Name of Who is Calling:  Patient          What is the request in detail:  Please contact patient he would like to have a call back            Can the clinic reply by [facility]: No            What Number to Call Back if not in [facility] [phone]

## 2024-10-09 ENCOUNTER — OFFICE VISIT (OUTPATIENT)
Dept: BARIATRICS | Facility: CLINIC | Age: 74
End: 2024-10-09
Payer: OTHER GOVERNMENT

## 2024-10-09 VITALS
HEART RATE: 73 BPM | OXYGEN SATURATION: 98 % | WEIGHT: 237.19 LBS | SYSTOLIC BLOOD PRESSURE: 146 MMHG | BODY MASS INDEX: 31.3 KG/M2 | DIASTOLIC BLOOD PRESSURE: 84 MMHG

## 2024-10-09 DIAGNOSIS — E11.9 DIABETES MELLITUS TYPE 2 WITHOUT RETINOPATHY: ICD-10-CM

## 2024-10-09 DIAGNOSIS — E66.811 OBESITY, CLASS I, BMI 30.0-34.9 (SEE ACTUAL BMI): Primary | ICD-10-CM

## 2024-10-09 DIAGNOSIS — I63.9 CEREBELLAR STROKE: ICD-10-CM

## 2024-10-09 PROCEDURE — 99999 PR PBB SHADOW E&M-EST. PATIENT-LVL V: CPT | Mod: PBBFAC,,, | Performed by: INTERNAL MEDICINE

## 2024-10-09 PROCEDURE — 99215 OFFICE O/P EST HI 40 MIN: CPT | Mod: PBBFAC | Performed by: INTERNAL MEDICINE

## 2024-10-09 RX ORDER — SEMAGLUTIDE 2.68 MG/ML
2 INJECTION, SOLUTION SUBCUTANEOUS
Qty: 9 ML | Refills: 1 | Status: SHIPPED | OUTPATIENT
Start: 2024-10-09 | End: 2024-10-09 | Stop reason: SDUPTHER

## 2024-10-09 RX ORDER — SEMAGLUTIDE 2.68 MG/ML
2 INJECTION, SOLUTION SUBCUTANEOUS
Qty: 9 ML | Refills: 1 | Status: SHIPPED | OUTPATIENT
Start: 2024-10-09
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
## 2024-10-15 ENCOUNTER — TELEPHONE (OUTPATIENT)
Dept: NEUROLOGY | Facility: CLINIC | Age: 74
End: 2024-10-15
Payer: OTHER GOVERNMENT

## 2024-10-15 NOTE — TELEPHONE ENCOUNTER
Left a message for patient informing they have been scheduled an appointment on DEC 30th at 8am.  And asked to call back to reschedule if that day/time does not work for them

## 2024-11-04 ENCOUNTER — TELEPHONE (OUTPATIENT)
Dept: PAIN MEDICINE | Facility: CLINIC | Age: 74
End: 2024-11-04
Payer: OTHER GOVERNMENT

## 2024-11-04 NOTE — TELEPHONE ENCOUNTER
"----- Message from Destinee sent at 11/4/2024  1:36 PM CST -----  Regarding: Appointment  "Type:  Patient Call Back    Who Called:PT    What is the reqeust in detail:Pt is scheduled on 11/6, but would like to know if he can come in for 8am. Please advise    Can the clinic reply by [facility]?no     Best Call Back Number:[phone]      Additional Information:  "

## 2024-11-04 NOTE — TELEPHONE ENCOUNTER
Staff tried giving pt a call to inform him that his appointment on 12/9 isn't in the pain management department.   It is the EMG center. Staff left pt voicemail in detail.

## 2024-11-04 NOTE — TELEPHONE ENCOUNTER
Staff spoke with patient and informed him that Dr. Vasquez is completely booked and we aren't able to move his appointment to 8. Patient verbalized understanding.

## 2024-11-05 ENCOUNTER — TELEPHONE (OUTPATIENT)
Dept: PAIN MEDICINE | Facility: CLINIC | Age: 74
End: 2024-11-05
Payer: OTHER GOVERNMENT

## 2024-11-05 NOTE — TELEPHONE ENCOUNTER
While doing confirmation calls the day prior to scheduled appointment. Left a voicemail with appointment date and time. Documenting the left voicemail with a telephone encounter.

## 2024-11-06 ENCOUNTER — OFFICE VISIT (OUTPATIENT)
Dept: PAIN MEDICINE | Facility: CLINIC | Age: 74
End: 2024-11-06
Attending: ANESTHESIOLOGY
Payer: OTHER GOVERNMENT

## 2024-11-06 ENCOUNTER — TELEPHONE (OUTPATIENT)
Dept: PAIN MEDICINE | Facility: CLINIC | Age: 74
End: 2024-11-06

## 2024-11-06 VITALS
SYSTOLIC BLOOD PRESSURE: 172 MMHG | DIASTOLIC BLOOD PRESSURE: 82 MMHG | HEART RATE: 70 BPM | WEIGHT: 237.19 LBS | TEMPERATURE: 98 F | BODY MASS INDEX: 31.3 KG/M2

## 2024-11-06 DIAGNOSIS — M54.16 LUMBAR RADICULOPATHY: Primary | ICD-10-CM

## 2024-11-06 DIAGNOSIS — M54.17 LUMBOSACRAL RADICULOPATHY: Primary | ICD-10-CM

## 2024-11-06 DIAGNOSIS — M25.551 RIGHT HIP PAIN: ICD-10-CM

## 2024-11-06 DIAGNOSIS — M87.059 AVASCULAR NECROSIS OF FEMORAL HEAD, UNSPECIFIED LATERALITY: ICD-10-CM

## 2024-11-06 DIAGNOSIS — M54.16 LUMBAR RADICULOPATHY, CHRONIC: ICD-10-CM

## 2024-11-06 DIAGNOSIS — G89.4 CHRONIC PAIN SYNDROME: ICD-10-CM

## 2024-11-06 PROCEDURE — 99214 OFFICE O/P EST MOD 30 MIN: CPT | Mod: PBBFAC | Performed by: ANESTHESIOLOGY

## 2024-11-06 PROCEDURE — 99999 PR PBB SHADOW E&M-EST. PATIENT-LVL IV: CPT | Mod: PBBFAC,,, | Performed by: ANESTHESIOLOGY

## 2024-11-06 PROCEDURE — 99214 OFFICE O/P EST MOD 30 MIN: CPT | Mod: S$PBB,,, | Performed by: ANESTHESIOLOGY

## 2024-11-06 NOTE — TELEPHONE ENCOUNTER
----- Message from Mimi sent at 11/6/2024 11:56 AM CST -----  Regarding: [facility]provval from [facility]  Name of Who is Calling:Macario            What is the request in detail:Pt said the shot is [facility] the [facility] and can order the shot.            Can the clinic reply by [facility]:No            [facility] to [facility] Back [facility] [phone]

## 2024-11-06 NOTE — H&P (VIEW-ONLY)
Chronic patient Established Note (Follow up visit)      SUBJECTIVE:  Interval History 11/06/2024:  Macario Dior Jr. Returns to clinic for follow-up of R-hip pain. The patient states that two weeks ago 10/23 he woke up in bed with sharp pain on the top of the R iliac crest with radiating radiation doing his back anterior to Right SI joint when laying down. However, when his standing and trying, the pain starts at top of the R iliac crest radiating to the pubis. Pain level is 10/10 with movement and 8/10 at rest. When every step he receives a sharp stabbing pain starting from the back of heel and jumps to the R iliac crest (not radiates). Pain is exacerbated with palpation, standing, bending and walking. No mitigating symptoms. No relief with oxycodone, patches, heat. The pain has been there for a long time but it only occurred when he walked for prolonged period of time.  [facility] or CT of the hip and per patient, the provider told him that there was no acute pathology. The patient was prescribed Robaxin 500mg TID and Prednisone (dose unkown) TID. The pain was alleviated  from 10/10 to 6/10 until Андрей 11/3. He was able to walk but his pain lingered.  On 11/4, while still taking the medication, the pain returned. Denies changes in sensation or weakness.     Interval History 8/20/2024:  Macario Dior Jr. Returns to clinic for follow-up of right hip and abdominal pain.He had right TAP block on 7/9/2024. He states this helped with abdominal and mid back pain, but he continues to complain of right-sided pain over the right iliac crest to the groin. He states the pain is worse when he places pressure on his right heel while ambulating. He also reports numbness and altered sensation to bilateral feet right>left. He takes Oxycodone as needed which allows him to have fun with his grandchildren. He had an EMG/NCS ordered previously, but it was never completed. He denies recent  falls or trauma. He denies new onset fever/night sweats, urinary incontinence, bowel incontinence, significant weight changes, significant motor weakness or changes, or loss of sensations. His pain today is 8/10.     Interval History 6/12/24  Pt presents with same R hip pain he has been having for years. He was unable to get the MRI hip due to a lack of equipment at the imaging center. He still complains of pain in a QL/Iliopsoas distribution. Still has numbness into the R leg and into the groin.     Interval History 5/15/2024:  Macario Dior Jr. presents to the clinic for a follow-up appointment for hip pain. He continues to report right hip pain. He does have radiating pain into the groin and lateral thigh. He also notes pain that radiates into his back. His right leg feels week. His feet are numb. He is frustrated with his pain. He has tried ABIODUN and RFAs with short term relief. He was taking Lyrica but is out. He did recently see Orthopedics who obtained lab work and ordered imaging. He denies any other health changes. His pain today is 10/10.    Interval history 12/13/2023:  71-year-old male presents today he is status post a bilateral TF ABIODUN as 5 on 12/03 reporting minimal relief.  He continues to report primarily axial low back pain with intermittent right leg.  Pain with extension today also with walking, standing.  Facetogenic pain upon palpation of lower lumbar spine.  He did report more axial low back pain bandlike distribution versus state that the right leg hurts.  He would like to be considered for other pain interventions.     Interval History 11/22/2022:  Mr Dior presents for follow up evaluation of chronic R hip pain and awaiting surgery. He is having acute R sided lumbar myofascial pain and requesting TPI to address symptoms as this has provided benefit in past and will be going out of town. He has no s/s concerning for cauda equina syndrome. He denies SE of his medication regimen.      Interval  History 9/23/2022:  The Pt presents for delayed follow He has continued lumbar complaints but focal right hip pain that continues. His R hip pain has continued and he was unaware he was supposed to see Dr Sinha today to address this. He can continue to see us at any time but will reach out to Dr Sinha's office to get him continued care regarding his greatest pain generator at this time.      Interval History 2/18/2021:  The patient presents for follow up of diffuse pain complaints. Pt canceled prior Hip block. State he states lyrica increase to 100mg has helped but neuropathy but also states he is also having stent placed to R leg where pain is greater due to insuffiencey. He continues to take Oxycodone QID. He denies any adverse SE of medications. Denies new areas of pain.       Interval History 12/4/2020:  Pt presents for follow up of lower back pain. He continues to have benefit from RFA of right lower lumbar. He is doing well with med mgt of oxycodone and lyrica. He states night time leg cramping. The patient denies myelopathic symptoms such as handwriting changes or difficulty with buttons/coins/keys. Denies perineal paresthesias, bowel/bladder dysfunction.     Interval History 10/20/2020:  The patient presents follow-up for lower back pain.  He has had near resolution since right-sided L 3, 4, 5 RFA.  He states he is doing well.  He continues to take minimum the medication to address pain.  He is currently taking oxycodone 5 mg q.i.d. as well as Lyrica 75 b.i.d..  He finds he is beneficial without adverse side effects.The patient denies myelopathic symptoms such as handwriting changes or difficulty with buttons/coins/keys. Denies perineal paresthesias, bowel/bladder dysfunction.     Interval History 8/11/2020:  The patient presents for follow up of lower back pain. Pt over interval had 2 MBB of right L3,4,5 with >90% relief of both procedures. Pt states + a.m. stiffness, states when he has MBB of lumbar he  "notices his hip pain more and vise versa when he has hip block. Lumbar pain>R hip pain at this time. He continues to take oxycodone 5mg QID which is beneficial to pain control without adverse side effects. The patient denies myelopathic symptoms such as handwriting changes or difficulty with buttons/coins/keys. Denies perineal paresthesias, bowel/bladder dysfunction.     Interval History 05/27/2020:  S/P Right Femoral / Obturator Nerve Block in 10/2019 -- 50% relief for 2 days --  lost to follow up after that  S/p Right DHRUV 08/2015 -- doing well for 1.5 years  Pain is located at right hip joint. Occasionally radiates to right thigh.   Aggravated standing, walking, laying down.   Alleviated by Oxycodone     HPI:  Macario Dior is a 69 y.o. Man with PMH DVT s/p right DHRUV that presents today as a new consult for low back pain.  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 Since 2015 DHRUV, he initially had improved but progressive pain in the right hip and low back.  In the last 6 months, he has had 4 back injections from Dr. De Los Santos that lasted for 2 weeks each.  1 month ago, he had a Lumbar ABIODUN that provided 100% relief that lasted 5 days for the low back but the pain changed to radiate to the right hip.  Since the ABIODUN relief stopped, the pain has "worked its way back up to the low back."  The pain is described as intense, sharp, and shoots up from the right hip into the low back to the right periscapular area. The shooting pain is worse than the low back pain. The pain is interfering with everything, including walking, sitting, and putting on his shoes.  Oxycodone 10mg Q6H provides relief. Lidocaine patches and TENS provide minimal relief when he wakes up.  He did physical therapy for 1 year with minimal relief.  He is unable to regularly exercise secondary to pain. He is radha to get 4 hours of uninterrupted sleep per night.  He wakes up with stiffness in his low back despite $4,000 mattresses.  He " "has weakness and buckling in the right lower extremity.  He endorses worsening falls due to weakness "seems like the leg is not there." He says " I need the bone removed" referring to his right hip. [facility] is a  [facility] He denies incontinence and saddle anesthesia.    Pain Disability Index Review:      11/6/2024     8:01 AM 7/9/2024     2:19 PM 6/12/2024    10:55 AM   Last 3 PDI Scores   Pain Disability Index (PDI) 60 70 70       Pain Medications:  None    Opioid Contract: not applicable     report:  Not applicable    Pain Procedures:   Lumbar ABIODUN 100% relief 5 days - Dr. De Los Santos   Lumbar TPIs 0% relief - Dr. De Los Santos   10/23/2019- Right femoral and obturator nerve block  7/6/2020 Right L3,4,5 MBB  7/30/2020 Right L3,4,5 MBB  10/5/2020- Right L3,4,5 RFA  12/17/2020- L5/S1 IL ABIODUN  12/4/2023- Bilateral L4/5 TF ABIODUN  7/9/2024 - Right TAP block under U/S - 90% relief of back pain      Physical Therapy/Home Exercise: yes    Imaging:     XR HIP WITH PELVIS WHEN PERFORMED, 2 OR 3  VIEWS RIGHT     CLINICAL HISTORY:  RM 3; Presence of right artificial hip joint     TECHNIQUE:  AP view of the pelvis and frog leg lateral view of the right hip were performed.     COMPARISON:  06/23/2023     FINDINGS:  Postoperative changes of right total hip arthroplasty with stable hardware positioning and alignment.  No acute fracture, dislocation or bone destruction.  Patchy areas of sclerosis involving the left femoral head suggesting avascular necrosis with mild left hip joint space narrowing.  There are surgical clips in the right inguinal region.     Impression:     Postoperative changes of right total hip arthroplasty.     Electronically signed by:Brady Brown MD  Date:                                            05/14/2024  Time:                                           13:15    MRI HIP WITHOUT CONTRAST RIGHT     CLINICAL HISTORY:  Hip replacement, loosening suspected;  Presence of right artificial hip " joint     TECHNIQUE:  Multisequence, multiplanar MRI of the right hip performed without contrast.     COMPARISON:  Hip radiograph 05/14/2024, CT head 09/13/2022     FINDINGS:  Bones: Postoperative changes of right total hip arthroplasty.  No periprosthetic fracture or osteolysis.  Mild heterotopic ossification posteriorly, unchanged from prior CT.  No infiltrative process.  Bone marrow signal is maintained.     Pelvic joints: Sacroiliac joints and pubic symphysis are unremarkable.     Muscles/tendons: Regional muscles and tendons appear unremarkable.  No evidence for trochanteric or iliopsoas bursitis.  No evidence for ischiofemoral impingement.     Miscellaneous: No periprosthetic fluid collections or masses.  Limited evaluation of pelvic soft tissues is unremarkable.  Postoperative changes of the right groin.     Impression:     1. Postoperative changes of right total hip arthroplasty with unchanged mild heterotopic calcification.  No evidence for loosening or osteolysis.     Electronically signed by resident: Nisa Murillo  Date:                                            07/01/2024  Time:                                           13:32     Electronically signed by:Jonas Kumar MD  Date:                                            07/01/2024  Time:                                           16:05       MRI LUMBAR SPINE WITHOUT CONTRAST     CLINICAL HISTORY:  Lumbar radiculopathy, symptoms persist with conservative treatment; Radiculopathy, lumbar region     TECHNIQUE:  Multiplanar, multisequence MR images were acquired from the thoracolumbar junction to the sacrum without the administration of contrast.     COMPARISON:  9/17/2022     FINDINGS:  The marrow demonstrates homogeneous signal.  Vertebral body heights are maintained.     Disc spaces are maintained.  Disc desiccation L2-3 and L3-4.     Conus terminates appropriately at L1.     Multilevel degenerative change as diesel below:     T12-L1: No focal disc  abnormality.  No significant canal or neural foraminal narrowing.     L1-2: Facet arthropathy, right greater than left with mild right neural foraminal narrowing.     L2-3: Small posterior circumferential disc bulge, facet arthropathy (right greater than left), and thickening of the ligamentum flavum.  Prominent posterior epidural fat.  Findings contribute to mild canal narrowing.  Mild bilateral neural foraminal narrowing.     L3-4: Small posterior circumferential disc bulge.  Facet arthropathy with thickening of the ligamentum flavum (left greater than right).  Mild canal and mild bilateral neural foraminal narrowing, left greater than right.     L4-5: Small posterior circumferential disc bulge, facet arthropathy, and thickening of the ligamentum flavum.  Mild canal narrowing.  Mild bilateral neural foraminal narrowing, right greater than left.     L5-S1: Facet arthropathy resulting in mild left neural foraminal narrowing.     Atrophic right kidney.     Impression:     Overall, stable exam compared to prior.  Multilevel degenerative change contributes to multilevel mild neural foraminal narrowing.  Mild canal narrowing as given in detail level by level as above.        Electronically signed by:Shazia Eaton  Date:                                            05/30/2024  Time:                                           08:59    Allergies:   Review of patient's allergies indicates:   Allergen Reactions    Amlodipine Swelling    Lisinopril Tinitus and Swelling    Pregabalin Swelling    Azithromycin      Muscles tense up    Levetiracetam Other (See Comments)    Acetaminophen Rash     Feels like needles are sticking him per pt       Current Medications:   Current Outpatient Medications   Medication Sig Dispense Refill    albuterol 90 mcg/actuation inhaler Inhale 2 puffs into the lungs every 6 (six) hours as needed for Wheezing.       atorvastatin (LIPITOR) 80 MG tablet Take 80 mg by mouth once daily. Take one-half tablet  daily      capsaicin 0.1 % Crea Apply twice to area of maximum paresthesia 15 g 6    carboxymethyl-gly-awue16-SC 0.5-1-0.5 % Dpet Place 1 drop into both eyes 4 (four) times daily.      carboxymethylcellulose sodium 1 % DpGe Place 0.4 mLs into both eyes 4 (four) times daily as needed.      chlorhexidine (PERIDEX) 0.12 % solution RINSE 1 CAPFUL BY MOUTH ONCE DAILY AS NEEDED SWISH FOR 30 SECONDS AFTER,AND SPIT OUT      chlorthalidone (HYGROTEN) 25 MG Tab Take 25 mg by mouth once daily.      CLOBETASOL PROPIONATE, BULK, MISC Apply topically once daily.      cyanocobalamin (VITAMIN B-12) 100 MCG tablet Take 100 mcg by mouth once daily. Take two tablets      cycloSPORINE (RESTASIS) 0.05 % ophthalmic emulsion INSTILL 0.4ML IN EACH EYE EVERY 12 HOURS      eplerenone (INSPRA) 25 MG Tab Take 1 tablet by mouth once daily.      ergocalciferol (ERGOCALCIFEROL) 50,000 unit Cap Take 50,000 Units by mouth.      finasteride (PROSCAR) 5 mg tablet TAKE ONE TABLET BY MOUTH ONCE DAILY FOR PROSTATE      hydrALAZINE (APRESOLINE) 50 MG tablet TAKE ONE TABLET BY MOUTH THREE TIMES A DAY FOR HEART/BLOOD PRESSURE      ketoconazole (NIZORAL) 2 % cream Apply topically 2 (two) times daily.      latanoprost 0.005 % ophthalmic solution Place 1 drop into both eyes every evening.      lidocaine (LIDODERM) 5 % Place 1 patch onto the skin once daily. Wear for 12 hours, then remove. Do not apply a new patch for at least 12 hours.      naloxone (NARCAN) 4 mg/actuation Spry as needed.      omega-3/dha/epa/fish oil (OMEGA-3 FISH OIL ORAL) 1,000 mg once daily.      oxyCODONE (ROXICODONE) 10 mg Tab immediate release tablet Take 1 tablet (10 mg total) by mouth every 4 (four) hours as needed for Pain. 42 tablet 0    semaglutide (OZEMPIC) 2 mg/dose (8 mg/3 mL) PnIj Inject 2 mg into the skin every 7 days. 9 mL 1    thiamine 100 MG tablet Take 0.5 tablets (50 mg total) by mouth once daily. 60 tablet 11    valsartan (DIOVAN) 320 MG tablet Take 320 mg by mouth.       carvediloL (COREG) 25 MG tablet Take 1 tablet (25 mg total) by mouth 2 (two) times daily. 60 tablet 11     No current facility-administered medications for this visit.       REVIEW OF SYSTEMS:    GENERAL:  No weight loss, malaise or fevers.  HEENT:  Negative for frequent or significant headaches.  NECK:  Negative for lumps, goiter, pain and significant neck swelling.  RESPIRATORY:  Negative for cough, wheezing or shortness of breath. BUZZ, asthma  CARDIOVASCULAR:  Negative for chest pain, leg swelling or palpitations. HTN  GI:  Negative for abdominal discomfort, blood in stools or black stools or change in bowel habits.  MUSCULOSKELETAL:  See HPI.  SKIN:  Negative for lesions, rash, and itching.  RENAL: CKD  ENDO: Diabetes  PSYCH:  Negative for sleep disturbance, mood disorder and recent psychosocial stressors.  HEMATOLOGY/LYMPHOLOGY:  Negative for prolonged bleeding, bruising easily or swollen nodes.  NEURO:   No history of headaches, syncope, paralysis, seizures or tremors.  All other reviewed and negative other than HPI.    Past Medical History:  Past Medical History:   Diagnosis Date    Arthritis     Asthma     chronic    CKD (chronic kidney disease), stage III     patient denies    Deep vein thrombosis     Demyelinating neuropathy     polyneuropathy    Diabetes mellitus type 2 without retinopathy 12/14/2022    Gout     History of blood clots 2013    lower part of both legs    HLD (hyperlipidemia)     Hypertension     Iron deficiency anemia     BUZZ (obstructive sleep apnea)     CPAP    Pulmonary embolism        Past Surgical History:  Past Surgical History:   Procedure Laterality Date    COLONOSCOPY N/A 11/7/2016    Procedure: COLONOSCOPY;  Surgeon: Shar Weiss MD;  Location: [address]);  Service: Endoscopy;  Laterality: N/A;  [facility] Referral. Okay to hold Coumadin 5 days prior to procedure. See Referral scaned in media tab on 10/20/16.    COLONOSCOPY N/A 9/21/2017    Procedure: COLONOSCOPY;   Surgeon: Stuart Trinidad MD;  Location: [facility] ENDO (4TH FLR);  Service: Endoscopy;  Laterality: N/A;  [facility] Olivarez-see scanned docs under media tab          9/6/17-current [facility] authorization and last clinic visit scanned into chart    EPIDURAL STEROID INJECTION N/A 12/17/2020    Procedure: INJECTION, STEROID, EPIDURAL, L5-S1 clear to hold Xarelto 2 days;  Surgeon: Moy Vasquez MD;  Location: [facility] [place] PAIN MGT;  Service: Pain Management;  Laterality: N/A;    EXCISION OF LESION OF FEMUR Right 1/4/2023    Procedure: EXCISION, LESION, FEMUR: POSTERIOR APPROACH;  Surgeon: Prieto Sinha MD;  Location: [facility] OR 2ND FLR;  Service: Orthopedics;  Laterality: Right;    HIP SURGERY Right 2008    exploratory at [facility]    HIP SURGERY Right 8-31-15    THR    INJECTION OF ANESTHETIC AGENT AROUND NERVE Right 10/23/2019    Procedure: BLOCK, NERVE, Obturator and Femoral cleared to hold xarelto 3 days pt. said he's not taking coumadin;  Surgeon: Moy Vasquez MD;  Location: [facility] [place] PAIN MGT;  Service: Pain Management;  Laterality: Right;    INJECTION OF ANESTHETIC AGENT AROUND NERVE Right 7/6/2020    Procedure: BLOCK, NERVE, RIGHT MBB L3,L5,L5;  Surgeon: Moy Vasquez MD;  Location: [facility] [place] PAIN MGT;  Service: Pain Management;  Laterality: Right;  BLOCK, NERVE, RIGHT MBB L3,L5,L5    INJECTION OF ANESTHETIC AGENT AROUND NERVE Right 7/30/2020    Procedure: BLOCK, NERVE RIGHT L3, L4, L5 2ND;  Surgeon: Moy Vasquez MD;  Location: [facility] [place] PAIN MGT;  Service: Pain Management;  Laterality: Right;  NEEDS CONSENT, XARELTO    LEG SURGERY Left 2012    fibula and tibia    RADIOFREQUENCY ABLATION Right 10/5/2020    Procedure: RADIOFREQUENCY ABLATION RIGHT L3,4,5;  Surgeon: Moy Vasquez MD;  Location: [facility] [place] PAIN MGT;  Service: Pain Management;  Laterality: Right;  RADIOFREQUENCY ABLATION RIGHT L3,4,5  ok to hold xarelto, scanned in Media    SHOULDER SURGERY Right 1989    torn ligament    TRANSFORAMINAL EPIDURAL INJECTION OF STEROID  Bilateral 12/4/2023    Procedure: LUMBAR TRANSFORAMINAL BILATERAL L4/5 DIRECT REFERRAL;  Surgeon: Moy Vasquez MD;  Location: [facility] PAIN T;  Service: Pain Management;  Laterality: Bilateral;       Family History:  Family History   Problem Relation Name Age of Onset    Cancer Mother          ovarian    Cancer Father      Cancer Sister          breast    Cancer Sister          breast    No Known Problems Brother      No Known Problems Daughter      No Known Problems Daughter      No Known Problems Daughter      No Known Problems Son      No Known Problems Son         Social History:  Social History     Socioeconomic History    Marital status: Single   Tobacco Use    Smoking status: Former     Types: Cigars     Quit date: 8/13/2009     Years since quitting: 15.2    Smokeless tobacco: Never   Substance and Sexual Activity    Alcohol use: No     Alcohol/week: 0.0 standard drinks of alcohol    Drug use: No    Sexual activity: Not Currently       OBJECTIVE:    BP (!) 172/82   Pulse 70   Temp 97.6 °F (36.4 °C)   Wt 107.6 kg (237 lb 3.4 oz)   BMI 31.30 kg/m²     PHYSICAL EXAMINATION:    General appearance: Well appearing, in no acute distress, alert and oriented x3.  Psych:  Mood and affect appropriate.  Skin: Skin color, texture, turgor normal, no rashes or lesions, in both upper and lower body. Well-healed scar over right iliac crest. Pain when scar is palpated.   Head/face:  Atraumatic, normocephalic.   Cor: Rate regular.   Pulm: Symmetric chest rise, no respiratory distress noted.   Back: There is pain with palpation over paraspinals and facet joints on the right L4, L5. Limited ROM with pain on extension. Pain with lateral flexion towards the R  Extremities: No deformities or skin discoloration. Good capillary refill. +1 edema to BLE  Musculoskeletal: 4/5 strength with hip flexion Right. All other strength normal and symmetric. Right hip: pain to lateral hip with external rotation with log roll. + Right FADIR.  Pain on palpation R top iliac Crest;  Millarg neg + F. Nerve stretch b/l (R>L) No atrophy or tone abnormalities are noted.  Neuro:  No loss of sensation is noted.  Gait: Antalgic- ambulates without assistance.     ASSESSMENT: 74 y.o. year old male with low back and hip pain, consistent with the followin. Lumbosacral radiculopathy  Procedure Order to Pain Management      2. Right hip pain        3. Lumbar radiculopathy, chronic  Procedure Order to Pain Management      4. Avascular necrosis of femoral head, unspecified laterality        5. Chronic pain syndrome  Procedure Order to Pain Management            PLAN:   We discussed with the patient the assessment and recommendations. The following is the plan we agreed on:   - Previous imaging reviewed. MRI oh Lumbar spine showing L1/L2 -narrowing of forminal on R-side. L2/L3- congenital shortening of pedicles  - Ordered Transforaminal injection of L1/L2, L2/3    The above plan and management options were discussed at length with patient. Patient is in agreement with the above and verbalized understanding.    I discussed the patient's evaluation, assessment and plan with Dr. Vasquez.    Beth Adam MD  [facility] Fellow      I have personally taken the history and examined this patient and agree with the fellow's note as stated above.

## 2024-11-06 NOTE — PROGRESS NOTES
Chronic patient Established Note (Follow up visit)      SUBJECTIVE:  Interval History 11/06/2024:  Macario Dior Jr. Returns to clinic for follow-up of R-hip pain. The patient states that two weeks ago 10/23 he woke up in bed with sharp pain on the top of the R iliac crest with radiating radiation doing his back anterior to Right SI joint when laying down. However, when his standing and trying, the pain starts at top of the R iliac crest radiating to the pubis. Pain level is 10/10 with movement and 8/10 at rest. When every step he receives a sharp stabbing pain starting from the back of heel and jumps to the R iliac crest (not radiates). Pain is exacerbated with palpation, standing, bending and walking. No mitigating symptoms. No relief with oxycodone, patches, heat. The pain has been there for a long time but it only occurred when he walked for prolonged period of time.  [facility] or CT of the hip and per patient, the provider told him that there was no acute pathology. The patient was prescribed Robaxin 500mg TID and Prednisone (dose unkown) TID. The pain was alleviated  from 10/10 to 6/10 until Андрей 11/3. He was able to walk but his pain lingered.  On 11/4, while still taking the medication, the pain returned. Denies changes in sensation or weakness.     Interval History 8/20/2024:  Macario Dior Jr. Returns to clinic for follow-up of right hip and abdominal pain.He had right TAP block on 7/9/2024. He states this helped with abdominal and mid back pain, but he continues to complain of right-sided pain over the right iliac crest to the groin. He states the pain is worse when he places pressure on his right heel while ambulating. He also reports numbness and altered sensation to bilateral feet right>left. He takes Oxycodone as needed which allows him to have fun with his grandchildren. He had an EMG/NCS ordered previously, but it was never completed. He denies recent  falls or trauma. He denies new onset fever/night sweats, urinary incontinence, bowel incontinence, significant weight changes, significant motor weakness or changes, or loss of sensations. His pain today is 8/10.     Interval History 6/12/24  Pt presents with same R hip pain he has been having for years. He was unable to get the MRI hip due to a lack of equipment at the imaging center. He still complains of pain in a QL/Iliopsoas distribution. Still has numbness into the R leg and into the groin.     Interval History 5/15/2024:  Macario Dior Jr. presents to the clinic for a follow-up appointment for hip pain. He continues to report right hip pain. He does have radiating pain into the groin and lateral thigh. He also notes pain that radiates into his back. His right leg feels week. His feet are numb. He is frustrated with his pain. He has tried ABIODUN and RFAs with short term relief. He was taking Lyrica but is out. He did recently see Orthopedics who obtained lab work and ordered imaging. He denies any other health changes. His pain today is 10/10.    Interval history 12/13/2023:  71-year-old male presents today he is status post a bilateral TF ABIODUN as 5 on 12/03 reporting minimal relief.  He continues to report primarily axial low back pain with intermittent right leg.  Pain with extension today also with walking, standing.  Facetogenic pain upon palpation of lower lumbar spine.  He did report more axial low back pain bandlike distribution versus state that the right leg hurts.  He would like to be considered for other pain interventions.     Interval History 11/22/2022:  Mr Dior presents for follow up evaluation of chronic R hip pain and awaiting surgery. He is having acute R sided lumbar myofascial pain and requesting TPI to address symptoms as this has provided benefit in past and will be going out of town. He has no s/s concerning for cauda equina syndrome. He denies SE of his medication regimen.      Interval  History 9/23/2022:  The Pt presents for delayed follow He has continued lumbar complaints but focal right hip pain that continues. His R hip pain has continued and he was unaware he was supposed to see Dr Sinha today to address this. He can continue to see us at any time but will reach out to Dr Sinha's office to get him continued care regarding his greatest pain generator at this time.      Interval History 2/18/2021:  The patient presents for follow up of diffuse pain complaints. Pt canceled prior Hip block. State he states lyrica increase to 100mg has helped but neuropathy but also states he is also having stent placed to R leg where pain is greater due to insuffiencey. He continues to take Oxycodone QID. He denies any adverse SE of medications. Denies new areas of pain.       Interval History 12/4/2020:  Pt presents for follow up of lower back pain. He continues to have benefit from RFA of right lower lumbar. He is doing well with med mgt of oxycodone and lyrica. He states night time leg cramping. The patient denies myelopathic symptoms such as handwriting changes or difficulty with buttons/coins/keys. Denies perineal paresthesias, bowel/bladder dysfunction.     Interval History 10/20/2020:  The patient presents follow-up for lower back pain.  He has had near resolution since right-sided L 3, 4, 5 RFA.  He states he is doing well.  He continues to take minimum the medication to address pain.  He is currently taking oxycodone 5 mg q.i.d. as well as Lyrica 75 b.i.d..  He finds he is beneficial without adverse side effects.The patient denies myelopathic symptoms such as handwriting changes or difficulty with buttons/coins/keys. Denies perineal paresthesias, bowel/bladder dysfunction.     Interval History 8/11/2020:  The patient presents for follow up of lower back pain. Pt over interval had 2 MBB of right L3,4,5 with >90% relief of both procedures. Pt states + a.m. stiffness, states when he has MBB of lumbar he  "notices his hip pain more and vise versa when he has hip block. Lumbar pain>R hip pain at this time. He continues to take oxycodone 5mg QID which is beneficial to pain control without adverse side effects. The patient denies myelopathic symptoms such as handwriting changes or difficulty with buttons/coins/keys. Denies perineal paresthesias, bowel/bladder dysfunction.     Interval History 05/27/2020:  S/P Right Femoral / Obturator Nerve Block in 10/2019 -- 50% relief for 2 days --  lost to follow up after that  S/p Right DHRUV 08/2015 -- doing well for 1.5 years  Pain is located at right hip joint. Occasionally radiates to right thigh.   Aggravated standing, walking, laying down.   Alleviated by Oxycodone     HPI:  Macario Dior is a 69 y.o. Man with PMH DVT s/p right DHRUV that presents today as a new consult for low back pain.  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 Since 2015 DHRUV, he initially had improved but progressive pain in the right hip and low back.  In the last 6 months, he has had 4 back injections from Dr. De Los Santos that lasted for 2 weeks each.  1 month ago, he had a Lumbar ABIODUN that provided 100% relief that lasted 5 days for the low back but the pain changed to radiate to the right hip.  Since the ABIODUN relief stopped, the pain has "worked its way back up to the low back."  The pain is described as intense, sharp, and shoots up from the right hip into the low back to the right periscapular area. The shooting pain is worse than the low back pain. The pain is interfering with everything, including walking, sitting, and putting on his shoes.  Oxycodone 10mg Q6H provides relief. Lidocaine patches and TENS provide minimal relief when he wakes up.  He did physical therapy for 1 year with minimal relief.  He is unable to regularly exercise secondary to pain. He is radha to get 4 hours of uninterrupted sleep per night.  He wakes up with stiffness in his low back despite $4,000 mattresses.  He " "has weakness and buckling in the right lower extremity.  He endorses worsening falls due to weakness "seems like the leg is not there." He says " I need the bone removed" referring to his right hip. [facility] is a  [facility] He denies incontinence and saddle anesthesia.    Pain Disability Index Review:      11/6/2024     8:01 AM 7/9/2024     2:19 PM 6/12/2024    10:55 AM   Last 3 PDI Scores   Pain Disability Index (PDI) 60 70 70       Pain Medications:  None    Opioid Contract: not applicable     report:  Not applicable    Pain Procedures:   Lumbar ABIODUN 100% relief 5 days - Dr. De Los Santos   Lumbar TPIs 0% relief - Dr. De Los Santos   10/23/2019- Right femoral and obturator nerve block  7/6/2020 Right L3,4,5 MBB  7/30/2020 Right L3,4,5 MBB  10/5/2020- Right L3,4,5 RFA  12/17/2020- L5/S1 IL ABIODUN  12/4/2023- Bilateral L4/5 TF ABIODUN  7/9/2024 - Right TAP block under U/S - 90% relief of back pain      Physical Therapy/Home Exercise: yes    Imaging:     XR HIP WITH PELVIS WHEN PERFORMED, 2 OR 3  VIEWS RIGHT     CLINICAL HISTORY:  RM 3; Presence of right artificial hip joint     TECHNIQUE:  AP view of the pelvis and frog leg lateral view of the right hip were performed.     COMPARISON:  06/23/2023     FINDINGS:  Postoperative changes of right total hip arthroplasty with stable hardware positioning and alignment.  No acute fracture, dislocation or bone destruction.  Patchy areas of sclerosis involving the left femoral head suggesting avascular necrosis with mild left hip joint space narrowing.  There are surgical clips in the right inguinal region.     Impression:     Postoperative changes of right total hip arthroplasty.     Electronically signed by:Brady Brown MD  Date:                                            05/14/2024  Time:                                           13:15    MRI HIP WITHOUT CONTRAST RIGHT     CLINICAL HISTORY:  Hip replacement, loosening suspected;  Presence of right artificial hip " joint     TECHNIQUE:  Multisequence, multiplanar MRI of the right hip performed without contrast.     COMPARISON:  Hip radiograph 05/14/2024, CT head 09/13/2022     FINDINGS:  Bones: Postoperative changes of right total hip arthroplasty.  No periprosthetic fracture or osteolysis.  Mild heterotopic ossification posteriorly, unchanged from prior CT.  No infiltrative process.  Bone marrow signal is maintained.     Pelvic joints: Sacroiliac joints and pubic symphysis are unremarkable.     Muscles/tendons: Regional muscles and tendons appear unremarkable.  No evidence for trochanteric or iliopsoas bursitis.  No evidence for ischiofemoral impingement.     Miscellaneous: No periprosthetic fluid collections or masses.  Limited evaluation of pelvic soft tissues is unremarkable.  Postoperative changes of the right groin.     Impression:     1. Postoperative changes of right total hip arthroplasty with unchanged mild heterotopic calcification.  No evidence for loosening or osteolysis.     Electronically signed by resident: Nisa Murillo  Date:                                            07/01/2024  Time:                                           13:32     Electronically signed by:Jonas Kumar MD  Date:                                            07/01/2024  Time:                                           16:05       MRI LUMBAR SPINE WITHOUT CONTRAST     CLINICAL HISTORY:  Lumbar radiculopathy, symptoms persist with conservative treatment; Radiculopathy, lumbar region     TECHNIQUE:  Multiplanar, multisequence MR images were acquired from the thoracolumbar junction to the sacrum without the administration of contrast.     COMPARISON:  9/17/2022     FINDINGS:  The marrow demonstrates homogeneous signal.  Vertebral body heights are maintained.     Disc spaces are maintained.  Disc desiccation L2-3 and L3-4.     Conus terminates appropriately at L1.     Multilevel degenerative change as diesel below:     T12-L1: No focal disc  abnormality.  No significant canal or neural foraminal narrowing.     L1-2: Facet arthropathy, right greater than left with mild right neural foraminal narrowing.     L2-3: Small posterior circumferential disc bulge, facet arthropathy (right greater than left), and thickening of the ligamentum flavum.  Prominent posterior epidural fat.  Findings contribute to mild canal narrowing.  Mild bilateral neural foraminal narrowing.     L3-4: Small posterior circumferential disc bulge.  Facet arthropathy with thickening of the ligamentum flavum (left greater than right).  Mild canal and mild bilateral neural foraminal narrowing, left greater than right.     L4-5: Small posterior circumferential disc bulge, facet arthropathy, and thickening of the ligamentum flavum.  Mild canal narrowing.  Mild bilateral neural foraminal narrowing, right greater than left.     L5-S1: Facet arthropathy resulting in mild left neural foraminal narrowing.     Atrophic right kidney.     Impression:     Overall, stable exam compared to prior.  Multilevel degenerative change contributes to multilevel mild neural foraminal narrowing.  Mild canal narrowing as given in detail level by level as above.        Electronically signed by:Shazia Eaton  Date:                                            05/30/2024  Time:                                           08:59    Allergies:   Review of patient's allergies indicates:   Allergen Reactions    Amlodipine Swelling    Lisinopril Tinitus and Swelling    Pregabalin Swelling    Azithromycin      Muscles tense up    Levetiracetam Other (See Comments)    Acetaminophen Rash     Feels like needles are sticking him per pt       Current Medications:   Current Outpatient Medications   Medication Sig Dispense Refill    albuterol 90 mcg/actuation inhaler Inhale 2 puffs into the lungs every 6 (six) hours as needed for Wheezing.       atorvastatin (LIPITOR) 80 MG tablet Take 80 mg by mouth once daily. Take one-half tablet  daily      capsaicin 0.1 % Crea Apply twice to area of maximum paresthesia 15 g 6    carboxymethyl-[id number]-HF 0.5-1-0.5 % Dpet Place 1 drop into both eyes 4 (four) times daily.      carboxymethylcellulose sodium 1 % DpGe Place 0.4 mLs into both eyes 4 (four) times daily as needed.      chlorhexidine (PERIDEX) 0.12 % solution RINSE 1 CAPFUL BY MOUTH ONCE DAILY AS NEEDED SWISH FOR 30 SECONDS AFTER,AND SPIT OUT      chlorthalidone (HYGROTEN) 25 MG Tab Take 25 mg by mouth once daily.      CLOBETASOL PROPIONATE, BULK, MISC Apply topically once daily.      cyanocobalamin (VITAMIN B-12) 100 MCG tablet Take 100 mcg by mouth once daily. Take two tablets      cycloSPORINE (RESTASIS) 0.05 % ophthalmic emulsion INSTILL 0.4ML IN EACH EYE EVERY 12 HOURS      eplerenone (INSPRA) 25 MG Tab Take 1 tablet by mouth once daily.      ergocalciferol (ERGOCALCIFEROL) 50,000 unit Cap Take 50,000 Units by mouth.      finasteride (PROSCAR) 5 mg tablet TAKE ONE TABLET BY MOUTH ONCE DAILY FOR PROSTATE      hydrALAZINE (APRESOLINE) 50 MG tablet TAKE ONE TABLET BY MOUTH THREE TIMES A DAY FOR HEART/BLOOD PRESSURE      ketoconazole (NIZORAL) 2 % cream Apply topically 2 (two) times daily.      latanoprost 0.005 % ophthalmic solution Place 1 drop into both eyes every evening.      lidocaine (LIDODERM) 5 % Place 1 patch onto the skin once daily. Wear for 12 hours, then remove. Do not apply a new patch for at least 12 hours.      naloxone (NARCAN) 4 mg/actuation Spry as needed.      omega-3/dha/epa/fish oil (OMEGA-3 FISH OIL ORAL) 1,000 mg once daily.      oxyCODONE (ROXICODONE) 10 mg Tab immediate release tablet Take 1 tablet (10 mg total) by mouth every 4 (four) hours as needed for Pain. 42 tablet 0    semaglutide (OZEMPIC) 2 mg/dose (8 mg/3 mL) PnIj Inject 2 mg into the skin every 7 days. 9 mL 1    thiamine 100 MG tablet Take 0.5 tablets (50 mg total) by mouth once daily. 60 tablet 11    valsartan (DIOVAN) 320 MG tablet Take 320 mg by mouth.       carvediloL (COREG) 25 MG tablet Take 1 tablet (25 mg total) by mouth 2 (two) times daily. 60 tablet 11     No current facility-administered medications for this visit.       REVIEW OF SYSTEMS:    GENERAL:  No weight loss, malaise or fevers.  HEENT:  Negative for frequent or significant headaches.  NECK:  Negative for lumps, goiter, pain and significant neck swelling.  RESPIRATORY:  Negative for cough, wheezing or shortness of breath. BUZZ, asthma  CARDIOVASCULAR:  Negative for chest pain, leg swelling or palpitations. HTN  GI:  Negative for abdominal discomfort, blood in stools or black stools or change in bowel habits.  MUSCULOSKELETAL:  See HPI.  SKIN:  Negative for lesions, rash, and itching.  RENAL: CKD  ENDO: Diabetes  PSYCH:  Negative for sleep disturbance, mood disorder and recent psychosocial stressors.  HEMATOLOGY/LYMPHOLOGY:  Negative for prolonged bleeding, bruising easily or swollen nodes.  NEURO:   No history of headaches, syncope, paralysis, seizures or tremors.  All other reviewed and negative other than HPI.    Past Medical History:  Past Medical History:   Diagnosis Date    Arthritis     Asthma     chronic    CKD (chronic kidney disease), stage III     patient denies    Deep vein thrombosis     Demyelinating neuropathy     polyneuropathy    Diabetes mellitus type 2 without retinopathy 12/14/2022    Gout     History of blood clots 2013    lower part of both legs    HLD (hyperlipidemia)     Hypertension     Iron deficiency anemia     BUZZ (obstructive sleep apnea)     CPAP    Pulmonary embolism        Past Surgical History:  Past Surgical History:   Procedure Laterality Date    COLONOSCOPY N/A 11/7/2016    Procedure: COLONOSCOPY;  Surgeon: Shar Weiss MD;  Location: [address]);  Service: Endoscopy;  Laterality: N/A;  [facility] Referral. Okay to hold Coumadin 5 days prior to procedure. See Referral scaned in media tab on 10/20/16.    COLONOSCOPY N/A 9/21/2017    Procedure: COLONOSCOPY;   Surgeon: Stuart Trinidad MD;  Location: [facility] ENDO (4TH FLR);  Service: Endoscopy;  Laterality: N/A;  [facility] Olivarez-see scanned docs under media tab          9/6/17-current [facility] authorization and last clinic visit scanned into chart    EPIDURAL STEROID INJECTION N/A 12/17/2020    Procedure: INJECTION, STEROID, EPIDURAL, L5-S1 clear to hold Xarelto 2 days;  Surgeon: Moy Vasquez MD;  Location: [facility] PAIN MGT;  Service: Pain Management;  Laterality: N/A;    EXCISION OF LESION OF FEMUR Right 1/4/2023    Procedure: EXCISION, LESION, FEMUR: POSTERIOR APPROACH;  Surgeon: Prieto Sinha MD;  Location: [facility] OR 2ND FLR;  Service: Orthopedics;  Laterality: Right;    HIP SURGERY Right 2008    exploratory at [facility]    HIP SURGERY Right 8-31-15    THR    INJECTION OF ANESTHETIC AGENT AROUND NERVE Right 10/23/2019    Procedure: BLOCK, NERVE, Obturator and Femoral cleared to hold xarelto 3 days pt. said he's not taking coumadin;  Surgeon: Moy Vasquez MD;  Location: [facility] PAIN MGT;  Service: Pain Management;  Laterality: Right;    INJECTION OF ANESTHETIC AGENT AROUND NERVE Right 7/6/2020    Procedure: BLOCK, NERVE, RIGHT MBB L3,L5,L5;  Surgeon: Moy Vasquez MD;  Location: [facility] PAIN MGT;  Service: Pain Management;  Laterality: Right;  BLOCK, NERVE, RIGHT MBB L3,L5,L5    INJECTION OF ANESTHETIC AGENT AROUND NERVE Right 7/30/2020    Procedure: BLOCK, NERVE RIGHT L3, L4, L5 2ND;  Surgeon: Moy Vasquez MD;  Location: [facility] PAIN MGT;  Service: Pain Management;  Laterality: Right;  NEEDS CONSENT, XARELTO    LEG SURGERY Left 2012    fibula and tibia    RADIOFREQUENCY ABLATION Right 10/5/2020    Procedure: RADIOFREQUENCY ABLATION RIGHT L3,4,5;  Surgeon: Moy Vasquez MD;  Location: [facility] PAIN MGT;  Service: Pain Management;  Laterality: Right;  RADIOFREQUENCY ABLATION RIGHT L3,4,5  ok to hold xarelto, scanned in Media    SHOULDER SURGERY Right 1989    torn ligament    TRANSFORAMINAL EPIDURAL INJECTION OF STEROID  Bilateral 12/4/2023    Procedure: LUMBAR TRANSFORAMINAL BILATERAL L4/5 DIRECT REFERRAL;  Surgeon: Moy Vasquez MD;  Location: [facility] PAIN T;  Service: Pain Management;  Laterality: Bilateral;       Family History:  Family History   Problem Relation Name Age of Onset    Cancer Mother          ovarian    Cancer Father      Cancer Sister          breast    Cancer Sister          breast    No Known Problems Brother      No Known Problems Daughter      No Known Problems Daughter      No Known Problems Daughter      No Known Problems Son      No Known Problems Son         Social History:  Social History     Socioeconomic History    Marital status: Single   Tobacco Use    Smoking status: Former     Types: Cigars     Quit date: 8/13/2009     Years since quitting: 15.2    Smokeless tobacco: Never   Substance and Sexual Activity    Alcohol use: No     Alcohol/week: 0.0 standard drinks of alcohol    Drug use: No    Sexual activity: Not Currently       OBJECTIVE:    BP (!) 172/82   Pulse 70   Temp 97.6 °F (36.4 °C)   Wt 107.6 kg (237 lb 3.4 oz)   BMI 31.30 kg/m²     PHYSICAL EXAMINATION:    General appearance: Well appearing, in no acute distress, alert and oriented x3.  Psych:  Mood and affect appropriate.  Skin: Skin color, texture, turgor normal, no rashes or lesions, in both upper and lower body. Well-healed scar over right iliac crest. Pain when scar is palpated.   Head/face:  Atraumatic, normocephalic.   Cor: Rate regular.   Pulm: Symmetric chest rise, no respiratory distress noted.   Back: There is pain with palpation over paraspinals and facet joints on the right L4, L5. Limited ROM with pain on extension. Pain with lateral flexion towards the R  Extremities: No deformities or skin discoloration. Good capillary refill. +1 edema to BLE  Musculoskeletal: 4/5 strength with hip flexion Right. All other strength normal and symmetric. Right hip: pain to lateral hip with external rotation with log roll. + Right FADIR.  Pain on palpation R top iliac Crest;  Millarg neg + F. Nerve stretch b/l (R>L) No atrophy or tone abnormalities are noted.  Neuro:  No loss of sensation is noted.  Gait: Antalgic- ambulates without assistance.     ASSESSMENT: 74 y.o. year old male with low back and hip pain, consistent with the followin. Lumbosacral radiculopathy  Procedure Order to Pain Management      2. Right hip pain        3. Lumbar radiculopathy, chronic  Procedure Order to Pain Management      4. Avascular necrosis of femoral head, unspecified laterality        5. Chronic pain syndrome  Procedure Order to Pain Management            PLAN:   We discussed with the patient the assessment and recommendations. The following is the plan we agreed on:   - Previous imaging reviewed. MRI oh Lumbar spine showing L1/L2 -narrowing of forminal on R-side. L2/L3- congenital shortening of pedicles  - Ordered Transforaminal injection of L1/L2, L2/3    The above plan and management options were discussed at length with patient. Patient is in agreement with the above and verbalized understanding.    I discussed the patient's evaluation, assessment and plan with Dr. Vasquez.    Beth Adam MD  [facility] Fellow      I have personally taken the history and examined this patient and agree with the fellow's note as stated above.

## 2024-11-21 ENCOUNTER — HOSPITAL ENCOUNTER (OUTPATIENT)
Facility: OTHER | Age: 74
Discharge: HOME | End: 2024-11-21
Attending: ANESTHESIOLOGY | Admitting: ANESTHESIOLOGY
Payer: OTHER GOVERNMENT

## 2024-11-21 VITALS
WEIGHT: 235 LBS | BODY MASS INDEX: 31.14 KG/M2 | SYSTOLIC BLOOD PRESSURE: 117 MMHG | DIASTOLIC BLOOD PRESSURE: 79 MMHG | TEMPERATURE: 96 F | OXYGEN SATURATION: 98 % | RESPIRATION RATE: 16 BRPM | HEIGHT: 73 IN | HEART RATE: 60 BPM

## 2024-11-21 DIAGNOSIS — M54.16 LUMBAR RADICULOPATHY: Primary | ICD-10-CM

## 2024-11-21 DIAGNOSIS — G89.29 CHRONIC PAIN: ICD-10-CM

## 2024-11-21 LAB — POCT GLUCOSE: 109 MG/DL (ref 70–110)

## 2024-11-21 PROCEDURE — 25500020 PHARM REV CODE 255: Performed by: ANESTHESIOLOGY

## 2024-11-21 PROCEDURE — 64483 NJX AA&/STRD TFRM EPI L/S 1: CPT | Mod: RT | Performed by: ANESTHESIOLOGY

## 2024-11-21 PROCEDURE — 64484 NJX AA&/STRD TFRM EPI L/S EA: CPT | Mod: RT,,, | Performed by: ANESTHESIOLOGY

## 2024-11-21 PROCEDURE — 64483 NJX AA&/STRD TFRM EPI L/S 1: CPT | Mod: RT,,, | Performed by: ANESTHESIOLOGY

## 2024-11-21 PROCEDURE — 63600175 PHARM REV CODE 636 W HCPCS: Performed by: ANESTHESIOLOGY

## 2024-11-21 PROCEDURE — 64484 NJX AA&/STRD TFRM EPI L/S EA: CPT | Mod: RT | Performed by: ANESTHESIOLOGY

## 2024-11-21 RX ORDER — FENTANYL CITRATE 50 UG/ML
INJECTION, SOLUTION INTRAMUSCULAR; INTRAVENOUS
Status: DISCONTINUED | OUTPATIENT
Start: 2024-11-21 | End: 2024-11-21 | Stop reason: HOSPADM

## 2024-11-21 RX ORDER — LIDOCAINE HYDROCHLORIDE 20 MG/ML
INJECTION, SOLUTION INFILTRATION; PERINEURAL
Status: DISCONTINUED | OUTPATIENT
Start: 2024-11-21 | End: 2024-11-21 | Stop reason: HOSPADM

## 2024-11-21 RX ORDER — SODIUM CHLORIDE 9 MG/ML
INJECTION, SOLUTION INTRAVENOUS CONTINUOUS
Status: DISCONTINUED | OUTPATIENT
Start: 2024-11-21 | End: 2024-11-21 | Stop reason: HOSPADM

## 2024-11-21 RX ORDER — DEXAMETHASONE SODIUM PHOSPHATE 10 MG/ML
INJECTION INTRAMUSCULAR; INTRAVENOUS
Status: DISCONTINUED | OUTPATIENT
Start: 2024-11-21 | End: 2024-11-21 | Stop reason: HOSPADM

## 2024-11-21 RX ORDER — MIDAZOLAM HYDROCHLORIDE 1 MG/ML
INJECTION INTRAMUSCULAR; INTRAVENOUS
Status: DISCONTINUED | OUTPATIENT
Start: 2024-11-21 | End: 2024-11-21 | Stop reason: HOSPADM

## 2024-11-21 RX ORDER — LIDOCAINE HYDROCHLORIDE 10 MG/ML
INJECTION, SOLUTION EPIDURAL; INFILTRATION; INTRACAUDAL; PERINEURAL
Status: DISCONTINUED | OUTPATIENT
Start: 2024-11-21 | End: 2024-11-21 | Stop reason: HOSPADM

## 2024-11-21 NOTE — DISCHARGE INSTRUCTIONS

## 2024-11-21 NOTE — OP NOTE
Lumbar Transforaminal Epidural Steroid Injection under Fluoroscopic Guidance    The procedure, risks, benefits, and options were discussed with the patient. There are no contraindications to the procedure. The patent expressed understanding and agreed to the procedure. Informed written consent was obtained prior to the start of the procedure and can be found in the patient's chart.    PATIENT NAME: Macario Dior Jr.   MRN: [id number]     DATE OF PROCEDURE: 11/21/2024    PROCEDURE:  Right  L1/2 and L2/3 Lumbar Transforaminal Epidural Steroid Injection under Fluoroscopic Guidance    PRE-OP DIAGNOSIS: Lumbar radiculopathy [M54.16] Lumbar radiculopathy [M54.16]    POST-OP DIAGNOSIS: Same    PHYSICIAN: Moy Vasquez MD    ASSISTANTS: Pedro Mckeon MD     MEDICATIONS INJECTED: Preservative-free Decadron 10mg with 5cc of Lidocaine 1% MPF     LOCAL ANESTHETIC INJECTED: Xylocaine 2%     SEDATION: Versed 2mg and Fentanyl 50mcg                                                                                                                                                                                     Conscious sedation ordered by M.D. Patient re-evaluation prior to administration of conscious sedation. No changes noted in patient's status from initial evaluation. The patient's vital signs were monitored by RN and patient remained hemodynamically stable throughout the procedure.    Event Time In   Sedation Start 1019   Sedation End 1024       ESTIMATED BLOOD LOSS: None    COMPLICATIONS: None    TECHNIQUE: Time-out was performed to identify the patient and procedure to be performed. With the patient laying in a prone position, the surgical area was prepped and draped in the usual sterile fashion using ChloraPrep and a fenestrated drape.The levels were determined under fluoroscopy guidance. Skin anesthesia was achieved by injecting Lidocaine 2% over the injection sites. The transforaminal spaces were then approached with a 22  gauge, 5 inch spinal quinke needle that was introduced under fluoroscopic guidance in the AP and Lateral views. Once the needle tip was in the area of the transforaminal space, and there was no blood, CSF or paraesthesias, contrast dye Omnipaque (300mg/mL) was injected to confirm placement and there was no vascular runoff. Fluoroscopic imaging in the AP and lateral views revealed a clear outline of the spinal nerve with proximal spread of agent through the neural foramen into the epidural space. 3 mL of the medication mixture listed above was injected slowly at each site. Displacement of the radio opaque contrast after injection of the medication confirmed that the medication went into the area of the transforaminal spaces. The needles were removed and bleeding was nil. A sterile dressing was applied. No specimens collected. The patient tolerated the procedure well.     PRE-PROCEDURE PAIN SCORE: 4/10    POST-PROCEDURE PAIN SCORE: 3/10    The patient was monitored after the procedure in the recovery area. They were given post-procedure and discharge instructions to follow at home. The patient was discharged in a stable condition.    I reviewed and edited the fellow's note. I conducted my own interview and physical examination. I agree with the findings. I was present and supervising all critical portions of the procedure.    Moy Vasquez MD

## 2024-11-21 NOTE — DISCHARGE SUMMARY
Discharge Note  Short Stay      SUMMARY     Admit Date: 11/21/2024    Attending Physician: Moy Vasquez      Discharge Physician: Moy Vasquez      Discharge Date: 11/21/2024 10:25 AM    Procedure(s) (LRB):  LUMBAR TRANSFORAMINAL RIGHT L1/2 AND L2/3 please add xarelto to pt's med list when admitted 11/21 (Right)    Final Diagnosis: Lumbar radiculopathy [M54.16]    Disposition: Home or self care    Patient Instructions:   Current Discharge Medication List        CONTINUE these medications which have NOT CHANGED    Details   albuterol 90 mcg/actuation inhaler Inhale 2 puffs into the lungs every 6 (six) hours as needed for Wheezing.       atorvastatin (LIPITOR) 80 MG tablet Take 80 mg by mouth once daily. Take one-half tablet daily      capsaicin 0.1 % Crea Apply twice to area of maximum paresthesia  Qty: 15 g, Refills: 6    Comments: Apply twice to area of maximum paresthesia  Associated Diagnoses: Sensory polyneuropathy due to diabetes mellitus      carboxymethyl-[id number] 0.5-1-0.5 % Dpet Place 1 drop into both eyes 4 (four) times daily.      carboxymethylcellulose sodium 1 % DpGe Place 0.4 mLs into both eyes 4 (four) times daily as needed.      carvediloL (COREG) 25 MG tablet Take 1 tablet (25 mg total) by mouth 2 (two) times daily.  Qty: 60 tablet, Refills: 11    Comments: .      chlorhexidine (PERIDEX) 0.12 % solution RINSE 1 CAPFUL BY MOUTH ONCE DAILY AS NEEDED SWISH FOR 30 SECONDS AFTER,AND SPIT OUT      chlorthalidone (HYGROTEN) 25 MG Tab Take 25 mg by mouth once daily.      CLOBETASOL PROPIONATE, BULK, MISC Apply topically once daily.      cyanocobalamin (VITAMIN B-12) 100 MCG tablet Take 100 mcg by mouth once daily. Take two tablets      cycloSPORINE (RESTASIS) 0.05 % ophthalmic emulsion INSTILL 0.4ML IN EACH EYE EVERY 12 HOURS      eplerenone (INSPRA) 25 MG Tab Take 1 tablet by mouth once daily.      ergocalciferol (ERGOCALCIFEROL) 50,000 unit Cap Take 50,000 Units by mouth.      finasteride (PROSCAR) 5  mg tablet TAKE ONE TABLET BY MOUTH ONCE DAILY FOR PROSTATE      hydrALAZINE (APRESOLINE) 50 MG tablet TAKE ONE TABLET BY MOUTH THREE TIMES A DAY FOR HEART/BLOOD PRESSURE      ketoconazole (NIZORAL) 2 % cream Apply topically 2 (two) times daily.      latanoprost 0.005 % ophthalmic solution Place 1 drop into both eyes every evening.      lidocaine (LIDODERM) 5 % Place 1 patch onto the skin once daily. Wear for 12 hours, then remove. Do not apply a new patch for at least 12 hours.      naloxone (NARCAN) 4 mg/actuation Spry as needed.      omega-3/dha/epa/fish oil (OMEGA-3 FISH OIL ORAL) 1,000 mg once daily.      oxyCODONE (ROXICODONE) 10 mg Tab immediate release tablet Take 1 tablet (10 mg total) by mouth every 4 (four) hours as needed for Pain.  Qty: 42 tablet, Refills: 0    Comments: Quantity prescribed more than 7 day supply? No Bedside delivery      rivaroxaban (XARELTO ORAL) Take by mouth.      semaglutide (OZEMPIC) 2 mg/dose (8 mg/3 mL) PnIj Inject 2 mg into the skin every 7 days.  Qty: 9 mL, Refills: 1    Comments: If not available, [facility]  Associated Diagnoses: Diabetes mellitus type 2 without retinopathy      thiamine 100 MG tablet Take 0.5 tablets (50 mg total) by mouth once daily.  Qty: 60 tablet, Refills: 11    Associated Diagnoses: Thiamine deficiency      valsartan (DIOVAN) 320 MG tablet Take 320 mg by mouth.                 Discharge Diagnosis: Lumbar radiculopathy [M54.16]  Condition on Discharge: Stable with no complications to procedure   Diet on Discharge: Same as before.  Activity: as per instruction sheet.  Discharge to: Home with a responsible adult.  Follow up: 2-4 weeks       Please call my office or pager at [phone] if experienced any weakness or loss of sensation, fever > 101.5, pain uncontrolled with oral medications, persistent nausea/vomiting/or diarrhea, redness or drainage from the incisions, or any other worrisome concerns. If physician on call  was not reached or could not communicate with our office for any reason please go to the nearest emergency department

## 2024-11-22 ENCOUNTER — OFFICE VISIT (OUTPATIENT)
Dept: ORTHOPEDICS | Facility: CLINIC | Age: 74
End: 2024-11-22
Payer: OTHER GOVERNMENT

## 2024-11-22 VITALS — WEIGHT: 233.69 LBS | HEIGHT: 73 IN | BODY MASS INDEX: 30.97 KG/M2

## 2024-11-22 DIAGNOSIS — E66.811 CLASS 1 OBESITY WITH SERIOUS COMORBIDITY AND BODY MASS INDEX (BMI) OF 33.0 TO 33.9 IN ADULT, UNSPECIFIED OBESITY TYPE: ICD-10-CM

## 2024-11-22 DIAGNOSIS — M54.16 LUMBAR RADICULOPATHY: Primary | ICD-10-CM

## 2024-11-22 PROCEDURE — 99215 OFFICE O/P EST HI 40 MIN: CPT | Mod: PBBFAC | Performed by: ORTHOPAEDIC SURGERY

## 2024-11-22 PROCEDURE — 99213 OFFICE O/P EST LOW 20 MIN: CPT | Mod: S$PBB,,, | Performed by: ORTHOPAEDIC SURGERY

## 2024-11-22 PROCEDURE — 99999 PR PBB SHADOW E&M-EST. PATIENT-LVL V: CPT | Mod: PBBFAC,,, | Performed by: ORTHOPAEDIC SURGERY

## 2024-11-25 ENCOUNTER — TELEPHONE (OUTPATIENT)
Dept: ORTHOPEDICS | Facility: CLINIC | Age: 74
End: 2024-11-25
Payer: OTHER GOVERNMENT

## 2024-11-25 DIAGNOSIS — M54.16 LUMBAR RADICULOPATHY: Primary | ICD-10-CM

## 2024-11-25 NOTE — PROGRESS NOTES
Subjective:      Patient ID: Macario Dior Jr. is a 74 y.o. male.    Chief Complaint:   Pain of the Right Hip and Pain of the Spine    History of Present Illness    CHIEF COMPLAINT:  Patient presents for follow-up regarding chronic pain in the hip and back, with recent changes in pain location following pain management interventions.    HPI:  Patient presents for follow-up after seeing pain management yesterday. He received injections for pain management and describes a complex pain pattern. Initially, the pain started in his hip and radiated down his leg. After walking about two blocks, the pain would shift to his back and travel up the right side. Following the recent injection, he reports that the initial pain is temporarily gone, but now he experiences pain traveling from his foot to his hip when his foot strikes the ground while walking.    His doctor showed him x-rays indicating issues with his L2 and L3 vertebrae, noting that L3 lumbar and L2 were collapsing on a nerve. He expresses concern about this nerve compression and inquires about the possibility of relieving the pressure on the nerve.    He describes difficulty with certain movements, particularly flexing his knee inward and raising his leg to put on socks, which causes pain. He can flex his knee outward without issue, but flexing inward or raising his leg straight up causes pain. The pain is consistently on the right side and never on the left.    He reports that an MRI was performed in July, which did not show any issues with his hip. He mentions having a nerve test scheduled for the night. Patient denies any fevers or chills.    He also discusses his weight management efforts, reporting a 27-pound weight loss. He mentions seeing Dr. Valente for weight management and notes that the treatment has also helped manage his diabetes, resulting in normalization of his A1C levels.    MEDICATIONS:  Patient is on Lyrica, muscle relaxants, and some diabetes  medication.        Objective:        Ortho/SPM Exam  On exam he walks with a list, leaning forward using a walker.  Negative Stinchfield, negative C sign.  Negative flip test.  Diffuse tenderness in the paraspinous muscles of the lumbar spine and the upper buttock area.    IMAGING:  Patient underwent an MRI of the lumbar spine in May, which revealed significant spondylosis with possible upper lumbar nerve root impingement.             IMAGING:  None today  Assessment/Plan   Lumbar radiculopathy.  The patient says that he would be interested in surgical intervention if this would help.  We will have him evaluated by Dr. Montalvo.    The patient's pathophysiology was explained in detail with reference to x-rays, models, other visual aids as appropriate.  Treatment options were discussed in detail.  Questions were invited and answered to the patient's satisfaction.    This note was generated with the assistance of ambient listening technology. Verbal consent was obtained by the patient and accompanying visitor(s) for the recording of patient appointment to facilitate this note. I attest to having reviewed and edited the generated note for accuracy, though some syntax or spelling errors may persist. Please contact the author of this note for any clarification.       Prieto Sinha MD  Orthopedic Surgery

## 2024-11-25 NOTE — TELEPHONE ENCOUNTER
[facility] about [facility] for [facility] for [facility]. They have advised they will fax over form.

## 2024-12-05 ENCOUNTER — TELEPHONE (OUTPATIENT)
Dept: PAIN MEDICINE | Facility: CLINIC | Age: 74
End: 2024-12-05
Payer: OTHER GOVERNMENT

## 2024-12-05 NOTE — TELEPHONE ENCOUNTER
----- Message from Mercedez sent at 12/5/2024  9:28 AM CST -----  Contact: [phone]  [email or web address]     Patient is calling for Medical Advice regarding: Patient is requesting a call back from the staff regarding an appt.     How long has patient had these symptoms: n/a      Patient wants a call back or thru [facility]: call back     Comments:    Please advise patient replies from provider may take up to 48 hours.

## 2024-12-09 ENCOUNTER — PROCEDURE VISIT (OUTPATIENT)
Dept: NEUROLOGY | Facility: CLINIC | Age: 74
End: 2024-12-09
Payer: OTHER GOVERNMENT

## 2024-12-09 DIAGNOSIS — M54.17 LUMBOSACRAL RADICULOPATHY: ICD-10-CM

## 2024-12-09 PROCEDURE — 95886 MUSC TEST DONE W/N TEST COMP: CPT | Mod: 26,S$PBB,50, | Performed by: PHYSICAL MEDICINE & REHABILITATION

## 2024-12-09 PROCEDURE — 95913 NRV CNDJ TEST 13/> STUDIES: CPT | Mod: PBBFAC,PN | Performed by: PHYSICAL MEDICINE & REHABILITATION

## 2024-12-09 PROCEDURE — 95886 MUSC TEST DONE W/N TEST COMP: CPT | Mod: PBBFAC,PN | Performed by: PHYSICAL MEDICINE & REHABILITATION

## 2024-12-09 PROCEDURE — 95913 NRV CNDJ TEST 13/> STUDIES: CPT | Mod: 26,S$PBB,, | Performed by: PHYSICAL MEDICINE & REHABILITATION

## 2024-12-09 NOTE — PROCEDURES
Test Date:  2024    Patient: macario galloway : 1950 Physician: Marcial Burch D.O.   ID#: [id number] SEX: Male Ref. Phys: Krysta Spain NP     HPI: Macario Galloway Jr. is a 74 y.o.male who presents for NCS/EMG to evaluate for neuropathic causes of lower extremity n/t and pain, r/o lumbosacral radiculopathy.  Note: given findings in BLE, one upper extremity was evaluated in a limited fashion on NCS.        PROCEDURE:  Prior to the procedure, the procedure was discussed in detail with the patient.  All questions were answered, and verbal consent was obtained.  For nerve conduction studies, a combination of surface electrodes, bar electrodes, and/or ring electrodes were used as needed.  For needle EMG, each site was cleaned and prepped in usual fashion with an alcohol pad.  A monopolar needle (28G) was used.  There was no significant bleeding, and bandages were applied as needed.  The procedure was tolerated without adverse effect.  The patient was instructed on post-procedure care including ice if needed for 10-15 minutes up to 4 times/day for any sore muscles.  I discussed with the patient that the data would be reviewed and a report sent to the referring provider, where any follow up questions regarding next steps should be directed.        NCV & EMG Findings:  Evaluation of the left Fibular (EDB) motor, the right Fibular (EDB) motor, the left Tibial (AHB) motor, the left Superficial Fibular sensory, the right Superficial Fibular sensory, the left sural sensory, and the right sural sensory nerves showed no response.  The right Tibial (AHB) motor and the right radial sensory nerves showed reduced amplitude.  The right median sensory nerve showed prolonged distal onset latency, prolonged distal peak latency, and decreased conduction velocity.  All remaining nerves (as indicated in the following tables) were within normal limits.  All examined muscles (as indicated in the following table) showed no  evidence of electrical instability.      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  This is relatively symmetrical.    Incidentally, there is electrophysiologic evidence of a right sensory median mononeuropathy across the wrist (I.e. Carpal tunnel syndrome).  There is no motor axonal loss.  This is graded as Mild in severity on the right.  Note:  one upper extremity was evaluated in a limited fashion on NCS given the findings in the bilateral lower extremities.  He does not have any symptoms of carpal tunnel syndrome.        ___________________________  Marcial Burch D.O.        NCS+  Motor Nerve Results      Latency Amplitude F-Lat Segment Distance CV Comment   Site (ms) Norm (mV) Norm (ms)  (cm) (m/s) Norm    Right Median (APB)   Wrist 3.6  < 4.7 6.1  > 3.8         Elbow 8.8 - 5.2 -  Elbow-Wrist 29 56  > 47    Right Ulnar (ADM)   Wrist 3.2  < 3.7 8.2  > 3.0         Bel Elbow 8.8 - 7.5 -  Bel Elbow-Wrist 29.5 53  > 52    Left Fibular (EDB)   Ankle *NR  < 6.5 *NR  > 1.10         Left Fibular (Tib Anterior)   Fib Head 3.1  < 4.2 2.3 -         Pop Fossa 4.8  < 5.7 1.75 -  Pop Fossa-Fib Head 10 59 -    Right Fibular (EDB)   Ankle *NR  < 6.5 *NR  > 1.10         Right Fibular (Tib Anterior)   Fib Head 2.6  < 4.2 1.30 -         Pop Fossa 4.1  < 5.7 2.4 -  Pop Fossa-Fib Head 8 53 -    Left Tibial (AHB)   Ankle *NR  < 6.1 *NR  > 1.10         Right Tibial (AHB)   Ankle 4.5  < 6.1 *0.43  > 1.10           Sensory Nerve Results      Start Lat Latency (Peak) Amplitude (P-P) Segment Distance Start CV Comment   Site (ms) Norm (ms) (µV) Norm  (cm) (m/s) Norm    Right Median (Rec:Dig II)   Wrist *3.4  < 3.3 *4.2 9  > 8 Wrist-Dig II 14 *41  > 42    Right Ulnar (Rec:Dig V)   Wrist 2.7  < 3.1 3.5 7  > 4 Wrist-Dig V 14 52  > 45    Right Radial (Rec:Wrist)   Forearm 1.78  < 2.2 2.4 *9  > 11 Forearm-Wrist 10 56 -    Left Sural (Rec:Lat Mall)   Calf *NR  <  3.6 *NR *NR  > 4 Calf-Lat Mall 14 *NR  > 39    Right Sural (Rec:Lat Mall)   Calf *NR  < 3.6 *NR *NR  > 4 Calf-Lat Mall 14 *NR  > 39    Left Superficial Fibular (Rec:Ankle)   14 cm *NR - *NR *NR  > 5 14 cm-Ankle 14 *NR  > 32    Right Superficial Fibular (Rec:Ankle)   14 cm *NR - *NR *NR  > 5 14 cm-Ankle 14 *NR  > 32      EMG+     Side Muscle Nerve Root Ins Act Fibs Psw Amp Dur Poly Recrt Int Pat Comment   Right Vastus Med Femoral L2-L4 Nml Nml Nml Nml Nml 0 Nml Nml    Right Tib Anterior Fibular,  Deep Fibula... L4-L5 Nml Nml Nml Nml Nml 0 Nml Nml    Right Fib Longus Fibular,  Superficial... L5-S1 Nml Nml Nml Nml Nml 0 Nml Nml    Right Gastroc Tibial S1-S2 Nml Nml Nml Nml Nml 0 Nml Nml    Right Dorsal Interossei ped I Lateral Plantar S2-S3 Nml Nml Nml Nml Nml 0 Nml Nml    Left Vastus Med Femoral L2-L4 Nml Nml Nml Nml Nml 0 Nml Nml    Left Tib Anterior Fibular,  Deep Fibula... L4-L5 Nml Nml Nml Nml Nml 0 Nml Nml    Left Fib Longus Fibular,  Superficial... L5-S1 Nml Nml Nml Nml Nml 0 Nml Nml    Left Gastroc Tibial S1-S2 Nml Nml Nml Nml Nml 0 Nml Nml    Left Dorsal Interossei ped I Lateral Plantar S2-S3 Nml Nml Nml Nml Nml 0 Nml Nml            Waveforms:    Motor                  Sensory

## 2024-12-16 ENCOUNTER — HOSPITAL ENCOUNTER (OUTPATIENT)
Dept: RADIOLOGY | Facility: HOSPITAL | Age: 74
Discharge: HOME | End: 2024-12-16
Attending: ORTHOPAEDIC SURGERY
Payer: OTHER GOVERNMENT

## 2024-12-16 ENCOUNTER — OFFICE VISIT (OUTPATIENT)
Dept: ORTHOPEDICS | Facility: CLINIC | Age: 74
End: 2024-12-16
Payer: OTHER GOVERNMENT

## 2024-12-16 VITALS — WEIGHT: 239.88 LBS | HEIGHT: 73 IN | BODY MASS INDEX: 31.79 KG/M2

## 2024-12-16 DIAGNOSIS — M25.551 RIGHT HIP PAIN: Primary | ICD-10-CM

## 2024-12-16 DIAGNOSIS — M54.16 LUMBAR RADICULOPATHY: ICD-10-CM

## 2024-12-16 DIAGNOSIS — M47.816 LUMBAR SPONDYLOSIS: ICD-10-CM

## 2024-12-16 DIAGNOSIS — M51.360 DEGENERATION OF INTERVERTEBRAL DISC OF LUMBAR REGION WITH DISCOGENIC BACK PAIN: ICD-10-CM

## 2024-12-16 PROCEDURE — 99214 OFFICE O/P EST MOD 30 MIN: CPT | Mod: PBBFAC,25 | Performed by: ORTHOPAEDIC SURGERY

## 2024-12-16 PROCEDURE — 99214 OFFICE O/P EST MOD 30 MIN: CPT | Mod: S$PBB,,, | Performed by: ORTHOPAEDIC SURGERY

## 2024-12-16 PROCEDURE — 72110 X-RAY EXAM L-2 SPINE 4/>VWS: CPT | Mod: 26,,, | Performed by: RADIOLOGY

## 2024-12-16 PROCEDURE — 72110 X-RAY EXAM L-2 SPINE 4/>VWS: CPT | Mod: TC

## 2024-12-16 PROCEDURE — 99999 PR PBB SHADOW E&M-EST. PATIENT-LVL IV: CPT | Mod: PBBFAC,,, | Performed by: ORTHOPAEDIC SURGERY

## 2024-12-17 NOTE — PROGRESS NOTES
DATE: 12/17/2024  PATIENT: Macario Dior Jr.    Attending Physician: Ricardo Montalvo M.D.    CHIEF COMPLAINT: LBP and R hip pain    HISTORY:  Macario Dior Jr. is a 74 y.o. male w/ a hx of R DHRUV presents for initial evaluation of low back and right hip pain (Back - 10, Hip - 10). The pain has been present for years. The patient describes the pain as dull and it radiates to R hip.  The pain is worse with activity and improved by rest. There is R foot associated numbness and tingling. There is RLE subjective weakness. Prior treatments have included OTC meds, PT, but no ABIODUN or surgery.    The Patient denies myelopathic symptoms such as handwriting changes or difficulty with buttons/coins/keys. Denies perineal paresthesias, bowel/bladder dysfunction.    The patient does not smoke, have DM or endorse IVDU. The patient is not on any blood thinners and does not take chronic narcotics.    PAST MEDICAL/SURGICAL HISTORY:  Past Medical History:   Diagnosis Date    Arthritis     Asthma     chronic    CKD (chronic kidney disease), stage III     patient denies    Deep vein thrombosis     Demyelinating neuropathy     polyneuropathy    Diabetes mellitus type 2 without retinopathy 12/14/2022    Gout     History of blood clots 2013    lower part of both legs    HLD (hyperlipidemia)     Hypertension     Iron deficiency anemia     BUZZ (obstructive sleep apnea)     CPAP    Pulmonary embolism      Past Surgical History:   Procedure Laterality Date    COLONOSCOPY N/A 11/7/2016    Procedure: COLONOSCOPY;  Surgeon: Shar Weiss MD;  Location: [address]);  Service: Endoscopy;  Laterality: N/A;  [facility] Referral. Okay to hold Coumadin 5 days prior to procedure. See Referral scaned in media tab on 10/20/16.    COLONOSCOPY N/A 9/21/2017    Procedure: COLONOSCOPY;  Surgeon: Stuart Trinidad MD;  Location: [address]);  Service: Endoscopy;  Laterality: N/A;  [facility] Olivarez-see scanned docs under  media tab          9/6/17-current [facility] authorization and last clinic visit scanned into chart    EPIDURAL STEROID INJECTION N/A 12/17/2020    Procedure: INJECTION, STEROID, EPIDURAL, L5-S1 clear to hold Xarelto 2 days;  Surgeon: Moy Vasquez MD;  Location: [facility], [place], operated by [facility];  Service: Pain Management;  Laterality: N/A;    EXCISION OF LESION OF FEMUR Right 1/4/2023    Procedure: EXCISION, LESION, FEMUR: POSTERIOR APPROACH;  Surgeon: Prieto Sinha MD;  Location: [facility] OR [facility] F[facility];  Service: Orthopedics;  Laterality: Right;    HIP SURGERY Right 2008    exploratory at [facility]    HIP SURGERY Right 8-31-15    THR    INJECTION OF ANESTHETIC AGENT AROUND NERVE Right 10/23/2019    Procedure: BLOCK, NERVE, Obturator and Femoral cleared to hold xarelto 3 days pt. said he's not taking coumadin;  Surgeon: Moy Vasquez MD;  Location: [facility], [place], operated [facility];  Service: Pain Management;  Laterality: Right;    INJECTION OF ANESTHETIC AGENT AROUND NERVE Right 7/6/2020    Procedure: BLOCK, NERVE, RIGHT MBB L3,L5,L5;  Surgeon: Moy Vasquez MD;  Location: [facility], [place], operated [facility];  Service: Pain Management;  Laterality: Right;  BLOCK, NERVE, RIGHT MBB L3,L5,L5    INJECTION OF ANESTHETIC AGENT AROUND NERVE Right 7/30/2020    Procedure: BLOCK, NERVE RIGHT L3, L4, L5 2ND;  Surgeon: Moy Vasquez MD;  Location: [facility], [place], operated [facility];  Service: Pain Management;  Laterality: Right;  NEEDS CONSENT, XARELTO    LEG SURGERY Left 2012    fibula and tibia    RADIOFREQUENCY ABLATION Right 10/5/2020    Procedure: RADIOFREQUENCY ABLATION RIGHT L3,4,5;  Surgeon: Moy Vasquez MD;  Location: [facility], [place], operated [facility];  Service: Pain Management;  Laterality: Right;  RADIOFREQUENCY ABLATION RIGHT L3,4,5  ok to hold xarelto, scanned in Media    SHOULDER SURGERY Right 1989    torn ligament    TRANSFORAMINAL EPIDURAL INJECTION OF STEROID Bilateral 12/4/2023    Procedure: LUMBAR TRANSFORAMINAL BILATERAL L4/5 DIRECT REFERRAL;  Surgeon: Moy Vasquez MD;  Location: [facility], [place], operated [facility];  Service: Pain Management;   Laterality: Bilateral;    TRANSFORAMINAL EPIDURAL INJECTION OF STEROID Right 11/21/2024    Procedure: LUMBAR TRANSFORAMINAL RIGHT L1/2 AND L2/3 please add xarelto to pt's med list when admitted 11/21;  Surgeon: Moy Vasquez MD;  Location: [facility];  Service: Pain Management;  Laterality: Right;  [phone]  2 WK F/U ELEAZAR       Current Medications:   Current Outpatient Medications:     albuterol 90 mcg/actuation inhaler, Inhale 2 puffs into the lungs every 6 (six) hours as needed for Wheezing. , Disp: , Rfl:     atorvastatin (LIPITOR) 80 MG tablet, Take 80 mg by mouth once daily. Take one-half tablet daily, Disp: , Rfl:     capsaicin 0.1 % Crea, Apply twice to area of maximum paresthesia, Disp: 15 g, Rfl: 6    carboxymethyl-[id number]-YE 0.5-1-0.5 % Dpet, Place 1 drop into both eyes 4 (four) times daily., Disp: , Rfl:     carboxymethylcellulose sodium 1 % DpGe, Place 0.4 mLs into both eyes 4 (four) times daily as needed., Disp: , Rfl:     carvediloL (COREG) 25 MG tablet, Take 1 tablet (25 mg total) by mouth 2 (two) times daily., Disp: 60 tablet, Rfl: 11    chlorhexidine (PERIDEX) 0.12 % solution, RINSE 1 CAPFUL BY MOUTH ONCE DAILY AS NEEDED SWISH FOR 30 SECONDS AFTER,AND SPIT OUT, Disp: , Rfl:     chlorthalidone (HYGROTEN) 25 MG Tab, Take 25 mg by mouth once daily., Disp: , Rfl:     CLOBETASOL PROPIONATE, BULK, MISC, Apply topically once daily., Disp: , Rfl:     cyanocobalamin (VITAMIN B-12) 100 MCG tablet, Take 100 mcg by mouth once daily. Take two tablets, Disp: , Rfl:     cycloSPORINE (RESTASIS) 0.05 % ophthalmic emulsion, INSTILL 0.4ML IN EACH EYE EVERY 12 HOURS, Disp: , Rfl:     eplerenone (INSPRA) 25 MG Tab, Take 1 tablet by mouth once daily., Disp: , Rfl:     ergocalciferol (ERGOCALCIFEROL) 50,000 unit Cap, Take 50,000 Units by mouth., Disp: , Rfl:     finasteride (PROSCAR) 5 mg tablet, TAKE ONE TABLET BY MOUTH ONCE DAILY FOR PROSTATE, Disp: , Rfl:     hydrALAZINE (APRESOLINE) 50 MG tablet, TAKE ONE  TABLET BY MOUTH THREE TIMES A DAY FOR HEART/BLOOD PRESSURE, Disp: , Rfl:     ketoconazole (NIZORAL) 2 % cream, Apply topically 2 (two) times daily., Disp: , Rfl:     latanoprost 0.005 % ophthalmic solution, Place 1 drop into both eyes every evening., Disp: , Rfl:     lidocaine (LIDODERM) 5 %, Place 1 patch onto the skin once daily. Wear for 12 hours, then remove. Do not apply a new patch for at least 12 hours., Disp: , Rfl:     naloxone (NARCAN) 4 mg/actuation Spry, as needed., Disp: , Rfl:     omega-3/dha/epa/fish oil (OMEGA-3 FISH OIL ORAL), 1,000 mg once daily., Disp: , Rfl:     oxyCODONE (ROXICODONE) 10 mg Tab immediate release tablet, Take 1 tablet (10 mg total) by mouth every 4 (four) hours as needed for Pain., Disp: 42 tablet, Rfl: 0    rivaroxaban (XARELTO ORAL), Take by mouth., Disp: , Rfl:     semaglutide (OZEMPIC) 2 mg/dose (8 mg/3 mL) PnIj, Inject 2 mg into the skin every 7 days., Disp: 9 mL, Rfl: 1    thiamine 100 MG tablet, Take 0.5 tablets (50 mg total) by mouth once daily., Disp: 60 tablet, Rfl: 11    valsartan (DIOVAN) 320 MG tablet, Take 320 mg by mouth., Disp: , Rfl:     Social History:   Social History     Socioeconomic History    Marital status: Single   Tobacco Use    Smoking status: Former     Types: Cigars     Quit date: 8/13/2009     Years since quitting: 15.3    Smokeless tobacco: Never   Substance and Sexual Activity    Alcohol use: No     Alcohol/week: 0.0 standard drinks of alcohol    Drug use: No    Sexual activity: Not Currently       REVIEW OF SYSTEMS:  Constitution: Negative. Negative for chills, fever and night sweats.   Cardiovascular: Negative for chest pain and syncope.   Respiratory: Negative for cough and shortness of breath.   Gastrointestinal: See HPI. Negative for nausea/vomiting. Negative for abdominal pain.  Genitourinary: See HPI. Negative for discoloration or dysuria.  Hematologic/Lymphatic: negative for bleeding/clotting disorders.   Musculoskeletal: Negative for falls  "and muscle weakness.   Neurological: See HPI. no history of seizures. no history of cranial surgery or shunts.  Neurological: See HPI. No seizures.   Endocrine: Negative for polydipsia, polyphagia and polyuria.   Allergic/Immunologic: Negative for hives and persistent infections.     EXAM:  Ht 6' 1" (1.854 m)   Wt 108.8 kg (239 lb 13.8 oz)   BMI 31.65 kg/m²     PHYSICAL EXAMINATION:    General: The patient is a 74 y.o. male in no apparent distress, the patient is orientatied to person, place and time.  Psych: Normal mood and affect  HEENT: Vision grossly intact, hearing intact to the spoken word.  Lungs: Respirations unlabored.  Gait: Normal station and gait, no difficulty with toe or heel walk.   Skin: Dorsal lumbar skin negative for rashes, lesions, hairy patches and surgical scars. There is lower lumbar tenderness to palpation.  Range of motion: Lumbar range of motion is acceptable.  Spinal Balance: Global saggital and coronal spinal balance acceptable, no significant for scoliosis and kyphosis.  Musculoskeletal: No pain with the range of motion of the bilateral hips. No trochanteric tenderness to palpation.  Vascular: Bilateral lower extremities warm and well perfused, Dorsalis pedis pulses 2+ bilaterally.  Neurological: Normal strength and tone in all major motor groups in the bilateral lower extremities. Normal sensation to light touch in the L2-S1 dermatomes bilaterally.  Deep tendon reflexes symmetric 2+ in the bilateral lower extremities.  Negative Babinski bilaterally. Straight leg raise negative bilaterally.    IMAGING:   Today I independently reviewed the following images and my interpretations are as follows:    AP, Lat and Flex/Ex  upright L-spine demonstrate spondylosis and DDD without listhesis.    MRI lumbar showed no significant stenosis.      Body mass index is 31.65 kg/m².  Hemoglobin A1C   Date Value Ref Range Status   12/21/2022 6.7 (H) 4.0 - 5.6 % Final     Comment:     ADA Screening " Guidelines:  5.7-6.4%  Consistent with prediabetes  >or=6.5%  Consistent with diabetes    High levels of fetal hemoglobin interfere with the HbA1C  assay. Heterozygous hemoglobin variants (HbS, HgC, etc)do  not significantly interfere with this assay.   However, presence of multiple variants may affect accuracy.     09/19/2022 6.7 (H) 4.0 - 5.6 % Final     Comment:     ADA Screening Guidelines:  5.7-6.4%  Consistent with prediabetes  >or=6.5%  Consistent with diabetes    High levels of fetal hemoglobin interfere with the HbA1C  assay. Heterozygous hemoglobin variants (HbS, HgC, etc)do  not significantly interfere with this assay.   However, presence of multiple variants may affect accuracy.         ASSESSMENT/PLAN:    Diagnoses and all orders for this visit:    Right hip pain    Lumbar radiculopathy  -     [facility] referral[facility]    Lumbar spondylosis    Degeneration of intervertebral disc of lumbar region with discogenic back pain      Follow up if symptoms worsen or fail to improve.    Patient has lumbar spondylosis. MRI is not impressive; no ortho spine surgical intervention warranted. I discussed the natural history of their diagnoses as well as surgical and nonsurgical treatment options. I educated the patient on the importance of core/back strengthening, correct posture, bending/lifting ergonomics, and low-impact aerobic exercises (walking, elliptical, and aquatherapy). Continue medications. I will refer the patient to Dr. Loyola for evaluation. Patient will follow up PRN.    Ricardo Montalvo MD  Orthopaedic Spine Surgeon  Department of Orthopaedic Surgery  [phone]

## 2024-12-26 ENCOUNTER — OFFICE VISIT (OUTPATIENT)
Dept: PAIN MEDICINE | Facility: CLINIC | Age: 74
End: 2024-12-26
Payer: OTHER GOVERNMENT

## 2024-12-26 VITALS
OXYGEN SATURATION: 100 % | WEIGHT: 239.88 LBS | BODY MASS INDEX: 31.65 KG/M2 | RESPIRATION RATE: 17 BRPM | TEMPERATURE: 98 F

## 2024-12-26 DIAGNOSIS — Z96.641 PRESENCE OF RIGHT ARTIFICIAL HIP JOINT: ICD-10-CM

## 2024-12-26 DIAGNOSIS — G89.4 CHRONIC PAIN SYNDROME: ICD-10-CM

## 2024-12-26 DIAGNOSIS — R10.31 RIGHT LOWER QUADRANT PAIN: Primary | ICD-10-CM

## 2024-12-26 DIAGNOSIS — G57.81 NEUROPATHY OF RIGHT ILIOINGUINAL NERVE: ICD-10-CM

## 2024-12-26 PROCEDURE — 99214 OFFICE O/P EST MOD 30 MIN: CPT | Mod: S$PBB,,,

## 2024-12-26 PROCEDURE — 99999 PR PBB SHADOW E&M-EST. PATIENT-LVL V: CPT | Mod: PBBFAC,,,

## 2024-12-26 PROCEDURE — 99215 OFFICE O/P EST HI 40 MIN: CPT | Mod: PBBFAC

## 2024-12-26 NOTE — PROGRESS NOTES
Interventional Pain Management - Established Visit  Follow-Up       SUBJECTIVE:  Interval History 12/26/2024:  Macario Dior Jr. Returns to clinic for follow-up after right L1/2 and L2/3 TFESI on 11/21/2024. He reports no relief.  He states the pain remains to the right iliac crest about prior surgical scars. The pain is worse with walking more than 1 block. He states [facility] He is unable to recall the name/device. He was scheduled for previous CT abdomen and pelvis, but this was not completed. He denies recent health changes. He denies recent falls or trauma. He denies new onset fever/night sweats, urinary incontinence, bowel incontinence, significant weight changes, significant motor weakness or changes, or loss of sensations. His pain today is 10/10.      Interval History 11/06/2024:  Macario Dior Jr. Returns to clinic for follow-up of R-hip pain. The patient states that two weeks ago 10/23 he woke up in bed with sharp pain on the top of the R iliac crest with radiating radiation doing his back anterior to Right SI joint when laying down. However, when his standing and trying, the pain starts at top of the R iliac crest radiating to the pubis. Pain level is 10/10 with movement and 8/10 at rest. When every step he receives a sharp stabbing pain starting from the back of heel and jumps to the R iliac crest (not radiates). Pain is exacerbated with palpation, standing, bending and walking. No mitigating symptoms. No relief with oxycodone, patches, heat. The pain has been there for a long time but it only occurred when he walked for prolonged period of time.  [facility] and [facility] or CT of the hip and per patient, the provider told him that there was no acute pathology. The patient was prescribed Robaxin 500mg TID and Prednisone (dose unkown) TID. The pain was alleviated  from 10/10 to 6/10 until Андрей 11/3. He was able to walk but his pain  lingered.  On 11/4, while still taking the medication, the pain returned. Denies changes in sensation or weakness.     Interval History 8/20/2024:  Macario Dior Jr. Returns to clinic for follow-up of right hip and abdominal pain.He had right TAP block on 7/9/2024. He states this helped with abdominal and mid back pain, but he continues to complain of right-sided pain over the right iliac crest to the groin. He states the pain is worse when he places pressure on his right heel while ambulating. He also reports numbness and altered sensation to bilateral feet right>left. He takes Oxycodone as needed which allows him to have fun with his grandchildren. He had an EMG/NCS ordered previously, but it was never completed. He denies recent falls or trauma. He denies new onset fever/night sweats, urinary incontinence, bowel incontinence, significant weight changes, significant motor weakness or changes, or loss of sensations. His pain today is 8/10.     Interval History 6/12/24  Pt presents with same R hip pain he has been having for years. He was unable to get the MRI hip due to a lack of equipment at the imaging center. He still complains of pain in a QL/Iliopsoas distribution. Still has numbness into the R leg and into the groin.     Interval History 5/15/2024:  Macario Dior Jr. presents to the clinic for a follow-up appointment for hip pain. He continues to report right hip pain. He does have radiating pain into the groin and lateral thigh. He also notes pain that radiates into his back. His right leg feels week. His feet are numb. He is frustrated with his pain. He has tried ABIODUN and RFAs with short term relief. He was taking Lyrica but is out. He did recently see Orthopedics who obtained lab work and ordered imaging. He denies any other health changes. His pain today is 10/10.    Interval history 12/13/2023:  71-year-old male presents today he is status post a bilateral TF ABIODUN as 5 on 12/03 reporting minimal relief.   He continues to report primarily axial low back pain with intermittent right leg.  Pain with extension today also with walking, standing.  Facetogenic pain upon palpation of lower lumbar spine.  He did report more axial low back pain bandlike distribution versus state that the right leg hurts.  He would like to be considered for other pain interventions.     Interval History 11/22/2022:  Mr Dior presents for follow up evaluation of chronic R hip pain and awaiting surgery. He is having acute R sided lumbar myofascial pain and requesting TPI to address symptoms as this has provided benefit in past and will be going out of town. He has no s/s concerning for cauda equina syndrome. He denies SE of his medication regimen.      Interval History 9/23/2022:  The Pt presents for delayed follow He has continued lumbar complaints but focal right hip pain that continues. His R hip pain has continued and he was unaware he was supposed to see Dr Sinha today to address this. He can continue to see us at any time but will reach out to Dr Sinha's office to get him continued care regarding his greatest pain generator at this time.      Interval History 2/18/2021:  The patient presents for follow up of diffuse pain complaints. Pt canceled prior Hip block. State he states lyrica increase to 100mg has helped but neuropathy but also states he is also having stent placed to R leg where pain is greater due to insuffiencey. He continues to take Oxycodone QID. He denies any adverse SE of medications. Denies new areas of pain.       Interval History 12/4/2020:  Pt presents for follow up of lower back pain. He continues to have benefit from RFA of right lower lumbar. He is doing well with med mgt of oxycodone and lyrica. He states night time leg cramping. The patient denies myelopathic symptoms such as handwriting changes or difficulty with buttons/coins/keys. Denies perineal paresthesias, bowel/bladder dysfunction.     Interval History  10/20/2020:  The patient presents follow-up for lower back pain.  He has had near resolution since right-sided L 3, 4, 5 RFA.  He states he is doing well.  He continues to take minimum the medication to address pain.  He is currently taking oxycodone 5 mg q.i.d. as well as Lyrica 75 b.i.d..  He finds he is beneficial without adverse side effects.The patient denies myelopathic symptoms such as handwriting changes or difficulty with buttons/coins/keys. Denies perineal paresthesias, bowel/bladder dysfunction.     Interval History 8/11/2020:  The patient presents for follow up of lower back pain. Pt over interval had 2 MBB of right L3,4,5 with >90% relief of both procedures. Pt states + a.m. stiffness, states when he has MBB of lumbar he notices his hip pain more and vise versa when he has hip block. Lumbar pain>R hip pain at this time. He continues to take oxycodone 5mg QID which is beneficial to pain control without adverse side effects. The patient denies myelopathic symptoms such as handwriting changes or difficulty with buttons/coins/keys. Denies perineal paresthesias, bowel/bladder dysfunction.     Interval History 05/27/2020:  S/P Right Femoral / Obturator Nerve Block in 10/2019 -- 50% relief for 2 days --  lost to follow up after that  S/p Right DHRUV 08/2015 -- doing well for 1.5 years  Pain is located at right hip joint. Occasionally radiates to right thigh.   Aggravated standing, walking, laying down.   Alleviated by Oxycodone     HPI:  Macario Dior is a 69 y.o. Man with PMH DVT s/p right DHRUV that presents today as a new consult for low back pain.  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 Since 2015 DHRUV, he initially had improved but progressive pain in the right hip and low back.  In the last 6 months, he has had 4 back injections from Dr. De Los Santos that lasted for 2 weeks each.  1 month ago, he had a Lumbar ABIODUN that provided 100% relief that lasted 5 days for the low back but the pain  "changed to radiate to the right hip.  Since the ABIODUN relief stopped, the pain has "worked its way back up to the low back."  The pain is described as intense, sharp, and shoots up from the right hip into the low back to the right periscapular area. The shooting pain is worse than the low back pain. The pain is interfering with everything, including walking, sitting, and putting on his shoes.  Oxycodone 10mg Q6H provides relief. Lidocaine patches and TENS provide minimal relief when he wakes up.  He did physical therapy for 1 year with minimal relief.  He is unable to regularly exercise secondary to pain. He is radha to get 4 hours of uninterrupted sleep per night.  He wakes up with stiffness in his low back despite $4,000 mattresses.  He has weakness and buckling in the right lower extremity.  He endorses worsening falls due to weakness "seems like the leg is not there." He says " I need the bone removed" referring to his right hip. He is a  [facility] He denies incontinence and saddle anesthesia.    Pain Disability Index Review:      11/6/2024     8:01 AM 7/9/2024     2:19 PM 6/12/2024    10:55 AM   Last 3 PDI Scores   Pain Disability Index (PDI) 60 70 70       Pain Medications:  None    Opioid Contract: not applicable     report:  Not applicable    Pain Procedures:   Lumbar ABIODUN 100% relief 5 days - Dr. De Los Santos   Lumbar TPIs 0% relief - Dr. De Los Santos   10/23/2019- Right femoral and obturator nerve block  7/6/2020 Right L3,4,5 MBB  7/30/2020 Right L3,4,5 MBB  10/5/2020- Right L3,4,5 RFA  12/17/2020- L5/S1 IL ABIODUN  12/4/2023- Bilateral L4/5 TF ABIODUN  7/9/2024 - Right TAP block under U/S - 90% relief of back pain  11/21/2024 - Right L1/2 and L2/3 TFESI - no relief      Physical Therapy/Home Exercise: yes    Imaging:     XR HIP WITH PELVIS WHEN PERFORMED, 2 OR 3  VIEWS RIGHT     CLINICAL HISTORY:  RM 3; Presence of right artificial hip joint     TECHNIQUE:  AP view of the pelvis and frog " leg lateral view of the right hip were performed.     COMPARISON:  06/23/2023     FINDINGS:  Postoperative changes of right total hip arthroplasty with stable hardware positioning and alignment.  No acute fracture, dislocation or bone destruction.  Patchy areas of sclerosis involving the left femoral head suggesting avascular necrosis with mild left hip joint space narrowing.  There are surgical clips in the right inguinal region.     Impression:     Postoperative changes of right total hip arthroplasty.     Electronically signed by:Brady Brown MD  Date:                                            05/14/2024  Time:                                           13:15    MRI HIP WITHOUT CONTRAST RIGHT     CLINICAL HISTORY:  Hip replacement, loosening suspected;  Presence of right artificial hip joint     TECHNIQUE:  Multisequence, multiplanar MRI of the right hip performed without contrast.     COMPARISON:  Hip radiograph 05/14/2024, CT head 09/13/2022     FINDINGS:  Bones: Postoperative changes of right total hip arthroplasty.  No periprosthetic fracture or osteolysis.  Mild heterotopic ossification posteriorly, unchanged from prior CT.  No infiltrative process.  Bone marrow signal is maintained.     Pelvic joints: Sacroiliac joints and pubic symphysis are unremarkable.     Muscles/tendons: Regional muscles and tendons appear unremarkable.  No evidence for trochanteric or iliopsoas bursitis.  No evidence for ischiofemoral impingement.     Miscellaneous: No periprosthetic fluid collections or masses.  Limited evaluation of pelvic soft tissues is unremarkable.  Postoperative changes of the right groin.     Impression:     1. Postoperative changes of right total hip arthroplasty with unchanged mild heterotopic calcification.  No evidence for loosening or osteolysis.     Electronically signed by resident: Nisa Murillo  Date:                                            07/01/2024  Time:                                            13:32     Electronically signed by:Jonas Kumar MD  Date:                                            07/01/2024  Time:                                           16:05       MRI LUMBAR SPINE WITHOUT CONTRAST     CLINICAL HISTORY:  Lumbar radiculopathy, symptoms persist with conservative treatment; Radiculopathy, lumbar region     TECHNIQUE:  Multiplanar, multisequence MR images were acquired from the thoracolumbar junction to the sacrum without the administration of contrast.     COMPARISON:  9/17/2022     FINDINGS:  The marrow demonstrates homogeneous signal.  Vertebral body heights are maintained.     Disc spaces are maintained.  Disc desiccation L2-3 and L3-4.     Conus terminates appropriately at L1.     Multilevel degenerative change as diesel below:     T12-L1: No focal disc abnormality.  No significant canal or neural foraminal narrowing.     L1-2: Facet arthropathy, right greater than left with mild right neural foraminal narrowing.     L2-3: Small posterior circumferential disc bulge, facet arthropathy (right greater than left), and thickening of the ligamentum flavum.  Prominent posterior epidural fat.  Findings contribute to mild canal narrowing.  Mild bilateral neural foraminal narrowing.     L3-4: Small posterior circumferential disc bulge.  Facet arthropathy with thickening of the ligamentum flavum (left greater than right).  Mild canal and mild bilateral neural foraminal narrowing, left greater than right.     L4-5: Small posterior circumferential disc bulge, facet arthropathy, and thickening of the ligamentum flavum.  Mild canal narrowing.  Mild bilateral neural foraminal narrowing, right greater than left.     L5-S1: Facet arthropathy resulting in mild left neural foraminal narrowing.     Atrophic right kidney.     Impression:     Overall, stable exam compared to prior.  Multilevel degenerative change contributes to multilevel mild neural foraminal narrowing.  Mild canal narrowing as given  in detail level by level as above.        Electronically signed by:Shazia Eaton  Date:                                            05/30/2024  Time:                                           08:59    Allergies:   Review of patient's allergies indicates:   Allergen Reactions    Amlodipine Swelling    Lisinopril Tinitus and Swelling    Pregabalin Swelling    Azithromycin      Muscles tense up    Levetiracetam Other (See Comments)    Acetaminophen Rash     Feels like needles are sticking him per pt       Current Medications:   Current Outpatient Medications   Medication Sig Dispense Refill    albuterol 90 mcg/actuation inhaler Inhale 2 puffs into the lungs every 6 (six) hours as needed for Wheezing.       atorvastatin (LIPITOR) 80 MG tablet Take 80 mg by mouth once daily. Take one-half tablet daily      capsaicin 0.1 % Crea Apply twice to area of maximum paresthesia 15 g 6    carboxymethyl-[id number]-RZ 0.5-1-0.5 % Dpet Place 1 drop into both eyes 4 (four) times daily.      carboxymethylcellulose sodium 1 % DpGe Place 0.4 mLs into both eyes 4 (four) times daily as needed.      chlorhexidine (PERIDEX) 0.12 % solution RINSE 1 CAPFUL BY MOUTH ONCE DAILY AS NEEDED SWISH FOR 30 SECONDS AFTER,AND SPIT OUT      chlorthalidone (HYGROTEN) 25 MG Tab Take 25 mg by mouth once daily.      CLOBETASOL PROPIONATE, BULK, MISC Apply topically once daily.      cyanocobalamin (VITAMIN B-12) 100 MCG tablet Take 100 mcg by mouth once daily. Take two tablets      cycloSPORINE (RESTASIS) 0.05 % ophthalmic emulsion INSTILL 0.4ML IN EACH EYE EVERY 12 HOURS      eplerenone (INSPRA) 25 MG Tab Take 1 tablet by mouth once daily.      ergocalciferol (ERGOCALCIFEROL) 50,000 unit Cap Take 50,000 Units by mouth.      finasteride (PROSCAR) 5 mg tablet TAKE ONE TABLET BY MOUTH ONCE DAILY FOR PROSTATE      hydrALAZINE (APRESOLINE) 50 MG tablet TAKE ONE TABLET BY MOUTH THREE TIMES A DAY FOR HEART/BLOOD PRESSURE      ketoconazole (NIZORAL) 2 % cream Apply  topically 2 (two) times daily.      latanoprost 0.005 % ophthalmic solution Place 1 drop into both eyes every evening.      lidocaine (LIDODERM) 5 % Place 1 patch onto the skin once daily. Wear for 12 hours, then remove. Do not apply a new patch for at least 12 hours.      naloxone (NARCAN) 4 mg/actuation Spry as needed.      omega-3/dha/epa/fish oil (OMEGA-3 FISH OIL ORAL) 1,000 mg once daily.      oxyCODONE (ROXICODONE) 10 mg Tab immediate release tablet Take 1 tablet (10 mg total) by mouth every 4 (four) hours as needed for Pain. 42 tablet 0    rivaroxaban (XARELTO ORAL) Take by mouth.      semaglutide (OZEMPIC) 2 mg/dose (8 mg/3 mL) PnIj Inject 2 mg into the skin every 7 days. 9 mL 1    thiamine 100 MG tablet Take 0.5 tablets (50 mg total) by mouth once daily. 60 tablet 11    valsartan (DIOVAN) 320 MG tablet Take 320 mg by mouth.      carvediloL (COREG) 25 MG tablet Take 1 tablet (25 mg total) by mouth 2 (two) times daily. 60 tablet 11     No current facility-administered medications for this visit.       REVIEW OF SYSTEMS:    GENERAL:  No weight loss, malaise or fevers.  HEENT:  Negative for frequent or significant headaches.  NECK:  Negative for lumps, goiter, pain and significant neck swelling.  RESPIRATORY:  Negative for cough, wheezing or shortness of breath. BUZZ, asthma  CARDIOVASCULAR:  Negative for chest pain, leg swelling or palpitations. HTN  GI:  Negative for abdominal discomfort, blood in stools or black stools or change in bowel habits.  MUSCULOSKELETAL:  See HPI.  SKIN:  Negative for lesions, rash, and itching.  RENAL: CKD  ENDO: Diabetes  PSYCH:  Negative for sleep disturbance, mood disorder and recent psychosocial stressors.  HEMATOLOGY/LYMPHOLOGY:  Negative for prolonged bleeding, bruising easily or swollen nodes.  NEURO:   No history of headaches, syncope, paralysis, seizures or tremors.  All other reviewed and negative other than HPI.    Past Medical History:  Past Medical History:   Diagnosis  Date    Arthritis     Asthma     chronic    CKD (chronic kidney disease), stage III     patient denies    Deep vein thrombosis     Demyelinating neuropathy     polyneuropathy    Diabetes mellitus type 2 without retinopathy 12/14/2022    Gout     History of blood clots 2013    lower part of both legs    HLD (hyperlipidemia)     Hypertension     Iron deficiency anemia     BUZZ (obstructive sleep apnea)     CPAP    Pulmonary embolism        Past Surgical History:  Past Surgical History:   Procedure Laterality Date    COLONOSCOPY N/A 11/7/2016    Procedure: COLONOSCOPY;  Surgeon: Shar Weiss MD;  Location: [facility] ENDO (4TH FLR);  Service: Endoscopy;  Laterality: N/A;  [facility] Referral. Okay to hold Coumadin 5 days prior to procedure. See Referral scaned in media tab on 10/20/16.    COLONOSCOPY N/A 9/21/2017    Procedure: COLONOSCOPY;  Surgeon: Stuart Trinidad MD;  Location: [facility] ENDO (4TH FLR);  Service: Endoscopy;  Laterality: N/A;  [facility] Oliv[facility]-see scanned docs under media tab          9/6/17-current [facility] authorization and last clinic visit scanned into chart    EPIDURAL STEROID INJECTION N/A 12/17/2020    Procedure: INJECTION, STEROID, EPIDURAL, L5-S1 clear to hold Xarelto 2 days;  Surgeon: Moy Vasquez MD;  Location: [facility];  Service: Pain Management;  Laterality: N/A;    EXCISION OF LESION OF FEMUR Right 1/4/2023    Procedure: EXCISION, LESION, FEMUR: POSTERIOR APPROACH;  Surgeon: Prieto Sinha MD;  Location: [facility] OR 2ND FLR;  Service: Orthopedics;  Laterality: Right;    HIP SURGERY Right 2008    exploratory at [facility]    HIP SURGERY Right 8-31-15    THR    INJECTION OF ANESTHETIC AGENT AROUND NERVE Right 10/23/2019    Procedure: BLOCK, NERVE, Obturator and Femoral cleared to hold xarelto 3 days pt. said he's not taking coumadin;  Surgeon: Moy Vasquez MD;  Location: [facility];  Service: Pain Management;  Laterality: Right;    INJECTION OF ANESTHETIC AGENT AROUND NERVE  Right 7/6/2020    Procedure: BLOCK, NERVE, RIGHT MBB L3,L5,L5;  Surgeon: Moy Vasquez MD;  Location: [facility]IN [facility];  Service: Pain Management;  Laterality: Right;  BLOCK, NERVE, RIGHT MBB L3,L5,L5    INJECTION OF ANESTHETIC AGENT AROUND NERVE Right 7/30/2020    Procedure: BLOCK, NERVE RIGHT L3, L4, L5 2ND;  Surgeon: Moy Vasquez MD;  Location: [facility]IN [facility]GT;  Service: Pain Management;  Laterality: Right;  NEEDS CONSENT, XARELTO    LEG SURGERY Left 2012    fibula and tibia    RADIOFREQUENCY ABLATION Right 10/5/2020    Procedure: RADIOFREQUENCY ABLATION RIGHT L3,4,5;  Surgeon: Moy Vasquez MD;  Location: [facility];  Service: Pain Management;  Laterality: Right;  RADIOFREQUENCY ABLATION RIGHT L3,4,5  ok to hold xarelto, scanned in Media    SHOULDER SURGERY Right 1989    torn ligament    TRANSFORAMINAL EPIDURAL INJECTION OF STEROID Bilateral 12/4/2023    Procedure: LUMBAR TRANSFORAMINAL BILATERAL L4/5 DIRECT REFERRAL;  Surgeon: Moy Vasquez MD;  Location: [facility];  Service: Pain Management;  Laterality: Bilateral;    TRANSFORAMINAL EPIDURAL INJECTION OF STEROID Right 11/21/2024    Procedure: LUMBAR TRANSFORAMINAL RIGHT L1/2 AND L2/3 please add xarelto to pt's med list when admitted 11/21;  Surgeon: Moy Vasquez MD;  Location: [facility] PAIN MGT;  Service: Pain Management;  Laterality: Right;  [phone]  2 WK F/U ELEAZAR       Family History:  Family History   Problem Relation Name Age of Onset    Cancer Mother          ovarian    Cancer Father      Cancer Sister          breast    Cancer Sister          breast    No Known Problems Brother      No Known Problems Daughter      No Known Problems Daughter      No Known Problems Daughter      No Known Problems Son      No Known Problems Son         Social History:  Social History     Socioeconomic History    Marital status: Single   Tobacco Use    Smoking status: Former     Types: Cigars     Quit date: 8/13/2009     Years since quitting: 15.3    Smokeless tobacco:  Never   Substance and Sexual Activity    Alcohol use: No     Alcohol/week: 0.0 standard drinks of alcohol    Drug use: No    Sexual activity: Not Currently       OBJECTIVE:    Temp 98 °F (36.7 °C)   Resp 17   Wt 108.8 kg (239 lb 13.8 oz)   SpO2 100%   BMI 31.65 kg/m²     PHYSICAL EXAMINATION:    General appearance: Well appearing, in no acute distress, alert and oriented x3.  Psych:  Mood and affect appropriate.  Skin: Skin color, texture, turgor normal, no rashes or lesions, in both upper and lower body. Well-healed scar over right iliac crest. Pain reproduction when scar is palpated.   Head/face:  Atraumatic, normocephalic.   Cor: Rate regular.   Pulm: Symmetric chest rise, no respiratory distress noted.   Back: There is pain with palpation over paraspinals and facet joints on the right. Limited ROM with pain on extension. Pain with flexion.  Extremities: No deformities or skin discoloration. Good capillary refill. +1 edema to BLE  Musculoskeletal: 4/5 strength with hip flexion Right. All other strength normal and symmetric. Right hip: pain to lateral hip with external rotation with log roll. + Right FADIR.  No atrophy or tone abnormalities are noted.  Neuro:  No loss of sensation is noted.  Gait: Antalgic- ambulates without assistance.     ASSESSMENT: 74 y.o. year old male with low back and hip pain, consistent with the followin. Right lower quadrant pain  CT Abdomen Pelvis  Without Contrast      2. Neuropathy of right ilioinguinal nerve  CT Abdomen Pelvis  Without Contrast      3. Chronic pain syndrome        4. Presence of right artificial hip joint            PLAN:   We discussed with the patient the assessment and recommendations. The following is the plan we agreed on:   - Previous imaging reviewed. MRI of Lumbar spine showing L1/L2 -narrowing of forminal on R-side. L2/L3- congenital shortening of pedicles  - He is s/p Right L1/L2, L2/3 TFESI with no pain relief.   - Previous CT abdomen pelvis  ordered by Dr Vasquez not performed. Rescheduled today.  - RTC after imaging to discuss options.  - Patient thinks he may have discussed [facility] and would like to discuss with Dr Vasquez if appropriate.     The above plan and management options were discussed at length with patient. Patient is in agreement with the above and verbalized understanding.    Krysta Spain NP  12/26/2024

## 2024-12-30 ENCOUNTER — OFFICE VISIT (OUTPATIENT)
Dept: NEUROLOGY | Facility: CLINIC | Age: 74
End: 2024-12-30
Payer: OTHER GOVERNMENT

## 2024-12-30 ENCOUNTER — TELEPHONE (OUTPATIENT)
Dept: NEUROLOGY | Facility: CLINIC | Age: 74
End: 2024-12-30
Payer: OTHER GOVERNMENT

## 2024-12-30 ENCOUNTER — LAB VISIT (OUTPATIENT)
Dept: LAB | Facility: HOSPITAL | Age: 74
End: 2024-12-30
Attending: PSYCHIATRY & NEUROLOGY
Payer: OTHER GOVERNMENT

## 2024-12-30 VITALS
HEIGHT: 73 IN | SYSTOLIC BLOOD PRESSURE: 180 MMHG | WEIGHT: 243.81 LBS | BODY MASS INDEX: 32.31 KG/M2 | DIASTOLIC BLOOD PRESSURE: 89 MMHG | HEART RATE: 69 BPM

## 2024-12-30 DIAGNOSIS — E11.42 SENSORY POLYNEUROPATHY DUE TO DIABETES MELLITUS: Primary | ICD-10-CM

## 2024-12-30 DIAGNOSIS — E11.42 SENSORY POLYNEUROPATHY DUE TO DIABETES MELLITUS: ICD-10-CM

## 2024-12-30 DIAGNOSIS — N18.30 STAGE 3 CHRONIC KIDNEY DISEASE, UNSPECIFIED WHETHER STAGE 3A OR 3B CKD: ICD-10-CM

## 2024-12-30 DIAGNOSIS — M54.17 LUMBOSACRAL RADICULOPATHY: ICD-10-CM

## 2024-12-30 DIAGNOSIS — N18.30 STAGE 3 CHRONIC KIDNEY DISEASE, UNSPECIFIED WHETHER STAGE 3A OR 3B CKD: Primary | ICD-10-CM

## 2024-12-30 LAB — PTH-INTACT SERPL-MCNC: 408.5 PG/ML (ref 9–77)

## 2024-12-30 PROCEDURE — 99215 OFFICE O/P EST HI 40 MIN: CPT | Mod: S$PBB,,, | Performed by: PSYCHIATRY & NEUROLOGY

## 2024-12-30 PROCEDURE — 99215 OFFICE O/P EST HI 40 MIN: CPT | Mod: PBBFAC | Performed by: PSYCHIATRY & NEUROLOGY

## 2024-12-30 PROCEDURE — 83970 ASSAY OF PARATHORMONE: CPT | Performed by: PSYCHIATRY & NEUROLOGY

## 2024-12-30 PROCEDURE — 99999 PR PBB SHADOW E&M-EST. PATIENT-LVL V: CPT | Mod: PBBFAC,,, | Performed by: PSYCHIATRY & NEUROLOGY

## 2024-12-30 PROCEDURE — G2211 COMPLEX E/M VISIT ADD ON: HCPCS | Mod: S$PBB,,, | Performed by: PSYCHIATRY & NEUROLOGY

## 2024-12-30 PROCEDURE — 36415 COLL VENOUS BLD VENIPUNCTURE: CPT | Performed by: PSYCHIATRY & NEUROLOGY

## 2024-12-30 NOTE — PATIENT INSTRUCTIONS
Walk and exercise  Fall precautions.      In case of any question, plz contact through [facility].

## 2024-12-30 NOTE — TELEPHONE ENCOUNTER
----- Message from Tomer Malik MD sent at 12/30/2024  9:39 AM CST -----  Good morning, this patient is candidate for capsaicin patch therapy as we discussed in the past.  Let me know if any questions.  Thank you

## 2024-12-30 NOTE — PROGRESS NOTES
[facility] - NEUROLOGY 7TH [place] [facility], [place]    Date: 12/30/24  Patient Name: Macario Dior Jr.   MRN: [id number]   Referring Provider: No ref. provider found    Thank you so much No ref. provider found for your patient referral to Neuromuscular team at [facility]. We take pride in our care coordination and look forward to your feedback and questions.    Assessment:   Macario Dior Jr. is a 74 y.o. male is a very pleasant patient with probable diabetic polyneuropathy, CKD stage 3, lumbar spondylosis and inability to walk due to right hip pain for follow-up.  I discussed in detail with the patient about age related changes as well as pain coming from different structures including joints, nerves and muscles but as this time pain is worsened by movements, I feel it is coming from the right hip joint.  He is already on multiple medications for different reasons.     Will check parathyroid hormone given postoperative calcification of R total hip arthroplasty seen on MRI. Capsicin cream has not been giving patient relief, so will try qutenza patch instead. Will let orthopedic team take the lead as patient's R hip pain is likely not neuromuscular in origin.    I discussed about fall precautions and need for Physiotherapy. I addressed his complaints. I provided information about fall precautions and healthy lifestyle. I would wish him very best for improvement/recovery in his condition.    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 This includes  "face to face time and non-face to face time preparing to see the patient (eg, review of tests), obtaining and/or reviewing separately obtained history, documenting clinical information in the electronic or other health record, independently interpreting results and communicating results to the patient/family/caregiver, or care coordinator.    Patient note was created using [employer]ge[employer] Dictation.  Any errors in syntax or even information may not have been identified and edited on initial review prior to signing this note.    Details provided by:    Patient    Chief complaint today:  Pain and paresthesia in lower extremities as well as back    Interval history on 12/30/24   Patient is here today for f/u of pain and paresthesia in his lower extremities and back. He says his pain is in the "right iliac crest", and it occurs every time his right foot hits the ground while walking. He describes it as "feeling like an open sore". He states that this problem has been going on for 3 months and is worsening. Still experiencing numbness and tingling in his bilateral feel. Currently using capsaicin cream which provides no relief. However, he states the numbness and tingling is not nearly as concerning as the pain in his R hip. Patient states this pain has causes him to present to the ER multiple times recently. He has fallen 8-9 times this year, the last being about 2 weeks ago. Patient attributes all of these falls to the pain in his R hip. Patient uses an electric wheelchair at home to help get around this walking has become very painful for him. Currently taking oxycodone and baclofen for this pain, but he states neither give him any relief. Can't take pregabalin because of reported swelling. He states that the only thing that's helped so far is a recent epidural injection.    On examination, Pain with right hip abduction with movement with diminished deep tendon reflexes in lower extremities. Absent vibration bilaterally in " toes, R>L. Decreased vibration up to ankles, decreased sharp sensation to pinprick bilaterally in lower extremities up ankles, worst in bilateral feet. Slow, antalgic gait.    Interval work up:  HIV 1 and 2 antibody negative   Vitamin B1 level 40   RPR negative   Protein electrophoresis with mildly high beta chains, Normal total protein, normal pattern.   Folate level 7.0 normal   Vitamin B12 level 522   RANDALL negative   Immunofixation electrophoresis normal, No monoclonal peaks identified.     NCS/EMG on 12/09/2024  length-dependent axonal sensorimotor peripheral polyneuropathy.    MRI hip joint on 7/1/24   Postoperative changes of right total hip arthroplasty with unchanged mild heterotopic calcification. No evidence for loosening or osteolysis.     MRI lumbar spine on 05/29/2024.    Multilevel degenerative change contributes to multilevel mild neural foraminal narrowing. Mild canal narrowing as given in detail level by level as above.       =========================================================================    Initial visit on 09/07/2023  History of Present Illness:      Mr. Macario Dior Jr. is a 74 y.o. male presenting for evaluation of  pain and paresthesia in lower extremities as well as back.  He has PMH of probable diabetic peripheral neuropathy, lumbar radiculopathy, HTN, CKD stage 3, DM2, and DVT history     The detail was confirmed with the patient who mentioned severe exacerbation of lower back pain progressively resulting in an inability to walk for the last six months. Patient states that the problems began after his R Hip replacement he had around 4742-2039. Since then, he has had chronic low back pain, initially confined to the R side, that has no spread to the entirety of his lower back. The pain travels down his lower extremities bilaterally. There is a stabbing pain in his back that turns into a dull ache traveling posteriorly to his feet. The pain is present when standing, laying flat, or  sitting, but is worst when he walks. The patient follows with pain management and has tried various medications and procedures, but currently takes oxycodone to manage the pain. The patient states that the oxycodone does not relieve the pain, but makes the pain more tolerable.     He is also complaining of bilateral burning and numbness in his feet for past 3 years. Patient states that he has previously been told this is diabetic neuropathy, but patient does not believe he is diabetic. The patient takes pregabalin for this pain and states that it has reduced the burning sensation by approximately 50%.  He is unable to walk because of pain as well as paresthesia.  The patient denies nausea/vomiting/diarrhea/constipation or loss of bladder control. The patient is a former smoker and does not consume alcohol. The patient does not have a consistent diet.       Chart Review:  Neurology note on 12/16/2022.    He has PMH of idiopathic peripheral neuropathy, lumbar radiculopathy, HTN, CKD stage 3, DM2, and DVT history. His current therapy regimen is  Lyrica 100mg TID. 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  He has left tibial/fibula fracture almost 10 years ago.  He had injections in the back by pain management which he states did not help.  He is retired [employer]     Pertinent work up based on chart review for current condition:  CBC with hemoglobin 14, hematocrit 43, and MCV 98  Hemoglobin A1c 6.7  Basic metabolic panel with creatinine 2.2 and BUN 15 and GFR of 31%  ESR 22.    C-reactive protein 7.5     MRI lumbar spine on 09/17/2022  Multilevel lumbar spondylosis as detailed level by level above, mild bilateral neural foraminal from L3 to S1.  No more than mild spinal canal stenosis at L3-L4 and L4-L5.    NCS/EMG on 03/10/2020   suspicion for L5-S1 lumbar radiculopathy on  EMG      Last CBC Results:   Lab Results   Component Value Date    WBC 5.96 05/14/2024    HGB 12.8 (L) 05/14/2024    HCT 39.8 (L) 05/14/2024     05/14/2024       Last CMP Results  Lab Results   Component Value Date     05/14/2024    K 4.7 05/14/2024     05/14/2024    CO2 27 05/14/2024    BUN 35 (H) 05/14/2024    CREATININE 2.8 (H) 05/14/2024    CALCIUM 9.6 05/14/2024    ALBUMIN 3.0 (L) 04/13/2016    AST 17 04/13/2016    ALT 13 04/13/2016       Last A1C  Lab Results   Component Value Date    HGBA1C 6.7 (H) 12/21/2022       Review of Systems:  12 system review of systems is negative except for the symptoms mentioned in HPI.       PAST MEDICAL HISTORY:  Past Medical History:   Diagnosis Date    Arthritis     Asthma     chronic    CKD (chronic kidney disease), stage III     patient denies    Deep vein thrombosis     Demyelinating neuropathy     polyneuropathy    Diabetes mellitus type 2 without retinopathy 12/14/2022    Gout     History of blood clots 2013    lower part of both legs    HLD (hyperlipidemia)     Hypertension     Iron deficiency anemia     BUZZ (obstructive sleep apnea)     CPAP    Pulmonary embolism        PAST SURGICAL HISTORY:  Past Surgical History:   Procedure Laterality Date    COLONOSCOPY N/A 11/7/2016    Procedure: COLONOSCOPY;  Surgeon: Shar Weiss MD;  Location: [address]);  Service: Endoscopy;  Laterality: N/A;  [facility] Referral. Okay to hold Coumadin 5 days prior to procedure. See Referral scaned in media tab on 10/20/16.    COLONOSCOPY N/A 9/21/2017    Procedure: COLONOSCOPY;  Surgeon: Stuart Trinidad MD;  Location: [address]);  Service: Endoscopy;  Laterality: N/A;  [facility] Olivarez-see scanned docs under media tab          9/6/17-current [facility] authorization and last clinic visit scanned into chart    EPIDURAL STEROID INJECTION N/A 12/17/2020    Procedure: INJECTION, STEROID, EPIDURAL, L5-S1 clear to hold Xarelto 2 days;   Surgeon: Moy Vasquez MD;  Location: [facility], [place], operated by [facility];  Service: Pain Management;  Laterality: N/A;    EXCISION OF LESION OF FEMUR Right 1/4/2023    Procedure: EXCISION, LESION, FEMUR: POSTERIOR APPROACH;  Surgeon: Prieto Sinha MD;  Location: [facility] OR [facility] FLR;  Service: Orthopedics;  Laterality: Right;    HIP SURGERY Right 2008    exploratory at [facility]    HIP SURGERY Right 8-31-15    THR    INJECTION OF ANESTHETIC AGENT AROUND NERVE Right 10/23/2019    Procedure: BLOCK, NERVE, Obturator and Femoral cleared to hold xarelto 3 days pt. said he's not taking coumadin;  Surgeon: Moy Vasquez MD;  Location: [facility];  Service: Pain Management;  Laterality: Right;    INJECTION OF ANESTHETIC AGENT AROUND NERVE Right 7/6/2020    Procedure: BLOCK, NERVE, RIGHT MBB L3,L5,L5;  Surgeon: Moy Vasquez MD;  Location: [facility];  Service: Pain Management;  Laterality: Right;  BLOCK, NERVE, RIGHT MBB L3,L5,L5    INJECTION OF ANESTHETIC AGENT AROUND NERVE Right 7/30/2020    Procedure: BLOCK, NERVE RIGHT L3, L4, L5 2ND;  Surgeon: Moy Vasquez MD;  Location: [facility], [place], operated by [facility];  Service: Pain Management;  Laterality: Right;  NEEDS CONSENT, XARELTO    LEG SURGERY Left 2012    fibula and tibia    RADIOFREQUENCY ABLATION Right 10/5/2020    Procedure: RADIOFREQUENCY ABLATION RIGHT L3,4,5;  Surgeon: Moy Vasquez MD;  Location: [facility];  Service: Pain Management;  Laterality: Right;  RADIOFREQUENCY ABLATION RIGHT L3,4,5  ok to hold xarelto, scanned in Media    SHOULDER SURGERY Right 1989    torn ligament    TRANSFORAMINAL EPIDURAL INJECTION OF STEROID Bilateral 12/4/2023    Procedure: LUMBAR TRANSFORAMINAL BILATERAL L4/5 DIRECT REFERRAL;  Surgeon: Moy Vasquez MD;  Location: [facility], [place], operated by [facility];  Service: Pain Management;  Laterality: Bilateral;    TRANSFORAMINAL EPIDURAL INJECTION OF STEROID Right 11/21/2024    Procedure: LUMBAR TRANSFORAMINAL RIGHT L1/2 AND L2/3 please add xarelto to pt's med list when admitted 11/21;  Surgeon: Christina  MD Moy;  Location: [facility], [place], operated by [facility];  Service: Pain Management;  Laterality: Right;  [phone]  2 WK F/U ELEAZAR       CURRENT MEDS:  I have reconciled the patient's home medications and discharge medications with the patient/family. I have updated all changes.  Refer to After-Visit Medication List.    Current Outpatient Medications   Medication Sig Dispense Refill    albuterol 90 mcg/actuation inhaler Inhale 2 puffs into the lungs every 6 (six) hours as needed for Wheezing.       atorvastatin (LIPITOR) 80 MG tablet Take 80 mg by mouth once daily. Take one-half tablet daily      capsaicin 0.1 % Crea Apply twice to area of maximum paresthesia 15 g 6    carboxymethyl-[id number]-KL 0.5-1-0.5 % Dpet Place 1 drop into both eyes 4 (four) times daily.      carboxymethylcellulose sodium 1 % DpGe Place 0.4 mLs into both eyes 4 (four) times daily as needed.      carvediloL (COREG) 25 MG tablet Take 1 tablet (25 mg total) by mouth 2 (two) times daily. 60 tablet 11    chlorhexidine (PERIDEX) 0.12 % solution RINSE 1 CAPFUL BY MOUTH ONCE DAILY AS NEEDED SWISH FOR 30 SECONDS AFTER,AND SPIT OUT      chlorthalidone (HYGROTEN) 25 MG Tab Take 25 mg by mouth once daily.      CLOBETASOL PROPIONATE, BULK, MISC Apply topically once daily.      cyanocobalamin (VITAMIN B-12) 100 MCG tablet Take 100 mcg by mouth once daily. Take two tablets      cycloSPORINE (RESTASIS) 0.05 % ophthalmic emulsion INSTILL 0.4ML IN EACH EYE EVERY 12 HOURS      eplerenone (INSPRA) 25 MG Tab Take 1 tablet by mouth once daily.      ergocalciferol (ERGOCALCIFEROL) 50,000 unit Cap Take 50,000 Units by mouth.      finasteride (PROSCAR) 5 mg tablet TAKE ONE TABLET BY MOUTH ONCE DAILY FOR PROSTATE      hydrALAZINE (APRESOLINE) 50 MG tablet TAKE ONE TABLET BY MOUTH THREE TIMES A DAY FOR HEART/BLOOD PRESSURE      ketoconazole (NIZORAL) 2 % cream Apply topically 2 (two) times daily.      latanoprost 0.005 % ophthalmic solution Place 1 drop into both eyes every evening.       lidocaine (LIDODERM) 5 % Place 1 patch onto the skin once daily. Wear for 12 hours, then remove. Do not apply a new patch for at least 12 hours.      naloxone (NARCAN) 4 mg/actuation Spry as needed.      omega-3/dha/epa/fish oil (OMEGA-3 FISH OIL ORAL) 1,000 mg once daily.      oxyCODONE (ROXICODONE) 10 mg Tab immediate release tablet Take 1 tablet (10 mg total) by mouth every 4 (four) hours as needed for Pain. 42 tablet 0    rivaroxaban (XARELTO ORAL) Take by mouth.      semaglutide (OZEMPIC) 2 mg/dose (8 mg/3 mL) PnIj Inject 2 mg into the skin every 7 days. 9 mL 1    thiamine 100 MG tablet Take 0.5 tablets (50 mg total) by mouth once daily. 60 tablet 11    valsartan (DIOVAN) 320 MG tablet Take 320 mg by mouth.       No current facility-administered medications for this visit.       ALLERGIES:  Review of patient's allergies indicates:   Allergen Reactions    Amlodipine Swelling    Lisinopril Tinitus and Swelling    Pregabalin Swelling    Azithromycin      Muscles tense up    Levetiracetam Other (See Comments)    Acetaminophen Rash     Feels like needles are sticking him per pt       FAMILY HISTORY:  Family History   Problem Relation Name Age of Onset    Cancer Mother          ovarian    Cancer Father      Cancer Sister          breast    Cancer Sister          breast    No Known Problems Brother      No Known Problems Daughter      No Known Problems Daughter      No Known Problems Daughter      No Known Problems Son      No Known Problems Son         SOCIAL HISTORY:  Social History     Tobacco Use    Smoking status: Former     Types: Cigars     Quit date: 8/13/2009     Years since quitting: 15.3    Smokeless tobacco: Never   Substance Use Topics    Alcohol use: No     Alcohol/week: 0.0 standard drinks of alcohol    Drug use: No         Objective:     There were no vitals filed for this visit.    Wt Readings from Last 3 Encounters:   12/26/24 1427 108.8 kg (239 lb 13.8 oz)   12/16/24 1400 108.8 kg (239 lb 13.8  oz)   11/22/24 1523 106 kg (233 lb 11 oz)     There is no height or weight on file to calculate BMI.       Eyes: no tearing, discharge, no erythema   ENT: moist mucous membranes of the oral cavity, nares patent    Neck: Supple, full range of motion  Cardiovascular: Warm and well perfused, pulses equal and symmetrical  Lungs: Normal work of breathing, normal chest wall excursions  Skin: No rash, lesions, or breakdown on exposed skin  Psychiatry: Mood and affect are appropriate   Extremeties: No cyanosis, clubbing or edema.    GENERAL/CONSTITUTIONAL:    -Well appearing; well nourished    HIGHER INTEGRATIVE FUNCTIONS:   -Attention & concentration: Normal   -Orientation: Oriented to person, place & time  -Memory: Normal  -Language: Normal   -Fund of Knowledge: Normal     CRANIAL NERVES:   -CN 2: Visual fields full  -CN 2,3: PERRL  -CN 3,4,6: EOMI  -CN 5: Facial sensation intact bilaterally  -CN 7: Facial strength/movement intact bilaterally  -CN 8: Hearing normal bilaterally  -CN 9,10: Palate elevates symmetrically  -CN 11: Normal shoulder shrug and head turn  -CN 12: Tongue protrudes midline     MOTOR:   -Tone: normal in upper and lower extremities  -UE/LE motor: 5/5 throughout, no pronator drift    Upper Ext Right Left Lower Ext Right Left   Shoulder Abd 5 5 Hip flexion 5 5   Elbow flexion 5 5 Knee extension 5 5   Elbow extension 5 5 Knee flexion 5 5   Fingers abduction 5 5 Ankle dorsiflexion 5 5   Wrist extension   Ankle plantar flexion     Wrist flexion   Great toe dorsiflexion     Finger extension   Thigh adduction     Finger flexion   Thigh abduction     Thumb abduction            REFLEXES:      R L  R L   Triceps 2 2 Knee 2 2   Biceps 2 2 Ankle 2 2   BR 2 2        -extensor plantar reflex on the left side     SENSATION:   -decreased vibration bilaterally in lower extremities up to the knees   -decreased sharp sensation to pinprick bilaterally in lower extremities up to mid shin.        COORDINATION:   -FNF normal  bilaterally     GAIT:   -Normal casual gait. Able to walk on heels and toes without difficulty.  Came in wheelchair due to pain  -he is able to do squat without difficulty  -SLR negative bilaterally    Scheduled Follow-up :  Future Appointments   Date Time Provider Department Center   12/30/2024  8:00 AM Tomer Malik MD [facility] NEURO [facility]   12/31/2024  2:00 PM [facility] CT OP LIMIT 450 LBS [facility] CTSCANO [facility]   1/16/2025  9:00 AM Kenn Loyola MD [facility] ORTHO [facility]   1/21/2025  7:45 AM Moy Vasquez MD [facility]   2/13/2025  2:00 PM Marianne Astorga MD [facility] BARIAT [facility]       After Visit Medication List :     Medication List            Accurate as of December 30, 2024  7:41 AM. If you have any questions, ask your nurse or doctor.                CONTINUE taking these medications      albuterol 90 mcg/actuation inhaler  Commonly known as: PROVENTIL/VENTOLIN HFA     atorvastatin 80 MG tablet  Commonly known as: LIPITOR     capsaicin 0.1 % Crea  Apply twice to area of maximum paresthesia     carboxymethyl-gly-dkvj42-JN 0.5-1-0.5 % Dpet     carboxymethylcellulose sodium 1 % Dpge     carvediloL 25 MG tablet  Commonly known as: COREG  Take 1 tablet (25 mg total) by mouth 2 (two) times daily.     chlorhexidine 0.12 % solution  Commonly known as: PERIDEX     chlorthalidone 25 MG Tab  Commonly known as: HYGROTEN     CLOBETASOL PROPIONATE (BULK) MISC     cyanocobalamin 100 MCG tablet  Commonly known as: VITAMIN B-12     cycloSPORINE 0.05 % ophthalmic emulsion  Commonly known as: RESTASIS     eplerenone 25 MG Tab  Commonly known as: INSPRA     ergocalciferol 50,000 unit Cap  Commonly known as: ERGOCALCIFEROL     finasteride 5 mg tablet  Commonly known as: PROSCAR     hydrALAZINE 50 MG tablet  Commonly known as: APRESOLINE     ketoconazole 2 % cream  Commonly known as: NIZORAL     latanoprost 0.005 % ophthalmic solution     LIDOcaine 5 %  Commonly known as: LIDODERM     naloxone 4  mg/actuation Spry  Commonly known as: NARCAN     OMEGA-3 FISH OIL ORAL     oxyCODONE 10 mg Tab immediate release tablet  Commonly known as: ROXICODONE  Take 1 tablet (10 mg total) by mouth every 4 (four) hours as needed for Pain.     OZEMPIC 2 mg/dose (8 mg/3 mL) Pnij  Generic drug: semaglutide  Inject 2 mg into the skin every 7 days.     thiamine 100 MG tablet  Take 0.5 tablets (50 mg total) by mouth once daily.     valsartan 320 MG tablet  Commonly known as: DIOVAN     XARELTO ORAL              Signing Physician:          Tomer Malik MD  , [employer] [facility] [employer]  Neurology Consultant. [facility].   [address],  [address] [place] [address].

## 2024-12-31 ENCOUNTER — TELEPHONE (OUTPATIENT)
Dept: PAIN MEDICINE | Facility: CLINIC | Age: 74
End: 2024-12-31
Payer: OTHER GOVERNMENT

## 2024-12-31 ENCOUNTER — HOSPITAL ENCOUNTER (OUTPATIENT)
Dept: RADIOLOGY | Facility: OTHER | Age: 74
Discharge: HOME | End: 2024-12-31
Payer: OTHER GOVERNMENT

## 2024-12-31 DIAGNOSIS — R10.31 RIGHT LOWER QUADRANT PAIN: ICD-10-CM

## 2024-12-31 DIAGNOSIS — G57.81 NEUROPATHY OF RIGHT ILIOINGUINAL NERVE: ICD-10-CM

## 2024-12-31 PROCEDURE — 74176 CT ABD & PELVIS W/O CONTRAST: CPT | Mod: TC

## 2024-12-31 PROCEDURE — 74176 CT ABD & PELVIS W/O CONTRAST: CPT | Mod: 26,,, | Performed by: RADIOLOGY

## 2025-01-16 ENCOUNTER — OFFICE VISIT (OUTPATIENT)
Dept: ORTHOPEDICS | Facility: CLINIC | Age: 75
End: 2025-01-16
Payer: OTHER GOVERNMENT

## 2025-01-16 VITALS — BODY MASS INDEX: 31.66 KG/M2 | HEIGHT: 73 IN | WEIGHT: 238.88 LBS

## 2025-01-16 DIAGNOSIS — E11.42 SENSORY POLYNEUROPATHY DUE TO DIABETES MELLITUS: ICD-10-CM

## 2025-01-16 DIAGNOSIS — M54.16 LUMBAR RADICULOPATHY: ICD-10-CM

## 2025-01-16 DIAGNOSIS — M25.551 CHRONIC HIP PAIN AFTER TOTAL REPLACEMENT OF RIGHT HIP JOINT: Primary | ICD-10-CM

## 2025-01-16 DIAGNOSIS — Z96.641 CHRONIC HIP PAIN AFTER TOTAL REPLACEMENT OF RIGHT HIP JOINT: Primary | ICD-10-CM

## 2025-01-16 DIAGNOSIS — R29.898 WEAKNESS OF RIGHT HIP: ICD-10-CM

## 2025-01-16 DIAGNOSIS — G89.29 CHRONIC HIP PAIN AFTER TOTAL REPLACEMENT OF RIGHT HIP JOINT: Primary | ICD-10-CM

## 2025-01-16 PROCEDURE — 99215 OFFICE O/P EST HI 40 MIN: CPT | Mod: PBBFAC | Performed by: STUDENT IN AN ORGANIZED HEALTH CARE EDUCATION/TRAINING PROGRAM

## 2025-01-16 PROCEDURE — 99999 PR PBB SHADOW E&M-EST. PATIENT-LVL V: CPT | Mod: PBBFAC,,, | Performed by: STUDENT IN AN ORGANIZED HEALTH CARE EDUCATION/TRAINING PROGRAM

## 2025-01-16 PROCEDURE — 99214 OFFICE O/P EST MOD 30 MIN: CPT | Mod: S$PBB,,, | Performed by: STUDENT IN AN ORGANIZED HEALTH CARE EDUCATION/TRAINING PROGRAM

## 2025-01-16 NOTE — PROGRESS NOTES
Assessment:  Encounter Diagnoses   Name Primary?    Chronic hip pain after total replacement of right hip joint Yes    Weakness of right hip     Lumbar radiculopathy     Sensory polyneuropathy due to diabetes mellitus        Plan:  We discussed that he has significant abductor weakness that is affecting his gait, and the we recommend dedicated hip/core strengthening with emphasis on the abductors.  We also discussed that his pain is likely multifactorial, with a component related to the weakness but also a neurogenic component related to the diabetic neuropathy in addition to a component coming from his back.  We discussed and agreed upon the following:  - Recommend he f/u with pain mgmt for continued interventional and medication for his pain  - Ordered lab work to check for potential hip infection.  - Referred to physical therapy for strengthening exercises and HEP training  - Follow up after completing 6 weeks physical therapy.              Patient ID: Macario Dior Jr. is a 74 y.o. male.  Chief Complaint   Patient presents with    Right Hip - Pain        History of Present Illness    HPI:  Right hip and leg pain. Mr. Dior presents with chronic right hip pain that began after a hip replacement in 2015. The hip functioned well for about three years before becoming stiff. He describes intense pain at the top of the right iliac crest. Pain radiates down the leg and is exacerbated by walking. He reports pain in the back of his heel that travels up to the iliac crest when taking a step.    Pain intensity increases with distance walked. After one block, it causes soreness in the leg. After two blocks, pain travels up to the collarbone. He has been to the emergency room 3 times since the Monday before [place] due to severe pain that prevented walking or touch. The only relief comes from being still.    He takes oxycodone daily, which reduces pain intensity but does not alleviate the foot-to-hip pain when walking.  Dr. Hyman previously identified and removed heterotopic ossification, which provided about 50% pain relief for six months. He has received injections from Dr. Adan in both the hip and back areas, but these only provide relief for about four days before pain moves to a different location.    He reports weakness in the right leg and numbness in both feet. He has undergone nerve conduction tests, but no nerve issues were identified. 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 The left hip, however, remains pain-free.    He expresses frustration with the lack of visible causes for his pain on imaging studies. He has seen multiple specialists, including neurologists, orthopedics, and pain management doctors, but has not found long-term relief.    Mr. Dior denies any history of nerve burns.    PREVIOUS TREATMENTS:  - Multiple injections in hip and back areas by Dr. Adan, providing only 4 days of relief at most  - Physical therapy in the past, but patient reports it caused increased pain and inflammation for 3-4 days afterwards  - Nerve conduction test performed, results were negative    MEDICATIONS:  - Oxycodone: Daily for pain management. Reduces pain intensity to allow some functioning, but does not relieve the pain when foot hits the ground.    SURGICAL HISTORY:  - Hip replacement: 2015, performed by Dr. [facility]  - Surgery to remove heterotopic ossification from the hip: Performed by Dr. Hyman, provided relief for about 6 months     R DHRUV 8/31/15 (Dr. [facility])  R hip HO resection 1/4/23 (Dr. Sinha)         Past Medical History:  Past Medical History:   Diagnosis Date    Arthritis     Asthma     chronic    CKD (chronic kidney disease), stage III     patient denies    Deep vein thrombosis     Demyelinating neuropathy     polyneuropathy    Diabetes mellitus type 2 without retinopathy 12/14/2022    Gout     History of blood clots 2013    lower part of both  legs    HLD (hyperlipidemia)     Hypertension     Iron deficiency anemia     BUZZ (obstructive sleep apnea)     CPAP    Pulmonary embolism         Surgical History:  Past Surgical History:   Procedure Laterality Date    COLONOSCOPY N/A 11/07/2016    Procedure: COLONOSCOPY;  Surgeon: Shar Weiss MD;  Location: [facility] (4TH FLR);  Service: Endoscopy;  Laterality: N/A;  [facility] Referral. Okay to hold Coumadin 5 days prior to procedure. See Referral scaned in media tab on 10/20/16.    COLONOSCOPY N/A 09/21/2017    Procedure: COLONOSCOPY;  Surgeon: Stuart Trinidad MD;  Location: [facility] ENDO (4TH FLR);  Service: Endoscopy;  Laterality: N/A;  [facility] Olivarez-see scanned docs under media tab          9/6/17-current [facility] authorization and last clinic visit scanned into chart    EPIDURAL STEROID INJECTION N/A 12/17/2020    Procedure: INJECTION, STEROID, EPIDURAL, L5-S1 clear to hold Xarelto 2 days;  Surgeon: Moy Vasquez MD;  Location: [facility] PAIN MGT;  Service: Pain Management;  Laterality: N/A;    EXCISION OF LESION OF FEMUR Right 01/04/2023    Procedure: EXCISION, LESION, FEMUR: POSTERIOR APPROACH;  Surgeon: Prieto Sinha MD;  [facility];  Service: Orthopedics;  Laterality: Right;    HIP SURGERY Right 2008    exploratory at [facility]    HIP SURGERY Right 08/31/2015    THR    INJECTION OF ANESTHETIC AGENT AROUND NERVE Right 10/23/2019    Procedure: BLOCK, NERVE, Obturator and Femoral cleared to hold xarelto 3 days pt. said he's not taking coumadin;  Surgeon: Moy Vasquez MD;  Location: [facility] PAIN MGT;  Service: Pain Management;  Laterality: Right;    INJECTION OF ANESTHETIC AGENT AROUND NERVE Right 07/06/2020    Procedure: BLOCK, NERVE, RIGHT MBB L3,L5,L5;  Surgeon: Moy Vasquez MD;  Location: [facility] PAIN MGT;  Service: Pain Management;  Laterality: Right;  BLOCK, NERVE, RIGHT MBB L3,L5,L5    INJECTION OF ANESTHETIC AGENT AROUND NERVE Right 07/30/2020    Procedure: BLOCK, NERVE  RIGHT L3, L4, L5 2ND;  Surgeon: Moy Vasquez MD;  Location: [facility] PAIN MGT;  Service: Pain Management;  Laterality: Right;  NEEDS CONSENT, XARELTO    LEG SURGERY Left 2012    fibula and tibia    RADIOFREQUENCY ABLATION Right 10/05/2020    Procedure: RADIOFREQUENCY ABLATION RIGHT L3,4,5;  Surgeon: Moy Vasquez MD;  Location: [facility] PAIN [facility]GT;  Service: Pain Management;  Laterality: Right;  RADIOFREQUENCY ABLATION RIGHT L3,4,5  ok to hold xarelto, scanned in Media    SHOULDER SURGERY Right 1989    torn ligament    TRANSFORAMINAL EPIDURAL INJECTION OF STEROID Bilateral 12/04/2023    Procedure: LUMBAR TRANSFORAMINAL BILATERAL L4/5 DIRECT REFERRAL;  Surgeon: Moy Vasquez MD;  Location: [facility] PAIN MGT;  Service: Pain Management;  Laterality: Bilateral;    TRANSFORAMINAL EPIDURAL INJECTION OF STEROID Right 11/21/2024    Procedure: LUMBAR TRANSFORAMINAL RIGHT L1/2 AND L2/3 please add xarelto to pt's med list when admitted 11/21;  Surgeon: Moy Vasquez MD;  Location: [facility] PAIN MGT;  Service: Pain Management;  Laterality: Right;  [phone]  2 WK F/U ELEAZAR        Social History:  He reports that he quit smoking about 15 years ago. His smoking use included cigars. He has never used smokeless tobacco. He reports that he does not drink alcohol and does not use drugs.     Family History:  family history includes Cancer in his father, mother, sister, and sister; No Known Problems in his brother, daughter, daughter, daughter, son, and son.     Current Outpatient Medications on File Prior to Visit   Medication Sig Dispense Refill    albuterol 90 mcg/actuation inhaler Inhale 2 puffs into the lungs every 6 (six) hours as needed for Wheezing.       atorvastatin (LIPITOR) 80 MG tablet Take 80 mg by mouth once daily. Take one-half tablet daily      capsaicin 0.1 % Crea Apply twice to area of maximum paresthesia 15 g 6    carboxymethyl-gly-lxcb55-NR 0.5-1-0.5 % Dpet Place 1 drop into both eyes 4 (four) times daily.       carboxymethylcellulose sodium 1 % DpGe Place 0.4 mLs into both eyes 4 (four) times daily as needed.      carvediloL (COREG) 25 MG tablet Take 1 tablet (25 mg total) by mouth 2 (two) times daily. 60 tablet 11    chlorhexidine (PERIDEX) 0.12 % solution RINSE 1 CAPFUL BY MOUTH ONCE DAILY AS NEEDED SWISH FOR 30 SECONDS AFTER,AND SPIT OUT      chlorthalidone (HYGROTEN) 25 MG Tab Take 25 mg by mouth once daily.      CLOBETASOL PROPIONATE, BULK, MISC Apply topically once daily.      cyanocobalamin (VITAMIN B-12) 100 MCG tablet Take 100 mcg by mouth once daily. Take two tablets      cycloSPORINE (RESTASIS) 0.05 % ophthalmic emulsion INSTILL 0.4ML IN EACH EYE EVERY 12 HOURS      eplerenone (INSPRA) 25 MG Tab Take 1 tablet by mouth once daily.      ergocalciferol (ERGOCALCIFEROL) 50,000 unit Cap Take 50,000 Units by mouth.      finasteride (PROSCAR) 5 mg tablet TAKE ONE TABLET BY MOUTH ONCE DAILY FOR PROSTATE      hydrALAZINE (APRESOLINE) 50 MG tablet TAKE ONE TABLET BY MOUTH THREE TIMES A DAY FOR HEART/BLOOD PRESSURE      ketoconazole (NIZORAL) 2 % cream Apply topically 2 (two) times daily.      latanoprost 0.005 % ophthalmic solution Place 1 drop into both eyes every evening.      lidocaine (LIDODERM) 5 % Place 1 patch onto the skin once daily. Wear for 12 hours, then remove. Do not apply a new patch for at least 12 hours.      naloxone (NARCAN) 4 mg/actuation Spry as needed.      omega-3/dha/epa/fish oil (OMEGA-3 FISH OIL ORAL) 1,000 mg once daily.      oxyCODONE (ROXICODONE) 10 mg Tab immediate release tablet Take 1 tablet (10 mg total) by mouth every 4 (four) hours as needed for Pain. 42 tablet 0    rivaroxaban (XARELTO ORAL) Take by mouth.      semaglutide (OZEMPIC) 2 mg/dose (8 mg/3 mL) PnIj Inject 2 mg into the skin every 7 days. 9 mL 1    thiamine 100 MG tablet Take 0.5 tablets (50 mg total) by mouth once daily. 60 tablet 11    valsartan (DIOVAN) 320 MG tablet Take 320 mg by mouth.       No current  "facility-administered medications on file prior to visit.     Review of patient's allergies indicates:   Allergen Reactions    Amlodipine Swelling    Lisinopril Tinitus and Swelling    Pregabalin Swelling    Prednisone Palpitations    Azithromycin      Muscles tense up    Levetiracetam Other (See Comments)    Acetaminophen Rash     Feels like needles are sticking him per pt          Physical exam:  Ht 6' 1" (1.854 m)   Wt 108.3 kg (238 lb 13.9 oz)   BMI 31.51 kg/m²    General: no apparent distress      Gait: + trendelenburg gait, + limp, no use of assistive devices although typically uses cane    Physical Exam    Musculoskeletal: Tenderness at the top of the right iliac crest. No pain at midline of the back. No pain in lower back. Weakness when squeezing legs apart.  IMAGING:  - X-rays and MRIs of the hip: Results do not show any clear issues  - Recent MRI of the hip area: No significant findings          R hip:   TTP at iliac crest, not at greater troch and not at anterior hip  Skin: intact, no erythema or swelling - well-healed posterolateral incision  Range of motion: 70 degrees of flexion, 10 degrees internal rotation, 30 degrees external rotation  Strength: 4/5 with abduction, 4/5 with flexion - pain with resisted flexion  Provocative testing:  + FADIR, - LILIA, - logroll, + SLR  Neurovascular: WWP, Light touch sensation intact    Relevant Results:  Imaging:  Plain x-rays of the R hip and pelvis were obtained and independently reviewed by me today, 1/16/2025 , and demonstrate well positioned and well-fixed cementless DHRUV. No subsidence nor migration. No notable recurrence of HO, with XRs from 2022 showing significant (grade 3 vs 4) HO.      Labs:  9/9/22: ESR/CRP- 22 & 7.5mg/L (prior to HO resection)    This note was generated with the assistance of ambient listening technology. Verbal consent was obtained by the patient and accompanying visitor(s) for the recording of patient appointment to facilitate this " note. I attest to having reviewed and edited the generated note for accuracy, though some syntax or spelling errors may persist. Please contact the author of this note for any clarification.

## 2025-01-20 ENCOUNTER — TELEPHONE (OUTPATIENT)
Dept: PAIN MEDICINE | Facility: CLINIC | Age: 75
End: 2025-01-20
Payer: OTHER GOVERNMENT

## 2025-01-20 NOTE — TELEPHONE ENCOUNTER
Staff tried calling pt to inform him that our clinic is closed on 1/21. No answer, left voicemail and cancelled appt.

## 2025-01-23 ENCOUNTER — TELEPHONE (OUTPATIENT)
Dept: ORTHOPEDICS | Facility: CLINIC | Age: 75
End: 2025-01-23
Payer: OTHER GOVERNMENT

## 2025-01-31 ENCOUNTER — TELEPHONE (OUTPATIENT)
Dept: PAIN MEDICINE | Facility: CLINIC | Age: 75
End: 2025-01-31
Payer: OTHER GOVERNMENT
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
## 2025-02-03 ENCOUNTER — OFFICE VISIT (OUTPATIENT)
Dept: PAIN MEDICINE | Facility: CLINIC | Age: 75
End: 2025-02-03
Payer: OTHER GOVERNMENT

## 2025-02-03 ENCOUNTER — TELEPHONE (OUTPATIENT)
Dept: ORTHOPEDICS | Facility: CLINIC | Age: 75
End: 2025-02-03
Payer: OTHER GOVERNMENT

## 2025-02-03 VITALS
BODY MASS INDEX: 29.93 KG/M2 | WEIGHT: 226.88 LBS | TEMPERATURE: 97 F | DIASTOLIC BLOOD PRESSURE: 84 MMHG | SYSTOLIC BLOOD PRESSURE: 138 MMHG | HEART RATE: 91 BPM

## 2025-02-03 DIAGNOSIS — M79.18 MYOFASCIAL PAIN ON RIGHT SIDE: ICD-10-CM

## 2025-02-03 DIAGNOSIS — Z96.641 PRESENCE OF RIGHT ARTIFICIAL HIP JOINT: Primary | ICD-10-CM

## 2025-02-03 DIAGNOSIS — G89.29 OTHER CHRONIC PAIN: ICD-10-CM

## 2025-02-03 PROCEDURE — 99215 OFFICE O/P EST HI 40 MIN: CPT | Mod: PBBFAC

## 2025-02-03 NOTE — PROGRESS NOTES
"Interventional Pain Management - Established Visit  Follow-Up       SUBJECTIVE:  Interval History 2/3/2025:  Macario Dior Jr. Returns to clinic for follow-up of right iliac crest pain. He had CT abdomen/pelvis completed and would like to review at this time. He states he continues with the same right posterior iliac crest pain. He states he thinks the pain could be muscular. He states he was having pain over the weekend. He decided to try the stationary bike which helped the pain. He has been seeing Dr Loyola with Ortho who has referred him to [facility]. He continues Oxycodone from an outside provider which helps "take the edge off". He denies any perceived side effects. He denies recent health changes. He denies recent falls or trauma. He denies new onset fever/night sweats, urinary incontinence, bowel incontinence, significant weight changes, significant motor weakness or changes, or loss of sensations. His pain today is 10/10.      Interval History 12/26/2024:  Macario Dior Jr. Returns to clinic for follow-up after right L1/2 and L2/3 TFESI on 11/21/2024. He reports no relief.  He states the pain remains to the right iliac crest about prior surgical scars. The pain is worse with walking more than 1 block. He states [facility] He is unable to recall the name/device. He was scheduled for previous CT abdomen and pelvis, but this was not completed. He denies recent health changes. He denies recent falls or trauma. He denies new onset fever/night sweats, urinary incontinence, bowel incontinence, significant weight changes, significant motor weakness or changes, or loss of sensations. His pain today is 10/10.      Interval History 11/06/2024:  Macario Dior Jr. Returns to clinic for follow-up of R-hip pain. The patient states that two weeks ago 10/23 he woke up in bed with sharp pain on the top of the R iliac crest with radiating radiation doing his back anterior " to Right SI joint when laying down. However, when his standing and trying, the pain starts at top of the R iliac crest radiating to the pubis. Pain level is 10/10 with movement and 8/10 at rest. When every step he receives a sharp stabbing pain starting from the back of heel and jumps to the R iliac crest (not radiates). Pain is exacerbated with palpation, standing, bending and walking. No mitigating symptoms. No relief with oxycodone, patches, heat. The pain has been there for a long time but it only occurred when he walked for prolonged period of time.  [facility] or CT of the hip and per patient, the provider told him that there was no acute pathology. The patient was prescribed Robaxin 500mg TID and Prednisone (dose unkown) TID. The pain was alleviated  from 10/10 to 6/10 until Андрей 11/3. He was able to walk but his pain lingered.  On 11/4, while still taking the medication, the pain returned. Denies changes in sensation or weakness.     Interval History 8/20/2024:  Macario Dior Jr. Returns to clinic for follow-up of right hip and abdominal pain.He had right TAP block on 7/9/2024. He states this helped with abdominal and mid back pain, but he continues to complain of right-sided pain over the right iliac crest to the groin. He states the pain is worse when he places pressure on his right heel while ambulating. He also reports numbness and altered sensation to bilateral feet right>left. He takes Oxycodone as needed which allows him to have fun with his grandchildren. He had an EMG/NCS ordered previously, but it was never completed. He denies recent falls or trauma. He denies new onset fever/night sweats, urinary incontinence, bowel incontinence, significant weight changes, significant motor weakness or changes, or loss of sensations. His pain today is 8/10.     Interval History 6/12/24  Pt presents with same R hip pain he has been having for years. He was unable to  get the MRI hip due to a lack of equipment at the imaging center. He still complains of pain in a QL/Iliopsoas distribution. Still has numbness into the R leg and into the groin.     Interval History 5/15/2024:  Macario Dior Jr. presents to the clinic for a follow-up appointment for hip pain. He continues to report right hip pain. He does have radiating pain into the groin and lateral thigh. He also notes pain that radiates into his back. His right leg feels week. His feet are numb. He is frustrated with his pain. He has tried ABIODUN and RFAs with short term relief. He was taking Lyrica but is out. He did recently see Orthopedics who obtained lab work and ordered imaging. He denies any other health changes. His pain today is 10/10.    Interval history 12/13/2023:  71-year-old male presents today he is status post a bilateral TF ABIODUN as 5 on 12/03 reporting minimal relief.  He continues to report primarily axial low back pain with intermittent right leg.  Pain with extension today also with walking, standing.  Facetogenic pain upon palpation of lower lumbar spine.  He did report more axial low back pain bandlike distribution versus state that the right leg hurts.  He would like to be considered for other pain interventions.     Interval History 11/22/2022:  Mr Dior presents for follow up evaluation of chronic R hip pain and awaiting surgery. He is having acute R sided lumbar myofascial pain and requesting TPI to address symptoms as this has provided benefit in past and will be going out of town. He has no s/s concerning for cauda equina syndrome. He denies SE of his medication regimen.      Interval History 9/23/2022:  The Pt presents for delayed follow He has continued lumbar complaints but focal right hip pain that continues. His R hip pain has continued and he was unaware he was supposed to see Dr Sinha today to address this. He can continue to see us at any time but will reach out to Dr Sinha's office to get him  continued care regarding his greatest pain generator at this time.      Interval History 2/18/2021:  The patient presents for follow up of diffuse pain complaints. Pt canceled prior Hip block. State he states lyrica increase to 100mg has helped but neuropathy but also states he is also having stent placed to R leg where pain is greater due to insuffiencey. He continues to take Oxycodone QID. He denies any adverse SE of medications. Denies new areas of pain.       Interval History 12/4/2020:  Pt presents for follow up of lower back pain. He continues to have benefit from RFA of right lower lumbar. He is doing well with med mgt of oxycodone and lyrica. He states night time leg cramping. The patient denies myelopathic symptoms such as handwriting changes or difficulty with buttons/coins/keys. Denies perineal paresthesias, bowel/bladder dysfunction.     Interval History 10/20/2020:  The patient presents follow-up for lower back pain.  He has had near resolution since right-sided L 3, 4, 5 RFA.  He states he is doing well.  He continues to take minimum the medication to address pain.  He is currently taking oxycodone 5 mg q.i.d. as well as Lyrica 75 b.i.d..  He finds he is beneficial without adverse side effects.The patient denies myelopathic symptoms such as handwriting changes or difficulty with buttons/coins/keys. Denies perineal paresthesias, bowel/bladder dysfunction.     Interval History 8/11/2020:  The patient presents for follow up of lower back pain. Pt over interval had 2 MBB of right L3,4,5 with >90% relief of both procedures. Pt states + a.m. stiffness, states when he has MBB of lumbar he notices his hip pain more and vise versa when he has hip block. Lumbar pain>R hip pain at this time. He continues to take oxycodone 5mg QID which is beneficial to pain control without adverse side effects. The patient denies myelopathic symptoms such as handwriting changes or difficulty with buttons/coins/keys. Denies  "perineal paresthesias, bowel/bladder dysfunction.     Interval History 05/27/2020:  S/P Right Femoral / Obturator Nerve Block in 10/2019 -- 50% relief for 2 days --  lost to follow up after that  S/p Right DHRUV 08/2015 -- doing well for 1.5 years  Pain is located at right hip joint. Occasionally radiates to right thigh.   Aggravated standing, walking, laying down.   Alleviated by Oxycodone     HPI:  Macario Dior is a 69 y.o. Man with PMH DVT s/p right DHRUV that presents today as a new consult for low back pain.  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 Since 2015 DHRUV, he initially had improved but progressive pain in the right hip and low back.  In the last 6 months, he has had 4 back injections from Dr. De Los Santos that lasted for 2 weeks each.  1 month ago, he had a Lumbar ABIODUN that provided 100% relief that lasted 5 days for the low back but the pain changed to radiate to the right hip.  Since the ABIODUN relief stopped, the pain has "worked its way back up to the low back."  The pain is described as intense, sharp, and shoots up from the right hip into the low back to the right periscapular area. The shooting pain is worse than the low back pain. The pain is interfering with everything, including walking, sitting, and putting on his shoes.  Oxycodone 10mg Q6H provides relief. Lidocaine patches and TENS provide minimal relief when he wakes up.  He did physical therapy for 1 year with minimal relief.  He is unable to regularly exercise secondary to pain. He is radha to get 4 hours of uninterrupted sleep per night.  He wakes up with stiffness in his low back despite $4,000 mattresses.  He has weakness and buckling in the right lower extremity.  He endorses worsening falls due to weakness "seems like the leg is not there." He says " I need the bone removed" referring to his right hip. [facility] is a  [facility] He denies incontinence and saddle anesthesia.    Pain Disability " Index Review:      11/6/2024     8:01 AM 7/9/2024     2:19 PM 6/12/2024    10:55 AM   Last 3 PDI Scores   Pain Disability Index (PDI) 60 70 70       Pain Medications:  Oxycodone-outside provider    Opioid Contract: not applicable     report:  Not applicable    Pain Procedures:   Lumbar ABIODUN 100% relief 5 days - Dr. De Los Santos   Lumbar TPIs 0% relief - Dr. De Los Santos   10/23/2019- Right femoral and obturator nerve block  7/6/2020 Right L3,4,5 MBB  7/30/2020 Right L3,4,5 MBB  10/5/2020- Right L3,4,5 RFA  12/17/2020- L5/S1 IL ABIODUN  12/4/2023- Bilateral L4/5 TF ABIODUN  7/9/2024 - Right TAP block under U/S - 90% relief of back pain  11/21/2024 - Right L1/2 and L2/3 TFESI - no relief      Physical Therapy/Home Exercise: yes    Imaging:     CT ABDOMEN PELVIS WITHOUT CONTRAST     CLINICAL HISTORY:  RLQ abdominal pain (Age >= 14y); Right lower quadrant pain     TECHNIQUE:  Low dose axial images, sagittal and coronal reformations were obtained from the lung bases to the pubic symphysis, 30 mL of oral Omnipaque 350 was administered..     Low dose axial images, sagittal and coronal reformations were obtained from the lung bases to the pubic symphysis.  Oral contrast was not administered.     COMPARISON:  None     FINDINGS:  Heart: Normal in size. No pericardial effusion.     Lung Bases: Well aerated, without consolidation or pleural fluid. Bandlike atelectasis in the right lung base.     Liver: Normal in size and attenuation, with no focal hepatic lesions.     Gallbladder: No calcified gallstones.     Bile Ducts: No evidence of dilated ducts.     Pancreas: No mass or peripancreatic fat stranding.     Spleen: Unremarkable.     Adrenals: Unremarkable.     Kidneys/ Ureters: The right kidney is atrophic.     Bladder: Circumferential urinary bladder wall thickening.     Reproductive organs: The prostate is enlarged.     GI Tract/Mesentery: No evidence of bowel obstruction or inflammation. Pancolonic diverticulosis with no evidence for acute  diverticulitis.     Peritoneal Space: No ascites. No free air.     Retroperitoneum: No significant adenopathy.     Abdominal wall: Unremarkable.     Vasculature: No aneurysm. Atherosclerosis.  The aorta is ectatic.     Bones: No acute fracture. Right total hip arthroplasty with no evidence for hardware failure.  Biomechanical changes with no aggressive osseous lesions.     Impression:     No acute intraabdominal process identified.     Diverticulosis with no evidence for acute diverticulitis.     Circumferential wall thickening with enlarged prostate, likely outlet obstruction.        Electronically signed by:Danae Henry  Date:                                            12/31/2024  Time:                                           14:54    XR LUMBAR SPINE AP AND LAT WITH FLEX/EXT     CLINICAL HISTORY:  Radiculopathy, lumbar region     TECHNIQUE:  AP and lateral views as well as lateral flexion and extension images are performed through the lumbar spine.     COMPARISON:  Non 07/18/2023 e     FINDINGS:  Mild DJD.  No significant disc space narrowing.  No fracture, spondylolisthesis or bone destruction identified.  Vascular calcification noted.  Right hip arthroplasty as before     Impression:     See above        Electronically signed by:Bhupendra Lugo MD  Date:                                            12/16/2024  Time:                                           13:20    XR HIP WITH PELVIS WHEN PERFORMED, 2 OR 3  VIEWS RIGHT     CLINICAL HISTORY:  RM 3; Presence of right artificial hip joint     TECHNIQUE:  AP view of the pelvis and frog leg lateral view of the right hip were performed.     COMPARISON:  06/23/2023     FINDINGS:  Postoperative changes of right total hip arthroplasty with stable hardware positioning and alignment.  No acute fracture, dislocation or bone destruction.  Patchy areas of sclerosis involving the left femoral head suggesting avascular necrosis with mild left hip joint space narrowing.  There  are surgical clips in the right inguinal region.     Impression:     Postoperative changes of right total hip arthroplasty.     Electronically signed by:Brady Brown MD  Date:                                            05/14/2024  Time:                                           13:15    MRI HIP WITHOUT CONTRAST RIGHT     CLINICAL HISTORY:  Hip replacement, loosening suspected;  Presence of right artificial hip joint     TECHNIQUE:  Multisequence, multiplanar MRI of the right hip performed without contrast.     COMPARISON:  Hip radiograph 05/14/2024, CT head 09/13/2022     FINDINGS:  Bones: Postoperative changes of right total hip arthroplasty.  No periprosthetic fracture or osteolysis.  Mild heterotopic ossification posteriorly, unchanged from prior CT.  No infiltrative process.  Bone marrow signal is maintained.     Pelvic joints: Sacroiliac joints and pubic symphysis are unremarkable.     Muscles/tendons: Regional muscles and tendons appear unremarkable.  No evidence for trochanteric or iliopsoas bursitis.  No evidence for ischiofemoral impingement.     Miscellaneous: No periprosthetic fluid collections or masses.  Limited evaluation of pelvic soft tissues is unremarkable.  Postoperative changes of the right groin.     Impression:     1. Postoperative changes of right total hip arthroplasty with unchanged mild heterotopic calcification.  No evidence for loosening or osteolysis.     Electronically signed by resident: Nisa Murillo  Date:                                            07/01/2024  Time:                                           13:32     Electronically signed by:Jonas Kumar MD  Date:                                            07/01/2024  Time:                                           16:05       MRI LUMBAR SPINE WITHOUT CONTRAST     CLINICAL HISTORY:  Lumbar radiculopathy, symptoms persist with conservative treatment; Radiculopathy, lumbar region     TECHNIQUE:  Multiplanar, multisequence MR  images were acquired from the thoracolumbar junction to the sacrum without the administration of contrast.     COMPARISON:  9/17/2022     FINDINGS:  The marrow demonstrates homogeneous signal.  Vertebral body heights are maintained.     Disc spaces are maintained.  Disc desiccation L2-3 and L3-4.     Conus terminates appropriately at L1.     Multilevel degenerative change as diesel below:     T12-L1: No focal disc abnormality.  No significant canal or neural foraminal narrowing.     L1-2: Facet arthropathy, right greater than left with mild right neural foraminal narrowing.     L2-3: Small posterior circumferential disc bulge, facet arthropathy (right greater than left), and thickening of the ligamentum flavum.  Prominent posterior epidural fat.  Findings contribute to mild canal narrowing.  Mild bilateral neural foraminal narrowing.     L3-4: Small posterior circumferential disc bulge.  Facet arthropathy with thickening of the ligamentum flavum (left greater than right).  Mild canal and mild bilateral neural foraminal narrowing, left greater than right.     L4-5: Small posterior circumferential disc bulge, facet arthropathy, and thickening of the ligamentum flavum.  Mild canal narrowing.  Mild bilateral neural foraminal narrowing, right greater than left.     L5-S1: Facet arthropathy resulting in mild left neural foraminal narrowing.     Atrophic right kidney.     Impression:     Overall, stable exam compared to prior.  Multilevel degenerative change contributes to multilevel mild neural foraminal narrowing.  Mild canal narrowing as given in detail level by level as above.        Electronically signed by:Shazia Eaton  Date:                                            05/30/2024  Time:                                           08:59    Allergies:   Review of patient's allergies indicates:   Allergen Reactions    Amlodipine Swelling    Lisinopril Tinitus and Swelling    Pregabalin Swelling    Prednisone Palpitations     Azithromycin      Muscles tense up    Levetiracetam Other (See Comments)    Acetaminophen Rash     Feels like needles are sticking him per pt       Current Medications:   Current Outpatient Medications   Medication Sig Dispense Refill    albuterol 90 mcg/actuation inhaler Inhale 2 puffs into the lungs every 6 (six) hours as needed for Wheezing.       atorvastatin (LIPITOR) 80 MG tablet Take 80 mg by mouth once daily. Take one-half tablet daily      capsaicin 0.1 % Crea Apply twice to area of maximum paresthesia 15 g 6    [id number] 0.5-1-0.5 % Dpet Place 1 drop into both eyes 4 (four) times daily.      carboxymethylcellulose sodium 1 % DpGe Place 0.4 mLs into both eyes 4 (four) times daily as needed.      carvediloL (COREG) 25 MG tablet Take 1 tablet (25 mg total) by mouth 2 (two) times daily. 60 tablet 11    chlorhexidine (PERIDEX) 0.12 % solution RINSE 1 CAPFUL BY MOUTH ONCE DAILY AS NEEDED SWISH FOR 30 SECONDS AFTER,AND SPIT OUT      chlorthalidone (HYGROTEN) 25 MG Tab Take 25 mg by mouth once daily.      CLOBETASOL PROPIONATE, BULK, MISC Apply topically once daily.      cyanocobalamin (VITAMIN B-12) 100 MCG tablet Take 100 mcg by mouth once daily. Take two tablets      cycloSPORINE (RESTASIS) 0.05 % ophthalmic emulsion INSTILL 0.4ML IN EACH EYE EVERY 12 HOURS      eplerenone (INSPRA) 25 MG Tab Take 1 tablet by mouth once daily.      ergocalciferol (ERGOCALCIFEROL) 50,000 unit Cap Take 50,000 Units by mouth.      finasteride (PROSCAR) 5 mg tablet TAKE ONE TABLET BY MOUTH ONCE DAILY FOR PROSTATE      hydrALAZINE (APRESOLINE) 50 MG tablet TAKE ONE TABLET BY MOUTH THREE TIMES A DAY FOR HEART/BLOOD PRESSURE      ketoconazole (NIZORAL) 2 % cream Apply topically 2 (two) times daily.      latanoprost 0.005 % ophthalmic solution Place 1 drop into both eyes every evening.      lidocaine (LIDODERM) 5 % Place 1 patch onto the skin once daily. Wear for 12 hours, then remove. Do not apply a new patch for  at least 12 hours.      naloxone (NARCAN) 4 mg/actuation Spry as needed.      omega-3/dha/epa/fish oil (OMEGA-3 FISH OIL ORAL) 1,000 mg once daily.      oxyCODONE (ROXICODONE) 10 mg Tab immediate release tablet Take 1 tablet (10 mg total) by mouth every 4 (four) hours as needed for Pain. 42 tablet 0    rivaroxaban (XARELTO ORAL) Take by mouth.      semaglutide (OZEMPIC) 2 mg/dose (8 mg/3 mL) PnIj Inject 2 mg into the skin every 7 days. 9 mL 1    thiamine 100 MG tablet Take 0.5 tablets (50 mg total) by mouth once daily. 60 tablet 11    valsartan (DIOVAN) 320 MG tablet Take 320 mg by mouth.       No current facility-administered medications for this visit.       REVIEW OF SYSTEMS:    GENERAL:  No weight loss, malaise or fevers.  HEENT:  Negative for frequent or significant headaches.  NECK:  Negative for lumps, goiter, pain and significant neck swelling.  RESPIRATORY:  Negative for cough, wheezing or shortness of breath. BUZZ, asthma  CARDIOVASCULAR:  Negative for chest pain, leg swelling or palpitations. HTN  GI:  Negative for abdominal discomfort, blood in stools or black stools or change in bowel habits.  MUSCULOSKELETAL:  See HPI.  SKIN:  Negative for lesions, rash, and itching.  RENAL: CKD  ENDO: Diabetes  PSYCH:  Negative for sleep disturbance, mood disorder and recent psychosocial stressors.  HEMATOLOGY/LYMPHOLOGY:  Negative for prolonged bleeding, bruising easily or swollen nodes.  NEURO:   No history of headaches, syncope, paralysis, seizures or tremors.  All other reviewed and negative other than HPI.    Past Medical History:  Past Medical History:   Diagnosis Date    Arthritis     Asthma     chronic    CKD (chronic kidney disease), stage III     patient denies    Deep vein thrombosis     Demyelinating neuropathy     polyneuropathy    Diabetes mellitus type 2 without retinopathy 12/14/2022    Gout     History of blood clots 2013    lower part of both legs    HLD (hyperlipidemia)     Hypertension     Iron  deficiency anemia     BUZZ (obstructive sleep apnea)     CPAP    Pulmonary embolism        Past Surgical History:  Past Surgical History:   Procedure Laterality Date    COLONOSCOPY N/A 11/07/2016    Procedure: COLONOSCOPY;  Surgeon: Shar Weiss MD;  Location: [facility] ENDO (4TH FLR);  Service: Endoscopy;  Laterality: N/A;  [facility] Referral. Okay to hold Coumadin 5 days prior to procedure. See Referral scaned in media tab on 10/20/16.    COLONOSCOPY N/A 09/21/2017    Procedure: COLONOSCOPY;  Surgeon: Stuart Trinidad MD;  Location: [facility] ENDO (4TH FLR);  Service: Endoscopy;  Laterality: N/A;  [facility] Olivarez-see scanned docs under media tab          9/6/17-current [facility] authorization and last clinic visit scanned into chart    EPIDURAL STEROID INJECTION N/A 12/17/2020    Procedure: INJECTION, STEROID, EPIDURAL, L5-S1 clear to hold Xarelto 2 days;  Surgeon: Moy Vasquez MD;  Location: [facility] PAIN MGT;  Service: Pain Management;  Laterality: N/A;    EXCISION OF LESION OF FEMUR Right 01/04/2023    Procedure: EXCISION, LESION, FEMUR: POSTERIOR APPROACH;  Surgeon: Prieto Sinha MD;  [facility]  Service: Orthopedics;  Laterality: Right;    HIP SURGERY Right 2008    exploratory at [facility]    HIP SURGERY Right 08/31/2015    THR    INJECTION OF ANESTHETIC AGENT AROUND NERVE Right 10/23/2019    Procedure: BLOCK, NERVE, Obturator and Femoral cleared to hold xarelto 3 days pt. said he's not taking coumadin;  Surgeon: Moy Vasquez MD;  Location: [facility] PAIN MGT;  Service: Pain Management;  Laterality: Right;    INJECTION OF ANESTHETIC AGENT AROUND NERVE Right 07/06/2020    Procedure: BLOCK, NERVE, RIGHT MBB L3,L5,L5;  Surgeon: Moy Vasquez MD;  Location: [facility] PAIN MGT;  Service: Pain Management;  Laterality: Right;  BLOCK, NERVE, RIGHT MBB L3,L5,L5    INJECTION OF ANESTHETIC AGENT AROUND NERVE Right 07/30/2020    Procedure: BLOCK, NERVE RIGHT L3, L4, L5 2ND;  Surgeon: Moy Vasquez MD;   Location: [facility] PAIN MGT;  Service: Pain Management;  Laterality: Right;  NEEDS CONSENT, XARELTO    LEG SURGERY Left 2012    fibula and tibia    RADIOFREQUENCY ABLATION Right 10/05/2020    Procedure: RADIOFREQUENCY ABLATION RIGHT L3,4,5;  Surgeon: Moy Vasquez MD;  Location: [facility] PAIN MGT;  Service: Pain Management;  Laterality: Right;  RADIOFREQUENCY ABLATION RIGHT L3,4,5  ok to hold xarelto, scanned in Media    SHOULDER SURGERY Right 1989    torn ligament    TRANSFORAMINAL EPIDURAL INJECTION OF STEROID Bilateral 12/04/2023    Procedure: LUMBAR TRANSFORAMINAL BILATERAL L4/5 DIRECT REFERRAL;  Surgeon: Moy Vasquez MD;  Location: [facility] PAIN [facility]GT;  Service: Pain Management;  Laterality: Bilateral;    TRANSFORAMINAL EPIDURAL INJECTION OF STEROID Right 11/21/2024    Procedure: LUMBAR TRANSFORAMINAL RIGHT L1/2 AND L2/3 please add xarelto to pt's med list when admitted 11/21;  Surgeon: Moy Vasquez MD;  Location: [facility] PAIN [facility]GT;  Service: Pain Management;  Laterality: Right;  [phone]  2 WK F/U ELEAZAR       Family History:  Family History   Problem Relation Name Age of Onset    Cancer Mother          ovarian    Cancer Father      Cancer Sister          breast    Cancer Sister          breast    No Known Problems Brother      No Known Problems Daughter      No Known Problems Daughter      No Known Problems Daughter      No Known Problems Son      No Known Problems Son         Social History:  Social History     Socioeconomic History    Marital status: Single   Tobacco Use    Smoking status: Former     Types: Cigars     Quit date: 8/13/2009     Years since quitting: 15.4    Smokeless tobacco: Never   Substance and Sexual Activity    Alcohol use: No     Alcohol/week: 0.0 standard drinks of alcohol    Drug use: No    Sexual activity: Not Currently       OBJECTIVE:    /84 (Patient Position: Sitting)   Pulse 91   Temp 97.4 °F (36.3 °C)   Wt 102.9 kg (226 lb 13.7 oz)   BMI 29.93 kg/m²     PHYSICAL EXAMINATION:      General appearance: Well appearing, in no acute distress, alert and oriented x3.  Psych:  Mood and affect appropriate.  Skin: Skin color, texture, turgor normal, no rashes or lesions, in both upper and lower body. Well-healed scar over right iliac crest. Pain reproduction when scar is palpated.   Head/face:  Atraumatic, normocephalic.   Cor: Rate regular.   Pulm: Symmetric chest rise, no respiratory distress noted.   Back: There is pain with palpation over paraspinals and facet joints on the right. Limited ROM with pain on extension. Pain with flexion.  Extremities: No deformities or skin discoloration. Good capillary refill. +1 edema to BLE  Musculoskeletal: 4/5 strength with hip flexion Right. All other strength normal and symmetric. Right hip: pain to lateral hip with external rotation with log roll. + Right FADIR.  No atrophy or tone abnormalities are noted.  Neuro:  No loss of sensation is noted.  Gait: Antalgic- ambulates without assistance.     ASSESSMENT: 74 y.o. year old male with low back and hip pain, consistent with the followin. Presence of right artificial hip joint        2. Other chronic pain        3. Myofascial pain on right side              PLAN:   We discussed with the patient the assessment and recommendations. The following is the plan we agreed on:   - Previous imaging reviewed. MRI of Lumbar spine showing L1/L2 -narrowing of forminal on R-side. L2/L3- congenital shortening of pedicles  - Reviewed and discussed with patient recent CT abdomen/pelvis.   - Continue Healthy Back program.  - Encouraged daily HEP.   - Patient thinks he may have discussed [facility] and would like to discuss with Dr Vasquez if appropriate.   - RTC after Healthy Back.    The above plan and management options were discussed at length with patient. Patient is in agreement with the above and verbalized understanding.    Krysta Spain NP  2025    I spent a total of 32  minutes on the day of the visit.  This includes face to face time and non-face to face time preparing to see the patient (eg, review of tests), obtaining and/or reviewing separately obtained history, documenting clinical information in the electronic or other health record, independently interpreting results and communicating results to the patient/family/caregiver, or care coordinator.

## 2025-02-03 NOTE — TELEPHONE ENCOUNTER
Staff tried calling pt multiple times. There was no answer, staff left voicemail . Staff tried getting pt rescheduled to see  to follow up for imaging as it will be more beneficial. And he originally was scheduled with him. Staff tried calling pt daughter as well. No answer left voicemail.

## 2025-02-03 NOTE — TELEPHONE ENCOUNTER
LVM for pt to confirm which [facility] for [facility] to send form----- Message from Ari Zavala sent at 2/3/2025 10:07 AM CST -----  This is [facility] is [facility] I'm not sure if its something we can get online? I forget.

## 2025-02-05 ENCOUNTER — TELEPHONE (OUTPATIENT)
Dept: PODIATRY | Facility: CLINIC | Age: 75
End: 2025-02-05
Payer: OTHER GOVERNMENT

## 2025-02-05 DIAGNOSIS — Z00.8 ENCOUNTER FOR OTHER GENERAL EXAMINATION: Primary | ICD-10-CM

## 2025-02-06 ENCOUNTER — CLINICAL SUPPORT (OUTPATIENT)
Dept: REHABILITATION | Facility: OTHER | Age: 75
End: 2025-02-06
Attending: STUDENT IN AN ORGANIZED HEALTH CARE EDUCATION/TRAINING PROGRAM
Payer: OTHER GOVERNMENT

## 2025-02-06 DIAGNOSIS — G89.29 CHRONIC HIP PAIN AFTER TOTAL REPLACEMENT OF RIGHT HIP JOINT: ICD-10-CM

## 2025-02-06 DIAGNOSIS — R29.898 WEAKNESS OF RIGHT HIP: ICD-10-CM

## 2025-02-06 DIAGNOSIS — Z74.09 IMPAIRED FUNCTIONAL MOBILITY, BALANCE, GAIT, AND ENDURANCE: ICD-10-CM

## 2025-02-06 DIAGNOSIS — F40.298 FEAR OF PAIN: ICD-10-CM

## 2025-02-06 DIAGNOSIS — R29.898 HIP WEAKNESS: Primary | ICD-10-CM

## 2025-02-06 DIAGNOSIS — M25.551 CHRONIC HIP PAIN AFTER TOTAL REPLACEMENT OF RIGHT HIP JOINT: ICD-10-CM

## 2025-02-06 DIAGNOSIS — Z96.641 CHRONIC HIP PAIN AFTER TOTAL REPLACEMENT OF RIGHT HIP JOINT: ICD-10-CM

## 2025-02-06 PROCEDURE — 97530 THERAPEUTIC ACTIVITIES: CPT

## 2025-02-06 PROCEDURE — 97110 THERAPEUTIC EXERCISES: CPT

## 2025-02-06 PROCEDURE — 97163 PT EVAL HIGH COMPLEX 45 MIN: CPT

## 2025-02-06 NOTE — PATIENT INSTRUCTIONS
The physical cycle is very intuitive for people! As pain increases & you avoid activities, you get weaker. This leads to more pain, which leads to more activity avoidance & the cycle fuels on leading to maladaptive behaviors. You start avoiding activities you love to do & start having pain with normal & safe activities.    With chronic conditions, it is important to consider all of the negative psychological factors associated with pain. How we experience pain is based on the context of our lives. You are limited & thus getting less ursula out of life, but also have all of the past experiences with pain looming over you. This negative context is scientifically proven to cause the brain to become more sensitive & perceive more pain. Pain may change or spread, & you may to start feeling pain from non-painful stimuli, such as movement & everyday life. This does not mean your pain is not real, it just means the brain is producing more than it should & your mind needs to be retrained along with your body.

## 2025-02-06 NOTE — PROGRESS NOTES
Outpatient Rehab    Physical Therapy Evaluation    Patient Name: Macario Dior Jr.  MRN: [id number]  YOB: 1950  Today's Date: 2/8/2025    Therapy Diagnosis:   Encounter Diagnoses   Name Primary?    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 of right hip joint   R29.898 (ICD-10-CM) - Weakness of right hip     Evaluation Date: 2/6/2025  Authorization Period Expiration: 01/16/26  Plan of Care Expiration: 04/18/25  Visit # / Visits authorized: 01/ 01 Progress Note Due: 03/06/25  FOTO: 1/ 3        Precautions: Standard and Fall DHRUV 2015, R hip HO resection 1/4/23      Time In: 1:30 pm   Time Out: 2:45 pm   Total Appointment Time (timed & untimed codes): 75 minutes    Precautions  Right Lower Extremity Weight-Bearing Status: Full weight-bearing  Left Lower Extremity Weight-Bearing Status: Full weight-bearing  Precautions: Standard and Fall DHRUV 2015, R hip HO resection 1/4/23        Subjective   History of Present Illness  Macario is a 74 y.o. male who reports to physical therapy with a chief concern of R side pelvic rim and hip pain and B foot/heel discomfort especially /c stepping.. According to the patient's chart, Macario has a past medical history of Arthritis, Asthma, CKD (chronic kidney disease), stage III, Deep vein thrombosis, Demyelinating neuropathy, Diabetes mellitus type 2 without retinopathy, Gout, History of blood clots, HLD (hyperlipidemia), Hypertension, Iron deficiency anemia, BUZZ (obstructive sleep apnea), and Pulmonary embolism. Macario has a past surgical history that includes Leg Surgery (Left, 2012); Shoulder surgery (Right, 1989); Colonoscopy (N/A,  11/07/2016); Colonoscopy (N/A, 09/21/2017); Hip surgery (Right, 2008); Hip surgery (Right, 08/31/2015); Injection of anesthetic agent around nerve (Right, 10/23/2019); Injection of anesthetic agent around nerve (Right, 07/06/2020); Injection of anesthetic agent around nerve (Right, 07/30/2020); Radiofrequency ablation (Right, 10/05/2020); Epidural steroid injection (N/A, 12/17/2020); Excision of lesion of femur (Right, 01/04/2023); Transforaminal epidural injection of steroid (Bilateral, 12/04/2023); and Transforaminal epidural injection of steroid (Right, 11/21/2024).    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    Diagnostic tests related to this condition: MRI studies.   MRI Studies Details: EXAMINATION:  MRI HIP WITHOUT CONTRAST RIGHT     FINDINGS:  Bones: Postoperative changes of right total hip arthroplasty.  No periprosthetic fracture or osteolysis.  Mild heterotopic ossification posteriorly, unchanged from prior CT.  No infiltrative process.  Bone marrow signal is maintained.  Pelvic joints: Sacroiliac joints and pubic symphysis are unremarkable.  Muscles/tendons: Regional muscles and tendons appear unremarkable.  No evidence for trochanteric or iliopsoas bursitis.  No evidence for ischiofemoral impingement.  Miscellaneous: No periprosthetic fluid collections or masses.  Limited evaluation of pelvic soft tissues is unremarkable.  Postoperative changes of the right groin.     Impression:  1. Postoperative changes of right total hip arthroplasty with unchanged mild heterotopic calcification.  No evidence for loosening or osteolysis.     Electronically signed by resident: Nisa Murillo  Date:                                            07/01/2024  Time:                                           13:32     Electronically signed by:Jonas Kumar MD  Date:               "                              07/01/2024  Time:                                           16:05    History of Present Condition/Illness: Date of onset: 5 years ago insidious onset exacerbation 6 months  History of current condition - Macario reports:R side pelvic rim and hip pain and B foot/heel discomfort especially /c stepping.  Patient describe hip/pelvic sx as "feels like reaching into open sore" especially inc in intensity /c palpation to R pelvis but consistent /c a level of discomfort. Patient describe B foot/heel sx as N/T "stinging" also constant in presentation.  Patient report "the leg feels slow" Patient report 6 months ago intermittent sig inc in R hip/buttock discomfort leading to multiple ER visits and inability to STS or walk.  Patient report last flare up 2 weeks ago including extended time in bed thru the weekend but dissipation by Monday /s explanation.  Patient report typical 2 block amb tolerance before pain progresses from hip to LB.  For last 5 years, patient report difficulty bending to don/doff shoes/pants/socks and having to use caretaker assist.   Patient clarify caretaker comes 3 x wk and other days patient /c inc time in laying in bed.  Patient uses elevated toilet seat and shower chair but notes Ind use.  Patient verbalize thoughts of laying on bed may not be helpful in his pain presentation.   Patient report sleep is sig disturbed from hip discomfort.   Patient report mod concern for balance noting needing to use electric wheelchair when out in the community ([employer] and arrives today /c a SPC.  Patient report falling typically monthly.  Patient note mild relief /c sitting /c inc R WB (onto painful side). Patient report oxycodone use also provide some relief and "allows me to function".   Patient report prior PT /c no relief.  Patient report stopping attendance due to inc pain /c tx.               Per MD report  He reports weakness in the right leg and numbness in both feet. " He has undergone nerve conduction tests, but no nerve issues were identified. 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 The left hip, however, remains pain-free.   He expresses frustration with the lack of visible causes for his pain on imaging studies. He has seen multiple specialists, including neurologists, orthopedics, and pain management doctors, but has not found long-term relief.     Activities of Daily Living  Social history was obtained from Patient.       General Current Level of Function Comments: dec amb tolerance, ModA for don/doff socks/shoes, impaired balance  Patient Responsibilities: Community mobility, Personal ADL    Previously independent with activities of daily living? No  Activities previously needing assistance include Dressing - lower body.   Currently independent with activities of daily living? No  Activities currently needing assistance include Dressing - lower body.          Pts goals:  dec intensity of pain for improved walking tolerance     Pain     Patient reports a current pain level of 5/10. Pain at best is reported as 5/10. Pain at worst is reported as 10/10.   Location: B hip, B LB  Clinical Progression (since onset): Worsening  Pain Qualities: Aching, Sharp, Other (Comment)  Other Pain Qualities: like an open sore  Pain-Relieving Factors: Medications - prescription, Other (Comment)  Other Pain-Relieving Factors: Sitting /c inc R WB  Pain-Aggravating Factors: Walking, Standing, Bending, Lifting, Reaching         Review of Systems  Patient reports: Diabetes, Kidney History, Back Pain, Balance Loss, Decreased Range of Motion, Functional Changes, Unable to Bear Weight, and Weakness  Patient denies: Bladder Incontinence, Bowel Incontinence, Night Pain, and Saddle Numbness  Additional Red Flag Details: CKD stage 3,  hx pulmonary embolism, hx of DVT      Living Arrangements  Living Situation  Housing: Home  independently  Living Arrangements: Alone, Other (Comment)  Other Living Arrangements Comment: 3 x week caretaker visit  Support Systems: Home care staff    Home Setup  Type of Structure: Apartment/condo  Number of Levels in Home: One level  Bathroom Toilet: Raised  Bathroom Equipment: Shower chair with back, Raised toilet seat without rails    Equipment/Treatments  Mobility Equipment: Electric scooter, Straight cane        Employment  Patient does not report that: Does the patient's condition impact their ability to work?  Employment Status: Retired   Retired       Past Medical History/Physical Systems Review:   Macario Dior Jr.  has a past medical history of Arthritis, Asthma, CKD (chronic kidney disease), stage III, Deep vein thrombosis, Demyelinating neuropathy, Diabetes mellitus type 2 without retinopathy, Gout, History of blood clots, HLD (hyperlipidemia), Hypertension, Iron deficiency anemia, BUZZ (obstructive sleep apnea), and Pulmonary embolism.    Macario Dior Jr.  has a past surgical history that includes Leg Surgery (Left, 2012); Shoulder surgery (Right, 1989); Colonoscopy (N/A, 11/07/2016); Colonoscopy (N/A, 09/21/2017); Hip surgery (Right, 2008); Hip surgery (Right, 08/31/2015); Injection of anesthetic agent around nerve (Right, 10/23/2019); Injection of anesthetic agent around nerve (Right, 07/06/2020); Injection of anesthetic agent around nerve (Right, 07/30/2020); Radiofrequency ablation (Right, 10/05/2020); Epidural steroid injection (N/A, 12/17/2020); Excision of lesion of femur (Right, 01/04/2023); Transforaminal epidural injection of steroid (Bilateral, 12/04/2023); and Transforaminal epidural injection of steroid (Right, 11/21/2024).    Macario has a current medication list which includes the following prescription(s): albuterol, atorvastatin, capsaicin, [id number], carboxymethylcellulose sodium, carvedilol, chlorhexidine, chlorthalidone, clobetasol  propionate, cyanocobalamin, cyclosporine, eplerenone, ergocalciferol, finasteride, hydralazine, ketoconazole, latanoprost, lidocaine, naloxone, omega-3/dha/epa/fish oil, oxycodone, rivaroxaban, ozempic, thiamine, and valsartan.    Review of patient's allergies indicates:   Allergen Reactions    Amlodipine Swelling    Lisinopril Tinitus and Swelling    Pregabalin Swelling    Prednisone Palpitations    Azithromycin      Muscles tense up    Levetiracetam Other (See Comments)    Acetaminophen Rash     Feels like needles are sticking him per pt        Objective          Observation:   Posture Alignment: slouched posture in sitting, inc R WB in sitting     Sensation: Light touch:    BUE LT intact    L5 myotome intact     DTR:    Patella  R 1+ L 1+    GAIT DEVIATIONS: Macario displays decreased step length;antalgic gait; amb /c SPC;    Hip Range of Motion:   Left active Left Passive Right active  Right Passive   Flexion WFL WFL WFL WFL   Abduction Mod loss P!  Mod loss P!    Extension       Ext. Rotation WFL WFL WFL WFL   Int. Rotation WFL WFL WFL WFL       Lower Extremity Strength  Right LE  Left LE    Knee extension: 5/5 Knee extension: 5/5   Knee flexion: 5/5 Knee flexion: 5/5   Hip flexion: seated 4/5 P! Hip flexion: 4/5 P!   Hip Internal Rotation:  4/5    Hip Internal Rotation: 4/5      Hip External Rotation: 4/5 P!    Hip External Rotation: 4/5 P!      Hip extension:  3+/5 P! Hip extension: 3+/5 P!   Hip abduction: seated 3+/5 Hip abduction: 3+/5   Hip adduction: 5/5 Hip adduction: 5/5     Special Tests:  5TSTS    02/06/25:  23.08 sec, no change in pain, BUE ascent/descent     SLS   02/06/25: R 2 sec L 5 sec     Repeated Motions   Prone press ups x 20 patient report abolished B hip sx     Bridge Test:positive  Step down Test: R: unable to do ; L unable to do   LILIA: positive R   FADIR: negative  Scour: positive R   Hip extension movement pattern: inc lumbar paraspinal activation     Palpation:  TTP in LB in  "standing      CMS Impairment/Limitation/Restriction for FOTO HIP Survey    Therapist reviewed FOTO scores for Macario Dior Jr. on 2/6/2025.   FOTO documents entered into [address] - see Media section.    INTAKE Score: 11%    Goal = 36%     MDC = 9 points        Treatment:  Therapeutic Exercise  Therapeutic Exercise Activity 1: HEP demo include:  Therapeutic Exercise Activity 2: Seated hip isometrics x 5 5 sec hold  flexion /c hand resistance, ext /c chair and floor resistance  Therapeutic Exercise Activity 3: Seated hip isometrics x 5 5 sec hold ABduction cue to use strap, ADDuction cue to use pillow  Therapeutic Exercise Activity 4: Prone hip IR/ER x 10 both sides  Therapeutic Exercise Activity 5: Prone press ups x 20 cue for controlled breathing for end range "sag"   pt note B hip sx improvement    Therapeutic Activity  Therapeutic Activity 1: See pain education below    PT education:  Physical & psychological viscous pain cycles (see patient instructions 2/6/2025)  Role of consistent activity (graded exposure) to break the physical cycle  Importance of education & behavioral changes that promote intrinsic patient motivation to help break the psychological cycle  Impact on pt's functional goals when breaking each one of these cycles.    Impact central sensitization has on the sensory system in the chronic pain population    Patient's spiritual, cultural, and educational needs considered and patient agreeable to plan of care and goals.     Assessment & Plan   Assessment  Macario presents with a condition of High complexity.   Presentation of Symptoms: Unstable  Will Comorbidities Impact Care: Yes  CKD, arthritis, prior R hip surgery     Functional Limitations: Activity tolerance, Decreased ambulation distance/endurance, Disrupted sleep pattern, Functional mobility, Getting off the floor, Increased risk of fall, Proprioception, Performing household chores, Pain with ADLs/IADLs, Standing tolerance  Impairments: Abnormal " coordination, Abnormal or restricted range of motion, Activity intolerance, Impaired physical strength, Lack of appropriate home exercise program, Pain with functional activity  Personal Factors Affecting Prognosis: Fear/anxiety, Motivation, Pain, Physical limitations    Prognosis: Fair  Assessment Details: Macario is a 74 y.o. male referred to outpatient Physical Therapy with a medical diagnosis of M25.551,G89.29,Z96.641 (ICD-10-CM) - Chronic hip pain after total replacement of right hip joint, R29.898 (ICD-10-CM) - Weakness of right hip. Pt presents with pain, weakness, impaired mobility, decreased trunk and hip ROM, impaired balance, fall risk, gait deviations, decreased endurance, fear of pain /c central sensitization, increased psychosocial concerns, & inability to perform ADL's as before due to current functional status.  Upon interview patient /c consistent fear avoidance verbiage indicating sig psychosocial barriers to activity resulting in anticipatory pain throughout exam. FOTO score offer further evidence of this /c patient score falling sig below FOTO estimated initial score (11% vs 40%). Upon physical exam, patient demonstrate near norms B hip ROM however, demonstrates hip weakness and notes pain /c assessment.  Therefore HEP /c B hip isometrics and purposeful hip motion for motor control and dec fear of pain /c regional muscle activation.  Interestingly repeated motions lumbar extension lead to abolished B hip discomfort, therefore, HEP also include repeated extension /c education to use for sx self management and to address observed postural deficits /c sitting and standing.    Patient education today include discussion how our team provides education and training aimed at improving physical function, emotional health, stress and pain coping skills.Treatment is designed to build confidence in physical activity and ADLs and in your ability to control and manage your pain.   Pt's increased psychosocial  "concerns /c central sensitization present an unstable clinical presentation which may limit progress, but the intrinsic motivation to improve will assist.  Also, patient verbalize thoughts of laying on bed may not be helpful in his pain presentation indicate improving insight into role of exercise for sx management thereby assisting in HEP compliance. However, recommend f/u for carry over.      Plan  From a physical therapy perspective, the patient would benefit from: Skilled Rehab Services    Planned therapy interventions include: Therapeutic exercise, Therapeutic activities, Neuromuscular re-education, Manual therapy, ADLs/IADLs, and Gait training.    Planned modalities to include: Thermotherapy (hot pack), Cryotherapy (cold pack), and Other (Comment). Dry Needling       Visit Frequency: 2 times Per Week for 10 Weeks.       This plan was discussed with Patient.   Discussion participants: Agreed Upon Plan of Care             Goals:   Active       Continue LTG       Pt to demonstrate ability to independently control and reduce their pain through posture positioning and mechanical movements throughout a typical day.        Start:  02/08/25    Expected End:  04/18/25            Pt will scores report 36% on FOTO HIP survey  to demonstrate increase in LE function with every day tasks.       Start:  02/08/25    Expected End:  04/18/25            Pt to be Independent with HEP to improve ROM and independence with ADL's.        Start:  02/08/25    Expected End:  04/18/25               LTG       Report decreased in pain at worse less than  <   / =  4  /10  to increase tolerance for functional mobility       Start:  02/08/25    Expected End:  04/18/25            Patient will verbalize difference between  "hurt vs harm"  to demonstrate understanding of fear avoidance in pain cycle.       Start:  02/08/25    Expected End:  04/18/25            Patient report ability to don/doff socks and shoes /s assistance indicating improved " functional strength and mobility and improved ADL independence       Start:  02/08/25    Expected End:  04/18/25            Patient perform 5TSTS test < 17 sec to demonstrate inc BLE functional strength and mobility and dec fall risk        Start:  02/08/25    Expected End:  04/18/25            Perform single leg stance 10 seconds or greater bilaterally to improve safety and balance in ADLs.       Start:  02/08/25    Expected End:  04/18/25               STG       Report decreased in pain at worse less than <   / =  8  /10  to increase tolerance for functional activities.        Start:  02/08/25    Expected End:  04/18/25            Patient demonstrate negative Bridge Test indicating dec fear of movement and inc BLE functional strength for improved bed mobility       Start:  02/08/25    Expected End:  04/18/25            Patient perform 5TSTS test < 20 sec to demonstrate inc BLE functional strength and mobility and dec fall risk        Start:  02/08/25    Expected End:  04/18/25            Pt able to perform BLE MMT and BLE ROM assessments /s note of acute discomfort for improved activity tolerance        Start:  02/08/25    Expected End:  04/18/25            Pt to tolerate HEP to improve ROM and independence with ADL's       Start:  02/08/25    Expected End:  04/18/25                Isra Cano, PT

## 2025-02-08 PROBLEM — R29.898 HIP WEAKNESS: Status: ACTIVE | Noted: 2025-02-08

## 2025-02-08 PROBLEM — F40.298 FEAR OF PAIN: Status: ACTIVE | Noted: 2025-02-08

## 2025-02-08 PROBLEM — Z74.09 IMPAIRED FUNCTIONAL MOBILITY, BALANCE, GAIT, AND ENDURANCE: Status: ACTIVE | Noted: 2025-02-08

## 2025-02-11 ENCOUNTER — TELEPHONE (OUTPATIENT)
Dept: ORTHOPEDICS | Facility: CLINIC | Age: 75
End: 2025-02-11
Payer: OTHER GOVERNMENT

## 2025-02-11 NOTE — TELEPHONE ENCOUNTER
Lvm to confirm what pt needs----- Message from Katherine sent at 2/11/2025 12:47 PM CST -----  Type:  Patient Requesting Order    Who ANTELMO Lara JR. [[id number]] Anu  (nurse)  [facility]      Does the patient already have the specialty appointment scheduled?   Pt has seen  physical therapy twice     If yes, what is the date of that appointment?: 02/11, and 2/13 ( [facility]  physical therapy )      Additional Information:  Requesting patient  clinical notes , Amended document what is actually needed with the ICD code that matches , The ordering physician signature is required for patient to receive auth for treatment .       Please contact Nurse Brennan  from  8am to 4pm. [phone] -ext [phone] fax  [phone] or uploaded [facility] portal

## 2025-02-19 ENCOUNTER — TELEPHONE (OUTPATIENT)
Facility: CLINIC | Age: 75
End: 2025-02-19
Payer: OTHER GOVERNMENT

## 2025-02-19 NOTE — TELEPHONE ENCOUNTER
----- Message from Ari Ibanez sent at 2/18/2025  9:31 AM CST -----  Regarding: FW: Missed call  Contact: [phone]    ----- Message -----  From: Yazmin Melo  Sent: 2/17/2025   2:33 PM CST  To: [facility] Staff  Subject: Missed call                                      Caller is returning a missed called from Liborio Ward RN  in the office. He said he have been trying to contact you for the last 2 weeks. Please call back as soon as possible.

## 2025-03-13 ENCOUNTER — OFFICE VISIT (OUTPATIENT)
Dept: ORTHOPEDICS | Facility: CLINIC | Age: 75
End: 2025-03-13
Payer: OTHER GOVERNMENT

## 2025-03-13 ENCOUNTER — HOSPITAL ENCOUNTER (OUTPATIENT)
Dept: RADIOLOGY | Facility: HOSPITAL | Age: 75
Discharge: HOME | End: 2025-03-13
Attending: STUDENT IN AN ORGANIZED HEALTH CARE EDUCATION/TRAINING PROGRAM
Payer: OTHER GOVERNMENT

## 2025-03-13 VITALS — WEIGHT: 225.44 LBS | BODY MASS INDEX: 29.88 KG/M2 | HEIGHT: 73 IN

## 2025-03-13 DIAGNOSIS — M25.551 CHRONIC HIP PAIN AFTER TOTAL REPLACEMENT OF RIGHT HIP JOINT: ICD-10-CM

## 2025-03-13 DIAGNOSIS — Z96.641 CHRONIC HIP PAIN AFTER TOTAL REPLACEMENT OF RIGHT HIP JOINT: ICD-10-CM

## 2025-03-13 DIAGNOSIS — M25.551 CHRONIC HIP PAIN AFTER TOTAL REPLACEMENT OF RIGHT HIP JOINT: Primary | ICD-10-CM

## 2025-03-13 DIAGNOSIS — G89.29 CHRONIC HIP PAIN AFTER TOTAL REPLACEMENT OF RIGHT HIP JOINT: Primary | ICD-10-CM

## 2025-03-13 DIAGNOSIS — Z96.641 CHRONIC HIP PAIN AFTER TOTAL REPLACEMENT OF RIGHT HIP JOINT: Primary | ICD-10-CM

## 2025-03-13 DIAGNOSIS — G89.29 CHRONIC HIP PAIN AFTER TOTAL REPLACEMENT OF RIGHT HIP JOINT: ICD-10-CM

## 2025-03-13 PROCEDURE — 99213 OFFICE O/P EST LOW 20 MIN: CPT | Mod: PBBFAC,25 | Performed by: STUDENT IN AN ORGANIZED HEALTH CARE EDUCATION/TRAINING PROGRAM

## 2025-03-13 PROCEDURE — 99214 OFFICE O/P EST MOD 30 MIN: CPT | Mod: S$PBB,,, | Performed by: STUDENT IN AN ORGANIZED HEALTH CARE EDUCATION/TRAINING PROGRAM

## 2025-03-13 PROCEDURE — 99999 PR PBB SHADOW E&M-EST. PATIENT-LVL III: CPT | Mod: PBBFAC,,, | Performed by: STUDENT IN AN ORGANIZED HEALTH CARE EDUCATION/TRAINING PROGRAM

## 2025-03-13 PROCEDURE — 73521 X-RAY EXAM HIPS BI 2 VIEWS: CPT | Mod: 26,,, | Performed by: RADIOLOGY

## 2025-03-13 PROCEDURE — 73521 X-RAY EXAM HIPS BI 2 VIEWS: CPT | Mod: TC

## 2025-03-13 RX ORDER — METHOCARBAMOL 750 MG/1
750 TABLET, FILM COATED ORAL EVERY 8 HOURS PRN
Qty: 90 TABLET | Refills: 0 | Status: SHIPPED | OUTPATIENT
Start: 2025-03-13

## 2025-03-13 NOTE — PROGRESS NOTES
"Assessment:  Encounter Diagnosis   Name Primary?    Chronic hip pain after total replacement of right hip joint Yes       Plan:  We discussed that he has significant abductor weakness that is affecting his gait, and the we recommend dedicated hip/core strengthening with emphasis on the abductors.  We also discussed that his pain is likely multifactorial, with a component related to the weakness but also a neurogenic component related to the diabetic neuropathy in addition to a component coming from his back.  We discussed and agreed upon the following:  -  Robaxin added today for muscle spasms. Continue to see pain mgmt for continued interventional and medication for his pain.  - ESR/CRP to check for potential hip infection - to be done today  - Continue physical therapy for strengthening exercises and HEP training - has only had one visit due to issues with [facility] authorization, but PT now approved   - Follow up prn     *Called patient after the visit to f/u on lab work, which was not done today. Patient said he would go to the lab tomorrow to have ESR/CRP drawn        Patient ID: Macario Dior Jr. is a 74 y.o. male.  Chief Complaint   Patient presents with    Right Hip - Pain        History of Present Illness    CHIEF COMPLAINT:  - Severe left hip pain    HPI:  Mr. Dior presents with severe hip pain that began before Mardi Gras (presumably early 2025) and has persisted for several weeks. He describes intense pain with any movement, including standing, sitting, and lying down. Pain was so severe he required assistance to move in bed. He characterizes the pain as feeling "like somebody hit me with a knife" when moving, but subsiding when still. Pain was most intense for about a week, easing off the Tuesday before his current visit. He now describes it as "thumping like a toothache" and radiating into his groin area.    He visited the emergency room twice due to pain severity. They diagnosed him with severe muscle " "spasms and mentioned a "fear of muscle death." He received a pain relief shot, which provided immediate relief from the stabbing pain, though he could "still feel it in the joint when I walk."    Prior to this acute episode, he had been experiencing a different pain pattern for about three years, rolling from his hip to his back. Over the last three weeks, pain has been localized to one specific spot in his hip area. His leg remains strong, but he has pain with each step, saying, "every time my foot hit the ground, but not as bad as it was, you know, for the last few days."    Mr. Dior mentions completing lab work as requested during his previous visit, though results are not currently visible in his chart.    No specific medical diagnoses were denied in the transcript.    PREVIOUS TREATMENTS:  - Mr. Dior received a shot (likely muscle relaxer) in the emergency room, which provided immediate relief from the stabbing pain       R DHRUV 8/31/15 (Dr. [facility])  R hip HO resection 1/4/23 (Dr. Sinha)         Past Medical History:  Past Medical History:   Diagnosis Date    Arthritis     Asthma     chronic    CKD (chronic kidney disease), stage III     patient denies    Deep vein thrombosis     Demyelinating neuropathy     polyneuropathy    Diabetes mellitus type 2 without retinopathy 12/14/2022    Gout     History of blood clots 2013    lower part of both legs    HLD (hyperlipidemia)     Hypertension     Iron deficiency anemia     BUZZ (obstructive sleep apnea)     CPAP    Pulmonary embolism         Surgical History:  Past Surgical History:   Procedure Laterality Date    COLONOSCOPY N/A 11/07/2016    Procedure: COLONOSCOPY;  Surgeon: Shar Weiss MD;  Location: [address];  Service: Endoscopy;  Laterality: N/A;  [facility] Referral. Okay to hold Coumadin 5 days prior to procedure. See Referral scaned in media tab on 10/20/16.    COLONOSCOPY N/A 09/21/2017    Procedure: COLONOSCOPY;  Surgeon: Stuart Trinidad, " MD;  Location: [facility] ENDO (4TH FLR);  Service: Endoscopy;  Laterality: N/A;  ref[facility] from [facility] from Dr. Olivarez-see scanned docs under media tab          9/6/17-current VA authorization and last clinic visit scanned into chart    EPIDURAL STEROID INJECTION N/A 12/17/2020    Procedure: INJECTION, STEROID, EPIDURAL, L5-S1 clear to hold Xarelto 2 days;  Surgeon: Moy Vasquez MD;  Location: [facility] PAIN MGT;  Service: Pain Management;  Laterality: N/A;    EXCISION OF LESION OF FEMUR Right 01/04/2023    Procedure: EXCISION, LESION, FEMUR: POSTERIOR APPROACH;  Surgeon: Prieto Sinha MD;  Location: [facility] OR 2ND FLR;  Service: Orthopedics;  Laterality: Right;    HIP SURGERY Right 2008    exploratory at [facility]    HIP SURGERY Right 08/31/2015    THR    INJECTION OF ANESTHETIC AGENT AROUND NERVE Right 10/23/2019    Procedure: BLOCK, NERVE, Obturator and Femoral cleared to hold xarelto 3 days pt. said he's not taking coumadin;  Surgeon: Moy Vasquez MD;  Location: [facility] PAIN MGT;  Service: Pain Management;  Laterality: Right;    INJECTION OF ANESTHETIC AGENT AROUND NERVE Right 07/06/2020    Procedure: BLOCK, NERVE, RIGHT MBB L3,L5,L5;  Surgeon: Moy Vasquez MD;  Location: [facility] PAIN MGT;  Service: Pain Management;  Laterality: Right;  BLOCK, NERVE, RIGHT MBB L3,L5,L5    INJECTION OF ANESTHETIC AGENT AROUND NERVE Right 07/30/2020    Procedure: BLOCK, NERVE RIGHT L3, L4, L5 2ND;  Surgeon: Moy Vasquez MD;  Location: [facility] PAIN MGT;  Service: Pain Management;  Laterality: Right;  NEEDS CONSENT, XARELTO    LEG SURGERY Left 2012    fibula and tibia    RADIOFREQUENCY ABLATION Right 10/05/2020    Procedure: RADIOFREQUENCY ABLATION RIGHT L3,4,5;  Surgeon: Moy Vasquez MD;  Location: [facility] PAIN MGT;  Service: Pain Management;  Laterality: Right;  RADIOFREQUENCY ABLATION RIGHT L3,4,5  ok to hold xarelto, scanned in Media    SHOULDER SURGERY Right 1989    torn ligament    TRANSFORAMINAL EPIDURAL INJECTION OF STEROID Bilateral  12/04/2023    Procedure: LUMBAR TRANSFORAMINAL BILATERAL L4/5 DIRECT REFERRAL;  Surgeon: Moy Vasquez MD;  Location: [facility] PAIN MGT;  Service: Pain Management;  Laterality: Bilateral;    TRANSFORAMINAL EPIDURAL INJECTION OF STEROID Right 11/21/2024    Procedure: LUMBAR TRANSFORAMINAL RIGHT L1/2 AND L2/3 please add xarelto to pt's med list when admitted 11/21;  Surgeon: Moy Vasquez MD;  Location: [facility] PAIN MGT;  Service: Pain Management;  Laterality: Right;  [phone]  2 WK F/U ELEAZAR        Social History:  He reports that he quit smoking about 15 years ago. His smoking use included cigars. He has never used smokeless tobacco. He reports that he does not drink alcohol and does not use drugs.     Family History:  family history includes Cancer in his father, mother, sister, and sister; No Known Problems in his brother, daughter, daughter, daughter, son, and son.     Current Outpatient Medications on File Prior to Visit   Medication Sig Dispense Refill    albuterol 90 mcg/actuation inhaler Inhale 2 puffs into the lungs every 6 (six) hours as needed for Wheezing.       atorvastatin (LIPITOR) 80 MG tablet Take 80 mg by mouth once daily. Take one-half tablet daily      capsaicin 0.1 % Crea Apply twice to area of maximum paresthesia 15 g 6    carboxymethyl-gly-ynfy18-EI 0.5-1-0.5 % Dpet Place 1 drop into both eyes 4 (four) times daily.      carboxymethylcellulose sodium 1 % DpGe Place 0.4 mLs into both eyes 4 (four) times daily as needed.      carvediloL (COREG) 25 MG tablet Take 1 tablet (25 mg total) by mouth 2 (two) times daily. 60 tablet 11    chlorhexidine (PERIDEX) 0.12 % solution RINSE 1 CAPFUL BY MOUTH ONCE DAILY AS NEEDED SWISH FOR 30 SECONDS AFTER,AND SPIT OUT      chlorthalidone (HYGROTEN) 25 MG Tab Take 25 mg by mouth once daily.      CLOBETASOL PROPIONATE, BULK, MISC Apply topically once daily.      cyanocobalamin (VITAMIN B-12) 100 MCG tablet Take 100 mcg by mouth once daily. Take two tablets       "cycloSPORINE (RESTASIS) 0.05 % ophthalmic emulsion INSTILL 0.4ML IN EACH EYE EVERY 12 HOURS      eplerenone (INSPRA) 25 MG Tab Take 1 tablet by mouth once daily.      ergocalciferol (ERGOCALCIFEROL) 50,000 unit Cap Take 50,000 Units by mouth.      finasteride (PROSCAR) 5 mg tablet TAKE ONE TABLET BY MOUTH ONCE DAILY FOR PROSTATE      hydrALAZINE (APRESOLINE) 50 MG tablet TAKE ONE TABLET BY MOUTH THREE TIMES A DAY FOR HEART/BLOOD PRESSURE      ketoconazole (NIZORAL) 2 % cream Apply topically 2 (two) times daily.      latanoprost 0.005 % ophthalmic solution Place 1 drop into both eyes every evening.      lidocaine (LIDODERM) 5 % Place 1 patch onto the skin once daily. Wear for 12 hours, then remove. Do not apply a new patch for at least 12 hours.      naloxone (NARCAN) 4 mg/actuation Spry as needed.      omega-3/dha/epa/fish oil (OMEGA-3 FISH OIL ORAL) 1,000 mg once daily.      oxyCODONE (ROXICODONE) 10 mg Tab immediate release tablet Take 1 tablet (10 mg total) by mouth every 4 (four) hours as needed for Pain. 42 tablet 0    rivaroxaban (XARELTO ORAL) Take by mouth.      semaglutide (OZEMPIC) 2 mg/dose (8 mg/3 mL) PnIj Inject 2 mg into the skin every 7 days. 9 mL 1    thiamine 100 MG tablet Take 0.5 tablets (50 mg total) by mouth once daily. 60 tablet 11    valsartan (DIOVAN) 320 MG tablet Take 320 mg by mouth.       No current facility-administered medications on file prior to visit.     Review of patient's allergies indicates:   Allergen Reactions    Amlodipine Swelling    Lisinopril Tinitus and Swelling    Pregabalin Swelling    Prednisone Palpitations    Azithromycin      Muscles tense up    Levetiracetam Other (See Comments)    Acetaminophen Rash     Feels like needles are sticking him per pt          Physical exam:  Ht 6' 1" (1.854 m)   Wt 102.3 kg (225 lb 6.7 oz)   BMI 29.74 kg/m²    General: no apparent distress      Gait: + trendelenburg gait, + limp, no use of assistive devices although typically uses " cane      R hip:   TTP at iliac crest, not at greater troch and not at anterior hip  Skin: intact, no erythema or swelling - well-healed posterolateral incision  Range of motion: 70 degrees of flexion, 10 degrees internal rotation, 30 degrees external rotation  Strength: 4/5 with abduction, 4/5 with flexion - pain with resisted flexion  Provocative testing:  + FADIR, - LILIA, - logroll, + SLR  Neurovascular: WWP, Light touch sensation intact      Relevant Results:  Imaging:  Plain x-rays of the R hip and pelvis were obtained today and independently reviewed by me today, 3/13/2025 , and demonstrate well positioned and well-fixed cementless DHRUV. No subsidence nor migration. No notable recurrence of HO, with XRs from 2022 showing significant (grade 3 vs 4) HO.      Labs:  9/9/22: ESR/CRP- 22 & 7.5mg/L (prior to HO resection)    This note was generated with the assistance of ambient listening technology. Verbal consent was obtained by the patient and accompanying visitor(s) for the recording of patient appointment to facilitate this note. I attest to having reviewed and edited the generated note for accuracy, though some syntax or spelling errors may persist. Please contact the author of this note for any clarification.

## 2025-03-14 ENCOUNTER — LAB VISIT (OUTPATIENT)
Dept: LAB | Facility: HOSPITAL | Age: 75
End: 2025-03-14
Attending: STUDENT IN AN ORGANIZED HEALTH CARE EDUCATION/TRAINING PROGRAM
Payer: OTHER GOVERNMENT

## 2025-03-14 DIAGNOSIS — G89.29 CHRONIC HIP PAIN AFTER TOTAL REPLACEMENT OF RIGHT HIP JOINT: ICD-10-CM

## 2025-03-14 DIAGNOSIS — Z96.641 CHRONIC HIP PAIN AFTER TOTAL REPLACEMENT OF RIGHT HIP JOINT: ICD-10-CM

## 2025-03-14 DIAGNOSIS — M25.551 CHRONIC HIP PAIN AFTER TOTAL REPLACEMENT OF RIGHT HIP JOINT: ICD-10-CM

## 2025-03-14 DIAGNOSIS — R29.898 WEAKNESS OF RIGHT HIP: ICD-10-CM

## 2025-03-14 LAB
CRP SERPL-MCNC: 12.7 MG/L (ref 0–8.2)
ERYTHROCYTE [SEDIMENTATION RATE] IN BLOOD BY PHOTOMETRIC METHOD: 71 MM/HR (ref 0–23)

## 2025-03-14 PROCEDURE — 86140 C-REACTIVE PROTEIN: CPT | Performed by: STUDENT IN AN ORGANIZED HEALTH CARE EDUCATION/TRAINING PROGRAM

## 2025-03-14 PROCEDURE — 85652 RBC SED RATE AUTOMATED: CPT | Performed by: STUDENT IN AN ORGANIZED HEALTH CARE EDUCATION/TRAINING PROGRAM

## 2025-03-14 PROCEDURE — 36415 COLL VENOUS BLD VENIPUNCTURE: CPT | Performed by: STUDENT IN AN ORGANIZED HEALTH CARE EDUCATION/TRAINING PROGRAM

## 2025-03-16 ENCOUNTER — HOSPITAL ENCOUNTER (INPATIENT)
Facility: HOSPITAL | Age: 75
LOS: 6 days | Discharge: HOME HEALTH | DRG: 478 | End: 2025-03-23
Attending: STUDENT IN AN ORGANIZED HEALTH CARE EDUCATION/TRAINING PROGRAM | Admitting: HOSPITALIST
Payer: OTHER GOVERNMENT

## 2025-03-16 DIAGNOSIS — Z96.641 HISTORY OF TOTAL RIGHT HIP ARTHROPLASTY: ICD-10-CM

## 2025-03-16 DIAGNOSIS — R78.81 BACTEREMIA DUE TO STAPHYLOCOCCUS: ICD-10-CM

## 2025-03-16 DIAGNOSIS — M25.551 RIGHT HIP PAIN: ICD-10-CM

## 2025-03-16 DIAGNOSIS — B95.8 BACTEREMIA DUE TO STAPHYLOCOCCUS: ICD-10-CM

## 2025-03-16 DIAGNOSIS — R07.9 CHEST PAIN: ICD-10-CM

## 2025-03-16 DIAGNOSIS — M46.56 SEPTIC ARTHRITIS OF LUMBAR SPINE: Primary | ICD-10-CM

## 2025-03-16 DIAGNOSIS — M47.816 LUMBAR SPONDYLOSIS: ICD-10-CM

## 2025-03-16 DIAGNOSIS — M54.16 LUMBAR RADICULOPATHY, RIGHT: ICD-10-CM

## 2025-03-16 DIAGNOSIS — M54.9 BACK PAIN: ICD-10-CM

## 2025-03-16 LAB
ALBUMIN SERPL BCP-MCNC: 3.4 G/DL (ref 3.5–5.2)
ALP SERPL-CCNC: 133 U/L (ref 40–150)
ALT SERPL W/O P-5'-P-CCNC: 12 U/L (ref 10–44)
ANION GAP SERPL CALC-SCNC: 12 MMOL/L (ref 8–16)
AST SERPL-CCNC: 14 U/L (ref 10–40)
BASOPHILS # BLD AUTO: 0.03 K/UL (ref 0–0.2)
BASOPHILS NFR BLD: 0.4 % (ref 0–1.9)
BILIRUB SERPL-MCNC: 0.3 MG/DL (ref 0.1–1)
BILIRUB UR QL STRIP: NEGATIVE
BUN SERPL-MCNC: 36 MG/DL (ref 8–23)
CALCIUM SERPL-MCNC: 9.1 MG/DL (ref 8.7–10.5)
CHLORIDE SERPL-SCNC: 104 MMOL/L (ref 95–110)
CLARITY UR REFRACT.AUTO: CLEAR
CO2 SERPL-SCNC: 25 MMOL/L (ref 23–29)
COLOR UR AUTO: YELLOW
CREAT SERPL-MCNC: 2.9 MG/DL (ref 0.5–1.4)
CRP SERPL-MCNC: 21.5 MG/L (ref 0–8.2)
DIFFERENTIAL METHOD BLD: ABNORMAL
EOSINOPHIL # BLD AUTO: 0.4 K/UL (ref 0–0.5)
EOSINOPHIL NFR BLD: 6.1 % (ref 0–8)
ERYTHROCYTE [DISTWIDTH] IN BLOOD BY AUTOMATED COUNT: 13.3 % (ref 11.5–14.5)
ERYTHROCYTE [SEDIMENTATION RATE] IN BLOOD BY PHOTOMETRIC METHOD: 82 MM/HR (ref 0–23)
EST. GFR  (NO RACE VARIABLE): 22.1 ML/MIN/1.73 M^2
GLUCOSE SERPL-MCNC: 93 MG/DL (ref 70–110)
GLUCOSE UR QL STRIP: NEGATIVE
HCT VFR BLD AUTO: 40.7 % (ref 40–54)
HGB BLD-MCNC: 12.6 G/DL (ref 14–18)
HGB UR QL STRIP: NEGATIVE
IMM GRANULOCYTES # BLD AUTO: 0.01 K/UL (ref 0–0.04)
IMM GRANULOCYTES NFR BLD AUTO: 0.1 % (ref 0–0.5)
KETONES UR QL STRIP: NEGATIVE
LEUKOCYTE ESTERASE UR QL STRIP: NEGATIVE
LYMPHOCYTES # BLD AUTO: 2.6 K/UL (ref 1–4.8)
LYMPHOCYTES NFR BLD: 36 % (ref 18–48)
MAGNESIUM SERPL-MCNC: 1.8 MG/DL (ref 1.6–2.6)
MCH RBC QN AUTO: 32.3 PG (ref 27–31)
MCHC RBC AUTO-ENTMCNC: 31 G/DL (ref 32–36)
MCV RBC AUTO: 104 FL (ref 82–98)
MONOCYTES # BLD AUTO: 0.5 K/UL (ref 0.3–1)
MONOCYTES NFR BLD: 7.1 % (ref 4–15)
NEUTROPHILS # BLD AUTO: 3.6 K/UL (ref 1.8–7.7)
NEUTROPHILS NFR BLD: 50.3 % (ref 38–73)
NITRITE UR QL STRIP: NEGATIVE
NRBC BLD-RTO: 0 /100 WBC
PH UR STRIP: 7 [PH] (ref 5–8)
PLATELET # BLD AUTO: 329 K/UL (ref 150–450)
PMV BLD AUTO: 9 FL (ref 9.2–12.9)
POTASSIUM SERPL-SCNC: 3.9 MMOL/L (ref 3.5–5.1)
PROCALCITONIN SERPL IA-MCNC: 0.07 NG/ML
PROT SERPL-MCNC: 7.6 G/DL (ref 6–8.4)
PROT UR QL STRIP: NEGATIVE
RBC # BLD AUTO: 3.9 M/UL (ref 4.6–6.2)
SODIUM SERPL-SCNC: 141 MMOL/L (ref 136–145)
SP GR UR STRIP: 1.01 (ref 1–1.03)
URN SPEC COLLECT METH UR: NORMAL
WBC # BLD AUTO: 7.2 K/UL (ref 3.9–12.7)

## 2025-03-16 PROCEDURE — 86140 C-REACTIVE PROTEIN: CPT | Performed by: PHYSICIAN ASSISTANT

## 2025-03-16 PROCEDURE — 85652 RBC SED RATE AUTOMATED: CPT | Performed by: PHYSICIAN ASSISTANT

## 2025-03-16 PROCEDURE — 99285 EMERGENCY DEPT VISIT HI MDM: CPT | Mod: 25

## 2025-03-16 PROCEDURE — 80053 COMPREHEN METABOLIC PANEL: CPT | Performed by: PHYSICIAN ASSISTANT

## 2025-03-16 PROCEDURE — 84145 PROCALCITONIN (PCT): CPT | Performed by: PHYSICIAN ASSISTANT

## 2025-03-16 PROCEDURE — 25000003 PHARM REV CODE 250: Performed by: PHYSICIAN ASSISTANT

## 2025-03-16 PROCEDURE — G0378 HOSPITAL OBSERVATION PER HR: HCPCS

## 2025-03-16 PROCEDURE — 83735 ASSAY OF MAGNESIUM: CPT | Performed by: PHYSICIAN ASSISTANT

## 2025-03-16 PROCEDURE — 63600175 PHARM REV CODE 636 W HCPCS: Mod: JZ,TB | Performed by: PHYSICIAN ASSISTANT

## 2025-03-16 PROCEDURE — 85025 COMPLETE CBC W/AUTO DIFF WBC: CPT | Performed by: PHYSICIAN ASSISTANT

## 2025-03-16 PROCEDURE — 96374 THER/PROPH/DIAG INJ IV PUSH: CPT

## 2025-03-16 PROCEDURE — 81003 URINALYSIS AUTO W/O SCOPE: CPT | Performed by: PHYSICIAN ASSISTANT

## 2025-03-16 RX ORDER — ATORVASTATIN CALCIUM 40 MG/1
80 TABLET, FILM COATED ORAL DAILY
Status: DISCONTINUED | OUTPATIENT
Start: 2025-03-17 | End: 2025-03-23 | Stop reason: HOSPADM

## 2025-03-16 RX ORDER — HEPARIN SODIUM 5000 [USP'U]/ML
5000 INJECTION, SOLUTION INTRAVENOUS; SUBCUTANEOUS EVERY 8 HOURS
Status: DISCONTINUED | OUTPATIENT
Start: 2025-03-17 | End: 2025-03-16

## 2025-03-16 RX ORDER — TALC
6 POWDER (GRAM) TOPICAL NIGHTLY PRN
Status: DISCONTINUED | OUTPATIENT
Start: 2025-03-16 | End: 2025-03-23 | Stop reason: HOSPADM

## 2025-03-16 RX ORDER — METHOCARBAMOL 750 MG/1
750 TABLET, FILM COATED ORAL EVERY 8 HOURS PRN
Status: DISCONTINUED | OUTPATIENT
Start: 2025-03-16 | End: 2025-03-23 | Stop reason: HOSPADM

## 2025-03-16 RX ORDER — OXYCODONE HYDROCHLORIDE 10 MG/1
10 TABLET ORAL EVERY 6 HOURS PRN
Refills: 0 | Status: DISCONTINUED | OUTPATIENT
Start: 2025-03-16 | End: 2025-03-23 | Stop reason: HOSPADM

## 2025-03-16 RX ORDER — POLYETHYLENE GLYCOL 3350 17 G/17G
17 POWDER, FOR SOLUTION ORAL DAILY PRN
Status: DISCONTINUED | OUTPATIENT
Start: 2025-03-16 | End: 2025-03-23 | Stop reason: HOSPADM

## 2025-03-16 RX ORDER — HYDROMORPHONE HYDROCHLORIDE 1 MG/ML
0.5 INJECTION, SOLUTION INTRAMUSCULAR; INTRAVENOUS; SUBCUTANEOUS
Refills: 0 | Status: COMPLETED | OUTPATIENT
Start: 2025-03-16 | End: 2025-03-16

## 2025-03-16 RX ORDER — INSULIN ASPART 100 [IU]/ML
0-5 INJECTION, SOLUTION INTRAVENOUS; SUBCUTANEOUS
Status: DISCONTINUED | OUTPATIENT
Start: 2025-03-16 | End: 2025-03-23 | Stop reason: HOSPADM

## 2025-03-16 RX ORDER — IBUPROFEN 200 MG
24 TABLET ORAL
Status: DISCONTINUED | OUTPATIENT
Start: 2025-03-16 | End: 2025-03-23 | Stop reason: HOSPADM

## 2025-03-16 RX ORDER — GLUCAGON 1 MG
1 KIT INJECTION
Status: DISCONTINUED | OUTPATIENT
Start: 2025-03-16 | End: 2025-03-23 | Stop reason: HOSPADM

## 2025-03-16 RX ORDER — FINASTERIDE 5 MG/1
5 TABLET, FILM COATED ORAL DAILY
Status: DISCONTINUED | OUTPATIENT
Start: 2025-03-17 | End: 2025-03-23 | Stop reason: HOSPADM

## 2025-03-16 RX ORDER — IBUPROFEN 200 MG
16 TABLET ORAL
Status: DISCONTINUED | OUTPATIENT
Start: 2025-03-16 | End: 2025-03-23 | Stop reason: HOSPADM

## 2025-03-16 RX ORDER — OXYCODONE HYDROCHLORIDE 5 MG/1
5 TABLET ORAL
Refills: 0 | Status: COMPLETED | OUTPATIENT
Start: 2025-03-16 | End: 2025-03-16

## 2025-03-16 RX ORDER — CARVEDILOL 25 MG/1
25 TABLET ORAL 2 TIMES DAILY
Status: DISCONTINUED | OUTPATIENT
Start: 2025-03-17 | End: 2025-03-23 | Stop reason: HOSPADM

## 2025-03-16 RX ORDER — IPRATROPIUM BROMIDE AND ALBUTEROL SULFATE 2.5; .5 MG/3ML; MG/3ML
3 SOLUTION RESPIRATORY (INHALATION) EVERY 4 HOURS PRN
Status: DISCONTINUED | OUTPATIENT
Start: 2025-03-16 | End: 2025-03-23 | Stop reason: HOSPADM

## 2025-03-16 RX ORDER — LATANOPROST 50 UG/ML
1 SOLUTION/ DROPS OPHTHALMIC NIGHTLY
Status: DISCONTINUED | OUTPATIENT
Start: 2025-03-16 | End: 2025-03-23 | Stop reason: HOSPADM

## 2025-03-16 RX ADMIN — OXYCODONE 5 MG: 5 TABLET ORAL at 09:03

## 2025-03-16 RX ADMIN — HYDROMORPHONE HYDROCHLORIDE 0.5 MG: 1 INJECTION, SOLUTION INTRAMUSCULAR; INTRAVENOUS; SUBCUTANEOUS at 08:03

## 2025-03-16 NOTE — Clinical Note
Diagnosis: Right hip pain [554318]   Future Attending Provider: CHAPINCITO NORTON [[id number]]   Is the patient being sent to ED Observation?: No   Special Needs:: No Special Needs [1]

## 2025-03-17 PROBLEM — E83.51 HYPOCALCEMIA: Status: ACTIVE | Noted: 2025-03-17

## 2025-03-17 PROBLEM — M54.50 RIGHT LOW BACK PAIN: Status: ACTIVE | Noted: 2025-03-16

## 2025-03-17 LAB
25(OH)D3+25(OH)D2 SERPL-MCNC: 11 NG/ML (ref 30–96)
ALBUMIN SERPL BCP-MCNC: 2.8 G/DL (ref 3.5–5.2)
ALP SERPL-CCNC: 113 U/L (ref 40–150)
ALT SERPL W/O P-5'-P-CCNC: 10 U/L (ref 10–44)
ANION GAP SERPL CALC-SCNC: 11 MMOL/L (ref 8–16)
ANION GAP SERPL CALC-SCNC: 9 MMOL/L (ref 8–16)
AST SERPL-CCNC: 16 U/L (ref 10–40)
BASOPHILS # BLD AUTO: 0.02 K/UL (ref 0–0.2)
BASOPHILS NFR BLD: 0.3 % (ref 0–1.9)
BILIRUB SERPL-MCNC: 0.3 MG/DL (ref 0.1–1)
BUN SERPL-MCNC: 33 MG/DL (ref 8–23)
BUN SERPL-MCNC: 35 MG/DL (ref 8–23)
CA-I BLDV-SCNC: 1.04 MMOL/L (ref 1.06–1.42)
CALCIUM SERPL-MCNC: 7.7 MG/DL (ref 8.7–10.5)
CALCIUM SERPL-MCNC: 8.4 MG/DL (ref 8.7–10.5)
CHLORIDE SERPL-SCNC: 107 MMOL/L (ref 95–110)
CHLORIDE SERPL-SCNC: 109 MMOL/L (ref 95–110)
CO2 SERPL-SCNC: 21 MMOL/L (ref 23–29)
CO2 SERPL-SCNC: 22 MMOL/L (ref 23–29)
CREAT SERPL-MCNC: 2.3 MG/DL (ref 0.5–1.4)
CREAT SERPL-MCNC: 2.4 MG/DL (ref 0.5–1.4)
DIFFERENTIAL METHOD BLD: ABNORMAL
EOSINOPHIL # BLD AUTO: 0.4 K/UL (ref 0–0.5)
EOSINOPHIL NFR BLD: 5.9 % (ref 0–8)
ERYTHROCYTE [DISTWIDTH] IN BLOOD BY AUTOMATED COUNT: 13.3 % (ref 11.5–14.5)
EST. GFR  (NO RACE VARIABLE): 27.8 ML/MIN/1.73 M^2
EST. GFR  (NO RACE VARIABLE): 29.3 ML/MIN/1.73 M^2
GLUCOSE SERPL-MCNC: 102 MG/DL (ref 70–110)
GLUCOSE SERPL-MCNC: 106 MG/DL (ref 70–110)
GLUCOSE SERPL-MCNC: 122 MG/DL (ref 70–110)
GLUCOSE SERPL-MCNC: 137 MG/DL (ref 70–110)
GLUCOSE SERPL-MCNC: 98 MG/DL (ref 70–110)
HCT VFR BLD AUTO: 34.1 % (ref 40–54)
HGB BLD-MCNC: 11 G/DL (ref 14–18)
IMM GRANULOCYTES # BLD AUTO: 0.03 K/UL (ref 0–0.04)
IMM GRANULOCYTES NFR BLD AUTO: 0.4 % (ref 0–0.5)
LYMPHOCYTES # BLD AUTO: 2.5 K/UL (ref 1–4.8)
LYMPHOCYTES NFR BLD: 36.8 % (ref 18–48)
MAGNESIUM SERPL-MCNC: 1.7 MG/DL (ref 1.6–2.6)
MCH RBC QN AUTO: 32.3 PG (ref 27–31)
MCHC RBC AUTO-ENTMCNC: 32.3 G/DL (ref 32–36)
MCV RBC AUTO: 100 FL (ref 82–98)
MONOCYTES # BLD AUTO: 0.6 K/UL (ref 0.3–1)
MONOCYTES NFR BLD: 9 % (ref 4–15)
NEUTROPHILS # BLD AUTO: 3.2 K/UL (ref 1.8–7.7)
NEUTROPHILS NFR BLD: 47.6 % (ref 38–73)
NRBC BLD-RTO: 0 /100 WBC
PLATELET # BLD AUTO: 273 K/UL (ref 150–450)
PMV BLD AUTO: 9.3 FL (ref 9.2–12.9)
POTASSIUM SERPL-SCNC: 3.7 MMOL/L (ref 3.5–5.1)
POTASSIUM SERPL-SCNC: 4.8 MMOL/L (ref 3.5–5.1)
PROT SERPL-MCNC: 6.9 G/DL (ref 6–8.4)
RBC # BLD AUTO: 3.41 M/UL (ref 4.6–6.2)
SODIUM SERPL-SCNC: 138 MMOL/L (ref 136–145)
SODIUM SERPL-SCNC: 141 MMOL/L (ref 136–145)
WBC # BLD AUTO: 6.8 K/UL (ref 3.9–12.7)

## 2025-03-17 PROCEDURE — 87040 BLOOD CULTURE FOR BACTERIA: CPT | Performed by: HOSPITALIST

## 2025-03-17 PROCEDURE — 82306 VITAMIN D 25 HYDROXY: CPT | Performed by: HOSPITALIST

## 2025-03-17 PROCEDURE — 87154 CUL TYP ID BLD PTHGN 6+ TRGT: CPT | Performed by: HOSPITALIST

## 2025-03-17 PROCEDURE — 63600175 PHARM REV CODE 636 W HCPCS: Performed by: EMERGENCY MEDICINE

## 2025-03-17 PROCEDURE — 80053 COMPREHEN METABOLIC PANEL: CPT | Performed by: HOSPITALIST

## 2025-03-17 PROCEDURE — 80048 BASIC METABOLIC PNL TOTAL CA: CPT | Performed by: PHYSICIAN ASSISTANT

## 2025-03-17 PROCEDURE — 97165 OT EVAL LOW COMPLEX 30 MIN: CPT

## 2025-03-17 PROCEDURE — 12000002 HC ACUTE/MED SURGE SEMI-PRIVATE ROOM

## 2025-03-17 PROCEDURE — 85025 COMPLETE CBC W/AUTO DIFF WBC: CPT | Performed by: PHYSICIAN ASSISTANT

## 2025-03-17 PROCEDURE — 82330 ASSAY OF CALCIUM: CPT | Performed by: HOSPITALIST

## 2025-03-17 PROCEDURE — 99222 1ST HOSP IP/OBS MODERATE 55: CPT | Mod: ,,, | Performed by: NEUROLOGICAL SURGERY

## 2025-03-17 PROCEDURE — 97162 PT EVAL MOD COMPLEX 30 MIN: CPT

## 2025-03-17 PROCEDURE — 25000003 PHARM REV CODE 250: Performed by: HOSPITALIST

## 2025-03-17 PROCEDURE — 87077 CULTURE AEROBIC IDENTIFY: CPT | Performed by: HOSPITALIST

## 2025-03-17 PROCEDURE — 87186 SC STD MICRODIL/AGAR DIL: CPT | Performed by: HOSPITALIST

## 2025-03-17 PROCEDURE — 25000003 PHARM REV CODE 250: Performed by: PHYSICIAN ASSISTANT

## 2025-03-17 PROCEDURE — 97530 THERAPEUTIC ACTIVITIES: CPT

## 2025-03-17 PROCEDURE — 97112 NEUROMUSCULAR REEDUCATION: CPT

## 2025-03-17 PROCEDURE — 83735 ASSAY OF MAGNESIUM: CPT | Performed by: HOSPITALIST

## 2025-03-17 RX ORDER — FUROSEMIDE 40 MG/1
40 TABLET ORAL DAILY
COMMUNITY
Start: 2024-04-16

## 2025-03-17 RX ORDER — ERGOCALCIFEROL 1.25 MG/1
50000 CAPSULE ORAL
Status: DISCONTINUED | OUTPATIENT
Start: 2025-03-17 | End: 2025-03-23 | Stop reason: HOSPADM

## 2025-03-17 RX ORDER — HYDRALAZINE HYDROCHLORIDE 50 MG/1
50 TABLET, FILM COATED ORAL EVERY 8 HOURS
Status: DISCONTINUED | OUTPATIENT
Start: 2025-03-17 | End: 2025-03-23 | Stop reason: HOSPADM

## 2025-03-17 RX ORDER — MORPHINE SULFATE 4 MG/ML
4 INJECTION, SOLUTION INTRAMUSCULAR; INTRAVENOUS EVERY 4 HOURS PRN
Status: DISCONTINUED | OUTPATIENT
Start: 2025-03-17 | End: 2025-03-23 | Stop reason: HOSPADM

## 2025-03-17 RX ORDER — VALSARTAN 160 MG/1
320 TABLET ORAL DAILY
Status: DISCONTINUED | OUTPATIENT
Start: 2025-03-17 | End: 2025-03-23 | Stop reason: HOSPADM

## 2025-03-17 RX ORDER — THIAMINE HCL 50 MG
50 TABLET ORAL DAILY
Status: DISCONTINUED | OUTPATIENT
Start: 2025-03-17 | End: 2025-03-23 | Stop reason: HOSPADM

## 2025-03-17 RX ADMIN — ERGOCALCIFEROL 50000 UNITS: 1.25 CAPSULE ORAL at 10:03

## 2025-03-17 RX ADMIN — ATORVASTATIN CALCIUM 80 MG: 40 TABLET, FILM COATED ORAL at 07:03

## 2025-03-17 RX ADMIN — VALSARTAN 320 MG: 160 TABLET, FILM COATED ORAL at 10:03

## 2025-03-17 RX ADMIN — CARVEDILOL 25 MG: 25 TABLET, FILM COATED ORAL at 07:03

## 2025-03-17 RX ADMIN — OXYCODONE HYDROCHLORIDE 10 MG: 10 TABLET ORAL at 04:03

## 2025-03-17 RX ADMIN — HYDRALAZINE HYDROCHLORIDE 50 MG: 25 TABLET ORAL at 02:03

## 2025-03-17 RX ADMIN — LATANOPROST 1 DROP: 50 SOLUTION OPHTHALMIC at 12:03

## 2025-03-17 RX ADMIN — OXYCODONE HYDROCHLORIDE 10 MG: 10 TABLET ORAL at 06:03

## 2025-03-17 RX ADMIN — LATANOPROST 1 DROP: 50 SOLUTION OPHTHALMIC at 09:03

## 2025-03-17 RX ADMIN — RIVAROXABAN 20 MG: 20 TABLET, FILM COATED ORAL at 07:03

## 2025-03-17 RX ADMIN — MORPHINE SULFATE 4 MG: 4 INJECTION INTRAVENOUS at 05:03

## 2025-03-17 RX ADMIN — METHOCARBAMOL 750 MG: 750 TABLET ORAL at 12:03

## 2025-03-17 RX ADMIN — MORPHINE SULFATE 4 MG: 4 INJECTION INTRAVENOUS at 08:03

## 2025-03-17 RX ADMIN — Medication 50 MG: at 10:03

## 2025-03-17 NOTE — ASSESSMENT & PLAN NOTE
Patient's blood pressure range in the last 24 hours was: BP  Min: 131/60  Max: 165/91.The patient's inpatient anti-hypertensive regimen is listed below:    Home meds:  coreg, hydralazine and valsartan    Current Antihypertensives  carvediloL tablet 25 mg, 2 times daily, Oral  hydrALAZINE tablet 50 mg, Every 8 hours, Oral  valsartan tablet 320 mg, Daily, Oral    Plan  - BP is controlled, no changes needed to their regimen  - monitor and adjust daily.   - would continue BB through surgery/ procedure.

## 2025-03-17 NOTE — H&P
"ED Observation Unit  History and Physical      I assumed care of this patient from the Main ED at onset of observation time, 21:50PM on 03/16/2025.       History of Present Illness:    Macario Dior Jr. is a 74 y.o. male with medical history of HTN, HLD, R hip replacement 2016 presenting to the ED with the chief complaint of righ hip pain.      Reports acute on chronic R hip pain for the past 3 months but really worsening over the last 9 days. Normally uses a walker to get around but now not able to bear any weight on his right leg. Reports having severe "stabbing" pain above his R hip whenever he puts weight onto his R leg. He has previously seen pain management and orthopedist. Recently saw Dr. Loyola in clinic on 3/13/25 who ordered labs that showed ESR 71 and CRP 12.7 which were previously normal. Additionally XR pelvis and hips showed stable R hip arthroplasty. AVN findings of left hip. He has been taking Robaxin for muscle spasms. Denies fever, chest pain, SOB, cough, abdominal pain, vomiting, dysuria, hematuria, constipation, diarrhea. No skin changes or rash. No falls or trauma.      I reviewed the ED Provider Note dated 3/16/25 prior to my evaluation of this patient.  I reviewed all labs and imaging performed in the Main ED, prior to patient being placed in Observation. Patient was placed in the ED Observation Unit for Right hip pain.    PMHx   Past Medical History:   Diagnosis Date    Arthritis     Asthma     chronic    CKD (chronic kidney disease), stage III     patient denies    Deep vein thrombosis     Demyelinating neuropathy     polyneuropathy    Diabetes mellitus type 2 without retinopathy 12/14/2022    Gout     History of blood clots 2013    lower part of both legs    HLD (hyperlipidemia)     Hypertension     Iron deficiency anemia     BUZZ (obstructive sleep apnea)     CPAP    Pulmonary embolism       Past Surgical History:   Procedure Laterality Date    COLONOSCOPY N/A 11/07/2016    Procedure: " COLONOSCOPY;  Surgeon: Shar Weiss MD;  Location: [facility] ENDO (4TH FLR);  Service: Endoscopy;  Laterality: N/A;  [facility] Referral. Okay to hold Coumadin 5 days prior to procedure. See Referral scaned in media tab on 10/20/16.    COLONOSCOPY N/A 09/21/2017    Procedure: COLONOSCOPY;  Surgeon: Stuart Trinidad MD;  Location: [facility] ENDO (4TH FLR);  Service: Endoscopy;  Laterality: N/A;  [facility]. Olivarez-see scanned docs under media tab          9/6/17-current VA authorization and last clinic visit scanned into chart    EPIDURAL STEROID INJECTION N/A 12/17/2020    Procedure: INJECTION, STEROID, EPIDURAL, L5-S1 clear to hold Xarelto 2 days;  Surgeon: Moy Vasquez MD;  Location: [facility] PAIN MGT;  Service: Pain Management;  Laterality: N/A;    EXCISION OF LESION OF FEMUR Right 01/04/2023    Procedure: EXCISION, LESION, FEMUR: POSTERIOR APPROACH;  Surgeon: Prieto Sinha MD;  Location: [facility] OR 2ND FLR;  Service: Orthopedics;  Laterality: Right;    HIP SURGERY Right 2008    exploratory at [facility]    HIP SURGERY Right 08/31/2015    THR    INJECTION OF ANESTHETIC AGENT AROUND NERVE Right 10/23/2019    Procedure: BLOCK, NERVE, Obturator and Femoral cleared to hold xarelto 3 days pt. said he's not taking coumadin;  Surgeon: Moy Vasquez MD;  Location: [facility] PAIN MGT;  Service: Pain Management;  Laterality: Right;    INJECTION OF ANESTHETIC AGENT AROUND NERVE Right 07/06/2020    Procedure: BLOCK, NERVE, RIGHT MBB L3,L5,L5;  Surgeon: Moy Vasquez MD;  Location: [facility] PAIN MGT;  Service: Pain Management;  Laterality: Right;  BLOCK, NERVE, RIGHT MBB L3,L5,L5    INJECTION OF ANESTHETIC AGENT AROUND NERVE Right 07/30/2020    Procedure: BLOCK, NERVE RIGHT L3, L4, L5 2ND;  Surgeon: Moy Vasquez MD;  Location: [facility] PAIN MGT;  Service: Pain Management;  Laterality: Right;  NEEDS CONSENT, XARELTO    LEG SURGERY Left 2012    fibula and tibia    RADIOFREQUENCY ABLATION Right 10/05/2020    Procedure: RADIOFREQUENCY  "ABLATION RIGHT L3,4,5;  Surgeon: Moy Vasquez MD;  Location: [facility] PAIN MGT;  Service: Pain Management;  Laterality: Right;  RADIOFREQUENCY ABLATION RIGHT L3,4,5  ok to hold xarelto, scanned in Media    SHOULDER SURGERY Right 1989    torn ligament    TRANSFORAMINAL EPIDURAL INJECTION OF STEROID Bilateral 12/04/2023    Procedure: LUMBAR TRANSFORAMINAL BILATERAL L4/5 DIRECT REFERRAL;  Surgeon: Moy Vasquez MD;  Location: [facility] PAIN MGT;  Service: Pain Management;  Laterality: Bilateral;    TRANSFORAMINAL EPIDURAL INJECTION OF STEROID Right 11/21/2024    Procedure: LUMBAR TRANSFORAMINAL RIGHT L1/2 AND L2/3 please add xarelto to pt's med list when admitted 11/21;  Surgeon: Moy Vasquez MD;  Location: [facility] PAIN MGT;  Service: Pain Management;  Laterality: Right;  [phone]  2 WK F/U ELEAZAR        Family Hx   Family History   Problem Relation Name Age of Onset    Cancer Mother          ovarian    Cancer Father      Cancer Sister          breast    Cancer Sister          breast    No Known Problems Brother      No Known Problems Daughter      No Known Problems Daughter      No Known Problems Daughter      No Known Problems Son      No Known Problems Son          Social Hx   Social History     Socioeconomic History    Marital status: Single   Tobacco Use    Smoking status: Former     Types: Cigars     Quit date: 8/13/2009     Years since quitting: 15.6    Smokeless tobacco: Never   Substance and Sexual Activity    Alcohol use: No     Alcohol/week: 0.0 standard drinks of alcohol    Drug use: No    Sexual activity: Not Currently        Vital Signs   Vitals:    03/16/25 1652 03/16/25 1901 03/16/25 1955 03/16/25 2033   BP: (!) 136/58  (!) 140/61    BP Location:   Right arm    Patient Position:   Sitting    Pulse: 83 71     Resp: 18 16  16   Temp: 97.7 °F (36.5 °C) 98.4 °F (36.9 °C)     TempSrc: Oral Oral     SpO2: 97% 98%     Weight: 100.2 kg (221 lb)      Height: 6' 1" (1.854 m)           Review of Systems  Review of Systems "   Constitutional:  Negative for chills and fever.   HENT:  Negative for sore throat.    Respiratory:  Negative for cough and shortness of breath.    Cardiovascular:  Negative for chest pain, palpitations and leg swelling.   Gastrointestinal:  Negative for diarrhea, nausea and vomiting.   Genitourinary:  Negative for dysuria and flank pain.   Musculoskeletal:  Positive for back pain and falls. Negative for myalgias.        Right hip pain, right sided back pain, inability to ambulate   Neurological:  Negative for weakness and headaches.       Physical Exam  Physical Exam  Vitals and nursing note reviewed.   Constitutional:       General: He is not in acute distress.     Appearance: Normal appearance. He is not ill-appearing.   HENT:      Head: Atraumatic.      Right Ear: External ear normal.      Left Ear: External ear normal.   Eyes:      Extraocular Movements: Extraocular movements intact.   Cardiovascular:      Rate and Rhythm: Normal rate and regular rhythm.      Pulses: Normal pulses.      Heart sounds: Normal heart sounds. No murmur heard.  Pulmonary:      Effort: Pulmonary effort is normal. No respiratory distress.      Breath sounds: Normal breath sounds.   Abdominal:      General: Abdomen is flat. Bowel sounds are normal.      Palpations: Abdomen is soft. There is no mass.      Tenderness: There is no abdominal tenderness. There is no guarding.   Musculoskeletal:         General: No tenderness. Normal range of motion.      Right lower leg: No edema.      Left lower leg: No edema.      Comments: Trace edema to BLE, R>L. Good ROM R hip with flexion and extension, painful but full ROM. TTP right lower back. No TTP L spine   Skin:     General: Skin is warm and dry.   Neurological:      General: No focal deficit present.      Mental Status: He is alert and oriented to person, place, and time.   Psychiatric:         Mood and Affect: Mood normal.         Behavior: Behavior normal.         Medications:   Scheduled  Meds:   [START ON 3/17/2025] heparin (porcine)  5,000 Units Subcutaneous Q8H    oxyCODONE  5 mg Oral ED 1 Time     Continuous Infusions:  PRN Meds:.  Current Facility-Administered Medications:     albuterol-ipratropium, 3 mL, Nebulization, Q4H PRN    dextrose 50%, 12.5 g, Intravenous, PRN    dextrose 50%, 25 g, Intravenous, PRN    glucagon (human recombinant), 1 mg, Intramuscular, PRN    glucose, 16 g, Oral, PRN    glucose, 24 g, Oral, PRN    insulin aspart U-100, 0-5 Units, Subcutaneous, QID (AC + HS) PRN    melatonin, 6 mg, Oral, Nightly PRN    polyethylene glycol, 17 g, Oral, Daily PRN      Assessment/Plan:  Right hip pain  - hx R DHRUV in 2016 presents with acute on chronic R hip pain and inability to ambulate  - no red flag s/sxs  - afebrile without leukocytosis  - ESR, CRP mildly elevated  - XR from 3/13 without acute fracture  - repeat XR here pending  - MRI L hip and L spine ordered  - PT/OT eval  - pain control  - may need ortho consult pending image findings     Abnormal XR  - findings concerning for L hip AVN on XR from 3/13, no L hip symptoms  - needs close ortho follow up if not seen here    CKD 3/4  - Cr 2.9, unknown baseline but most recent value to compare ~10 months ago was 2.8 ([facility])  - trend daily BMP  - hold home chlorthalidone & valsartan until baseline Cr established    Type 2 DM  - not on home meds  - YOVANI, ACHS accuchecks  - A1c in AM    Case was discussed with the ED provider, CARL Clark.

## 2025-03-17 NOTE — PLAN OF CARE
Jaspreet Arteaga - Emergency Dept  Initial Discharge Assessment       Primary Care Provider: [facility]    Admission Diagnosis: Back pain [M54.9]  Right hip pain [M25.551]    Admission Date: 3/16/2025    Pt is independent with their ADL's. Pt uses a wheelchair,walker,cane and electric chair to assist with their ambulation. Post acute was discussed with pt. Pt d/c home with no needs at the moment. Pt daughter Heron [phone] to provide transportation home.     Expected Discharge Date:     Transition of Care Barriers: None    Payor: [employer] / Plan: [facility] OPTUM / Product Type: Government /     Extended Emergency Contact Information  Primary Emergency Contact: Heron Dior   [facility]  Home Phone: [phone]  Relation: Daughter    Discharge Plan A: Home, Home with family  Discharge Plan B: Home with family, Home      [facility] - [facility] [place] - [address]  [address] [facility] [place] [address]  Phone: [phone] Fax: [phone]      Initial Assessment (most recent)       Adult Discharge Assessment - 03/17/25 1235          Discharge Assessment    Assessment Type Discharge Planning Assessment     Confirmed/corrected address, phone number and insurance Yes     Confirmed Demographics Correct on Facesheet     Source of Information patient     Does patient/caregiver understand observation status Yes     Reason For Admission Right low back pain     People in Home child(asmita), adult     Do you expect to return to your current living situation? Yes     Do you have help at home or someone to help you manage your care at home? No     Prior to hospitilization cognitive status: Alert/Oriented     Current cognitive status: Alert/Oriented     Walking or Climbing Stairs Difficulty no     Dressing/Bathing Difficulty no     Home Accessibility stairs to enter home     Number of Stairs, Main Entrance none     Home Layout Able to live on 1st floor      Equipment Currently Used at Home wheelchair;walker, rolling;cane, straight;shower chair     Readmission within 30 days? No     Patient currently being followed by outpatient case management? No     Do you currently have service(s) that help you manage your care at home? No     Do you take prescription medications? Yes     Do you have prescription coverage? Yes     Coverage Payor: [facility] -     Do you have any problems affording any of your prescribed medications? No     Is the patient taking medications as prescribed? yes     Who is going to help you get home at discharge? family/friend     How do you get to doctors appointments? car, drives self;family or friend will provide     Are you on dialysis? No     Do you take coumadin? No     Discharge Plan A Home;Home with family     Discharge Plan B Home with family;Home     DME Needed Upon Discharge  none     Discharge Plan discussed with: Patient     Transition of Care Barriers None        Physical Activity    On average, how many days per week do you engage in moderate to strenuous exercise (like a brisk walk)? 0 days     On average, how many minutes do you engage in exercise at this level? 0 min        Financial Resource Strain    How hard is it for you to pay for the very basics like food, housing, medical care, and heating? Not very hard        Housing Stability    In the last 12 months, was there a time when you were not able to pay the mortgage or rent on time? No     At any time in the past 12 months, were you homeless or living in a shelter (including now)? No        Transportation Needs    Has the lack of transportation kept you from medical appointments, meetings, work or from getting things needed for daily living? No        Food Insecurity    Within the past 12 months, you worried that your food would run out before you got the money to buy more. Never true     Within the past 12 months, the food you bought just didn't last  and you didn't have money to get more. Never true        Stress    Do you feel stress - tense, restless, nervous, or anxious, or unable to sleep at night because your mind is troubled all the time - these days? Not at all        Social Isolation    How often do you feel lonely or isolated from those around you?  Never        Alcohol Use    Q1: How often do you have a drink containing alcohol? Never     Q2: How many drinks containing alcohol do you have on a typical day when you are drinking? Patient does not drink     Q3: How often do you have six or more drinks on one occasion? Never        Utilities    In the past 12 months has the electric, gas, oil, or water company threatened to shut off services in your home? No        Health Literacy    How often do you need to have someone help you when you read instructions, pamphlets, or other written material from your doctor or pharmacy? Never        OTHER    Name(s) of People in Home daughter (P)                    MARIANNA Davies, CSW.   Case Management  [facility]  Email: [email or web address]

## 2025-03-17 NOTE — ED PROVIDER NOTES
"Encounter Date: 3/16/2025       History     Chief Complaint   Patient presents with    Hip Pain     Spasms to hip for 2 weeks,  hip replacement 2016     The history is provided by the patient and medical records. No  was used.     Macario Dior Jr. is a 74 y.o. male with medical history of HTN, HLD, R hip replacement 2016 presenting to the ED with the chief complaint of righ hip pain.     Reports acute on chronic R hip pain for the past 3 months but really worsening over the last 9 days. Normally uses a walker to get around but now not able to bear any weight on his right leg. Reports having severe "stabbing" pain above his R hip whenever he puts weight onto his R leg. He has previously seen pain management and orthopedist. Recently saw Dr. Loyola in clinic on 3/13/25 who ordered labs that showed ESR 71 and CRP 12.7 which were previously normal. Additionally XR pelvis and hips showed stable R hip arthroplasty. AVN findings of left hip. He has been taking Robaxin for muscle spasms. Denies fever, chest pain, SOB, cough, abdominal pain, vomiting, dysuria, hematuria, constipation, diarrhea. No skin changes or rash. No falls or trauma.       Review of patient's allergies indicates:   Allergen Reactions    Amlodipine Swelling    Lisinopril Tinitus and Swelling    Pregabalin Swelling    Prednisone Palpitations    Azithromycin      Muscles tense up    Levetiracetam Other (See Comments)    Acetaminophen Rash     Feels like needles are sticking him per pt     Past Medical History:   Diagnosis Date    Arthritis     Asthma     chronic    CKD (chronic kidney disease), stage III     patient denies    Deep vein thrombosis     Demyelinating neuropathy     polyneuropathy    Diabetes mellitus type 2 without retinopathy 12/14/2022    Gout     History of blood clots 2013    lower part of both legs    HLD (hyperlipidemia)     Hypertension     Iron deficiency anemia     BUZZ (obstructive sleep apnea)     CPAP    " Pulmonary embolism      Past Surgical History:   Procedure Laterality Date    COLONOSCOPY N/A 11/07/2016    Procedure: COLONOSCOPY;  Surgeon: Shar Weiss MD;  Location: [facility] ENDO (4TH FLR);  Service: Endoscopy;  Laterality: N/A;  [facility] Referral. Okay to hold Coumadin 5 days prior to procedure. See Referral scaned in media tab on 10/20/16.    COLONOSCOPY N/A 09/21/2017    Procedure: COLONOSCOPY;  Surgeon: Stuart Trinidad MD;  Location: [facility] ENDO (4TH FLR);  Service: Endoscopy;  Laterality: N/A;  referral [facility] from [facility]. Olivarez-see scanned docs under media tab          9/6/17-current [facility] authorization and last clinic visit scanned into chart    EPIDURAL STEROID INJECTION N/A 12/17/2020    Procedure: INJECTION, STEROID, EPIDURAL, L5-S1 clear to hold Xarelto 2 days;  Surgeon: Moy Vasquez MD;  Location: [facility] PAIN MGT;  Service: Pain Management;  Laterality: N/A;    EXCISION OF LESION OF FEMUR Right 01/04/2023    Procedure: EXCISION, LESION, FEMUR: POSTERIOR APPROACH;  Surgeon: Prieto Sinha MD;  Location: [facility] OR 2ND FLR;  Service: Orthopedics;  Laterality: Right;    HIP SURGERY Right 2008    exploratory at [facility]    HIP SURGERY Right 08/31/2015    THR    INJECTION OF ANESTHETIC AGENT AROUND NERVE Right 10/23/2019    Procedure: BLOCK, NERVE, Obturator and Femoral cleared to hold xarelto 3 days pt. said he's not taking coumadin;  Surgeon: Moy Vasquez MD;  Location: [facility] PAIN MGT;  Service: Pain Management;  Laterality: Right;    INJECTION OF ANESTHETIC AGENT AROUND NERVE Right 07/06/2020    Procedure: BLOCK, NERVE, RIGHT MBB L3,L5,L5;  Surgeon: Moy Vasquez MD;  Location: [facility] PAIN MGT;  Service: Pain Management;  Laterality: Right;  BLOCK, NERVE, RIGHT MBB L3,L5,L5    INJECTION OF ANESTHETIC AGENT AROUND NERVE Right 07/30/2020    Procedure: BLOCK, NERVE RIGHT L3, L4, L5 2ND;  Surgeon: Moy Vasquez MD;  Location: [facility] PAIN MGT;  Service: Pain Management;  Laterality: Right;  NEEDS CONSENT, XARELTO     LEG SURGERY Left 2012    fibula and tibia    RADIOFREQUENCY ABLATION Right 10/05/2020    Procedure: RADIOFREQUENCY ABLATION RIGHT L3,4,5;  Surgeon: Moy Vasquez MD;  Location: [facility] PAIN MGT;  Service: Pain Management;  Laterality: Right;  RADIOFREQUENCY ABLATION RIGHT L3,4,5  ok to hold xarelto, scanned in Media    SHOULDER SURGERY Right 1989    torn ligament    TRANSFORAMINAL EPIDURAL INJECTION OF STEROID Bilateral 12/04/2023    Procedure: LUMBAR TRANSFORAMINAL BILATERAL L4/5 DIRECT REFERRAL;  Surgeon: Moy Vasquez MD;  Location: [facility] PAIN MGT;  Service: Pain Management;  Laterality: Bilateral;    TRANSFORAMINAL EPIDURAL INJECTION OF STEROID Right 11/21/2024    Procedure: LUMBAR TRANSFORAMINAL RIGHT L1/2 AND L2/3 please add xarelto to pt's med list when admitted 11/21;  Surgeon: oMy Vasquez MD;  Location: [facility] PAIN MGT;  Service: Pain Management;  Laterality: Right;  [phone]  2 WK F/U ELEAZAR     Family History   Problem Relation Name Age of Onset    Cancer Mother          ovarian    Cancer Father      Cancer Sister          breast    Cancer Sister          breast    No Known Problems Brother      No Known Problems Daughter      No Known Problems Daughter      No Known Problems Daughter      No Known Problems Son      No Known Problems Son       Social History[1]  Review of Systems   Musculoskeletal:  Positive for arthralgias.       Physical Exam     Initial Vitals [03/16/25 1652]   BP Pulse Resp Temp SpO2   (!) 136/58 83 18 97.7 °F (36.5 °C) 97 %      MAP       --         Physical Exam    Constitutional: He appears well-developed and well-nourished. He is not diaphoretic. He is easily aroused.   HENT:   Head: Normocephalic and atraumatic. Mouth/Throat: Oropharynx is clear and moist. No oropharyngeal exudate.   Eyes: EOM and lids are normal. Pupils are equal, round, and reactive to light. No scleral icterus.   Neck: Phonation normal. Neck supple. No stridor present.   Normal range of motion.  Cardiovascular:   Normal rate and regular rhythm.           +2 DP pulses   Pulmonary/Chest: Breath sounds normal. No stridor. No respiratory distress. He has no wheezes. He has no rales.   Abdominal: Abdomen is soft. He exhibits no distension. There is no abdominal tenderness. There is no rebound.   Musculoskeletal:         General: Tenderness present. No edema. Normal range of motion.      Cervical back: Normal range of motion and neck supple.        Legs:       Comments: Full A/P ROM of BLE. DHRUV surgical scar with no overlying erythema or warmth. Negative log roll.     Neurological: He is alert, oriented to person, place, and time and easily aroused. He has normal strength. No sensory deficit.   Skin: Skin is warm and dry. No rash noted. No erythema.   Psychiatric: He has a normal mood and affect. His speech is normal.         ED Course   Procedures  Labs Reviewed   CBC W/ AUTO DIFFERENTIAL - Abnormal       Result Value    WBC 7.20      RBC 3.90 (*)     Hemoglobin 12.6 (*)     Hematocrit 40.7       (*)     MCH 32.3 (*)     MCHC 31.0 (*)     RDW 13.3      Platelets 329      MPV 9.0 (*)     Immature Granulocytes 0.1      Gran # (ANC) 3.6      Immature Grans (Abs) 0.01      Lymph # 2.6      Mono # 0.5      Eos # 0.4      Baso # 0.03      nRBC 0      Gran % 50.3      Lymph % 36.0      Mono % 7.1      Eosinophil % 6.1      Basophil % 0.4      Differential Method Automated     COMPREHENSIVE METABOLIC PANEL - Abnormal    Sodium 141      Potassium 3.9      Chloride 104      CO2 25      Glucose 93      BUN 36 (*)     Creatinine 2.9 (*)     Calcium 9.1      Total Protein 7.6      Albumin 3.4 (*)     Total Bilirubin 0.3      Alkaline Phosphatase 133      AST 14      ALT 12      eGFR 22.1 (*)     Anion Gap 12     C-REACTIVE PROTEIN - Abnormal    CRP 21.5 (*)    SEDIMENTATION RATE - Abnormal    Sed Rate 82 (*)    PROCALCITONIN    Procalcitonin 0.07     MAGNESIUM    Magnesium 1.8     URINALYSIS, REFLEX TO URINE CULTURE   URINALYSIS,  REFLEX TO URINE CULTURE          Imaging Results              X-Ray Chest AP Portable (In process)                      X-Ray Hip 2 or 3 views Right with Pelvis when performed (In process)                      Medications   albuterol-ipratropium 2.5 mg-0.5 mg/3 mL nebulizer solution 3 mL (has no administration in time range)   melatonin tablet 6 mg (has no administration in time range)   polyethylene glycol packet 17 g (has no administration in time range)   glucose chewable tablet 16 g (has no administration in time range)   glucose chewable tablet 24 g (has no administration in time range)   dextrose 50% injection 12.5 g (has no administration in time range)   dextrose 50% injection 25 g (has no administration in time range)   glucagon (human recombinant) injection 1 mg (has no administration in time range)   heparin (porcine) injection 5,000 Units (has no administration in time range)   insulin aspart U-100 pen 0-5 Units (has no administration in time range)   HYDROmorphone injection 0.5 mg (0.5 mg Intravenous Given 3/16/25 2033)   oxyCODONE immediate release tablet 5 mg (5 mg Oral Given 3/16/25 2157)     Medical Decision Making  74 y.o. male with medical history of HTN, HLD, R hip replacement 2016 presenting to the ED c/o acute on chronic R hip pain. Normally uses a walker but now not able to bear any weight.     DDx includes but not limited to muscular strain, tendonitis, lumbar radiculopathy, neuropathy, occult fracture, hardware complication, septic joint.     Amount and/or Complexity of Data Reviewed  External Data Reviewed: labs and notes.  Labs: ordered. Decision-making details documented in ED Course.  Radiology: ordered and independent interpretation performed.    Risk  Prescription drug management.               ED Course as of 03/16/25 2203   Sun Mar 16, 2025   2132 Initially ordered MRI hip and lumbar spine with contrast but after discussion with radiology will switch to without contrast given  patient's GFR being less than 30.  [BA]   2132 CRP(!): 21.5 [BA]   2133 Sed Rate(!): 82  ESR and CRP uptrending [BA]   2133 WBC: 7.20 [BA]   2133 Procalcitonin: 0.07 [BA]   2201 Discussed with EDOU, patient placed in observation for completion and radiology reads of his MRI lumbar and hip studies. Additionally for pain control and PT/OT evaluation. Patient may need orthopedic or spine consultation pending his MRI reads and clinical improvement.     Patient expresses understanding and agreeable to the plan. I have discussed the care of this patient with my supervising physician.  [BA]      ED Course User Index  [BA] Kenn Lopez PA-C                           Clinical Impression:  Final diagnoses:  [M54.9] Back pain  [R07.9] Chest pain  [M25.551] Right hip pain (Primary)  [Z96.641] History of total right hip arthroplasty          ED Disposition Condition    Observation Stable                    [1]   Social History  Tobacco Use    Smoking status: Former     Types: Cigars     Quit date: 8/13/2009     Years since quitting: 15.6    Smokeless tobacco: Never   Substance Use Topics    Alcohol use: No     Alcohol/week: 0.0 standard drinks of alcohol    Drug use: No        Kenn Lopez PA-C  03/16/25 2203

## 2025-03-17 NOTE — PHARMACY MED REC
"      Admission Medication History     The home medication history was taken by Maria Elena Calderon.    You may go to "Admission" then "Reconcile Home Medications" tabs to review and/or act upon these items.     The home medication list has been updated by the Pharmacy department.   Please read ALL comments highlighted in yellow.   Please address this information as you see fit.    Feel free to contact us if you have any questions or require assistance.      The medications listed below were removed from the home medication list. Please reorder if appropriate:  Patient reports no longer taking the following medication(s):  Capsaicin 0.1% cream  Carboxymethyl-gly-crdu58-EI 0.5-1-0.5% dpet  Carboxymethylcellulose sodium 1% dpge  Clobetasol propionate   Cyanocobalamin 100 mcg  Cyclosporine 0.05% opth emulsion  Ergocalciferol 50,000 unit   Finasteride 5 mg  Hydralazine 50 mg  Ketoconazole 2% cream  Latanoprost 0.005% opth solution  Lidocaine 5% patch  Methocarbamol 750 mg  Omega-3/dha/epa/fish oil  Semaglutide 2 mg/dose pnij  Thiamine 100 mg      Medications listed below were obtained from: Patient/family and Patient's pharmacy  Current Outpatient Medications on File Prior to Encounter   Medication Sig    albuterol 90 mcg/actuation inhaler Inhale 2 puffs into the lungs every 6 (six) hours as needed for Wheezing.     atorvastatin (LIPITOR) 80 MG tablet Take 80 mg by mouth once daily.    carvediloL (COREG) 25 MG tablet Take 25 mg by mouth Daily.    chlorhexidine (PERIDEX) 0.12 % solution RINSE 1 CAPFUL BY MOUTH ONCE DAILY AS NEEDED SWISH FOR 30 SECONDS AFTER AND SPIT OUT    chlorthalidone (HYGROTEN) 25 MG Tab Take 25 mg by mouth once daily.    dulaglutide (TRULICITY) 4.5 mg/0.5 mL pen injector Inject 4.5 mg into the skin every Saturday.    eplerenone (INSPRA) 25 MG Tab Take 1 tablet by mouth once daily.    furosemide (LASIX) 40 MG tablet Take 40 mg by mouth once daily.    oxyCODONE (ROXICODONE) 10 mg Tab immediate release " tablet Take 10 mg by mouth every 6 (six) hours as needed for Pain.    rivaroxaban (XARELTO) 15 mg Tab Take 15 mg by mouth Daily.    valsartan (DIOVAN) 320 MG tablet Take 320 mg by mouth once daily.    naloxone (NARCAN) 4 mg/actuation Spry as needed.           Maria Elena Calderon  EXT [id number]            .

## 2025-03-17 NOTE — CONSULTS
Interventional Neuroradiology Pre-Procedure Note    Chief Complaint: Right L3-4, L4-5 soft tissue edema signal,    History of Present Illness:  Macario Dior Jr. is a 74 y.o. male with with PMH of asthma, arthritis, CKD3, DM, DVT, PE on Xarelto, gout, HTN, BUZZ, R hip replacement 2016 presenting to the ED with the chief complaint of severe R lower back pain for 4 months. MRI [facility] 3/17 showed right L3-4, L4-5 soft tissue edema signal, c/o septic facet joint and osteo. ARIANNA consulted for aspiration +/- biopsy.       Past Medical History:   Diagnosis Date    Arthritis     Asthma     chronic    CKD (chronic kidney disease), stage III     patient denies    Deep vein thrombosis     Demyelinating neuropathy     polyneuropathy    Diabetes mellitus type 2 without retinopathy 12/14/2022    Gout     History of blood clots 2013    lower part of both legs    HLD (hyperlipidemia)     Hypertension     Iron deficiency anemia     BUZZ (obstructive sleep apnea)     CPAP    Pulmonary embolism      Past Surgical History:   Procedure Laterality Date    COLONOSCOPY N/A 11/07/2016    Procedure: COLONOSCOPY;  Surgeon: Shar Weiss MD;  Location: [address]);  Service: Endoscopy;  Laterality: N/A;  [facility] Referral. Okay to hold Coumadin 5 days prior to procedure. See Referral scaned in media tab on 10/20/16.    COLONOSCOPY N/A 09/21/2017    Procedure: COLONOSCOPY;  Surgeon: Stuart Trinidad MD;  Location: [address]);  Service: Endoscopy;  Laterality: N/A;  [facility] Olivarez-see scanned docs under media tab          9/6/17-current [facility] authorization and last clinic visit scanned into chart    EPIDURAL STEROID INJECTION N/A 12/17/2020    Procedure: INJECTION, STEROID, EPIDURAL, L5-S1 clear to hold Xarelto 2 days;  Surgeon: Moy Vasquez MD;  Location: [facility] PAIN MGT;  Service: Pain Management;  Laterality: N/A;    EXCISION OF LESION OF FEMUR Right 01/04/2023    Procedure: EXCISION, LESION, FEMUR:  POSTERIOR APPROACH;  Surgeon: Prieto Sinha MD;  Location: [facility] OR 2ND FLR;  Service: Orthopedics;  Laterality: Right;    HIP SURGERY Right 2008    exploratory at [facility]    HIP SURGERY Right 08/31/2015    THR    INJECTION OF ANESTHETIC AGENT AROUND NERVE Right 10/23/2019    Procedure: BLOCK, NERVE, Obturator and Femoral cleared to hold xarelto 3 days pt. said he's not taking coumadin;  Surgeon: Moy Vasquez MD;  Location: [facility] PAIN MGT;  Service: Pain Management;  Laterality: Right;    INJECTION OF ANESTHETIC AGENT AROUND NERVE Right 07/06/2020    Procedure: BLOCK, NERVE, RIGHT MBB L3,L5,L5;  Surgeon: Moy Vasquez MD;  Location: [facility] PAIN MGT;  Service: Pain Management;  Laterality: Right;  BLOCK, NERVE, RIGHT MBB L3,L5,L5    INJECTION OF ANESTHETIC AGENT AROUND NERVE Right 07/30/2020    Procedure: BLOCK, NERVE RIGHT L3, L4, L5 2ND;  Surgeon: Moy Vasquez MD;  Location: [facility] PAIN MGT;  Service: Pain Management;  Laterality: Right;  NEEDS CONSENT, XARELTO    LEG SURGERY Left 2012    fibula and tibia    RADIOFREQUENCY ABLATION Right 10/05/2020    Procedure: RADIOFREQUENCY ABLATION RIGHT L3,4,5;  Surgeon: Moy Vasquez MD;  Location: [facility] PAIN MGT;  Service: Pain Management;  Laterality: Right;  RADIOFREQUENCY ABLATION RIGHT L3,4,5  ok to hold xarelto, scanned in Media    SHOULDER SURGERY Right 1989    torn ligament    TRANSFORAMINAL EPIDURAL INJECTION OF STEROID Bilateral 12/04/2023    Procedure: LUMBAR TRANSFORAMINAL BILATERAL L4/5 DIRECT REFERRAL;  Surgeon: Moy Vasquez MD;  Location: [facility] PAIN MGT;  Service: Pain Management;  Laterality: Bilateral;    TRANSFORAMINAL EPIDURAL INJECTION OF STEROID Right 11/21/2024    Procedure: LUMBAR TRANSFORAMINAL RIGHT L1/2 AND L2/3 please add xarelto to pt's med list when admitted 11/21;  Surgeon: Moy Vasquez MD;  Location: [facility] PAIN MGT;  Service: Pain Management;  Laterality: Right;  [phone]  2 WK F/U ELEAZAR       Home Meds:   Prior to Admission medications     Medication Sig Start Date End Date Taking? Authorizing Provider   albuterol 90 mcg/actuation inhaler Inhale 2 puffs into the lungs every 6 (six) hours as needed for Wheezing.    Yes Provider, Historical   atorvastatin (LIPITOR) 80 MG tablet Take 80 mg by mouth once daily.   Yes Provider, Historical   carvediloL (COREG) 25 MG tablet Take 1 tablet (25 mg total) by mouth 2 (two) times daily.  Patient taking differently: Take 25 mg by mouth Daily. 1/5/23 3/17/25 Yes Raghavendra Talbot MD   chlorhexidine (PERIDEX) 0.12 % solution RINSE 1 CAPFUL BY MOUTH ONCE DAILY AS NEEDED SWISH FOR 30 SECONDS AFTER AND SPIT OUT 1/8/21  Yes Provider, Historical   chlorthalidone (HYGROTEN) 25 MG Tab Take 25 mg by mouth once daily. 11/17/22  Yes Provider, Historical   dulaglutide (TRULICITY) 4.5 mg/0.5 mL pen injector Inject 4.5 mg into the skin every Saturday. 10/29/24  Yes Provider, Historical   eplerenone (INSPRA) 25 MG Tab Take 1 tablet by mouth once daily. 11/17/22  Yes Provider, Historical   furosemide (LASIX) 40 MG tablet Take 40 mg by mouth once daily. 4/16/24  Yes Provider, Historical   oxyCODONE (ROXICODONE) 10 mg Tab immediate release tablet Take 1 tablet (10 mg total) by mouth every 4 (four) hours as needed for Pain.  Patient taking differently: Take 10 mg by mouth every 6 (six) hours as needed for Pain. 1/4/23  Yes Raghavendra Talbot MD   rivaroxaban (XARELTO) 15 mg Tab Take 15 mg by mouth Daily.   Yes Provider, Historical   valsartan (DIOVAN) 320 MG tablet Take 320 mg by mouth once daily. 8/13/24  Yes Provider, Historical   naloxone (NARCAN) 4 mg/actuation Spry as needed. 10/18/22   Provider, Historical   capsaicin 0.1 % Crea Apply twice to area of maximum paresthesia 9/7/23 3/17/25  Tomer Malik MD   [id number] 0.5-1-0.5 % Dpet Place 1 drop into both eyes 4 (four) times daily.  3/17/25  Provider, Historical   carboxymethylcellulose sodium 1 % DpGe Place 0.4 mLs into both eyes 4 (four) times daily  as needed.  3/17/25  Provider, Historical   CLOBETASOL PROPIONATE, BULK, MISC Apply topically once daily.  3/17/25  Provider, Historical   cyanocobalamin (VITAMIN B-12) 100 MCG tablet Take 100 mcg by mouth once daily. Take two tablets  3/17/25  Provider, Historical   cycloSPORINE (RESTASIS) 0.05 % ophthalmic emulsion INSTILL 0.4ML IN EACH EYE EVERY 12 HOURS 1/20/21 3/17/25  Provider, Historical   ergocalciferol (ERGOCALCIFEROL) 50,000 unit Cap Take 50,000 Units by mouth.  3/17/25  Provider, Historical   finasteride (PROSCAR) 5 mg tablet TAKE ONE TABLET BY MOUTH ONCE DAILY FOR PROSTATE 1/5/21 3/17/25  Provider, Historical   hydrALAZINE (APRESOLINE) 50 MG tablet TAKE ONE TABLET BY MOUTH THREE TIMES A DAY FOR HEART/BLOOD PRESSURE 3/18/21 3/17/25  Provider, Historical   ketoconazole (NIZORAL) 2 % cream Apply topically 2 (two) times daily.  3/17/25  Provider, Historical   latanoprost 0.005 % ophthalmic solution Place 1 drop into both eyes every evening.  3/17/25  Provider, Historical   lidocaine (LIDODERM) 5 % Place 1 patch onto the skin once daily. Wear for 12 hours, then remove. Do not apply a new patch for at least 12 hours.  3/17/25  Provider, Historical   methocarbamoL (ROBAXIN) 750 MG Tab Take 1 tablet (750 mg total) by mouth every 8 (eight) hours as needed (muscle pain/spasm). 3/13/25 3/17/25  Kenn Loyola MD   omega-3/dha/epa/fish oil (OMEGA-3 FISH OIL ORAL) 1,000 mg once daily. 11/17/22 3/17/25  Provider, Historical   semaglutide (OZEMPIC) 2 mg/dose (8 mg/3 mL) PnIj Inject 2 mg into the skin every 7 days. 10/9/24 3/17/25  Marianne Astorga MD   thiamine 100 MG tablet Take 0.5 tablets (50 mg total) by mouth once daily. 9/15/23 3/17/25  Tomer Malik MD     Anticoagulants/Antiplatelets:  Xarelto    Allergies:   Review of patient's allergies indicates:   Allergen Reactions    Amlodipine Swelling    Lisinopril Tinitus and Swelling    Pregabalin Swelling    Prednisone Palpitations    Azithromycin       Muscles tense up    Levetiracetam Other (See Comments)    Acetaminophen Rash     Feels like needles are sticking him per pt     Sedation History:  no adverse reactions    Review of Systems:   Hematological: no known coagulopathies  Respiratory: no shortness of breath  Cardiovascular: no chest pain  Gastrointestinal: no abdominal pain  Genito-Urinary: no dysuria  Musculoskeletal: +back pain  Neurological: no TIA or stroke symptoms          OBJECTIVE:     Vital Signs (Most Recent)  Temp: 97.9 °F (36.6 °C) (03/17/25 1216)  Pulse: (!) 56 (03/17/25 1216)  Resp: 16 (03/17/25 1216)  BP: 132/62 (03/17/25 1216)  SpO2: 97 % (03/17/25 1216)    Physical Exam:  ASA: 2  Mallampati: 2    General: no acute distress  Mental Status: alert and oriented to person, place and time  HEENT: normocephalic, atraumatic  Chest: unlabored breathing  Heart: regular heart rate  Abdomen: nondistended  Extremity: moves all extremities    Laboratory  Lab Results   Component Value Date    INR 1.1 12/21/2022       Lab Results   Component Value Date    WBC 6.80 03/17/2025    HGB 11.0 (L) 03/17/2025    HCT 34.1 (L) 03/17/2025     (H) 03/17/2025     03/17/2025      Lab Results   Component Value Date     03/17/2025     03/17/2025    K 3.7 03/17/2025     03/17/2025    CO2 21 (L) 03/17/2025    BUN 33 (H) 03/17/2025    CREATININE 2.3 (H) 03/17/2025    CALCIUM 7.7 (L) 03/17/2025    MG 1.8 03/16/2025    ALT 12 03/16/2025    AST 14 03/16/2025    ALBUMIN 3.4 (L) 03/16/2025    BILITOT 0.3 03/16/2025       ASSESSMENT/PLAN:   -Sedation Plan: Up to moderate  -Patient will undergo: fluoroscopy guided  right L4-L5 facet biopsy +/- aspiration on Wednesday  -Continue to hold Xarelto for at least 48h                 Sathya Rodriguez MD, MHA  Fellow, NeuroEndovascular Surgery, [facility]  Neurologist, [facility] [place], [place]

## 2025-03-17 NOTE — H&P
[facility]te[facility] - Emergency Dept  Hospital Medicine  History & Physical    Patient Name: Macario Dior Jr.  MRN: [id number]  Patient Class: OP- Observation  Admission Date: 3/16/2025  Attending Physician: Jena Crawford MD  Primary Care Provider: [facility]         Patient information was obtained from patient and ER records.     Subjective:     Principal Problem:Right low back pain    Chief Complaint:   Chief Complaint   Patient presents with    Hip Pain     Spasms to hip for 2 weeks,  hip replacement 2016        HPI: 74 y.o. 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 Pain located right para spinal lumbar region, is non-radiating.  No sciatica. He denies fever, chills, cough, SOB. Weight loss, appetite loss.     In the ED, evaluation included  wbc 6.8, Hb 11, CRP 21, cr 2.9-> 2.3,  & MRI. Ortho was consulted. They do not feel that the hip is infected. They recommend a NS spine consult with concern for with edema in the right paraspinal musculature and potential R L4-5 facet septic arthritis.  His MRI resulted this morning. Radiology was concerned for R L4-5 septic facet joint with osteomyelitis.  His calcium dropped after hydration. 9.1-> 7.7  Suspect albumin was falsely high upon admission.        He was placed in OBS, Likely needs upgrade to Inpatient status        Past Medical History:   Diagnosis Date    Arthritis     Asthma     chronic    CKD (chronic kidney disease), stage III     patient denies    Deep vein thrombosis     Demyelinating neuropathy     polyneuropathy    Diabetes mellitus type 2 without retinopathy 12/14/2022    Gout     History of blood clots 2013    lower part of both legs    HLD (hyperlipidemia)     Hypertension     Iron deficiency anemia     BUZZ (obstructive sleep apnea)     CPAP    Pulmonary embolism        Past Surgical History:    Procedure Laterality Date    COLONOSCOPY N/A 11/07/2016    Procedure: COLONOSCOPY;  Surgeon: Shar Weiss MD;  Location: [facility] ENDO (4TH FLR);  Service: Endoscopy;  Laterality: N/A;  [facility] Referral. Okay to hold Coumadin 5 days prior to procedure. See Referral scaned in media tab on 10/20/16.    COLONOSCOPY N/A 09/21/2017    Procedure: COLONOSCOPY;  Surgeon: Stuart Trinidad MD;  Location: [facility] ENDO (4TH FLR);  Service: Endoscopy;  Laterality: N/A;  [facility] Olivarez-see scanned docs under media tab          9/6/17-current [facility] authorization and last clinic visit scanned into chart    EPIDURAL STEROID INJECTION N/A 12/17/2020    Procedure: INJECTION, STEROID, EPIDURAL, L5-S1 clear to hold Xarelto 2 days;  Surgeon: Moy Vasquez MD;  Location: [facility] PAIN MGT;  Service: Pain Management;  Laterality: N/A;    EXCISION OF LESION OF FEMUR Right 01/04/2023    Procedure: EXCISION, LESION, FEMUR: POSTERIOR APPROACH;  Surgeon: Prieto Sinha MD;  Location: [facility] OR 2ND FLR;  Service: Orthopedics;  Laterality: Right;    HIP SURGERY Right 2008    exploratory at [facility]    HIP SURGERY Right 08/31/2015    THR    INJECTION OF ANESTHETIC AGENT AROUND NERVE Right 10/23/2019    Procedure: BLOCK, NERVE, Obturator and Femoral cleared to hold xarelto 3 days pt. said he's not taking coumadin;  Surgeon: Moy Vasquez MD;  Location: [facility] PAIN MGT;  Service: Pain Management;  Laterality: Right;    INJECTION OF ANESTHETIC AGENT AROUND NERVE Right 07/06/2020    Procedure: BLOCK, NERVE, RIGHT MBB L3,L5,L5;  Surgeon: Moy Vasquez MD;  Location: [facility] PAIN MGT;  Service: Pain Management;  Laterality: Right;  BLOCK, NERVE, RIGHT MBB L3,L5,L5    INJECTION OF ANESTHETIC AGENT AROUND NERVE Right 07/30/2020    Procedure: BLOCK, NERVE RIGHT L3, L4, L5 2ND;  Surgeon: Moy Vasquez MD;  Location: [facility] PAIN MGT;  Service: Pain Management;  Laterality: Right;  NEEDS CONSENT, XARELTO    LEG SURGERY Left 2012    fibula and tibia     RADIOFREQUENCY ABLATION Right 10/05/2020    Procedure: RADIOFREQUENCY ABLATION RIGHT L3,4,5;  Surgeon: Moy Vasquez MD;  Location: [facility] PAIN MGT;  Service: Pain Management;  Laterality: Right;  RADIOFREQUENCY ABLATION RIGHT L3,4,5  ok to hold xarelto, scanned in Media    SHOULDER SURGERY Right 1989    torn ligament    TRANSFORAMINAL EPIDURAL INJECTION OF STEROID Bilateral 12/04/2023    Procedure: LUMBAR TRANSFORAMINAL BILATERAL L4/5 DIRECT REFERRAL;  Surgeon: Moy Vasquez MD;  Location: [facility] PAIN MGT;  Service: Pain Management;  Laterality: Bilateral;    TRANSFORAMINAL EPIDURAL INJECTION OF STEROID Right 11/21/2024    Procedure: LUMBAR TRANSFORAMINAL RIGHT L1/2 AND L2/3 please add xarelto to pt's med list when admitted 11/21;  Surgeon: Moy Vasquez MD;  Location: [facility] PAIN MGT;  Service: Pain Management;  Laterality: Right;  [phone]  2 WK F/U ELEAZAR       Review of patient's allergies indicates:   Allergen Reactions    Amlodipine Swelling    Lisinopril Tinitus and Swelling    Pregabalin Swelling    Prednisone Palpitations    Azithromycin      Muscles tense up    Levetiracetam Other (See Comments)    Acetaminophen Rash     Feels like needles are sticking him per pt       No current facility-administered medications on file prior to encounter.     Current Outpatient Medications on File Prior to Encounter   Medication Sig    albuterol 90 mcg/actuation inhaler Inhale 2 puffs into the lungs every 6 (six) hours as needed for Wheezing.     atorvastatin (LIPITOR) 80 MG tablet Take 80 mg by mouth once daily.    carvediloL (COREG) 25 MG tablet Take 1 tablet (25 mg total) by mouth 2 (two) times daily. (Patient taking differently: Take 25 mg by mouth Daily.)    chlorhexidine (PERIDEX) 0.12 % solution RINSE 1 CAPFUL BY MOUTH ONCE DAILY AS NEEDED SWISH FOR 30 SECONDS AFTER AND SPIT OUT    chlorthalidone (HYGROTEN) 25 MG Tab Take 25 mg by mouth once daily.    dulaglutide (TRULICITY) 4.5 mg/0.5 mL pen injector Inject 4.5 mg  into the skin every Saturday.    eplerenone (INSPRA) 25 MG Tab Take 1 tablet by mouth once daily.    furosemide (LASIX) 40 MG tablet Take 40 mg by mouth once daily.    oxyCODONE (ROXICODONE) 10 mg Tab immediate release tablet Take 1 tablet (10 mg total) by mouth every 4 (four) hours as needed for Pain. (Patient taking differently: Take 10 mg by mouth every 6 (six) hours as needed for Pain.)    rivaroxaban (XARELTO) 15 mg Tab Take 15 mg by mouth Daily.    valsartan (DIOVAN) 320 MG tablet Take 320 mg by mouth once daily.    naloxone (NARCAN) 4 mg/actuation Spry as needed.    [DISCONTINUED] capsaicin 0.1 % Crea Apply twice to area of maximum paresthesia    [DISCONTINUED] carboxymethyl-[id number]-KC 0.5-1-0.5 % Dpet Place 1 drop into both eyes 4 (four) times daily.    [DISCONTINUED] carboxymethylcellulose sodium 1 % DpGe Place 0.4 mLs into both eyes 4 (four) times daily as needed.    [DISCONTINUED] CLOBETASOL PROPIONATE, BULK, MISC Apply topically once daily.    [DISCONTINUED] cyanocobalamin (VITAMIN B-12) 100 MCG tablet Take 100 mcg by mouth once daily. Take two tablets    [DISCONTINUED] cycloSPORINE (RESTASIS) 0.05 % ophthalmic emulsion INSTILL 0.4ML IN EACH EYE EVERY 12 HOURS    [DISCONTINUED] ergocalciferol (ERGOCALCIFEROL) 50,000 unit Cap Take 50,000 Units by mouth.    [DISCONTINUED] finasteride (PROSCAR) 5 mg tablet TAKE ONE TABLET BY MOUTH ONCE DAILY FOR PROSTATE    [DISCONTINUED] hydrALAZINE (APRESOLINE) 50 MG tablet TAKE ONE TABLET BY MOUTH THREE TIMES A DAY FOR HEART/BLOOD PRESSURE    [DISCONTINUED] ketoconazole (NIZORAL) 2 % cream Apply topically 2 (two) times daily.    [DISCONTINUED] latanoprost 0.005 % ophthalmic solution Place 1 drop into both eyes every evening.    [DISCONTINUED] lidocaine (LIDODERM) 5 % Place 1 patch onto the skin once daily. Wear for 12 hours, then remove. Do not apply a new patch for at least 12 hours.    [DISCONTINUED] methocarbamoL (ROBAXIN) 750 MG Tab Take 1 tablet (750 mg total) by  mouth every 8 (eight) hours as needed (muscle pain/spasm).    [DISCONTINUED] omega-3/dha/epa/fish oil (OMEGA-3 FISH OIL ORAL) 1,000 mg once daily.    [DISCONTINUED] semaglutide (OZEMPIC) 2 mg/dose (8 mg/3 mL) PnIj Inject 2 mg into the skin every 7 days.    [DISCONTINUED] thiamine 100 MG tablet Take 0.5 tablets (50 mg total) by mouth once daily.     Family History       Problem Relation (Age of Onset)    Cancer Mother, Father, Sister, Sister    No Known Problems Brother, Daughter, Daughter, Daughter, Son, Son          Tobacco Use    Smoking status: Former     Types: Cigars     Quit date: 8/13/2009     Years since quitting: 15.6    Smokeless tobacco: Never   Substance and Sexual Activity    Alcohol use: No     Alcohol/week: 0.0 standard drinks of alcohol    Drug use: No    Sexual activity: Not Currently     Review of Systems   Constitutional:  Negative for activity change, appetite change, chills, diaphoresis, fatigue, fever and unexpected weight change.   HENT:  Negative for congestion, ear pain, hearing loss, nosebleeds, sinus pressure, sore throat, trouble swallowing and voice change.    Eyes:  Negative for pain and visual disturbance.   Respiratory:  Negative for cough, shortness of breath and wheezing.    Cardiovascular:  Negative for chest pain, palpitations and leg swelling.   Gastrointestinal:  Negative for abdominal distention, abdominal pain, blood in stool, constipation, diarrhea, nausea and vomiting.   Genitourinary:  Negative for decreased urine volume, difficulty urinating, flank pain, hematuria and urgency.   Musculoskeletal:  Positive for arthralgias, back pain and gait problem. Negative for joint swelling, myalgias, neck pain and neck stiffness.   Skin:  Negative for rash and wound.   Neurological:  Negative for dizziness, facial asymmetry, weakness, light-headedness and headaches.   Hematological:  Does not bruise/bleed easily.   Psychiatric/Behavioral:  Negative for agitation and behavioral  problems.      Objective:     Vital Signs (Most Recent):  Temp: 98.6 °F (37 °C) (03/17/25 0746)  Pulse: 61 (03/17/25 0746)  Resp: 16 (03/17/25 0746)  BP: (!) 145/65 (03/17/25 0746)  SpO2: 97 % (03/17/25 0746) Vital Signs (24h Range):  Temp:  [97.7 °F (36.5 °C)-98.6 °F (37 °C)] 98.6 °F (37 °C)  Pulse:  [53-83] 61  Resp:  [16-18] 16  SpO2:  [96 %-98 %] 97 %  BP: (131-165)/(58-91) 145/65     Weight: 100.2 kg (221 lb)  Body mass index is 29.16 kg/m².     Physical Exam  Constitutional:       General: He is not in acute distress.     Appearance: Normal appearance. He is normal weight. He is not ill-appearing, toxic-appearing or diaphoretic.   HENT:      Head: Normocephalic and atraumatic.      Right Ear: External ear normal.      Left Ear: External ear normal.      Nose: Nose normal. No congestion or rhinorrhea.      Mouth/Throat:      Mouth: Mucous membranes are dry.      Pharynx: Oropharynx is clear.   Eyes:      General: No scleral icterus.     Extraocular Movements: Extraocular movements intact.      Conjunctiva/sclera: Conjunctivae normal.      Pupils: Pupils are equal, round, and reactive to light.   Neck:      Vascular: No carotid bruit.   Cardiovascular:      Rate and Rhythm: Normal rate and regular rhythm.      Pulses: Normal pulses.      Heart sounds: Normal heart sounds. No murmur heard.     No friction rub. No gallop.   Pulmonary:      Effort: Pulmonary effort is normal. No respiratory distress.      Breath sounds: Normal breath sounds. No stridor. No wheezing, rhonchi or rales.   Chest:      Chest wall: No tenderness.   Abdominal:      General: Abdomen is flat. Bowel sounds are normal. There is no distension.      Palpations: Abdomen is soft. There is no mass.      Tenderness: There is no abdominal tenderness. There is no right CVA tenderness, left CVA tenderness, guarding or rebound.      Hernia: No hernia is present.   Musculoskeletal:         General: No swelling, tenderness, deformity or signs of injury.  Normal range of motion.      Cervical back: Normal range of motion and neck supple. No rigidity or tenderness.      Right lower leg: No edema.      Left lower leg: No edema.      Comments: Tender right paraspinal muscles, lumbar region   Lymphadenopathy:      Cervical: No cervical adenopathy.   Skin:     General: Skin is warm and dry.      Coloration: Skin is not jaundiced or pale.      Findings: No bruising, erythema, lesion or rash.   Neurological:      General: No focal deficit present.      Mental Status: He is alert and oriented to person, place, and time. Mental status is at baseline.      Motor: No weakness.   Psychiatric:         Mood and Affect: Mood normal.         Behavior: Behavior normal.         Thought Content: Thought content normal.         Judgment: Judgment normal.            CRANIAL NERVES     CN III, IV, VI   Pupils are equal, round, and reactive to light.       Significant Labs: All pertinent labs within the past 24 hours have been reviewed.  CBC:   Recent Labs   Lab 03/16/25 2023 03/17/25  0331   WBC 7.20 6.80   HGB 12.6* 11.0*   HCT 40.7 34.1*    273     CMP:   Recent Labs   Lab 03/16/25 2023 03/17/25  0506    141   K 3.9 3.7    109   CO2 25 21*   GLU 93 102   BUN 36* 33*   CREATININE 2.9* 2.3*   CALCIUM 9.1 7.7*   PROT 7.6  --    ALBUMIN 3.4*  --    BILITOT 0.3  --    ALKPHOS 133  --    AST 14  --    ALT 12  --    ANIONGAP 12 11       Significant Imaging: I have reviewed all pertinent imaging results/findings within the past 24 hours.    Assessment/Plan:     * Right low back pain  Concern for Septic Arthritis  with RIGHT L4-5 septic facet joint with osteomyelitis.   Hx of DJD spine, lumbar radiculopathy    Admit to Inpatient  wbc 6.8, Hb 11, CRP 21, cr 2.9-> 2.3,    MRI noted: edema in the right paraspinal musculature and potential R L4-5 facet septic arthritis.  Appreciate Ortho consult in the ED.  Neurosurgery Consulted. May need surgical debridement or IR  "aspiration  Cultures  Will need IV antibiotics, long term.  Holding antibiotics to maximize yield from cultures  Consult IR for aspiration/ biopsy. Cultures ordered for routine, aerobic, anaerobic, AFB and fungi.   ID consult placed.       Osteoarthritis of spine with radiculopathy, lumbar region  See RIGHT low back pain, Pain management    Long term (current) use of anticoagulants  This patient has long term use on an anticoagulant with Select Anticoagulant(s): rivaroxaban . Their long term anticoagulation will be Held or Continued: held. They are on long term anticoagulation due to Reason for Anticoagulation: DVT/PE.     - holding rivaxabaran 20 mg daily until NS eval. Pt may need precedure to drain spinal abscess.  Pt has not had clotting issues for years. He understands risk of recurrence. Plan to resume after procedure. IR consulted for aspiration/ biopsy.     CKD (chronic kidney disease) stage 3, GFR 30-59 ml/min  Creatine stable for now. BMP reviewed- noted Estimated Creatinine Clearance: 35.6 mL/min (A) (based on SCr of 2.3 mg/dL (H)). according to latest data. Based on current GFR, CKD stage is stage 3 - GFR 30-59.  Monitor UOP and serial BMP and adjust therapy as needed. Renally dose meds. Avoid nephrotoxic medications and procedures.    Diabetes mellitus type 2 without retinopathy  Patient's FSGs are controlled on current medication regimen.  Home meds : seiglutide  Holding home meds  - Working to optimize BG control  - SSI provided for corrective dosing  - POCT glucose checks as ordered  - Hypoglycemic protocol in effect  - Diabetic diet provided    Last A1c reviewed-   Lab Results   Component Value Date    HGBA1C 6.7 (H) 12/21/2022     Most recent fingerstick glucose reviewed- No results for input(s): "POCTGLUCOSE" in the last 24 hours.  Current correctional scale  Low  Maintain anti-hyperglycemic dose as follows-   Antihyperglycemics (From admission, onward)      Start     Stop Route Frequency Ordered "    03/16/25 2248  insulin aspart U-100 pen 0-5 Units         -- SubQ Before meals & nightly PRN 03/16/25 2148          Hold Oral hypoglycemics while patient is in the hospital.    Essential hypertension  Patient's blood pressure range in the last 24 hours was: BP  Min: 131/60  Max: 165/91.The patient's inpatient anti-hypertensive regimen is listed below:    Home meds:  coreg, hydralazine and valsartan    Current Antihypertensives  carvediloL tablet 25 mg, 2 times daily, Oral  hydrALAZINE tablet 50 mg, Every 8 hours, Oral  valsartan tablet 320 mg, Daily, Oral    Plan  - BP is controlled, no changes needed to their regimen  - monitor and adjust daily.   - would continue BB through surgery/ procedure.     Hypocalcemia  Hypocalcemia is likely due to  rehydration, CKD, has hx of secondary hyperparathyroidism.  . The most recent calcium and albumin results listed below.  Recent Labs     03/16/25 2023 03/17/25  0506   CALCIUM 9.1 7.7*   ALBUMIN 3.4*  --      Plan  - Will replace calcium and monitor electrolytes closely.  - The patient's hypocalcemia is worsening. Will repeat lab  - Hypocalcemia:  His calcium dropped after hydration. 9.1-> 7.7  Suspect albumin was falsely high upon admission.  Continue ergocalciferol 50,000 weekly.  Vid d level. Repeat lab this afternoon.         BUZZ (obstructive sleep apnea)  Continue Home CPAP, Respiratory to adjust settings      Chronic pain  Pain management  Continue home meds.   Home meds:  Robaxan , oxycodone 10 , capasix, lidoderm.     Scheduled:    PRN:  Oxycod 10 q 6  Morhine 4 mg IV   Robaxan         VTE Risk Mitigation (From admission, onward)           Ordered     IP VTE HIGH RISK PATIENT  Once         03/16/25 2148                       On 03/17/2025, patient should be placed in hospital observation services under my care.        ED Observation Unit  Progress Note      HPI   Macario Barby Blankenship is a 74 y.o. male with medical history of HTN, HLD, R hip replacement 2016  "presenting to the ED with the chief complaint of righ hip pain.      Reports acute on chronic R hip pain for the past 3 months but really worsening over the last 9 days. Normally uses a walker to get around but now not able to bear any weight on his right leg. Reports having severe "stabbing" pain above his R hip whenever he puts weight onto his R leg. He has previously seen pain management and orthopedist. Recently saw Dr. Loyola in clinic on 3/13/25 who ordered labs that showed ESR 71 and CRP 12.7 which were previously normal. Additionally XR pelvis and hips showed stable R hip arthroplasty. AVN findings of left hip. He has been taking Robaxin for muscle spasms. Denies fever, chest pain, SOB, cough, abdominal pain, vomiting, dysuria, hematuria, constipation, diarrhea. No skin changes or rash. No falls or trauma.      Interval History   Patient reports ongoing pain this morning. MRI resulted - concerning for septic facet joint or osteomyelitis. Orthopedics was consulted and feels as though this could represent L4-5 facet septic arthritis. NSGY consulted. Awaiting recommendations.     PMHx   Past Medical History:   Diagnosis Date    Arthritis     Asthma     chronic    CKD (chronic kidney disease), stage III     patient denies    Deep vein thrombosis     Demyelinating neuropathy     polyneuropathy    Diabetes mellitus type 2 without retinopathy 12/14/2022    Gout     History of blood clots 2013    lower part of both legs    HLD (hyperlipidemia)     Hypertension     Iron deficiency anemia     BUZZ (obstructive sleep apnea)     CPAP    Pulmonary embolism       Past Surgical History:   Procedure Laterality Date    COLONOSCOPY N/A 11/07/2016    Procedure: COLONOSCOPY;  Surgeon: Shar Weiss MD;  Location: [address];  Service: Endoscopy;  Laterality: N/A;  [facility] Referral. Okay to hold Coumadin 5 days prior to procedure. See Referral scaned in media tab on 10/20/16.    COLONOSCOPY N/A 09/21/2017    " Procedure: COLONOSCOPY;  Surgeon: Stuart Trinidad MD;  Location: [facility] ENDO (4TH FLR);  Service: Endoscopy;  Laterality: N/A;  [facility] Olivarez-see scanned docs under media tab          9/6/17-current [facility] authorization and last clinic visit scanned into chart    EPIDURAL STEROID INJECTION N/A 12/17/2020    Procedure: INJECTION, STEROID, EPIDURAL, L5-S1 clear to hold Xarelto 2 days;  Surgeon: Moy Vasquez MD;  Location: [facility] PAIN MGT;  Service: Pain Management;  Laterality: N/A;    EXCISION OF LESION OF FEMUR Right 01/04/2023    Procedure: EXCISION, LESION, FEMUR: POSTERIOR APPROACH;  Surgeon: Prieto Sinha MD;  Location: [facility] OR 2ND FLR;  Service: Orthopedics;  Laterality: Right;    HIP SURGERY Right 2008    exploratory at [facility]    HIP SURGERY Right 08/31/2015    THR    INJECTION OF ANESTHETIC AGENT AROUND NERVE Right 10/23/2019    Procedure: BLOCK, NERVE, Obturator and Femoral cleared to hold xarelto 3 days pt. said he's not taking coumadin;  Surgeon: Moy Vasquez MD;  Location: [facility] PAIN MGT;  Service: Pain Management;  Laterality: Right;    INJECTION OF ANESTHETIC AGENT AROUND NERVE Right 07/06/2020    Procedure: BLOCK, NERVE, RIGHT MBB L3,L5,L5;  Surgeon: Moy Vasquez MD;  Location: [facility] PAIN MGT;  Service: Pain Management;  Laterality: Right;  BLOCK, NERVE, RIGHT MBB L3,L5,L5    INJECTION OF ANESTHETIC AGENT AROUND NERVE Right 07/30/2020    Procedure: BLOCK, NERVE RIGHT L3, L4, L5 2ND;  Surgeon: Moy Vasquez MD;  Location: [facility] PAIN MGT;  Service: Pain Management;  Laterality: Right;  NEEDS CONSENT, XARELTO    LEG SURGERY Left 2012    fibula and tibia    RADIOFREQUENCY ABLATION Right 10/05/2020    Procedure: RADIOFREQUENCY ABLATION RIGHT L3,4,5;  Surgeon: Moy Vasquez MD;  Location: [facility] PAIN MGT;  Service: Pain Management;  Laterality: Right;  RADIOFREQUENCY ABLATION RIGHT L3,4,5  ok to hold xarelto, scanned in Media    SHOULDER SURGERY Right 1989    torn ligament    TRANSFORAMINAL  EPIDURAL INJECTION OF STEROID Bilateral 12/04/2023    Procedure: LUMBAR TRANSFORAMINAL BILATERAL L4/5 DIRECT REFERRAL;  Surgeon: Moy Vasquez MD;  Location: [facility];  Service: Pain Management;  Laterality: Bilateral;    TRANSFORAMINAL EPIDURAL INJECTION OF STEROID Right 11/21/2024    Procedure: LUMBAR TRANSFORAMINAL RIGHT L1/2 AND L2/3 please add xarelto to pt's med list when admitted 11/21;  Surgeon: Moy Vasquez MD;  Location: [facility] PAIN T;  Service: Pain Management;  Laterality: Right;  [phone]  2 WK F/U ELEAZAR        Family Hx   Family History   Problem Relation Name Age of Onset    Cancer Mother          ovarian    Cancer Father      Cancer Sister          breast    Cancer Sister          breast    No Known Problems Brother      No Known Problems Daughter      No Known Problems Daughter      No Known Problems Daughter      No Known Problems Son      No Known Problems Son          Social Hx   Social History     Socioeconomic History    Marital status: Single   Tobacco Use    Smoking status: Former     Types: Cigars     Quit date: 8/13/2009     Years since quitting: 15.6    Smokeless tobacco: Never   Substance and Sexual Activity    Alcohol use: No     Alcohol/week: 0.0 standard drinks of alcohol    Drug use: No    Sexual activity: Not Currently        Vital Signs   Vitals:    03/16/25 2311 03/17/25 0404 03/17/25 0426 03/17/25 0520   BP: 131/60  (!) 165/91    BP Location: Right arm  Right arm    Patient Position: Sitting  Lying    Pulse: 71  (!) 53    Resp: 16 18 18 18   Temp: 98.4 °F (36.9 °C)  98.3 °F (36.8 °C)    TempSrc: Oral  Oral    SpO2: 96%  96%    Weight:       Height:            Review of Systems  Review of Systems   Musculoskeletal:  Positive for back pain and joint pain.       Brief Physical Exam/Reassessment   Physical Exam  Vitals and nursing note reviewed.   Constitutional:       General: He is not in acute distress.     Appearance: He is well-developed and normal weight. He is not  ill-appearing, toxic-appearing or diaphoretic.   HENT:      Head: Normocephalic and atraumatic.      Right Ear: External ear normal.      Left Ear: External ear normal.      Nose: Nose normal.      Mouth/Throat:      Mouth: Mucous membranes are moist.   Eyes:      Extraocular Movements: Extraocular movements intact.      Conjunctiva/sclera: Conjunctivae normal.      Pupils: Pupils are equal, round, and reactive to light.   Cardiovascular:      Rate and Rhythm: Normal rate and regular rhythm.      Heart sounds: Normal heart sounds.   Pulmonary:      Effort: Pulmonary effort is normal.      Breath sounds: Normal breath sounds.   Abdominal:      General: Bowel sounds are normal.      Palpations: Abdomen is soft.      Tenderness: There is no abdominal tenderness.   Musculoskeletal:         General: Normal range of motion.      Cervical back: Normal range of motion and neck supple.      Right lower leg: No edema.      Left lower leg: No edema.      Comments: Painful but full ROM of bilateral lower extremity. Trace edema. Neurovascularly intact.    Skin:     General: Skin is warm and dry.      Capillary Refill: Capillary refill takes less than 2 seconds.   Neurological:      General: No focal deficit present.      Mental Status: He is alert and oriented to person, place, and time.      Cranial Nerves: No cranial nerve deficit.   Psychiatric:         Mood and Affect: Mood normal. Mood is not anxious.         Behavior: Behavior normal. Behavior is not agitated.         Thought Content: Thought content normal.         Judgment: Judgment normal.         Labs/Imaging   Labs Reviewed   CBC W/ AUTO DIFFERENTIAL - Abnormal       Result Value    WBC 7.20      RBC 3.90 (*)     Hemoglobin 12.6 (*)     Hematocrit 40.7       (*)     MCH 32.3 (*)     MCHC 31.0 (*)     RDW 13.3      Platelets 329      MPV 9.0 (*)     Immature Granulocytes 0.1      Gran # (ANC) 3.6      Immature Grans (Abs) 0.01      Lymph # 2.6      Mono # 0.5       Eos # 0.4      Baso # 0.03      nRBC 0      Gran % 50.3      Lymph % 36.0      Mono % 7.1      Eosinophil % 6.1      Basophil % 0.4      Differential Method Automated     COMPREHENSIVE METABOLIC PANEL - Abnormal    Sodium 141      Potassium 3.9      Chloride 104      CO2 25      Glucose 93      BUN 36 (*)     Creatinine 2.9 (*)     Calcium 9.1      Total Protein 7.6      Albumin 3.4 (*)     Total Bilirubin 0.3      Alkaline Phosphatase 133      AST 14      ALT 12      eGFR 22.1 (*)     Anion Gap 12     C-REACTIVE PROTEIN - Abnormal    CRP 21.5 (*)    SEDIMENTATION RATE - Abnormal    Sed Rate 82 (*)    CBC W/ AUTO DIFFERENTIAL - Abnormal    WBC 6.80      RBC 3.41 (*)     Hemoglobin 11.0 (*)     Hematocrit 34.1 (*)      (*)     MCH 32.3 (*)     MCHC 32.3      RDW 13.3      Platelets 273      MPV 9.3      Immature Granulocytes 0.4      Gran # (ANC) 3.2      Immature Grans (Abs) 0.03      Lymph # 2.5      Mono # 0.6      Eos # 0.4      Baso # 0.02      nRBC 0      Gran % 47.6      Lymph % 36.8      Mono % 9.0      Eosinophil % 5.9      Basophil % 0.3      Differential Method Automated     BASIC METABOLIC PANEL - Abnormal    Sodium 141      Potassium 3.7      Chloride 109      CO2 21 (*)     Glucose 102      BUN 33 (*)     Creatinine 2.3 (*)     Calcium 7.7 (*)     Anion Gap 11      eGFR 29.3 (*)    PROCALCITONIN    Procalcitonin 0.07     MAGNESIUM    Magnesium 1.8     URINALYSIS, REFLEX TO URINE CULTURE    Specimen UA Urine, Clean Catch      Color, UA Yellow      Appearance, UA Clear      pH, UA 7.0      Specific Gravity, UA 1.010      Protein, UA Negative      Glucose, UA Negative      Ketones, UA Negative      Bilirubin (UA) Negative      Occult Blood UA Negative      Nitrite, UA Negative      Leukocytes, UA Negative      Narrative:     Specimen Source->Urine   POCT GLUCOSE, HAND-HELD DEVICE      Imaging Results               MRI Hip Without Contrast Right (Final result)  Result time 03/17/25 01:16:30    Procedure changed from MRI Hip With Contrast Right     Final result by Thomas Head MD (03/17/25 01:16:30)                   Impression:      Limited but negative exam of the right hip for fluid collection or edema surrounding prosthesis.    Abnormal edematous signal and morphology with marrow replacement in the foot set joint on the right at L4-5.  Correlate for septic facet joint with osteomyelitis.    AVN without collapse or active edema in the left femoral head.    This report was flagged in Epic as abnormal.      Electronically signed by: Thomas Head  Date:    03/17/2025  Time:    01:16               Narrative:    EXAMINATION:  MRI HIP WITHOUT CONTRAST RIGHT    CLINICAL HISTORY:  Hip pain, infection suspected, xray done;    TECHNIQUE:  Multiplanar multisequence MR imaging of the pelvis and right hip was performed without gadolinium.    COMPARISON:  None    FINDINGS:  Metallic artifact of the right prosthetic hip limits the exam.  There is no evidence of focal fluid collection or definitive edema in the adjacent muscles or native bone.    There is abnormal signal and morphology of the facet joints at L4-5 with bone marrow edema especially in the L4 superior facet.  Edema in the adjacent paraspinous muscles on the right is noted as well.  A drainable fluid collection is not evident.  Mild impingement at the subarticular foraminal level is present but incompletely evaluated on hip MR.    Diverticulosis coli in the descending and sigmoid colon.  No fluid in the pelvis to suggest diverticulitis.    Serpiginous sclerotic lesion without collapse or edema in the left femoral head in the subcortical bone.                                        MRI Lumbar Spine Without Contrast (Final result)  Result time 03/17/25 01:33:05   Procedure changed from MRI Lumbar Spine With Contrast     Final result by Thomas Head MD (03/17/25 01:33:05)                   Impression:      Lumbar spondylosis most advanced  at L4-L5 with up to moderate neural foraminal narrowing and mild spinal canal stenosis at multiple levels.  Findings are overall stable compared to prior MRI.    New edema signal in the soft tissues at the level of the right L3-L4 and L4-L5 facets suggesting an infectious/inflammatory process.  This is especially prominent at the L4-5 facet in the L4 component with bone marrow edema.  Correlate for septic facet joint and osteomyelitis.    This report was flagged in Epic as abnormal.    Electronically signed by resident: Casper Torres  Date:    03/17/2025  Time:    01:02    Electronically signed by: Thomas Head  Date:    03/17/2025  Time:    01:33               Narrative:    EXAMINATION:  MRI LUMBAR SPINE WITHOUT CONTRAST    CLINICAL HISTORY:  Motor neuron disease suspected;    TECHNIQUE:  Multiplanar, multisequence MR images were acquired from the thoracolumbar junction to the sacrum without contrast.    COMPARISON:  MRI lumbar spine 05/29/2024    FINDINGS:  Alignment: Normal.    Vertebrae: No fracture or marrow infiltrative process. Vertebral body heights are maintained.    Discs: Mild multilevel disc height loss and desiccation.    Cord: Conus terminates at L1-L2 and appears unremarkable.  Cauda equina appears unremarkable.    Degenerative findings:    T12-L1: No spinal canal stenosis or neuroforaminal narrowing.    L1-L2: Mild facet arthropathy and ligamentum flavum hypertrophy.  No spinal canal stenosis or neuroforaminal narrowing.    L2-L3: Small circumferential disc bulge, moderate bilateral facet arthropathy and ligamentum flavum hypertrophy.  Mild spinal canal stenosis.  Mild bilateral neuroforaminal narrowing.    L3-L4: Circumferential disc bulge, moderate bilateral facet arthropathy, and ligamentum flavum hypertrophy.  Mild spinal canal stenosis.  Mild bilateral neural foraminal narrowing.    L4-L5: Circumferential disc bulge, severe right, moderate left facet arthropathy, and ligamentum flavum  hypertrophy.  Mild spinal canal stenosis.  Moderate right, mild left neural foraminal narrowing.    L5-S1: Circumferential disc bulge, moderate bilateral facet arthropathy.  No spinal canal stenosis.  Mild left neural foraminal narrowing.    Paraspinal muscles & soft tissues: STIR hyperintense signal along the right L3-L4 and L4-L5 facets.  Atrophic right kidney.  More over other appears to be bone marrow edema in the right facet L4-5 mainly in the L4 component without discrete fluid in the facet joint.                                       X-Ray Chest AP Portable (Final result)  Result time 03/16/25 22:53:37      Final result by Thomas Head MD (03/16/25 22:53:37)                   Impression:      No acute findings evident the chest.      Electronically signed by: Thomas Head  Date:    03/16/2025  Time:    22:53               Narrative:    EXAMINATION:  XR CHEST AP PORTABLE    CLINICAL HISTORY:  Dorsalgia, unspecified    TECHNIQUE:  Single frontal view of the chest was performed.    COMPARISON:  None    FINDINGS:  Heart is not enlarged the trachea is midline.  Loop recorder is noted.  Lungs are clear.  Degenerative changes asymmetric in the right shoulder.                                       X-Ray Hip 2 or 3 views Right with Pelvis when performed (Final result)  Result time 03/16/25 22:33:24      Final result by Thomas Head MD (03/16/25 22:33:24)                   Impression:      No acute findings of the prosthetic right hip to suggest fracture or loosening.      Electronically signed by: Thomas Head  Date:    03/16/2025  Time:    22:33               Narrative:    EXAMINATION:  XR HIP WITH PELVIS WHEN PERFORMED 2 OR 3 VIEWS RIGHT    CLINICAL HISTORY:  R hip pain;    TECHNIQUE:  AP view of the pelvis and frog leg lateral view of the right hip were performed.    COMPARISON:  Of 03/13/2025    FINDINGS:  Total right hip arthroplasty changes with surgical clips in the groin again noted.  No  acute fracture or dislocation or malalignment.    The pelvic ring remains intact.  Mixed sclerotic and lucent changes without collapse of the femoral head on the left is stable.                                       I reviewed all labs, imaging, EKGs.     Plan   Upgrade to hospital medicine with concern for L4-5 facet septic arthritis. Will hold on antibiotic until NSGY evaluation.     I have discussed this case with DAVID Herrera.       Jena Crawford MD  Department of Hospital Medicine  [facility] - Emergency Dept

## 2025-03-17 NOTE — ASSESSMENT & PLAN NOTE
Pain management  Continue home meds.   Home meds:  Robaxan , oxycodone 10 , capasix, lidoderm.     Scheduled:    PRN:  Oxycod 10 q 6  Morhine 4 mg IV   Robaxan

## 2025-03-17 NOTE — FIRST PROVIDER EVALUATION
" Emergency Department TeleTriage Encounter Note      CHIEF COMPLAINT    Chief Complaint   Patient presents with    Hip Pain     Spasms to hip for 2 weeks,  hip replacement 2016       VITAL SIGNS   Initial Vitals [03/16/25 1652]   BP Pulse Resp Temp SpO2   (!) 136/58 83 18 97.7 °F (36.5 °C) 97 %      MAP       --            ALLERGIES    Review of patient's allergies indicates:   Allergen Reactions    Amlodipine Swelling    Lisinopril Tinitus and Swelling    Pregabalin Swelling    Prednisone Palpitations    Azithromycin      Muscles tense up    Levetiracetam Other (See Comments)    Acetaminophen Rash     Feels like needles are sticking him per pt       PROVIDER TRIAGE NOTE  This is a teletriage evaluation of a 74 y.o. male presenting to the ED complaining of "spasm" to right hip for three months. No trauma. Recently had xrays of hips. Pmhx of hip replacement.    Alert, no distress.     Initial orders will be placed and care will be transferred to an alternate provider when patient is roomed for a full evaluation. Any additional orders and the final disposition will be determined by that provider.         ORDERS  Labs Reviewed - No data to display    ED Orders (720h ago, onward)      None              Virtual Visit Note: The provider triage portion of this emergency department evaluation and documentation was performed via [employer], a HIPAA-compliant telemedicine application, in concert with a tele-presenter in the room. A face to face patient evaluation with one of my colleagues will occur once the patient is placed in an emergency department room.      DISCLAIMER: This note was prepared with [employer] voice recognition transcription software. Garbled syntax, mangled pronouns, and other bizarre constructions may be attributed to that software system.    "

## 2025-03-17 NOTE — ASSESSMENT & PLAN NOTE
"Patient's FSGs are controlled on current medication regimen.  Home meds : seiglutide  Holding home meds  - Working to optimize BG control  - SSI provided for corrective dosing  - POCT glucose checks as ordered  - Hypoglycemic protocol in effect  - Diabetic diet provided    Last A1c reviewed-   Lab Results   Component Value Date    HGBA1C 6.7 (H) 12/21/2022     Most recent fingerstick glucose reviewed- No results for input(s): "POCTGLUCOSE" in the last 24 hours.  Current correctional scale  Low  Maintain anti-hyperglycemic dose as follows-   Antihyperglycemics (From admission, onward)      Start     Stop Route Frequency Ordered    03/16/25 2248  insulin aspart U-100 pen 0-5 Units         -- SubQ Before meals & nightly PRN 03/16/25 2148          Hold Oral hypoglycemics while patient is in the hospital.  "

## 2025-03-17 NOTE — SUBJECTIVE & OBJECTIVE
Past Medical History:   Diagnosis Date    Arthritis     Asthma     chronic    CKD (chronic kidney disease), stage III     patient denies    Deep vein thrombosis     Demyelinating neuropathy     polyneuropathy    Diabetes mellitus type 2 without retinopathy 12/14/2022    Gout     History of blood clots 2013    lower part of both legs    HLD (hyperlipidemia)     Hypertension     Iron deficiency anemia     BUZZ (obstructive sleep apnea)     CPAP    Pulmonary embolism        Past Surgical History:   Procedure Laterality Date    COLONOSCOPY N/A 11/07/2016    Procedure: COLONOSCOPY;  Surgeon: Shar Weiss MD;  Location: [facility] ENDO (4TH FLR);  Service: Endoscopy;  Laterality: N/A;  [facility] Referral. Okay to hold Coumadin 5 days prior to procedure. See Referral scaned in media tab on 10/20/16.    COLONOSCOPY N/A 09/21/2017    Procedure: COLONOSCOPY;  Surgeon: Stuart Trinidad MD;  Location: [facility] ENDO (4TH FLR);  Service: Endoscopy;  Laterality: N/A;  [facility] Olivarez-see scanned docs under media tab          9/6/17-current [facility] authorization and last clinic visit scanned into chart    EPIDURAL STEROID INJECTION N/A 12/17/2020    Procedure: INJECTION, STEROID, EPIDURAL, L5-S1 clear to hold Xarelto 2 days;  Surgeon: Moy Vasquez MD;  Location: [facility] PAIN MGT;  Service: Pain Management;  Laterality: N/A;    EXCISION OF LESION OF FEMUR Right 01/04/2023    Procedure: EXCISION, LESION, FEMUR: POSTERIOR APPROACH;  Surgeon: Prieto Sinha MD;  [facility];  Service: Orthopedics;  Laterality: Right;    HIP SURGERY Right 2008    exploratory at [facility]    HIP SURGERY Right 08/31/2015    THR    INJECTION OF ANESTHETIC AGENT AROUND NERVE Right 10/23/2019    Procedure: BLOCK, NERVE, Obturator and Femoral cleared to hold xarelto 3 days pt. said he's not taking coumadin;  Surgeon: Moy Vasquez MD;  Location: [facility] PAIN MGT;  Service: Pain Management;  Laterality: Right;    INJECTION OF ANESTHETIC  AGENT AROUND NERVE Right 07/06/2020    Procedure: BLOCK, NERVE, RIGHT MBB L3,L5,L5;  Surgeon: Moy Vasquez MD;  Location: [facility] PAIN MGT;  Service: Pain Management;  Laterality: Right;  BLOCK, NERVE, RIGHT MBB L3,L5,L5    INJECTION OF ANESTHETIC AGENT AROUND NERVE Right 07/30/2020    Procedure: BLOCK, NERVE RIGHT L3, L4, L5 2ND;  Surgeon: Moy Vasquez MD;  Location: [facility] PAIN MGT;  Service: Pain Management;  Laterality: Right;  NEEDS CONSENT, XARELTO    LEG SURGERY Left 2012    fibula and tibia    RADIOFREQUENCY ABLATION Right 10/05/2020    Procedure: RADIOFREQUENCY ABLATION RIGHT L3,4,5;  Surgeon: Moy Vasquez MD;  Location: [facility] PAIN MGT;  Service: Pain Management;  Laterality: Right;  RADIOFREQUENCY ABLATION RIGHT L3,4,5  ok to hold xarelto, scanned in Media    SHOULDER SURGERY Right 1989    torn ligament    TRANSFORAMINAL EPIDURAL INJECTION OF STEROID Bilateral 12/04/2023    Procedure: LUMBAR TRANSFORAMINAL BILATERAL L4/5 DIRECT REFERRAL;  Surgeon: Moy Vasquez MD;  Location: [facility] PAIN MGT;  Service: Pain Management;  Laterality: Bilateral;    TRANSFORAMINAL EPIDURAL INJECTION OF STEROID Right 11/21/2024    Procedure: LUMBAR TRANSFORAMINAL RIGHT L1/2 AND L2/3 please add xarelto to pt's med list when admitted 11/21;  Surgeon: Moy Vasquez MD;  Location: [facility] PAIN MGT;  Service: Pain Management;  Laterality: Right;  [phone]  2 WK F/U ELEAZAR       Review of patient's allergies indicates:   Allergen Reactions    Amlodipine Swelling    Lisinopril Tinitus and Swelling    Pregabalin Swelling    Prednisone Palpitations    Azithromycin      Muscles tense up    Levetiracetam Other (See Comments)    Acetaminophen Rash     Feels like needles are sticking him per pt       Current Facility-Administered Medications   Medication    albuterol-ipratropium 2.5 mg-0.5 mg/3 mL nebulizer solution 3 mL    atorvastatin tablet 80 mg    carvediloL tablet 25 mg    dextrose 50% injection 12.5 g    dextrose 50% injection 25 g     finasteride tablet 5 mg    glucagon (human recombinant) injection 1 mg    glucose chewable tablet 16 g    glucose chewable tablet 24 g    insulin aspart U-100 pen 0-5 Units    latanoprost 0.005 % ophthalmic solution 1 drop    melatonin tablet 6 mg    methocarbamoL tablet 750 mg    morphine injection 4 mg    oxyCODONE immediate release tablet Tab 10 mg    polyethylene glycol packet 17 g    rivaroxaban tablet 20 mg     Current Outpatient Medications   Medication Sig    albuterol 90 mcg/actuation inhaler Inhale 2 puffs into the lungs every 6 (six) hours as needed for Wheezing.     atorvastatin (LIPITOR) 80 MG tablet Take 80 mg by mouth once daily. Take one-half tablet daily    capsaicin 0.1 % Crea Apply twice to area of maximum paresthesia    carboxymethyl-[id number]-EA 0.5-1-0.5 % Dpet Place 1 drop into both eyes 4 (four) times daily.    carboxymethylcellulose sodium 1 % DpGe Place 0.4 mLs into both eyes 4 (four) times daily as needed.    carvediloL (COREG) 25 MG tablet Take 1 tablet (25 mg total) by mouth 2 (two) times daily.    chlorhexidine (PERIDEX) 0.12 % solution RINSE 1 CAPFUL BY MOUTH ONCE DAILY AS NEEDED SWISH FOR 30 SECONDS AFTER,AND SPIT OUT    chlorthalidone (HYGROTEN) 25 MG Tab Take 25 mg by mouth once daily.    CLOBETASOL PROPIONATE, BULK, MISC Apply topically once daily.    cyanocobalamin (VITAMIN B-12) 100 MCG tablet Take 100 mcg by mouth once daily. Take two tablets    cycloSPORINE (RESTASIS) 0.05 % ophthalmic emulsion INSTILL 0.4ML IN EACH EYE EVERY 12 HOURS    eplerenone (INSPRA) 25 MG Tab Take 1 tablet by mouth once daily.    ergocalciferol (ERGOCALCIFEROL) 50,000 unit Cap Take 50,000 Units by mouth.    finasteride (PROSCAR) 5 mg tablet TAKE ONE TABLET BY MOUTH ONCE DAILY FOR PROSTATE    hydrALAZINE (APRESOLINE) 50 MG tablet TAKE ONE TABLET BY MOUTH THREE TIMES A DAY FOR HEART/BLOOD PRESSURE    ketoconazole (NIZORAL) 2 % cream Apply topically 2 (two) times daily.    latanoprost 0.005 % ophthalmic  solution Place 1 drop into both eyes every evening.    lidocaine (LIDODERM) 5 % Place 1 patch onto the skin once daily. Wear for 12 hours, then remove. Do not apply a new patch for at least 12 hours.    methocarbamoL (ROBAXIN) 750 MG Tab Take 1 tablet (750 mg total) by mouth every 8 (eight) hours as needed (muscle pain/spasm).    naloxone (NARCAN) 4 mg/actuation Spry as needed.    omega-3/dha/epa/fish oil (OMEGA-3 FISH OIL ORAL) 1,000 mg once daily.    oxyCODONE (ROXICODONE) 10 mg Tab immediate release tablet Take 1 tablet (10 mg total) by mouth every 4 (four) hours as needed for Pain.    rivaroxaban (XARELTO ORAL) Take by mouth.    semaglutide (OZEMPIC) 2 mg/dose (8 mg/3 mL) PnIj Inject 2 mg into the skin every 7 days.    thiamine 100 MG tablet Take 0.5 tablets (50 mg total) by mouth once daily.    valsartan (DIOVAN) 320 MG tablet Take 320 mg by mouth.     Family History       Problem Relation (Age of Onset)    Cancer Mother, Father, Sister, Sister    No Known Problems Brother, Daughter, Daughter, Daughter, Son, Son          Tobacco Use    Smoking status: Former     Types: Cigars     Quit date: 8/13/2009     Years since quitting: 15.6    Smokeless tobacco: Never   Substance and Sexual Activity    Alcohol use: No     Alcohol/week: 0.0 standard drinks of alcohol    Drug use: No    Sexual activity: Not Currently     ROS  Constitutional: negative for fevers or chills  Eyes: negative visual changes or eye discharge  ENT: negative for ear pain or sore throat  Respiratory: negative for shortness of breath or cough  Cardiovascular: negative for chest pain or palpitations  Gastrointestinal: negative for abdominal pain, nausea, or vomiting  Genitourinary: negative for dysuria and flank pain  Neurological: negative for headaches or dizziness  Musculoskeletal: positive for right low back pain     Objective:     Vital Signs (Most Recent):  Temp: 98.3 °F (36.8 °C) (03/17/25 0426)  Pulse: (!) 53 (03/17/25 0426)  Resp: 18  "(03/17/25 0520)  BP: (!) 165/91 (03/17/25 0426)  SpO2: 96 % (03/17/25 0426) Vital Signs (24h Range):  Temp:  [97.7 °F (36.5 °C)-98.4 °F (36.9 °C)] 98.3 °F (36.8 °C)  Pulse:  [53-83] 53  Resp:  [16-18] 18  SpO2:  [96 %-98 %] 96 %  BP: (131-165)/(58-91) 165/91     Weight: 100.2 kg (221 lb)  Height: 6' 1" (185.4 cm)  Body mass index is 29.16 kg/m².    No intake or output data in the 24 hours ending 03/17/25 0556     Ortho/SPM Exam  General:  He is lying in bed on his left side and appears to be in pain.  Neuro: alert and oriented x3  Psych: normal mood  Head: normocephalic, atraumatic.  Eyes: no scleral icterus  Mouth: moist mucous membranes  CV: extremities warm and well perfused  Pulm: breathing comfortably, equal chest rise bilat  Skin: clean, dry, intact (any exceptions noted in below musculoskeletal exam)    MSK:    RUE:  - Skin intact throughout, no open wounds  - No swelling  - No ecchymosis, erythema, or signs of cellulitis  - NonTTP throughout  - AROM and PROM of the shoulder, elbow, wrist, and hand intact without pain  - Axillary/AIN/PIN/Radial/Median/Ulnar Nerves assessed in isolation without deficit  - SILT throughout  - Compartments soft  - Radial artery palpated   - Capillary Refill <3s    LUE:  - Skin intact throughout, no open wounds  - No swelling  - No ecchymosis, erythema, or signs of cellulitis  - NonTTP throughout  - AROM and PROM of the shoulder, elbow, wrist, and hand intact without pain  - Axillary/AIN/PIN/Radial/Median/Ulnar Nerves assessed in isolation without deficit  - SILT throughout  - Compartments soft  - Radial artery palpated   - Capillary Refill <3s    RLE:  - Skin intact throughout, no open wounds.   - No swelling  - No ecchymosis, erythema, or signs of cellulitis  - NonTTP of the anterior hip.  He is nontender to palpation over the greater trochanter.  He is nontender to the patient along the proximal, middle, and distal aspects of the femur.  - He has full passive range of motion of " the right hip.  With the passive range of motion, pain is elicited in the right low back beyond 90° of hip flexion.  He has 20° external rotation, 10° of internal rotation of the hip without pain.  He has 20° of abduction of the hip which does elicit pain to the right low back; 20° adduction of the right hip without pain.  - He has pain in the right low back with active range of motion of the hip and knee.  - AROM and PROM of the ankle, and foot intact without pain  - TA/EHL/Gastroc/FHL assessed in isolation without deficit  - Sensation is intact to light touch throughout; however, he has slightly diminished sensation to the right lower extremity as compared to the contralateral side.  He states that this is his baseline given his history of diabetic neuropathy.  - 3/5 strength with hip flexion which is limited secondary to pain in the right low back.  Otherwise, 5/5 strength throughout  - Compartments soft and compressible  - no palpable pedal pulses  - Negative Log roll  - No pain with axial loading     LLE:  - Skin intact throughout, no open wounds  - No swelling  - No ecchymosis, erythema, or signs of cellulitis  - NonTTP throughout  - He has full active and passive range of motion of the left hip.  With active range of motion of the left hip, he reports that he has pain in the right low back.  - AROM and PROM of the knee, ankle, and foot intact without pain  - TA/EHL/Gastroc/FHL assessed in isolation without deficit  - SILT throughout   - 5/5 strength throughout  - Compartments soft  - DP and PT palpated  - Capillary Refill <3s  - Negative Log roll  - No pain with axial loading     Spine/pelvis/axial body:  No tenderness to palpation of cervical, thoracic, or lumbar spine  No pain with compression of pelvis      Significant Labs:   Recent Lab Results         03/17/25  0506   03/17/25  0331   03/16/25  2242   03/16/25 2023        Procalcitonin       0.07  Comment: A concentration < 0.25 ng/mL represents a low  risk of bacterial   infection.  Procalcitonin may not be accurate among patients with localized   infection, recent trauma or major surgery, immunosuppressed state,   invasive fungal infection, renal dysfunction. Decisions regarding   initiation or continuation of antibiotic therapy should not be based   solely on procalcitonin levels.         Albumin       3.4       ALP       133       ALT       12       Anion Gap 11       12       Appearance, UA     Clear         AST       14       Baso #   0.02     0.03       Basophil %   0.3     0.4       Bilirubin (UA)     Negative         BILIRUBIN TOTAL       0.3  Comment: For infants and newborns, interpretation of results should be based  on gestational age, weight and in agreement with clinical  observations.    Premature Infant recommended reference ranges:  Up to 24 hours.............<8.0 mg/dL  Up to 48 hours............<12.0 mg/dL  3-5 days..................<15.0 mg/dL  6-29 days.................<15.0 mg/dL         BUN 33       36       Calcium 7.7       9.1       Chloride 109       104       CO2 21       25       Color, UA     Yellow         Creatinine 2.3       2.9       CRP       21.5       Differential Method   Automated     Automated       eGFR 29.3       22.1       Eos #   0.4     0.4       Eos %   5.9     6.1       Glucose 102       93       Glucose, UA     Negative         Gran # (ANC)   3.2     3.6       Gran %   47.6     50.3       Hematocrit   34.1     40.7       Hemoglobin   11.0     12.6       Immature Grans (Abs)   0.03  Comment: Mild elevation in immature granulocytes is non specific and   can be seen in a variety of conditions including stress response,   acute inflammation, trauma and pregnancy. Correlation with other   laboratory and clinical findings is essential.       0.01  Comment: Mild elevation in immature granulocytes is non specific and   can be seen in a variety of conditions including stress response,   acute inflammation, trauma and  pregnancy. Correlation with other   laboratory and clinical findings is essential.         Immature Granulocytes   0.4     0.1       Ketones, UA     Negative         Leukocyte Esterase, UA     Negative         Lymph #   2.5     2.6       Lymph %   36.8     36.0       Magnesium        1.8       MCH   32.3     32.3       MCHC   32.3     31.0       MCV   100     104       Mono #   0.6     0.5       Mono %   9.0     7.1       MPV   9.3     9.0       NITRITE UA     Negative         nRBC   0     0       Blood, UA     Negative         pH, UA     7.0         Platelet Count   273     329       Potassium 3.7       3.9       PROTEIN TOTAL       7.6       Protein, UA     Negative  Comment: Recommend a 24 hour urine protein or a urine   protein/creatinine ratio if globulin induced proteinuria is  clinically suspected.           RBC   3.41     3.90       RDW   13.3     13.3       Sed Rate       82       Sodium 141       141       Spec Grav UA     1.010         Specimen UA     Urine, Clean Catch         WBC   6.80     7.20             All pertinent labs within the past 24 hours have been reviewed.    Significant Imaging: X-Ray: I have reviewed all pertinent results/findings and my personal findings are:  XR R hip with R DHRUV without evidence of periprosthetic fx or dislocation. Stem appears well fixated and stable as compared to prior imaging   MRI: I have reviewed all pertinent results/findings and my personal findings are:  MRI of the R hip and L spine without evidence of significant joint effusion or SA taking into account artifact from hip prosthesis. No focal fluid collection or abscess of the hip. L spine MRI with edema in the R paraspinal musculature which directly correlates with patients are of focal tenderness. There is also signal intensity of the right L3-4 and L4-5 facet joints which may signify infection or reactive fluid from degenerative change.

## 2025-03-17 NOTE — ED NOTES
I-STAT Chem-8+ Results:   Value Reference Range   Sodium 143 136-145 mmol/L   Potassium  3 3.5-5.1 mmol/L   Chloride 111  mmol/L   Ionized Calcium 0.91 1.06-1.42 mmol/L   CO2 (measured) 21 23-29 mmol/L   Glucose 106  mg/dL   BUN 28 6-30 mg/dL   Creatinine 2.1 0.5-1.4 mg/dL   Hematocrit 28 36-54%

## 2025-03-17 NOTE — SUBJECTIVE & OBJECTIVE
Prescriptions Prior to Admission[1]    Review of patient's allergies indicates:   Allergen Reactions    Amlodipine Swelling    Lisinopril Tinitus and Swelling    Pregabalin Swelling    Prednisone Palpitations    Azithromycin      Muscles tense up    Levetiracetam Other (See Comments)    Acetaminophen Rash     Feels like needles are sticking him per pt       Past Medical History:   Diagnosis Date    Arthritis     Asthma     chronic    CKD (chronic kidney disease), stage III     patient denies    Deep vein thrombosis     Demyelinating neuropathy     polyneuropathy    Diabetes mellitus type 2 without retinopathy 12/14/2022    Gout     History of blood clots 2013    lower part of both legs    HLD (hyperlipidemia)     Hypertension     Iron deficiency anemia     BUZZ (obstructive sleep apnea)     CPAP    Pulmonary embolism      Past Surgical History:   Procedure Laterality Date    COLONOSCOPY N/A 11/07/2016    Procedure: COLONOSCOPY;  Surgeon: Shar Weiss MD;  [facility]  Service: Endoscopy;  Laterality: N/A;  [facility] Referral. Okay to hold Coumadin 5 days prior to procedure. See Referral scaned in media tab on 10/20/16.    COLONOSCOPY N/A 09/21/2017    Procedure: COLONOSCOPY;  Surgeon: Stuart Trinidad MD;  Location: [facility] (4TH FLR);  Service: Endoscopy;  Laterality: N/A;  [facility] Olivarez-see scanned docs under media tab          9/6/17-current [facility] authorization and last clinic visit scanned into chart    EPIDURAL STEROID INJECTION N/A 12/17/2020    Procedure: INJECTION, STEROID, EPIDURAL, L5-S1 clear to hold Xarelto 2 days;  Surgeon: Moy Vasquez MD;  Location: [facility] PAIN MGT;  Service: Pain Management;  Laterality: N/A;    EXCISION OF LESION OF FEMUR Right 01/04/2023    Procedure: EXCISION, LESION, FEMUR: POSTERIOR APPROACH;  Surgeon: Prieto Sinha MD;  [facility];  Service: Orthopedics;  Laterality: Right;    HIP SURGERY Right 2008    exploratory  at [facility]    HIP SURGERY Right 08/31/2015    THR    INJECTION OF ANESTHETIC AGENT AROUND NERVE Right 10/23/2019    Procedure: BLOCK, NERVE, Obturator and Femoral cleared to hold xarelto 3 days pt. said he's not taking coumadin;  Surgeon: Moy Vasquez MD;  Location: [facility] PAIN MGT;  Service: Pain Management;  Laterality: Right;    INJECTION OF ANESTHETIC AGENT AROUND NERVE Right 07/06/2020    Procedure: BLOCK, NERVE, RIGHT MBB L3,L5,L5;  Surgeon: Moy Vasquez MD;  Location: [facility] PAIN MGT;  Service: Pain Management;  Laterality: Right;  BLOCK, NERVE, RIGHT MBB L3,L5,L5    INJECTION OF ANESTHETIC AGENT AROUND NERVE Right 07/30/2020    Procedure: BLOCK, NERVE RIGHT L3, L4, L5 2ND;  Surgeon: Moy Vasquez MD;  Location: [facility] PAIN MGT;  Service: Pain Management;  Laterality: Right;  NEEDS CONSENT, XARELTO    LEG SURGERY Left 2012    fibula and tibia    RADIOFREQUENCY ABLATION Right 10/05/2020    Procedure: RADIOFREQUENCY ABLATION RIGHT L3,4,5;  Surgeon: Moy Vasquez MD;  Location: [facility] PAIN MGT;  Service: Pain Management;  Laterality: Right;  RADIOFREQUENCY ABLATION RIGHT L3,4,5  ok to hold xarelto, scanned in Media    SHOULDER SURGERY Right 1989    torn ligament    TRANSFORAMINAL EPIDURAL INJECTION OF STEROID Bilateral 12/04/2023    Procedure: LUMBAR TRANSFORAMINAL BILATERAL L4/5 DIRECT REFERRAL;  Surgeon: Moy Vasquez MD;  Location: [facility] PAIN MGT;  Service: Pain Management;  Laterality: Bilateral;    TRANSFORAMINAL EPIDURAL INJECTION OF STEROID Right 11/21/2024    Procedure: LUMBAR TRANSFORAMINAL RIGHT L1/2 AND L2/3 please add xarelto to pt's med list when admitted 11/21;  Surgeon: Moy Vasquez MD;  Location: [facility] PAIN MGT;  Service: Pain Management;  Laterality: Right;  [phone]  2 WK F/U ELEAZAR     Family History       Problem Relation (Age of Onset)    Cancer Mother, Father, Sister, Sister    No Known Problems Brother, Daughter, Daughter, Daughter, Son, Son          Tobacco Use    Smoking status: Former     Types:  Cigars     Quit date: 8/13/2009     Years since quitting: 15.6    Smokeless tobacco: Never   Substance and Sexual Activity    Alcohol use: No     Alcohol/week: 0.0 standard drinks of alcohol    Drug use: No    Sexual activity: Not Currently     Review of Systems  Objective:     Weight: 100.2 kg (221 lb)  Body mass index is 29.16 kg/m².  Vital Signs (Most Recent):  Temp: 98.6 °F (37 °C) (03/17/25 0746)  Pulse: 61 (03/17/25 0746)  Resp: 16 (03/17/25 0746)  BP: (!) 145/65 (03/17/25 0746)  SpO2: 97 % (03/17/25 0746) Vital Signs (24h Range):  Temp:  [97.7 °F (36.5 °C)-98.6 °F (37 °C)] 98.6 °F (37 °C)  Pulse:  [53-83] 61  Resp:  [16-18] 16  SpO2:  [96 %-98 %] 97 %  BP: (131-165)/(58-91) 145/65                                 Physical Exam         Neurosurgery Physical Exam    Neurosurgery Physical Exam 03/17/2025       General: well developed, well nourished, no acute distress  HEENT: normocephalic, atraumatic  CV: regular rate   Pulmonary: normal respirations, no signs of respiratory distress  Abdomen: soft, non-distended, not tender to palpation  Skin: unremarkable  Heme: no bruising     Neuro:   Mental Status: AO x4  CN II-XII intact  Sensory: intact to light touch throughout  Motor:    Strength   Deltoids Biceps Triceps Wrist Extension Wrist Flexion    Upper: R 5/5 5/5 5/5 5/5 5/5 5/5     L 5/5 5/5 5/5 5/5 5/5 5/5       Hip Flex Knee Ext Knee Flex DF PF EHL   Lower: R 5/5 5/5 5/5 5/5 5/5 5/5     L 5/5 5/5 5/5 5/5 5/5 5/5   Reflexes: -Kathleen's, -Babinski, no clonus. Patellar: 2+ bilaterally   Gait: deferred  Back: no tenderness to palpation along spinous processes  Rectal: tone intact     Right lower back tenderness, no fluctuance noticed.    Significant Labs:  Recent Labs   Lab 03/16/25 2023 03/17/25  0506   GLU 93 102    141   K 3.9 3.7    109   CO2 25 21*   BUN 36* 33*   CREATININE 2.9* 2.3*   CALCIUM 9.1 7.7*   MG 1.8  --      Recent Labs   Lab 03/16/25 2023 03/17/25  0331   WBC 7.20 6.80   HGB  "12.6* 11.0*   HCT 40.7 34.1*    273     No results for input(s): "LABPT", "INR", "APTT" in the last 48 hours.  Microbiology Results (last 7 days)       ** No results found for the last 168 hours. **          All pertinent labs from the last 24 hours have been reviewed.    Significant Diagnostics:  I have reviewed all pertinent imaging results/findings within the past 24 hours.       [1] (Not in a hospital admission)    "

## 2025-03-17 NOTE — ASSESSMENT & PLAN NOTE
This patient has long term use on an anticoagulant with Select Anticoagulant(s): rivaroxaban . Their long term anticoagulation will be Held or Continued: held. They are on long term anticoagulation due to Reason for Anticoagulation: DVT/PE.     - holding rivaxabaran 20 mg daily until NS eval. Pt may need precedure to drain spinal abscess.  Pt has not had clotting issues for years. He understands risk of recurrence. Plan to resume after procedure. IR consulted for aspiration/ biopsy.

## 2025-03-17 NOTE — ASSESSMENT & PLAN NOTE
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   Hx of DJD spine, lumbar radiculopathy    Admit to Inpatient  wbc 6.8, Hb 11, CRP 21, cr 2.9-> 2.3,    MRI noted: edema in the right paraspinal musculature and potential R L4-5 facet septic arthritis.  Appreciate Ortho consult in the ED.  Neurosurgery Consulted. May need surgical debridement or IR aspiration  Cultures  Will need IV antibiotics, long term.  Holding antibiotics to maximize yield from cultures  Consult IR for aspiration/ biopsy. Cultures ordered for routine, aerobic, anaerobic, AFB and fungi.   ID consult placed.

## 2025-03-17 NOTE — CONSULTS
"[facility] - Emergency Dept  Neurosurgery  Consult Note    Inpatient consult to Neurosurgery  Consult performed by: Austin Hatfield MD  Consult ordered by: Daniel Baum MD        Subjective:     Chief Complaint/Reason for Admission: lower back pain    History of Present Illness: Macario Dior Jr. is a 74 y.o. male with PMH of asthma, arthritis, CKD3, DM, DVT, PE on Xarelto, gout, HTN, BUZZ, R hip replacement 2016 presenting to the ED with the chief complaint of severe R lower back pain for 4 months. Also reports acute on chronic R hip pain for the past 3 months but really worsening over the last 9 days. Normally uses a walker to get around but now not able to bear any weight on his right leg. Reports having severe "stabbing" pain above his R hip whenever he puts weight onto his R leg. He also reports bilateral feet numbness at his baseline due to "neuropathy (unknown type)". He has previously seen pain management and orthopedist. Recently saw Dr. Loyola in clinic on 3/13/25 who ordered labs that showed ESR 71 and CRP 12.7 which were previously normal. Additionally XR pelvis and hips showed stable R hip arthroplasty. AVN findings of left hip. He has been taking Robaxin for muscle spasms. Denies fever, saddle anesthesia, or changes in bowel/bladder functions. No skin changes or rash. No falls or trauma. MRI Lsp 3/17 showed right L3-4, L4-5 soft tissue edema signal, c/o septic facet joint and osteo. NSGY was consulted for further evaluation and management.    Prescriptions Prior to Admission[1]    Review of patient's allergies indicates:   Allergen Reactions    Amlodipine Swelling    Lisinopril Tinitus and Swelling    Pregabalin Swelling    Prednisone Palpitations    Azithromycin      Muscles tense up    Levetiracetam Other (See Comments)    Acetaminophen Rash     Feels like needles are sticking him per pt       Past Medical History:   Diagnosis Date    Arthritis     Asthma     chronic    CKD (chronic kidney " disease), stage III     patient denies    Deep vein thrombosis     Demyelinating neuropathy     polyneuropathy    Diabetes mellitus type 2 without retinopathy 12/14/2022    Gout     History of blood clots 2013    lower part of both legs    HLD (hyperlipidemia)     Hypertension     Iron deficiency anemia     BUZZ (obstructive sleep apnea)     CPAP    Pulmonary embolism      Past Surgical History:   Procedure Laterality Date    COLONOSCOPY N/A 11/07/2016    Procedure: COLONOSCOPY;  Surgeon: Shar Weiss MD;  Location: [facility] (4TH FLR);  Service: Endoscopy;  Laterality: N/A;  [facility] Referral. Okay to hold Coumadin 5 days prior to procedure. See Referral scaned in media tab on 10/20/16.    COLONOSCOPY N/A 09/21/2017    Procedure: COLONOSCOPY;  Surgeon: Stuart Trinidad MD;  Location: [facility] (4TH FLR);  Service: Endoscopy;  Laterality: N/A;  [facility]. Olivarez-see scanned docs under media tab          9/6/17-current [facility] authorization and last clinic visit scanned into chart    EPIDURAL STEROID INJECTION N/A 12/17/2020    Procedure: INJECTION, STEROID, EPIDURAL, L5-S1 clear to hold Xarelto 2 days;  Surgeon: Moy Vasquez MD;  Location: [facility] PAIN MGT;  Service: Pain Management;  Laterality: N/A;    EXCISION OF LESION OF FEMUR Right 01/04/2023    Procedure: EXCISION, LESION, FEMUR: POSTERIOR APPROACH;  Surgeon: Prieto Sinha MD;  Location: [facility] OR 2ND FLR;  Service: Orthopedics;  Laterality: Right;    HIP SURGERY Right 2008    exploratory at [facility]    HIP SURGERY Right 08/31/2015    THR    INJECTION OF ANESTHETIC AGENT AROUND NERVE Right 10/23/2019    Procedure: BLOCK, NERVE, Obturator and Femoral cleared to hold xarelto 3 days pt. said he's not taking coumadin;  Surgeon: Moy Vasquez MD;  Location: [facility] PAIN MGT;  Service: Pain Management;  Laterality: Right;    INJECTION OF ANESTHETIC AGENT AROUND NERVE Right 07/06/2020    Procedure: BLOCK, NERVE, RIGHT MBB L3,L5,L5;  Surgeon: Moy  MD Christina;  Location: [facility] PAIN MGT;  Service: Pain Management;  Laterality: Right;  BLOCK, NERVE, RIGHT MBB L3,L5,L5    INJECTION OF ANESTHETIC AGENT AROUND NERVE Right 07/30/2020    Procedure: BLOCK, NERVE RIGHT L3, L4, L5 2ND;  Surgeon: Moy Vasquez MD;  Location: [facility] PAIN MGT;  Service: Pain Management;  Laterality: Right;  NEEDS CONSENT, XARELTO    LEG SURGERY Left 2012    fibula and tibia    RADIOFREQUENCY ABLATION Right 10/05/2020    Procedure: RADIOFREQUENCY ABLATION RIGHT L3,4,5;  Surgeon: Moy Vasquez MD;  Location: [facility] PAIN MGT;  Service: Pain Management;  Laterality: Right;  RADIOFREQUENCY ABLATION RIGHT L3,4,5  ok to hold xarelto, scanned in Media    SHOULDER SURGERY Right 1989    torn ligament    TRANSFORAMINAL EPIDURAL INJECTION OF STEROID Bilateral 12/04/2023    Procedure: LUMBAR TRANSFORAMINAL BILATERAL L4/5 DIRECT REFERRAL;  Surgeon: Moy Vasquez MD;  Location: [facility] PAIN MGT;  Service: Pain Management;  Laterality: Bilateral;    TRANSFORAMINAL EPIDURAL INJECTION OF STEROID Right 11/21/2024    Procedure: LUMBAR TRANSFORAMINAL RIGHT L1/2 AND L2/3 please add xarelto to pt's med list when admitted 11/21;  Surgeon: Moy Vasquez MD;  Location: [facility] PAIN MGT;  Service: Pain Management;  Laterality: Right;  [phone]  2 WK F/U ELEAZAR     Family History       Problem Relation (Age of Onset)    Cancer Mother, Father, Sister, Sister    No Known Problems Brother, Daughter, Daughter, Daughter, Son, Son          Tobacco Use    Smoking status: Former     Types: Cigars     Quit date: 8/13/2009     Years since quitting: 15.6    Smokeless tobacco: Never   Substance and Sexual Activity    Alcohol use: No     Alcohol/week: 0.0 standard drinks of alcohol    Drug use: No    Sexual activity: Not Currently     Review of Systems  Objective:     Weight: 100.2 kg (221 lb)  Body mass index is 29.16 kg/m².  Vital Signs (Most Recent):  Temp: 98.6 °F (37 °C) (03/17/25 0746)  Pulse: 61 (03/17/25 0746)  Resp: 16 (03/17/25  "0746)  BP: (!) 145/65 (03/17/25 0746)  SpO2: 97 % (03/17/25 0746) Vital Signs (24h Range):  Temp:  [97.7 °F (36.5 °C)-98.6 °F (37 °C)] 98.6 °F (37 °C)  Pulse:  [53-83] 61  Resp:  [16-18] 16  SpO2:  [96 %-98 %] 97 %  BP: (131-165)/(58-91) 145/65                                 Physical Exam         Neurosurgery Physical Exam    Neurosurgery Physical Exam 03/17/2025       General: well developed, well nourished, no acute distress  HEENT: normocephalic, atraumatic  CV: regular rate   Pulmonary: normal respirations, no signs of respiratory distress  Abdomen: soft, non-distended, not tender to palpation  Skin: unremarkable  Heme: no bruising     Neuro:   Mental Status: AO x4  CN II-XII intact  Sensory: intact to light touch throughout  Motor:    Strength   Deltoids Biceps Triceps Wrist Extension Wrist Flexion    Upper: R 5/5 5/5 5/5 5/5 5/5 5/5     L 5/5 5/5 5/5 5/5 5/5 5/5       Hip Flex Knee Ext Knee Flex DF PF EHL   Lower: R 5/5 5/5 5/5 5/5 5/5 5/5     L 5/5 5/5 5/5 5/5 5/5 5/5   Reflexes: -Kathleen's, -Babinski, no clonus. Patellar: 2+ bilaterally   Gait: deferred  Back: no tenderness to palpation along spinous processes  Rectal: tone intact     Right lower back tenderness, no fluctuance noticed.    Significant Labs:  Recent Labs   Lab 03/16/25 2023 03/17/25  0506   GLU 93 102    141   K 3.9 3.7    109   CO2 25 21*   BUN 36* 33*   CREATININE 2.9* 2.3*   CALCIUM 9.1 7.7*   MG 1.8  --      Recent Labs   Lab 03/16/25 2023 03/17/25  0331   WBC 7.20 6.80   HGB 12.6* 11.0*   HCT 40.7 34.1*    273     No results for input(s): "LABPT", "INR", "APTT" in the last 48 hours.  Microbiology Results (last 7 days)       ** No results found for the last 168 hours. **          All pertinent labs from the last 24 hours have been reviewed.    Significant Diagnostics:  I have reviewed all pertinent imaging results/findings within the past 24 hours.    Assessment/Plan:     * Right low back pain  Macario Dior Jr. " "is a 74 y.o. male with PMH of asthma, arthritis, CKD3, DM, DVT, PE on Xarelto, gout, HTN, BUZZ, R hip replacement 2016 presenting to the ED with the chief complaint of severe R lower back pain for 4 months. Also reports acute on chronic R hip pain for the past 3 months but really worsening over the last 9 days. Normally uses a walker to get around but now not able to bear any weight on his right leg. Reports having severe "stabbing" pain above his R hip whenever he puts weight onto his R leg. He also reports bilateral feet numbness at his baseline due to "neuropathy (unknown type)". He has previously seen pain management and orthopedist. Recently saw Dr. Loyola in clinic on 3/13/25 who ordered labs that showed ESR 71 and CRP 12.7 which were previously normal. Additionally XR pelvis and hips showed stable R hip arthroplasty. AVN findings of left hip. He has been taking Robaxin for muscle spasms. Denies fever, saddle anesthesia, or changes in bowel/bladder functions. No skin changes or rash. No falls or trauma. MRI Lsp 3/17 showed right L3-4, L4-5 soft tissue edema signal, c/o septic facet joint and osteo. NSGY was consulted for further evaluation and management.    Imaging:  MRI Lsp 3/17: right L3-4, L4-5 soft tissue edema signal, c/o septic facet joint and osteo     Labs:  WBC6.8, procalcitonin wnl, CRP 21.5, ESR 82    Plan:  --Patient seen by NSGY at bedside  --All labs and imaging were reviewed  --No acute surgical intervention indicated at this time  --Admit to  for infectious workup     -q4h neurochecks on floor  --Recommend IR biopsy of the lumbar lesion  --Recommend blood Cx  --Recommend ID consult for antibiotic therapy  --Rest of care per ED  --Continue to monitor clinically, notify NSGY immediately with any changes in neuro status    Discussed with on call staff Dr. Jiang        Thank you for your consult. I will follow-up with patient. Please contact us if you have any additional questions.    Austin" MD Liu  Neurosurgery  [facility] - Emergency Dept       [1] (Not in a hospital admission)

## 2025-03-17 NOTE — PT/OT/SLP EVAL
Physical Therapy Co-Evaluation and Co-Treatment    Patient Name:  Macario Dior Jr.   MRN:  [id number]    Co-evaluation and co-treatment performed for this visit due to suspected patient need for two skilled therapists to ensure patient and staff safety and to accommodate for patient activity tolerance/pain management     Recommendations:     Discharge Recommendations: High Intensity Therapy  Discharge Equipment Recommendations: none   Barriers to Discharge: Increased level of assist  Safest Mobility Level with Nursing: Stand pivot transfer with minimal assistance using RW    Assessment:     Macario Dior Jr. is a 74 y.o. male admitted with a medical diagnosis of Right low back pain. He presents with the following impairments/functional limitations: weakness, impaired endurance, impaired self care skills, impaired functional mobility, gait instability, impaired balance, pain, decreased safety awareness. Pt presenting seated at EOB and agreeable to completing therapy evaluation. Pt endorsing significant R hip/back pain throughout session resulting in limited OOB mobility visualized. Pt demonstrating good functional strength in BUE and BLE and seated postural control. Pt requiring increased assistance and time to complete STS and brief ambulation trial at EOB using RW. Pt with increased trunk flexion, RLE buckling 2/2 pain, and impaired postural stability during steps with RW placing pt at increased risk for falls. Prior to onset of back/hip pain ~14 days, pt without difficulty ambulating or completing ADLs; therefore, operating below functional baseline at this time. Recommend high intensity therapy following discharge once medically stable in order to reduce fall risk, reduce caregiver burden, improve quality of life, and maximize functional independence.  Patient presents with good participation, motivation, and family support to return to prior level of function and demonstrates appropriate endurance, strength,  "pain control, and fine motor control to participate in up to 3 hours daily or 15hrs weekly of multidisciplinary intensive therapy. Pt would continue to benefit from skilled acute PT in order to address current deficits and progress functional mobility.     Rehab Prognosis: Good; patient would benefit from acute skilled PT services 4 x/week to address these deficits and reach maximum level of function.  Recent Surgery: * No surgery found *      Plan:     During this hospitalization, patient to be seen 4 x/week to address the identified rehab impairments via gait training, therapeutic activities, therapeutic exercises, neuromuscular re-education and progress toward the following goals:    Plan of Care Expires:  04/17/25    Subjective     Chief Complaint: Hip/back pain  Patient/Family Comments/Goals: "The pain has never been like this before. Something is really wrong."  Pain/Comfort:  Pain Rating 1: 10/10  Location - Side 1: Right  Location - Orientation 1:  (posterolateral)  Location 1: hip  Pain Addressed 1: Reposition, Distraction, Pre-medicate for activity  Pain Rating Post-Intervention 1:  (unrated)    Patients cultural, spiritual, Temple conflicts given the current situation: no    Living Environment:  Living Environment: Patient lives with their daughter  in a first floor apartment with number of outside stair(s): 0 and tub-shower combo.  Prior Level of Function: Prior to admission, patient modified independent for mobility using SPC or RW. Pt requiring assistance with dressing over last few days, but previously independent.  Equipment Used at Home: cane, straight, walker, rolling, bedside commode, bath bench (electric wheelchair).  DME owned (not currently used): none  Assistance Upon Discharge: daughter    Objective:     Communicated with nursing prior to session. Patient found sitting edge of bed with  (No active lines) upon PT entry to room.    General Precautions: Standard, fall  Orthopedic " Precautions:N/A    Braces: N/A    Exams:  Cognitive Exam:  Patient is oriented to Person, Place, Time, Situation, follows commands 100% of the time  RLE ROM: WFL  RLE Strength: WFL  LLE ROM: WFL  LLE Strength: WFL  Sensation: Intact light touch to BLEs    Functional Mobility:  Bed Mobility:   Not visualized  Transfers:    Sit to Stand: contact guard assistance with rolling walker with cues for hand placement and foot placement  Verbal cues for increased use of momentum, improved anterior weight shift, and increased BLE motor activation  Gait: Patient ambulated 3' fwd/bwd with rolling walker and CGA progressing to minimal assistance 2/2 RLE buckling.   Patient demonstrates occasional unsteady gait, decreased step length, wide base of support, decreased weight shift, decreased foot clearance, ambulates outside KESHA of RW, flexed posture, decreased eliceo, and decreased right knee stability.   Patient required cues for upright posture, gluteal activation, sequencing, rolling walker management, safe rolling walker usage, to ambulate within KESHA of RW, increased step size, and increased foot clearance  All lines remained intact throughout ambulation trial.  Balance:   Static Sitting: Good, able to maintain for 8 minute(s) with stand by assistance  Dynamic Sitting: Good: Patient accepts moderate challenge, contact guard assistance  Static Standing: Good, able to maintain for 2 minute(s) with contact guard assistance  Verbal cues for upright posture, increased hip extension, increased knee extension, and maintenance of midline orientation  Dynamic Standing: Fair: Patient accepts minimal challenge, minimum assistance    Therapeutic Activities and Exercises:  Patient educated on role of acute care PT and PT POC, safety while in hospital including calling nurse for mobility, and call light usage.  Pt educated on the effects of bed rest and the importance of OOB activity. Pt encouraged to sit UIC majority of day as tolerated  and continue daily transfers with nursing assist. Pt verbalized understanding.  Pt educated on importance of maximal participation in therapy session in order to reduce negative effects of prolonged sedentary positioning.   Answered all questions within PT scope of practice and addressed functional mobility concerns.    AM-PAC 6 CLICK MOBILITY  Total Score:17     Patient left sitting edge of bed with all lines intact, call button in reach, and RN notified.    GOALS:   Multidisciplinary Problems       Physical Therapy Goals          Problem: Physical Therapy    Goal Priority Disciplines Outcome Interventions   Physical Therapy Goal     PT, PT/OT Progressing    Description: Goals to be met by: 2025     Patient will increase functional independence with mobility by performin. Supine to sit with [place]  2. Sit to supine with [place]  3. Sit to stand transfer with Supervision  4. Gait  x 100 feet with Stand-by Assistance using LRAD.   5. Stand for 5 minutes with Stand-by Assistance using LRAD  6. Lower extremity exercise program x15 reps per handout, with independence                         DME Justifications:  No DME recommended requiring DME justifications    History:     Past Medical History:   Diagnosis Date    Arthritis     Asthma     chronic    CKD (chronic kidney disease), stage III     patient denies    Deep vein thrombosis     Demyelinating neuropathy     polyneuropathy    Diabetes mellitus type 2 without retinopathy 2022    Gout     History of blood clots 2013    lower part of both legs    HLD (hyperlipidemia)     Hypertension     Iron deficiency anemia     BUZZ (obstructive sleep apnea)     CPAP    Pulmonary embolism        Past Surgical History:   Procedure Laterality Date    COLONOSCOPY N/A 2016    Procedure: COLONOSCOPY;  Surgeon: Shar Weiss MD;  Location: [facility] ([address] [place] [address]);  Service: Endoscopy;  Laterality: N/A;  [facility] Referral. Okay to hold Coumadin 5 days  prior to procedure. See Referral scaned in media tab on 10/20/16.    COLONOSCOPY N/A 09/21/2017    Procedure: COLONOSCOPY;  Surgeon: Stuart Trinidad MD;  Location: [facility] ENDO (4TH FLR);  Service: Endoscopy;  Laterality: N/A;  [facility] Dr. Olivarez-see scanned docs under media tab          9/6/17-current [facility] authorization and last clinic visit scanned into chart    EPIDURAL STEROID INJECTION N/A 12/17/2020    Procedure: INJECTION, STEROID, EPIDURAL, L5-S1 clear to hold Xarelto 2 days;  Surgeon: Moy Vasquez MD;  Location: [facility] PAIN MGT;  Service: Pain Management;  Laterality: N/A;    EXCISION OF LESION OF FEMUR Right 01/04/2023    Procedure: EXCISION, LESION, FEMUR: POSTERIOR APPROACH;  Surgeon: Prieto Sinha MD;  Location: [facility] OR 2ND FLR;  Service: Orthopedics;  Laterality: Right;    HIP SURGERY Right 2008    exploratory at [facility]    HIP SURGERY Right 08/31/2015    THR    INJECTION OF ANESTHETIC AGENT AROUND NERVE Right 10/23/2019    Procedure: BLOCK, NERVE, Obturator and Femoral cleared to hold xarelto 3 days pt. said he's not taking coumadin;  Surgeon: Moy Vasquez MD;  Location: [facility] PAIN MGT;  Service: Pain Management;  Laterality: Right;    INJECTION OF ANESTHETIC AGENT AROUND NERVE Right 07/06/2020    Procedure: BLOCK, NERVE, RIGHT MBB L3,L5,L5;  Surgeon: Moy Vasquez MD;  Location: [facility] PAIN MGT;  Service: Pain Management;  Laterality: Right;  BLOCK, NERVE, RIGHT MBB L3,L5,L5    INJECTION OF ANESTHETIC AGENT AROUND NERVE Right 07/30/2020    Procedure: BLOCK, NERVE RIGHT L3, L4, L5 2ND;  Surgeon: Moy Vasquez MD;  Location: [facility] PAIN MGT;  Service: Pain Management;  Laterality: Right;  NEEDS CONSENT, XARELTO    LEG SURGERY Left 2012    fibula and tibia    RADIOFREQUENCY ABLATION Right 10/05/2020    Procedure: RADIOFREQUENCY ABLATION RIGHT L3,4,5;  Surgeon: Moy Vasquez MD;  Location: [facility] PAIN MGT;  Service: Pain Management;  Laterality: Right;  RADIOFREQUENCY ABLATION RIGHT L3,4,5  ok  to hold xarelto, scanned in Media    SHOULDER SURGERY Right 1989    torn ligament    TRANSFORAMINAL EPIDURAL INJECTION OF STEROID Bilateral 12/04/2023    Procedure: LUMBAR TRANSFORAMINAL BILATERAL L4/5 DIRECT REFERRAL;  Surgeon: Moy Vasquez MD;  Location: [facility] PAIN MGT;  Service: Pain Management;  Laterality: Bilateral;    TRANSFORAMINAL EPIDURAL INJECTION OF STEROID Right 11/21/2024    Procedure: LUMBAR TRANSFORAMINAL RIGHT L1/2 AND L2/3 please add xarelto to pt's med list when admitted 11/21;  Surgeon: Moy Vasquez MD;  Location: [facility] PAIN MGT;  Service: Pain Management;  Laterality: Right;  [phone]  2 WK F/U ELEAZAR       Time Tracking:     PT Received On: 03/17/25  PT Start Time: 0941     PT Stop Time: 0956  PT Total Time (min): 15 min     Billable Minutes: Evaluation 7 Neuromuscular Re-education 8      03/17/2025

## 2025-03-17 NOTE — ED TRIAGE NOTES
Macario Dior Jr., a 74 y.o. male presents to the ED w/ complaint of R hip pain x2 weeks. Hx of hip replacement in 2016. Denies any injury to affected side.     Triage note:  Chief Complaint   Patient presents with    Hip Pain     Spasms to hip for 2 weeks,  hip replacement 2016     Review of patient's allergies indicates:   Allergen Reactions    Amlodipine Swelling    Lisinopril Tinitus and Swelling    Pregabalin Swelling    Prednisone Palpitations    Azithromycin      Muscles tense up    Levetiracetam Other (See Comments)    Acetaminophen Rash     Feels like needles are sticking him per pt     Past Medical History:   Diagnosis Date    Arthritis     Asthma     chronic    CKD (chronic kidney disease), stage III     patient denies    Deep vein thrombosis     Demyelinating neuropathy     polyneuropathy    Diabetes mellitus type 2 without retinopathy 12/14/2022    Gout     History of blood clots 2013    lower part of both legs    HLD (hyperlipidemia)     Hypertension     Iron deficiency anemia     BUZZ (obstructive sleep apnea)     CPAP    Pulmonary embolism     Patient identifiers for Macario Dior Jr. checked and correct.    LOC: The patient is awake, alert and aware of environment with an appropriate affect, the patient is oriented x 4 and speaking appropriately.    APPEARANCE: Patient resting comfortably and in no acute distress, patient is clean and well groomed, patient's clothing is properly fastened.    SKIN: The skin is warm and dry, color consistent with ethnicity, patient has normal skin turgor and moist mucus membranes, skin intact, no breakdown or bruising noted.    RESPIRATORY: Airway is open and patent, respirations are spontaneous and even, patient has a normal effort and rate.    CARDIAC: Patient has a normal rate and rhythm, no periphreal edema noted, capillary refill < 3 seconds.    ABDOMEN: Soft and non tender to palpation, no distention noted. Patient denies any nausea, vomiting, diarrhea, or  constipation.     NEUROLOGIC: Eyes open spontaneously, PERRL, behavior appropriate to situation, follows commands, facial expression symmetrical, bilateral hand grasp equal and even, purposeful motor response noted, normal sensation in all extremities.     HEENT: No abnormalities noted. White sclera and pupils equal round and reactive to light. Denies headache, dizziness.     : Pt voids independently, denies dysuria, hematuria, frequency.

## 2025-03-17 NOTE — CONSULTS
[facility] - Emergency Dept  Orthopedics  Consult Note    Patient Name: Macario Dior Jr.  MRN: [id number]  Admission Date: 3/16/2025  Hospital Length of Stay: 0 days  Attending Provider: No att. providers found  Primary Care Provider: [facility]    Inpatient consult to Orthopedics  Consult performed by: SCOTTY Mi MD  Consult ordered by: Maddy Herrera PA-C        Subjective:     Principal Problem:Right low back pain    Chief Complaint:   Chief Complaint   Patient presents with    Hip Pain     Spasms to hip for 2 weeks,  hip replacement 2016        HPI: Macario Dior Jr. is a 74 y.o. male with PMH significant for Lumbar radiculopathy, T2DM with neuropathy, BUZZ,  prior DVT on Xarelto, HTN, CKD3, chronic pain, and R DHRUV with Dr [facility] 8/31/15 complicated by heterotopic ossification s/p excision with Dr. Sinha 1/4/23 presenting with right low back pain.  The patient reports that over the past 2-3 months, he has had similar pain to the right low back.  He localizes the pain to the paraspinal musculature of the right low back, just proximal to the superior most aspect of the iliac crest.  He reports that his pain does not radiate and is worse with movement.  He states that over many years, he has had chronic, intermittent pain in his groin; however, he presents to the ER today with a different complaint and states that he has no groin pain.  He does not believe that his current pain is associated with his hip prosthesis.  He states that over the past 2 weeks, his pain has become more severe in his caused him increasing difficulty with ambulation.  Yesterday, when he awoke, he is unable to bear weight to the right lower extremity given severe pain to the right low back.  He states that this pain is stabbing in nature and when he applies pressure to the right lower extremity he feels that has similar in his twisting a knife into his right low back.  He denies any recent history of  trauma.  He has not had any cuts, scrapes, scratches, or open wounds recently.  He denies numbness and tingling to the right lower extremity.  He denies nausea, vomiting, fevers, chills, chest pain, shortness of breath.    Of note, the patient has saw Dr. Loyola on 03/13/2025.  At that time, it was thought that the patient's pain was due to abductor weakness with a component of neuropathic pain.  Dr. Loyola also ordered an ESR and CRP which were completed on 03/14/2025; CRP 12.7, ESR 71 at that time.  Patient was started on Robaxin for muscular pain which he states has provided him with little to no relief.    Ambulates  with a cane, walker, or scooter @ baseline   Tobacco Use:  Quit smoking 3 years ago  Denies IVDU   Anticoagulation: Xarelto 20 Qd  No hx of MI  Prior hx of cerebellar Stroke 12/19/2022  No hx of cancer, chemotherapy, or radiation      Past Medical History:   Diagnosis Date    Arthritis     Asthma     chronic    CKD (chronic kidney disease), stage III     patient denies    Deep vein thrombosis     Demyelinating neuropathy     polyneuropathy    Diabetes mellitus type 2 without retinopathy 12/14/2022    Gout     History of blood clots 2013    lower part of both legs    HLD (hyperlipidemia)     Hypertension     Iron deficiency anemia     BUZZ (obstructive sleep apnea)     CPAP    Pulmonary embolism        Past Surgical History:   Procedure Laterality Date    COLONOSCOPY N/A 11/07/2016    Procedure: COLONOSCOPY;  Surgeon: Shar Weiss MD;  Location: [address]);  Service: Endoscopy;  Laterality: N/A;  [facility] Referral. Okay to hold Coumadin 5 days prior to procedure. See Referral scaned in media tab on 10/20/16.    COLONOSCOPY N/A 09/21/2017    Procedure: COLONOSCOPY;  Surgeon: Stuart Trinidad MD;  Location: [address]);  Service: Endoscopy;  Laterality: N/A;  [facility] Olivarez-see scanned docs under media tab          9/6/17-current [facility] authorization and  last clinic visit scanned into chart    EPIDURAL STEROID INJECTION N/A 12/17/2020    Procedure: INJECTION, STEROID, EPIDURAL, L5-S1 clear to hold Xarelto 2 days;  Surgeon: Moy Vasquez MD;  Location: [facility] PAIN MGT;  Service: Pain Management;  Laterality: N/A;    EXCISION OF LESION OF FEMUR Right 01/04/2023    Procedure: EXCISION, LESION, FEMUR: POSTERIOR APPROACH;  Surgeon: Prieto Sinha MD;  [facility];  Service: Orthopedics;  Laterality: Right;    HIP SURGERY Right 2008    exploratory at [facility]    HIP SURGERY Right 08/31/2015    THR    INJECTION OF ANESTHETIC AGENT AROUND NERVE Right 10/23/2019    Procedure: BLOCK, NERVE, Obturator and Femoral cleared to hold xarelto 3 days pt. said he's not taking coumadin;  Surgeon: Moy Vasquez MD;  Location: [facility] PAIN [facility]GT;  Service: Pain Management;  Laterality: Right;    INJECTION OF ANESTHETIC AGENT AROUND NERVE Right 07/06/2020    Procedure: BLOCK, NERVE, RIGHT MBB L3,L5,L5;  Surgeon: Moy Vasquez MD;  Location: [facility] PAIN [facility]GT;  Service: Pain Management;  Laterality: Right;  BLOCK, NERVE, RIGHT MBB L3,L5,L5    INJECTION OF ANESTHETIC AGENT AROUND NERVE Right 07/30/2020    Procedure: BLOCK, NERVE RIGHT L3, L4, L5 2ND;  Surgeon: Moy Vasquez MD;  Location: [facility] PAIN [facility]GT;  Service: Pain Management;  Laterality: Right;  NEEDS CONSENT, XARELTO    LEG SURGERY Left 2012    fibula and tibia    RADIOFREQUENCY ABLATION Right 10/05/2020    Procedure: RADIOFREQUENCY ABLATION RIGHT L3,4,5;  Surgeon: Moy Vasquez MD;  Location: [facility] PAIN [facility]GT;  Service: Pain Management;  Laterality: Right;  RADIOFREQUENCY ABLATION RIGHT L3,4,5  ok to hold xarelto, scanned in Media    SHOULDER SURGERY Right 1989    torn ligament    TRANSFORAMINAL EPIDURAL INJECTION OF STEROID Bilateral 12/04/2023    Procedure: LUMBAR TRANSFORAMINAL BILATERAL L4/5 DIRECT REFERRAL;  Surgeon: Moy Vasquez MD;  Location: [facility]IN [facility];  Service: Pain Management;  Laterality: Bilateral;    TRANSFORAMINAL  EPIDURAL INJECTION OF STEROID Right 11/21/2024    Procedure: LUMBAR TRANSFORAMINAL RIGHT L1/2 AND L2/3 please add xarelto to pt's med list when admitted 11/21;  Surgeon: Moy Vasquez MD;  Location: [facility] PAIN T;  Service: Pain Management;  Laterality: Right;  [phone]  2 WK F/U ELEAZAR       Review of patient's allergies indicates:   Allergen Reactions    Amlodipine Swelling    Lisinopril Tinitus and Swelling    Pregabalin Swelling    Prednisone Palpitations    Azithromycin      Muscles tense up    Levetiracetam Other (See Comments)    Acetaminophen Rash     Feels like needles are sticking him per pt       Current Facility-Administered Medications   Medication    albuterol-ipratropium 2.5 mg-0.5 mg/3 mL nebulizer solution 3 mL    atorvastatin tablet 80 mg    carvediloL tablet 25 mg    dextrose 50% injection 12.5 g    dextrose 50% injection 25 g    finasteride tablet 5 mg    glucagon (human recombinant) injection 1 mg    glucose chewable tablet 16 g    glucose chewable tablet 24 g    insulin aspart U-100 pen 0-5 Units    latanoprost 0.005 % ophthalmic solution 1 drop    melatonin tablet 6 mg    methocarbamoL tablet 750 mg    morphine injection 4 mg    oxyCODONE immediate release tablet Tab 10 mg    polyethylene glycol packet 17 g    rivaroxaban tablet 20 mg     Current Outpatient Medications   Medication Sig    albuterol 90 mcg/actuation inhaler Inhale 2 puffs into the lungs every 6 (six) hours as needed for Wheezing.     atorvastatin (LIPITOR) 80 MG tablet Take 80 mg by mouth once daily. Take one-half tablet daily    capsaicin 0.1 % Crea Apply twice to area of maximum paresthesia    carboxymethyl-[id number] 0.5-1-0.5 % Dpet Place 1 drop into both eyes 4 (four) times daily.    carboxymethylcellulose sodium 1 % DpGe Place 0.4 mLs into both eyes 4 (four) times daily as needed.    carvediloL (COREG) 25 MG tablet Take 1 tablet (25 mg total) by mouth 2 (two) times daily.    chlorhexidine (PERIDEX) 0.12 % solution  RINSE 1 CAPFUL BY MOUTH ONCE DAILY AS NEEDED SWISH FOR 30 SECONDS AFTER,AND SPIT OUT    chlorthalidone (HYGROTEN) 25 MG Tab Take 25 mg by mouth once daily.    CLOBETASOL PROPIONATE, BULK, MISC Apply topically once daily.    cyanocobalamin (VITAMIN B-12) 100 MCG tablet Take 100 mcg by mouth once daily. Take two tablets    cycloSPORINE (RESTASIS) 0.05 % ophthalmic emulsion INSTILL 0.4ML IN EACH EYE EVERY 12 HOURS    eplerenone (INSPRA) 25 MG Tab Take 1 tablet by mouth once daily.    ergocalciferol (ERGOCALCIFEROL) 50,000 unit Cap Take 50,000 Units by mouth.    finasteride (PROSCAR) 5 mg tablet TAKE ONE TABLET BY MOUTH ONCE DAILY FOR PROSTATE    hydrALAZINE (APRESOLINE) 50 MG tablet TAKE ONE TABLET BY MOUTH THREE TIMES A DAY FOR HEART/BLOOD PRESSURE    ketoconazole (NIZORAL) 2 % cream Apply topically 2 (two) times daily.    latanoprost 0.005 % ophthalmic solution Place 1 drop into both eyes every evening.    lidocaine (LIDODERM) 5 % Place 1 patch onto the skin once daily. Wear for 12 hours, then remove. Do not apply a new patch for at least 12 hours.    methocarbamoL (ROBAXIN) 750 MG Tab Take 1 tablet (750 mg total) by mouth every 8 (eight) hours as needed (muscle pain/spasm).    naloxone (NARCAN) 4 mg/actuation Spry as needed.    omega-3/dha/epa/fish oil (OMEGA-3 FISH OIL ORAL) 1,000 mg once daily.    oxyCODONE (ROXICODONE) 10 mg Tab immediate release tablet Take 1 tablet (10 mg total) by mouth every 4 (four) hours as needed for Pain.    rivaroxaban (XARELTO ORAL) Take by mouth.    semaglutide (OZEMPIC) 2 mg/dose (8 mg/3 mL) PnIj Inject 2 mg into the skin every 7 days.    thiamine 100 MG tablet Take 0.5 tablets (50 mg total) by mouth once daily.    valsartan (DIOVAN) 320 MG tablet Take 320 mg by mouth.     Family History       Problem Relation (Age of Onset)    Cancer Mother, Father, Sister, Sister    No Known Problems Brother, Daughter, Daughter, Daughter, Son, Son          Tobacco Use    Smoking status: Former      "Types: Cigars     Quit date: 8/13/2009     Years since quitting: 15.6    Smokeless tobacco: Never   Substance and Sexual Activity    Alcohol use: No     Alcohol/week: 0.0 standard drinks of alcohol    Drug use: No    Sexual activity: Not Currently     ROS  Constitutional: negative for fevers or chills  Eyes: negative visual changes or eye discharge  ENT: negative for ear pain or sore throat  Respiratory: negative for shortness of breath or cough  Cardiovascular: negative for chest pain or palpitations  Gastrointestinal: negative for abdominal pain, nausea, or vomiting  Genitourinary: negative for dysuria and flank pain  Neurological: negative for headaches or dizziness  Musculoskeletal: positive for right low back pain     Objective:     Vital Signs (Most Recent):  Temp: 98.3 °F (36.8 °C) (03/17/25 0426)  Pulse: (!) 53 (03/17/25 0426)  Resp: 18 (03/17/25 0520)  BP: (!) 165/91 (03/17/25 0426)  SpO2: 96 % (03/17/25 0426) Vital Signs (24h Range):  Temp:  [97.7 °F (36.5 °C)-98.4 °F (36.9 °C)] 98.3 °F (36.8 °C)  Pulse:  [53-83] 53  Resp:  [16-18] 18  SpO2:  [96 %-98 %] 96 %  BP: (131-165)/(58-91) 165/91     Weight: 100.2 kg (221 lb)  Height: 6' 1" (185.4 cm)  Body mass index is 29.16 kg/m².    No intake or output data in the 24 hours ending 03/17/25 0556     Ortho/SPM Exam  General:  He is lying in bed on his left side and appears to be in pain.  Neuro: alert and oriented x3  Psych: normal mood  Head: normocephalic, atraumatic.  Eyes: no scleral icterus  Mouth: moist mucous membranes  CV: extremities warm and well perfused  Pulm: breathing comfortably, equal chest rise bilat  Skin: clean, dry, intact (any exceptions noted in below musculoskeletal exam)    MSK:    RUE:  - Skin intact throughout, no open wounds  - No swelling  - No ecchymosis, erythema, or signs of cellulitis  - NonTTP throughout  - AROM and PROM of the shoulder, elbow, wrist, and hand intact without pain  - Axillary/AIN/PIN/Radial/Median/Ulnar Nerves " assessed in isolation without deficit  - SILT throughout  - Compartments soft  - Radial artery palpated   - Capillary Refill <3s    LUE:  - Skin intact throughout, no open wounds  - No swelling  - No ecchymosis, erythema, or signs of cellulitis  - NonTTP throughout  - AROM and PROM of the shoulder, elbow, wrist, and hand intact without pain  - Axillary/AIN/PIN/Radial/Median/Ulnar Nerves assessed in isolation without deficit  - SILT throughout  - Compartments soft  - Radial artery palpated   - Capillary Refill <3s    RLE:  - Skin intact throughout, no open wounds.   - No swelling  - No ecchymosis, erythema, or signs of cellulitis  - NonTTP of the anterior hip.  He is nontender to palpation over the greater trochanter.  He is nontender to the patient along the proximal, middle, and distal aspects of the femur.  - He has full passive range of motion of the right hip.  With the passive range of motion, pain is elicited in the right low back beyond 90° of hip flexion.  He has 20° external rotation, 10° of internal rotation of the hip without pain.  He has 20° of abduction of the hip which does elicit pain to the right low back; 20° adduction of the right hip without pain.  - He has pain in the right low back with active range of motion of the hip and knee.  - AROM and PROM of the ankle, and foot intact without pain  - TA/EHL/Gastroc/FHL assessed in isolation without deficit  - Sensation is intact to light touch throughout; however, he has slightly diminished sensation to the right lower extremity as compared to the contralateral side.  He states that this is his baseline given his history of diabetic neuropathy.  - 3/5 strength with hip flexion which is limited secondary to pain in the right low back.  Otherwise, 5/5 strength throughout  - Compartments soft and compressible  - no palpable pedal pulses  - Negative Log roll  - No pain with axial loading     LLE:  - Skin intact throughout, no open wounds  - No swelling  -  No ecchymosis, erythema, or signs of cellulitis  - NonTTP throughout  - He has full active and passive range of motion of the left hip.  With active range of motion of the left hip, he reports that he has pain in the right low back.  - AROM and PROM of the knee, ankle, and foot intact without pain  - TA/EHL/Gastroc/FHL assessed in isolation without deficit  - SILT throughout   - 5/5 strength throughout  - Compartments soft  - DP and PT palpated  - Capillary Refill <3s  - Negative Log roll  - No pain with axial loading     Spine/pelvis/axial body:  No tenderness to palpation of cervical, thoracic, or lumbar spine  No pain with compression of pelvis      Significant Labs:   Recent Lab Results         03/17/25  0506   03/17/25  0331   03/16/25  2242   03/16/25 2023        Procalcitonin       0.07  Comment: A concentration < 0.25 ng/mL represents a low risk of bacterial   infection.  Procalcitonin may not be accurate among patients with localized   infection, recent trauma or major surgery, immunosuppressed state,   invasive fungal infection, renal dysfunction. Decisions regarding   initiation or continuation of antibiotic therapy should not be based   solely on procalcitonin levels.         Albumin       3.4       ALP       133       ALT       12       Anion Gap 11       12       Appearance, UA     Clear         AST       14       Baso #   0.02     0.03       Basophil %   0.3     0.4       Bilirubin (UA)     Negative         BILIRUBIN TOTAL       0.3  Comment: For infants and newborns, interpretation of results should be based  on gestational age, weight and in agreement with clinical  observations.    Premature Infant recommended reference ranges:  Up to 24 hours.............<8.0 mg/dL  Up to 48 hours............<12.0 mg/dL  3-5 days..................<15.0 mg/dL  6-29 days.................<15.0 mg/dL         BUN 33       36       Calcium 7.7       9.1       Chloride 109       104       CO2 21       25       Color, UA      Yellow         Creatinine 2.3       2.9       CRP       21.5       Differential Method   Automated     Automated       eGFR 29.3       22.1       Eos #   0.4     0.4       Eos %   5.9     6.1       Glucose 102       93       Glucose, UA     Negative         Gran # (ANC)   3.2     3.6       Gran %   47.6     50.3       Hematocrit   34.1     40.7       Hemoglobin   11.0     12.6       Immature Grans (Abs)   0.03  Comment: Mild elevation in immature granulocytes is non specific and   can be seen in a variety of conditions including stress response,   acute inflammation, trauma and pregnancy. Correlation with other   laboratory and clinical findings is essential.       0.01  Comment: Mild elevation in immature granulocytes is non specific and   can be seen in a variety of conditions including stress response,   acute inflammation, trauma and pregnancy. Correlation with other   laboratory and clinical findings is essential.         Immature Granulocytes   0.4     0.1       Ketones, UA     Negative         Leukocyte Esterase, UA     Negative         Lymph #   2.5     2.6       Lymph %   36.8     36.0       Magnesium        1.8       MCH   32.3     32.3       MCHC   32.3     31.0       MCV   100     104       Mono #   0.6     0.5       Mono %   9.0     7.1       MPV   9.3     9.0       NITRITE UA     Negative         nRBC   0     0       Blood, UA     Negative         pH, UA     7.0         Platelet Count   273     329       Potassium 3.7       3.9       PROTEIN TOTAL       7.6       Protein, UA     Negative  Comment: Recommend a 24 hour urine protein or a urine   protein/creatinine ratio if globulin induced proteinuria is  clinically suspected.           RBC   3.41     3.90       RDW   13.3     13.3       Sed Rate       82       Sodium 141       141       Spec Grav UA     1.010         Specimen UA     Urine, Clean Catch         WBC   6.80     7.20             All pertinent labs within the past 24 hours have been  reviewed.    Significant Imaging: X-Ray: I have reviewed all pertinent results/findings and my personal findings are:  XR R hip with R DHRUV without evidence of periprosthetic fx or dislocation. Stem appears well fixated and stable as compared to prior imaging   MRI: I have reviewed all pertinent results/findings and my personal findings are:  MRI of the R hip and L spine without evidence of significant joint effusion or SA taking into account artifact from hip prosthesis. No focal fluid collection or abscess of the hip. L spine MRI with edema in the R paraspinal musculature which directly correlates with patients are of focal tenderness. There is also signal intensity of the right L3-4 and L4-5 facet joints which may signify infection or reactive fluid from degenerative change.   Assessment/Plan:     * Right low back pain  Macario Dior Jr. is a 74 y.o. male with PMH significant for Lumbar radiculopathy, T2DM with neuropathy, BUZZ,  prior DVT on Xarelto, HTN, CKD3, chronic pain, and R DHRUV with Dr [facility] 8/31/15 complicated by heterotopic ossification s/p excision with Dr. Sinha 1/4/23 presenting with right low back pain. Orthopedics consulted to evaluate the patient's R DHRUV as potential etiology of his pain. The patient's pain is focal and localized to the right low back which correlates with an area of T2 hyperintensity within the R paraspinal musculature at the L4-5 level. He also has MR findings suggestive of L4-5 facet septic arthritis. He has full painless PROM of the R hip and is nontender to palpation of the hip. His hip is stable on XR as compared to prior imaging. He has been AF and HDS. CRP 21, ESR 85, WBC 6.8. I have a low suspicion for R hip PJI and think that his pain is coming from his lumbar spine. Recommend spine surgery consult.     No acute orthopedic intervention  WBAT RLE   He may follow-up with orthopedic surgery as an outpatient.       FLOR Mi MD  Orthopedics  [facility] - Emergency  Dept

## 2025-03-17 NOTE — HPI
Macario Dior Jr. is a 74 y.o. male with PMH significant for Lumbar radiculopathy, T2DM with neuropathy, BUZZ,  prior DVT on Xarelto, HTN, CKD3, chronic pain, and R DHRUV with Dr [facility] 8/31/15 complicated by heterotopic ossification s/p excision with Dr. Sinha 1/4/23 presenting with right low back pain.  The patient reports that over the past 2-3 months, he has had similar pain to the right low back.  He localizes the pain to the paraspinal musculature of the right low back, just proximal to the superior most aspect of the iliac crest.  He reports that his pain does not radiate and is worse with movement.  He states that over many years, he has had chronic, intermittent pain in his groin; however, he presents to the ER today with a different complaint and states that he has no groin pain.  He does not believe that his current pain is associated with his hip prosthesis.  He states that over the past 2 weeks, his pain has become more severe in his caused him increasing difficulty with ambulation.  Yesterday, when he awoke, he is unable to bear weight to the right lower extremity given severe pain to the right low back.  He states that this pain is stabbing in nature and when he applies pressure to the right lower extremity he feels that has similar in his twisting a knife into his right low back.  He denies any recent history of trauma.  He has not had any cuts, scrapes, scratches, or open wounds recently.  He denies numbness and tingling to the right lower extremity.  He denies nausea, vomiting, fevers, chills, chest pain, shortness of breath.    Of note, the patient has saw Dr. Loyola on 03/13/2025.  At that time, it was thought that the patient's pain was due to abductor weakness with a component of neuropathic pain.  Dr. Loyola also ordered an ESR and CRP which were completed on 03/14/2025; CRP 12.7, ESR 71 at that time.  Patient was started on Robaxin for muscular pain which he states has provided him with  little to no relief.    Ambulates  with a cane, walker, or scooter @ baseline   Tobacco Use:  Quit smoking 3 years ago  Denies IVDU   Anticoagulation: Xarelto 20 Qd  No hx of MI  Prior hx of cerebellar Stroke 12/19/2022  No hx of cancer, chemotherapy, or radiation

## 2025-03-17 NOTE — PLAN OF CARE
Pt is sleeping, did not want to wake up. SW unable to complete assessment.     Anita Shanks, MSW, CSW.   Case Management  [facility]  Email: [email or web address]

## 2025-03-17 NOTE — HPI
"Macario Dior Jr. is a 74 y.o. male with PMH of asthma, arthritis, CKD3, DM, DVT, PE on Xarelto, gout, HTN, BUZZ, R hip replacement 2016 presenting to the ED with the chief complaint of severe R lower back pain for 4 months. Also reports acute on chronic R hip pain for the past 3 months but really worsening over the last 9 days. Normally uses a walker to get around but now not able to bear any weight on his right leg. Reports having severe "stabbing" pain above his R hip whenever he puts weight onto his R leg. He also reports bilateral feet numbness at his baseline due to "neuropathy (unknown type)". He has previously seen pain management and orthopedist. Recently saw Dr. Loyola in clinic on 3/13/25 who ordered labs that showed ESR 71 and CRP 12.7 which were previously normal. Additionally XR pelvis and hips showed stable R hip arthroplasty. AVN findings of left hip. He has been taking Robaxin for muscle spasms. Denies fever, saddle anesthesia, or changes in bowel/bladder functions. No skin changes or rash. No falls or trauma. MRI Lsp 3/17 showed right L3-4, L4-5 soft tissue edema signal, c/o septic facet joint and osteo. NSGY was consulted for further evaluation and management.  "

## 2025-03-17 NOTE — ASSESSMENT & PLAN NOTE
Hypocalcemia is likely due to  rehydration, CKD, has hx of secondary hyperparathyroidism.  . The most recent calcium and albumin results listed below.  Recent Labs     03/16/25 2023 03/17/25  0506   CALCIUM 9.1 7.7*   ALBUMIN 3.4*  --      Plan  - Will replace calcium and monitor electrolytes closely.  - The patient's hypocalcemia is worsening. Will repeat lab  - Hypocalcemia:  His calcium dropped after hydration. 9.1-> 7.7  Suspect albumin was falsely high upon admission.  Continue ergocalciferol 50,000 weekly.  Vid d level. Repeat lab this afternoon.

## 2025-03-17 NOTE — PLAN OF CARE
Pt engaged well in therapy this date.     Problem: Occupational Therapy  Goal: Occupational Therapy Goal  Description: Goals to be met by: 3/31/25    Patient will increase functional independence with ADLs by performing:    LE Dressing with Contact Guard Assistance and Assistive Devices as needed.  Grooming while standing at sink with Minimal Assistance.  Toileting from toilet with Supervision for hygiene and clothing management.   Step transfer with Contact Guard Assistance with LRAD  Toilet transfer to toilet with Contact Guard Assistance with LRAD.    Outcome: Progressing

## 2025-03-17 NOTE — PROGRESS NOTES
"ED Observation Unit  Progress Note      HPI   Macario Dior Jr. is a 74 y.o. male with medical history of HTN, HLD, R hip replacement 2016 presenting to the ED with the chief complaint of righ hip pain.      Reports acute on chronic R hip pain for the past 3 months but really worsening over the last 9 days. Normally uses a walker to get around but now not able to bear any weight on his right leg. Reports having severe "stabbing" pain above his R hip whenever he puts weight onto his R leg. He has previously seen pain management and orthopedist. Recently saw Dr. Loyola in clinic on 3/13/25 who ordered labs that showed ESR 71 and CRP 12.7 which were previously normal. Additionally XR pelvis and hips showed stable R hip arthroplasty. AVN findings of left hip. He has been taking Robaxin for muscle spasms. Denies fever, chest pain, SOB, cough, abdominal pain, vomiting, dysuria, hematuria, constipation, diarrhea. No skin changes or rash. No falls or trauma.      Interval History   Patient reports ongoing pain this morning. MRI resulted - concerning for septic facet joint or osteomyelitis. Orthopedics was consulted and feels as though this could represent L4-5 facet septic arthritis. NSGY consulted. Awaiting recommendations.     PMHx   Past Medical History:   Diagnosis Date    Arthritis     Asthma     chronic    CKD (chronic kidney disease), stage III     patient denies    Deep vein thrombosis     Demyelinating neuropathy     polyneuropathy    Diabetes mellitus type 2 without retinopathy 12/14/2022    Gout     History of blood clots 2013    lower part of both legs    HLD (hyperlipidemia)     Hypertension     Iron deficiency anemia     BUZZ (obstructive sleep apnea)     CPAP    Pulmonary embolism       Past Surgical History:   Procedure Laterality Date    COLONOSCOPY N/A 11/07/2016    Procedure: COLONOSCOPY;  Surgeon: Shar Weiss MD;  Location: [facility] ([address] [place] [address]);  Service: Endoscopy;  Laterality: N/A;  [facility] " Referral. Okay to hold Coumadin 5 days prior to procedure. See Referral scaned in media tab on 10/20/16.    COLONOSCOPY N/A 09/21/2017    Procedure: COLONOSCOPY;  Surgeon: Stuart Trinidad MD;  Location: [facility] (4TH FLR);  Service: Endoscopy;  Laterality: N/A;  [facility] Olivarez-see scanned docs under media tab          9/6/17-current [facility] authorization and last clinic visit scanned into chart    EPIDURAL STEROID INJECTION N/A 12/17/2020    Procedure: INJECTION, STEROID, EPIDURAL, L5-S1 clear to hold Xarelto 2 days;  Surgeon: Moy Vasquez MD;  Location: [facility] PAIN MGT;  Service: Pain Management;  Laterality: N/A;    EXCISION OF LESION OF FEMUR Right 01/04/2023    Procedure: EXCISION, LESION, FEMUR: POSTERIOR APPROACH;  Surgeon: Prieto Sinha MD;  Location: [facility] OR 2ND FLR;  Service: Orthopedics;  Laterality: Right;    HIP SURGERY Right 2008    exploratory at [facility]    HIP SURGERY Right 08/31/2015    THR    INJECTION OF ANESTHETIC AGENT AROUND NERVE Right 10/23/2019    Procedure: BLOCK, NERVE, Obturator and Femoral cleared to hold xarelto 3 days pt. said he's not taking coumadin;  Surgeon: Moy Vasquez MD;  Location: [facility] PAIN MGT;  Service: Pain Management;  Laterality: Right;    INJECTION OF ANESTHETIC AGENT AROUND NERVE Right 07/06/2020    Procedure: BLOCK, NERVE, RIGHT MBB L3,L5,L5;  Surgeon: Moy Vasquez MD;  Location: [facility] PAIN MGT;  Service: Pain Management;  Laterality: Right;  BLOCK, NERVE, RIGHT MBB L3,L5,L5    INJECTION OF ANESTHETIC AGENT AROUND NERVE Right 07/30/2020    Procedure: BLOCK, NERVE RIGHT L3, L4, L5 2ND;  Surgeon: Moy Vasquez MD;  Location: [facility] PAIN MGT;  Service: Pain Management;  Laterality: Right;  NEEDS CONSENT, XARELTO    LEG SURGERY Left 2012    fibula and tibia    RADIOFREQUENCY ABLATION Right 10/05/2020    Procedure: RADIOFREQUENCY ABLATION RIGHT L3,4,5;  Surgeon: Moy Vasquez MD;  Location: [facility] PAIN MGT;  Service: Pain Management;  Laterality: Right;   RADIOFREQUENCY ABLATION RIGHT L3,4,5  ok to hold xarelto, scanned in Media    SHOULDER SURGERY Right 1989    torn ligament    TRANSFORAMINAL EPIDURAL INJECTION OF STEROID Bilateral 12/04/2023    Procedure: LUMBAR TRANSFORAMINAL BILATERAL L4/5 DIRECT REFERRAL;  Surgeon: Moy Vasquez MD;  Location: [facility] PAIN MGT;  Service: Pain Management;  Laterality: Bilateral;    TRANSFORAMINAL EPIDURAL INJECTION OF STEROID Right 11/21/2024    Procedure: LUMBAR TRANSFORAMINAL RIGHT L1/2 AND L2/3 please add xarelto to pt's med list when admitted 11/21;  Surgeon: Moy Vasquez MD;  Location: [facility] PAIN MGT;  Service: Pain Management;  Laterality: Right;  [phone]  2 WK F/U ELEAZAR        Family Hx   Family History   Problem Relation Name Age of Onset    Cancer Mother          ovarian    Cancer Father      Cancer Sister          breast    Cancer Sister          breast    No Known Problems Brother      No Known Problems Daughter      No Known Problems Daughter      No Known Problems Daughter      No Known Problems Son      No Known Problems Son          Social Hx   Social History     Socioeconomic History    Marital status: Single   Tobacco Use    Smoking status: Former     Types: Cigars     Quit date: 8/13/2009     Years since quitting: 15.6    Smokeless tobacco: Never   Substance and Sexual Activity    Alcohol use: No     Alcohol/week: 0.0 standard drinks of alcohol    Drug use: No    Sexual activity: Not Currently        Vital Signs   Vitals:    03/16/25 2311 03/17/25 0404 03/17/25 0426 03/17/25 0520   BP: 131/60  (!) 165/91    BP Location: Right arm  Right arm    Patient Position: Sitting  Lying    Pulse: 71  (!) 53    Resp: 16 18 18 18   Temp: 98.4 °F (36.9 °C)  98.3 °F (36.8 °C)    TempSrc: Oral  Oral    SpO2: 96%  96%    Weight:       Height:            Review of Systems  Review of Systems   Musculoskeletal:  Positive for back pain and joint pain.       Brief Physical Exam/Reassessment   Physical Exam  Vitals and nursing note  reviewed.   Constitutional:       General: He is not in acute distress.     Appearance: He is well-developed and normal weight. He is not ill-appearing, toxic-appearing or diaphoretic.   HENT:      Head: Normocephalic and atraumatic.      Right Ear: External ear normal.      Left Ear: External ear normal.      Nose: Nose normal.      Mouth/Throat:      Mouth: Mucous membranes are moist.   Eyes:      Extraocular Movements: Extraocular movements intact.      Conjunctiva/sclera: Conjunctivae normal.      Pupils: Pupils are equal, round, and reactive to light.   Cardiovascular:      Rate and Rhythm: Normal rate and regular rhythm.      Heart sounds: Normal heart sounds.   Pulmonary:      Effort: Pulmonary effort is normal.      Breath sounds: Normal breath sounds.   Abdominal:      General: Bowel sounds are normal.      Palpations: Abdomen is soft.      Tenderness: There is no abdominal tenderness.   Musculoskeletal:         General: Normal range of motion.      Cervical back: Normal range of motion and neck supple.      Right lower leg: No edema.      Left lower leg: No edema.      Comments: Painful but full ROM of bilateral lower extremity. Trace edema. Neurovascularly intact.    Skin:     General: Skin is warm and dry.      Capillary Refill: Capillary refill takes less than 2 seconds.   Neurological:      General: No focal deficit present.      Mental Status: He is alert and oriented to person, place, and time.      Cranial Nerves: No cranial nerve deficit.   Psychiatric:         Mood and Affect: Mood normal. Mood is not anxious.         Behavior: Behavior normal. Behavior is not agitated.         Thought Content: Thought content normal.         Judgment: Judgment normal.         Labs/Imaging   Labs Reviewed   CBC W/ AUTO DIFFERENTIAL - Abnormal       Result Value    WBC 7.20      RBC 3.90 (*)     Hemoglobin 12.6 (*)     Hematocrit 40.7       (*)     MCH 32.3 (*)     MCHC 31.0 (*)     RDW 13.3      Platelets  329      MPV 9.0 (*)     Immature Granulocytes 0.1      Gran # (ANC) 3.6      Immature Grans (Abs) 0.01      Lymph # 2.6      Mono # 0.5      Eos # 0.4      Baso # 0.03      nRBC 0      Gran % 50.3      Lymph % 36.0      Mono % 7.1      Eosinophil % 6.1      Basophil % 0.4      Differential Method Automated     COMPREHENSIVE METABOLIC PANEL - Abnormal    Sodium 141      Potassium 3.9      Chloride 104      CO2 25      Glucose 93      BUN 36 (*)     Creatinine 2.9 (*)     Calcium 9.1      Total Protein 7.6      Albumin 3.4 (*)     Total Bilirubin 0.3      Alkaline Phosphatase 133      AST 14      ALT 12      eGFR 22.1 (*)     Anion Gap 12     C-REACTIVE PROTEIN - Abnormal    CRP 21.5 (*)    SEDIMENTATION RATE - Abnormal    Sed Rate 82 (*)    CBC W/ AUTO DIFFERENTIAL - Abnormal    WBC 6.80      RBC 3.41 (*)     Hemoglobin 11.0 (*)     Hematocrit 34.1 (*)      (*)     MCH 32.3 (*)     MCHC 32.3      RDW 13.3      Platelets 273      MPV 9.3      Immature Granulocytes 0.4      Gran # (ANC) 3.2      Immature Grans (Abs) 0.03      Lymph # 2.5      Mono # 0.6      Eos # 0.4      Baso # 0.02      nRBC 0      Gran % 47.6      Lymph % 36.8      Mono % 9.0      Eosinophil % 5.9      Basophil % 0.3      Differential Method Automated     BASIC METABOLIC PANEL - Abnormal    Sodium 141      Potassium 3.7      Chloride 109      CO2 21 (*)     Glucose 102      BUN 33 (*)     Creatinine 2.3 (*)     Calcium 7.7 (*)     Anion Gap 11      eGFR 29.3 (*)    PROCALCITONIN    Procalcitonin 0.07     MAGNESIUM    Magnesium 1.8     URINALYSIS, REFLEX TO URINE CULTURE    Specimen UA Urine, Clean Catch      Color, UA Yellow      Appearance, UA Clear      pH, UA 7.0      Specific Gravity, UA 1.010      Protein, UA Negative      Glucose, UA Negative      Ketones, UA Negative      Bilirubin (UA) Negative      Occult Blood UA Negative      Nitrite, UA Negative      Leukocytes, UA Negative      Narrative:     Specimen Source->Urine   POCT  GLUCOSE, HAND-HELD DEVICE      Imaging Results               MRI Hip Without Contrast Right (Final result)  Result time 03/17/25 01:16:30   Procedure changed from MRI Hip With Contrast Right     Final result by Thomas Head MD (03/17/25 01:16:30)                   Impression:      Limited but negative exam of the right hip for fluid collection or edema surrounding prosthesis.    Abnormal edematous signal and morphology with marrow replacement in the foot set joint on the right at L4-5.  Correlate for septic facet joint with osteomyelitis.    AVN without collapse or active edema in the left femoral head.    This report was flagged in Epic as abnormal.      Electronically signed by: Thomas Head  Date:    03/17/2025  Time:    01:16               Narrative:    EXAMINATION:  MRI HIP WITHOUT CONTRAST RIGHT    CLINICAL HISTORY:  Hip pain, infection suspected, xray done;    TECHNIQUE:  Multiplanar multisequence MR imaging of the pelvis and right hip was performed without gadolinium.    COMPARISON:  None    FINDINGS:  Metallic artifact of the right prosthetic hip limits the exam.  There is no evidence of focal fluid collection or definitive edema in the adjacent muscles or native bone.    There is abnormal signal and morphology of the facet joints at L4-5 with bone marrow edema especially in the L4 superior facet.  Edema in the adjacent paraspinous muscles on the right is noted as well.  A drainable fluid collection is not evident.  Mild impingement at the subarticular foraminal level is present but incompletely evaluated on hip MR.    Diverticulosis coli in the descending and sigmoid colon.  No fluid in the pelvis to suggest diverticulitis.    Serpiginous sclerotic lesion without collapse or edema in the left femoral head in the subcortical bone.                                        MRI Lumbar Spine Without Contrast (Final result)  Result time 03/17/25 01:33:05   Procedure changed from MRI Lumbar Spine With  Contrast     Final result by Thomas Head MD (03/17/25 01:33:05)                   Impression:      Lumbar spondylosis most advanced at L4-L5 with up to moderate neural foraminal narrowing and mild spinal canal stenosis at multiple levels.  Findings are overall stable compared to prior MRI.    New edema signal in the soft tissues at the level of the right L3-L4 and L4-L5 facets suggesting an infectious/inflammatory process.  This is especially prominent at the L4-5 facet in the L4 component with bone marrow edema.  Correlate for septic facet joint and osteomyelitis.    This report was flagged in Epic as abnormal.    Electronically signed by resident: Casper Torres  Date:    03/17/2025  Time:    01:02    Electronically signed by: Thomas Head  Date:    03/17/2025  Time:    01:33               Narrative:    EXAMINATION:  MRI LUMBAR SPINE WITHOUT CONTRAST    CLINICAL HISTORY:  Motor neuron disease suspected;    TECHNIQUE:  Multiplanar, multisequence MR images were acquired from the thoracolumbar junction to the sacrum without contrast.    COMPARISON:  MRI lumbar spine 05/29/2024    FINDINGS:  Alignment: Normal.    Vertebrae: No fracture or marrow infiltrative process. Vertebral body heights are maintained.    Discs: Mild multilevel disc height loss and desiccation.    Cord: Conus terminates at L1-L2 and appears unremarkable.  Cauda equina appears unremarkable.    Degenerative findings:    T12-L1: No spinal canal stenosis or neuroforaminal narrowing.    L1-L2: Mild facet arthropathy and ligamentum flavum hypertrophy.  No spinal canal stenosis or neuroforaminal narrowing.    L2-L3: Small circumferential disc bulge, moderate bilateral facet arthropathy and ligamentum flavum hypertrophy.  Mild spinal canal stenosis.  Mild bilateral neuroforaminal narrowing.    L3-L4: Circumferential disc bulge, moderate bilateral facet arthropathy, and ligamentum flavum hypertrophy.  Mild spinal canal stenosis.  Mild bilateral  neural foraminal narrowing.    L4-L5: Circumferential disc bulge, severe right, moderate left facet arthropathy, and ligamentum flavum hypertrophy.  Mild spinal canal stenosis.  Moderate right, mild left neural foraminal narrowing.    L5-S1: Circumferential disc bulge, moderate bilateral facet arthropathy.  No spinal canal stenosis.  Mild left neural foraminal narrowing.    Paraspinal muscles & soft tissues: STIR hyperintense signal along the right L3-L4 and L4-L5 facets.  Atrophic right kidney.  More over other appears to be bone marrow edema in the right facet L4-5 mainly in the L4 component without discrete fluid in the facet joint.                                       X-Ray Chest AP Portable (Final result)  Result time 03/16/25 22:53:37      Final result by Thomas Head MD (03/16/25 22:53:37)                   Impression:      No acute findings evident the chest.      Electronically signed by: Thomas Head  Date:    03/16/2025  Time:    22:53               Narrative:    EXAMINATION:  XR CHEST AP PORTABLE    CLINICAL HISTORY:  Dorsalgia, unspecified    TECHNIQUE:  Single frontal view of the chest was performed.    COMPARISON:  None    FINDINGS:  Heart is not enlarged the trachea is midline.  Loop recorder is noted.  Lungs are clear.  Degenerative changes asymmetric in the right shoulder.                                       X-Ray Hip 2 or 3 views Right with Pelvis when performed (Final result)  Result time 03/16/25 22:33:24      Final result by Thomas Head MD (03/16/25 22:33:24)                   Impression:      No acute findings of the prosthetic right hip to suggest fracture or loosening.      Electronically signed by: Thomas Head  Date:    03/16/2025  Time:    22:33               Narrative:    EXAMINATION:  XR HIP WITH PELVIS WHEN PERFORMED 2 OR 3 VIEWS RIGHT    CLINICAL HISTORY:  R hip pain;    TECHNIQUE:  AP view of the pelvis and frog leg lateral view of the right hip were  performed.    COMPARISON:  Of 03/13/2025    FINDINGS:  Total right hip arthroplasty changes with surgical clips in the groin again noted.  No acute fracture or dislocation or malalignment.    The pelvic ring remains intact.  Mixed sclerotic and lucent changes without collapse of the femoral head on the left is stable.                                       I reviewed all labs, imaging, EKGs.     Plan   Upgrade to hospital medicine with concern for L4-5 facet septic arthritis. Will hold on antibiotic until NSGY evaluation.     I have discussed this case with DAVID Herrera.

## 2025-03-17 NOTE — ASSESSMENT & PLAN NOTE
Macario Dior Jr. is a 74 y.o. male with PMH significant for Lumbar radiculopathy, T2DM with neuropathy, BUZZ,  prior DVT on Xarelto, HTN, CKD3, chronic pain, and R DHRUV with Dr Ochsner 8/31/15 complicated by heterotopic ossification s/p excision with Dr. Sinha 1/4/23 presenting with right low back pain. Orthopedics consulted to evaluate the patient's R DHRUV as potential etiology of his pain. The patient's pain is focal and localized to the right low back which correlates with an area of T2 hyperintensity within the R paraspinal musculature at the L4-5 level. He also has MR findings suggestive of L4-5 facet septic arthritis. He has full painless PROM of the R hip and is nontender to palpation of the hip. He has been AF and HDS. CRP 21, ESR 85, WBC 6.8. I have a low suspicion for R hip PJI and think that his pain is coming from his lumbar spine. Recommend spine surgery consult.     No acute orthopedic intervention  WBAT RLE   He may follow-up with orthopedic surgery as an outpatient.

## 2025-03-17 NOTE — SUBJECTIVE & OBJECTIVE
Past Medical History:   Diagnosis Date    Arthritis     Asthma     chronic    CKD (chronic kidney disease), stage III     patient denies    Deep vein thrombosis     Demyelinating neuropathy     polyneuropathy    Diabetes mellitus type 2 without retinopathy 12/14/2022    Gout     History of blood clots 2013    lower part of both legs    HLD (hyperlipidemia)     Hypertension     Iron deficiency anemia     BUZZ (obstructive sleep apnea)     CPAP    Pulmonary embolism        Past Surgical History:   Procedure Laterality Date    COLONOSCOPY N/A 11/07/2016    Procedure: COLONOSCOPY;  Surgeon: Shar Weiss MD;  Location: [facility] ENDO (4TH FLR);  Service: Endoscopy;  Laterality: N/A;  [facility] Referral. Okay to hold Coumadin 5 days prior to procedure. See Referral scaned in media tab on 10/20/16.    COLONOSCOPY N/A 09/21/2017    Procedure: COLONOSCOPY;  Surgeon: Stuart Trinidad MD;  Location: [facility] ENDO (4TH FLR);  Service: Endoscopy;  Laterality: N/A;  [facility]arez-see scanned docs under media tab          9/6/17-current [facility] authorization and last clinic visit scanned into chart    EPIDURAL STEROID INJECTION N/A 12/17/2020    Procedure: INJECTION, STEROID, EPIDURAL, L5-S1 clear to hold Xarelto 2 days;  Surgeon: Moy Vasquez MD;  Location: [facility] PAIN MGT;  Service: Pain Management;  Laterality: N/A;    EXCISION OF LESION OF FEMUR Right 01/04/2023    Procedure: EXCISION, LESION, FEMUR: POSTERIOR APPROACH;  Surgeon: Prieto Sinha MD;  [facility];  Service: Orthopedics;  Laterality: Right;    HIP SURGERY Right 2008    exploratory at [facility]    HIP SURGERY Right 08/31/2015    THR    INJECTION OF ANESTHETIC AGENT AROUND NERVE Right 10/23/2019    Procedure: BLOCK, NERVE, Obturator and Femoral cleared to hold xarelto 3 days pt. said he's not taking coumadin;  Surgeon: Moy Vasquez MD;  Location: [facility] PAIN MGT;  Service: Pain Management;  Laterality: Right;    INJECTION OF ANESTHETIC  AGENT AROUND NERVE Right 07/06/2020    Procedure: BLOCK, NERVE, RIGHT MBB L3,L5,L5;  Surgeon: Moy Vasquez MD;  Location: [facility] PAIN MGT;  Service: Pain Management;  Laterality: Right;  BLOCK, NERVE, RIGHT MBB L3,L5,L5    INJECTION OF ANESTHETIC AGENT AROUND NERVE Right 07/30/2020    Procedure: BLOCK, NERVE RIGHT L3, L4, L5 2ND;  Surgeon: Moy Vasquez MD;  Location: [facility] PAIN MGT;  Service: Pain Management;  Laterality: Right;  NEEDS CONSENT, XARELTO    LEG SURGERY Left 2012    fibula and tibia    RADIOFREQUENCY ABLATION Right 10/05/2020    Procedure: RADIOFREQUENCY ABLATION RIGHT L3,4,5;  Surgeon: Moy Vasquez MD;  Location: [facility] PAIN MGT;  Service: Pain Management;  Laterality: Right;  RADIOFREQUENCY ABLATION RIGHT L3,4,5  ok to hold xarelto, scanned in Media    SHOULDER SURGERY Right 1989    torn ligament    TRANSFORAMINAL EPIDURAL INJECTION OF STEROID Bilateral 12/04/2023    Procedure: LUMBAR TRANSFORAMINAL BILATERAL L4/5 DIRECT REFERRAL;  Surgeon: Moy Vasquez MD;  Location: [facility] PAIN MGT;  Service: Pain Management;  Laterality: Bilateral;    TRANSFORAMINAL EPIDURAL INJECTION OF STEROID Right 11/21/2024    Procedure: LUMBAR TRANSFORAMINAL RIGHT L1/2 AND L2/3 please add xarelto to pt's med list when admitted 11/21;  Surgeon: Moy Vasquez MD;  Location: [facility] PAIN MGT;  Service: Pain Management;  Laterality: Right;  [phone]  2 WK F/U ELEAZAR       Review of patient's allergies indicates:   Allergen Reactions    Amlodipine Swelling    Lisinopril Tinitus and Swelling    Pregabalin Swelling    Prednisone Palpitations    Azithromycin      Muscles tense up    Levetiracetam Other (See Comments)    Acetaminophen Rash     Feels like needles are sticking him per pt       No current facility-administered medications on file prior to encounter.     Current Outpatient Medications on File Prior to Encounter   Medication Sig    albuterol 90 mcg/actuation inhaler Inhale 2 puffs into the lungs every 6 (six) hours as needed for  Wheezing.     atorvastatin (LIPITOR) 80 MG tablet Take 80 mg by mouth once daily.    carvediloL (COREG) 25 MG tablet Take 1 tablet (25 mg total) by mouth 2 (two) times daily. (Patient taking differently: Take 25 mg by mouth Daily.)    chlorhexidine (PERIDEX) 0.12 % solution RINSE 1 CAPFUL BY MOUTH ONCE DAILY AS NEEDED SWISH FOR 30 SECONDS AFTER AND SPIT OUT    chlorthalidone (HYGROTEN) 25 MG Tab Take 25 mg by mouth once daily.    dulaglutide (TRULICITY) 4.5 mg/0.5 mL pen injector Inject 4.5 mg into the skin every Saturday.    eplerenone (INSPRA) 25 MG Tab Take 1 tablet by mouth once daily.    furosemide (LASIX) 40 MG tablet Take 40 mg by mouth once daily.    oxyCODONE (ROXICODONE) 10 mg Tab immediate release tablet Take 1 tablet (10 mg total) by mouth every 4 (four) hours as needed for Pain. (Patient taking differently: Take 10 mg by mouth every 6 (six) hours as needed for Pain.)    rivaroxaban (XARELTO) 15 mg Tab Take 15 mg by mouth Daily.    valsartan (DIOVAN) 320 MG tablet Take 320 mg by mouth once daily.    naloxone (NARCAN) 4 mg/actuation Spry as needed.    [DISCONTINUED] capsaicin 0.1 % Crea Apply twice to area of maximum paresthesia    [DISCONTINUED] carboxymethyl-gly-guee52-LI 0.5-1-0.5 % Dpet Place 1 drop into both eyes 4 (four) times daily.    [DISCONTINUED] carboxymethylcellulose sodium 1 % DpGe Place 0.4 mLs into both eyes 4 (four) times daily as needed.    [DISCONTINUED] CLOBETASOL PROPIONATE, BULK, MISC Apply topically once daily.    [DISCONTINUED] cyanocobalamin (VITAMIN B-12) 100 MCG tablet Take 100 mcg by mouth once daily. Take two tablets    [DISCONTINUED] cycloSPORINE (RESTASIS) 0.05 % ophthalmic emulsion INSTILL 0.4ML IN EACH EYE EVERY 12 HOURS    [DISCONTINUED] ergocalciferol (ERGOCALCIFEROL) 50,000 unit Cap Take 50,000 Units by mouth.    [DISCONTINUED] finasteride (PROSCAR) 5 mg tablet TAKE ONE TABLET BY MOUTH ONCE DAILY FOR PROSTATE    [DISCONTINUED] hydrALAZINE (APRESOLINE) 50 MG tablet TAKE  ONE TABLET BY MOUTH THREE TIMES A DAY FOR HEART/BLOOD PRESSURE    [DISCONTINUED] ketoconazole (NIZORAL) 2 % cream Apply topically 2 (two) times daily.    [DISCONTINUED] latanoprost 0.005 % ophthalmic solution Place 1 drop into both eyes every evening.    [DISCONTINUED] lidocaine (LIDODERM) 5 % Place 1 patch onto the skin once daily. Wear for 12 hours, then remove. Do not apply a new patch for at least 12 hours.    [DISCONTINUED] methocarbamoL (ROBAXIN) 750 MG Tab Take 1 tablet (750 mg total) by mouth every 8 (eight) hours as needed (muscle pain/spasm).    [DISCONTINUED] omega-3/dha/epa/fish oil (OMEGA-3 FISH OIL ORAL) 1,000 mg once daily.    [DISCONTINUED] semaglutide (OZEMPIC) 2 mg/dose (8 mg/3 mL) PnIj Inject 2 mg into the skin every 7 days.    [DISCONTINUED] thiamine 100 MG tablet Take 0.5 tablets (50 mg total) by mouth once daily.     Family History       Problem Relation (Age of Onset)    Cancer Mother, Father, Sister, Sister    No Known Problems Brother, Daughter, Daughter, Daughter, Son, Son          Tobacco Use    Smoking status: Former     Types: Cigars     Quit date: 8/13/2009     Years since quitting: 15.6    Smokeless tobacco: Never   Substance and Sexual Activity    Alcohol use: No     Alcohol/week: 0.0 standard drinks of alcohol    Drug use: No    Sexual activity: Not Currently     Review of Systems   Constitutional:  Negative for activity change, appetite change, chills, diaphoresis, fatigue, fever and unexpected weight change.   HENT:  Negative for congestion, ear pain, hearing loss, nosebleeds, sinus pressure, sore throat, trouble swallowing and voice change.    Eyes:  Negative for pain and visual disturbance.   Respiratory:  Negative for cough, shortness of breath and wheezing.    Cardiovascular:  Negative for chest pain, palpitations and leg swelling.   Gastrointestinal:  Negative for abdominal distention, abdominal pain, blood in stool, constipation, diarrhea, nausea and vomiting.    Genitourinary:  Negative for decreased urine volume, difficulty urinating, flank pain, hematuria and urgency.   Musculoskeletal:  Positive for arthralgias, back pain and gait problem. Negative for joint swelling, myalgias, neck pain and neck stiffness.   Skin:  Negative for rash and wound.   Neurological:  Negative for dizziness, facial asymmetry, weakness, light-headedness and headaches.   Hematological:  Does not bruise/bleed easily.   Psychiatric/Behavioral:  Negative for agitation and behavioral problems.      Objective:     Vital Signs (Most Recent):  Temp: 98.6 °F (37 °C) (03/17/25 0746)  Pulse: 61 (03/17/25 0746)  Resp: 16 (03/17/25 0746)  BP: (!) 145/65 (03/17/25 0746)  SpO2: 97 % (03/17/25 0746) Vital Signs (24h Range):  Temp:  [97.7 °F (36.5 °C)-98.6 °F (37 °C)] 98.6 °F (37 °C)  Pulse:  [53-83] 61  Resp:  [16-18] 16  SpO2:  [96 %-98 %] 97 %  BP: (131-165)/(58-91) 145/65     Weight: 100.2 kg (221 lb)  Body mass index is 29.16 kg/m².     Physical Exam  Constitutional:       General: He is not in acute distress.     Appearance: Normal appearance. He is normal weight. He is not ill-appearing, toxic-appearing or diaphoretic.   HENT:      Head: Normocephalic and atraumatic.      Right Ear: External ear normal.      Left Ear: External ear normal.      Nose: Nose normal. No congestion or rhinorrhea.      Mouth/Throat:      Mouth: Mucous membranes are dry.      Pharynx: Oropharynx is clear.   Eyes:      General: No scleral icterus.     Extraocular Movements: Extraocular movements intact.      Conjunctiva/sclera: Conjunctivae normal.      Pupils: Pupils are equal, round, and reactive to light.   Neck:      Vascular: No carotid bruit.   Cardiovascular:      Rate and Rhythm: Normal rate and regular rhythm.      Pulses: Normal pulses.      Heart sounds: Normal heart sounds. No murmur heard.     No friction rub. No gallop.   Pulmonary:      Effort: Pulmonary effort is normal. No respiratory distress.      Breath  sounds: Normal breath sounds. No stridor. No wheezing, rhonchi or rales.   Chest:      Chest wall: No tenderness.   Abdominal:      General: Abdomen is flat. Bowel sounds are normal. There is no distension.      Palpations: Abdomen is soft. There is no mass.      Tenderness: There is no abdominal tenderness. There is no right CVA tenderness, left CVA tenderness, guarding or rebound.      Hernia: No hernia is present.   Musculoskeletal:         General: No swelling, tenderness, deformity or signs of injury. Normal range of motion.      Cervical back: Normal range of motion and neck supple. No rigidity or tenderness.      Right lower leg: No edema.      Left lower leg: No edema.      Comments: Tender right paraspinal muscles, lumbar region   Lymphadenopathy:      Cervical: No cervical adenopathy.   Skin:     General: Skin is warm and dry.      Coloration: Skin is not jaundiced or pale.      Findings: No bruising, erythema, lesion or rash.   Neurological:      General: No focal deficit present.      Mental Status: He is alert and oriented to person, place, and time. Mental status is at baseline.      Motor: No weakness.   Psychiatric:         Mood and Affect: Mood normal.         Behavior: Behavior normal.         Thought Content: Thought content normal.         Judgment: Judgment normal.            CRANIAL NERVES     CN III, IV, VI   Pupils are equal, round, and reactive to light.       Significant Labs: All pertinent labs within the past 24 hours have been reviewed.  CBC:   Recent Labs   Lab 03/16/25 2023 03/17/25  0331   WBC 7.20 6.80   HGB 12.6* 11.0*   HCT 40.7 34.1*    273     CMP:   Recent Labs   Lab 03/16/25 2023 03/17/25  0506    141   K 3.9 3.7    109   CO2 25 21*   GLU 93 102   BUN 36* 33*   CREATININE 2.9* 2.3*   CALCIUM 9.1 7.7*   PROT 7.6  --    ALBUMIN 3.4*  --    BILITOT 0.3  --    ALKPHOS 133  --    AST 14  --    ALT 12  --    ANIONGAP 12 11       Significant Imaging: I have  reviewed all pertinent imaging results/findings within the past 24 hours.

## 2025-03-17 NOTE — PT/OT/SLP EVAL
Occupational Therapy  Co -  Evaluation with PT    Co-evaluation/treatment performed due to patient's multiple deficits requiring two skilled therapists to appropriately and safely assess patient's strength and endurance while facilitating functional tasks in addition to accommodating for patient's activity tolerance.       Name: Macario Dior Jr.  MRN: [id number]  Admitting Diagnosis: Right low back pain  Recent Surgery: * No surgery found *      Recommendations:     Discharge Recommendations: High Intensity Therapy  Discharge Equipment Recommendations:  none  Barriers to discharge:   (increased skilled A needed)    Assessment:     Macario Dior Jr. is a 74 y.o. male with a medical diagnosis of Right low back pain.  He presents with performance deficits affecting function: weakness, impaired endurance, impaired self care skills, impaired functional mobility, gait instability, impaired balance, pain, decreased safety awareness.      Pt encountered sitting EOB with good upright balance, impacted by noticeable increased pain with mobility.  Pt able to complete STS transfer with light assistance but remained in forward flexion while transitioning to stand with RW. Pt able to take steps from EOB with increased assistance though RLE began to buckle 2* pain. Pt reports fall ~3 weeks ago when LLE buckled from pain. Patient presents with good participation and motivation to return to prior level of function with high intensity therapy.  The patient demonstrates appropriate endurance to participate in up to 3 hours or 15hrs of combined therapy post acute.      Rehab Prognosis: Good; patient would benefit from acute skilled OT services to address these deficits and reach maximum level of function.       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  Patient found sitting EOB with  (no active lines) upon OT entry to room.    General Precautions: Standard, fall  Orthopedic Precautions: N/A  Braces: N/A  Respiratory Status: Room air    Occupational Performance:    Bed Mobility:    Pt sat EOB with good upright balance, occasional offloading from RLE 2* pain    Functional Mobility/Transfers:  Patient completed Sit <> Stand Transfer with contact guard assistance  with  rolling walker   Functional Mobility: Pt able to take ~4 YANA forward and backward from EOB with CGA to min A with RW 2* RLE buckling from pain    Activities of Daily Living:  Lower Body Dressing: independence donning LLE buckle    Cognitive/Visual Perceptual:  Cognitive/Psychosocial Skills:     -       Oriented to: AOX4   -       Follows Commands/attention:Follows multistep  commands  -       Communication: clear/fluent  -       Memory: No Deficits noted  -       Safety awareness/insight to disability: impaired   -       Mood/Affect/Coping skills/emotional control: Pleasant  Visual/Perceptual:      -Intact      Physical Exam:  Balance:    -       sitting: Good.  Standing: " Impaired  Postural examination/scapula alignment:    -       Rounded shoulders  Skin integrity: Visible skin intact  Edema:  None noted  Sensation:    -       Intact  Motor Planning:    -       Intact  Dominant hand:    -       right  Upper Extremity Range of Motion:     -       Right Upper Extremity: WNL  -       Left Upper Extremity: WNL  Upper Extremity Strength:    -       Right Upper Extremity: WNL  -       Left Upper Extremity: WNL   Strength:    -       Right Upper Extremity: WNL  -       Left Upper Extremity: WNL  Fine Motor Coordination:    -       Intact  Neurological:   Intact    AMPAC 6 Click ADL:  AMPAC Total Score: 18    Treatment & Education:  Pt educated on role of OT, POC, and goals for therapy.    POC was dicussed with patient/caregiver, who was included in its development and is in agreement with the identified goals and treatment plan.   Patient and family aware of patient's deficits and therapy progression.   Time provided for therapeutic counseling and discussion of health disposition.   Educated on importance of EOB/OOB mobility, maintaining routine, sitting up in chair, and maximizing independence with ADLs during admission   Pt completed ADLs and functional mobility for treatment session as noted above   Pt/caregiver verbalized understanding and expressed no further concerns/questions.  Updated communication board with level of assist required      Patient left sitting edge of bed with call button in reach and RN notified    GOALS:   Multidisciplinary Problems       Occupational Therapy Goals          Problem: Occupational Therapy    Goal Priority Disciplines Outcome Interventions   Occupational Therapy Goal     OT, PT/OT Progressing    Description: Goals to be met by: 3/31/25    Patient will increase functional independence with ADLs by performing:    LE Dressing with Contact Guard Assistance and Assistive Devices as needed.  Grooming while standing at sink with Minimal  Assistance.  Toileting from toilet with Supervision for hygiene and clothing management.   Step transfer with Contact Guard Assistance with LRAD  Toilet transfer to toilet with Contact Guard Assistance with LRAD.                         DME Justifications:   No DME needs that require clarification at this time.    History:     Past Medical History:   Diagnosis Date    Arthritis     Asthma     chronic    CKD (chronic kidney disease), stage III     patient denies    Deep vein thrombosis     Demyelinating neuropathy     polyneuropathy    Diabetes mellitus type 2 without retinopathy 12/14/2022    Gout     History of blood clots 2013    lower part of both legs    HLD (hyperlipidemia)     Hypertension     Iron deficiency anemia     BUZZ (obstructive sleep apnea)     CPAP    Pulmonary embolism          Past Surgical History:   Procedure Laterality Date    COLONOSCOPY N/A 11/07/2016    Procedure: COLONOSCOPY;  Surgeon: Shar Weiss MD;  [facility]  Service: Endoscopy;  Laterality: N/A;  [facility] Referral. Okay to hold Coumadin 5 days prior to procedure. See Referral scaned in media tab on 10/20/16.    COLONOSCOPY N/A 09/21/2017    Procedure: COLONOSCOPY;  Surgeon: Stuart Trinidad MD;  [facility]  Service: Endoscopy;  Laterality: N/A;  [facility] Olivarez-see scanned docs under media tab          9/6/17-current [facility] authorization and last clinic visit scanned into chart    EPIDURAL STEROID INJECTION N/A 12/17/2020    Procedure: INJECTION, STEROID, EPIDURAL, L5-S1 clear to hold Xarelto 2 days;  Surgeon: Moy Vasquez MD;  Location: [facility] PAIN MGT;  Service: Pain Management;  Laterality: N/A;    EXCISION OF LESION OF FEMUR Right 01/04/2023    Procedure: EXCISION, LESION, FEMUR: POSTERIOR APPROACH;  Surgeon: Prieto Sinha MD;  Location: [facility] OR [facility] FLR;  Service: Orthopedics;  Laterality: Right;    HIP SURGERY Right 2008    exploratory at [facility]    HIP SURGERY  Right 08/31/2015    THR    INJECTION OF ANESTHETIC AGENT AROUND NERVE Right 10/23/2019    Procedure: BLOCK, NERVE, Obturator and Femoral cleared to hold xarelto 3 days pt. said he's not taking coumadin;  Surgeon: Moy Vasquez MD;  Location: [facility] [place] PAIN MGT;  Service: Pain Management;  Laterality: Right;    INJECTION OF ANESTHETIC AGENT AROUND NERVE Right 07/06/2020    Procedure: BLOCK, NERVE, RIGHT MBB L3,L5,L5;  Surgeon: Moy Vasquez MD;  Location: [facility] [place] PAIN MGT;  Service: Pain Management;  Laterality: Right;  BLOCK, NERVE, RIGHT MBB L3,L5,L5    INJECTION OF ANESTHETIC AGENT AROUND NERVE Right 07/30/2020    Procedure: BLOCK, NERVE RIGHT L3, L4, L5 2ND;  Surgeon: Moy Vasquez MD;  Location: [facility] [place] PAIN MGT;  Service: Pain Management;  Laterality: Right;  NEEDS CONSENT, XARELTO    LEG SURGERY Left 2012    fibula and tibia    RADIOFREQUENCY ABLATION Right 10/05/2020    Procedure: RADIOFREQUENCY ABLATION RIGHT L3,4,5;  Surgeon: Moy Vasquez MD;  Location: [facility] [place] PAIN MGT;  Service: Pain Management;  Laterality: Right;  RADIOFREQUENCY ABLATION RIGHT L3,4,5  ok to hold xarelto, scanned in Media    SHOULDER SURGERY Right 1989    torn ligament    TRANSFORAMINAL EPIDURAL INJECTION OF STEROID Bilateral 12/04/2023    Procedure: LUMBAR TRANSFORAMINAL BILATERAL L4/5 DIRECT REFERRAL;  Surgeon: Moy Vasquez MD;  Location: [facility] [place] PAIN MGT;  Service: Pain Management;  Laterality: Bilateral;    TRANSFORAMINAL EPIDURAL INJECTION OF STEROID Right 11/21/2024    Procedure: LUMBAR TRANSFORAMINAL RIGHT L1/2 AND L2/3 please add xarelto to pt's med list when admitted 11/21;  Surgeon: Moy Vasquez MD;  Location: [facility] [place] PAIN MGT;  Service: Pain Management;  Laterality: Right;  [phone]  2 WK F/U ELEAZAR       Time Tracking:     OT Date of Treatment: 03/17/25  OT Start Time: 0940  OT Stop Time: 0955  OT Total Time (min): 15 min    Billable Minutes:Evaluation 6  Therapeutic Activity 9    3/17/2025

## 2025-03-17 NOTE — ASSESSMENT & PLAN NOTE
"Macario Dior Jr. is a 74 y.o. male with PMH of asthma, arthritis, CKD3, DM, DVT, PE on Xarelto, gout, HTN, BUZZ, R hip replacement 2016 presenting to the ED with the chief complaint of severe R lower back pain for 4 months. Also reports acute on chronic R hip pain for the past 3 months but really worsening over the last 9 days. Normally uses a walker to get around but now not able to bear any weight on his right leg. Reports having severe "stabbing" pain above his R hip whenever he puts weight onto his R leg. He also reports bilateral feet numbness at his baseline due to "neuropathy (unknown type)". He has previously seen pain management and orthopedist. Recently saw Dr. Loyola in clinic on 3/13/25 who ordered labs that showed ESR 71 and CRP 12.7 which were previously normal. Additionally XR pelvis and hips showed stable R hip arthroplasty. AVN findings of left hip. He has been taking Robaxin for muscle spasms. Denies fever, saddle anesthesia, or changes in bowel/bladder functions. No skin changes or rash. No falls or trauma. MRI Lsp 3/17 showed right L3-4, L4-5 soft tissue edema signal, c/o septic facet joint and osteo. NSGY was consulted for further evaluation and management.    Imaging:  MRI Lsp 3/17: right L3-4, L4-5 soft tissue edema signal, c/o septic facet joint and osteo     Labs:  WBC6.8, procalcitonin wnl, CRP 21.5, ESR 82    Plan:  --Patient seen by NSGY at bedside  --All labs and imaging were reviewed  --No acute surgical intervention indicated at this time  --Admit to  for infectious workup     -q4h neurochecks on floor  --Recommend IR biopsy of the lumbar lesion  --Recommend blood Cx  --Recommend ID consult for antibiotic therapy  --Rest of care per ED  --Continue to monitor clinically, notify NSGY immediately with any changes in neuro status    Discussed with on call staff Dr. Jiang  "

## 2025-03-17 NOTE — HPI
74 y.o. 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 Pain located right para spinal lumbar region, is non-radiating.  No sciatica. He denies fever, chills, cough, SOB. Weight loss, appetite loss.     In the ED, evaluation included  wbc 6.8, Hb 11, CRP 21, cr 2.9-> 2.3,  & MRI. Ortho was consulted. They do not feel that the hip is infected. They recommend a NS spine consult with concern for with edema in the right paraspinal musculature and potential R L4-5 facet septic arthritis.  His MRI resulted this morning. Radiology was concerned for R L4-5 septic facet joint with osteomyelitis.  His calcium dropped after hydration. 9.1-> 7.7  Suspect albumin was falsely high upon admission.        He was placed in OBS, Likely needs upgrade to Inpatient status

## 2025-03-17 NOTE — PLAN OF CARE
PT evaluation complete and appropriate goals established.    Problem: Physical Therapy  Goal: Physical Therapy Goal  Description: Goals to be met by: 2025     Patient will increase functional independence with mobility by performin. Supine to sit with Vernal  2. Sit to supine with Vernal  3. Sit to stand transfer with Supervision  4. Gait  x 100 feet with Stand-by Assistance using LRAD.   5. Stand for 5 minutes with Stand-by Assistance using LRAD  6. Lower extremity exercise program x15 reps per handout, with independence    Outcome: Progressing     3/17/2025

## 2025-03-17 NOTE — ASSESSMENT & PLAN NOTE
Creatine stable for now. BMP reviewed- noted Estimated Creatinine Clearance: 35.6 mL/min (A) (based on SCr of 2.3 mg/dL (H)). according to latest data. Based on current GFR, CKD stage is stage 3 - GFR 30-59.  Monitor UOP and serial BMP and adjust therapy as needed. Renally dose meds. Avoid nephrotoxic medications and procedures.

## 2025-03-18 PROBLEM — R93.7 ABNORMAL MRI, LUMBAR SPINE: Status: ACTIVE | Noted: 2025-03-18

## 2025-03-18 LAB
ACINETOBACTER CALCOACETICUS/BAUMANNII COMPLEX: NOT DETECTED
ANION GAP SERPL CALC-SCNC: 13 MMOL/L (ref 8–16)
BACTEROIDES FRAGILIS: NOT DETECTED
BASOPHILS # BLD AUTO: 0.02 K/UL (ref 0–0.2)
BASOPHILS NFR BLD: 0.4 % (ref 0–1.9)
BUN SERPL-MCNC: 30 MG/DL (ref 8–23)
CALCIUM SERPL-MCNC: 8.6 MG/DL (ref 8.7–10.5)
CANDIDA ALBICANS: NOT DETECTED
CANDIDA AURIS: NOT DETECTED
CANDIDA GLABRATA: NOT DETECTED
CANDIDA KRUSEI: NOT DETECTED
CANDIDA PARAPSILOSIS: NOT DETECTED
CANDIDA TROPICALIS: NOT DETECTED
CHLORIDE SERPL-SCNC: 109 MMOL/L (ref 95–110)
CO2 SERPL-SCNC: 19 MMOL/L (ref 23–29)
CREAT SERPL-MCNC: 2.1 MG/DL (ref 0.5–1.4)
CRYPTOCOCCUS NEOFORMANS/GATTII: NOT DETECTED
CTX-M GENE (ESBL PRODUCER): ABNORMAL
DIFFERENTIAL METHOD BLD: ABNORMAL
ENTEROBACTER CLOACAE COMPLEX: NOT DETECTED
ENTEROBACTERALES: NOT DETECTED
ENTEROCOCCUS FAECALIS: NOT DETECTED
ENTEROCOCCUS FAECIUM: NOT DETECTED
EOSINOPHIL # BLD AUTO: 0.3 K/UL (ref 0–0.5)
EOSINOPHIL NFR BLD: 5.7 % (ref 0–8)
ERYTHROCYTE [DISTWIDTH] IN BLOOD BY AUTOMATED COUNT: 13.3 % (ref 11.5–14.5)
ESCHERICHIA COLI: NOT DETECTED
EST. GFR  (NO RACE VARIABLE): 32.6 ML/MIN/1.73 M^2
GLUCOSE SERPL-MCNC: 103 MG/DL (ref 70–110)
HAEMOPHILUS INFLUENZAE: NOT DETECTED
HCT VFR BLD AUTO: 36.1 % (ref 40–54)
HGB BLD-MCNC: 11.3 G/DL (ref 14–18)
IMM GRANULOCYTES # BLD AUTO: 0.02 K/UL (ref 0–0.04)
IMM GRANULOCYTES NFR BLD AUTO: 0.4 % (ref 0–0.5)
IMP GENE (CARBAPENEM RESISTANT): ABNORMAL
KLEBSIELLA AEROGENES: NOT DETECTED
KLEBSIELLA OXYTOCA: NOT DETECTED
KLEBSIELLA PNEUMONIAE GROUP: NOT DETECTED
KPC RESISTANCE GENE (CARBAPENEM): ABNORMAL
LISTERIA MONOCYTOGENES: NOT DETECTED
LYMPHOCYTES # BLD AUTO: 2 K/UL (ref 1–4.8)
LYMPHOCYTES NFR BLD: 40 % (ref 18–48)
MAGNESIUM SERPL-MCNC: 1.8 MG/DL (ref 1.6–2.6)
MCH RBC QN AUTO: 32 PG (ref 27–31)
MCHC RBC AUTO-ENTMCNC: 31.3 G/DL (ref 32–36)
MCR-1: ABNORMAL
MCV RBC AUTO: 102 FL (ref 82–98)
MEC A/C AND MREJ (MRSA): ABNORMAL
MEC A/C: DETECTED
MONOCYTES # BLD AUTO: 0.4 K/UL (ref 0.3–1)
MONOCYTES NFR BLD: 8.3 % (ref 4–15)
NDM GENE (CARBAPENEM RESISTANT): ABNORMAL
NEISSERIA MENINGITIDIS: NOT DETECTED
NEUTROPHILS # BLD AUTO: 2.2 K/UL (ref 1.8–7.7)
NEUTROPHILS NFR BLD: 45.2 % (ref 38–73)
NRBC BLD-RTO: 0 /100 WBC
OXA-48-LIKE (CARBAPENEM RESISTANT): ABNORMAL
PHOSPHATE SERPL-MCNC: 3.5 MG/DL (ref 2.7–4.5)
PLATELET # BLD AUTO: 246 K/UL (ref 150–450)
PMV BLD AUTO: 8.9 FL (ref 9.2–12.9)
POCT GLUCOSE: 115 MG/DL (ref 70–110)
POCT GLUCOSE: 125 MG/DL (ref 70–110)
POCT GLUCOSE: 136 MG/DL (ref 70–110)
POTASSIUM SERPL-SCNC: 3.7 MMOL/L (ref 3.5–5.1)
PROTEUS SPECIES: NOT DETECTED
PSEUDOMONAS AERUGINOSA: NOT DETECTED
RBC # BLD AUTO: 3.53 M/UL (ref 4.6–6.2)
SALMONELLA SP: NOT DETECTED
SERRATIA MARCESCENS: NOT DETECTED
SODIUM SERPL-SCNC: 141 MMOL/L (ref 136–145)
STAPHYLOCOCCUS AUREUS: NOT DETECTED
STAPHYLOCOCCUS EPIDERMIDIS: DETECTED
STAPHYLOCOCCUS LUGDUNESIS: NOT DETECTED
STAPHYLOCOCCUS SPECIES: ABNORMAL
STENOTROPHOMONAS MALTOPHILIA: NOT DETECTED
STREPTOCOCCUS AGALACTIAE: NOT DETECTED
STREPTOCOCCUS PNEUMONIAE: NOT DETECTED
STREPTOCOCCUS PYOGENES: NOT DETECTED
STREPTOCOCCUS SPECIES: NOT DETECTED
VAN A/B (VRE GENE): ABNORMAL
VIM GENE (CARBAPENEM RESISTANT): ABNORMAL
WBC # BLD AUTO: 4.92 K/UL (ref 3.9–12.7)

## 2025-03-18 PROCEDURE — 94761 N-INVAS EAR/PLS OXIMETRY MLT: CPT

## 2025-03-18 PROCEDURE — 11000001 HC ACUTE MED/SURG PRIVATE ROOM

## 2025-03-18 PROCEDURE — 63600175 PHARM REV CODE 636 W HCPCS: Performed by: EMERGENCY MEDICINE

## 2025-03-18 PROCEDURE — 25000003 PHARM REV CODE 250: Performed by: PHYSICIAN ASSISTANT

## 2025-03-18 PROCEDURE — 25000003 PHARM REV CODE 250: Performed by: HOSPITALIST

## 2025-03-18 PROCEDURE — 99223 1ST HOSP IP/OBS HIGH 75: CPT | Mod: ,,, | Performed by: INTERNAL MEDICINE

## 2025-03-18 PROCEDURE — 84100 ASSAY OF PHOSPHORUS: CPT | Performed by: PHYSICIAN ASSISTANT

## 2025-03-18 PROCEDURE — 94660 CPAP INITIATION&MGMT: CPT

## 2025-03-18 PROCEDURE — 99024 POSTOP FOLLOW-UP VISIT: CPT | Mod: ,,, | Performed by: PHYSICIAN ASSISTANT

## 2025-03-18 PROCEDURE — 85025 COMPLETE CBC W/AUTO DIFF WBC: CPT | Performed by: PHYSICIAN ASSISTANT

## 2025-03-18 PROCEDURE — 5A09357 ASSISTANCE WITH RESPIRATORY VENTILATION, LESS THAN 24 CONSECUTIVE HOURS, CONTINUOUS POSITIVE AIRWAY PRESSURE: ICD-10-PCS | Performed by: HOSPITALIST

## 2025-03-18 PROCEDURE — 80048 BASIC METABOLIC PNL TOTAL CA: CPT | Performed by: PHYSICIAN ASSISTANT

## 2025-03-18 PROCEDURE — 83735 ASSAY OF MAGNESIUM: CPT | Performed by: PHYSICIAN ASSISTANT

## 2025-03-18 RX ORDER — CAPSAICIN 0.03 G/100G
CREAM TOPICAL 2 TIMES DAILY
Status: DISCONTINUED | OUTPATIENT
Start: 2025-03-18 | End: 2025-03-23 | Stop reason: HOSPADM

## 2025-03-18 RX ORDER — LIDOCAINE 50 MG/G
2 PATCH TOPICAL
Status: DISCONTINUED | OUTPATIENT
Start: 2025-03-18 | End: 2025-03-23 | Stop reason: HOSPADM

## 2025-03-18 RX ADMIN — CAPSAICIN: 0.25 CREAM TOPICAL at 09:03

## 2025-03-18 RX ADMIN — OXYCODONE HYDROCHLORIDE 10 MG: 10 TABLET ORAL at 09:03

## 2025-03-18 RX ADMIN — CAPSAICIN: 0.25 CREAM TOPICAL at 11:03

## 2025-03-18 RX ADMIN — OXYCODONE HYDROCHLORIDE 10 MG: 10 TABLET ORAL at 04:03

## 2025-03-18 RX ADMIN — HYDRALAZINE HYDROCHLORIDE 50 MG: 25 TABLET ORAL at 09:03

## 2025-03-18 RX ADMIN — LATANOPROST 1 DROP: 50 SOLUTION OPHTHALMIC at 09:03

## 2025-03-18 RX ADMIN — VALSARTAN 320 MG: 160 TABLET, FILM COATED ORAL at 08:03

## 2025-03-18 RX ADMIN — LIDOCAINE 2 PATCH: 50 PATCH CUTANEOUS at 09:03

## 2025-03-18 RX ADMIN — CARVEDILOL 25 MG: 25 TABLET, FILM COATED ORAL at 09:03

## 2025-03-18 RX ADMIN — HYDRALAZINE HYDROCHLORIDE 50 MG: 25 TABLET ORAL at 01:03

## 2025-03-18 RX ADMIN — ATORVASTATIN CALCIUM 80 MG: 40 TABLET, FILM COATED ORAL at 08:03

## 2025-03-18 RX ADMIN — MORPHINE SULFATE 4 MG: 4 INJECTION INTRAVENOUS at 09:03

## 2025-03-18 RX ADMIN — CARVEDILOL 25 MG: 25 TABLET, FILM COATED ORAL at 08:03

## 2025-03-18 RX ADMIN — HYDRALAZINE HYDROCHLORIDE 50 MG: 25 TABLET ORAL at 06:03

## 2025-03-18 RX ADMIN — MORPHINE SULFATE 4 MG: 4 INJECTION INTRAVENOUS at 10:03

## 2025-03-18 RX ADMIN — METHOCARBAMOL 750 MG: 750 TABLET ORAL at 06:03

## 2025-03-18 NOTE — ASSESSMENT & PLAN NOTE
This patient has long term use on an anticoagulant with Select Anticoagulant(s): rivaroxaban. Their long term anticoagulation will be Held or Continued: held. They are on long term anticoagulation due to Reason for Anticoagulation: DVT/PE.     - holding rivaxabaran 20 mg daily until NS eval. Pt may need precedure to drain spinal abscess.  Pt has not had clotting issues for years. He understands risk of recurrence. Plan to resume after procedure. IR consulted for aspiration/ biopsy.

## 2025-03-18 NOTE — HPI
Mr. Macario Dior is a 73 M with a past medical history that includes R hip replacement in 2016, HTN, HLD, BUZZ, CKD 3, T2DM with neuropathy, prior DVT (on Xarelto), loop recorder placement, and lumbar radiculopathy. He came to the ED on 3/16/2025 with concerns for right hip pain in addition to lower right back pain. When asked where the pain is located the patient points to his lower right back in region that is between his lumbar spine and right iliac crest. He describes the pain as sharp in nature and aggravated by movement. He denies fever at home and has been afebrile while in patient. No signs of sepsis at this time. WBC wnl. 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 MRI of the spine found similar findings with possible additional involvement of L3-L4 (though findings more prominent in L4-L5). Orthopedics was consulted to evaluate his right hip replaemcent and feel the pain is coming from his spine; recommended neurosurgery consult. Neurosurgery did not recommend any surgical intervention at this time but recommended admittance to hospital medicine with ID consult for infectious workup; additionally recommended IR biopsy of lumbar region for culture (Neuro IR planning procedure for 3/19/2025). WBC is within normal limits and patient has remained afebrile. Labs did show an elevated ESR (82)  and CRP (21.5). Not currently on antibiotics at this time.

## 2025-03-18 NOTE — ASSESSMENT & PLAN NOTE
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   Hx of DJD spine, lumbar radiculopathy    Admit to Inpatient  wbc 6.8, Hb 11, CRP 21, cr 2.9-> 2.3,    MRI noted: edema in the right paraspinal musculature and potential R L4-5 facet septic arthritis.  Appreciate Ortho consult in the ED.  Neurosurgery Consulted. May need surgical debridement or IR aspiration  Cultures  Holding antibiotics to maximize yield from cultures  Consult IR for aspiration/ biopsy. Cultures ordered for routine, aerobic, anaerobic, AFB and fungi.   ID consult placed.   Aspiration planned for 3/19 per Neuro IR. Then will start empiric antibiotics. Will need long term antibiotics, likely 6 weeks.

## 2025-03-18 NOTE — HOSPITAL COURSE
Patient admitted with left hip pain and MRI of pelvis and lumbar spine done that showed that revealed concern for left L4-L5 facet septic arthritis and osteomyelitis. Orthopedic surgery consulted and reviewed MRI and noted no evidence of left hip infection. Neurosurgery consulted concerning facet septic arthritis and felt no surgical intervention recommended IR aspiration of facet and blood cultures and Infectious disease consult. Consult to ID and IR placed. Blood culture drawn. Blood cultures grew Staph epidermis but ID felt likely just a contaminant and recommended to hold off on antibiotics until IR aspiration and to repeat blood cultures that was done. Patient went to IR and had aspiration of L4-L5 facet and bone biopsy done by IR. Patient started on empiric IV Vancomycin (pharmacy to dose) after aspiration to cover for possible Staph infection and Ceftriaxone 2 grams IV every 12 hours to cover other bacterial organisms awaiting culture results as per ID. PT/OT consulted and initially recommended high intensity but patient progressed with therapy and recommending low intensity on discharge on 3/20. Plan discharge home with  PT/OT on discharge when medically ready. Awaiting culture results and final ID recs for discharge. Pathology from bone from right L4-L5 facet returned negative for malignancy and osteomyelitis. ID gave final recs on 3/22:  Possible septic arthritis based on MRI findings. Had biopsy for path and culture. Cultures remain NGTD. Biopsy from bone without evidence of osteomyelitis.  ID favored treating conservatively as possible septic arthritis with Doxycycline 100 mg PO BID x 4 weeks. Patchapiton switched to po Doxycyline on 3/22 and IV Vancomycin and Ceftriaxone discontinued on 3/22.   ID recommended to have patient follow up with his spine specialist.  No need for ID clinic follow up.  Patient doing well on discharge. Patient discharged home in good condition on 3/23 on oral Doxycyline for 30  days with HH PT/OT. Patient requesting po Morphine for short term pain management on discharge. Patient chronically on Oxycodone at home prior to this admit. I discussed  in detail with patient and he is aware for his acute pain for now to take only the Morphine IR and not to take any of his Oxycodone that he normally takes for his chronic back pain. He is having acute pain in hospital Morphine was working better than his normal Oxycodone so we made an arrangement for short term to use Morphine po prn for now to get over his acute pain and then can go back to taking his Oxycodone for his chronic long term back pain. I instructed him not to take the 2 in combination and he is aware.

## 2025-03-18 NOTE — PROGRESS NOTES
Agree with biopsy for culture.     No acute neurosurgical intervention. Patient to be treated conservatively with targeted abx therapy per ID recs.     Neurosurgery will sign off. Please contact with any questions or concerns.     Ashley Langford PA-C   Neurosurgery  [facility]

## 2025-03-18 NOTE — CONSULTS
[facility] - Emergency Dept  Infectious Disease  Consult Note    Patient Name: Macario Dior Jr.  MRN: [id number]  Admission Date: 3/16/2025  Hospital Length of Stay: 1 days  Attending Physician: Jena Crawford MD  Primary Care Provider: [facility]     Isolation Status: No active isolations    Patient information was obtained from patient, relative(s), past medical records, ER records, and primary team.      Inpatient consult to Infectious Diseases  Consult performed by: Kellie Rich DO  Consult ordered by: Jena Crawford MD        Assessment/Plan:     Neuro  Abnormal MRI, lumbar spine  MRI hip and MRI spine reveal findings of possible facet osteomyelitis mostly in the L4-L5 region with some possible findings on L3. Patient is a febrile with a WBC wnl. When asked where the pain is located the patient points to his lower right back in region that is between his lumbar spine and right iliac crest. He describes the pain as sharp in nature and aggravated by movement. He denies fever at home and has been afebrile while in patient. No signs of sepsis at this time.     Neuro-IR is planning a biopsy on 3/19/2025 of the region seen on MRI for culture.     Plan:  -Can withhold antibiotics at this time till biopsy results return unless blood cultures show growth  -Will continue to follow blood cultures  -Will follow up on biopsy/culutre results.          Thank you for your consult. I will follow-up with patient. Please contact us if you have any additional questions.    Kellie Rich DO  Infectious Disease  [facility] - Emergency Dept    Subjective:     Principal Problem: Right low back pain    HPI: Mr. Macario Dior is a 73 M with a past medical history that includes R hip replacement in 2016, HTN, HLD, BUZZ, CKD 3, T2DM with neuropathy, prior DVT (on Xarelto), loop recorder placement, and lumbar radiculopathy. He came to the ED on 3/16/2025 with concerns for right hip pain in addition  to lower right back pain. When asked where the pain is located the patient points to his lower right back in region that is between his lumbar spine and right iliac crest. He describes the pain as sharp in nature and aggravated by movement. He denies fever at home and has been afebrile while in patient. No signs of sepsis at this time. WBC wnl. 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 MRI of the spine found similar findings with possible additional involvement of L3-L4 (though findings more prominent in L4-L5). Orthopedics was consulted to evaluate his right hip replaemcent and feel the pain is coming from his spine; recommended neurosurgery consult. Neurosurgery did not recommend any surgical intervention at this time but recommended admittance to hospital medicine with ID consult for infectious workup; additionally recommended IR biopsy of lumbar region for culture (Neuro IR planning procedure for 3/19/2025). WBC is within normal limits and patient has remained afebrile. Labs did show an elevated ESR (82)  and CRP (21.5). Not currently on antibiotics at this time.     Past Medical History:   Diagnosis Date    Arthritis     Asthma     chronic    CKD (chronic kidney disease), stage III     patient denies    Deep vein thrombosis     Demyelinating neuropathy     polyneuropathy    Diabetes mellitus type 2 without retinopathy 12/14/2022    Gout     History of blood clots 2013    lower part of both legs    HLD (hyperlipidemia)     Hypertension     Iron deficiency anemia     BUZZ (obstructive sleep apnea)     CPAP    Pulmonary embolism        Past Surgical History:   Procedure Laterality Date    COLONOSCOPY N/A 11/07/2016    Procedure: COLONOSCOPY;  Surgeon: Shar Weiss MD;  [facility] [place] [address]);  Service: Endoscopy;  Laterality: N/A;  [facility] Referral. Okay to hold Coumadin 5 days  prior to procedure. See Referral scaned in media tab on 10/20/16.    COLONOSCOPY N/A 09/21/2017    Procedure: COLONOSCOPY;  Surgeon: Stuart Trinidad MD;  Location: [facility] ENDO (4TH FLR);  Service: Endoscopy;  Laterality: N/A;  [facility] Olivarez-see scanned docs under media tab          9/6/17-current [facility] authorization and last clinic visit scanned into chart    EPIDURAL STEROID INJECTION N/A 12/17/2020    Procedure: INJECTION, STEROID, EPIDURAL, L5-S1 clear to hold Xarelto 2 days;  Surgeon: Moy Vasquez MD;  Location: [facility] PAIN MGT;  Service: Pain Management;  Laterality: N/A;    EXCISION OF LESION OF FEMUR Right 01/04/2023    Procedure: EXCISION, LESION, FEMUR: POSTERIOR APPROACH;  Surgeon: Prieto Sinha MD;  Location: [facility] OR 2ND FLR;  Service: Orthopedics;  Laterality: Right;    HIP SURGERY Right 2008    exploratory at [facility]    HIP SURGERY Right 08/31/2015    THR    INJECTION OF ANESTHETIC AGENT AROUND NERVE Right 10/23/2019    Procedure: BLOCK, NERVE, Obturator and Femoral cleared to hold xarelto 3 days pt. said he's not taking coumadin;  Surgeon: Moy Vasquez MD;  Location: [facility] PAIN MGT;  Service: Pain Management;  Laterality: Right;    INJECTION OF ANESTHETIC AGENT AROUND NERVE Right 07/06/2020    Procedure: BLOCK, NERVE, RIGHT MBB L3,L5,L5;  Surgeon: Moy Vasquez MD;  Location: [facility] PAIN MGT;  Service: Pain Management;  Laterality: Right;  BLOCK, NERVE, RIGHT MBB L3,L5,L5    INJECTION OF ANESTHETIC AGENT AROUND NERVE Right 07/30/2020    Procedure: BLOCK, NERVE RIGHT L3, L4, L5 2ND;  Surgeon: Moy Vasquez MD;  Location: [facility] PAIN MGT;  Service: Pain Management;  Laterality: Right;  NEEDS CONSENT, XARELTO    LEG SURGERY Left 2012    fibula and tibia    RADIOFREQUENCY ABLATION Right 10/05/2020    Procedure: RADIOFREQUENCY ABLATION RIGHT L3,4,5;  Surgeon: Moy Vasquez MD;  Location: [facility] PAIN MGT;  Service: Pain Management;  Laterality: Right;  RADIOFREQUENCY ABLATION RIGHT L3,4,5  ok  to hold xarelto, scanned in Media    SHOULDER SURGERY Right 1989    torn ligament    TRANSFORAMINAL EPIDURAL INJECTION OF STEROID Bilateral 12/04/2023    Procedure: LUMBAR TRANSFORAMINAL BILATERAL L4/5 DIRECT REFERRAL;  Surgeon: Moy Vasquez MD;  Location: [facility] PAIN MGT;  Service: Pain Management;  Laterality: Bilateral;    TRANSFORAMINAL EPIDURAL INJECTION OF STEROID Right 11/21/2024    Procedure: LUMBAR TRANSFORAMINAL RIGHT L1/2 AND L2/3 please add xarelto to pt's med list when admitted 11/21;  Surgeon: Moy Vasquez MD;  Location: [facility] PAIN MGT;  Service: Pain Management;  Laterality: Right;  [phone]  2 WK F/U ELEAZAR       Review of patient's allergies indicates:   Allergen Reactions    Amlodipine Swelling    Lisinopril Tinitus and Swelling    Pregabalin Swelling    Prednisone Palpitations    Azithromycin      Muscles tense up    Levetiracetam Other (See Comments)    Acetaminophen Rash     Feels like needles are sticking him per pt       Medications:  (Not in a hospital admission)    Antibiotics (From admission, onward)      None          Antifungals (From admission, onward)      None          Antivirals (From admission, onward)      None             Immunization History   Administered Date(s) Administered    COVID-19, mRNA, LNP-S, PF, franco-sucrose, 30 mcg/0.3 mL (Pfizer Ages 12+) 01/23/2024, 10/04/2024       Family History       Problem Relation (Age of Onset)    Cancer Mother, Father, Sister, Sister    No Known Problems Brother, Daughter, Daughter, Daughter, Son, Son          Social History     Socioeconomic History    Marital status: Single   Tobacco Use    Smoking status: Former     Types: Cigars     Quit date: 8/13/2009     Years since quitting: 15.6    Smokeless tobacco: Never   Substance and Sexual Activity    Alcohol use: No     Alcohol/week: 0.0 standard drinks of alcohol    Drug use: No    Sexual activity: Not Currently     Social Drivers of Health     Financial Resource Strain: Low Risk  (3/17/2025)     Overall Financial Resource Strain (CARDIA)     Difficulty of Paying Living Expenses: Not very hard   Food Insecurity: No Food Insecurity (3/17/2025)    Hunger Vital Sign     Worried About Running Out of Food in the Last Year: Never true     Ran Out of Food in the Last Year: Never true   Physical Activity: Inactive (3/17/2025)    Exercise Vital Sign     Days of Exercise per Week: 0 days     Minutes of Exercise per Session: 0 min   Stress: No Stress Concern Present (3/17/2025)    [employer] - Occupational Stress Questionnaire     Feeling of Stress : Not at all   Housing Stability: Low Risk  (3/17/2025)    Housing Stability Vital Sign     Unable to Pay for Housing in the Last Year: No     Homeless in the Last Year: No     Review of Systems   Constitutional:  Negative for chills, diaphoresis and fever.   HENT:  Negative for congestion, ear pain, rhinorrhea, sinus pressure and sore throat.    Respiratory:  Negative for cough and shortness of breath.    Cardiovascular:  Negative for chest pain.   Gastrointestinal:  Negative for abdominal pain, blood in stool, constipation, diarrhea, nausea and vomiting.   Musculoskeletal:  Positive for arthralgias and myalgias.        Pain in area between his lower lumbar spine and right iliac crest with movement.    Neurological:  Negative for headaches.   Psychiatric/Behavioral:  Negative for confusion.      Objective:     Vital Signs (Most Recent):  Temp: 98.4 °F (36.9 °C) (03/18/25 1151)  Pulse: 69 (03/18/25 1151)  Resp: 18 (03/18/25 0937)  BP: 128/67 (03/18/25 1151)  SpO2: 97 % (03/18/25 1151) Vital Signs (24h Range):  Temp:  [97.6 °F (36.4 °C)-98.9 °F (37.2 °C)] 98.4 °F (36.9 °C)  Pulse:  [59-77] 69  Resp:  [18-20] 18  SpO2:  [97 %-98 %] 97 %  BP: (128-156)/(52-81) 128/67     Weight: 100.2 kg (221 lb)  Body mass index is 29.16 kg/m².    Estimated Creatinine Clearance: 39 mL/min (A) (based on SCr of 2.1 mg/dL (H)).     Physical Exam  Constitutional:        General: He is not in acute distress.     Appearance: Normal appearance. He is not toxic-appearing.   HENT:      Head: Normocephalic and atraumatic.   Eyes:      General: No scleral icterus.  Cardiovascular:      Rate and Rhythm: Normal rate and regular rhythm.      Heart sounds: No murmur heard.  Pulmonary:      Effort: Pulmonary effort is normal.      Breath sounds: Normal breath sounds.   Musculoskeletal:         General: Tenderness present.      Right lower leg: No edema.      Left lower leg: No edema.      Comments: Area between lower lumbar spine and right iliac crest is tender to palpation without overt signs of erythema or fluctuance; area is tight to the touch when compared to the left side.    Skin:     General: Skin is warm and dry.   Neurological:      Mental Status: He is alert and oriented to person, place, and time.          Significant Labs: All pertinent labs within the past 24 hours have been reviewed.    Significant Imaging: I have reviewed all pertinent imaging results/findings within the past 24 hours.

## 2025-03-18 NOTE — SUBJECTIVE & OBJECTIVE
Interval History: see above    Review of Systems   Constitutional:  Positive for activity change and chills. Negative for fatigue.   HENT:  Negative for trouble swallowing.    Respiratory:  Negative for cough and shortness of breath.    Cardiovascular:  Negative for chest pain and leg swelling.   Gastrointestinal:  Negative for abdominal pain, constipation, diarrhea and nausea.   Genitourinary:  Negative for difficulty urinating.   Musculoskeletal:  Positive for arthralgias and back pain (right lumbar region). Negative for neck stiffness.   Skin:  Negative for rash.   Neurological:  Negative for headaches.   Psychiatric/Behavioral:  Negative for behavioral problems.      Objective:     Vital Signs (Most Recent):  Temp: 98.6 °F (37 °C) (03/18/25 0755)  Pulse: 73 (03/18/25 0755)  Resp: 18 (03/18/25 0438)  BP: 137/73 (03/18/25 0755)  SpO2: 98 % (03/18/25 0755) Vital Signs (24h Range):  Temp:  [97.6 °F (36.4 °C)-98.9 °F (37.2 °C)] 98.6 °F (37 °C)  Pulse:  [56-77] 73  Resp:  [16-20] 18  SpO2:  [97 %-98 %] 98 %  BP: (131-156)/(52-81) 137/73     Weight: 100.2 kg (221 lb)  Body mass index is 29.16 kg/m².  No intake or output data in the 24 hours ending 03/18/25 0916      Physical Exam  Constitutional:       General: He is not in acute distress.     Appearance: He is not toxic-appearing.   HENT:      Head: Normocephalic and atraumatic.      Nose: Nose normal.      Mouth/Throat:      Mouth: Mucous membranes are moist.      Pharynx: Oropharynx is clear.   Eyes:      General: No scleral icterus.     Extraocular Movements: Extraocular movements intact.      Conjunctiva/sclera: Conjunctivae normal.      Pupils: Pupils are equal, round, and reactive to light.   Cardiovascular:      Rate and Rhythm: Normal rate and regular rhythm.      Pulses: Normal pulses.      Heart sounds: Normal heart sounds.   Pulmonary:      Effort: Pulmonary effort is normal. No respiratory distress.      Breath sounds: Normal breath sounds. No stridor. No  wheezing, rhonchi or rales.   Chest:      Chest wall: No tenderness.   Abdominal:      General: Abdomen is flat. Bowel sounds are normal. There is no distension.      Palpations: Abdomen is soft.      Tenderness: There is no abdominal tenderness. There is no right CVA tenderness, left CVA tenderness or guarding.   Musculoskeletal:         General: No swelling, tenderness, deformity or signs of injury. Normal range of motion.      Cervical back: Normal range of motion and neck supple. No rigidity or tenderness.      Right lower leg: No edema.      Left lower leg: No edema.      Comments: Tenderness right lumbar paraspinal muscles   Skin:     General: Skin is warm and dry.      Coloration: Skin is not jaundiced.      Findings: No erythema or rash.   Neurological:      General: No focal deficit present.      Mental Status: He is alert.      Motor: No weakness.      Gait: Gait normal.               Significant Labs: All pertinent labs within the past 24 hours have been reviewed.  CBC:   Recent Labs   Lab 03/16/25 2023 03/17/25  0331 03/18/25  0422   WBC 7.20 6.80 4.92   HGB 12.6* 11.0* 11.3*   HCT 40.7 34.1* 36.1*    273 246     CMP:   Recent Labs   Lab 03/16/25 2023 03/17/25  0506 03/17/25  1410 03/18/25  0422    141 138 141   K 3.9 3.7 4.8 3.7    109 107 109   CO2 25 21* 22* 19*   GLU 93 102 137* 103   BUN 36* 33* 35* 30*   CREATININE 2.9* 2.3* 2.4* 2.1*   CALCIUM 9.1 7.7* 8.4* 8.6*   PROT 7.6  --  6.9  --    ALBUMIN 3.4*  --  2.8*  --    BILITOT 0.3  --  0.3  --    ALKPHOS 133  --  113  --    AST 14  --  16  --    ALT 12  --  10  --    ANIONGAP 12 11 9 13       Significant Imaging: I have reviewed all pertinent imaging results/findings within the past 24 hours.

## 2025-03-18 NOTE — ASSESSMENT & PLAN NOTE
Hypocalcemia is likely due to rehydration, CKD, has hx of secondary hyperparathyroidism. . The most recent calcium and albumin results listed below.  Recent Labs     03/16/25 2023 03/17/25  0506 03/17/25  1410 03/18/25  0422   CALCIUM 9.1 7.7* 8.4* 8.6*   ALBUMIN 3.4*  --  2.8*  --    CAION  --   --  1.04*  --      Plan  - Will replace calcium and monitor electrolytes closely.  - The patient's hypocalcemia is worsening. Will repeat lab  - Hypocalcemia:  His calcium dropped after hydration. 9.1-> 7.7  Suspect albumin was falsely high upon admission.  Continue ergocalciferol 50,000 weekly.  Vid d level. Repeat lab this afternoon.

## 2025-03-18 NOTE — ASSESSMENT & PLAN NOTE
Patient's blood pressure range in the last 24 hours was: BP  Min: 131/62  Max: 156/52.The patient's inpatient anti-hypertensive regimen is listed below:    Home meds:  coreg, hydralazine and valsartan    Current Antihypertensives  carvediloL tablet 25 mg, 2 times daily, Oral  hydrALAZINE tablet 50 mg, Every 8 hours, Oral  valsartan tablet 320 mg, Daily, Oral  , Daily, Oral    Plan  - BP is controlled, no changes needed to their regimen  - monitor and adjust daily.   - would continue BB through surgery/ procedure.

## 2025-03-18 NOTE — PROGRESS NOTES
[facility] - Emergency Dept  Hospital Medicine  Progress Note    Patient Name: Macario Dior Jr.  MRN: [id number]  Patient Class: IP- Inpatient   Admission Date: 3/16/2025  Length of Stay: 1 days  Attending Physician: Jena Crawford MD  Primary Care Provider: [facility]        Subjective     Principal Problem:Right low back pain        HPI:  74 y.o. 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 Pain located right para spinal lumbar region, is non-radiating.  No sciatica. He denies fever, chills, cough, SOB. Weight loss, appetite loss.     In the ED, evaluation included  wbc 6.8, Hb 11, CRP 21, cr 2.9-> 2.3,  & MRI. Ortho was consulted. They do not feel that the hip is infected. They recommend a NS spine consult with concern for with edema in the right paraspinal musculature and potential R L4-5 facet septic arthritis.  His MRI resulted this morning. Radiology was concerned for R L4-5 septic facet joint with osteomyelitis.  His calcium dropped after hydration. 9.1-> 7.7  Suspect albumin was falsely high upon admission.        He was placed in OBS, Likely needs upgrade to Inpatient status        Overview/Hospital Course:  3/18- plan for Neuro IR aspiration on Wednesday. Holding xarelto 48 hours. Holding antibiotics until after procedure to increase culture yield. May start antibiotics  if VSS change of signs of sepsis, /73   Pulse 73   Temp 98.6 °F Resp 18 SpO2 98% .  Cr 2.9-> 2.4 (likely at baseline).       Interval History: see above    Review of Systems   Constitutional:  Positive for activity change and chills. Negative for fatigue.   HENT:  Negative for trouble swallowing.    Respiratory:  Negative for cough and shortness of breath.    Cardiovascular:  Negative for chest pain and leg swelling.   Gastrointestinal:  Negative for abdominal pain,  constipation, diarrhea and nausea.   Genitourinary:  Negative for difficulty urinating.   Musculoskeletal:  Positive for arthralgias and back pain (right lumbar region). Negative for neck stiffness.   Skin:  Negative for rash.   Neurological:  Negative for headaches.   Psychiatric/Behavioral:  Negative for behavioral problems.      Objective:     Vital Signs (Most Recent):  Temp: 98.6 °F (37 °C) (03/18/25 0755)  Pulse: 73 (03/18/25 0755)  Resp: 18 (03/18/25 0438)  BP: 137/73 (03/18/25 0755)  SpO2: 98 % (03/18/25 0755) Vital Signs (24h Range):  Temp:  [97.6 °F (36.4 °C)-98.9 °F (37.2 °C)] 98.6 °F (37 °C)  Pulse:  [56-77] 73  Resp:  [16-20] 18  SpO2:  [97 %-98 %] 98 %  BP: (131-156)/(52-81) 137/73     Weight: 100.2 kg (221 lb)  Body mass index is 29.16 kg/m².  No intake or output data in the 24 hours ending 03/18/25 0916      Physical Exam  Constitutional:       General: He is not in acute distress.     Appearance: He is not toxic-appearing.   HENT:      Head: Normocephalic and atraumatic.      Nose: Nose normal.      Mouth/Throat:      Mouth: Mucous membranes are moist.      Pharynx: Oropharynx is clear.   Eyes:      General: No scleral icterus.     Extraocular Movements: Extraocular movements intact.      Conjunctiva/sclera: Conjunctivae normal.      Pupils: Pupils are equal, round, and reactive to light.   Cardiovascular:      Rate and Rhythm: Normal rate and regular rhythm.      Pulses: Normal pulses.      Heart sounds: Normal heart sounds.   Pulmonary:      Effort: Pulmonary effort is normal. No respiratory distress.      Breath sounds: Normal breath sounds. No stridor. No wheezing, rhonchi or rales.   Chest:      Chest wall: No tenderness.   Abdominal:      General: Abdomen is flat. Bowel sounds are normal. There is no distension.      Palpations: Abdomen is soft.      Tenderness: There is no abdominal tenderness. There is no right CVA tenderness, left CVA tenderness or guarding.   Musculoskeletal:          General: No swelling, tenderness, deformity or signs of injury. Normal range of motion.      Cervical back: Normal range of motion and neck supple. No rigidity or tenderness.      Right lower leg: No edema.      Left lower leg: No edema.      Comments: Tenderness right lumbar paraspinal muscles   Skin:     General: Skin is warm and dry.      Coloration: Skin is not jaundiced.      Findings: No erythema or rash.   Neurological:      General: No focal deficit present.      Mental Status: He is alert.      Motor: No weakness.      Gait: Gait normal.               Significant Labs: All pertinent labs within the past 24 hours have been reviewed.  CBC:   Recent Labs   Lab 03/16/25 2023 03/17/25  0331 03/18/25  0422   WBC 7.20 6.80 4.92   HGB 12.6* 11.0* 11.3*   HCT 40.7 34.1* 36.1*    273 246     CMP:   Recent Labs   Lab 03/16/25 2023 03/17/25  0506 03/17/25  1410 03/18/25  0422    141 138 141   K 3.9 3.7 4.8 3.7    109 107 109   CO2 25 21* 22* 19*   GLU 93 102 137* 103   BUN 36* 33* 35* 30*   CREATININE 2.9* 2.3* 2.4* 2.1*   CALCIUM 9.1 7.7* 8.4* 8.6*   PROT 7.6  --  6.9  --    ALBUMIN 3.4*  --  2.8*  --    BILITOT 0.3  --  0.3  --    ALKPHOS 133  --  113  --    AST 14  --  16  --    ALT 12  --  10  --    ANIONGAP 12 11 9 13       Significant Imaging: I have reviewed all pertinent imaging results/findings within the past 24 hours.      Assessment & Plan  Right low back pain  Concern for Septic Arthritis  with RIGHT L4-5 septic facet joint with osteomyelitis.   Hx of DJD spine, lumbar radiculopathy    Admit to Inpatient  wbc 6.8, Hb 11, CRP 21, cr 2.9-> 2.3,    MRI noted: edema in the right paraspinal musculature and potential R L4-5 facet septic arthritis.  Appreciate Ortho consult in the ED.  Neurosurgery Consulted. May need surgical debridement or IR aspiration  Cultures  Holding antibiotics to maximize yield from cultures  Consult IR for aspiration/ biopsy. Cultures ordered for routine, aerobic,  anaerobic, AFB and fungi.   ID consult placed.   Aspiration planned for 3/19 per Neuro IR. Then will start empiric antibiotics. Will need long term antibiotics, likely 6 weeks.     Osteoarthritis of spine with radiculopathy, lumbar region  See RIGHT low back pain, Pain management  Long term (current) use of anticoagulants  This patient has long term use on an anticoagulant with Select Anticoagulant(s): rivaroxaban . Their long term anticoagulation will be Held or Continued: held. They are on long term anticoagulation due to Reason for Anticoagulation: DVT/PE.     - holding rivaxabaran 20 mg daily until NS eval. Pt may need precedure to drain spinal abscess.  Pt has not had clotting issues for years. He understands risk of recurrence. Plan to resume after procedure. IR consulted for aspiration/ biopsy.   CKD (chronic kidney disease) stage 3, GFR 30-59 ml/min  Creatine stable for now. BMP reviewed- noted Estimated Creatinine Clearance: 39 mL/min (A) (based on SCr of 2.1 mg/dL (H)). according to latest data. Based on current GFR, CKD stage is stage 3 - GFR 30-59.  Monitor UOP and serial BMP and adjust therapy as needed. Renally dose meds. Avoid nephrotoxic medications and procedures.  Diabetes mellitus type 2 without retinopathy  Patient's FSGs are controlled on current medication regimen.  Home meds : ronalutkt  Holding home meds  - Working to optimize BG control  - SSI provided for corrective dosing  - POCT glucose checks as ordered  - Hypoglycemic protocol in effect  - Diabetic diet provided    Last A1c reviewed-   Lab Results   Component Value Date    HGBA1C 6.7 (H) 12/21/2022     Most recent fingerstick glucose reviewed-   Recent Labs   Lab 03/18/25  0736   POCTGLUCOSE 115*     Current correctional scale  Low  Maintain anti-hyperglycemic dose as follows-   Antihyperglycemics (From admission, onward)      Start     Stop Route Frequency Ordered    03/16/25 7166  insulin aspart U-100 pen 0-5 Units         -- SubQ  Before meals & nightly PRN 03/16/25 2148          Hold Oral hypoglycemics while patient is in the hospital.  Essential hypertension  Patient's blood pressure range in the last 24 hours was: BP  Min: 131/62  Max: 156/52.The patient's inpatient anti-hypertensive regimen is listed below:    Home meds:  coreg, hydralazine and valsartan    Current Antihypertensives  carvediloL tablet 25 mg, 2 times daily, Oral  hydrALAZINE tablet 50 mg, Every 8 hours, Oral  valsartan tablet 320 mg, Daily, Oral  , Daily, Oral    Plan  - BP is controlled, no changes needed to their regimen  - monitor and adjust daily.   - would continue BB through surgery/ procedure.   Hypocalcemia  Hypocalcemia is likely due to  rehydration, CKD, has hx of secondary hyperparathyroidism.  . The most recent calcium and albumin results listed below.  Recent Labs     03/16/25 2023 03/17/25  0506 03/17/25  1410 03/18/25  0422   CALCIUM 9.1 7.7* 8.4* 8.6*   ALBUMIN 3.4*  --  2.8*  --    CAION  --   --  1.04*  --      Plan  - Will replace calcium and monitor electrolytes closely.  - The patient's hypocalcemia is worsening. Will repeat lab  - Hypocalcemia:  His calcium dropped after hydration. 9.1-> 7.7  Suspect albumin was falsely high upon admission.  Continue ergocalciferol 50,000 weekly.  Vid d level. Repeat lab this afternoon.       BUZZ (obstructive sleep apnea)  Continue Home CPAP, Respiratory to adjust settings    Chronic pain  Pain management  Continue home meds.   Home meds:  Robaxan , oxycodone 10 , capasin, lidoderm.   Avoid NSAID due to CKD.     Scheduled:  Lidoderm patch daily  Capasin gel bid    PRN:  Oxycodone 10 q 6  Morhine 4 mg IV   Robaxan 750     Inpatient Morphine Milligram Equivalents Per Day 3/16 - 3/19    Values displayed are in units of MME/Day     Order Start / End Date 3/16 Yesterday Today Tomorrow     HYDROmorphone injection 0.5 mg 3/16 - 3/16 10 of 10 -- -- --     oxyCODONE immediate release tablet 5 mg 3/16 - 3/16 7.5 of 7.5 -- --  --     oxyCODONE immediate release tablet Tab 10 mg 3/16 - No end date 0 of 15 30 of 60 15 of 60 0 of 60     morphine injection 4 mg 3/17 - No end date -- 24 of 60 0 of 72 0 of 72     Daily Totals  17.5 of 32.5 54 of 120 15 of 132 0 of 132              Abnormal MRI, lumbar spine  Abscess   IR aspiration planned 1/19.     VTE Risk Mitigation (From admission, onward)           Ordered     IP VTE HIGH RISK PATIENT  Once         03/16/25 2148                    Discharge Planning   GERRI: 3/20/2025     Code Status: Full Code   Medical Readiness for Discharge Date:   Discharge Plan A: Home, Home with family        Jena Crawford MD  Department of Hospital Medicine   [facility] - Emergency Dept

## 2025-03-18 NOTE — ASSESSMENT & PLAN NOTE
Patient's FSGs are controlled on current medication regimen.  Home meds : seiglutide  Holding home meds  - Working to optimize BG control  - SSI provided for corrective dosing  - POCT glucose checks as ordered  - Hypoglycemic protocol in effect  - Diabetic diet provided    Last A1c reviewed-   Lab Results   Component Value Date    HGBA1C 6.7 (H) 12/21/2022     Most recent fingerstick glucose reviewed-   Recent Labs   Lab 03/18/25  0736   POCTGLUCOSE 115*     Current correctional scale  Low  Maintain anti-hyperglycemic dose as follows-   Antihyperglycemics (From admission, onward)      Start     Stop Route Frequency Ordered    03/16/25 2248  insulin aspart U-100 pen 0-5 Units         -- SubQ Before meals & nightly PRN 03/16/25 2148          Hold Oral hypoglycemics while patient is in the hospital.

## 2025-03-18 NOTE — SUBJECTIVE & OBJECTIVE
Past Medical History:   Diagnosis Date    Arthritis     Asthma     chronic    CKD (chronic kidney disease), stage III     patient denies    Deep vein thrombosis     Demyelinating neuropathy     polyneuropathy    Diabetes mellitus type 2 without retinopathy 12/14/2022    Gout     History of blood clots 2013    lower part of both legs    HLD (hyperlipidemia)     Hypertension     Iron deficiency anemia     BUZZ (obstructive sleep apnea)     CPAP    Pulmonary embolism        Past Surgical History:   Procedure Laterality Date    COLONOSCOPY N/A 11/07/2016    Procedure: COLONOSCOPY;  Surgeon: Shar Weiss MD;  Location: [facility] ENDO (4TH FLR);  Service: Endoscopy;  Laterality: N/A;  [facility] Referral. Okay to hold Coumadin 5 days prior to procedure. See Referral scaned in media tab on 10/20/16.    COLONOSCOPY N/A 09/21/2017    Procedure: COLONOSCOPY;  Surgeon: Stuart Trinidad MD;  Location: [facility] ENDO (4TH FLR);  Service: Endoscopy;  Laterality: N/A;  [facility] from [facility] from Dr. Olivarez-see scanned docs under media tab          9/6/17-current [facility] authorization and last clinic visit scanned into chart    EPIDURAL STEROID INJECTION N/A 12/17/2020    Procedure: INJECTION, STEROID, EPIDURAL, L5-S1 clear to hold Xarelto 2 days;  Surgeon: Moy Vasquez MD;  Location: [facility] PAIN MGT;  Service: Pain Management;  Laterality: N/A;    EXCISION OF LESION OF FEMUR Right 01/04/2023    Procedure: EXCISION, LESION, FEMUR: POSTERIOR APPROACH;  Surgeon: Prieto Sinha MD;  [facility];  Service: Orthopedics;  Laterality: Right;    HIP SURGERY Right 2008    exploratory at [facility]    HIP SURGERY Right 08/31/2015    THR    INJECTION OF ANESTHETIC AGENT AROUND NERVE Right 10/23/2019    Procedure: BLOCK, NERVE, Obturator and Femoral cleared to hold xarelto 3 days pt. said he's not taking coumadin;  Surgeon: Moy Vasquez MD;  Location: [facility] PAIN MGT;  Service: Pain Management;  Laterality: Right;    INJECTION OF ANESTHETIC  AGENT AROUND NERVE Right 07/06/2020    Procedure: BLOCK, NERVE, RIGHT MBB L3,L5,L5;  Surgeon: Moy Vasquez MD;  Location: [facility] PAIN MGT;  Service: Pain Management;  Laterality: Right;  BLOCK, NERVE, RIGHT MBB L3,L5,L5    INJECTION OF ANESTHETIC AGENT AROUND NERVE Right 07/30/2020    Procedure: BLOCK, NERVE RIGHT L3, L4, L5 2ND;  Surgeon: Moy Vasquez MD;  Location: [facility] PAIN MGT;  Service: Pain Management;  Laterality: Right;  NEEDS CONSENT, XARELTO    LEG SURGERY Left 2012    fibula and tibia    RADIOFREQUENCY ABLATION Right 10/05/2020    Procedure: RADIOFREQUENCY ABLATION RIGHT L3,4,5;  Surgeon: Moy Vasquez MD;  Location: [facility] PAIN MGT;  Service: Pain Management;  Laterality: Right;  RADIOFREQUENCY ABLATION RIGHT L3,4,5  ok to hold xarelto, scanned in Media    SHOULDER SURGERY Right 1989    torn ligament    TRANSFORAMINAL EPIDURAL INJECTION OF STEROID Bilateral 12/04/2023    Procedure: LUMBAR TRANSFORAMINAL BILATERAL L4/5 DIRECT REFERRAL;  Surgeon: Moy Vasquez MD;  Location: [facility] PAIN MGT;  Service: Pain Management;  Laterality: Bilateral;    TRANSFORAMINAL EPIDURAL INJECTION OF STEROID Right 11/21/2024    Procedure: LUMBAR TRANSFORAMINAL RIGHT L1/2 AND L2/3 please add xarelto to pt's med list when admitted 11/21;  Surgeon: Moy Vasquez MD;  Location: [facility] PAIN MGT;  Service: Pain Management;  Laterality: Right;  [phone]  2 WK F/U ELEAZAR       Review of patient's allergies indicates:   Allergen Reactions    Amlodipine Swelling    Lisinopril Tinitus and Swelling    Pregabalin Swelling    Prednisone Palpitations    Azithromycin      Muscles tense up    Levetiracetam Other (See Comments)    Acetaminophen Rash     Feels like needles are sticking him per pt       Medications:  (Not in a hospital admission)    Antibiotics (From admission, onward)      None          Antifungals (From admission, onward)      None          Antivirals (From admission, onward)      None             Immunization History   Administered  Date(s) Administered    COVID-19, mRNA, LNP-S, PF, franco-sucrose, 30 mcg/0.3 mL (Pfizer Ages 12+) 01/23/2024, 10/04/2024       Family History       Problem Relation (Age of Onset)    Cancer Mother, Father, Sister, Sister    No Known Problems Brother, Daughter, Daughter, Daughter, Son, Son          Social History     Socioeconomic History    Marital status: Single   Tobacco Use    Smoking status: Former     Types: Cigars     Quit date: 8/13/2009     Years since quitting: 15.6    Smokeless tobacco: Never   Substance and Sexual Activity    Alcohol use: No     Alcohol/week: 0.0 standard drinks of alcohol    Drug use: No    Sexual activity: Not Currently     Social Drivers of Health     Financial Resource Strain: Low Risk  (3/17/2025)    Overall Financial Resource Strain (CARDIA)     Difficulty of Paying Living Expenses: Not very hard   Food Insecurity: No Food Insecurity (3/17/2025)    Hunger Vital Sign     Worried About Running Out of Food in the Last Year: Never true     Ran Out of Food in the Last Year: Never true   Physical Activity: Inactive (3/17/2025)    Exercise Vital Sign     Days of Exercise per Week: 0 days     Minutes of Exercise per Session: 0 min   Stress: No Stress Concern Present (3/17/2025)    [facility] - Occupational Stress Questionnaire     Feeling of Stress : Not at all   Housing Stability: Low Risk  (3/17/2025)    Housing Stability Vital Sign     Unable to Pay for Housing in the Last Year: No     Homeless in the Last Year: No     Review of Systems   Constitutional:  Negative for chills, diaphoresis and fever.   HENT:  Negative for congestion, ear pain, rhinorrhea, sinus pressure and sore throat.    Respiratory:  Negative for cough and shortness of breath.    Cardiovascular:  Negative for chest pain.   Gastrointestinal:  Negative for abdominal pain, blood in stool, constipation, diarrhea, nausea and vomiting.   Musculoskeletal:  Positive for arthralgias and myalgias.         Pain in area between his lower lumbar spine and right iliac crest with movement.    Neurological:  Negative for headaches.   Psychiatric/Behavioral:  Negative for confusion.      Objective:     Vital Signs (Most Recent):  Temp: 98.4 °F (36.9 °C) (03/18/25 1151)  Pulse: 69 (03/18/25 1151)  Resp: 18 (03/18/25 0937)  BP: 128/67 (03/18/25 1151)  SpO2: 97 % (03/18/25 1151) Vital Signs (24h Range):  Temp:  [97.6 °F (36.4 °C)-98.9 °F (37.2 °C)] 98.4 °F (36.9 °C)  Pulse:  [59-77] 69  Resp:  [18-20] 18  SpO2:  [97 %-98 %] 97 %  BP: (128-156)/(52-81) 128/67     Weight: 100.2 kg (221 lb)  Body mass index is 29.16 kg/m².    Estimated Creatinine Clearance: 39 mL/min (A) (based on SCr of 2.1 mg/dL (H)).     Physical Exam  Constitutional:       General: He is not in acute distress.     Appearance: Normal appearance. He is not toxic-appearing.   HENT:      Head: Normocephalic and atraumatic.   Eyes:      General: No scleral icterus.  Cardiovascular:      Rate and Rhythm: Normal rate and regular rhythm.      Heart sounds: No murmur heard.  Pulmonary:      Effort: Pulmonary effort is normal.      Breath sounds: Normal breath sounds.   Musculoskeletal:         General: Tenderness present.      Right lower leg: No edema.      Left lower leg: No edema.      Comments: Area between lower lumbar spine and right iliac crest is tender to palpation without overt signs of erythema or fluctuance; area is tight to the touch when compared to the left side.    Skin:     General: Skin is warm and dry.   Neurological:      Mental Status: He is alert and oriented to person, place, and time.          Significant Labs: All pertinent labs within the past 24 hours have been reviewed.    Significant Imaging: I have reviewed all pertinent imaging results/findings within the past 24 hours.

## 2025-03-18 NOTE — ASSESSMENT & PLAN NOTE
Creatine stable for now. BMP reviewed- noted Estimated Creatinine Clearance: 39 mL/min (A) (based on SCr of 2.1 mg/dL (H)). according to latest data. Based on current GFR, CKD stage is stage 3 - GFR 30-59.  Monitor UOP and serial BMP and adjust therapy as needed. Renally dose meds. Avoid nephrotoxic medications and procedures.

## 2025-03-18 NOTE — ED NOTES
Resumed care from CHEIKH Gregory. Patient resting comfortably, no complaints at this time. POC continues.

## 2025-03-18 NOTE — ASSESSMENT & PLAN NOTE
MRI hip and MRI spine reveal findings of possible facet osteomyelitis mostly in the L4-L5 region with some possible findings on L3. Patient is a febrile with a WBC wnl. When asked where the pain is located the patient points to his lower right back in region that is between his lumbar spine and right iliac crest. He describes the pain as sharp in nature and aggravated by movement. He denies fever at home and has been afebrile while in patient. No signs of sepsis at this time.     Neuro-IR is planning a biopsy on 3/19/2025 of the region seen on MRI for culture.     Plan:  -Can withhold antibiotics at this time till biopsy results return unless blood cultures show growth  -Will continue to follow blood cultures  -Will follow up on biopsy/culutre results.

## 2025-03-18 NOTE — ASSESSMENT & PLAN NOTE
Pain management  Continue home meds.   Home meds:  Robaxan , oxycodone 10 , capasin, lidoderm.   Avoid NSAID due to CKD.     Scheduled:  Lidoderm patch daily  Capasin gel bid    PRN:  Oxycodone 10 q 6  Morhine 4 mg IV   Robaxan 750     Inpatient Morphine Milligram Equivalents Per Day 3/16 - 3/19    Values displayed are in units of MME/Day     Order Start / End Date 3/16 Yesterday Today Tomorrow     HYDROmorphone injection 0.5 mg 3/16 - 3/16 10 of 10 -- -- --     oxyCODONE immediate release tablet 5 mg 3/16 - 3/16 7.5 of 7.5 -- -- --     oxyCODONE immediate release tablet Tab 10 mg 3/16 - No end date 0 of 15 30 of 60 15 of 60 0 of 60     morphine injection 4 mg 3/17 - No end date -- 24 of 60 0 of 72 0 of 72     Daily Totals  17.5 of 32.5 54 of 120 15 of 132 0 of 132

## 2025-03-19 PROBLEM — D62 ANEMIA ASSOCIATED WITH ACUTE BLOOD LOSS: Status: RESOLVED | Noted: 2017-09-21 | Resolved: 2025-03-19

## 2025-03-19 PROBLEM — M25.551 CHRONIC PAIN OF RIGHT HIP: Status: ACTIVE | Noted: 2019-10-23

## 2025-03-19 PROBLEM — F40.298 FEAR OF PAIN: Status: RESOLVED | Noted: 2025-02-08 | Resolved: 2025-03-19

## 2025-03-19 PROBLEM — M79.606: Status: RESOLVED | Noted: 2020-03-10 | Resolved: 2025-03-19

## 2025-03-19 PROBLEM — E11.42 TYPE 2 DIABETES MELLITUS WITH DIABETIC POLYNEUROPATHY, WITHOUT LONG-TERM CURRENT USE OF INSULIN: Status: ACTIVE | Noted: 2022-12-14

## 2025-03-19 PROBLEM — B99.9 ANEMIA DUE TO INFECTION: Status: ACTIVE | Noted: 2022-12-14

## 2025-03-19 PROBLEM — M96.89 POSTOPERATIVE HETEROTOPIC OSSIFICATION: Status: RESOLVED | Noted: 2022-10-01 | Resolved: 2025-03-19

## 2025-03-19 PROBLEM — M79.604 PAIN IN BOTH LOWER EXTREMITIES: Status: RESOLVED | Noted: 2020-02-27 | Resolved: 2025-03-19

## 2025-03-19 PROBLEM — R78.81 POSITIVE BLOOD CULTURE: Status: ACTIVE | Noted: 2025-03-19

## 2025-03-19 PROBLEM — M46.56: Status: ACTIVE | Noted: 2025-03-16

## 2025-03-19 PROBLEM — M79.605 PAIN IN BOTH LOWER EXTREMITIES: Status: RESOLVED | Noted: 2020-02-27 | Resolved: 2025-03-19

## 2025-03-19 PROBLEM — R29.898 HIP WEAKNESS: Status: RESOLVED | Noted: 2025-02-08 | Resolved: 2025-03-19

## 2025-03-19 PROBLEM — M61.50 POSTOPERATIVE HETEROTOPIC OSSIFICATION: Status: RESOLVED | Noted: 2022-10-01 | Resolved: 2025-03-19

## 2025-03-19 LAB
ALBUMIN SERPL BCP-MCNC: 2.7 G/DL (ref 3.5–5.2)
ALP SERPL-CCNC: 99 U/L (ref 40–150)
ALT SERPL W/O P-5'-P-CCNC: 11 U/L (ref 10–44)
ANION GAP SERPL CALC-SCNC: 10 MMOL/L (ref 8–16)
ANION GAP SERPL CALC-SCNC: 8 MMOL/L (ref 8–16)
ASCENDING AORTA: 4 CM
AST SERPL-CCNC: 15 U/L (ref 10–40)
AV AREA BY CONTINUOUS VTI: 4.5 CM2
AV INDEX (PROSTH): 0.83
AV LVOT MEAN GRADIENT: 3 MMHG
AV LVOT PEAK GRADIENT: 5 MMHG
AV MEAN GRADIENT: 5 MMHG
AV PEAK GRADIENT: 9 MMHG
AV VALVE AREA BY VELOCITY RATIO: 4.2 CM²
AV VALVE AREA: 4.4 CM2
AV VELOCITY RATIO: 0.8
BASOPHILS # BLD AUTO: 0.02 K/UL (ref 0–0.2)
BASOPHILS NFR BLD: 0.3 % (ref 0–1.9)
BILIRUB SERPL-MCNC: 0.5 MG/DL (ref 0.1–1)
BSA FOR ECHO PROCEDURE: 2.27 M2
BUN SERPL-MCNC: 30 MG/DL (ref 8–23)
BUN SERPL-MCNC: 32 MG/DL (ref 8–23)
CALCIUM SERPL-MCNC: 8.7 MG/DL (ref 8.7–10.5)
CALCIUM SERPL-MCNC: 8.8 MG/DL (ref 8.7–10.5)
CHLORIDE SERPL-SCNC: 107 MMOL/L (ref 95–110)
CHLORIDE SERPL-SCNC: 108 MMOL/L (ref 95–110)
CO2 SERPL-SCNC: 22 MMOL/L (ref 23–29)
CO2 SERPL-SCNC: 23 MMOL/L (ref 23–29)
CREAT SERPL-MCNC: 1.8 MG/DL (ref 0.5–1.4)
CREAT SERPL-MCNC: 2.2 MG/DL (ref 0.5–1.4)
CV ECHO LV RWT: 0.45 CM
DIFFERENTIAL METHOD BLD: ABNORMAL
DOP CALC AO PEAK VEL: 1.5 M/S
DOP CALC AO VTI: 35.4 CM
DOP CALC LVOT AREA: 5.3 CM2
DOP CALC LVOT DIAMETER: 2.6 CM
DOP CALC LVOT PEAK VEL: 1.2 M/S
DOP CALC LVOT STROKE VOLUME: 156 CM3
DOP CALCLVOT PEAK VEL VTI: 29.4 CM
E WAVE DECELERATION TIME: 307 MS
E/A RATIO: 0.83
E/E' RATIO: 10 M/S
ECHO EF ESTIMATED: 67 %
ECHO LV POSTERIOR WALL: 1 CM (ref 0.6–1.1)
EJECTION FRACTION: 65 %
EOSINOPHIL # BLD AUTO: 0.3 K/UL (ref 0–0.5)
EOSINOPHIL NFR BLD: 5.1 % (ref 0–8)
ERYTHROCYTE [DISTWIDTH] IN BLOOD BY AUTOMATED COUNT: 13.2 % (ref 11.5–14.5)
EST. GFR  (NO RACE VARIABLE): 30.9 ML/MIN/1.73 M^2
EST. GFR  (NO RACE VARIABLE): 39.3 ML/MIN/1.73 M^2
FRACTIONAL SHORTENING: 36.4 % (ref 28–44)
GLUCOSE SERPL-MCNC: 105 MG/DL (ref 70–110)
GLUCOSE SERPL-MCNC: 97 MG/DL (ref 70–110)
HCT VFR BLD AUTO: 35.4 % (ref 40–54)
HGB BLD-MCNC: 11.3 G/DL (ref 14–18)
IMM GRANULOCYTES # BLD AUTO: 0.02 K/UL (ref 0–0.04)
IMM GRANULOCYTES NFR BLD AUTO: 0.3 % (ref 0–0.5)
INTERVENTRICULAR SEPTUM: 0.9 CM (ref 0.6–1.1)
IVC DIAMETER: 1.35 CM
IVRT: 163 MS
LA MAJOR: 5.6 CM
LA MINOR: 5.6 CM
LA WIDTH: 4.1 CM
LEFT ATRIUM SIZE: 3.5 CM
LEFT ATRIUM VOLUME INDEX MOD: 27 ML/M2
LEFT ATRIUM VOLUME INDEX: 30 ML/M2
LEFT ATRIUM VOLUME MOD: 60 ML
LEFT ATRIUM VOLUME: 68 CM3
LEFT INTERNAL DIMENSION IN SYSTOLE: 2.8 CM (ref 2.1–4)
LEFT VENTRICLE DIASTOLIC VOLUME INDEX: 39.73 ML/M2
LEFT VENTRICLE DIASTOLIC VOLUME: 89 ML
LEFT VENTRICLE MASS INDEX: 61.5 G/M2
LEFT VENTRICLE SYSTOLIC VOLUME INDEX: 12.9 ML/M2
LEFT VENTRICLE SYSTOLIC VOLUME: 29 ML
LEFT VENTRICULAR INTERNAL DIMENSION IN DIASTOLE: 4.4 CM (ref 3.5–6)
LEFT VENTRICULAR MASS: 137.8 G
LV LATERAL E/E' RATIO: 8.9
LV SEPTAL E/E' RATIO: 10.3
LYMPHOCYTES # BLD AUTO: 2.5 K/UL (ref 1–4.8)
LYMPHOCYTES NFR BLD: 43 % (ref 18–48)
MAGNESIUM SERPL-MCNC: 1.8 MG/DL (ref 1.6–2.6)
MCH RBC QN AUTO: 30.9 PG (ref 27–31)
MCHC RBC AUTO-ENTMCNC: 31.9 G/DL (ref 32–36)
MCV RBC AUTO: 97 FL (ref 82–98)
MONOCYTES # BLD AUTO: 0.4 K/UL (ref 0.3–1)
MONOCYTES NFR BLD: 7.5 % (ref 4–15)
MV A" WAVE DURATION": 179.83 MS
MV PEAK A VEL: 0.75 M/S
MV PEAK E VEL: 0.62 M/S
NEUTROPHILS # BLD AUTO: 2.5 K/UL (ref 1.8–7.7)
NEUTROPHILS NFR BLD: 43.8 % (ref 38–73)
NRBC BLD-RTO: 0 /100 WBC
OHS CV RV/LV RATIO: 0.7 CM
PHOSPHATE SERPL-MCNC: 2.9 MG/DL (ref 2.7–4.5)
PLATELET # BLD AUTO: 326 K/UL (ref 150–450)
PMV BLD AUTO: 9.3 FL (ref 9.2–12.9)
POCT GLUCOSE: 109 MG/DL (ref 70–110)
POCT GLUCOSE: 113 MG/DL (ref 70–110)
POCT GLUCOSE: 114 MG/DL (ref 70–110)
POCT GLUCOSE: 142 MG/DL (ref 70–110)
POTASSIUM SERPL-SCNC: 3.6 MMOL/L (ref 3.5–5.1)
POTASSIUM SERPL-SCNC: 3.6 MMOL/L (ref 3.5–5.1)
PROT SERPL-MCNC: 6.3 G/DL (ref 6–8.4)
PULM VEIN A" WAVE DURATION": 179.83 MS
PULM VEIN S/D RATIO: 1.41
PULMONIC VEIN PEAK A VELOCITY: 0.3 M/S
PV PEAK D VEL: 0.39 M/S
PV PEAK S VEL: 0.55 M/S
RA MAJOR: 4.83 CM
RA PRESSURE ESTIMATED: 3 MMHG
RA WIDTH: 3.87 CM
RBC # BLD AUTO: 3.66 M/UL (ref 4.6–6.2)
RIGHT ATRIAL AREA: 15.5 CM2
RIGHT VENTRICLE DIASTOLIC BASEL DIMENSION: 3.1 CM
RV TISSUE DOPPLER FREE WALL SYSTOLIC VELOCITY 1 (APICAL 4 CHAMBER VIEW): 14.46 CM/S
SINUS: 3.24 CM
SODIUM SERPL-SCNC: 139 MMOL/L (ref 136–145)
SODIUM SERPL-SCNC: 139 MMOL/L (ref 136–145)
STJ: 3.16 CM
TDI LATERAL: 0.07 M/S
TDI SEPTAL: 0.06 M/S
TDI: 0.07 M/S
TRICUSPID ANNULAR PLANE SYSTOLIC EXCURSION: 2.27 CM
WBC # BLD AUTO: 5.72 K/UL (ref 3.9–12.7)
Z-SCORE OF LEFT VENTRICULAR DIMENSION IN END DIASTOLE: -5.99
Z-SCORE OF LEFT VENTRICULAR DIMENSION IN END SYSTOLE: -4.35

## 2025-03-19 PROCEDURE — 80048 BASIC METABOLIC PNL TOTAL CA: CPT | Performed by: PHYSICIAN ASSISTANT

## 2025-03-19 PROCEDURE — 63600175 PHARM REV CODE 636 W HCPCS: Performed by: EMERGENCY MEDICINE

## 2025-03-19 PROCEDURE — 99900035 HC TECH TIME PER 15 MIN (STAT)

## 2025-03-19 PROCEDURE — 11000001 HC ACUTE MED/SURG PRIVATE ROOM

## 2025-03-19 PROCEDURE — 87206 SMEAR FLUORESCENT/ACID STAI: CPT | Performed by: HOSPITALIST

## 2025-03-19 PROCEDURE — 25000003 PHARM REV CODE 250: Performed by: PHYSICIAN ASSISTANT

## 2025-03-19 PROCEDURE — 80053 COMPREHEN METABOLIC PANEL: CPT | Performed by: HOSPITALIST

## 2025-03-19 PROCEDURE — 84100 ASSAY OF PHOSPHORUS: CPT | Performed by: PHYSICIAN ASSISTANT

## 2025-03-19 PROCEDURE — 94660 CPAP INITIATION&MGMT: CPT

## 2025-03-19 PROCEDURE — 87075 CULTR BACTERIA EXCEPT BLOOD: CPT | Performed by: HOSPITALIST

## 2025-03-19 PROCEDURE — 36415 COLL VENOUS BLD VENIPUNCTURE: CPT | Performed by: PHYSICIAN ASSISTANT

## 2025-03-19 PROCEDURE — 36415 COLL VENOUS BLD VENIPUNCTURE: CPT | Mod: XB | Performed by: HOSPITALIST

## 2025-03-19 PROCEDURE — 85025 COMPLETE CBC W/AUTO DIFF WBC: CPT | Performed by: PHYSICIAN ASSISTANT

## 2025-03-19 PROCEDURE — 83735 ASSAY OF MAGNESIUM: CPT | Performed by: PHYSICIAN ASSISTANT

## 2025-03-19 PROCEDURE — 63600175 PHARM REV CODE 636 W HCPCS: Performed by: INTERNAL MEDICINE

## 2025-03-19 PROCEDURE — 0QB03ZX EXCISION OF LUMBAR VERTEBRA, PERCUTANEOUS APPROACH, DIAGNOSTIC: ICD-10-PCS | Performed by: RADIOLOGY

## 2025-03-19 PROCEDURE — 87116 MYCOBACTERIA CULTURE: CPT | Performed by: HOSPITALIST

## 2025-03-19 PROCEDURE — 87070 CULTURE OTHR SPECIMN AEROBIC: CPT | Performed by: HOSPITALIST

## 2025-03-19 PROCEDURE — 63600175 PHARM REV CODE 636 W HCPCS: Performed by: HOSPITALIST

## 2025-03-19 PROCEDURE — 25000003 PHARM REV CODE 250: Performed by: HOSPITALIST

## 2025-03-19 PROCEDURE — 88311 DECALCIFY TISSUE: CPT | Mod: 26,,, | Performed by: PATHOLOGY

## 2025-03-19 PROCEDURE — 88307 TISSUE EXAM BY PATHOLOGIST: CPT | Performed by: PATHOLOGY

## 2025-03-19 PROCEDURE — 87040 BLOOD CULTURE FOR BACTERIA: CPT | Performed by: INTERNAL MEDICINE

## 2025-03-19 PROCEDURE — 88311 DECALCIFY TISSUE: CPT | Performed by: PATHOLOGY

## 2025-03-19 PROCEDURE — 63600175 PHARM REV CODE 636 W HCPCS: Performed by: RADIOLOGY

## 2025-03-19 PROCEDURE — 87102 FUNGUS ISOLATION CULTURE: CPT | Performed by: HOSPITALIST

## 2025-03-19 PROCEDURE — 94761 N-INVAS EAR/PLS OXIMETRY MLT: CPT

## 2025-03-19 PROCEDURE — 88307 TISSUE EXAM BY PATHOLOGIST: CPT | Mod: 26,,, | Performed by: PATHOLOGY

## 2025-03-19 PROCEDURE — 36415 COLL VENOUS BLD VENIPUNCTURE: CPT | Performed by: INTERNAL MEDICINE

## 2025-03-19 RX ORDER — MIDAZOLAM HYDROCHLORIDE 1 MG/ML
INJECTION, SOLUTION INTRAMUSCULAR; INTRAVENOUS
Status: COMPLETED | OUTPATIENT
Start: 2025-03-19 | End: 2025-03-19

## 2025-03-19 RX ORDER — FENTANYL CITRATE 50 UG/ML
INJECTION, SOLUTION INTRAMUSCULAR; INTRAVENOUS
Status: COMPLETED | OUTPATIENT
Start: 2025-03-19 | End: 2025-03-19

## 2025-03-19 RX ORDER — CEFTRIAXONE 2 G/1
2 INJECTION, POWDER, FOR SOLUTION INTRAMUSCULAR; INTRAVENOUS
Status: DISCONTINUED | OUTPATIENT
Start: 2025-03-19 | End: 2025-03-22

## 2025-03-19 RX ORDER — VANCOMYCIN 2 GRAM/500 ML IN 0.9 % SODIUM CHLORIDE INTRAVENOUS
2000 ONCE
Status: DISCONTINUED | OUTPATIENT
Start: 2025-03-19 | End: 2025-03-19

## 2025-03-19 RX ORDER — MORPHINE SULFATE 4 MG/ML
INJECTION, SOLUTION INTRAMUSCULAR; INTRAVENOUS
Status: COMPLETED
Start: 2025-03-19 | End: 2025-03-19

## 2025-03-19 RX ORDER — LIDOCAINE HYDROCHLORIDE 10 MG/ML
INJECTION, SOLUTION INFILTRATION; PERINEURAL
Status: COMPLETED | OUTPATIENT
Start: 2025-03-19 | End: 2025-03-19

## 2025-03-19 RX ORDER — VANCOMYCIN 2 GRAM/500 ML IN 0.9 % SODIUM CHLORIDE INTRAVENOUS
2000 ONCE
Status: COMPLETED | OUTPATIENT
Start: 2025-03-19 | End: 2025-03-19

## 2025-03-19 RX ADMIN — MORPHINE SULFATE 4 MG: 4 INJECTION INTRAVENOUS at 08:03

## 2025-03-19 RX ADMIN — FINASTERIDE 5 MG: 5 TABLET, FILM COATED ORAL at 08:03

## 2025-03-19 RX ADMIN — FENTANYL CITRATE 50 MCG: 50 INJECTION, SOLUTION INTRAMUSCULAR; INTRAVENOUS at 05:03

## 2025-03-19 RX ADMIN — LIDOCAINE 2 PATCH: 50 PATCH CUTANEOUS at 10:03

## 2025-03-19 RX ADMIN — ATORVASTATIN CALCIUM 80 MG: 40 TABLET, FILM COATED ORAL at 08:03

## 2025-03-19 RX ADMIN — LATANOPROST 1 DROP: 50 SOLUTION OPHTHALMIC at 10:03

## 2025-03-19 RX ADMIN — HYDRALAZINE HYDROCHLORIDE 50 MG: 25 TABLET ORAL at 05:03

## 2025-03-19 RX ADMIN — CAPSAICIN: 0.25 CREAM TOPICAL at 08:03

## 2025-03-19 RX ADMIN — CAPSAICIN: 0.25 CREAM TOPICAL at 09:03

## 2025-03-19 RX ADMIN — SODIUM CHLORIDE 2000 MG: 9 INJECTION, SOLUTION INTRAVENOUS at 08:03

## 2025-03-19 RX ADMIN — MIDAZOLAM 1 MG: 1 INJECTION INTRAMUSCULAR; INTRAVENOUS at 05:03

## 2025-03-19 RX ADMIN — LIDOCAINE HYDROCHLORIDE 5 ML: 10 INJECTION, SOLUTION INFILTRATION; PERINEURAL at 05:03

## 2025-03-19 RX ADMIN — OXYCODONE HYDROCHLORIDE 10 MG: 10 TABLET ORAL at 09:03

## 2025-03-19 RX ADMIN — OXYCODONE HYDROCHLORIDE 10 MG: 10 TABLET ORAL at 05:03

## 2025-03-19 RX ADMIN — HYDRALAZINE HYDROCHLORIDE 50 MG: 25 TABLET ORAL at 02:03

## 2025-03-19 RX ADMIN — HYDRALAZINE HYDROCHLORIDE 50 MG: 25 TABLET ORAL at 09:03

## 2025-03-19 RX ADMIN — CARVEDILOL 25 MG: 25 TABLET, FILM COATED ORAL at 08:03

## 2025-03-19 RX ADMIN — MIDAZOLAM 2 MG: 1 INJECTION INTRAMUSCULAR; INTRAVENOUS at 05:03

## 2025-03-19 RX ADMIN — Medication 50 MG: at 08:03

## 2025-03-19 RX ADMIN — FENTANYL CITRATE 25 MCG: 50 INJECTION, SOLUTION INTRAMUSCULAR; INTRAVENOUS at 05:03

## 2025-03-19 RX ADMIN — VALSARTAN 320 MG: 160 TABLET, FILM COATED ORAL at 08:03

## 2025-03-19 RX ADMIN — CARVEDILOL 25 MG: 25 TABLET, FILM COATED ORAL at 09:03

## 2025-03-19 RX ADMIN — CEFTRIAXONE 2 G: 2 INJECTION, POWDER, FOR SOLUTION INTRAMUSCULAR; INTRAVENOUS at 09:03

## 2025-03-19 RX ADMIN — MORPHINE SULFATE 4 MG: 4 INJECTION INTRAVENOUS at 06:03

## 2025-03-19 NOTE — PLAN OF CARE
Pt arrived to IR room 4 for right L4-L5 bone biopsy. Pt oriented to unit and staff, Pt safely transferred from stretcher to procedural table. Fall risk reviewed and comfort measures utilized with interventions. Safety strap applied, position pillows to minimize pressure points. Blankets applied. Pt prepped and draped utilizing standard sterile technique. Patient placed on continuous monitoring, as required by sedation policy. Timeouts implemented utilizing standard universal time-out per department and facility policy. RN to remain at bedside with continuous monitoring. Pt resting comfortably. Denies pain/discomfort. Will continue to monitor. See flow sheets for monitoring, medication administration, and updates. patient verbalizes understanding.

## 2025-03-19 NOTE — PROGRESS NOTES
[facility]  Progress Note    Patient Name: Macario Dior Jr.  MRN: [id number]  Patient Class: IP- Inpatient   Admission Date: 3/16/2025  Length of Stay: 2 days  Attending Physician: Jena Crawford MD  Primary Care Provider: [facility]        Subjective     Principal Problem:Right low back pain        HPI:  74 y.o. 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 Pain located right para spinal lumbar region, is non-radiating.  No sciatica. He denies fever, chills, cough, SOB. Weight loss, appetite loss.     In the ED, evaluation included  wbc 6.8, Hb 11, CRP 21, cr 2.9-> 2.3,  & MRI. Ortho was consulted. They do not feel that the hip is infected. They recommend a NS spine consult with concern for with edema in the right paraspinal musculature and potential R L4-5 facet septic arthritis.  His MRI resulted this morning. Radiology was concerned for R L4-5 septic facet joint with osteomyelitis.  His calcium dropped after hydration. 9.1-> 7.7  Suspect albumin was falsely high upon admission.        He was placed in OBS, Likely needs upgrade to Inpatient status        Overview/Hospital Course:  3/18- plan for Neuro IR aspiration on Wednesday. Holding xarelto 48 hours. Holding antibiotics until after procedure to increase culture yield. May start antibiotics  if VSS change of signs of sepsis, /73   Pulse 73   Temp 98.6 °F Resp 18 SpO2 98% .  Cr 2.9-> 2.4 (likely at baseline).     3/19- IR aspiration planned today, Staph growing from blood 3/17. VSS. Second set of blood cultures done. Will start empiric antibiotics ( Vanc) after procedure. Appreciate ID and NS input. BP  148/67  Pulse 73   Temp 98.3 °F  Resp 14  wbc 5.7, cr 2.2. .  Will need picc line when bacteremia is cleared. HH and IV infusions at home.     Interval History: see  above    Review of Systems   Pt away at procedures, several times today.     Objective:     Vital Signs (Most Recent):  Temp: 98.3 °F (36.8 °C) (03/19/25 0757)  Pulse: 73 (03/19/25 0757)  Resp: 14 (03/19/25 0757)  BP: (!) 148/67 (03/19/25 0757)  SpO2: 96 % (03/19/25 0757) Vital Signs (24h Range):  Temp:  [97.7 °F (36.5 °C)-99.6 °F (37.6 °C)] 98.3 °F (36.8 °C)  Pulse:  [67-76] 73  Resp:  [14-18] 14  SpO2:  [93 %-98 %] 96 %  BP: (128-165)/(61-77) 148/67     Weight: 100.2 kg (220 lb 15.8 oz)  Body mass index is 29.16 kg/m².    Intake/Output Summary (Last 24 hours) at 3/19/2025 0806  Last data filed at 3/19/2025 0547  Gross per 24 hour   Intake 350 ml   Output --   Net 350 ml         Physical Exam  In procedure            Significant Labs: All pertinent labs within the past 24 hours have been reviewed.  CBC:   Recent Labs   Lab 03/18/25  0422 03/19/25  0307   WBC 4.92 5.72   HGB 11.3* 11.3*   HCT 36.1* 35.4*    326     CMP:   Recent Labs   Lab 03/17/25  1410 03/18/25  0422 03/19/25  0307    141 139   K 4.8 3.7 3.6    109 107   CO2 22* 19* 22*   * 103 105   BUN 35* 30* 32*   CREATININE 2.4* 2.1* 2.2*   CALCIUM 8.4* 8.6* 8.7   PROT 6.9  --   --    ALBUMIN 2.8*  --   --    BILITOT 0.3  --   --    ALKPHOS 113  --   --    AST 16  --   --    ALT 10  --   --    ANIONGAP 9 13 10       Significant Imaging: I have reviewed all pertinent imaging results/findings within the past 24 hours.      Assessment & Plan  Right low back pain  Concern for Septic Arthritis  with RIGHT L4-5 septic facet joint with osteomyelitis.   Hx of DJD spine, lumbar radiculopathy    Admit to Inpatient  wbc 6.8, Hb 11, CRP 21, cr 2.9-> 2.3,    MRI noted: edema in the right paraspinal musculature and potential R L4-5 facet septic arthritis.  Appreciate Ortho consult in the ED.  Neurosurgery Consulted. May need surgical debridement or IR aspiration  Cultures  Holding antibiotics to maximize yield from cultures  Consult IR for  aspiration/ biopsy. Cultures ordered for routine, aerobic, anaerobic, AFB and fungi.   ID consult placed.   Aspiration planned for 3/19 per Neuro IR. Then will start empiric antibiotics. Will need long term antibiotics, likely 6 weeks.     3/19- staph abscess, + Bacteremia. IR aspiration today. Will start antibiotics. Will need long term antibiotics six weeks at least. ECHO.     Osteoarthritis of spine with radiculopathy, lumbar region  See RIGHT low back pain, Pain management  Long term (current) use of anticoagulants  This patient has long term use on an anticoagulant with Select Anticoagulant(s): rivaroxaban . Their long term anticoagulation will be Held or Continued: held. They are on long term anticoagulation due to Reason for Anticoagulation: DVT/PE.     - holding rivaxabaran 20 mg daily until NS eval. Pt may need precedure to drain spinal abscess.  Pt has not had clotting issues for years. He understands risk of recurrence. Plan to resume after procedure. IR consulted for aspiration/ biopsy.   CKD (chronic kidney disease) stage 3, GFR 30-59 ml/min  Creatine stable for now. BMP reviewed- noted Estimated Creatinine Clearance: 37.1 mL/min (A) (based on SCr of 2.2 mg/dL (H)). according to latest data. Based on current GFR, CKD stage is stage 3 - GFR 30-59.  Monitor UOP and serial BMP and adjust therapy as needed. Renally dose meds. Avoid nephrotoxic medications and procedures.  Diabetes mellitus type 2 without retinopathy  Patient's FSGs are controlled on current medication regimen.  Home meds : seiglutide  Holding home meds  - Working to optimize BG control  - SSI provided for corrective dosing  - POCT glucose checks as ordered  - Hypoglycemic protocol in effect  - Diabetic diet provided    Last A1c reviewed-   Lab Results   Component Value Date    HGBA1C 6.7 (H) 12/21/2022     Most recent fingerstick glucose reviewed-   Recent Labs   Lab 03/18/25  1233 03/18/25  2111 03/19/25  0559 03/19/25  1124   POCTGLUCOSE  136* 125* 109 113*     Current correctional scale  Low  Maintain anti-hyperglycemic dose as follows-   Antihyperglycemics (From admission, onward)      Start     Stop Route Frequency Ordered    03/16/25 2248  insulin aspart U-100 pen 0-5 Units         -- SubQ Before meals & nightly PRN 03/16/25 2148          Hold Oral hypoglycemics while patient is in the hospital.  Essential hypertension  Patient's blood pressure range in the last 24 hours was: BP  Min: 126/60  Max: 165/77.The patient's inpatient anti-hypertensive regimen is listed below:    Home meds:  coreg, hydralazine and valsartan    Current Antihypertensives  carvediloL tablet 25 mg, 2 times daily, Oral  hydrALAZINE tablet 50 mg, Every 8 hours, Oral  valsartan tablet 320 mg, Daily, Oral  , Daily, Oral    Plan  - BP is controlled, no changes needed to their regimen  - monitor and adjust daily.   - would continue BB through surgery/ procedure.   Hypocalcemia  Hypocalcemia is likely due to  rehydration, CKD, has hx of secondary hyperparathyroidism.  . The most recent calcium and albumin results listed below.  Recent Labs     03/16/25  2023 03/17/25  0506 03/17/25  1410 03/18/25  0422 03/19/25  0307   CALCIUM 9.1   < > 8.4* 8.6* 8.7   ALBUMIN 3.4*  --  2.8*  --   --    CAION  --   --  1.04*  --   --     < > = values in this interval not displayed.     Plan  - Will replace calcium and monitor electrolytes closely.  - The patient's hypocalcemia is worsening. Will repeat lab  - Hypocalcemia:  His calcium dropped after hydration. 9.1-> 7.7  Suspect albumin was falsely high upon admission.  Continue ergocalciferol 50,000 weekly.  Vid d level. Repeat lab this afternoon.       BUZZ (obstructive sleep apnea)  Continue Home CPAP, Respiratory to adjust settings    Chronic pain  Pain management  Continue home meds.   Home meds:  Robaxan , oxycodone 10 , capasin, lidoderm.   Avoid NSAID due to CKD.     Scheduled:  Lidoderm patch daily  Capasin gel bid    PRN:  Oxycodone 10 q  6  Morhine 4 mg IV   Robaxan 750     Inpatient Morphine Milligram Equivalents Per Day 3/17 - 3/20    Values displayed are in units of MME/Day     Order Start / End Date 3/17 Yesterday Today Tomorrow     oxyCODONE immediate release tablet Tab 10 mg 3/16 - No end date 30 of 60 30 of 60 15 of 60 0 of 60     morphine injection 4 mg 3/17 - No end date 24 of 60 24 of 72 0 of 72 0 of 72     Daily Totals  54 of 120 54 of 132 15 of 132 0 of 132            Abnormal MRI, lumbar spine  Abscess   IR aspiration planned 1/19.       VTE Risk Mitigation (From admission, onward)           Ordered     IP VTE HIGH RISK PATIENT  Once         03/16/25 2148                    Discharge Planning   GERRI: 3/21/2025     Code Status: Full Code   Medical Readiness for Discharge Date:   Discharge Plan A: Home, Home with family          Jena Crawford MD  Department of Hospital Medicine   [facility] - Surgery

## 2025-03-19 NOTE — ASSESSMENT & PLAN NOTE
Patient's FSGs are controlled on current medication regimen.  Home meds : seiglutide  Holding home meds  - Working to optimize BG control  - SSI provided for corrective dosing  - POCT glucose checks as ordered  - Hypoglycemic protocol in effect  - Diabetic diet provided    Last A1c reviewed-   Lab Results   Component Value Date    HGBA1C 6.7 (H) 12/21/2022     Most recent fingerstick glucose reviewed-   Recent Labs   Lab 03/18/25  1233 03/18/25  2111 03/19/25  0559 03/19/25  1124   POCTGLUCOSE 136* 125* 109 113*     Current correctional scale  Low  Maintain anti-hyperglycemic dose as follows-   Antihyperglycemics (From admission, onward)      Start     Stop Route Frequency Ordered    03/16/25 2248  insulin aspart U-100 pen 0-5 Units         -- SubQ Before meals & nightly PRN 03/16/25 2148          Hold Oral hypoglycemics while patient is in the hospital.

## 2025-03-19 NOTE — PLAN OF CARE
Chart Reviewed.     No Acute Overnight Events. Patient is afebrile and hemodynamically stable.     Repeat blood cultures with NGTD.    Pending bx w/ neuroIR today.     Rec holding abx until post procedure; after procedure, rec empiric ceftriaxone 2 gm IV every 12 hours and vancomycin post biopsy/aspiration.     Discussed w/ staff, attestation to follow    Esteban SANCHEZ PGY-2  Infectious Disease

## 2025-03-19 NOTE — PLAN OF CARE
Procedure completed. Patient tolerated well; VSS. Site CDI. Patient to be transported to MPU for 1 hour recovery; report to be given at the bedside.

## 2025-03-19 NOTE — ASSESSMENT & PLAN NOTE
Hypocalcemia is likely due to rehydration, CKD, has hx of secondary hyperparathyroidism. . The most recent calcium and albumin results listed below.  Recent Labs     03/16/25 2023 03/17/25  0506 03/17/25  1410 03/18/25  0422 03/19/25  0307   CALCIUM 9.1   < > 8.4* 8.6* 8.7   ALBUMIN 3.4*  --  2.8*  --   --    CAION  --   --  1.04*  --   --     < > = values in this interval not displayed.     Plan  - Will replace calcium and monitor electrolytes closely.  - The patient's hypocalcemia is worsening. Will repeat lab  - Hypocalcemia:  His calcium dropped after hydration. 9.1-> 7.7  Suspect albumin was falsely high upon admission.  Continue ergocalciferol 50,000 weekly.  Vid d level. Repeat lab this afternoon.

## 2025-03-19 NOTE — NURSING
Pt completed recovery without incident. Pt did c/o 8/10 pain and was treated with 4 mg Morphine IVP as ordered.  I attempted to call report, however the nurse did not answer the phone.

## 2025-03-19 NOTE — ASSESSMENT & PLAN NOTE
Patient's blood pressure range in the last 24 hours was: BP  Min: 126/60  Max: 165/77.The patient's inpatient anti-hypertensive regimen is listed below:    Home meds:  coreg, hydralazine and valsartan    Current Antihypertensives  carvediloL tablet 25 mg, 2 times daily, Oral  hydrALAZINE tablet 50 mg, Every 8 hours, Oral  valsartan tablet 320 mg, Daily, Oral  , Daily, Oral    Plan  - BP is controlled, no changes needed to their regimen  - monitor and adjust daily.   - would continue BB through surgery/ procedure.

## 2025-03-19 NOTE — ASSESSMENT & PLAN NOTE
Creatine stable for now. BMP reviewed- noted Estimated Creatinine Clearance: 37.1 mL/min (A) (based on SCr of 2.2 mg/dL (H)). according to latest data. Based on current GFR, CKD stage is stage 3 - GFR 30-59.  Monitor UOP and serial BMP and adjust therapy as needed. Renally dose meds. Avoid nephrotoxic medications and procedures.

## 2025-03-19 NOTE — SUBJECTIVE & OBJECTIVE
Interval History: see above    Review of Systems   Constitutional:  Positive for activity change and chills. Negative for fatigue.   HENT:  Negative for trouble swallowing.    Respiratory:  Negative for cough and shortness of breath.    Cardiovascular:  Negative for chest pain and leg swelling.   Gastrointestinal:  Negative for abdominal pain, constipation, diarrhea and nausea.   Genitourinary:  Negative for difficulty urinating.   Musculoskeletal:  Positive for arthralgias and back pain (right lumbar region). Negative for neck stiffness.   Skin:  Negative for rash.   Neurological:  Negative for headaches.   Psychiatric/Behavioral:  Negative for behavioral problems.      Objective:     Vital Signs (Most Recent):  Temp: 98.3 °F (36.8 °C) (03/19/25 0757)  Pulse: 73 (03/19/25 0757)  Resp: 14 (03/19/25 0757)  BP: (!) 148/67 (03/19/25 0757)  SpO2: 96 % (03/19/25 0757) Vital Signs (24h Range):  Temp:  [97.7 °F (36.5 °C)-99.6 °F (37.6 °C)] 98.3 °F (36.8 °C)  Pulse:  [67-76] 73  Resp:  [14-18] 14  SpO2:  [93 %-98 %] 96 %  BP: (128-165)/(61-77) 148/67     Weight: 100.2 kg (220 lb 15.8 oz)  Body mass index is 29.16 kg/m².    Intake/Output Summary (Last 24 hours) at 3/19/2025 0806  Last data filed at 3/19/2025 0547  Gross per 24 hour   Intake 350 ml   Output --   Net 350 ml         Physical Exam  Constitutional:       General: He is not in acute distress.     Appearance: He is not toxic-appearing.   HENT:      Head: Normocephalic and atraumatic.      Nose: Nose normal.      Mouth/Throat:      Mouth: Mucous membranes are moist.      Pharynx: Oropharynx is clear.   Eyes:      General: No scleral icterus.     Extraocular Movements: Extraocular movements intact.      Conjunctiva/sclera: Conjunctivae normal.      Pupils: Pupils are equal, round, and reactive to light.   Cardiovascular:      Rate and Rhythm: Normal rate and regular rhythm.      Pulses: Normal pulses.      Heart sounds: Normal heart sounds.   Pulmonary:      Effort:  Pulmonary effort is normal. No respiratory distress.      Breath sounds: Normal breath sounds. No stridor. No wheezing, rhonchi or rales.   Chest:      Chest wall: No tenderness.   Abdominal:      General: Abdomen is flat. Bowel sounds are normal. There is no distension.      Palpations: Abdomen is soft.      Tenderness: There is no abdominal tenderness. There is no right CVA tenderness, left CVA tenderness or guarding.   Musculoskeletal:         General: No swelling, tenderness, deformity or signs of injury. Normal range of motion.      Cervical back: Normal range of motion and neck supple. No rigidity or tenderness.      Right lower leg: No edema.      Left lower leg: No edema.      Comments: Tenderness right lumbar paraspinal muscles   Skin:     General: Skin is warm and dry.      Coloration: Skin is not jaundiced.      Findings: No erythema or rash.   Neurological:      General: No focal deficit present.      Mental Status: He is alert.      Motor: No weakness.      Gait: Gait normal.               Significant Labs: All pertinent labs within the past 24 hours have been reviewed.  CBC:   Recent Labs   Lab 03/18/25  0422 03/19/25  0307   WBC 4.92 5.72   HGB 11.3* 11.3*   HCT 36.1* 35.4*    326     CMP:   Recent Labs   Lab 03/17/25  1410 03/18/25  0422 03/19/25  0307    141 139   K 4.8 3.7 3.6    109 107   CO2 22* 19* 22*   * 103 105   BUN 35* 30* 32*   CREATININE 2.4* 2.1* 2.2*   CALCIUM 8.4* 8.6* 8.7   PROT 6.9  --   --    ALBUMIN 2.8*  --   --    BILITOT 0.3  --   --    ALKPHOS 113  --   --    AST 16  --   --    ALT 10  --   --    ANIONGAP 9 13 10       Significant Imaging: I have reviewed all pertinent imaging results/findings within the past 24 hours.

## 2025-03-19 NOTE — PROGRESS NOTES
Pharmacokinetic Initial Assessment: IV Vancomycin    Assessment/Plan:    Initiate intravenous vancomycin with loading dose of 2000 mg once followed by a maintenance dose of vancomycin 1000 mg IV every 24 hours.  Desired empiric serum trough concentration is 15 to 20 mcg/mL.  Draw vancomycin trough level 60 min prior to third dose on 3/21/25 at approximately 1230.  Pharmacy will continue to follow and monitor vancomycin.      Please contact pharmacy at extension [phone] with any questions regarding this assessment.     Thank you for the consult,   Janeen Greene       Patient brief summary:  Macario Dior Jr. is a 74 y.o. male initiated on antimicrobial therapy with IV Vancomycin for treatment of suspected bacteremia.    Drug Allergies:   Review of patient's allergies indicates:   Allergen Reactions    Amlodipine Swelling    Lisinopril Tinitus and Swelling    Pregabalin Swelling    Prednisone Palpitations    Azithromycin      Muscles tense up    Levetiracetam Other (See Comments)    Acetaminophen Rash     Feels like needles are sticking him per pt       Actual Body Weight:   100 kg    Renal Function:   Estimated Creatinine Clearance: 37.1 mL/min (A) (based on SCr of 2.2 mg/dL (H)).,     Dialysis Method (if applicable):  N/A    CBC (last 72 hours):  Recent Labs   Lab Result Units 03/16/25 2023 03/17/25  0331 03/18/25  0422 03/19/25  0307   WBC K/uL 7.20 6.80 4.92 5.72   Hemoglobin g/dL 12.6* 11.0* 11.3* 11.3*   Hematocrit % 40.7 34.1* 36.1* 35.4*   Platelets K/uL 329 273 246 326   Gran % % 50.3 47.6 45.2 43.8   Lymph % % 36.0 36.8 40.0 43.0   Mono % % 7.1 9.0 8.3 7.5   Eosinophil % % 6.1 5.9 5.7 5.1   Basophil % % 0.4 0.3 0.4 0.3   Differential Method  Automated Automated Automated Automated       Metabolic Panel (last 72 hours):  Recent Labs   Lab Result Units 03/16/25 2023 03/16/25  2242 03/17/25  0506 03/17/25  1410 03/18/25  0422 03/19/25  0307   Sodium mmol/L 141  --  141 138 141 139   Potassium mmol/L 3.9  --   "3.7 4.8 3.7 3.6   Chloride mmol/L 104  --  109 107 109 107   CO2 mmol/L 25  --  21* 22* 19* 22*   Glucose mg/dL 93  --  102 137* 103 105   Glucose, UA   --  Negative  --   --   --   --    BUN mg/dL 36*  --  33* 35* 30* 32*   Creatinine mg/dL 2.9*  --  2.3* 2.4* 2.1* 2.2*   Albumin g/dL 3.4*  --   --  2.8*  --   --    Total Bilirubin mg/dL 0.3  --   --  0.3  --   --    Alkaline Phosphatase U/L 133  --   --  113  --   --    AST U/L 14  --   --  16  --   --    ALT U/L 12  --   --  10  --   --    Magnesium mg/dL 1.8  --   --  1.7 1.8 1.8   Phosphorus mg/dL  --   --   --   --  3.5 2.9       Drug levels (last 3 results):  No results for input(s): "VANCOMYCINRA", "VANCORANDOM", "VANCOMYCINPE", "VANCOPEAK", "VANCOMYCINTR", "VANCOTROUGH" in the last 72 hours.    Microbiologic Results:  Microbiology Results (last 7 days)       Procedure Component Value Units Date/Time    Blood culture [[id number]]  (Abnormal) Collected: 03/17/25 1410    Order Status: Completed Specimen: Blood from Peripheral, Antecubital, Left Updated: 03/19/25 1114     Blood Culture, Routine Gram stain nilam bottle: Gram positive cocci in clusters resembling Staph      Results called to and read back by:Betty Calderon RN 03/18/2025  18:20      STAPHYLOCOCCUS SPECIES  Identification and susceptibility pending      Blood culture [[id number]] Collected: 03/19/25 0002    Order Status: Completed Specimen: Blood Updated: 03/19/25 0715     Blood Culture, Routine No Growth to date    Blood culture [[id number]] Collected: 03/19/25 0001    Order Status: Completed Specimen: Blood Updated: 03/19/25 0715     Blood Culture, Routine No Growth to date    Rapid Organism ID by PCR (from Blood culture) [[id number]]  (Abnormal) Collected: 03/17/25 1410    Order Status: Completed Updated: 03/18/25 1911     Enterococcus faecalis Not Detected     Enterococcus faecium Not Detected     Listeria monocytogenes Not Detected     Staphylococcus spp. See species for ID     Staphylococcus " aureus Not Detected     Staphylococcus epidermidis Detected     Staphylococcus lugdunensis Not Detected     Streptococcus species Not Detected     Streptococcus agalactiae Not Detected     Streptococcus pneumoniae Not Detected     Streptococcus pyogenes Not Detected     Acinetobacter calcoaceticus/baumannii complex Not Detected     Bacteroides fragilis Not Detected     Enterobacterales Not Detected     Enterobacter cloacae complex Not Detected     Escherichia coli Not Detected     Klebsiella aerogenes Not Detected     Klebsiella oxytoca Not Detected     Klebsiella pneumoniae group Not Detected     Proteus Not Detected     Salmonella sp Not Detected     Serratia marcescens Not Detected     Haemophilus influenzae Not Detected     Neisseria meningtidis Not Detected     Pseudomonas aeruginosa Not Detected     Stenotrophomonas maltophilia Not Detected     Candida albicans Not Detected     Candida auris Not Detected     Candida glabrata Not Detected     Candida krusei Not Detected     Candida parapsilosis Not Detected     Candida tropicalis Not Detected     Cryptococcus neoformans/gattii Not Detected     CTX-M (ESBL ) Test Not Applicable     IMP (Carbapenem resistant) Test Not Applicable     KPC resistance gene (Carbapenem resistant) Test Not Applicable     mcr-1  Test Not Applicable     mec A/C  Detected     mec A/C and MREJ (MRSA) gene Test Not Applicable     NDM (Carbapenem resistant) Test Not Applicable     OXA-48-like (Carbapenem resistant) Test Not Applicable     van A/B (VRE gene) Test Not Applicable     VIM (Carbapenem resistant) Test Not Applicable    Culture, Anaerobe [[id number]]     Order Status: No result Specimen: Abscess from Back     Aerobic culture [[id number]]     Order Status: No result Specimen: Abscess     AFB Culture & Smear [[id number]]     Order Status: No result Specimen: Abscess from Back     Fungus culture [[id number]]     Order Status: No result Specimen: Abscess from Back

## 2025-03-19 NOTE — PLAN OF CARE
Infectious Disease Note      Chart has been reviewed.  Positive blood cultures 1 of 2 bottles positive with rapid ID positive for S epidermidis. Highly suspicious of contamination.    Recommendations:  Will repeat blood cultures x 2 sets. If new cultures become positive then will plan to start antibiotics.    Infectious Diseases will follow up. Please call if questions arise.  Sahara Donato MD, [facility]  Infectious Diseases

## 2025-03-19 NOTE — PROGRESS NOTES
Pharmacokinetic Initial Assessment: IV Vancomycin    Assessment/Plan:    Initiate intravenous vancomycin with loading dose of 2000 mg once followed by a maintenance dose of vancomycin 1250mg IV every 24 hours  Desired empiric serum trough concentration is 15 to 20 mcg/mL  Draw vancomycin trough level 60 min prior to third dose on 03/21 at approximately 1800  Pharmacy will continue to follow and monitor vancomycin.      Please contact pharmacy at extension [phone] with any questions regarding this assessment.     Thank you for the consult,   Alice Mcdowelllin       Patient brief summary:  Macario Dior Jr. is a 74 y.o. male initiated on antimicrobial therapy with IV Vancomycin for treatment of suspected bacteremia    Drug Allergies:   Review of patient's allergies indicates:   Allergen Reactions    Amlodipine Swelling    Lisinopril Tinitus and Swelling    Pregabalin Swelling    Prednisone Palpitations    Azithromycin      Muscles tense up    Levetiracetam Other (See Comments)    Acetaminophen Rash     Feels like needles are sticking him per pt       Actual Body Weight:   100 kg    Renal Function:   Estimated Creatinine Clearance: 45.3 mL/min (A) (based on SCr of 1.8 mg/dL (H)).,     Dialysis Method (if applicable):  N/A    CBC (last 72 hours):  Recent Labs   Lab Result Units 03/16/25 2023 03/17/25  0331 03/18/25  0422 03/19/25  0307   WBC K/uL 7.20 6.80 4.92 5.72   Hemoglobin g/dL 12.6* 11.0* 11.3* 11.3*   Hematocrit % 40.7 34.1* 36.1* 35.4*   Platelets K/uL 329 273 246 326   Gran % % 50.3 47.6 45.2 43.8   Lymph % % 36.0 36.8 40.0 43.0   Mono % % 7.1 9.0 8.3 7.5   Eosinophil % % 6.1 5.9 5.7 5.1   Basophil % % 0.4 0.3 0.4 0.3   Differential Method  Automated Automated Automated Automated       Metabolic Panel (last 72 hours):  Recent Labs   Lab Result Units 03/16/25 2023 03/16/25  2242 03/17/25  0506 03/17/25  1410 03/18/25  0422 03/19/25  0307 03/19/25  1353   Sodium mmol/L 141  --  141 138 141 139 139   Potassium  "mmol/L 3.9  --  3.7 4.8 3.7 3.6 3.6   Chloride mmol/L 104  --  109 107 109 107 108   CO2 mmol/L 25  --  21* 22* 19* 22* 23   Glucose mg/dL 93  --  102 137* 103 105 97   Glucose, UA   --  Negative  --   --   --   --   --    BUN mg/dL 36*  --  33* 35* 30* 32* 30*   Creatinine mg/dL 2.9*  --  2.3* 2.4* 2.1* 2.2* 1.8*   Albumin g/dL 3.4*  --   --  2.8*  --   --  2.7*   Total Bilirubin mg/dL 0.3  --   --  0.3  --   --  0.5   Alkaline Phosphatase U/L 133  --   --  113  --   --  99   AST U/L 14  --   --  16  --   --  15   ALT U/L 12  --   --  10  --   --  11   Magnesium mg/dL 1.8  --   --  1.7 1.8 1.8  --    Phosphorus mg/dL  --   --   --   --  3.5 2.9  --        Drug levels (last 3 results):  No results for input(s): "VANCOMYCINRA", "VANCORANDOM", "VANCOMYCINPE", "VANCOPEAK", "VANCOMYCINTR", "VANCOTROUGH" in the last 72 hours.    Microbiologic Results:  Microbiology Results (last 7 days)       Procedure Component Value Units Date/Time    Blood culture [[id number]]  (Abnormal) Collected: 03/17/25 1410    Order Status: Completed Specimen: Blood from Peripheral, Antecubital, Left Updated: 03/19/25 1405     Blood Culture, Routine Gram stain nilma bottle: Gram positive cocci in clusters resembling Staph      Results called to and read back by:Betty Calderon RN 03/18/2025  18:20      STAPHYLOCOCCUS EPIDERMIDIS  Susceptibility pending      Blood culture [[id number]] Collected: 03/19/25 0002    Order Status: Completed Specimen: Blood Updated: 03/19/25 0715     Blood Culture, Routine No Growth to date    Blood culture [[id number]] Collected: 03/19/25 0001    Order Status: Completed Specimen: Blood Updated: 03/19/25 0715     Blood Culture, Routine No Growth to date    Rapid Organism ID by PCR (from Blood culture) [[id number]]  (Abnormal) Collected: 03/17/25 1410    Order Status: Completed Updated: 03/18/25 1911     Enterococcus faecalis Not Detected     Enterococcus faecium Not Detected     Listeria monocytogenes Not Detected     " Staphylococcus spp. See species for ID     Staphylococcus aureus Not Detected     Staphylococcus epidermidis Detected     Staphylococcus lugdunensis Not Detected     Streptococcus species Not Detected     Streptococcus agalactiae Not Detected     Streptococcus pneumoniae Not Detected     Streptococcus pyogenes Not Detected     Acinetobacter calcoaceticus/baumannii complex Not Detected     Bacteroides fragilis Not Detected     Enterobacterales Not Detected     Enterobacter cloacae complex Not Detected     Escherichia coli Not Detected     Klebsiella aerogenes Not Detected     Klebsiella oxytoca Not Detected     Klebsiella pneumoniae group Not Detected     Proteus Not Detected     Salmonella sp Not Detected     Serratia marcescens Not Detected     Haemophilus influenzae Not Detected     Neisseria meningtidis Not Detected     Pseudomonas aeruginosa Not Detected     Stenotrophomonas maltophilia Not Detected     Candida albicans Not Detected     Candida auris Not Detected     Candida glabrata Not Detected     Candida krusei Not Detected     Candida parapsilosis Not Detected     Candida tropicalis Not Detected     Cryptococcus neoformans/gattii Not Detected     CTX-M (ESBL ) Test Not Applicable     IMP (Carbapenem resistant) Test Not Applicable     KPC resistance gene (Carbapenem resistant) Test Not Applicable     mcr-1  Test Not Applicable     mec A/C  Detected     mec A/C and MREJ (MRSA) gene Test Not Applicable     NDM (Carbapenem resistant) Test Not Applicable     OXA-48-like (Carbapenem resistant) Test Not Applicable     van A/B (VRE gene) Test Not Applicable     VIM (Carbapenem resistant) Test Not Applicable    Culture, Anaerobe [[id number]]     Order Status: No result Specimen: Abscess from Back     Aerobic culture [[id number]]     Order Status: No result Specimen: Abscess     AFB Culture & Smear [[id number]]     Order Status: No result Specimen: Abscess from Back     Fungus culture [[id number]]     Order  Status: No result Specimen: Abscess from Back

## 2025-03-19 NOTE — PT/OT/SLP PROGRESS
Occupational Therapy      Patient Name:  Macario Dior Jr.   MRN:  [id number]    Patient not seen today secondary to pt GEETHA at time of attempt this AM. Writing therapist unable to return this PM. Will follow-up as scheduled per OT POC.    3/19/2025

## 2025-03-19 NOTE — ASSESSMENT & PLAN NOTE
Pain management  Continue home meds.   Home meds:  Robaxan , oxycodone 10 , capasin, lidoderm.   Avoid NSAID due to CKD.     Scheduled:  Lidoderm patch daily  Capasin gel bid    PRN:  Oxycodone 10 q 6  Morhine 4 mg IV   Robaxan 750     Inpatient Morphine Milligram Equivalents Per Day 3/17 - 3/20    Values displayed are in units of MME/Day     Order Start / End Date 3/17 Yesterday Today Tomorrow     oxyCODONE immediate release tablet Tab 10 mg 3/16 - No end date 30 of 60 30 of 60 15 of 60 0 of 60     morphine injection 4 mg 3/17 - No end date 24 of 60 24 of 72 0 of 72 0 of 72     Daily Totals  54 of 120 54 of 132 15 of 132 0 of 132

## 2025-03-19 NOTE — ASSESSMENT & PLAN NOTE
MRI hip and MRI spine reveal findings of possible facet osteomyelitis mostly in the L4-L5 region with some possible findings on L3. Patient is a febrile with a WBC wnl. When asked where the pain is located the patient points to his lower right back in region that is between his lumbar spine and right iliac crest. He describes the pain as sharp in nature and aggravated by movement. He denies fever at home and has been afebrile while in patient. No signs of sepsis at this time.     Neuro-IR is planning a biopsy on 3/19/2025 of the region seen on MRI for culture.     Rec  S/p IR bx & aspiration. Cultures pending. No cell count unfortunately.   Currently on ceftriaxone and Vanc  Subjective improvement in symptoms report  Will f/u cultures and path

## 2025-03-19 NOTE — PT/OT/SLP PROGRESS
Physical Therapy      Patient Name:  Macario Dior JrJillian   MRN:  [id number]    Patient not seen today secondary to  (GEETHA on attempt, PT unable to return for additional attempt.). Will follow-up as appropriate.

## 2025-03-19 NOTE — SUBJECTIVE & OBJECTIVE
Interval History: No Acute Overnight Events. Patient is afebrile and hemodynamically stable.              Objective:     Vital Signs (Most Recent):  Temp: 98.2 °F (36.8 °C) (03/20/25 0711)  Pulse: 68 (03/20/25 0711)  Resp: 18 (03/20/25 0711)  BP: 139/65 (03/20/25 0711)  SpO2: (!) 94 % (03/20/25 0711) Vital Signs (24h Range):  Temp:  [97.7 °F (36.5 °C)-98.5 °F (36.9 °C)] 98.2 °F (36.8 °C)  Pulse:  [68-92] 68  Resp:  [10-20] 18  SpO2:  [94 %-100 %] 94 %  BP: (118-181)/(58-86) 139/65     Weight: 100 kg (220 lb 7.4 oz)  Body mass index is 29.22 kg/m².    Estimated Creatinine Clearance: 45.3 mL/min (A) (based on SCr of 1.8 mg/dL (H)).     Physical Exam  Constitutional:       General: He is not in acute distress.     Appearance: Normal appearance. He is not toxic-appearing.   HENT:      Head: Normocephalic and atraumatic.   Eyes:      General: No scleral icterus.  Cardiovascular:      Rate and Rhythm: Normal rate and regular rhythm.      Heart sounds: No murmur heard.  Pulmonary:      Effort: Pulmonary effort is normal.      Breath sounds: Normal breath sounds.   Musculoskeletal:         General: Tenderness present.      Right lower leg: No edema.      Left lower leg: No edema.      Comments: Area between lower lumbar spine and right iliac crest is tender to palpation without overt signs of erythema or fluctuance; area is tight to the touch when compared to the left side.    Skin:     General: Skin is warm and dry.   Neurological:      Mental Status: He is alert and oriented to person, place, and time.          Significant Labs: All pertinent labs within the past 24 hours have been reviewed.    Significant Imaging: I have reviewed all pertinent imaging results/findings within the past 24 hours.

## 2025-03-19 NOTE — PLAN OF CARE
03/19/25 1440   Post-Acute Status   Post-Acute Authorization IV Infusion;Home Health   Home Health Status Referrals Sent   IV Infusion Status Referral(s) sent   Discharge Plan   Discharge Plan A Home with family;Other   Discharge Plan B Rehab     SW met with the pt at bedside to discuss therapy rec's and his discharge plan. PRINCESS informed High (Rehab), pt would prefer to D/C with Home Health/ Infusion. PRINCESS educated on the different levels of care.    Tejal Davidson LCSW  Case Management   [facility]   Ext. [phone]       (4) no limitation

## 2025-03-19 NOTE — PROCEDURES
Radiology Post-Procedure Note    Pre Op Diagnosis: Suspected septic arthritis of the right L4-5 facet.  Post Op Diagnosis: Same    Procedure: Fluoro guided right L4-5 Facet/bone biopsy    Procedure performed by: Brady Brown MD    Written Informed Consent Obtained: Yes  Specimen Removed: YES   Estimated Blood Loss: Minimal    Findings:   Under biplane fluoro guidance, 18 gauge needle advanced into the right L4-5 facet. No fluid could be aspirated. Next, an 11 gauge access needle advanced into the facet joint and position confirmed with fluoro and cone beam CT. Multiple 13 gauge biopsies obtained of the facet joint and adjacent bone. Specimen sent for microbiology and pathology.     Patient tolerated procedure well.    Brady Brown MD  Department of Radiology

## 2025-03-20 PROBLEM — E83.51 HYPOCALCEMIA: Status: RESOLVED | Noted: 2025-03-17 | Resolved: 2025-03-20

## 2025-03-20 PROBLEM — E66.811 CLASS 1 OBESITY WITH SERIOUS COMORBIDITY AND BODY MASS INDEX (BMI) OF 33.0 TO 33.9 IN ADULT: Status: RESOLVED | Noted: 2022-12-19 | Resolved: 2025-03-20

## 2025-03-20 LAB
ACID FAST MOD KINY STN SPEC: NORMAL
MYCOBACTERIUM SPEC QL CULT: NORMAL
POCT GLUCOSE: 111 MG/DL (ref 70–110)
POCT GLUCOSE: 136 MG/DL (ref 70–110)
POCT GLUCOSE: 156 MG/DL (ref 70–110)
POCT GLUCOSE: 96 MG/DL (ref 70–110)

## 2025-03-20 PROCEDURE — 63600175 PHARM REV CODE 636 W HCPCS: Performed by: EMERGENCY MEDICINE

## 2025-03-20 PROCEDURE — 97530 THERAPEUTIC ACTIVITIES: CPT

## 2025-03-20 PROCEDURE — 99900035 HC TECH TIME PER 15 MIN (STAT)

## 2025-03-20 PROCEDURE — 63600175 PHARM REV CODE 636 W HCPCS: Performed by: INTERNAL MEDICINE

## 2025-03-20 PROCEDURE — 11000001 HC ACUTE MED/SURG PRIVATE ROOM

## 2025-03-20 PROCEDURE — 94660 CPAP INITIATION&MGMT: CPT

## 2025-03-20 PROCEDURE — 25000003 PHARM REV CODE 250: Performed by: INTERNAL MEDICINE

## 2025-03-20 PROCEDURE — 25000003 PHARM REV CODE 250: Performed by: HOSPITALIST

## 2025-03-20 PROCEDURE — 25000003 PHARM REV CODE 250: Performed by: PHYSICIAN ASSISTANT

## 2025-03-20 PROCEDURE — 63600175 PHARM REV CODE 636 W HCPCS: Performed by: HOSPITALIST

## 2025-03-20 PROCEDURE — 99233 SBSQ HOSP IP/OBS HIGH 50: CPT | Mod: ,,, | Performed by: INTERNAL MEDICINE

## 2025-03-20 RX ADMIN — CAPSAICIN: 0.25 CREAM TOPICAL at 08:03

## 2025-03-20 RX ADMIN — CEFTRIAXONE 2 G: 2 INJECTION, POWDER, FOR SOLUTION INTRAMUSCULAR; INTRAVENOUS at 09:03

## 2025-03-20 RX ADMIN — MORPHINE SULFATE 4 MG: 4 INJECTION INTRAVENOUS at 06:03

## 2025-03-20 RX ADMIN — CARVEDILOL 25 MG: 25 TABLET, FILM COATED ORAL at 09:03

## 2025-03-20 RX ADMIN — CARVEDILOL 25 MG: 25 TABLET, FILM COATED ORAL at 08:03

## 2025-03-20 RX ADMIN — CAPSAICIN: 0.25 CREAM TOPICAL at 09:03

## 2025-03-20 RX ADMIN — HYDRALAZINE HYDROCHLORIDE 50 MG: 25 TABLET ORAL at 05:03

## 2025-03-20 RX ADMIN — OXYCODONE HYDROCHLORIDE 10 MG: 10 TABLET ORAL at 03:03

## 2025-03-20 RX ADMIN — FINASTERIDE 5 MG: 5 TABLET, FILM COATED ORAL at 08:03

## 2025-03-20 RX ADMIN — VANCOMYCIN HYDROCHLORIDE 1250 MG: 1.25 INJECTION, POWDER, LYOPHILIZED, FOR SOLUTION INTRAVENOUS at 07:03

## 2025-03-20 RX ADMIN — Medication 50 MG: at 08:03

## 2025-03-20 RX ADMIN — RIVAROXABAN 15 MG: 10 TABLET, FILM COATED ORAL at 04:03

## 2025-03-20 RX ADMIN — OXYCODONE HYDROCHLORIDE 10 MG: 10 TABLET ORAL at 11:03

## 2025-03-20 RX ADMIN — OXYCODONE HYDROCHLORIDE 10 MG: 10 TABLET ORAL at 04:03

## 2025-03-20 RX ADMIN — LATANOPROST 1 DROP: 50 SOLUTION OPHTHALMIC at 09:03

## 2025-03-20 RX ADMIN — ATORVASTATIN CALCIUM 80 MG: 40 TABLET, FILM COATED ORAL at 08:03

## 2025-03-20 RX ADMIN — MORPHINE SULFATE 4 MG: 4 INJECTION INTRAVENOUS at 05:03

## 2025-03-20 RX ADMIN — HYDRALAZINE HYDROCHLORIDE 50 MG: 25 TABLET ORAL at 02:03

## 2025-03-20 RX ADMIN — VALSARTAN 320 MG: 160 TABLET, FILM COATED ORAL at 08:03

## 2025-03-20 RX ADMIN — HYDRALAZINE HYDROCHLORIDE 50 MG: 25 TABLET ORAL at 09:03

## 2025-03-20 NOTE — PLAN OF CARE
Problem: Occupational Therapy  Goal: Occupational Therapy Goal  Description: Goals to be met by: 3/31/25    Patient will increase functional independence with ADLs by performing:    LE Dressing with Contact Guard Assistance and Assistive Devices as needed.  Grooming while standing at sink with Minimal Assistance.  Toileting from toilet with Supervision for hygiene and clothing management.   Step transfer with Contact Guard Assistance with LRAD  Toilet transfer to toilet with Contact Guard Assistance with LRAD.    Outcome: Met   Discharge OT services.

## 2025-03-20 NOTE — ASSESSMENT & PLAN NOTE
This patient has long term use on an anticoagulant with [facility] Anticoagulant(s): Xareltothat was held for IR aspiration. They are on long term anticoagulation due to Reason for Anticoagulation: DVT/PE.   - After IR aspiration patient restarted on his home Xarelto.

## 2025-03-20 NOTE — PLAN OF CARE
Problem: Adult Inpatient Plan of Care  Goal: Plan of Care Review  Outcome: Progressing  Goal: Patient-Specific Goal (Individualized)  Outcome: Progressing  Goal: Absence of Hospital-Acquired Illness or Injury  Outcome: Progressing  Goal: Optimal Comfort and Wellbeing  Outcome: Progressing  Goal: Readiness for Transition of Care  Outcome: Progressing     Problem: Infection  Goal: Absence of Infection Signs and Symptoms  Outcome: Progressing     Problem: Diabetes Comorbidity  Goal: Blood Glucose Level Within Targeted Range  Outcome: Progressing     Problem: Wound  Goal: Optimal Coping  Outcome: Progressing  Goal: Optimal Functional Ability  Outcome: Progressing  Goal: Absence of Infection Signs and Symptoms  Outcome: Progressing  Goal: Improved Oral Intake  Outcome: Progressing  Goal: Optimal Pain Control and Function  Outcome: Progressing  Goal: Skin Health and Integrity  Outcome: Progressing  Goal: Optimal Wound Healing  Outcome: Progressing     Pt AAO x4 and is cooperative to care received. Pt c/o of pain and PRN medication admin. Pt tolerated intervention and reassessment provided. Pt received IV antibiotics as intervention tolerated with no outlying s/s. Pt able to ambulate to bathroom to void x3 with RW. Pt tolerated mobility with no signs of nausea and pain is controlled. At this time patient is in bed, as it it is low, locked in place with call light in reach. Continue plan of care.

## 2025-03-20 NOTE — PROGRESS NOTES
[facility] Medicine  Progress Note    Patient Name: Macario Dior Jr.  MRN: [id number]  Patient Class: IP- Inpatient   Admission Date: 3/16/2025  Length of Stay: 3 days  Attending Physician: Arpita Salamanca MD  Primary Care Provider: [facility]        Subjective     Principal Problem:Septic arthritis of lumbar spine        HPI:  74 y.o. 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 Pain located right para spinal lumbar region, is non-radiating.  No sciatica. He denies fever, chills, cough, SOB. Weight loss, appetite loss.     In the ED, evaluation included  wbc 6.8, Hb 11, CRP 21, cr 2.9-> 2.3,  & MRI. Ortho was consulted. They do not feel that the hip is infected. They recommend a NS spine consult with concern for with edema in the right paraspinal musculature and potential R L4-5 facet septic arthritis.  His MRI resulted this morning. Radiology was concerned for R L4-5 septic facet joint with osteomyelitis.  His calcium dropped after hydration. 9.1-> 7.7  Suspect albumin was falsely high upon admission.        He was placed in OBS, Likely needs upgrade to Inpatient status        Overview/Hospital Course:  Patient admitted with left hip pain and MRI of pelvis and lumbar spine done that showed that revealed concern for left L4-L5 facet septic arthritis and osteomyelitis. Orthopedic surgery consulted and reviewed MRI and noted no evidence of left hip infection. Neurosurgery consulted concerning facet septic arthritis and felt no surgical intervention recommended IR aspiration of facet and blood cultures and Infectious disease consult. Consult to ID and IR placed. Blood culture drawn. Blood cultures grew Staph epidermis but ID felt likely just a contaminant and recommended to hold off on antibiotics until  IR aspiration and to repeat blood cultures that was done. Patient went to IR and had asiration of L4-L5 facet and bone biopsy done by IR. Patient started on empiric IV Vancomycin (pharmacy to dose) after aspiration to cover for possible Staph infection and Ceftriaxone 2 grams IV every 12 hours to cover other bacterial organisms awaiting culture results as per ID. PT/OT consulted and initially recommended high intensity but patient progressed with therapy and recommending low intensity on discharge on 3/20. Plan discharge ome with  PT/OT and home IV infusion on discharge when medically ready. Awaiting culture results and final ID recs for discharge.     Interval History: Therapy now recommending low intensity on discharge and thus plan now for home health PT/OT and home IV infusion on discharge when medically ready. Patient went yesterday down to IR and had aspiration of right L4-L5 facet and bone biopsy. Awaiting culture results from aspiration and final ID recs for discharge. Patient placed on empiric IV Vancomycin and Ceftriaxone after aspiration as per ID recs. Patient with no new labs today. Patient in good spirits and reports pain is improving to his right hip and low back area and grateful that we found a problem that could be causing his pain and hopeful this treatment of this infection will improve his long term pain issues in that area.     Review of Systems   Constitutional:  Negative for fever.   Respiratory:  Negative for cough and shortness of breath.    Cardiovascular:  Negative for chest pain.   Gastrointestinal:  Negative for abdominal pain and nausea.   Musculoskeletal:  Positive for arthralgias (Right hip) and back pain (Right side low back).   Psychiatric/Behavioral:  Negative for agitation and confusion.      Objective:     Vital Signs (Most Recent):  Temp: 98.2 °F (36.8 °C) (03/20/25 0711)  Pulse: 68 (03/20/25 0711)  Resp: 18 (03/20/25 0711)  BP: 139/65 (03/20/25 0711)  SpO2: 94 % (03/20/25  0711) on room air Vital Signs (24h Range):  Temp:  [97.7 °F (36.5 °C)-98.5 °F (36.9 °C)] 98.2 °F (36.8 °C)  Pulse:  [68-92] 68  Resp:  [10-20] 18  SpO2:  [94 %-100 %] 94 %  BP: (118-181)/(58-86) 139/65     Weight: 100 kg (220 lb 7.4 oz)  Body mass index is 29.22 kg/m².  No intake or output data in the 24 hours ending 03/20/25 1212      Physical Exam  Vitals and nursing note reviewed.   Constitutional:       General: He is awake. He is not in acute distress.     Appearance: Normal appearance. He is well-developed. He is not ill-appearing.      Comments: Patient sitting up in bed in no distress. Patient awake and alert and oriented x 4. Patient appeared very comfortable and not in obvious pain on exam.    Eyes:      Conjunctiva/sclera: Conjunctivae normal.   Cardiovascular:      Rate and Rhythm: Normal rate and regular rhythm.      Heart sounds: Normal heart sounds. No murmur heard.  Pulmonary:      Effort: Pulmonary effort is normal. No respiratory distress.      Breath sounds: Normal breath sounds. No wheezing.   Abdominal:      General: Abdomen is flat. Bowel sounds are normal. There is no distension.      Palpations: Abdomen is soft.      Tenderness: There is no abdominal tenderness. There is no guarding.   Musculoskeletal:      Right lower leg: No edema.      Left lower leg: No edema.   Skin:     General: Skin is warm.      Findings: No erythema.   Neurological:      Mental Status: He is alert and oriented to person, place, and time.   Psychiatric:         Mood and Affect: Mood normal.         Behavior: Behavior normal. Behavior is cooperative.         Thought Content: Thought content normal.         Judgment: Judgment normal.               Significant Labs: All pertinent labs within the past 24 hours have been reviewed.    Significant Imaging: I have reviewed all pertinent imaging results/findings within the past 24 hours.      Assessment & Plan  Septic arthritis of lumbar spine of L4-L5 facet  Abnormal MRI,  lumbar spine  Patient admitted with worsening of acute on chronic right hip and low back pain. MRI of pelvis and lumbar spine done and showed concern for septic arthritis of right L4-5 facet joint with osteomyelitis.     - Patient admitted to hospital for evaluation. WBC normal at 6800 on admit and CRP elevated at 21.  - Orthopedic surgery consulted and reviewed MRI and noted no evidence of left hip infection.   - Neurosurgery consulted concerning facet septic arthritis and felt no surgical intervention needed and recommended IR aspiration of facet and blood cultures.   - Infectious disease consulted and recommended to monitor off antibiotics in hopes that we can increase culture yield and Recommended biopsy for pathology and microbiologic studies.  - Patient went to IR on 3/19 and had aspiration of L4-L5 facet and bone biopsy done by IR.   - Patient started on empiric IV Vancomycin (pharmacy to dose) after aspiration to cover for possible Staph infection and Ceftriaxone 2 grams IV every 12 hours to cover other bacterial organisms awaiting culture results.  - Pain improved to right hip and low back area in hospital.      Long term (current) use of anticoagulants  Personal history of other venous thrombosis and embolism  This patient has long term use on an anticoagulant with Select Anticoagulant(s): Xarelto that was held for IR aspiration. They are on long term anticoagulation due to Reason for Anticoagulation: DVT/PE.   - After IR aspiration patient restarted on his home Xarelto.     Stage 3b chronic kidney disease  Creatine stable for now and at baseline. Patient with known CKD stage 3b at baseline. BMP reviewed- noted Estimated Creatinine Clearance: 45.3 mL/min (A) (based on SCr of 1.8 mg/dL (H)). according to latest data. Monitor UOP and serial BMP and adjust therapy as needed. Renally dose meds. Avoid nephrotoxic medications and procedures.    Type 2 diabetes mellitus with diabetic polyneuropathy, without  long-term current use of insulin  Patient's FSGs are controlled on current medication regimen.  Home meds : Weekly Seiglutide injections.  Holding home meds in hospital.     Last A1c reviewed-   Lab Results   Component Value Date    HGBA1C 6.7 (H) 12/21/2022     Most recent fingerstick glucose reviewed-   Recent Labs   Lab 03/19/25  1531 03/19/25  2013 03/20/25  0611 03/20/25  1114   POCTGLUCOSE 114* 142* 111* 136*     Current correctional scale  Low  Maintain anti-hyperglycemic dose as follows-   Antihyperglycemics (From admission, onward)      Start     Stop Route Frequency Ordered    03/16/25 2248  insulin aspart U-100 pen 0-5 Units         -- SubQ Before meals & nightly PRN 03/16/25 2148          - Hold Oral hypoglycemics while patient is in the hospital.  - POCT glucose checks before meals and at bedtime.   - Hypoglycemic protocol in effect.  - Diabetic diet provided.  - Target blood sugars in hospital 140-180.     Essential hypertension  Patient's blood pressure range in the last 24 hours was: BP  Min: 118/58  Max: 181/81.The patient's inpatient anti-hypertensive regimen is listed below:    Current Antihypertensives  carvediloL tablet 25 mg, 2 times daily, Oral  hydrALAZINE tablet 50 mg, Every 8 hours, Oral  valsartan tablet 320 mg, Daily, Oral  , Daily, Oral    Plan  - BP is controlled, no changes needed to their regimen  - Goal BP < 140/90.     Hypocalcemia (Resolved: 3/20/2025)  Hypocalcemia is likely due to  rehydration, CKD, has history of secondary hyperparathyroidism.  . The most recent calcium and albumin results listed below.  Recent Labs     03/17/25  1410 03/18/25  0422 03/19/25  0307 03/19/25  1353   CALCIUM 8.4* 8.6* 8.7 8.8   ALBUMIN 2.8*  --   --  2.7*   CAION 1.04*  --   --   --      Plan  - Will replace calcium and monitor electrolytes closely.  - The patient's hypocalcemia is worsening. Will repeat lab  - Hypocalcemia:  His calcium dropped after hydration. 9.1-> 7.7  Suspect albumin was  falsely high upon admission.  Continue ergocalciferol 50,000 weekly.  Vid d level. Repeat lab this afternoon.       BUZZ (obstructive sleep apnea)  Patient on CPAP at home and continue in hospital. Respiratory to adjust settings.    Chronic pain of right hip  Osteoarthritis of spine with radiculopathy, lumbar region  Continue home meds.   Home meds:  Robaxin, Oxycodone 10 mg, Capasin, Lidoderm patch.   Avoid NSAID due to CKD.     Scheduled:  Lidoderm patch daily  Capasin gel bid    PRN:  Oxycodone 10 q 6  Morhine 4 mg IV   Robaxan 750     Inpatient Morphine Milligram Equivalents Per Day 3/17 - 3/20    Values displayed are in units of MME/Day     Order Start / End Date 3/17 Yesterday Today Tomorrow     oxyCODONE immediate release tablet Tab 10 mg 3/16 - No end date 30 of 60 30 of 60 15 of 60 0 of 60     morphine injection 4 mg 3/17 - No end date 24 of 60 24 of 72 0 of 72 0 of 72     Daily Totals  54 of 120 54 of 132 15 of 132 0 of 132            Anemia due to infection  Anemia is likely due to chronic disease due to infection . Most recent hemoglobin and hematocrit are listed below.  Recent Labs     03/18/25  0422 03/19/25  0307   HGB 11.3* 11.3*   HCT 36.1* 35.4*     Plan  - Monitor serial CBC: Daily  - Transfuse PRBC if patient becomes hemodynamically unstable, symptomatic or H/H drops below 7/21.  - Patient has not received any PRBC transfusions to date  - Patient's anemia is currently stable    Positive blood culture  Patient noted to have positive blood culture on admit with Staphylococcus epidermis. ID consulted and felt likely contaminant and not a real infection. Repeat blood cultures drawn and repeat blood cultures so far no growth so likely just a contaminant.        VTE Risk Mitigation (From admission, onward)           Ordered     rivaroxaban tablet 15 mg  with dinner         03/20/25 0921     IP VTE HIGH RISK PATIENT  Once         03/16/25 2148                    Discharge Planning   GERRI: 3/22/2025      Code Status: Full Code   Discharge Plan A: Home with family, Other; Home Health PT/OT and home infusion        Arpita Salamanca MD  Department of Hospital Medicine   [facility] - Surgery

## 2025-03-20 NOTE — ASSESSMENT & PLAN NOTE
Hypocalcemia is likely due to rehydration, CKD, has history of secondary hyperparathyroidism. . The most recent calcium and albumin results listed below.  Recent Labs     03/17/25  1410 03/18/25  0422 03/19/25  0307 03/19/25  1353   CALCIUM 8.4* 8.6* 8.7 8.8   ALBUMIN 2.8*  --   --  2.7*   CAION 1.04*  --   --   --      Plan  - Will replace calcium and monitor electrolytes closely.  - The patient's hypocalcemia is worsening. Will repeat lab  - Hypocalcemia:  His calcium dropped after hydration. 9.1-> 7.7  Suspect albumin was falsely high upon admission.  Continue ergocalciferol 50,000 weekly.  Vid d level. Repeat lab this afternoon.

## 2025-03-20 NOTE — ASSESSMENT & PLAN NOTE
Anemia is likely due to chronic disease due to infection. Most recent hemoglobin and hematocrit are listed below.  Recent Labs     03/18/25  0422 03/19/25  0307   HGB 11.3* 11.3*   HCT 36.1* 35.4*     Plan  - Monitor serial CBC: Daily  - Transfuse PRBC if patient becomes hemodynamically unstable, symptomatic or H/H drops below 7/21.  - Patient has not received any PRBC transfusions to date  - Patient's anemia is currently stable

## 2025-03-20 NOTE — ASSESSMENT & PLAN NOTE
Continue home meds.   Home meds:  Robaxin, Oxycodone 10 mg, Capasin, Lidoderm patch.   Avoid NSAID due to CKD.     Scheduled:  Lidoderm patch daily  Capasin gel bid    PRN:  Oxycodone 10 q 6  Morhine 4 mg IV   Robaxan 750     Inpatient Morphine Milligram Equivalents Per Day 3/17 - 3/20    Values displayed are in units of MME/Day     Order Start / End Date 3/17 Yesterday Today Tomorrow     oxyCODONE immediate release tablet Tab 10 mg 3/16 - No end date 30 of 60 30 of 60 15 of 60 0 of 60     morphine injection 4 mg 3/17 - No end date 24 of 60 24 of 72 0 of 72 0 of 72     Daily Totals  54 of 120 54 of 132 15 of 132 0 of 132

## 2025-03-20 NOTE — ASSESSMENT & PLAN NOTE
Patient's FSGs are controlled on current medication regimen.  Home meds : Weekly Seiglutide injections.  Holding home meds in hospital.     Last A1c reviewed-   Lab Results   Component Value Date    HGBA1C 6.7 (H) 12/21/2022     Most recent fingerstick glucose reviewed-   Recent Labs   Lab 03/19/25  1531 03/19/25 2013 03/20/25  0611 03/20/25  1114   POCTGLUCOSE 114* 142* 111* 136*     Current correctional scale  Low  Maintain anti-hyperglycemic dose as follows-   Antihyperglycemics (From admission, onward)      Start     Stop Route Frequency Ordered    03/16/25 2248  insulin aspart U-100 pen 0-5 Units         -- SubQ Before meals & nightly PRN 03/16/25 2148          - Hold Oral hypoglycemics while patient is in the hospital.  - POCT glucose checks before meals and at bedtime.   - Hypoglycemic protocol in effect.  - Diabetic diet provided.  - Target blood sugars in hospital 140-180.

## 2025-03-20 NOTE — ASSESSMENT & PLAN NOTE
Patient admitted with worsening of acute on chronic right hip and low back pain. MRI of pelvis and lumbar spine done and showed concern for septic arthritis of right L4-5 facet joint with osteomyelitis.     - Patient admitted to hospital for evaluation. WBC normal at 6800 on admit and CRP elevated at 21.  - Orthopedic surgery consulted and reviewed MRI and noted no evidence of left hip infection.   - Neurosurgery consulted concerning facet septic arthritis and felt no surgical intervention needed and recommended IR aspiration of facet and blood cultures.   - Infectious disease consulted and recommended to monitor off antibiotics in hopes that we can increase culture yield and Recommended biopsy for pathology and microbiologic studies.  - Patient went to IR on 3/19 and had aspiration of L4-L5 facet and bone biopsy done by IR.   - Patient started on empiric IV Vancomycin (pharmacy to dose) after aspiration to cover for possible Staph infection and Ceftriaxone 2 grams IV every 12 hours to cover other bacterial organisms awaiting culture results.  - Pain improved to right hip and low back area in hospital.

## 2025-03-20 NOTE — SUBJECTIVE & OBJECTIVE
Interval History: Therapy now recommending low intensity on discharge and thus plan now for home health PT/OT and home IV infusion on discharge when medically ready. Patient went yesterday down to IR and had aspiration of right L4-L5 facet and bone biopsy. Awaiting culture results from aspiration and final ID recs for discharge. Patient placed on empiric IV Vancomycin and Ceftriaxone after aspiration as per ID recs. Patient with no new labs today. Patient in good spirits and reports pain is improving to his right hip and low alan area and grateful that we found a problem that could be causing his pain and hopeful this treatment of this infection will improve his long term pain issues in that area.     Review of Systems   Constitutional:  Negative for fever.   Respiratory:  Negative for cough and shortness of breath.    Cardiovascular:  Negative for chest pain.   Gastrointestinal:  Negative for abdominal pain and nausea.   Musculoskeletal:  Positive for arthralgias (Right hip) and back pain (Right side low back).   Psychiatric/Behavioral:  Negative for agitation and confusion.      Objective:     Vital Signs (Most Recent):  Temp: 98.2 °F (36.8 °C) (03/20/25 0711)  Pulse: 68 (03/20/25 0711)  Resp: 18 (03/20/25 0711)  BP: 139/65 (03/20/25 0711)  SpO2: 94 % (03/20/25 0711) on room air Vital Signs (24h Range):  Temp:  [97.7 °F (36.5 °C)-98.5 °F (36.9 °C)] 98.2 °F (36.8 °C)  Pulse:  [68-92] 68  Resp:  [10-20] 18  SpO2:  [94 %-100 %] 94 %  BP: (118-181)/(58-86) 139/65     Weight: 100 kg (220 lb 7.4 oz)  Body mass index is 29.22 kg/m².  No intake or output data in the 24 hours ending 03/20/25 1212      Physical Exam  Vitals and nursing note reviewed.   Constitutional:       General: He is awake. He is not in acute distress.     Appearance: Normal appearance. He is well-developed. He is not ill-appearing.      Comments: Patient sitting up in bed in no distress. Patient awake and alert and oriented x 4. Patient appeared very  comfortable and not in obvious pain on exam.    Eyes:      Conjunctiva/sclera: Conjunctivae normal.   Cardiovascular:      Rate and Rhythm: Normal rate and regular rhythm.      Heart sounds: Normal heart sounds. No murmur heard.  Pulmonary:      Effort: Pulmonary effort is normal. No respiratory distress.      Breath sounds: Normal breath sounds. No wheezing.   Abdominal:      General: Abdomen is flat. Bowel sounds are normal. There is no distension.      Palpations: Abdomen is soft.      Tenderness: There is no abdominal tenderness. There is no guarding.   Musculoskeletal:      Right lower leg: No edema.      Left lower leg: No edema.   Skin:     General: Skin is warm.      Findings: No erythema.   Neurological:      Mental Status: He is alert and oriented to person, place, and time.   Psychiatric:         Mood and Affect: Mood normal.         Behavior: Behavior normal. Behavior is cooperative.         Thought Content: Thought content normal.         Judgment: Judgment normal.               Significant Labs: All pertinent labs within the past 24 hours have been reviewed.    Significant Imaging: I have reviewed all pertinent imaging results/findings within the past 24 hours.

## 2025-03-20 NOTE — ASSESSMENT & PLAN NOTE
Patient noted to have positive blood culture on admit with Staphylococcus epidermis. ID consulted and felt likely contaminant and not a real infection. Repeat blood cultures drawn and repeat blood cultures so far no growth so likely just a contaminant.

## 2025-03-20 NOTE — PT/OT/SLP PROGRESS
Occupational Therapy   Treatment and Discharge Summary    Name: Macario Dior Jr.  MRN: [id number]  Admitting Diagnosis:  Septic arthritis of lumbar spine       Recommendations:     Discharge Recommendations: Low Intensity Therapy   Updated from high intensity. Pt made good progress to Ot's goals during his hospitalization stay. He is able to participate in his BADLs and would benefit from outpatient therapy setting to address further pain mgmt and general strengthening in regards to his back recovery.  Discharge Equipment Recommendations:  none  Barriers to discharge:  None    Assessment:     Macario Dior Jr. is a 74 y.o. male with a medical diagnosis of Septic arthritis of lumbar spine.  He presents with good cognitive insight to self, abilities, and deficits. He compensates well for ADL performance. Showed good strength and functional mobility with use of RW. Has no further needs from skilled therapy in acute setting.  Performance deficits affecting function are pain, decreased ROM, impaired joint extensibility, impaired endurance.     Patient Discharged from acute Occupational Therapy on 3/20/2025 .  Please refer to this note dated for functional status.    Reasons for Discontinuation of Therapy Services  Satisfactory goal achievement., Patient declines to continue care., and Therapist determines that the patient will no longer benefit from therapy services.      Plan:     Patient was to be seen 4 x/week to address the above listed problems via self-care/home management, therapeutic activities, therapeutic exercises. Pt met his OT goals and requires no further skilled tx in acute setting.   Plan of Care Expires: 03/31/25  Plan of Care Reviewed with: patient    Subjective     Chief Complaint: none  Patient/Family Comments/goals: return home with home health  Pain/Comfort:  Pain Rating 1: 0/10  Pain Addressed 1: Pre-medicate for activity    Objective:     Communicated with: Lorena doherty prior to session.   Patient found supine with   upon OT entry to room.    General Precautions: Standard, fall    Orthopedic Precautions:N/A  Braces: N/A  Respiratory Status: Room air     Occupational Performance:     Bed Mobility:    Patient completed Scooting/Bridging with modified independence  Patient completed Supine to Sit with modified independence  Patient completed Sit to Supine with modified independence     Functional Mobility/Transfers:  Patient completed Sit <> Stand Transfer with modified independence  with  rolling walker   Functional Mobility: gait in the hallway approximately 100ft with mod I using the RW. Pt reported independence in ambulating to the bathroom.     Activities of Daily Living:  Reported completing all ADLs this AM. Pt dressed in his personal clothes. Pt did report chronic difficulties in LB dressing, but he is able to compensate by performing at EOB.     [facility] 6 Click ADL: 23    Treatment & Education:  Pt educated on the following topics:  OT 's plan of care and purpose of visit, ADLs, importance of increased activity in hospital setting, and to call for assistance with call button  Understanding was verbalized.     Patient left sitting edge of bed with all lines intact, call button in reach, and nurse notified    GOALS:   Multidisciplinary Problems       Occupational Therapy Goals       Not on file              Multidisciplinary Problems (Resolved)          Problem: Occupational Therapy    Goal Priority Disciplines Outcome Interventions   Occupational Therapy Goal   (Resolved)     OT, PT/OT Met    Description: Goals to be met by: 3/31/25    Patient will increase functional independence with ADLs by performing:    LE Dressing with Contact Guard Assistance and Assistive Devices as needed.  Grooming while standing at sink with Minimal Assistance.  Toileting from toilet with Supervision for hygiene and clothing management.   Step transfer with Contact Guard Assistance with LRAD  Toilet transfer to toilet  with Contact Guard Assistance with LRAD.                         DME Justifications:  No DME recommended requiring DME justifications    Time Tracking:     OT Date of Treatment: 03/20/25  OT Start Time: 1032  OT Stop Time: 1046  OT Total Time (min): 14 min    Billable Minutes:Therapeutic Activity 14    OT/CURRY: OT          3/20/2025

## 2025-03-20 NOTE — ASSESSMENT & PLAN NOTE
Patient's blood pressure range in the last 24 hours was: BP  Min: 118/58  Max: 181/81.The patient's inpatient anti-hypertensive regimen is listed below:    Current Antihypertensives  carvediloL tablet 25 mg, 2 times daily, Oral  hydrALAZINE tablet 50 mg, Every 8 hours, Oral  valsartan tablet 320 mg, Daily, Oral  , Daily, Oral    Plan  - BP is controlled, no changes needed to their regimen  - Goal BP < 140/90.

## 2025-03-20 NOTE — ASSESSMENT & PLAN NOTE
Creatine stable for now and at baseline. Patient with known CKD stage 3b at baseline. BMP reviewed- noted Estimated Creatinine Clearance: 45.3 mL/min (A) (based on SCr of 1.8 mg/dL (H)). according to latest data. Monitor UOP and serial BMP and adjust therapy as needed. Renally dose meds. Avoid nephrotoxic medications and procedures.

## 2025-03-20 NOTE — PROGRESS NOTES
[facility] - Surgery  Infectious Disease  Progress Note    Patient Name: Macario Dior Jr.  MRN: [id number]  Admission Date: 3/16/2025  Length of Stay: 3 days  Attending Physician: Arpita Salamanca MD  Primary Care Provider: [facility]    Isolation Status: No active isolations  Assessment/Plan:      Neuro  Abnormal MRI, lumbar spine  MRI hip and MRI spine reveal findings of possible facet osteomyelitis mostly in the L4-L5 region with some possible findings on L3. Patient is a febrile with a WBC wnl. When asked where the pain is located the patient points to his lower right back in region that is between his lumbar spine and right iliac crest. He describes the pain as sharp in nature and aggravated by movement. He denies fever at home and has been afebrile while in patient. No signs of sepsis at this time.     Neuro-IR is planning a biopsy on 3/19/2025 of the region seen on MRI for culture.     Rec  S/p IR bx & aspiration. Cultures pending. No cell count unfortunately.   Currently on ceftriaxone and Vanc  Subjective improvement in symptoms report  Will f/u cultures and path        Anticipated Disposition: As per PRimary    Thank you for your consult. I will follow-up with patient. Please contact us if you have any additional questions.    Esteban Bautista, DO  Infectious Disease  [facility] - Surgery    Subjective:     Principal Problem:Septic arthritis of lumbar spine    HPI: Mr. Macario Dior is a 73 M with a past medical history that includes R hip replacement in 2016, HTN, HLD, BUZZ, CKD 3, T2DM with neuropathy, prior DVT (on Xarelto), loop recorder placement, and lumbar radiculopathy. He came to the ED on 3/16/2025 with concerns for right hip pain in addition to lower right back pain. When asked where the pain is located the patient points to his lower right back in region that is between his lumbar spine and right iliac crest. He describes the pain as sharp in nature and aggravated  by movement. He denies fever at home and has been afebrile while in patient. No signs of sepsis at this time. WBC wnl. 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 MRI of the spine found similar findings with possible additional involvement of L3-L4 (though findings more prominent in L4-L5). Orthopedics was consulted to evaluate his right hip replaemcent and feel the pain is coming from his spine; recommended neurosurgery consult. Neurosurgery did not recommend any surgical intervention at this time but recommended admittance to hospital medicine with ID consult for infectious workup; additionally recommended IR biopsy of lumbar region for culture (Neuro IR planning procedure for 3/19/2025). WBC is within normal limits and patient has remained afebrile. Labs did show an elevated ESR (82)  and CRP (21.5). Not currently on antibiotics at this time.   Interval History: No Acute Overnight Events. Patient is afebrile and hemodynamically stable.              Objective:     Vital Signs (Most Recent):  Temp: 98.2 °F (36.8 °C) (03/20/25 0711)  Pulse: 68 (03/20/25 0711)  Resp: 18 (03/20/25 0711)  BP: 139/65 (03/20/25 0711)  SpO2: (!) 94 % (03/20/25 0711) Vital Signs (24h Range):  Temp:  [97.7 °F (36.5 °C)-98.5 °F (36.9 °C)] 98.2 °F (36.8 °C)  Pulse:  [68-92] 68  Resp:  [10-20] 18  SpO2:  [94 %-100 %] 94 %  BP: (118-181)/(58-86) 139/65     Weight: 100 kg (220 lb 7.4 oz)  Body mass index is 29.22 kg/m².    Estimated Creatinine Clearance: 45.3 mL/min (A) (based on SCr of 1.8 mg/dL (H)).     Physical Exam  Constitutional:       General: He is not in acute distress.     Appearance: Normal appearance. He is not toxic-appearing.   HENT:      Head: Normocephalic and atraumatic.   Eyes:      General: No scleral icterus.  Cardiovascular:      Rate and Rhythm: Normal rate and regular rhythm.      Heart sounds: No  murmur heard.  Pulmonary:      Effort: Pulmonary effort is normal.      Breath sounds: Normal breath sounds.   Musculoskeletal:         General: Tenderness present.      Right lower leg: No edema.      Left lower leg: No edema.      Comments: Area between lower lumbar spine and right iliac crest is tender to palpation without overt signs of erythema or fluctuance; area is tight to the touch when compared to the left side.    Skin:     General: Skin is warm and dry.   Neurological:      Mental Status: He is alert and oriented to person, place, and time.          Significant Labs: All pertinent labs within the past 24 hours have been reviewed.    Significant Imaging: I have reviewed all pertinent imaging results/findings within the past 24 hours.

## 2025-03-21 LAB
ANION GAP SERPL CALC-SCNC: 10 MMOL/L (ref 8–16)
BACTERIA BLD CULT: ABNORMAL
BASOPHILS # BLD AUTO: 0.03 K/UL (ref 0–0.2)
BASOPHILS NFR BLD: 0.7 % (ref 0–1.9)
BUN SERPL-MCNC: 26 MG/DL (ref 8–23)
CALCIUM SERPL-MCNC: 8.5 MG/DL (ref 8.7–10.5)
CHLORIDE SERPL-SCNC: 109 MMOL/L (ref 95–110)
CO2 SERPL-SCNC: 21 MMOL/L (ref 23–29)
CREAT SERPL-MCNC: 1.8 MG/DL (ref 0.5–1.4)
DIFFERENTIAL METHOD BLD: ABNORMAL
EOSINOPHIL # BLD AUTO: 0.2 K/UL (ref 0–0.5)
EOSINOPHIL NFR BLD: 3.7 % (ref 0–8)
ERYTHROCYTE [DISTWIDTH] IN BLOOD BY AUTOMATED COUNT: 13.2 % (ref 11.5–14.5)
EST. GFR  (NO RACE VARIABLE): 39.3 ML/MIN/1.73 M^2
FINAL PATHOLOGIC DIAGNOSIS: NORMAL
GLUCOSE SERPL-MCNC: 90 MG/DL (ref 70–110)
GROSS: NORMAL
HCT VFR BLD AUTO: 34.5 % (ref 40–54)
HGB BLD-MCNC: 10.9 G/DL (ref 14–18)
IMM GRANULOCYTES # BLD AUTO: 0.01 K/UL (ref 0–0.04)
IMM GRANULOCYTES NFR BLD AUTO: 0.2 % (ref 0–0.5)
LYMPHOCYTES # BLD AUTO: 1.8 K/UL (ref 1–4.8)
LYMPHOCYTES NFR BLD: 39.4 % (ref 18–48)
Lab: NORMAL
MCH RBC QN AUTO: 31.1 PG (ref 27–31)
MCHC RBC AUTO-ENTMCNC: 31.6 G/DL (ref 32–36)
MCV RBC AUTO: 98 FL (ref 82–98)
MONOCYTES # BLD AUTO: 0.4 K/UL (ref 0.3–1)
MONOCYTES NFR BLD: 9.2 % (ref 4–15)
NEUTROPHILS # BLD AUTO: 2.1 K/UL (ref 1.8–7.7)
NEUTROPHILS NFR BLD: 46.8 % (ref 38–73)
NRBC BLD-RTO: 0 /100 WBC
PLATELET # BLD AUTO: 286 K/UL (ref 150–450)
PMV BLD AUTO: 9.7 FL (ref 9.2–12.9)
POCT GLUCOSE: 113 MG/DL (ref 70–110)
POCT GLUCOSE: 115 MG/DL (ref 70–110)
POCT GLUCOSE: 141 MG/DL (ref 70–110)
POTASSIUM SERPL-SCNC: 3.8 MMOL/L (ref 3.5–5.1)
RBC # BLD AUTO: 3.51 M/UL (ref 4.6–6.2)
SODIUM SERPL-SCNC: 140 MMOL/L (ref 136–145)
VANCOMYCIN TROUGH SERPL-MCNC: 20.6 UG/ML (ref 10–22)
WBC # BLD AUTO: 4.57 K/UL (ref 3.9–12.7)

## 2025-03-21 PROCEDURE — 11000001 HC ACUTE MED/SURG PRIVATE ROOM

## 2025-03-21 PROCEDURE — 80202 ASSAY OF VANCOMYCIN: CPT | Performed by: HOSPITALIST

## 2025-03-21 PROCEDURE — 99233 SBSQ HOSP IP/OBS HIGH 50: CPT | Mod: ,,, | Performed by: INTERNAL MEDICINE

## 2025-03-21 PROCEDURE — 63600175 PHARM REV CODE 636 W HCPCS: Performed by: HOSPITALIST

## 2025-03-21 PROCEDURE — 36415 COLL VENOUS BLD VENIPUNCTURE: CPT | Performed by: INTERNAL MEDICINE

## 2025-03-21 PROCEDURE — 25000003 PHARM REV CODE 250: Performed by: HOSPITALIST

## 2025-03-21 PROCEDURE — 80048 BASIC METABOLIC PNL TOTAL CA: CPT | Performed by: INTERNAL MEDICINE

## 2025-03-21 PROCEDURE — 36415 COLL VENOUS BLD VENIPUNCTURE: CPT | Performed by: HOSPITALIST

## 2025-03-21 PROCEDURE — 25000003 PHARM REV CODE 250: Performed by: PHYSICIAN ASSISTANT

## 2025-03-21 PROCEDURE — 63600175 PHARM REV CODE 636 W HCPCS: Performed by: INTERNAL MEDICINE

## 2025-03-21 PROCEDURE — 63600175 PHARM REV CODE 636 W HCPCS: Performed by: EMERGENCY MEDICINE

## 2025-03-21 PROCEDURE — 85025 COMPLETE CBC W/AUTO DIFF WBC: CPT | Performed by: INTERNAL MEDICINE

## 2025-03-21 RX ADMIN — OXYCODONE HYDROCHLORIDE 10 MG: 10 TABLET ORAL at 01:03

## 2025-03-21 RX ADMIN — OXYCODONE HYDROCHLORIDE 10 MG: 10 TABLET ORAL at 06:03

## 2025-03-21 RX ADMIN — OXYCODONE HYDROCHLORIDE 10 MG: 10 TABLET ORAL at 08:03

## 2025-03-21 RX ADMIN — CARVEDILOL 25 MG: 25 TABLET, FILM COATED ORAL at 08:03

## 2025-03-21 RX ADMIN — LATANOPROST 1 DROP: 50 SOLUTION OPHTHALMIC at 09:03

## 2025-03-21 RX ADMIN — MORPHINE SULFATE 4 MG: 4 INJECTION INTRAVENOUS at 08:03

## 2025-03-21 RX ADMIN — CEFTRIAXONE 2 G: 2 INJECTION, POWDER, FOR SOLUTION INTRAMUSCULAR; INTRAVENOUS at 09:03

## 2025-03-21 RX ADMIN — HYDRALAZINE HYDROCHLORIDE 50 MG: 25 TABLET ORAL at 02:03

## 2025-03-21 RX ADMIN — HYDRALAZINE HYDROCHLORIDE 50 MG: 25 TABLET ORAL at 06:03

## 2025-03-21 RX ADMIN — FINASTERIDE 5 MG: 5 TABLET, FILM COATED ORAL at 08:03

## 2025-03-21 RX ADMIN — VANCOMYCIN HYDROCHLORIDE 1250 MG: 1.25 INJECTION, POWDER, LYOPHILIZED, FOR SOLUTION INTRAVENOUS at 07:03

## 2025-03-21 RX ADMIN — CAPSAICIN: 0.25 CREAM TOPICAL at 08:03

## 2025-03-21 RX ADMIN — HYDRALAZINE HYDROCHLORIDE 50 MG: 25 TABLET ORAL at 09:03

## 2025-03-21 RX ADMIN — MORPHINE SULFATE 4 MG: 4 INJECTION INTRAVENOUS at 11:03

## 2025-03-21 RX ADMIN — VALSARTAN 320 MG: 160 TABLET, FILM COATED ORAL at 08:03

## 2025-03-21 RX ADMIN — CARVEDILOL 25 MG: 25 TABLET, FILM COATED ORAL at 09:03

## 2025-03-21 RX ADMIN — MORPHINE SULFATE 4 MG: 4 INJECTION INTRAVENOUS at 01:03

## 2025-03-21 RX ADMIN — ATORVASTATIN CALCIUM 80 MG: 40 TABLET, FILM COATED ORAL at 08:03

## 2025-03-21 RX ADMIN — MORPHINE SULFATE 4 MG: 4 INJECTION INTRAVENOUS at 02:03

## 2025-03-21 RX ADMIN — Medication 50 MG: at 08:03

## 2025-03-21 RX ADMIN — CAPSAICIN: 0.25 CREAM TOPICAL at 09:03

## 2025-03-21 NOTE — SUBJECTIVE & OBJECTIVE
Interval History: Patient with borderline GFR with known CKD stage 3b so if requires long term IV antibiotics he may require clearance by Nephrology for PICC line or even a tunneled line done by IR. Cultures from aspiration remain no growth to date. Patient remains on IV Vancomycin and Ceftriaxone as per ID. Patient reports subjectively the pain in his right hip and low back area is better than on admit and patient appeared very comfortable on exam. Patient reports pain as 3/10 currently to right hip area. Labs reviewed. Creatinine improved and back down to his baseline at 1.8 and was 1.8 yesterday. Hgb stable at 10.9 and was 11.3 yesterday. Blood sugars are well controlled. Discussed with patient awaiting plan from ID concerning antibiotics prior to proceeding with discharge planning and they are awaiting culture results so likely will not discharge until Monday, 3/24 especially if needs a tunneled line for long term antibiotics.     Review of Systems   Constitutional:  Negative for fever.   Respiratory:  Negative for cough and shortness of breath.    Cardiovascular:  Negative for chest pain.   Gastrointestinal:  Negative for abdominal pain and nausea.   Musculoskeletal:  Positive for arthralgias (Right hip) and back pain (Right side low back).   Psychiatric/Behavioral:  Negative for agitation and confusion.      Objective:     Vital Signs (Most Recent):  Temp: 98 °F (36.7 °C) (03/21/25 1110)  Pulse: 66 (03/21/25 1110)  Resp: 18 (03/21/25 1110)  BP: 124/58 (03/21/25 1110)  SpO2: 95 % (03/21/25 1110) on room air Vital Signs (24h Range):  Temp:  [97.9 °F (36.6 °C)-98.9 °F (37.2 °C)] 98 °F (36.7 °C)  Pulse:  [66-71] 66  Resp:  [16-18] 18  SpO2:  [95 %-97 %] 95 %  BP: (124-145)/(58-66) 124/58     Weight: 100 kg (220 lb 7.4 oz)  Body mass index is 29.22 kg/m².  No intake or output data in the 24 hours ending 03/21/25 1209      Physical Exam  Vitals and nursing note reviewed.   Constitutional:       General: He is awake.  He is not in acute distress.     Appearance: Normal appearance. He is well-developed. He is not ill-appearing.      Comments: Patient sitting up in bed in no distress. Patient awake and alert and oriented x 4. Patient appeared very comfortable and not in obvious pain on exam.    Eyes:      Conjunctiva/sclera: Conjunctivae normal.   Cardiovascular:      Rate and Rhythm: Normal rate and regular rhythm.      Heart sounds: Normal heart sounds. No murmur heard.  Pulmonary:      Effort: Pulmonary effort is normal. No respiratory distress.      Breath sounds: Normal breath sounds. No wheezing.   Abdominal:      General: Abdomen is flat. Bowel sounds are normal. There is no distension.      Palpations: Abdomen is soft.      Tenderness: There is no abdominal tenderness.   Musculoskeletal:      Right lower leg: No edema.      Left lower leg: No edema.   Skin:     General: Skin is warm.      Findings: No erythema.   Neurological:      Mental Status: He is alert and oriented to person, place, and time.   Psychiatric:         Mood and Affect: Mood normal.         Behavior: Behavior normal. Behavior is cooperative.         Thought Content: Thought content normal.         Judgment: Judgment normal.               Significant Labs: BMP:   Recent Labs   Lab 03/21/25  0305   GLU 90      K 3.8      CO2 21*   BUN 26*   CREATININE 1.8*   CALCIUM 8.5*     CBC:   Recent Labs   Lab 03/21/25  0305   WBC 4.57   HGB 10.9*   HCT 34.5*          Significant Imaging: I have reviewed all pertinent imaging results/findings within the past 24 hours.

## 2025-03-21 NOTE — PLAN OF CARE
Problem: Adult Inpatient Plan of Care  Goal: Plan of Care Review  Outcome: Progressing  Goal: Patient-Specific Goal (Individualized)  Outcome: Progressing  Goal: Absence of Hospital-Acquired Illness or Injury  Outcome: Progressing  Goal: Optimal Comfort and Wellbeing  Outcome: Progressing  Goal: Readiness for Transition of Care  Outcome: Progressing     Problem: Infection  Goal: Absence of Infection Signs and Symptoms  Outcome: Progressing     Problem: Diabetes Comorbidity  Goal: Blood Glucose Level Within Targeted Range  Outcome: Progressing     Problem: Wound  Goal: Optimal Coping  Outcome: Progressing  Goal: Optimal Functional Ability  Outcome: Progressing  Goal: Absence of Infection Signs and Symptoms  Outcome: Progressing  Goal: Improved Oral Intake  Outcome: Progressing  Goal: Optimal Pain Control and Function  Outcome: Progressing  Goal: Skin Health and Integrity  Outcome: Progressing  Goal: Optimal Wound Healing  Outcome: Progressing    Pt AAO x4 and is calm and cooperative when receiving care. Pt stated that they were in pain as scheduled and PRN medication provided. Pt tolerated intervention. Pt tolerating use of RW to ambulate to bathroom and throughout room. No s/s of nausea, vomiting noted. At this time pt lying supine, resting in bed as it is low, wheels locked with call light in reach. Continue plan of care.

## 2025-03-21 NOTE — PROGRESS NOTES
[facility] - Surgery  Infectious Disease  Progress Note    Patient Name: Macario Dior Jr.  MRN: [id number]  Admission Date: 3/16/2025  Length of Stay: 4 days  Attending Physician: Arpita Salamanca MD  Primary Care Provider: [facility]    Isolation Status: No active isolations  Assessment/Plan:      Neuro  Abnormal MRI, lumbar spine  MRI hip and MRI spine reveal findings of possible facet osteomyelitis mostly in the L4-L5 region with some possible findings on L3. Patient is a febrile with a WBC wnl. When asked where the pain is located the patient points to his lower right back in region that is between his lumbar spine and right iliac crest. He describes the pain as sharp in nature and aggravated by movement. He denies fever at home and has been afebrile while in patient. No signs of sepsis at this time.     Neuro-IR is planning a biopsy on 3/19/2025 of the region seen on MRI for culture.     Rec  S/p IR bx & aspiration. Cultures pending.  Currently on ceftriaxone and Vanc, will de-escalate pending cultures+suspectabilities  Subjective improvement in symptoms report  Will f/u cultures and path        Anticipated Disposition: As per primary    Thank you for your consult. I will follow-up with patient. Please contact us if you have any additional questions.    Esteban aButista, DO  Infectious Disease  [facility] - [facility]    Subjective:     Principal Problem:Septic arthritis of lumbar spine    HPI: Mr. Macario Dior is a 73 M with a past medical history that includes R hip replacement in 2016, HTN, HLD, BUZZ, CKD 3, T2DM with neuropathy, prior DVT (on Xarelto), loop recorder placement, and lumbar radiculopathy. He came to the ED on 3/16/2025 with concerns for right hip pain in addition to lower right back pain. When asked where the pain is located the patient points to his lower right back in region that is between his lumbar spine and right iliac crest. He describes the pain as sharp in  nature and aggravated by movement. He denies fever at home and has been afebrile while in patient. No signs of sepsis at this time. WBC wnl. 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 MRI of the spine found similar findings with possible additional involvement of L3-L4 (though findings more prominent in L4-L5). Orthopedics was consulted to evaluate his right hip replaemcent and feel the pain is coming from his spine; recommended neurosurgery consult. Neurosurgery did not recommend any surgical intervention at this time but recommended admittance to hospital medicine with ID consult for infectious workup; additionally recommended IR biopsy of lumbar region for culture (Neuro IR planning procedure for 3/19/2025). WBC is within normal limits and patient has remained afebrile. Labs did show an elevated ESR (82)  and CRP (21.5). Not currently on antibiotics at this time.   Interval History: No Acute Overnight Events. Patient is afebrile and hemodynamically stable.     Cultures/Path from bx pending.       Objective:     Vital Signs (Most Recent):  Temp: 98.1 °F (36.7 °C) (03/21/25 0726)  Pulse: 66 (03/21/25 0726)  Resp: 18 (03/21/25 0808)  BP: (!) 145/66 (03/21/25 0726)  SpO2: 96 % (03/21/25 0726) Vital Signs (24h Range):  Temp:  [97.9 °F (36.6 °C)-98.9 °F (37.2 °C)] 98.1 °F (36.7 °C)  Pulse:  [66-71] 66  Resp:  [16-18] 18  SpO2:  [95 %-97 %] 96 %  BP: (133-145)/(63-66) 145/66     Weight: 100 kg (220 lb 7.4 oz)  Body mass index is 29.22 kg/m².    Estimated Creatinine Clearance: 45.3 mL/min (A) (based on SCr of 1.8 mg/dL (H)).     Physical Exam  Constitutional:       General: He is not in acute distress.     Appearance: Normal appearance. He is not toxic-appearing.   HENT:      Head: Normocephalic and atraumatic.   Eyes:      General: No scleral icterus.  Cardiovascular:      Rate and Rhythm: Normal  rate and regular rhythm.      Heart sounds: No murmur heard.  Pulmonary:      Effort: Pulmonary effort is normal.      Breath sounds: Normal breath sounds.   Musculoskeletal:         General: Tenderness present.      Right lower leg: No edema.      Left lower leg: No edema.      Comments: Area between lower lumbar spine and right iliac crest is tender to palpation without overt signs of erythema or fluctuance; area is tight to the touch when compared to the left side.    Skin:     General: Skin is warm and dry.   Neurological:      Mental Status: He is alert and oriented to person, place, and time.          Significant Labs: All pertinent labs within the past 24 hours have been reviewed.    Significant Imaging: I have reviewed all pertinent imaging results/findings within the past 24 hours.

## 2025-03-21 NOTE — PLAN OF CARE
03/21/25 1118   Post-Acute Status   Post-Acute Authorization Home Health;IV Infusion   Home Health Status Pending Payor Medical Review   IV Infusion Status Pending payor review/awaiting authorization (if required)   Discharge Plan   Discharge Plan A Home Health     SW met with pt and spoke to his daughter via speaker to discuss delay in D/C. [facility] following pending [facility] auth (worksheet not completed until finial rec's obtained). SW to follow.    Tejal Davidson LCSW  Case Management   [facility]   Ext. [phone]

## 2025-03-21 NOTE — ASSESSMENT & PLAN NOTE
Patient's blood pressure range in the last 24 hours was: BP  Min: 124/58  Max: 145/66.The patient's inpatient anti-hypertensive regimen is listed below:    Current Antihypertensives  carvediloL tablet 25 mg, 2 times daily, Oral  hydrALAZINE tablet 50 mg, Every 8 hours, Oral  valsartan tablet 320 mg, Daily, Oral  , Daily, Oral    Plan  - BP is controlled, no changes needed to their regimen  - Goal BP < 140/90.

## 2025-03-21 NOTE — PT/OT/SLP PROGRESS
Physical Therapy      Patient Name:  Macario Dior Jr.   MRN:  [id number]    Patient not seen today secondary to Patient fatigue, Unarousable. Attempt made for session; pt did not wake to name or touch. Breathing non-distressed. Will follow-up cont POC .

## 2025-03-21 NOTE — PROGRESS NOTES
[facility] Medicine  Progress Note    Patient Name: Macario Dior Jr.  MRN: [id number]  Patient Class: IP- Inpatient   Admission Date: 3/16/2025  Length of Stay: 4 days  Attending Physician: Arpita Salamanca MD  Primary Care Provider: [facility]        Subjective     Principal Problem:Septic arthritis of lumbar spine        HPI:  74 y.o. 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 Pain located right para spinal lumbar region, is non-radiating.  No sciatica. He denies fever, chills, cough, SOB. Weight loss, appetite loss.     In the ED, evaluation included  wbc 6.8, Hb 11, CRP 21, cr 2.9-> 2.3,  & MRI. Ortho was consulted. They do not feel that the hip is infected. They recommend a NS spine consult with concern for with edema in the right paraspinal musculature and potential R L4-5 facet septic arthritis.  His MRI resulted this morning. Radiology was concerned for R L4-5 septic facet joint with osteomyelitis.  His calcium dropped after hydration. 9.1-> 7.7  Suspect albumin was falsely high upon admission.        He was placed in OBS, Likely needs upgrade to Inpatient status        Overview/Hospital Course:  Patient admitted with left hip pain and MRI of pelvis and lumbar spine done that showed that revealed concern for left L4-L5 facet septic arthritis and osteomyelitis. Orthopedic surgery consulted and reviewed MRI and noted no evidence of left hip infection. Neurosurgery consulted concerning facet septic arthritis and felt no surgical intervention recommended IR aspiration of facet and blood cultures and Infectious disease consult. Consult to ID and IR placed. Blood culture drawn. Blood cultures grew Staph epidermis but ID felt likely just a contaminant and recommended to hold off on antibiotics until  IR aspiration and to repeat blood cultures that was done. Patient went to IR and had aspiration of L4-L5 facet and bone biopsy done by IR. Patient started on empiric IV Vancomycin (pharmacy to dose) after aspiration to cover for possible Staph infection and Ceftriaxone 2 grams IV every 12 hours to cover other bacterial organisms awaiting culture results as per ID. PT/OT consulted and initially recommended high intensity but patient progressed with therapy and recommending low intensity on discharge on 3/20. Plan discharge ome with HH PT/OT and home IV infusion on discharge when medically ready. Awaiting culture results and final ID recs for discharge.     Interval History: Patient with borderline GFR with known CKD stage 3b so if requires long term IV antibiotics he may require clearance by Nephrology for PICC line or even a tunneled line done by IR. Cultures from aspiration remain no growth to date. Patient remains on IV Vancomycin and Ceftriaxone as per ID. Patient reports subjectively the pain in his right hip and low back area is better than on admit and patient appeared very comfortable on exam. Patient reports pain as 3/10 currently to right hip area. Labs reviewed. Creatinine improved and back down to his baseline at 1.8 and was 1.8 yesterday. Hgb stable at 10.9 and was 11.3 yesterday. Blood sugars are well controlled. Discussed with patient awaiting plan from ID concerning antibiotics prior to proceeding with discharge planning and they are awaiting culture results so likely will not discharge until Monday, 3/24 especially if needs a tunneled line for long term antibiotics.     Review of Systems   Constitutional:  Negative for fever.   Respiratory:  Negative for cough and shortness of breath.    Cardiovascular:  Negative for chest pain.   Gastrointestinal:  Negative for abdominal pain and nausea.   Musculoskeletal:  Positive for arthralgias (Right hip) and back pain (Right side low back).    Psychiatric/Behavioral:  Negative for agitation and confusion.      Objective:     Vital Signs (Most Recent):  Temp: 98 °F (36.7 °C) (03/21/25 1110)  Pulse: 66 (03/21/25 1110)  Resp: 18 (03/21/25 1110)  BP: 124/58 (03/21/25 1110)  SpO2: 95 % (03/21/25 1110) on room air Vital Signs (24h Range):  Temp:  [97.9 °F (36.6 °C)-98.9 °F (37.2 °C)] 98 °F (36.7 °C)  Pulse:  [66-71] 66  Resp:  [16-18] 18  SpO2:  [95 %-97 %] 95 %  BP: (124-145)/(58-66) 124/58     Weight: 100 kg (220 lb 7.4 oz)  Body mass index is 29.22 kg/m².  No intake or output data in the 24 hours ending 03/21/25 1209      Physical Exam  Vitals and nursing note reviewed.   Constitutional:       General: He is awake. He is not in acute distress.     Appearance: Normal appearance. He is well-developed. He is not ill-appearing.      Comments: Patient sitting up in bed in no distress. Patient awake and alert and oriented x 4. Patient appeared very comfortable and not in obvious pain on exam.    Eyes:      Conjunctiva/sclera: Conjunctivae normal.   Cardiovascular:      Rate and Rhythm: Normal rate and regular rhythm.      Heart sounds: Normal heart sounds. No murmur heard.  Pulmonary:      Effort: Pulmonary effort is normal. No respiratory distress.      Breath sounds: Normal breath sounds. No wheezing.   Abdominal:      General: Abdomen is flat. Bowel sounds are normal. There is no distension.      Palpations: Abdomen is soft.      Tenderness: There is no abdominal tenderness.   Musculoskeletal:      Right lower leg: No edema.      Left lower leg: No edema.   Skin:     General: Skin is warm.      Findings: No erythema.   Neurological:      Mental Status: He is alert and oriented to person, place, and time.   Psychiatric:         Mood and Affect: Mood normal.         Behavior: Behavior normal. Behavior is cooperative.         Thought Content: Thought content normal.         Judgment: Judgment normal.               Significant Labs: BMP:   Recent Labs   Lab  03/21/25  0305   GLU 90      K 3.8      CO2 21*   BUN 26*   CREATININE 1.8*   CALCIUM 8.5*     CBC:   Recent Labs   Lab 03/21/25  0305   WBC 4.57   HGB 10.9*   HCT 34.5*          Significant Imaging: I have reviewed all pertinent imaging results/findings within the past 24 hours.      Assessment & Plan  Septic arthritis of lumbar spine of L4-L5 facet  Abnormal MRI, lumbar spine  Patient admitted with worsening of acute on chronic right hip and low back pain. MRI of pelvis and lumbar spine done and showed concern for septic arthritis of right L4-5 facet joint with osteomyelitis.     - Patient admitted to hospital for evaluation. WBC normal at 6800 on admit and CRP elevated at 21. Plan to repeat CRP now that he has had several days of IV antibiotics on 3/22.  - Orthopedic surgery consulted and reviewed MRI and noted no evidence of left hip infection.   - Neurosurgery consulted concerning facet septic arthritis and felt no surgical intervention needed and recommended IR aspiration of facet and blood cultures.   - Infectious disease consulted and recommended to monitor off antibiotics in hopes that we can increase culture yield and Recommended biopsy for pathology and microbiologic studies.  - Patient went to IR on 3/19 and had aspiration of L4-L5 facet and bone biopsy done by IR.   - Patient started on empiric IV Vancomycin (pharmacy to dose) after aspiration to cover for possible Staph infection and Ceftriaxone 2 grams IV every 12 hours to cover other bacterial organisms awaiting culture results.  - So far cultures from aspiration are no growth. Appreciate ID assistance on case and they are awaiting culture results to determine recommendations for discharge on his antibiotics. Patient with borderline GFR with known CKD stage 3b so if requires long term IV antibiotics he may require clearance by Nephrology for PICC line or even a tunneled line done by IR. Cultures from aspiration remain no growth to  date. Patient remains on IV Vancomycin and Ceftriaxone as per ID. Patient reports subjectively the pain in his right hip and low back area is better than on admit on 3/21 so clinically he appears to be improving on antibiotics.  - Patient on long term anticoagulation orally with Xarelto for history of DVT/PE so no other DVT prophylaxis required as patient is fully anticoagulated.      Long term (current) use of anticoagulants  Personal history of other venous thrombosis and embolism  This patient has long term use on an anticoagulant with Select Anticoagulant(s): Xarelto was held for IR aspiration but restarted after aspiration on 3/19. They are on long term anticoagulation due to Reason for Anticoagulation: DVT/PE. If patient needed tunneled line then will have to stop so to be safe will hold Xarelto for now on 3/21.    Stage 3b chronic kidney disease  Creatine stable for now and at baseline. Patient with known CKD stage 3b at baseline. BMP reviewed- noted Estimated Creatinine Clearance: 45.3 mL/min (A) (based on SCr of 1.8 mg/dL (H)). according to latest data. Monitor UOP and serial BMP and adjust therapy as needed. Renally dose meds. Avoid nephrotoxic medications and procedures.    Type 2 diabetes mellitus with diabetic polyneuropathy, without long-term current use of insulin  Patient's FSGs are controlled on current medication regimen.  Home meds : Weekly Seiglutide injections.  Holding home meds in hospital.     Last A1c reviewed-   Lab Results   Component Value Date    HGBA1C 6.7 (H) 12/21/2022     Most recent fingerstick glucose reviewed-   Recent Labs   Lab 03/20/25  1517 03/20/25  2100 03/21/25  0704 03/21/25  1109   POCTGLUCOSE 156* 96 113* 141*     Current correctional scale  Low  Maintain anti-hyperglycemic dose as follows-   Antihyperglycemics (From admission, onward)      Start     Stop Route Frequency Ordered    03/16/25 5138  insulin aspart U-100 pen 0-5 Units         -- SubQ Before meals & nightly  PRN 03/16/25 2148          - Hold Oral hypoglycemics while patient is in the hospital.  - POCT glucose checks before meals and at bedtime.   - Hypoglycemic protocol in effect.  - Diabetic diet provided.  - Target blood sugars in hospital 140-180.     Essential hypertension  Patient's blood pressure range in the last 24 hours was: BP  Min: 124/58  Max: 145/66.The patient's inpatient anti-hypertensive regimen is listed below:    Current Antihypertensives  carvediloL tablet 25 mg, 2 times daily, Oral  hydrALAZINE tablet 50 mg, Every 8 hours, Oral  valsartan tablet 320 mg, Daily, Oral  , Daily, Oral    Plan  - BP is controlled, no changes needed to their regimen  - Goal BP < 140/90.     BUZZ (obstructive sleep apnea)  Patient on CPAP at home but refusing in hospital.     Chronic pain of right hip  Osteoarthritis of spine with radiculopathy, lumbar region  Continue home meds.   Home meds:  Robaxin, Oxycodone 10 mg, Capasin, Lidoderm patch.   Avoid NSAID due to CKD.     Scheduled:  Lidoderm patch daily  Capasin gel bid    PRN:  Oxycodone 10 q 6  Morhine 4 mg IV   Robaxan 750     Inpatient Morphine Milligram Equivalents Per Day 3/17 - 3/20    Values displayed are in units of MME/Day     Order Start / End Date 3/17 Yesterday Today Tomorrow     oxyCODONE immediate release tablet Tab 10 mg 3/16 - No end date 30 of 60 30 of 60 15 of 60 0 of 60     morphine injection 4 mg 3/17 - No end date 24 of 60 24 of 72 0 of 72 0 of 72     Daily Totals  54 of 120 54 of 132 15 of 132 0 of 132            Anemia due to infection  Controlled. Anemia is likely due to chronic disease due to infection . Most recent hemoglobin and hematocrit are listed below.  Recent Labs     03/19/25  0307 03/21/25  0305   HGB 11.3* 10.9*   HCT 35.4* 34.5*     Plan  - Monitor serial CBC: Daily  - Transfuse PRBC if patient becomes hemodynamically unstable, symptomatic or H/H drops below 7/21.  - Patient has not received any PRBC transfusions to date  - Patient's  anemia is currently stable    Positive blood culture  Patient noted to have positive blood culture on admit with Staphylococcus epidermis. ID consulted and felt likely contaminant and not a real infection. Repeat blood cultures drawn and repeat blood cultures so far no growth so likely just a contaminant.        VTE Risk Mitigation (From admission, onward)           Ordered     rivaroxaban tablet 15 mg  with dinner         03/20/25 0921     IP VTE HIGH RISK PATIENT  Once         03/16/25 2148                    Discharge Planning   GERRI: 3/24/2025     Code Status: Full Code   Discharge Plan A: Home Health          Arpita Salamanca MD  Department of Hospital Medicine   [facility] - Surgery

## 2025-03-21 NOTE — ASSESSMENT & PLAN NOTE
Controlled. Anemia is likely due to chronic disease due to infection. Most recent hemoglobin and hematocrit are listed below.  Recent Labs     03/19/25  0307 03/21/25  0305   HGB 11.3* 10.9*   HCT 35.4* 34.5*     Plan  - Monitor serial CBC: Daily  - Transfuse PRBC if patient becomes hemodynamically unstable, symptomatic or H/H drops below 7/21.  - Patient has not received any PRBC transfusions to date  - Patient's anemia is currently stable

## 2025-03-21 NOTE — ASSESSMENT & PLAN NOTE
Patient admitted with worsening of acute on chronic right hip and low back pain. MRI of pelvis and lumbar spine done and showed concern for septic arthritis of right L4-5 facet joint with osteomyelitis.     - Patient admitted to hospital for evaluation. WBC normal at 6800 on admit and CRP elevated at 21. Plan to repeat CRP now that he has had several days of IV antibiotics on 3/22.  - Orthopedic surgery consulted and reviewed MRI and noted no evidence of left hip infection.   - Neurosurgery consulted concerning facet septic arthritis and felt no surgical intervention needed and recommended IR aspiration of facet and blood cultures.   - Infectious disease consulted and recommended to monitor off antibiotics in hopes that we can increase culture yield and Recommended biopsy for pathology and microbiologic studies.  - Patient went to IR on 3/19 and had aspiration of L4-L5 facet and bone biopsy done by IR.   - Patient started on empiric IV Vancomycin (pharmacy to dose) after aspiration to cover for possible Staph infection and Ceftriaxone 2 grams IV every 12 hours to cover other bacterial organisms awaiting culture results.  - So far cultures from aspiration are no growth. Appreciate ID assistance on case and they are awaiting culture results to determine recommendations for discharge on his antibiotics. Patient with borderline GFR with known CKD stage 3b so if requires long term IV antibiotics he may require clearance by Nephrology for PICC line or even a tunneled line done by IR. Cultures from aspiration remain no growth to date. Patient remains on IV Vancomycin and Ceftriaxone as per ID. Patient reports subjectively the pain in his right hip and low back area is better than on admit on 3/21 so clinically he appears to be improving on antibiotics.  - Patient on long term anticoagulation orally with Xarelto for history of DVT/PE so no other DVT prophylaxis required as patient is fully anticoagulated.

## 2025-03-21 NOTE — ASSESSMENT & PLAN NOTE
This patient has long term use on an anticoagulant with Select Anticoagulant(s): Xareltowas held for IR aspiration but restarted after aspiration on 3/19. They are on long term anticoagulation due to Reason for Anticoagulation: DVT/PE. If patient needed tunneled line then will have to stop so to be safe will hold Xarelto for now on 3/21.

## 2025-03-21 NOTE — SUBJECTIVE & OBJECTIVE
Interval History: No Acute Overnight Events. Patient is afebrile and hemodynamically stable.     Cultures/Path from bx pending.       Objective:     Vital Signs (Most Recent):  Temp: 98.1 °F (36.7 °C) (03/21/25 0726)  Pulse: 66 (03/21/25 0726)  Resp: 18 (03/21/25 0808)  BP: (!) 145/66 (03/21/25 0726)  SpO2: 96 % (03/21/25 0726) Vital Signs (24h Range):  Temp:  [97.9 °F (36.6 °C)-98.9 °F (37.2 °C)] 98.1 °F (36.7 °C)  Pulse:  [66-71] 66  Resp:  [16-18] 18  SpO2:  [95 %-97 %] 96 %  BP: (133-145)/(63-66) 145/66     Weight: 100 kg (220 lb 7.4 oz)  Body mass index is 29.22 kg/m².    Estimated Creatinine Clearance: 45.3 mL/min (A) (based on SCr of 1.8 mg/dL (H)).     Physical Exam  Constitutional:       General: He is not in acute distress.     Appearance: Normal appearance. He is not toxic-appearing.   HENT:      Head: Normocephalic and atraumatic.   Eyes:      General: No scleral icterus.  Cardiovascular:      Rate and Rhythm: Normal rate and regular rhythm.      Heart sounds: No murmur heard.  Pulmonary:      Effort: Pulmonary effort is normal.      Breath sounds: Normal breath sounds.   Musculoskeletal:         General: Tenderness present.      Right lower leg: No edema.      Left lower leg: No edema.      Comments: Area between lower lumbar spine and right iliac crest is tender to palpation without overt signs of erythema or fluctuance; area is tight to the touch when compared to the left side.    Skin:     General: Skin is warm and dry.   Neurological:      Mental Status: He is alert and oriented to person, place, and time.          Significant Labs: All pertinent labs within the past 24 hours have been reviewed.    Significant Imaging: I have reviewed all pertinent imaging results/findings within the past 24 hours.

## 2025-03-21 NOTE — ASSESSMENT & PLAN NOTE
MRI hip and MRI spine reveal findings of possible facet osteomyelitis mostly in the L4-L5 region with some possible findings on L3. Patient is a febrile with a WBC wnl. When asked where the pain is located the patient points to his lower right back in region that is between his lumbar spine and right iliac crest. He describes the pain as sharp in nature and aggravated by movement. He denies fever at home and has been afebrile while in patient. No signs of sepsis at this time.     Neuro-IR is planning a biopsy on 3/19/2025 of the region seen on MRI for culture.     Rec  S/p IR bx & aspiration. Cultures pending.  Currently on ceftriaxone and Vanc, will de-escalate pending cultures+suspectabilities  Subjective improvement in symptoms report  Will f/u cultures and path

## 2025-03-21 NOTE — ASSESSMENT & PLAN NOTE
Patient's FSGs are controlled on current medication regimen.  Home meds : Weekly Seiglutide injections.  Holding home meds in hospital.     Last A1c reviewed-   Lab Results   Component Value Date    HGBA1C 6.7 (H) 12/21/2022     Most recent fingerstick glucose reviewed-   Recent Labs   Lab 03/20/25  1517 03/20/25  2100 03/21/25  0704 03/21/25  1109   POCTGLUCOSE 156* 96 113* 141*     Current correctional scale  Low  Maintain anti-hyperglycemic dose as follows-   Antihyperglycemics (From admission, onward)      Start     Stop Route Frequency Ordered    03/16/25 2248  insulin aspart U-100 pen 0-5 Units         -- SubQ Before meals & nightly PRN 03/16/25 2148          - Hold Oral hypoglycemics while patient is in the hospital.  - POCT glucose checks before meals and at bedtime.   - Hypoglycemic protocol in effect.  - Diabetic diet provided.  - Target blood sugars in hospital 140-180.

## 2025-03-22 PROBLEM — R78.81 POSITIVE BLOOD CULTURE: Status: RESOLVED | Noted: 2025-03-19 | Resolved: 2025-03-22

## 2025-03-22 LAB
ABSOLUTE EOSINOPHIL (OHS): 0.26 K/UL
ABSOLUTE MONOCYTE (OHS): 0.45 K/UL (ref 0.3–1)
ABSOLUTE NEUTROPHIL COUNT (OHS): 3.22 K/UL (ref 1.8–7.7)
ANION GAP (OHS): 7 MMOL/L (ref 8–16)
BASOPHILS # BLD AUTO: 0.04 K/UL
BASOPHILS NFR BLD AUTO: 0.7 %
BUN SERPL-MCNC: 25 MG/DL (ref 8–23)
CALCIUM SERPL-MCNC: 8.6 MG/DL (ref 8.7–10.5)
CHLORIDE SERPL-SCNC: 112 MMOL/L (ref 95–110)
CO2 SERPL-SCNC: 21 MMOL/L (ref 23–29)
CREAT SERPL-MCNC: 1.8 MG/DL (ref 0.5–1.4)
CRP SERPL-MCNC: 10.3 MG/L
ERYTHROCYTE [DISTWIDTH] IN BLOOD BY AUTOMATED COUNT: 13.2 % (ref 11.5–14.5)
GFR SERPLBLD CREATININE-BSD FMLA CKD-EPI: 39 ML/MIN/1.73/M2
GLUCOSE SERPL-MCNC: 128 MG/DL (ref 70–110)
HCT VFR BLD AUTO: 33.4 % (ref 40–54)
HGB BLD-MCNC: 10.7 GM/DL (ref 14–18)
IMM GRANULOCYTES # BLD AUTO: 0.01 K/UL (ref 0–0.04)
IMM GRANULOCYTES NFR BLD AUTO: 0.2 % (ref 0–0.5)
LYMPHOCYTES # BLD AUTO: 1.52 K/UL (ref 1–4.8)
MCH RBC QN AUTO: 31.2 PG (ref 27–50)
MCHC RBC AUTO-ENTMCNC: 32 G/DL (ref 32–36)
MCV RBC AUTO: 97 FL (ref 82–98)
NUCLEATED RBC (/100WBC) (OHS): 0 /100 WBC
PLATELET # BLD AUTO: 256 K/UL (ref 150–450)
PMV BLD AUTO: 9.3 FL (ref 9.2–12.9)
POCT GLUCOSE: 121 MG/DL (ref 70–110)
POCT GLUCOSE: 126 MG/DL (ref 70–110)
POCT GLUCOSE: 148 MG/DL (ref 70–110)
POTASSIUM SERPL-SCNC: 3.8 MMOL/L (ref 3.5–5.1)
RBC # BLD AUTO: 3.43 M/UL (ref 4.6–6.2)
RELATIVE EOSINOPHIL (OHS): 4.7 %
RELATIVE LYMPHOCYTE (OHS): 27.6 % (ref 18–48)
RELATIVE MONOCYTE (OHS): 8.2 % (ref 4–15)
RELATIVE NEUTROPHIL (OHS): 58.6 % (ref 38–73)
SODIUM SERPL-SCNC: 140 MMOL/L (ref 136–145)
WBC # BLD AUTO: 5.5 K/UL (ref 3.9–12.7)

## 2025-03-22 PROCEDURE — 11000001 HC ACUTE MED/SURG PRIVATE ROOM

## 2025-03-22 PROCEDURE — 99233 SBSQ HOSP IP/OBS HIGH 50: CPT | Mod: ,,, | Performed by: INTERNAL MEDICINE

## 2025-03-22 PROCEDURE — 80048 BASIC METABOLIC PNL TOTAL CA: CPT | Performed by: INTERNAL MEDICINE

## 2025-03-22 PROCEDURE — 85025 COMPLETE CBC W/AUTO DIFF WBC: CPT | Performed by: INTERNAL MEDICINE

## 2025-03-22 PROCEDURE — 25000003 PHARM REV CODE 250: Performed by: PHYSICIAN ASSISTANT

## 2025-03-22 PROCEDURE — 63600175 PHARM REV CODE 636 W HCPCS: Performed by: EMERGENCY MEDICINE

## 2025-03-22 PROCEDURE — 25000003 PHARM REV CODE 250: Performed by: HOSPITALIST

## 2025-03-22 PROCEDURE — 63600175 PHARM REV CODE 636 W HCPCS: Performed by: INTERNAL MEDICINE

## 2025-03-22 PROCEDURE — 25000003 PHARM REV CODE 250: Performed by: INTERNAL MEDICINE

## 2025-03-22 PROCEDURE — 86140 C-REACTIVE PROTEIN: CPT | Performed by: INTERNAL MEDICINE

## 2025-03-22 PROCEDURE — 36415 COLL VENOUS BLD VENIPUNCTURE: CPT | Performed by: INTERNAL MEDICINE

## 2025-03-22 RX ORDER — DOXYCYCLINE HYCLATE 100 MG
100 TABLET ORAL EVERY 12 HOURS
Status: DISCONTINUED | OUTPATIENT
Start: 2025-03-22 | End: 2025-03-23 | Stop reason: HOSPADM

## 2025-03-22 RX ADMIN — LIDOCAINE 2 PATCH: 50 PATCH CUTANEOUS at 11:03

## 2025-03-22 RX ADMIN — LIDOCAINE 2 PATCH: 50 PATCH CUTANEOUS at 08:03

## 2025-03-22 RX ADMIN — OXYCODONE HYDROCHLORIDE 10 MG: 10 TABLET ORAL at 01:03

## 2025-03-22 RX ADMIN — HYDRALAZINE HYDROCHLORIDE 50 MG: 25 TABLET ORAL at 06:03

## 2025-03-22 RX ADMIN — OXYCODONE HYDROCHLORIDE 10 MG: 10 TABLET ORAL at 06:03

## 2025-03-22 RX ADMIN — LATANOPROST 1 DROP: 50 SOLUTION OPHTHALMIC at 09:03

## 2025-03-22 RX ADMIN — MORPHINE SULFATE 4 MG: 4 INJECTION INTRAVENOUS at 10:03

## 2025-03-22 RX ADMIN — CAPSAICIN: 0.25 CREAM TOPICAL at 09:03

## 2025-03-22 RX ADMIN — Medication 50 MG: at 08:03

## 2025-03-22 RX ADMIN — MORPHINE SULFATE 4 MG: 4 INJECTION INTRAVENOUS at 08:03

## 2025-03-22 RX ADMIN — FINASTERIDE 5 MG: 5 TABLET, FILM COATED ORAL at 08:03

## 2025-03-22 RX ADMIN — CEFTRIAXONE 2 G: 2 INJECTION, POWDER, FOR SOLUTION INTRAMUSCULAR; INTRAVENOUS at 08:03

## 2025-03-22 RX ADMIN — CARVEDILOL 25 MG: 25 TABLET, FILM COATED ORAL at 08:03

## 2025-03-22 RX ADMIN — VALSARTAN 320 MG: 160 TABLET, FILM COATED ORAL at 08:03

## 2025-03-22 RX ADMIN — HYDRALAZINE HYDROCHLORIDE 50 MG: 25 TABLET ORAL at 02:03

## 2025-03-22 RX ADMIN — OXYCODONE HYDROCHLORIDE 10 MG: 10 TABLET ORAL at 08:03

## 2025-03-22 RX ADMIN — DOXYCYCLINE HYCLATE 100 MG: 100 TABLET, COATED ORAL at 08:03

## 2025-03-22 RX ADMIN — CAPSAICIN: 0.25 CREAM TOPICAL at 08:03

## 2025-03-22 RX ADMIN — MORPHINE SULFATE 4 MG: 4 INJECTION INTRAVENOUS at 02:03

## 2025-03-22 RX ADMIN — ATORVASTATIN CALCIUM 80 MG: 40 TABLET, FILM COATED ORAL at 08:03

## 2025-03-22 RX ADMIN — HYDRALAZINE HYDROCHLORIDE 50 MG: 25 TABLET ORAL at 09:03

## 2025-03-22 NOTE — ASSESSMENT & PLAN NOTE
Continue home meds.   Home meds:  Robaxin, Oxycodone 10 mg, Capasin, Lidoderm patch.   Avoid NSAID due to CKD.     Scheduled:  Lidoderm patch daily  Capasin gel bid    PRN:  Oxycodone 10 q 6  Morhine 4 mg IV   Robaxan 750     Inpatient Morphine Milligram Equivalents Per Day 3/17 - 3/20    Values displayed are in units of MME/Day     Order Start / End Date 3/17 Yesterday Today Tomorrow     oxyCODONE immediate release tablet Tab 10 mg 3/16 - No end date 30 of 60 30 of 60 15 of 60 0 of 60     morphine injection 4 mg 3/17 - No end date 24 of 60 24 of 72 0 of 72 0 of 72     Daily Totals  54 of 120 54 of 132 15 of 132 0 of 132             Ordered refill of Lantus Solostar ([employer]) medication will be shipped to Miguel Claros NP office within 7-10 business days.

## 2025-03-22 NOTE — SUBJECTIVE & OBJECTIVE
Interval History: Pathology from bone from right L4-L5 facet returned negative for malignancy and osteomyelitis. ID gave final recs today and I discussed with Dr. Tanmay Donato from ID team via secure chat today. Dr. Tanmay Donato noted possible septic arthritis based on MRI findings. Had biopsy for path and culture. Cultures remain NGTD. Biopsy from bone without evidence of osteomyelitis.  Dr. Tanmay Donato favors treating conservatively as possible septic arthritis with Doxycycline 100 mg PO BID x 4 weeks. Pateitn switched to po Doxycyline on 3/22 and IV Vancomycin and Ceftriaxone discontinued on 3/22.   ID recommended to have patient follow up with his spine specialist.  No need for ID clinic follow up.  Plan home discharge tomorrow on oral Doxycyline and discussed with patient who is good with discharge plan. Patient reports right hip and back pain much better than on admit. He has chronic pain to his back and hip area and back to his baseline level of chronic pain. Labs reviewed and stable. Creatinine stable at 1.8 and was 1.8 yesterday. Hgb 10.7 today and was 10.9 yesterday so stable. CRP 10.3 today and improved from 21 on admit.     Review of Systems   Constitutional:  Negative for fever.   Respiratory:  Negative for cough and shortness of breath.    Cardiovascular:  Negative for chest pain.   Gastrointestinal:  Negative for abdominal pain and nausea.   Musculoskeletal:  Positive for arthralgias (Right hip) and back pain (Right side low back).   Psychiatric/Behavioral:  Negative for agitation and confusion.      Objective:     Vital Signs (Most Recent):  Temp: 98 °F (36.7 °C) (03/22/25 1519)  Pulse: 73 (03/22/25 1519)  Resp: 18 (03/22/25 1519)  BP: 125/64 (03/22/25 1519)  SpO2: 98 % (03/22/25 1519) Vital Signs (24h Range):  Temp:  [97.6 °F (36.4 °C)-98.3 °F (36.8 °C)] 98 °F (36.7 °C)  Pulse:  [62-73] 73  Resp:  [18-20] 18  SpO2:  [96 %-98 %] 98 %  BP: (125-152)/(60-68) 125/64     Weight: 100 kg (220 lb 7.4  oz)  Body mass index is 29.22 kg/m².    Intake/Output Summary (Last 24 hours) at 3/22/2025 1706  Last data filed at 3/22/2025 1202  Gross per 24 hour   Intake 780 ml   Output --   Net 780 ml         Physical Exam  Vitals and nursing note reviewed.   Constitutional:       General: He is awake. He is not in acute distress.     Appearance: Normal appearance. He is well-developed. He is not ill-appearing.      Comments: Patient sitting up in bed in no distress. Patient awake and alert and oriented x 4. Patient appeared very comfortable and not in obvious pain on exam.    Cardiovascular:      Rate and Rhythm: Normal rate and regular rhythm.      Heart sounds: Normal heart sounds. No murmur heard.  Pulmonary:      Effort: Pulmonary effort is normal. No respiratory distress.      Breath sounds: Normal breath sounds. No wheezing.   Abdominal:      General: Abdomen is flat. Bowel sounds are normal. There is no distension.      Palpations: Abdomen is soft.      Tenderness: There is no abdominal tenderness.   Musculoskeletal:      Right lower leg: No edema.      Left lower leg: No edema.   Skin:     General: Skin is warm.      Findings: No erythema.   Neurological:      Mental Status: He is alert and oriented to person, place, and time.   Psychiatric:         Mood and Affect: Mood normal.         Behavior: Behavior normal. Behavior is cooperative.         Thought Content: Thought content normal.         Judgment: Judgment normal.               Significant Labs: BMP:   Recent Labs   Lab 03/21/25  0305 03/22/25  0821   GLU 90  --     140   K 3.8 3.8    112*   CO2 21* 21*   BUN 26* 25*   CREATININE 1.8* 1.8*   CALCIUM 8.5* 8.6*     CBC:   Recent Labs   Lab 03/21/25  0305 03/22/25  0821   WBC 4.57 5.50   HGB 10.9* 10.7*   HCT 34.5* 33.4*    256       Significant Imaging: I have reviewed all pertinent imaging results/findings within the past 24 hours.

## 2025-03-22 NOTE — ASSESSMENT & PLAN NOTE
Controlled. Anemia is likely due to chronic disease due to infection. Most recent hemoglobin and hematocrit are listed below.  Recent Labs     03/21/25  0305 03/22/25  0821   HGB 10.9* 10.7*   HCT 34.5* 33.4*     Plan  - Monitor serial CBC: Daily  - Transfuse PRBC if patient becomes hemodynamically unstable, symptomatic or H/H drops below 7/21.  - Patient has not received any PRBC transfusions to date  - Patient's anemia is currently stable

## 2025-03-22 NOTE — PLAN OF CARE
Problem: Adult Inpatient Plan of Care  Goal: Plan of Care Review  3/22/2025 0626 by Kvng Oneal LPN  Outcome: Progressing  3/22/2025 0304 by Kvng Oneal LPN  Outcome: Progressing  Goal: Patient-Specific Goal (Individualized)  3/22/2025 0626 by Kvng Oneal LPN  Outcome: Progressing  3/22/2025 0304 by Kvng Oneal LPN  Outcome: Progressing  Goal: Absence of Hospital-Acquired Illness or Injury  3/22/2025 0626 by Kvng Oneal LPN  Outcome: Progressing  3/22/2025 0304 by Kvng Oneal LPN  Outcome: Progressing  Goal: Optimal Comfort and Wellbeing  3/22/2025 0626 by Kvng Oneal LPN  Outcome: Progressing  3/22/2025 0304 by Kvng Oneal LPN  Outcome: Progressing  Goal: Readiness for Transition of Care  3/22/2025 0626 by Kvng Oneal LPN  Outcome: Progressing  3/22/2025 0304 by Kvng Oneal LPN  Outcome: Progressing     Problem: Infection  Goal: Absence of Infection Signs and Symptoms  3/22/2025 0626 by Kvng Oneal LPN  Outcome: Progressing  3/22/2025 0304 by Kvng Oneal LPN  Outcome: Progressing     Problem: Diabetes Comorbidity  Goal: Blood Glucose Level Within Targeted Range  3/22/2025 0626 by Kvng Oneal LPN  Outcome: Progressing  3/22/2025 0304 by Kvng Oneal LPN  Outcome: Progressing     Problem: Wound  Goal: Optimal Coping  3/22/2025 0626 by Kvng Oneal LPN  Outcome: Progressing  3/22/2025 0304 by Kvng Oneal LPN  Outcome: Progressing  Goal: Optimal Functional Ability  3/22/2025 0626 by Kvng Oneal LPN  Outcome: Progressing  3/22/2025 0304 by Kvng Oneal LPN  Outcome: Progressing  Goal: Absence of Infection Signs and Symptoms  3/22/2025 0626 by Kvng Oneal LPN  Outcome: Progressing  3/22/2025 0304 by Kvng Oneal LPN  Outcome: Progressing  Goal: Improved Oral Intake  3/22/2025 0626 by Kvng Oneal LPN  Outcome: Progressing  3/22/2025 0304 by Kvng Oneal LPN  Outcome: Progressing  Goal: Optimal  Pain Control and Function  3/22/2025 0626 by Kvng Oneal LPN  Outcome: Progressing  3/22/2025 0304 by Kvng Oneal LPN  Outcome: Progressing  Goal: Skin Health and Integrity  3/22/2025 0626 by Kvng Oneal LPN  Outcome: Progressing  3/22/2025 0304 by Kvng Oneal LPN  Outcome: Progressing  Goal: Optimal Wound Healing  3/22/2025 0626 by Kvng Oneal LPN  Outcome: Progressing  3/22/2025 0304 by Kvng Oneal LPN  Outcome: Progressing

## 2025-03-22 NOTE — ASSESSMENT & PLAN NOTE
This patient has long term use on an anticoagulant with Select Anticoagulant(s): Xareltowas held for IR aspiration but restarted after aspiration on 3/19. They are on long term anticoagulation due to Reason for Anticoagulation: DVT/PE. Resume home Xarelto on 3/22.

## 2025-03-22 NOTE — PROGRESS NOTES
Pharmacokinetic Assessment Follow Up: IV Vancomycin    Vancomycin serum concentration assessment(s):    The trough level was drawn correctly and can be used to guide therapy at this time. The measurement is above the desired definitive target range of 15 to 20 mcg/mL.    Vancomycin Regimen Plan:    Change regimen to Vancomycin 1000 mg IV every 24 hours with next serum trough concentration measured at 18:00 prior to 3rd dose on 03/24    Drug levels (last 3 results):  Recent Labs   Lab Result Units 03/21/25  1849   Vancomycin-Trough ug/mL 20.6       Pharmacy will continue to follow and monitor vancomycin.    Please contact pharmacy at extension [phone] for questions regarding this assessment.    Thank you for the consult,   Birdie Pasrtana       Patient brief summary:  Macario Dior Jr. is a 74 y.o. male initiated on antimicrobial therapy with IV Vancomycin for treatment of bacteremia    The patient's current regimen is Vancomycin 1250 mg every 24 hrs.     Drug Allergies:   Review of patient's allergies indicates:   Allergen Reactions    Amlodipine Swelling    Lisinopril Tinitus and Swelling    Pregabalin Swelling    Prednisone Palpitations    Azithromycin      Muscles tense up    Levetiracetam Other (See Comments)    Acetaminophen Rash     Feels like needles are sticking him per pt       Actual Body Weight:   100 Kg    Renal Function:   Estimated Creatinine Clearance: 45.3 mL/min (A) (based on SCr of 1.8 mg/dL (H)).,     Dialysis Method (if applicable):  N/A    CBC (last 72 hours):  Recent Labs   Lab Result Units 03/19/25  0307 03/21/25  0305   WBC K/uL 5.72 4.57   Hemoglobin g/dL 11.3* 10.9*   Hematocrit % 35.4* 34.5*   Platelets K/uL 326 286   Gran % % 43.8 46.8   Lymph % % 43.0 39.4   Mono % % 7.5 9.2   Eosinophil % % 5.1 3.7   Basophil % % 0.3 0.7   Differential Method  Automated Automated       Metabolic Panel (last 72 hours):  Recent Labs   Lab Result Units 03/19/25  0307 03/19/25  1353 03/21/25  0305   Sodium  mmol/L 139 139 140   Potassium mmol/L 3.6 3.6 3.8   Chloride mmol/L 107 108 109   CO2 mmol/L 22* 23 21*   Glucose mg/dL 105 97 90   BUN mg/dL 32* 30* 26*   Creatinine mg/dL 2.2* 1.8* 1.8*   Albumin g/dL  --  2.7*  --    Total Bilirubin mg/dL  --  0.5  --    Alkaline Phosphatase U/L  --  99  --    AST U/L  --  15  --    ALT U/L  --  11  --    Magnesium mg/dL 1.8  --   --    Phosphorus mg/dL 2.9  --   --        Vancomycin Administrations:  vancomycin given in the last 96 hours                     vancomycin 1,250 mg in 0.9% NaCl 250 mL IVPB (admixture device) (mg) 1,250 mg New Bag 03/21/25 1921     1,250 mg New Bag 03/20/25 1906    vancomycin 2 g in 0.9% sodium chloride 500 mL IVPB (mg) 2,000 mg New Bag 03/19/25 2030                    Microbiologic Results:  Microbiology Results (last 7 days)       Procedure Component Value Units Date/Time    Blood culture [[id number]]  (Abnormal)  (Susceptibility) Collected: 03/17/25 1410    Order Status: Completed Specimen: Blood from Peripheral, Antecubital, Left Updated: 03/21/25 1213     Blood Culture, Routine Gram stain nilam bottle: Gram positive cocci in clusters resembling Staph      Results called to and read back by:Betty Calderon RN 03/18/2025  18:20      STAPHYLOCOCCUS EPIDERMIDIS    Culture, Anaerobe [[id number]] Collected: 03/19/25 1750    Order Status: Completed Specimen: Abscess from Back Updated: 03/21/25 0753     Anaerobic Culture Culture in progress    Narrative:      Spine    Aerobic culture [[id number]] Collected: 03/19/25 1750    Order Status: Completed Specimen: Abscess from Back Updated: 03/21/25 0739     Aerobic Bacterial Culture No growth    Narrative:      spine    Blood culture [[id number]] Collected: 03/19/25 0002    Order Status: Completed Specimen: Blood Updated: 03/21/25 0613     Blood Culture, Routine No Growth to date      No Growth to date      No Growth to date    Blood culture [[id number]] Collected: 03/19/25 0001    Order Status: Completed  Specimen: Blood Updated: 03/21/25 0613     Blood Culture, Routine No Growth to date      No Growth to date      No Growth to date    AFB Culture & Smear [[id number]] Collected: 03/19/25 1750    Order Status: Completed Specimen: Abscess from Back Updated: 03/20/25 2127     AFB Culture & Smear Culture in progress     AFB CULTURE STAIN No acid fast bacilli seen.    Narrative:      spine    Fungus culture [[id number]] Collected: 03/19/25 1750    Order Status: Sent Specimen: Abscess from Back Updated: 03/19/25 1809    Rapid Organism ID by PCR (from Blood culture) [[id number]]  (Abnormal) Collected: 03/17/25 1410    Order Status: Completed Updated: 03/18/25 1911     Enterococcus faecalis Not Detected     Enterococcus faecium Not Detected     Listeria monocytogenes Not Detected     Staphylococcus spp. See species for ID     Staphylococcus aureus Not Detected     Staphylococcus epidermidis Detected     Staphylococcus lugdunensis Not Detected     Streptococcus species Not Detected     Streptococcus agalactiae Not Detected     Streptococcus pneumoniae Not Detected     Streptococcus pyogenes Not Detected     Acinetobacter calcoaceticus/baumannii complex Not Detected     Bacteroides fragilis Not Detected     Enterobacterales Not Detected     Enterobacter cloacae complex Not Detected     Escherichia coli Not Detected     Klebsiella aerogenes Not Detected     Klebsiella oxytoca Not Detected     Klebsiella pneumoniae group Not Detected     Proteus Not Detected     Salmonella sp Not Detected     Serratia marcescens Not Detected     Haemophilus influenzae Not Detected     Neisseria meningtidis Not Detected     Pseudomonas aeruginosa Not Detected     Stenotrophomonas maltophilia Not Detected     Candida albicans Not Detected     Candida auris Not Detected     Candida glabrata Not Detected     Candida krusei Not Detected     Candida parapsilosis Not Detected     Candida tropicalis Not Detected     Cryptococcus neoformans/gattii Not  Detected     CTX-M (ESBL ) Test Not Applicable     IMP (Carbapenem resistant) Test Not Applicable     KPC resistance gene (Carbapenem resistant) Test Not Applicable     mcr-1  Test Not Applicable     mec A/C  Detected     mec A/C and MREJ (MRSA) gene Test Not Applicable     NDM (Carbapenem resistant) Test Not Applicable     OXA-48-like (Carbapenem resistant) Test Not Applicable     van A/B (VRE gene) Test Not Applicable     VIM (Carbapenem resistant) Test Not Applicable

## 2025-03-22 NOTE — PT/OT/SLP PROGRESS
"Physical Therapy      Patient Name:  Macario Dior Jr.   MRN:  [id number]    Patient not seen today secondary to Patient fatigue. Pt reported, "not this morning, maybe this afternoon." This PTA unable to return for second attempt. Will follow-up per POC.    "

## 2025-03-22 NOTE — PROGRESS NOTES
[facility]  Infectious Disease  Progress Note    Patient Name: Macario Dior Jr.  MRN: [id number]  Admission Date: 3/16/2025  Length of Stay: 5 days  Attending Physician: Arpita Salamanca MD  Primary Care Provider: [facility]    Isolation Status: No active isolations  Assessment/Plan:      Neuro  Abnormal MRI, lumbar spine  I have reviewed hospital notes from   service and other specialty providers. I have also reviewed CBC, CMP/BMP,  cultures and imaging with my interpretation as documented.     Possible septic arthritis based on MRI findings. Had biopsy for path and culture. Cultures remain NGTD. Biopsy from bone without evidence of osteomyelitis.  I favor treating conservatively as possible septic arthritis with doxycycline 100 mg PO BID x 4 weeks.  Have patient follow up with his spine specialist.  No need for ID clinic follow up/  Discussed management plan with the staff and/or members from  service.            Anticipated Disposition: per primary    Thank you for your consult. I will sign off. Please contact us if you have any additional questions.    Sahara Donato MD  Infectious Disease  [facility]    50 minutes of total time spent on the encounter, which includes face to face time and non-face to face time preparing to see the patient (eg, review of tests), obtaining and/or reviewing separately obtained history, documenting clinical information in the electronic or other health record, independently interpreting results (not separately reported) and communicating results to the patient/family/caregiver, or care coordination (not separately reported).     Subjective:     Principal Problem:Septic arthritis of lumbar spine    HPI: Mr. Macario Dior is a 73 M with a past medical history that includes R hip replacement in 2016, HTN, HLD, BUZZ, CKD 3, T2DM with neuropathy, prior DVT (on Xarelto), loop recorder placement, and lumbar radiculopathy. He came  "to the ED on 3/16/2025 with concerns for right hip pain in addition to lower right back pain. When asked where the pain is located the patient points to his lower right back in region that is between his lumbar spine and right iliac crest. He describes the pain as sharp in nature and aggravated by movement. He denies fever at home and has been afebrile while in patient. No signs of sepsis at this time. WBC wnl. 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 MRI of the spine found similar findings with possible additional involvement of L3-L4 (though findings more prominent in L4-L5). Orthopedics was consulted to evaluate his right hip replaemcent and feel the pain is coming from his spine; recommended neurosurgery consult. Neurosurgery did not recommend any surgical intervention at this time but recommended admittance to hospital medicine with ID consult for infectious workup; additionally recommended IR biopsy of lumbar region for culture (Neuro IR planning procedure for 3/19/2025). WBC is within normal limits and patient has remained afebrile. Labs did show an elevated ESR (82)  and CRP (21.5). Not currently on antibiotics at this time.   Interval History: "OK". No acute overnight events.     Review of Systems   Constitutional:  Negative for chills, fatigue and fever.   HENT:  Negative for ear pain, mouth sores, nosebleeds, postnasal drip, rhinorrhea, sinus pressure, sore throat, tinnitus, trouble swallowing and voice change.    Eyes:  Negative for photophobia, pain, redness and visual disturbance.   Respiratory:  Negative for apnea, cough, chest tightness, shortness of breath and wheezing.    Cardiovascular:  Negative for chest pain, palpitations and leg swelling.   Gastrointestinal:  Negative for abdominal pain, blood in stool, constipation, diarrhea, nausea and vomiting.   Endocrine: Negative " for cold intolerance, heat intolerance, polydipsia and polyuria.   Genitourinary:  Negative for decreased urine volume, difficulty urinating, dysuria, flank pain, frequency, genital sores, hematuria, penile discharge, penile pain, penile swelling, scrotal swelling, testicular pain and urgency.   Musculoskeletal:  Positive for back pain (improved). Negative for arthralgias, joint swelling, myalgias and neck pain.   Skin:  Negative for color change and rash.   Allergic/Immunologic: Negative for environmental allergies and food allergies.   Neurological:  Negative for dizziness, seizures, syncope, weakness, light-headedness, numbness and headaches.   Hematological:  Negative for adenopathy. Does not bruise/bleed easily.   Psychiatric/Behavioral:  Negative for agitation, confusion, decreased concentration, hallucinations, self-injury, sleep disturbance and suicidal ideas. The patient is not nervous/anxious.      Objective:     Vital Signs (Most Recent):  Temp: 97.9 °F (36.6 °C) (03/22/25 1054)  Pulse: 69 (03/22/25 1054)  Resp: 18 (03/22/25 1054)  BP: 128/60 (03/22/25 1054)  SpO2: 96 % (03/22/25 1054) Vital Signs (24h Range):  Temp:  [97.6 °F (36.4 °C)-98.3 °F (36.8 °C)] 97.9 °F (36.6 °C)  Pulse:  [62-76] 69  Resp:  [18-20] 18  SpO2:  [96 %-97 %] 96 %  BP: (120-152)/(58-68) 128/60     Weight: 100 kg (220 lb 7.4 oz)  Body mass index is 29.22 kg/m².    Estimated Creatinine Clearance: 45.3 mL/min (A) (based on SCr of 1.8 mg/dL (H)).     Physical Exam  Vitals reviewed.   Constitutional:       Appearance: He is well-developed.   HENT:      Head: Normocephalic and atraumatic.      Right Ear: External ear normal.      Left Ear: External ear normal.   Eyes:      Conjunctiva/sclera: Conjunctivae normal.   Neck:      Thyroid: No thyromegaly.   Cardiovascular:      Rate and Rhythm: Normal rate and regular rhythm.      Heart sounds: Normal heart sounds. No murmur heard.  Pulmonary:      Effort: Pulmonary effort is normal.       Breath sounds: Normal breath sounds. No wheezing or rales.   Abdominal:      General: Bowel sounds are normal.      Palpations: Abdomen is soft. There is no mass.      Tenderness: There is no abdominal tenderness. There is no rebound.   Musculoskeletal:         General: Normal range of motion.      Cervical back: Normal range of motion and neck supple.   Lymphadenopathy:      Cervical: No cervical adenopathy.   Skin:     General: Skin is warm and dry.   Neurological:      Mental Status: He is alert and oriented to person, place, and time.   Psychiatric:         Behavior: Behavior normal.          Significant Labs: CBC:   Recent Labs   Lab 03/21/25  0305 03/22/25  0821   WBC 4.57 5.50   HGB 10.9* 10.7*   HCT 34.5* 33.4*    256     CMP:   Recent Labs   Lab 03/21/25  0305 03/22/25  0821    140   K 3.8 3.8    112*   CO2 21* 21*   GLU 90  --    BUN 26* 25*   CREATININE 1.8* 1.8*   CALCIUM 8.5* 8.6*   ANIONGAP 10 7*     Microbiology Results (last 7 days)       Procedure Component Value Units Date/Time    Blood culture [[id number]] Collected: 03/19/25 0002    Order Status: Completed Specimen: Blood Updated: 03/22/25 0613     Blood Culture, Routine No Growth to date      No Growth to date      No Growth to date      No Growth to date    Blood culture [[id number]] Collected: 03/19/25 0001    Order Status: Completed Specimen: Blood Updated: 03/22/25 0613     Blood Culture, Routine No Growth to date      No Growth to date      No Growth to date      No Growth to date    Blood culture [[id number]]  (Abnormal)  (Susceptibility) Collected: 03/17/25 1410    Order Status: Completed Specimen: Blood from Peripheral, Antecubital, Left Updated: 03/21/25 1213     Blood Culture, Routine Gram stain nilam bottle: Gram positive cocci in clusters resembling Staph      Results called to and read back by:Betty Calderon RN 03/18/2025  18:20      STAPHYLOCOCCUS EPIDERMIDIS    Culture, Anaerobe [[id number]] Collected: 03/19/25  1750    Order Status: Completed Specimen: Abscess from Back Updated: 03/21/25 0753     Anaerobic Culture Culture in progress    Narrative:      Spine    Aerobic culture [[id number]] Collected: 03/19/25 1750    Order Status: Completed Specimen: Abscess from Back Updated: 03/21/25 0739     Aerobic Bacterial Culture No growth    Narrative:      spine    AFB Culture & Smear [[id number]] Collected: 03/19/25 1750    Order Status: Completed Specimen: Abscess from Back Updated: 03/20/25 2127     AFB Culture & Smear Culture in progress     AFB CULTURE STAIN No acid fast bacilli seen.    Narrative:      spine    Fungus culture [[id number]] Collected: 03/19/25 1750    Order Status: Sent Specimen: Abscess from Back Updated: 03/19/25 1809    Rapid Organism ID by PCR (from Blood culture) [[id number]]  (Abnormal) Collected: 03/17/25 1410    Order Status: Completed Updated: 03/18/25 1911     Enterococcus faecalis Not Detected     Enterococcus faecium Not Detected     Listeria monocytogenes Not Detected     Staphylococcus spp. See species for ID     Staphylococcus aureus Not Detected     Staphylococcus epidermidis Detected     Staphylococcus lugdunensis Not Detected     Streptococcus species Not Detected     Streptococcus agalactiae Not Detected     Streptococcus pneumoniae Not Detected     Streptococcus pyogenes Not Detected     Acinetobacter calcoaceticus/baumannii complex Not Detected     Bacteroides fragilis Not Detected     Enterobacterales Not Detected     Enterobacter cloacae complex Not Detected     Escherichia coli Not Detected     Klebsiella aerogenes Not Detected     Klebsiella oxytoca Not Detected     Klebsiella pneumoniae group Not Detected     Proteus Not Detected     Salmonella sp Not Detected     Serratia marcescens Not Detected     Haemophilus influenzae Not Detected     Neisseria meningtidis Not Detected     Pseudomonas aeruginosa Not Detected     Stenotrophomonas maltophilia Not Detected     Candida albicans Not  Detected     Candida auris Not Detected     Candida glabrata Not Detected     Candida krusei Not Detected     Candida parapsilosis Not Detected     Candida tropicalis Not Detected     Cryptococcus neoformans/gattii Not Detected     CTX-M (ESBL ) Test Not Applicable     IMP (Carbapenem resistant) Test Not Applicable     KPC resistance gene (Carbapenem resistant) Test Not Applicable     mcr-1  Test Not Applicable     mec A/C  Detected     mec A/C and MREJ (MRSA) gene Test Not Applicable     NDM (Carbapenem resistant) Test Not Applicable     OXA-48-like (Carbapenem resistant) Test Not Applicable     van A/B (VRE gene) Test Not Applicable     VIM (Carbapenem resistant) Test Not Applicable            Significant Imaging: I have reviewed all pertinent imaging results/findings within the past 24 hours.

## 2025-03-22 NOTE — SUBJECTIVE & OBJECTIVE
"Interval History: "OK". No acute overnight events.     Review of Systems   Constitutional:  Negative for chills, fatigue and fever.   HENT:  Negative for ear pain, mouth sores, nosebleeds, postnasal drip, rhinorrhea, sinus pressure, sore throat, tinnitus, trouble swallowing and voice change.    Eyes:  Negative for photophobia, pain, redness and visual disturbance.   Respiratory:  Negative for apnea, cough, chest tightness, shortness of breath and wheezing.    Cardiovascular:  Negative for chest pain, palpitations and leg swelling.   Gastrointestinal:  Negative for abdominal pain, blood in stool, constipation, diarrhea, nausea and vomiting.   Endocrine: Negative for cold intolerance, heat intolerance, polydipsia and polyuria.   Genitourinary:  Negative for decreased urine volume, difficulty urinating, dysuria, flank pain, frequency, genital sores, hematuria, penile discharge, penile pain, penile swelling, scrotal swelling, testicular pain and urgency.   Musculoskeletal:  Positive for back pain (improved). Negative for arthralgias, joint swelling, myalgias and neck pain.   Skin:  Negative for color change and rash.   Allergic/Immunologic: Negative for environmental allergies and food allergies.   Neurological:  Negative for dizziness, seizures, syncope, weakness, light-headedness, numbness and headaches.   Hematological:  Negative for adenopathy. Does not bruise/bleed easily.   Psychiatric/Behavioral:  Negative for agitation, confusion, decreased concentration, hallucinations, self-injury, sleep disturbance and suicidal ideas. The patient is not nervous/anxious.      Objective:     Vital Signs (Most Recent):  Temp: 97.9 °F (36.6 °C) (03/22/25 1054)  Pulse: 69 (03/22/25 1054)  Resp: 18 (03/22/25 1054)  BP: 128/60 (03/22/25 1054)  SpO2: 96 % (03/22/25 1054) Vital Signs (24h Range):  Temp:  [97.6 °F (36.4 °C)-98.3 °F (36.8 °C)] 97.9 °F (36.6 °C)  Pulse:  [62-76] 69  Resp:  [18-20] 18  SpO2:  [96 %-97 %] 96 %  BP: " (120-152)/(58-68) 128/60     Weight: 100 kg (220 lb 7.4 oz)  Body mass index is 29.22 kg/m².    Estimated Creatinine Clearance: 45.3 mL/min (A) (based on SCr of 1.8 mg/dL (H)).     Physical Exam  Vitals reviewed.   Constitutional:       Appearance: He is well-developed.   HENT:      Head: Normocephalic and atraumatic.      Right Ear: External ear normal.      Left Ear: External ear normal.   Eyes:      Conjunctiva/sclera: Conjunctivae normal.   Neck:      Thyroid: No thyromegaly.   Cardiovascular:      Rate and Rhythm: Normal rate and regular rhythm.      Heart sounds: Normal heart sounds. No murmur heard.  Pulmonary:      Effort: Pulmonary effort is normal.      Breath sounds: Normal breath sounds. No wheezing or rales.   Abdominal:      General: Bowel sounds are normal.      Palpations: Abdomen is soft. There is no mass.      Tenderness: There is no abdominal tenderness. There is no rebound.   Musculoskeletal:         General: Normal range of motion.      Cervical back: Normal range of motion and neck supple.   Lymphadenopathy:      Cervical: No cervical adenopathy.   Skin:     General: Skin is warm and dry.   Neurological:      Mental Status: He is alert and oriented to person, place, and time.   Psychiatric:         Behavior: Behavior normal.          Significant Labs: CBC:   Recent Labs   Lab 03/21/25  0305 03/22/25  0821   WBC 4.57 5.50   HGB 10.9* 10.7*   HCT 34.5* 33.4*    256     CMP:   Recent Labs   Lab 03/21/25  0305 03/22/25  0821    140   K 3.8 3.8    112*   CO2 21* 21*   GLU 90  --    BUN 26* 25*   CREATININE 1.8* 1.8*   CALCIUM 8.5* 8.6*   ANIONGAP 10 7*     Microbiology Results (last 7 days)       Procedure Component Value Units Date/Time    Blood culture [[id number]] Collected: 03/19/25 0002    Order Status: Completed Specimen: Blood Updated: 03/22/25 0613     Blood Culture, Routine No Growth to date      No Growth to date      No Growth to date      No Growth to date    Blood  culture [[id number]] Collected: 03/19/25 0001    Order Status: Completed Specimen: Blood Updated: 03/22/25 0613     Blood Culture, Routine No Growth to date      No Growth to date      No Growth to date      No Growth to date    Blood culture [[id number]]  (Abnormal)  (Susceptibility) Collected: 03/17/25 1410    Order Status: Completed Specimen: Blood from Peripheral, Antecubital, Left Updated: 03/21/25 1213     Blood Culture, Routine Gram stain nilam bottle: Gram positive cocci in clusters resembling Staph      Results called to and read back by:Betty Calderon RN 03/18/2025  18:20      STAPHYLOCOCCUS EPIDERMIDIS    Culture, Anaerobe [[id number]] Collected: 03/19/25 1750    Order Status: Completed Specimen: Abscess from Back Updated: 03/21/25 0753     Anaerobic Culture Culture in progress    Narrative:      Spine    Aerobic culture [[id number]] Collected: 03/19/25 1750    Order Status: Completed Specimen: Abscess from Back Updated: 03/21/25 0739     Aerobic Bacterial Culture No growth    Narrative:      spine    AFB Culture & Smear [[id number]] Collected: 03/19/25 1750    Order Status: Completed Specimen: Abscess from Back Updated: 03/20/25 2127     AFB Culture & Smear Culture in progress     AFB CULTURE STAIN No acid fast bacilli seen.    Narrative:      spine    Fungus culture [[id number]] Collected: 03/19/25 1750    Order Status: Sent Specimen: Abscess from Back Updated: 03/19/25 1809    Rapid Organism ID by PCR (from Blood culture) [[id number]]  (Abnormal) Collected: 03/17/25 1410    Order Status: Completed Updated: 03/18/25 1911     Enterococcus faecalis Not Detected     Enterococcus faecium Not Detected     Listeria monocytogenes Not Detected     Staphylococcus spp. See species for ID     Staphylococcus aureus Not Detected     Staphylococcus epidermidis Detected     Staphylococcus lugdunensis Not Detected     Streptococcus species Not Detected     Streptococcus agalactiae Not Detected     Streptococcus  pneumoniae Not Detected     Streptococcus pyogenes Not Detected     Acinetobacter calcoaceticus/baumannii complex Not Detected     Bacteroides fragilis Not Detected     Enterobacterales Not Detected     Enterobacter cloacae complex Not Detected     Escherichia coli Not Detected     Klebsiella aerogenes Not Detected     Klebsiella oxytoca Not Detected     Klebsiella pneumoniae group Not Detected     Proteus Not Detected     Salmonella sp Not Detected     Serratia marcescens Not Detected     Haemophilus influenzae Not Detected     Neisseria meningtidis Not Detected     Pseudomonas aeruginosa Not Detected     Stenotrophomonas maltophilia Not Detected     Candida albicans Not Detected     Candida auris Not Detected     Candida glabrata Not Detected     Candida krusei Not Detected     Candida parapsilosis Not Detected     Candida tropicalis Not Detected     Cryptococcus neoformans/gattii Not Detected     CTX-M (ESBL ) Test Not Applicable     IMP (Carbapenem resistant) Test Not Applicable     KPC resistance gene (Carbapenem resistant) Test Not Applicable     mcr-1  Test Not Applicable     mec A/C  Detected     mec A/C and MREJ (MRSA) gene Test Not Applicable     NDM (Carbapenem resistant) Test Not Applicable     OXA-48-like (Carbapenem resistant) Test Not Applicable     van A/B (VRE gene) Test Not Applicable     VIM (Carbapenem resistant) Test Not Applicable            Significant Imaging: I have reviewed all pertinent imaging results/findings within the past 24 hours.

## 2025-03-22 NOTE — ASSESSMENT & PLAN NOTE
Patient's FSGs are controlled on current medication regimen.  Home meds : Weekly Seiglutide injections.  Holding home meds in hospital but plan to resume on home discharge.     Last A1c reviewed-   Lab Results   Component Value Date    HGBA1C 6.7 (H) 12/21/2022     Most recent fingerstick glucose reviewed-   Recent Labs   Lab 03/22/25  0625 03/22/25  1118 03/22/25  1556   POCTGLUCOSE 126* 148* 121*     Current correctional scale  Low  Maintain anti-hyperglycemic dose as follows-   Antihyperglycemics (From admission, onward)      Start     Stop Route Frequency Ordered    03/16/25 2248  insulin aspart U-100 pen 0-5 Units         -- SubQ Before meals & nightly PRN 03/16/25 2148          - Hold Oral hypoglycemics while patient is in the hospital.  - POCT glucose checks before meals and at bedtime.   - Hypoglycemic protocol in effect.  - Diabetic diet provided.  - Target blood sugars in hospital 140-180.

## 2025-03-22 NOTE — PROGRESS NOTES
[facility] Medicine  Progress Note    Patient Name: Macario Dior Jr.  MRN: [id number]  Patient Class: IP- Inpatient   Admission Date: 3/16/2025  Length of Stay: 5 days  Attending Physician: Arpita Salamanca MD  Primary Care Provider: [facility]        Subjective     Principal Problem:Septic arthritis of lumbar spine        HPI:  74 y.o. 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 Pain located right para spinal lumbar region, is non-radiating.  No sciatica. He denies fever, chills, cough, SOB. Weight loss, appetite loss.     In the ED, evaluation included  wbc 6.8, Hb 11, CRP 21, cr 2.9-> 2.3,  & MRI. Ortho was consulted. They do not feel that the hip is infected. They recommend a NS spine consult with concern for with edema in the right paraspinal musculature and potential R L4-5 facet septic arthritis.  His MRI resulted this morning. Radiology was concerned for R L4-5 septic facet joint with osteomyelitis.  His calcium dropped after hydration. 9.1-> 7.7  Suspect albumin was falsely high upon admission.        He was placed in OBS, Likely needs upgrade to Inpatient status        Overview/Hospital Course:  Patient admitted with left hip pain and MRI of pelvis and lumbar spine done that showed that revealed concern for left L4-L5 facet septic arthritis and osteomyelitis. Orthopedic surgery consulted and reviewed MRI and noted no evidence of left hip infection. Neurosurgery consulted concerning facet septic arthritis and felt no surgical intervention recommended IR aspiration of facet and blood cultures and Infectious disease consult. Consult to ID and IR placed. Blood culture drawn. Blood cultures grew Staph epidermis but ID felt likely just a contaminant and recommended to hold off on antibiotics until  IR aspiration and to repeat blood cultures that was done. Patient went to IR and had aspiration of L4-L5 facet and bone biopsy done by IR. Patient started on empiric IV Vancomycin (pharmacy to dose) after aspiration to cover for possible Staph infection and Ceftriaxone 2 grams IV every 12 hours to cover other bacterial organisms awaiting culture results as per ID. PT/OT consulted and initially recommended high intensity but patient progressed with therapy and recommending low intensity on discharge on 3/20. Plan discharge home with  PT/OT on discharge when medically ready. Awaiting culture results and final ID recs for discharge. Pathology from bone from right L4-L5 facet returned negative for malignancy and osteomyelitis. ID gave final recs on 3/22:  Possible septic arthritis based on MRI findings. Had biopsy for path and culture. Cultures remain NGTD. Biopsy from bone without evidence of osteomyelitis.  ID favored treating conservatively as possible septic arthritis with Doxycycline 100 mg PO BID x 4 weeks. Zofia switched to po Doxycyline on 3/22 and IV Vancomycin and Ceftriaxone discontinued on 3/22.   ID recommended to have patient follow up with his spine specialist.  No need for ID clinic follow up.  Plan home discharge on 3/23.     Interval History: Pathology from bone from right L4-L5 facet returned negative for malignancy and osteomyelitis. ID gave final recs today and I discussed with Dr. Tanmay Donato from ID team via secure chat today. Dr. Tanmay Donato noted possible septic arthritis based on MRI findings. Had biopsy for path and culture. Cultures remain NGTD. Biopsy from bone without evidence of osteomyelitis.  Dr. Tanmay Donato favors treating conservatively as possible septic arthritis with Doxycycline 100 mg PO BID x 4 weeks. Zofia switched to po Doxycyline on 3/22 and IV Vancomycin and Ceftriaxone discontinued on 3/22.   ID recommended to have patient follow up with his spine specialist.  No  need for ID clinic follow up.  Plan home discharge tomorrow on oral Doxycyline and discussed with patient who is good with discharge plan. Patient reports right hip and back pain much better than on admit. He has chronic pain to his back and hip area and back to his baseline level of chronic pain. Labs reviewed and stable. Creatinine stable at 1.8 and was 1.8 yesterday. Hgb 10.7 today and was 10.9 yesterday so stable. CRP 10.3 today and improved from 21 on admit.     Review of Systems   Constitutional:  Negative for fever.   Respiratory:  Negative for cough and shortness of breath.    Cardiovascular:  Negative for chest pain.   Gastrointestinal:  Negative for abdominal pain and nausea.   Musculoskeletal:  Positive for arthralgias (Right hip) and back pain (Right side low back).   Psychiatric/Behavioral:  Negative for agitation and confusion.      Objective:     Vital Signs (Most Recent):  Temp: 98 °F (36.7 °C) (03/22/25 1519)  Pulse: 73 (03/22/25 1519)  Resp: 18 (03/22/25 1519)  BP: 125/64 (03/22/25 1519)  SpO2: 98 % (03/22/25 1519) Vital Signs (24h Range):  Temp:  [97.6 °F (36.4 °C)-98.3 °F (36.8 °C)] 98 °F (36.7 °C)  Pulse:  [62-73] 73  Resp:  [18-20] 18  SpO2:  [96 %-98 %] 98 %  BP: (125-152)/(60-68) 125/64     Weight: 100 kg (220 lb 7.4 oz)  Body mass index is 29.22 kg/m².    Intake/Output Summary (Last 24 hours) at 3/22/2025 1706  Last data filed at 3/22/2025 1202  Gross per 24 hour   Intake 780 ml   Output --   Net 780 ml         Physical Exam  Vitals and nursing note reviewed.   Constitutional:       General: He is awake. He is not in acute distress.     Appearance: Normal appearance. He is well-developed. He is not ill-appearing.      Comments: Patient sitting up in bed in no distress. Patient awake and alert and oriented x 4. Patient appeared very comfortable and not in obvious pain on exam.    Cardiovascular:      Rate and Rhythm: Normal rate and regular rhythm.      Heart sounds: Normal heart sounds. No  murmur heard.  Pulmonary:      Effort: Pulmonary effort is normal. No respiratory distress.      Breath sounds: Normal breath sounds. No wheezing.   Abdominal:      General: Abdomen is flat. Bowel sounds are normal. There is no distension.      Palpations: Abdomen is soft.      Tenderness: There is no abdominal tenderness.   Musculoskeletal:      Right lower leg: No edema.      Left lower leg: No edema.   Skin:     General: Skin is warm.      Findings: No erythema.   Neurological:      Mental Status: He is alert and oriented to person, place, and time.   Psychiatric:         Mood and Affect: Mood normal.         Behavior: Behavior normal. Behavior is cooperative.         Thought Content: Thought content normal.         Judgment: Judgment normal.               Significant Labs: BMP:   Recent Labs   Lab 03/21/25  0305 03/22/25  0821   GLU 90  --     140   K 3.8 3.8    112*   CO2 21* 21*   BUN 26* 25*   CREATININE 1.8* 1.8*   CALCIUM 8.5* 8.6*     CBC:   Recent Labs   Lab 03/21/25  0305 03/22/25  0821   WBC 4.57 5.50   HGB 10.9* 10.7*   HCT 34.5* 33.4*    256       Significant Imaging: I have reviewed all pertinent imaging results/findings within the past 24 hours.      Assessment & Plan  Septic arthritis of lumbar spine of L4-L5 facet  Abnormal MRI, lumbar spine  Patient admitted with worsening of acute on chronic right hip and low back pain. MRI of pelvis and lumbar spine done and showed concern for septic arthritis of right L4-5 facet joint with osteomyelitis.     - Patient admitted to hospital for evaluation. WBC normal at 6800 on admit and CRP elevated at 21. Plan to repeat CRP now that he has had several days of IV antibiotics on 3/22.  - Orthopedic surgery consulted and reviewed MRI and noted no evidence of left hip infection.   - Neurosurgery consulted concerning facet septic arthritis and felt no surgical intervention needed and recommended IR aspiration of facet and blood cultures.   -  Infectious disease consulted and recommended to monitor off antibiotics in hopes that we can increase culture yield and Recommended biopsy for pathology and microbiologic studies.  - Patient went to IR on 3/19 and had aspiration of L4-L5 facet and bone biopsy done by IR.   - Patient started on empiric IV Vancomycin (pharmacy to dose) after aspiration to cover for possible Staph infection and Ceftriaxone 2 grams IV every 12 hours to cover other bacterial organisms awaiting culture results.  - So far cultures from aspiration are no growth. Appreciate ID assistance on case and they are awaiting culture results to determine recommendations for discharge on his antibiotics. Patient with borderline GFR with known CKD stage 3b so if requires long term IV antibiotics he may require clearance by Nephrology for PICC line or even a tunneled line done by IR. Cultures from aspiration remain no growth to date. Patient remains on IV Vancomycin and Ceftriaxone as per ID. Patient reports subjectively the pain in his right hip and low back area is better than on admit on 3/21 so clinically he appears to be improving on antibiotics.  - Patient on long term anticoagulation orally with Xarelto for history of DVT/PE so no other DVT prophylaxis required as patient is fully anticoagulated.   - Pathology from bone from right L4-L5 facet returned negative for malignancy and osteomyelitis. ID gave final recs today and I discussed with Dr. Tanmay Donato from ID team via secure chat today. Dr. Tanmay Donato noted possible septic arthritis based on MRI findings. Had biopsy for path and culture. Cultures remain NGTD. Biopsy from bone without evidence of osteomyelitis.  Dr. Tanmay Donato favors treating conservatively as possible septic arthritis with Doxycycline 100 mg PO BID x 4 weeks. Patchapiton switched to po Doxycyline on 3/22 and IV Vancomycin and Ceftriaxone discontinued on 3/22.   ID recommended to have patient follow up with his spine  specialist.  No need for ID clinic follow up.  - Plan home discharge tomorrow, 3/23 on oral Doxycyline and discussed with patient who is good with discharge plan. Patient reports right hip and back pain much better than on admit. He has chronic pain to his back and hip area and back to his baseline level of chronic pain.   Long term (current) use of anticoagulants  Personal history of other venous thrombosis and embolism  This patient has long term use on an anticoagulant with [facility] Anticoagulant(s): Xarelto was held for IR aspiration but restarted after aspiration on 3/19. They are on long term anticoagulation due to Reason for Anticoagulation: DVT/PE. Resume home Xarelto on 3/22.     Stage 3b chronic kidney disease  Creatine stable for now and at baseline. Patient with known CKD stage 3b at baseline. BMP reviewed- noted Estimated Creatinine Clearance: 45.3 mL/min (A) (based on SCr of 1.8 mg/dL (H)). according to latest data. Monitor UOP and serial BMP and adjust therapy as needed. Renally dose meds. Avoid nephrotoxic medications and procedures.    Type 2 diabetes mellitus with diabetic polyneuropathy, without long-term current use of insulin  Patient's FSGs are controlled on current medication regimen.  Home meds : Weekly Seiglutide injections.  Holding home meds in hospital but plan to resume on home discharge.     Last A1c reviewed-   Lab Results   Component Value Date    HGBA1C 6.7 (H) 12/21/2022     Most recent fingerstick glucose reviewed-   Recent Labs   Lab 03/22/25  0625 03/22/25  1118 03/22/25  1556   POCTGLUCOSE 126* 148* 121*     Current correctional scale  Low  Maintain anti-hyperglycemic dose as follows-   Antihyperglycemics (From admission, onward)      Start     Stop Route Frequency Ordered    03/16/25 2248  insulin aspart U-100 pen 0-5 Units         -- SubQ Before meals & nightly PRN 03/16/25 2148          - Hold Oral hypoglycemics while patient is in the hospital.  - POCT glucose checks before  meals and at bedtime.   - Hypoglycemic protocol in effect.  - Diabetic diet provided.  - Target blood sugars in hospital 140-180.     Essential hypertension  Patient's blood pressure range in the last 24 hours was: BP  Min: 125/64  Max: 152/68.The patient's inpatient anti-hypertensive regimen is listed below:    Current Antihypertensives  carvediloL tablet 25 mg, 2 times daily, Oral  hydrALAZINE tablet 50 mg, Every 8 hours, Oral  valsartan tablet 320 mg, Daily, Oral  , Daily, Oral    Plan  - BP is controlled, no changes needed to their regimen  - Goal BP < 140/90.     BUZZ (obstructive sleep apnea)  Patient on CPAP at home but refusing in hospital.     Chronic pain of right hip  Osteoarthritis of spine with radiculopathy, lumbar region  Continue home meds.   Home meds:  Robaxin, Oxycodone 10 mg, Capasin, Lidoderm patch.   Avoid NSAID due to CKD.     Scheduled:  Lidoderm patch daily  Capasin gel bid    PRN:  Oxycodone 10 q 6  Morhine 4 mg IV   Robaxan 750     Inpatient Morphine Milligram Equivalents Per Day 3/17 - 3/20    Values displayed are in units of MME/Day     Order Start / End Date 3/17 Yesterday Today Tomorrow     oxyCODONE immediate release tablet Tab 10 mg 3/16 - No end date 30 of 60 30 of 60 15 of 60 0 of 60     morphine injection 4 mg 3/17 - No end date 24 of 60 24 of 72 0 of 72 0 of 72     Daily Totals  54 of 120 54 of 132 15 of 132 0 of 132            Anemia due to infection  Controlled. Anemia is likely due to chronic disease due to infection . Most recent hemoglobin and hematocrit are listed below.  Recent Labs     03/21/25  0305 03/22/25  0821   HGB 10.9* 10.7*   HCT 34.5* 33.4*     Plan  - Monitor serial CBC: Daily  - Transfuse PRBC if patient becomes hemodynamically unstable, symptomatic or H/H drops below 7/21.  - Patient has not received any PRBC transfusions to date  - Patient's anemia is currently stable    VTE Risk Mitigation (From admission, onward)           Ordered     IP VTE HIGH RISK  PATIENT  Once         03/16/25 2148                    Discharge Planning   GERRI: 3/23/2025     Code Status: Full Code   Medical Readiness for Discharge Date: 3/23/2025  Discharge Plan A: Home Health with oral antibiotics        Arpita Salamanca MD  Department of Hospital Medicine   [facility] - Surgery

## 2025-03-22 NOTE — ASSESSMENT & PLAN NOTE
Patient admitted with worsening of acute on chronic right hip and low back pain. MRI of pelvis and lumbar spine done and showed concern for septic arthritis of right L4-5 facet joint with osteomyelitis.     - Patient admitted to hospital for evaluation. WBC normal at 6800 on admit and CRP elevated at 21. Plan to repeat CRP now that he has had several days of IV antibiotics on 3/22.  - Orthopedic surgery consulted and reviewed MRI and noted no evidence of left hip infection.   - Neurosurgery consulted concerning facet septic arthritis and felt no surgical intervention needed and recommended IR aspiration of facet and blood cultures.   - Infectious disease consulted and recommended to monitor off antibiotics in hopes that we can increase culture yield and Recommended biopsy for pathology and microbiologic studies.  - Patient went to IR on 3/19 and had aspiration of L4-L5 facet and bone biopsy done by IR.   - Patient started on empiric IV Vancomycin (pharmacy to dose) after aspiration to cover for possible Staph infection and Ceftriaxone 2 grams IV every 12 hours to cover other bacterial organisms awaiting culture results.  - So far cultures from aspiration are no growth. Appreciate ID assistance on case and they are awaiting culture results to determine recommendations for discharge on his antibiotics. Patient with borderline GFR with known CKD stage 3b so if requires long term IV antibiotics he may require clearance by Nephrology for PICC line or even a tunneled line done by IR. Cultures from aspiration remain no growth to date. Patient remains on IV Vancomycin and Ceftriaxone as per ID. Patient reports subjectively the pain in his right hip and low back area is better than on admit on 3/21 so clinically he appears to be improving on antibiotics.  - Patient on long term anticoagulation orally with Xarelto for history of DVT/PE so no other DVT prophylaxis required as patient is fully anticoagulated.   - Pathology from  bone from right L4-L5 facet returned negative for malignancy and osteomyelitis. ID gave final recs today and I discussed with Dr. Tanmay Donato from ID team via secure chat today. Dr. Tanmay Donato noted possible septic arthritis based on MRI findings. Had biopsy for path and culture. Cultures remain NGTD. Biopsy from bone without evidence of osteomyelitis.  Dr. Tanmay Donato favors treating conservatively as possible septic arthritis with Doxycycline 100 mg PO BID x 4 weeks. Pateitn switched to po Doxycyline on 3/22 and IV Vancomycin and Ceftriaxone discontinued on 3/22.   ID recommended to have patient follow up with his spine specialist.  No need for ID clinic follow up.  - Plan home discharge tomorrow, 3/23 on oral Doxycyline and discussed with patient who is good with discharge plan. Patient reports right hip and back pain much better than on admit. He has chronic pain to his back and hip area and back to his baseline level of chronic pain.

## 2025-03-22 NOTE — ASSESSMENT & PLAN NOTE
I have reviewed hospital notes from   service and other specialty providers. I have also reviewed CBC, CMP/BMP,  cultures and imaging with my interpretation as documented.     Possible septic arthritis based on MRI findings. Had biopsy for path and culture. Cultures remain NGTD. Biopsy from bone without evidence of osteomyelitis.  I favor treating conservatively as possible septic arthritis with doxycycline 100 mg PO BID x 4 weeks.  Have patient follow up with his spine specialist.  No need for ID clinic follow up/  Discussed management plan with the staff and/or members from  service.

## 2025-03-22 NOTE — ASSESSMENT & PLAN NOTE
Patient's blood pressure range in the last 24 hours was: BP  Min: 125/64  Max: 152/68.The patient's inpatient anti-hypertensive regimen is listed below:    Current Antihypertensives  carvediloL tablet 25 mg, 2 times daily, Oral  hydrALAZINE tablet 50 mg, Every 8 hours, Oral  valsartan tablet 320 mg, Daily, Oral  , Daily, Oral    Plan  - BP is controlled, no changes needed to their regimen  - Goal BP < 140/90.

## 2025-03-23 VITALS
DIASTOLIC BLOOD PRESSURE: 78 MMHG | WEIGHT: 220.44 LBS | BODY MASS INDEX: 29.22 KG/M2 | OXYGEN SATURATION: 96 % | HEART RATE: 68 BPM | RESPIRATION RATE: 18 BRPM | SYSTOLIC BLOOD PRESSURE: 153 MMHG | HEIGHT: 73 IN | TEMPERATURE: 98 F

## 2025-03-23 LAB
ABSOLUTE EOSINOPHIL (OHS): 0.23 K/UL
ABSOLUTE MONOCYTE (OHS): 0.39 K/UL (ref 0.3–1)
ABSOLUTE NEUTROPHIL COUNT (OHS): 2.76 K/UL (ref 1.8–7.7)
BACTERIA SPEC AEROBE CULT: NO GROWTH
BASOPHILS # BLD AUTO: 0.03 K/UL
BASOPHILS NFR BLD AUTO: 0.6 %
ERYTHROCYTE [DISTWIDTH] IN BLOOD BY AUTOMATED COUNT: 13.2 % (ref 11.5–14.5)
HCT VFR BLD AUTO: 32.4 % (ref 40–54)
HGB BLD-MCNC: 10.3 GM/DL (ref 14–18)
IMM GRANULOCYTES # BLD AUTO: 0.02 K/UL (ref 0–0.04)
IMM GRANULOCYTES NFR BLD AUTO: 0.4 % (ref 0–0.5)
LYMPHOCYTES # BLD AUTO: 1.99 K/UL (ref 1–4.8)
MCH RBC QN AUTO: 31.1 PG (ref 27–50)
MCHC RBC AUTO-ENTMCNC: 31.8 G/DL (ref 32–36)
MCV RBC AUTO: 98 FL (ref 82–98)
NUCLEATED RBC (/100WBC) (OHS): 0 /100 WBC
PLATELET # BLD AUTO: 255 K/UL (ref 150–450)
PMV BLD AUTO: 9.4 FL (ref 9.2–12.9)
RBC # BLD AUTO: 3.31 M/UL (ref 4.6–6.2)
RELATIVE EOSINOPHIL (OHS): 4.2 %
RELATIVE LYMPHOCYTE (OHS): 36.7 % (ref 18–48)
RELATIVE MONOCYTE (OHS): 7.2 % (ref 4–15)
RELATIVE NEUTROPHIL (OHS): 50.9 % (ref 38–73)
WBC # BLD AUTO: 5.42 K/UL (ref 3.9–12.7)

## 2025-03-23 PROCEDURE — 25000003 PHARM REV CODE 250: Performed by: HOSPITALIST

## 2025-03-23 PROCEDURE — 63600175 PHARM REV CODE 636 W HCPCS: Performed by: EMERGENCY MEDICINE

## 2025-03-23 PROCEDURE — 25000003 PHARM REV CODE 250: Performed by: PHYSICIAN ASSISTANT

## 2025-03-23 PROCEDURE — 25000003 PHARM REV CODE 250: Performed by: INTERNAL MEDICINE

## 2025-03-23 PROCEDURE — 36415 COLL VENOUS BLD VENIPUNCTURE: CPT | Performed by: INTERNAL MEDICINE

## 2025-03-23 PROCEDURE — 85025 COMPLETE CBC W/AUTO DIFF WBC: CPT | Performed by: INTERNAL MEDICINE

## 2025-03-23 RX ORDER — HYDRALAZINE HYDROCHLORIDE 50 MG/1
50 TABLET, FILM COATED ORAL EVERY 8 HOURS
Start: 2025-03-23

## 2025-03-23 RX ORDER — MORPHINE SULFATE 15 MG/1
15 TABLET ORAL EVERY 4 HOURS PRN
Qty: 25 TABLET | Refills: 0 | Status: SHIPPED | OUTPATIENT
Start: 2025-03-23

## 2025-03-23 RX ORDER — DOXYCYCLINE HYCLATE 100 MG
100 TABLET ORAL EVERY 12 HOURS
Qty: 60 TABLET | Refills: 0 | Status: SHIPPED | OUTPATIENT
Start: 2025-03-23 | End: 2025-03-23

## 2025-03-23 RX ORDER — DOXYCYCLINE HYCLATE 100 MG
100 TABLET ORAL EVERY 12 HOURS
Qty: 60 TABLET | Refills: 0 | Status: SHIPPED | OUTPATIENT
Start: 2025-03-23 | End: 2025-04-22

## 2025-03-23 RX ORDER — OXYCODONE HYDROCHLORIDE 10 MG/1
10 TABLET ORAL EVERY 6 HOURS PRN
Qty: 30 TABLET | Refills: 0 | Status: CANCELLED | OUTPATIENT
Start: 2025-03-23

## 2025-03-23 RX ORDER — FINASTERIDE 5 MG/1
5 TABLET, FILM COATED ORAL DAILY
Start: 2025-03-23

## 2025-03-23 RX ORDER — ERGOCALCIFEROL 1.25 MG/1
50000 CAPSULE ORAL
Start: 2025-03-23

## 2025-03-23 RX ORDER — LATANOPROST 50 UG/ML
1 SOLUTION/ DROPS OPHTHALMIC NIGHTLY
Start: 2025-03-23

## 2025-03-23 RX ORDER — MORPHINE SULFATE 15 MG/1
15 TABLET ORAL EVERY 4 HOURS PRN
Qty: 25 TABLET | Refills: 0 | Status: SHIPPED | OUTPATIENT
Start: 2025-03-23 | End: 2025-03-23

## 2025-03-23 RX ADMIN — OXYCODONE HYDROCHLORIDE 10 MG: 10 TABLET ORAL at 05:03

## 2025-03-23 RX ADMIN — FINASTERIDE 5 MG: 5 TABLET, FILM COATED ORAL at 07:03

## 2025-03-23 RX ADMIN — CARVEDILOL 25 MG: 25 TABLET, FILM COATED ORAL at 07:03

## 2025-03-23 RX ADMIN — VALSARTAN 320 MG: 160 TABLET, FILM COATED ORAL at 07:03

## 2025-03-23 RX ADMIN — CAPSAICIN: 0.25 CREAM TOPICAL at 09:03

## 2025-03-23 RX ADMIN — LIDOCAINE 2 PATCH: 50 PATCH CUTANEOUS at 08:03

## 2025-03-23 RX ADMIN — ATORVASTATIN CALCIUM 80 MG: 40 TABLET, FILM COATED ORAL at 07:03

## 2025-03-23 RX ADMIN — DOXYCYCLINE HYCLATE 100 MG: 100 TABLET, COATED ORAL at 07:03

## 2025-03-23 RX ADMIN — HYDRALAZINE HYDROCHLORIDE 50 MG: 25 TABLET ORAL at 05:03

## 2025-03-23 RX ADMIN — MORPHINE SULFATE 4 MG: 4 INJECTION INTRAVENOUS at 07:03

## 2025-03-23 NOTE — PLAN OF CARE
Problem: Adult Inpatient Plan of Care  Goal: Plan of Care Review  Outcome: Progressing  Goal: Patient-Specific Goal (Individualized)  Outcome: Progressing  Goal: Absence of Hospital-Acquired Illness or Injury  Outcome: Progressing  Goal: Optimal Comfort and Wellbeing  Outcome: Progressing  Goal: Readiness for Transition of Care  Outcome: Progressing     Problem: Infection  Goal: Absence of Infection Signs and Symptoms  Outcome: Progressing     Problem: Diabetes Comorbidity  Goal: Blood Glucose Level Within Targeted Range  Outcome: Progressing     Problem: Wound  Goal: Optimal Coping  Outcome: Progressing  Goal: Optimal Functional Ability  Outcome: Progressing  Goal: Absence of Infection Signs and Symptoms  Outcome: Progressing  Goal: Improved Oral Intake  Outcome: Progressing  Goal: Optimal Pain Control and Function  Outcome: Progressing  Goal: Skin Health and Integrity  Outcome: Progressing  Goal: Optimal Wound Healing  Outcome: Progressing     Problem: Fall Injury Risk  Goal: Absence of Fall and Fall-Related Injury  Outcome: Progressing      Body Location Override (Optional - Billing Will Still Be Based On Selected Body Map Location If Applicable): right dorsal forearm

## 2025-03-23 NOTE — ASSESSMENT & PLAN NOTE
Creatine stable for now and at baseline. Patient with known CKD stage 3b at baseline. BMP reviewed- noted Estimated Creatinine Clearance: 45.3 mL/min (A) (based on SCr of 1.8 mg/dL (H)). according to latest data. Renally dose meds. Avoid nephrotoxic medications and procedures.

## 2025-03-23 NOTE — DISCHARGE SUMMARY
[facility] Medicine  Discharge Summary      Patient Name: Macario Dior Jr.  MRN: [id number]  MYRA: [id number]  Patient Class: IP- Inpatient  Admission Date: 3/16/2025  Hospital Length of Stay: 6 days  Discharge Date and Time: 3/23/2025 12:19 PM  Attending Physician: Arpita Salamanca MD   Discharging Provider: Arptia Salamanca MD  Primary Care Provider: [facility] Medicine Team: [facility] Arpita Salamanca MD  Primary Care Team: [facility]    HPI:   74 y.o. 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 Pain located right para spinal lumbar region, is non-radiating.  No sciatica. He denies fever, chills, cough, SOB. Weight loss, appetite loss.     In the ED, evaluation included  wbc 6.8, Hb 11, CRP 21, cr 2.9-> 2.3,  & MRI. Ortho was consulted. They do not feel that the hip is infected. They recommend a NS spine consult with concern for with edema in the right paraspinal musculature and potential R L4-5 facet septic arthritis.  His MRI resulted this morning. Radiology was concerned for R L4-5 septic facet joint with osteomyelitis.  His calcium dropped after hydration. 9.1-> 7.7  Suspect albumin was falsely high upon admission.        He was placed in OBS, Likely needs upgrade to Inpatient status        * No surgery found *      Hospital Course:   Patient admitted with left hip pain and MRI of pelvis and lumbar spine done that showed that revealed concern for left L4-L5 facet septic arthritis and osteomyelitis. Orthopedic surgery consulted and reviewed MRI and noted no evidence of left hip infection. Neurosurgery consulted concerning facet septic arthritis and felt no surgical intervention recommended IR aspiration of facet and blood cultures and Infectious disease consult. Consult to  ID and IR placed. Blood culture drawn. Blood cultures grew Staph epidermis but ID felt likely just a contaminant and recommended to hold off on antibiotics until IR aspiration and to repeat blood cultures that was done. Patient went to IR and had aspiration of L4-L5 facet and bone biopsy done by IR. Patient started on empiric IV Vancomycin (pharmacy to dose) after aspiration to cover for possible Staph infection and Ceftriaxone 2 grams IV every 12 hours to cover other bacterial organisms awaiting culture results as per ID. PT/OT consulted and initially recommended high intensity but patient progressed with therapy and recommending low intensity on discharge on 3/20. Plan discharge home with HH PT/OT on discharge when medically ready. Awaiting culture results and final ID recs for discharge. Pathology from bone from right L4-L5 facet returned negative for malignancy and osteomyelitis. ID gave final recs on 3/22:  Possible septic arthritis based on MRI findings. Had biopsy for path and culture. Cultures remain NGTD. Biopsy from bone without evidence of osteomyelitis.  ID favored treating conservatively as possible septic arthritis with Doxycycline 100 mg PO BID x 4 weeks. Pateitn switched to po Doxycyline on 3/22 and IV Vancomycin and Ceftriaxone discontinued on 3/22.   ID recommended to have patient follow up with his spine specialist.  No need for ID clinic follow up.  Patient doing well on discharge. Patient discharged home in good condition on 3/23 on oral Doxycyline for 30 days with  PT/OT. Patient requesting po Morphine for short term pain management on discharge. Patient chronically on Oxycodone at home prior to this admit. I discussed  in detail with patient and he is aware for his acute pain for now to take only the Morphine IR and not to take any of his Oxycodone that he normally takes for his chronic back pain. He is having acute pain in hospital Morphine was working better than his normal Oxycodone so we  made an arrangement for short term to use Morphine po prn for now to get over his acute pain and then can go back to taking his Oxycodone for his chronic long term back pain. I instructed him not to take the 2 in combination and he is aware.        Goals of Care Treatment Preferences:  Code Status: Full Code      SDOH Screening:  The patient was screened for utility difficulties, food insecurity, transport difficulties, housing insecurity, and interpersonal safety and there were no concerns identified this admission.     Consults:   Consults (From admission, onward)          Status Ordering Provider     Inpatient consult to Infectious Diseases  Once        Provider:  (Not yet assigned)    Completed CHAPINCITO NORTON     Inpatient consult to Interventional Radiology  Once        Provider:  (Not yet assigned)    Completed CHAPINCITO NORTON     Inpatient consult to Neurosurgery  Once        Provider:  (Not yet assigned)    Completed EVANGELINA SINGH     Inpatient consult to Orthopedics  Once        Provider:  (Not yet assigned)    Completed RASHAWN FLORES            Assessment & Plan  Septic arthritis of lumbar spine of L4-L5 facet  Abnormal MRI, lumbar spine  Patient admitted with worsening of acute on chronic right hip and low back pain. MRI of pelvis and lumbar spine done and showed concern for septic arthritis of right L4-5 facet joint with osteomyelitis.     - Patient admitted to hospital for evaluation. WBC normal at 6800 on admit and CRP elevated at 21. Plan to repeat CRP now that he has had several days of IV antibiotics on 3/22.  - Orthopedic surgery consulted and reviewed MRI and noted no evidence of left hip infection.   - Neurosurgery consulted concerning facet septic arthritis and felt no surgical intervention needed and recommended IR aspiration of facet and blood cultures.   - Infectious disease consulted and recommended to monitor off antibiotics in hopes that we can increase culture yield and  Recommended biopsy for pathology and microbiologic studies.  - Patient went to IR on 3/19 and had aspiration of L4-L5 facet and bone biopsy done by IR.   - Patient started on empiric IV Vancomycin (pharmacy to dose) after aspiration to cover for possible Staph infection and Ceftriaxone 2 grams IV every 12 hours to cover other bacterial organisms awaiting culture results.  - So far cultures from aspiration are no growth. Appreciate ID assistance on case and they are awaiting culture results to determine recommendations for discharge on his antibiotics. Patient with borderline GFR with known CKD stage 3b so if requires long term IV antibiotics he may require clearance by Nephrology for PICC line or even a tunneled line done by IR. Cultures from aspiration remain no growth to date. Patient remains on IV Vancomycin and Ceftriaxone as per ID. Patient reports subjectively the pain in his right hip and low back area is better than on admit on 3/21 so clinically he appears to be improving on antibiotics.  - Patient on long term anticoagulation orally with Xarelto for history of DVT/PE so no other DVT prophylaxis required as patient is fully anticoagulated.   - Pathology from bone from right L4-L5 facet returned negative for malignancy and osteomyelitis. ID gave final recs today and I discussed with Dr. Tanmay Donato from ID team via secure chat today. Dr. Tanmay Donato noted possible septic arthritis based on MRI findings. Had biopsy for path and culture. Cultures remain NGTD. Biopsy from bone without evidence of osteomyelitis.  Dr. Tanmay Donato favors treating conservatively as possible septic arthritis with Doxycycline 100 mg PO BID x 4 weeks. Pateitn switched to po Doxycyline on 3/22 and IV Vancomycin and Ceftriaxone discontinued on 3/22.   ID recommended to have patient follow up with his spine specialist.  No need for ID clinic follow up.  Patient doing well on discharge on 3/23. Patient discharged home in good  condition on 3/23 on oral Doxycyline for 30 days with HH PT/OT. Patient requesting po Morphine for short term pain management on discharge. Patient chronically on Oxycodone at home prior to this admit. I discussed  in detail with patient and he is aware for his acute pain for now to take only the Morphine IR and not to take any of his Oxycodone that he normally takes for his chronic back pain. He is having acute pain in hospital Morphine was working better than his normal Oxycodone so we made an arrangement for short term to use Morphine po prn for now to get over his acute pain and then can go back to taking his Oxycodone for his chronic long term back pain. I instructed him not to take the 2 in combination and he is aware.   Long term (current) use of anticoagulants  Personal history of other venous thrombosis and embolism  This patient has long term use on an anticoagulant with Select Anticoagulant(s): Xarelto was held for IR aspiration but restarted after aspiration on 3/19. They are on long term anticoagulation due to Reason for Anticoagulation: DVT/PE. Resume home Xarelto on 3/22 and continue on discharge.     Stage 3b chronic kidney disease  Creatine stable for now and at baseline. Patient with known CKD stage 3b at baseline. BMP reviewed- noted Estimated Creatinine Clearance: 45.3 mL/min (A) (based on SCr of 1.8 mg/dL (H)). according to latest data. Renally dose meds. Avoid nephrotoxic medications and procedures.    Type 2 diabetes mellitus with diabetic polyneuropathy, without long-term current use of insulin  Patient's FSGs are controlled on current medication regimen.  Home meds : Weekly Seiglutide injections.  Holding home meds in hospital but to resume on home discharge.     Last A1c reviewed-   Lab Results   Component Value Date    HGBA1C 6.7 (H) 12/21/2022     Most recent fingerstick glucose reviewed-   Blood Sugars (AccuCheck):    Recent Labs     03/21/25  0704 03/21/25  1109 03/21/25  1458  03/22/25  0625 03/22/25  1118 03/22/25  1556   POCTGLUCOSE 113* 141* 115* 126* 148* 121*       Essential hypertension  Patient's blood pressure range in the last 24 hours was: BP  Min: 121/60  Max: 159/69.The patient's inpatient anti-hypertensive regimen is listed below:    Current Antihypertensives  , Daily, Oral  hydrALAZINE (APRESOLINE) tablet, Every 8 hours, Oral    Plan  - BP is controlled, no changes needed to their regimen and resume on discharge.  - Goal BP < 140/90.     BUZZ (obstructive sleep apnea)  Patient on CPAP at home but refusing in hospital.     Chronic pain of right hip  Osteoarthritis of spine with radiculopathy, lumbar region  Continue home meds.   Home meds:  Robaxin, Oxycodone 10 mg, Capasin, Lidoderm patch.   Avoid NSAID due to CKD.     Scheduled:  Lidoderm patch daily  Capasin gel bid    PRN:  Oxycodone 10 q 6  Morhine 4 mg IV   Robaxan 750     Inpatient Morphine Milligram Equivalents Per Day 3/17 - 3/20    Values displayed are in units of MME/Day     Order Start / End Date 3/17 Yesterday Today Tomorrow     oxyCODONE immediate release tablet Tab 10 mg 3/16 - No end date 30 of 60 30 of 60 15 of 60 0 of 60     morphine injection 4 mg 3/17 - No end date 24 of 60 24 of 72 0 of 72 0 of 72     Daily Totals  54 of 120 54 of 132 15 of 132 0 of 132            Anemia due to infection  Controlled on discharge. Anemia is likely due to chronic disease due to infection . Most recent hemoglobin and hematocrit are listed below.  Recent Labs     03/21/25  0305 03/22/25  0821 03/23/25  0519   HGB 10.9* 10.7* 10.3*   HCT 34.5* 33.4* 32.4*     Plan  - Transfuse PRBC if patient becomes hemodynamically unstable, symptomatic or H/H drops below 7/21.  - Patient has not received any PRBC transfusions to date  - Patient's anemia is currently stable    Final Active Diagnoses:    Diagnosis Date Noted POA    PRINCIPAL PROBLEM:  Septic arthritis of lumbar spine of L4-L5 facet [M46.56] 03/16/2025 Yes    Abnormal MRI,  lumbar spine [R93.7] 03/18/2025 Yes    Personal history of other venous thrombosis and embolism [Z86.718] 12/14/2022 Not Applicable    Long term (current) use of anticoagulants [Z79.01] 09/03/2015 Not Applicable    Stage 3b chronic kidney disease [N18.32] 04/12/2016 Yes    Osteoarthritis of spine with radiculopathy, lumbar region [M47.26] 05/27/2020 Yes    Essential hypertension [I10] 04/12/2016 Yes    Type 2 diabetes mellitus with diabetic polyneuropathy, without long-term current use of insulin [E11.42] 12/14/2022 Yes    BUZZ (obstructive sleep apnea) [G47.33] 12/19/2022 Yes    Anemia due to infection [D64.9, B99.9] 12/14/2022 Yes    Chronic pain of right hip [M25.551, G89.29] 10/23/2019 Yes      Problems Resolved During this Admission:    Diagnosis Date Noted Date Resolved POA    Positive blood culture [R78.81] 03/19/2025 03/22/2025 Yes    Hypocalcemia [E83.51] 03/17/2025 03/20/2025 Yes       Discharged Condition: good    Disposition: Home-Health Care [facility] [place]    Follow Up:    Future Appointments   Date Time Provider Department Center   3/26/2025  2:45 PM Moy Vasquez MD [facility]       Patient Instructions:      Diet diabetic     Notify your health care provider if you experience any of the following:  temperature >100.4     Notify your health care provider if you experience any of the following:  persistent nausea and vomiting or diarrhea     Notify your health care provider if you experience any of the following:  severe uncontrolled pain     Notify your health care provider if you experience any of the following:  redness, tenderness, or signs of infection (pain, swelling, redness, odor or green/yellow discharge around incision site)     Notify your health care provider if you experience any of the following:  difficulty breathing or increased cough     Notify your health care provider if you experience any of the following:  severe persistent headache     Notify your health care provider if you  experience any of the following:  worsening rash     Notify your health care provider if you experience any of the following:  persistent dizziness, light-headedness, or visual disturbances     Notify your health care provider if you experience any of the following:  increased confusion or weakness     Activity as tolerated       Significant Diagnostic Studies: Labs: BMP:   Recent Labs   Lab 03/22/25  0821      K 3.8   *   CO2 21*   BUN 25*   CREATININE 1.8*   CALCIUM 8.6*    and CBC   Recent Labs   Lab 03/22/25  0821 03/23/25  0519   WBC 5.50 5.42   HGB 10.7* 10.3*   HCT 33.4* 32.4*    255     Blood Culture, Routine   Date Value Ref Range Status   03/19/2025 No Growth to date  Preliminary   03/19/2025 No Growth to date  Preliminary   03/19/2025 No Growth to date  Preliminary   03/19/2025 No Growth to date  Preliminary   03/19/2025 No Growth to date  Preliminary     Aerobic Bacterial Culture   Date Value Ref Range Status   03/19/2025 No growth  Final     Anaerobic Culture   Date Value Ref Range Status   03/19/2025 Culture in progress  Preliminary       Final Pathologic Diagnosis Right L4-L5 facet, biopsy:  Bone with degenerative changes, marrow fibrosis, and hemosiderin.    Negative for acute osteomyelitis.    Negative for malignancy.         Pending Diagnostic Studies:       None           Medications:  Reconciled Home Medications:      Medication List        START taking these medications      doxycycline 100 MG tablet  Commonly known as: VIBRA-TABS  Take 1 tablet (100 mg total) by mouth every 12 (twelve) hours.     morphine 15 MG tablet  Commonly known as: MSIR  Take 1 tablet (15 mg total) by mouth every 4 (four) hours as needed (Severe pain).            CHANGE how you take these medications      carvediloL 25 MG tablet  Commonly known as: COREG  Take 1 tablet (25 mg total) by mouth 2 (two) times daily.  What changed: when to take this     ergocalciferol 50,000 unit Cap  Commonly known as:  ERGOCALCIFEROL  Take 1 capsule (50,000 Units total) by mouth every 7 days.  What changed: when to take this     finasteride 5 mg tablet  Commonly known as: PROSCAR  Take 1 tablet (5 mg total) by mouth once daily.  What changed: See the new instructions.     hydrALAZINE 50 MG tablet  Commonly known as: APRESOLINE  Take 1 tablet (50 mg total) by mouth every 8 (eight) hours.  What changed: See the new instructions.     oxyCODONE 10 mg Tab immediate release tablet  Commonly known as: ROXICODONE  Take 1 tablet (10 mg total) by mouth every 4 (four) hours as needed for Pain.  What changed: when to take this            CONTINUE taking these medications      albuterol 90 mcg/actuation inhaler  Commonly known as: PROVENTIL/VENTOLIN HFA  Inhale 2 puffs into the lungs every 6 (six) hours as needed for Wheezing.     atorvastatin 80 MG tablet  Commonly known as: LIPITOR  Take 80 mg by mouth once daily.     chlorhexidine 0.12 % solution  Commonly known as: PERIDEX  RINSE 1 CAPFUL BY MOUTH ONCE DAILY AS NEEDED SWISH FOR 30 SECONDS AFTER AND SPIT OUT     chlorthalidone 25 MG Tab  Commonly known as: HYGROTEN  Take 25 mg by mouth once daily.     dulaglutide 4.5 mg/0.5 mL pen injector  Commonly known as: TRULICITY  Inject 4.5 mg into the skin every Saturday.     eplerenone 25 MG Tab  Commonly known as: INSPRA  Take 1 tablet by mouth once daily.     furosemide 40 MG tablet  Commonly known as: LASIX  Take 40 mg by mouth once daily.     latanoprost 0.005 % ophthalmic solution  Place 1 drop into both eyes every evening.     naloxone 4 mg/actuation Spry  Commonly known as: NARCAN  as needed.     rivaroxaban 15 mg Tab  Commonly known as: XARELTO  Take 15 mg by mouth Daily.     valsartan 320 MG tablet  Commonly known as: DIOVAN  Take 320 mg by mouth once daily.              Indwelling Lines/Drains at time of discharge:   Lines/Drains/Airways       None                   33 minutes of time spent on discharge, including examining the patient,  providing discharge instructions, arranging follow-up and documentation.         Arpita Salamanca MD  Department of Hospital Medicine  [facility] - Surgery

## 2025-03-23 NOTE — PLAN OF CARE
[facility]    HOME HEALTH ORDERS  FACE TO FACE ENCOUNTER    Patient Name: Macario Dior Jr.   YOB: 1950     PCP: 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 [M46.56]  Yes     Priority: 1 - High    Abnormal MRI, lumbar spine [R93.7]  Yes     Priority: 1 - High    Personal history of other venous thrombosis and embolism [Z86.718]  Not Applicable     Priority: 2     Long term (current) use of anticoagulants [Z79.01]  Not Applicable     Priority: 2     Stage 3b chronic kidney disease [N18.32]  Yes     Priority: 3     Osteoarthritis of spine with radiculopathy, lumbar region [M47.26]  Yes     Priority: 5     Essential hypertension [I10]  Yes     Priority: 5     Type 2 diabetes mellitus with diabetic polyneuropathy, without long-term current use of insulin [E11.42]  Yes     Priority: 6     BUZZ (obstructive sleep apnea) [G47.33]  Yes     Priority: 7     Anemia due to infection [D64.9, B99.9]  Yes     Priority: 7     Chronic pain of right hip [M25.551, G89.29]  Yes     Priority: 9       Resolved Hospital Problems    Diagnosis Date Resolved POA    Positive blood culture [R78.81] 03/22/2025 Yes     Priority: 4     Hypocalcemia [E83.51] 03/20/2025 Yes     Priority: 8         Future Appointments   Date Time Provider Department Center   3/26/2025  2:45 PM Moy Vasquez MD [facility]        My clinical findings that support the need for the home health skilled services and home bound status are the following:  Weakness/numbness causing balance and gait disturbance due to Infection and Weakness/Debility making it taxing to leave home.  Requiring assistive device to leave home due to  unsteady gait caused by  Infection and Weakness/Debility.    Allergies:   Review of patient's allergies indicates:   Allergen Reactions    Amlodipine Swelling    Lisinopril Tinitus and Swelling    Pregabalin Swelling    Prednisone Palpitations    Azithromycin      Muscles tense up    Levetiracetam Other (See Comments)    Acetaminophen Rash     Feels like needles are sticking him per pt        Diet: diabetic diet: 2000 calorie    Activities: activity as tolerated    Nursing:   SN to complete comprehensive assessment including routine vital signs. Instruct on disease process and s/s of complications to report to MD. Review/verify medication list sent home with the patient at time of discharge  and instruct patient/caregiver as needed. Frequency may be adjusted depending on start of care date.    Notify MD if SBP > 160 or < 90; DBP > 90 or < 50; HR > 120 or < 50; Temp > 101    Ok to schedule additional visits based on staff availability and patient request on consecutive days within the home health episode.     When multiple disciplines ordered:     Start of Care occurs on Sunday - Wednesday schedule remaining discipline evaluations as ordered on separate consecutive days following the start of care.     Thursday SOC -schedule subsequent evaluations Friday and Monday the following week.      Friday - Saturday SOC - schedule subsequent discipline evaluations on consecutive days starting Monday of the following week.     For all post-discharge communication and subsequent orders please contact patient's primary care physician. If unable to reach primary care physician or do not receive response within 30 minutes, please contact [facility] for clinical staff order clarification.    CONSULTS:    Physical Therapy to evaluate and treat. Evaluate for home safety and equipment needs; Establish/upgrade home exercise program. Perform / instruct on therapeutic exercises, gait training, transfer training, and Range of  Motion.  Occupational Therapy to evaluate and treat. Evaluate home environment for safety and equipment needs. Perform/Instruct on transfers, ADL training, ROM, and therapeutic exercises.      Medications: Review discharge medications with patient and family and provide education.         Medication List        START taking these medications      doxycycline 100 MG tablet  Commonly known as: VIBRA-TABS  Take 1 tablet (100 mg total) by mouth every 12 (twelve) hours.     morphine 15 MG tablet  Commonly known as: MSIR  Take 1 tablet (15 mg total) by mouth every 4 (four) hours as needed (Severe pain).            CHANGE how you take these medications      carvediloL 25 MG tablet  Commonly known as: COREG  Take 1 tablet (25 mg total) by mouth 2 (two) times daily.  What changed: when to take this     ergocalciferol 50,000 unit Cap  Commonly known as: ERGOCALCIFEROL  Take 1 capsule (50,000 Units total) by mouth every 7 days.  What changed: when to take this     finasteride 5 mg tablet  Commonly known as: PROSCAR  Take 1 tablet (5 mg total) by mouth once daily.  What changed: See the new instructions.     hydrALAZINE 50 MG tablet  Commonly known as: APRESOLINE  Take 1 tablet (50 mg total) by mouth every 8 (eight) hours.  What changed: See the new instructions.     oxyCODONE 10 mg Tab immediate release tablet  Commonly known as: ROXICODONE  Take 1 tablet (10 mg total) by mouth every 4 (four) hours as needed for Pain.  What changed: when to take this            CONTINUE taking these medications      albuterol 90 mcg/actuation inhaler  Commonly known as: PROVENTIL/VENTOLIN HFA  Inhale 2 puffs into the lungs every 6 (six) hours as needed for Wheezing.     atorvastatin 80 MG tablet  Commonly known as: LIPITOR  Take 80 mg by mouth once daily.     chlorhexidine 0.12 % solution  Commonly known as: PERIDEX  RINSE 1 CAPFUL BY MOUTH ONCE DAILY AS NEEDED SWISH FOR 30 SECONDS AFTER AND SPIT OUT     chlorthalidone 25 MG Tab  Commonly  known as: HYGROTEN  Take 25 mg by mouth once daily.     dulaglutide 4.5 mg/0.5 mL pen injector  Commonly known as: TRULICITY  Inject 4.5 mg into the skin every Saturday.     eplerenone 25 MG Tab  Commonly known as: INSPRA  Take 1 tablet by mouth once daily.     furosemide 40 MG tablet  Commonly known as: LASIX  Take 40 mg by mouth once daily.     latanoprost 0.005 % ophthalmic solution  Place 1 drop into both eyes every evening.     naloxone 4 mg/actuation Spry  Commonly known as: NARCAN  as needed.     rivaroxaban 15 mg Tab  Commonly known as: XARELTO  Take 15 mg by mouth Daily.     valsartan 320 MG tablet  Commonly known as: DIOVAN  Take 320 mg by mouth once daily.                I certify that this patient is confined to his home and needs intermittent skilled nursing care, physical therapy, and occupational therapy.    _________________________________  Arpita Salamanca MD  03/23/2025

## 2025-03-23 NOTE — ASSESSMENT & PLAN NOTE
Controlled on discharge. Anemia is likely due to chronic disease due to infection. Most recent hemoglobin and hematocrit are listed below.  Recent Labs     03/21/25  0305 03/22/25  0821 03/23/25  0519   HGB 10.9* 10.7* 10.3*   HCT 34.5* 33.4* 32.4*     Plan  - Transfuse PRBC if patient becomes hemodynamically unstable, symptomatic or H/H drops below 7/21.  - Patient has not received any PRBC transfusions to date  - Patient's anemia is currently stable

## 2025-03-23 NOTE — ASSESSMENT & PLAN NOTE
Patient's FSGs are controlled on current medication regimen.  Home meds : Weekly Seiglutide injections.  Holding home meds in hospital but to resume on home discharge.     Last A1c reviewed-   Lab Results   Component Value Date    HGBA1C 6.7 (H) 12/21/2022     Most recent fingerstick glucose reviewed-   Blood Sugars (AccuCheck):    Recent Labs     03/21/25  0704 03/21/25  1109 03/21/25  1458 03/22/25  0625 03/22/25  1118 03/22/25  1556   POCTGLUCOSE 113* 141* 115* 126* 148* 121*

## 2025-03-23 NOTE — ASSESSMENT & PLAN NOTE
Patient admitted with worsening of acute on chronic right hip and low back pain. MRI of pelvis and lumbar spine done and showed concern for septic arthritis of right L4-5 facet joint with osteomyelitis.     - Patient admitted to hospital for evaluation. WBC normal at 6800 on admit and CRP elevated at 21. Plan to repeat CRP now that he has had several days of IV antibiotics on 3/22.  - Orthopedic surgery consulted and reviewed MRI and noted no evidence of left hip infection.   - Neurosurgery consulted concerning facet septic arthritis and felt no surgical intervention needed and recommended IR aspiration of facet and blood cultures.   - Infectious disease consulted and recommended to monitor off antibiotics in hopes that we can increase culture yield and Recommended biopsy for pathology and microbiologic studies.  - Patient went to IR on 3/19 and had aspiration of L4-L5 facet and bone biopsy done by IR.   - Patient started on empiric IV Vancomycin (pharmacy to dose) after aspiration to cover for possible Staph infection and Ceftriaxone 2 grams IV every 12 hours to cover other bacterial organisms awaiting culture results.  - So far cultures from aspiration are no growth. Appreciate ID assistance on case and they are awaiting culture results to determine recommendations for discharge on his antibiotics. Patient with borderline GFR with known CKD stage 3b so if requires long term IV antibiotics he may require clearance by Nephrology for PICC line or even a tunneled line done by IR. Cultures from aspiration remain no growth to date. Patient remains on IV Vancomycin and Ceftriaxone as per ID. Patient reports subjectively the pain in his right hip and low back area is better than on admit on 3/21 so clinically he appears to be improving on antibiotics.  - Patient on long term anticoagulation orally with Xarelto for history of DVT/PE so no other DVT prophylaxis required as patient is fully anticoagulated.   - Pathology from  bone from right L4-L5 facet returned negative for malignancy and osteomyelitis. ID gave final recs today and I discussed with Dr. Tanmay Donato from ID team via secure chat today. Dr. Tanmay Donato noted possible septic arthritis based on MRI findings. Had biopsy for path and culture. Cultures remain NGTD. Biopsy from bone without evidence of osteomyelitis.  Dr. Tanmay Donato favors treating conservatively as possible septic arthritis with Doxycycline 100 mg PO BID x 4 weeks. Pateitn switched to po Doxycyline on 3/22 and IV Vancomycin and Ceftriaxone discontinued on 3/22.   ID recommended to have patient follow up with his spine specialist.  No need for ID clinic follow up.  Patient doing well on discharge on 3/23. Patient discharged home in good condition on 3/23 on oral Doxycyline for 30 days with HH PT/OT. Patient requesting po Morphine for short term pain management on discharge. Patient chronically on Oxycodone at home prior to this admit. I discussed  in detail with patient and he is aware for his acute pain for now to take only the Morphine IR and not to take any of his Oxycodone that he normally takes for his chronic back pain. He is having acute pain in hospital Morphine was working better than his normal Oxycodone so we made an arrangement for short term to use Morphine po prn for now to get over his acute pain and then can go back to taking his Oxycodone for his chronic long term back pain. I instructed him not to take the 2 in combination and he is aware.

## 2025-03-23 NOTE — ASSESSMENT & PLAN NOTE
Patient's blood pressure range in the last 24 hours was: BP  Min: 121/60  Max: 159/69.The patient's inpatient anti-hypertensive regimen is listed below:    Current Antihypertensives  , Daily, Oral  hydrALAZINE (APRESOLINE) tablet, Every 8 hours, Oral    Plan  - BP is controlled, no changes needed to their regimen and resume on discharge.  - Goal BP < 140/90.

## 2025-03-23 NOTE — ASSESSMENT & PLAN NOTE
This patient has long term use on an anticoagulant with Select Anticoagulant(s): Xareltowas held for IR aspiration but restarted after aspiration on 3/19. They are on long term anticoagulation due to Reason for Anticoagulation: DVT/PE. Resume home Xarelto on 3/22 and continue on discharge.

## 2025-03-24 ENCOUNTER — RESULTS FOLLOW-UP (OUTPATIENT)
Dept: INFECTIOUS DISEASES | Facility: CLINIC | Age: 75
End: 2025-03-24

## 2025-03-24 LAB
BACTERIA BLD CULT: NORMAL
BACTERIA BLD CULT: NORMAL
FUNGUS SPEC CULT: NORMAL

## 2025-03-27 LAB — BACTERIA SPEC ANAEROBE CULT: NORMAL

## 2025-04-08 DIAGNOSIS — Z96.641 CHRONIC HIP PAIN AFTER TOTAL REPLACEMENT OF RIGHT HIP JOINT: Primary | ICD-10-CM

## 2025-04-08 DIAGNOSIS — M25.551 CHRONIC HIP PAIN AFTER TOTAL REPLACEMENT OF RIGHT HIP JOINT: Primary | ICD-10-CM

## 2025-04-08 DIAGNOSIS — G89.29 CHRONIC HIP PAIN AFTER TOTAL REPLACEMENT OF RIGHT HIP JOINT: Primary | ICD-10-CM

## 2025-04-14 ENCOUNTER — TELEPHONE (OUTPATIENT)
Dept: NEUROLOGY | Facility: CLINIC | Age: 75
End: 2025-04-14
Payer: OTHER GOVERNMENT

## 2025-04-29 ENCOUNTER — TELEPHONE (OUTPATIENT)
Facility: CLINIC | Age: 75
End: 2025-04-29
Payer: OTHER GOVERNMENT

## 2025-05-06 ENCOUNTER — TELEPHONE (OUTPATIENT)
Facility: CLINIC | Age: 75
End: 2025-05-06
Payer: OTHER GOVERNMENT

## 2025-05-06 ENCOUNTER — PROCEDURE VISIT (OUTPATIENT)
Facility: CLINIC | Age: 75
End: 2025-05-06
Payer: OTHER GOVERNMENT

## 2025-05-06 VITALS
SYSTOLIC BLOOD PRESSURE: 182 MMHG | HEART RATE: 65 BPM | BODY MASS INDEX: 30.82 KG/M2 | HEIGHT: 73 IN | DIASTOLIC BLOOD PRESSURE: 74 MMHG | WEIGHT: 232.56 LBS

## 2025-05-06 DIAGNOSIS — E11.42 SENSORY POLYNEUROPATHY DUE TO DIABETES MELLITUS: Primary | ICD-10-CM

## 2025-05-06 PROCEDURE — 64999 UNLISTED PX NERVOUS SYSTEM: CPT | Mod: PBBFAC

## 2025-05-06 PROCEDURE — 99999PBSHW PR PBB SHADOW TECHNICAL ONLY FILED TO HB: Mod: PBBFAC,,,

## 2025-05-06 NOTE — PROCEDURES
Qutenza Application    Hands washed, dried, and gloves donned prior to patient care. Examinated the patient's feet for any signs of open wounds or broken skin. Skin intact with no open areas noted. Patches applied to plantar and dorsal areas of both clean, dry feet, wrapped with self-adherent wrap to hold in place.     After 30 minutes, hands washed, dried, and gloves donned prior to removal of patches. All 4 patches were then removed and disposed of properly in the biohazard receptacle. Feet cleansed with Qutenza cleanser provided, patted dry. Patient tolerated well.    Patient to follow-up in 91 days for next application.      Diagnosis Codes:  CPT code   ID QUTENZA APPLICATION, code [id number]

## 2025-05-09 ENCOUNTER — TELEPHONE (OUTPATIENT)
Dept: SPORTS MEDICINE | Facility: CLINIC | Age: 75
End: 2025-05-09
Payer: OTHER GOVERNMENT

## 2025-05-09 NOTE — TELEPHONE ENCOUNTER
"Called pt to advise that appt on 5/12 will need to be r/s due to approval pending through the [facility]. Pt states that we ([facility]) "need to take a class on this" - states that Dr. Loyola is now "his PCP" and that anything he orders is approved under the ortho referral. I advised the patient that in the past, any pt with [facility] insurance that is referred from the orthopedics dept to the sports medicine dept typically requires RFS submission and approval through the [facility]. Pt was adamant about keeping his appt on Monday despite advising him that services typically need to be approved prior to being performed.     Rupali Hinojosa MS, OTC  Clinical Assistant to Dr. Jenise Moise    "

## 2025-05-12 ENCOUNTER — TELEPHONE (OUTPATIENT)
Dept: SPORTS MEDICINE | Facility: CLINIC | Age: 75
End: 2025-05-12
Payer: OTHER GOVERNMENT

## 2025-05-12 NOTE — TELEPHONE ENCOUNTER
Called pt due to being over 30 mins late to appointment today. Pt requested to r/s due to pending authorization from [facility]. Pt r/s to 5/21 at [facility].     Rupali Hinojosa MS, OTC  Clinical Assistant to Dr. Jenise Moise

## 2025-05-19 ENCOUNTER — OFFICE VISIT (OUTPATIENT)
Dept: PODIATRY | Facility: CLINIC | Age: 75
End: 2025-05-19
Payer: OTHER GOVERNMENT

## 2025-05-19 VITALS
DIASTOLIC BLOOD PRESSURE: 75 MMHG | WEIGHT: 229.94 LBS | BODY MASS INDEX: 30.47 KG/M2 | HEART RATE: 66 BPM | HEIGHT: 73 IN | SYSTOLIC BLOOD PRESSURE: 177 MMHG

## 2025-05-19 DIAGNOSIS — R60.1 GENERALIZED EDEMA: ICD-10-CM

## 2025-05-19 DIAGNOSIS — E11.42 TYPE 2 DIABETES MELLITUS WITH DIABETIC POLYNEUROPATHY, WITHOUT LONG-TERM CURRENT USE OF INSULIN: Primary | ICD-10-CM

## 2025-05-19 PROCEDURE — 99204 OFFICE O/P NEW MOD 45 MIN: CPT | Mod: S$PBB,,, | Performed by: PODIATRIST

## 2025-05-19 PROCEDURE — 99214 OFFICE O/P EST MOD 30 MIN: CPT | Mod: PBBFAC | Performed by: PODIATRIST

## 2025-05-19 PROCEDURE — 99999 PR PBB SHADOW E&M-EST. PATIENT-LVL IV: CPT | Mod: PBBFAC,,, | Performed by: PODIATRIST

## 2025-05-19 RX ORDER — FERROUS SULFATE 325(65) MG
325 TABLET ORAL
COMMUNITY
Start: 2025-04-02

## 2025-05-19 RX ORDER — CALCITRIOL 0.25 UG/1
0.25 CAPSULE ORAL
COMMUNITY
Start: 2025-04-03

## 2025-05-19 NOTE — PROGRESS NOTES
Subjective:      Patient ID: Macario Dior Jr. is a 75 y.o. male.    Chief Complaint: Foot Pain (B/L), Numbness (For the last 4 years, always numb), and Diabetic Foot Exam (5/9/25 - VALivia Louis S)    Macario is a 75 y.o. male who presents to the clinic for evaluation and treatment of high risk feet. Macario has a past medical history of Arthritis, Asthma, CKD (chronic kidney disease), stage III, Deep vein thrombosis, Demyelinating neuropathy, Diabetes mellitus type 2 without retinopathy (12/14/2022), Gout, History of blood clots (2013), HLD (hyperlipidemia), Hypertension, Iron deficiency anemia, BUZZ (obstructive sleep apnea), Pulmonary embolism, and Status post hip replacement right 8/31/2015 (09/15/2015). The patient's chief complaint is burning to both of the feet. This patient has documented high risk feet requiring routine maintenance secondary to peripheral neuropathy.    PCP: [facility]    Date Last Seen by PCP:   Chief Complaint   Patient presents with    Foot Pain     B/L    Numbness     For the last 4 years, always numb    Diabetic Foot Exam     5/9/25 - Livia MARRERO Louis S         Current shoe gear:  Affected Foot: Tennis shoes     Unaffected Foot: Tennis shoes    Hemoglobin A1C   Date Value Ref Range Status   12/21/2022 6.7 (H) 4.0 - 5.6 % Final     Comment:     ADA Screening Guidelines:  5.7-6.4%  Consistent with prediabetes  >or=6.5%  Consistent with diabetes    High levels of fetal hemoglobin interfere with the HbA1C  assay. Heterozygous hemoglobin variants (HbS, HgC, etc)do  not significantly interfere with this assay.   However, presence of multiple variants may affect accuracy.     09/19/2022 6.7 (H) 4.0 - 5.6 % Final     Comment:     ADA Screening Guidelines:  5.7-6.4%  Consistent with prediabetes  >or=6.5%  Consistent with diabetes    High levels of fetal hemoglobin interfere with the HbA1C  assay. Heterozygous hemoglobin variants (HbS, HgC, etc)do  not  significantly interfere with this assay.   However, presence of multiple variants may affect accuracy.         Review of Systems   Constitutional: Negative for chills, fever and malaise/fatigue.   HENT:  Negative for hearing loss.    Cardiovascular:  Positive for leg swelling. Negative for claudication.   Respiratory:  Negative for shortness of breath.    Skin:  Positive for nail changes. Negative for flushing and rash.   Musculoskeletal:  Negative for joint pain and myalgias.   Gastrointestinal:  Negative for nausea and vomiting.   Neurological:  Positive for numbness and paresthesias. Negative for loss of balance and sensory change.   Psychiatric/Behavioral:  Negative for altered mental status.    Allergic/Immunologic: Negative for hives.           Objective:      Physical Exam  Vitals reviewed.   Constitutional:       Appearance: He is well-developed.   Cardiovascular:      Pulses:           Dorsalis pedis pulses are 0 on the right side and 0 on the left side.        Posterior tibial pulses are 1+ on the right side and 1+ on the left side.   Musculoskeletal:      Right ankle: Decreased range of motion. Abnormal pulse.      Right Achilles Tendon: Alonzo's test negative.      Left ankle: Decreased range of motion. Abnormal pulse.      Left Achilles Tendon: Alonzo's test negative.      Right foot: Decreased range of motion.      Left foot: Decreased range of motion.   Feet:      Right foot:      Protective Sensation: 5 sites tested.  2 sites sensed.      Left foot:      Protective Sensation: 5 sites tested.  2 sites sensed.   Skin:     General: Skin is dry.      Capillary Refill: Capillary refill takes more than 3 seconds.      Comments:  No open lesions noted.      Neurological:      Mental Status: He is alert.      Comments: diminished sensation noted to b/L lower extremities   Psychiatric:         Behavior: Behavior normal. Behavior is cooperative.                   Assessment:       Encounter Diagnoses    Name Primary?    Type 2 diabetes mellitus with diabetic polyneuropathy, without long-term current use of insulin Yes    Generalized edema          Plan:       Macario was seen today for foot pain, numbness and diabetic foot exam.    Diagnoses and all orders for this visit:    Type 2 diabetes mellitus with diabetic polyneuropathy, without long-term current use of insulin  -     DIABETIC SHOES FOR HOME USE  -     COMPRESSION STOCKINGS    Generalized edema  -     COMPRESSION STOCKINGS      I counseled the patient on his conditions, their implications and medical management.      Medical records reviewed  Labs: A1c reviewed     Message sent to neurology regarding neuropathy treatment, pt states he is allergic to gabapentin    Rx diabetic shoes  Rx compression stockings    Discussed DM foot care:  Wear comfortable, proper fitting shoes. Wash feet daily. Dry well. After drying, apply moisturizer to feet (no lotion to webspaces). Inspect feet daily for skin breaks, blisters, swelling, or redness. Wear cotton socks (preferably white)  Change socks every day. Do NOT walk barefoot. Do NOT use heating pads or warm/hot water soaks      - Discussed importance of daily moisturizer to the feet such as Gold bonds diabetic foot cream     - Patient is intermediate risk for developing lower extremity issues secondary to diabetes     - Advised the patient that fungus likes warm, dark, and moist environments such as bathrooms, gyms, and pools. Advised to spray shower, shower mat , and any shoes w/ Lysol periodically    RTC in 4-6 months or sooner if any new pedal problems should arise or if condition worsens.

## 2025-05-21 ENCOUNTER — TELEPHONE (OUTPATIENT)
Dept: SPORTS MEDICINE | Facility: CLINIC | Age: 75
End: 2025-05-21
Payer: OTHER GOVERNMENT

## 2025-05-21 ENCOUNTER — TELEPHONE (OUTPATIENT)
Facility: CLINIC | Age: 75
End: 2025-05-21
Payer: OTHER GOVERNMENT

## 2025-05-21 NOTE — TELEPHONE ENCOUNTER
Called pt to r/s Jose due to pt being out of town; pt elected to r/s to 6/2.    Rupali Hinojosa MS, OTC  Clinical Assistant to Dr. Jenise Moise

## 2025-05-21 NOTE — TELEPHONE ENCOUNTER
Called pt to advise appt is approved for today by [facility]; pt states he is out of town and requested a call back later this afternoon to r/s his appt.    Rupali Hinojosa MS, OTC  Clinical Assistant to Dr. Jenise Moise

## 2025-05-21 NOTE — TELEPHONE ENCOUNTER
----- Message from Med Assistant Shamar sent at 5/19/2025  4:07 PM CDT -----  Josh follow up please per dr. paredes  ----- Message -----  From: Tomer Paredes MD  Sent: 5/19/2025   2:06 PM CDT  To: Shamar Burden MA    Book this patient with Josh for follow-up. EK

## 2025-06-02 ENCOUNTER — TELEPHONE (OUTPATIENT)
Dept: SPORTS MEDICINE | Facility: CLINIC | Age: 75
End: 2025-06-02
Payer: OTHER GOVERNMENT

## 2025-07-30 ENCOUNTER — TELEPHONE (OUTPATIENT)
Dept: ORTHOPEDICS | Facility: CLINIC | Age: 75
End: 2025-07-30
Payer: OTHER GOVERNMENT

## 2025-07-30 DIAGNOSIS — G89.29 CHRONIC HIP PAIN AFTER TOTAL REPLACEMENT OF RIGHT HIP JOINT: Primary | ICD-10-CM

## 2025-07-30 DIAGNOSIS — M25.551 CHRONIC HIP PAIN AFTER TOTAL REPLACEMENT OF RIGHT HIP JOINT: Primary | ICD-10-CM

## 2025-07-30 DIAGNOSIS — Z96.641 CHRONIC HIP PAIN AFTER TOTAL REPLACEMENT OF RIGHT HIP JOINT: Primary | ICD-10-CM

## 2025-07-30 NOTE — TELEPHONE ENCOUNTER
Copied from CRM #[id number]. Topic: Appointments - Appointment Access  >> Jul 30, 2025 11:04 AM Tess wrote:  Requesting Appt    Patient is requesting an appointment. No appt is available in Epic. Caller would like for a nurse/MA to call to schedule an appointment.    Name of Caller: Pt  Appointment Access: ongoing pain and problem  Symptoms:back and hip pain  Call Back Number: [phone] (home)   Additional Information: pt stated he is in a lot of pain. Pt stated he is barley walking. Pt is requesting to see Dr. Loyola.  His 1st available is Oct

## 2025-07-30 NOTE — PROGRESS NOTES
Assessment:  Encounter Diagnoses   Name Primary?    Chronic hip pain after total replacement of right hip joint Yes    Weakness of right hip        Plan: We discussed the importance of R hip aspiration to eval for infection given his elevated ESR and CRP.   - Aspiration procedure under US guidance scheduled with Dr. Moise for August 12th - to be sent for Synovmakr panel  - Follow up in-person 1.5-2 weeks after aspiration to discuss results and next steps.        Patient ID: Macario Dior Jr. is a 75 y.o. male.  Chief Complaint   Patient presents with    Right Hip - Pain        History of Present Illness    CHIEF COMPLAINT:  - Right hip pain s/p hip replacement    HPI:  Mr. Dior presents with worsening pain and functional limitations following hip replacement. He reports right-sided pain from his hip to his back and neck, describing it as pressure on his back when walking and a tightening knot sensation. Pain is consistently on the right side and never on the left. He emphasizes the persistence of his symptoms.    He reports difficulty with daily activities, including walking and dressing. He expresses frustration with his symptoms, noting issues with balance and mobility.    He has completed prescribed PT but reports no improvement, stating that it exacerbates his pain.    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 He recalls a previous procedure by Dr. Hyman to remove excess bone growth, which provided relief for about eight months before symptoms returned.    He also reports numbness in both feet, for which he is undergoing a new treatment involving patches applied every 30 or 90 days. He states the first application was effective in reducing the numbness.    Previous lab work showed elevated numbers, raising concern for a possible infection. An aspiration procedure was scheduled with Dr. Ortiz in early June, but he was unable to attend due  "to a hospital stay. The procedure needs to be rescheduled to evaluate for potential infection in the hip replacement.    He denies any numbness and tingling in the leg.    PREVIOUS TREATMENTS:  - Mr. Dior underwent PT, which increased pain  - Mr. Dior received pain medication and shots, which helped with pain but did not resolve the underlying issue  - Mr. Dior had a previous procedure where Dr. Hyman removed excess bone growth, which provided relief for about 8 months    SURGICAL HISTORY:  - Right hip replacement            CC: right Hippain    75 y.o. Male presents today for follow up evaluation of his right Hip pain. Since last visit, patient reports pain has Worsened. He additionally reports several falls due to feeling unsteady on his feet    Surgical History:R DHRUV 8/31/15 (Dr. [facility])  R hip HO resection 1/4/23 (Dr. Sinha)    Pain score: 10/10    Pain location: Posterior aspect of the Hip   Pain quality: Sharp / Stabbing, Dull / Aching, Throbbing, and Tight / Stiffness    History of trauma/injury: reports 6+ falls since last visit    Feelings of instability?:    Hip pain is worsened by General walking, difficulty going up/down steps, difficulty getting in/out of the car, difficulty sleeping at night , difficulty rising from sitting , and difficulty putting on shoes/socks.     Conservative Treatments:     Assistive Device:    Physical Therapy:    Infection Workup:   ESR/CRP   Aspiration   Metal levels    HPI:  Mr. Dior presents with severe hip pain that began before Mardi Gras (presumably early 2025) and has persisted for several weeks. He describes intense pain with any movement, including standing, sitting, and lying down. Pain was so severe he required assistance to move in bed. He characterizes the pain as feeling "like somebody hit me with a knife" when moving, but subsiding when still. Pain was most intense for about a week, easing off the Tuesday before his current visit. He now describes it " "as "thumping like a toothache" and radiating into his groin area.   He visited the emergency room twice due to pain severity. They diagnosed him with severe muscle spasms and mentioned a "fear of muscle death." He received a pain relief shot, which provided immediate relief from the stabbing pain, though he could "still feel it in the joint when I walk."   Prior to this acute episode, he had been experiencing a different pain pattern for about three years, rolling from his hip to his back. Over the last three weeks, pain has been localized to one specific spot in his hip area. His leg remains strong, but he has pain with each step, saying, "every time my foot hit the ground, but not as bad as it was, you know, for the last few days."   Mr. Dior mentions completing lab work as requested during his previous visit, though results are not currently visible in his chart.   No specific medical diagnoses were denied in the transcript.   PREVIOUS TREATMENTS:  - Mr. Dior received a shot (likely muscle relaxer) in the emergency room, which provided immediate relief from the stabbing pain      R DHRUV 8/31/15 (Dr. [facility])  R hip HO resection 1/4/23 (Dr. Sinha)    Past Medical History:  Past Medical History:   Diagnosis Date    Arthritis     Asthma     chronic    CKD (chronic kidney disease), stage III     patient denies    Deep vein thrombosis     Demyelinating neuropathy     polyneuropathy    Diabetes mellitus type 2 without retinopathy 12/14/2022    Gout     History of blood clots 2013    lower part of both legs    HLD (hyperlipidemia)     Hypertension     Iron deficiency anemia     BUZZ (obstructive sleep apnea)     CPAP    Pulmonary embolism     Status post hip replacement right 8/31/2015 09/15/2015        Surgical History:  Past Surgical History:   Procedure Laterality Date    COLONOSCOPY N/A 11/07/2016    Procedure: COLONOSCOPY;  Surgeon: Shar Weiss MD;  Location: [facility] ([address] [place] [address]);  Service: Endoscopy;  " Laterality: N/A;  [facility] Referral. Okay to hold Coumadin 5 days prior to procedure. See Referral scaned in media tab on 10/20/16.    COLONOSCOPY N/A 09/21/2017    Procedure: COLONOSCOPY;  Surgeon: Stuart Trinidad MD;  Location: [facility] (4TH FLR);  Service: Endoscopy;  Laterality: N/A;  [facility] Olivarez-see scanned docs under media tab          9/6/17-current [facility] authorization and last clinic visit scanned into chart    EPIDURAL STEROID INJECTION N/A 12/17/2020    Procedure: INJECTION, STEROID, EPIDURAL, L5-S1 clear to hold Xarelto 2 days;  Surgeon: Moy Vasquez MD;  Location: [facility] PAIN MGT;  Service: Pain Management;  Laterality: N/A;    EXCISION OF LESION OF FEMUR Right 01/04/2023    Procedure: EXCISION, LESION, FEMUR: POSTERIOR APPROACH;  Surgeon: Prieto Sinha MD;  Location: [facility] OR 2ND FLR;  Service: Orthopedics;  Laterality: Right;    HIP SURGERY Right 2008    exploratory at [facility]    HIP SURGERY Right 08/31/2015    THR    INJECTION OF ANESTHETIC AGENT AROUND NERVE Right 10/23/2019    Procedure: BLOCK, NERVE, Obturator and Femoral cleared to hold xarelto 3 days pt. said he's not taking coumadin;  Surgeon: Moy Vasquez MD;  Location: [facility] PAIN MGT;  Service: Pain Management;  Laterality: Right;    INJECTION OF ANESTHETIC AGENT AROUND NERVE Right 07/06/2020    Procedure: BLOCK, NERVE, RIGHT MBB L3,L5,L5;  Surgeon: Moy Vasquez MD;  Location: [facility] PAIN MGT;  Service: Pain Management;  Laterality: Right;  BLOCK, NERVE, RIGHT MBB L3,L5,L5    INJECTION OF ANESTHETIC AGENT AROUND NERVE Right 07/30/2020    Procedure: BLOCK, NERVE RIGHT L3, L4, L5 2ND;  Surgeon: Moy Vasquez MD;  Location: [facility] PAIN MGT;  Service: Pain Management;  Laterality: Right;  NEEDS CONSENT, XARELTO    LEG SURGERY Left 2012    fibula and tibia    RADIOFREQUENCY ABLATION Right 10/05/2020    Procedure: RADIOFREQUENCY ABLATION RIGHT L3,4,5;  Surgeon: Moy Vasquez MD;  Location: [facility] PAIN MGT;  Service: Pain  "Management;  Laterality: Right;  RADIOFREQUENCY ABLATION RIGHT L3,4,5  ok to hold xarelto, scanned in Media    SHOULDER SURGERY Right 1989    torn ligament    TRANSFORAMINAL EPIDURAL INJECTION OF STEROID Bilateral 12/04/2023    Procedure: LUMBAR TRANSFORAMINAL BILATERAL L4/5 DIRECT REFERRAL;  Surgeon: Moy Vasquez MD;  Location: [facility] PAIN MGT;  Service: Pain Management;  Laterality: Bilateral;    TRANSFORAMINAL EPIDURAL INJECTION OF STEROID Right 11/21/2024    Procedure: LUMBAR TRANSFORAMINAL RIGHT L1/2 AND L2/3 please add xarelto to pt's med list when admitted 11/21;  Surgeon: Moy Vasquez MD;  Location: [facility] PAIN MGT;  Service: Pain Management;  Laterality: Right;  [phone]  2 WK F/U ELEAZAR        Social History:  He reports that he quit smoking about 15 years ago. His smoking use included cigars. He has never used smokeless tobacco. He reports that he does not drink alcohol and does not use drugs.     Family History:  family history includes Cancer in his father, mother, sister, and sister; No Known Problems in his brother, daughter, daughter, daughter, son, and son.     Medications Ordered Prior to Encounter[1]  Review of patient's allergies indicates:   Allergen Reactions    Amlodipine Swelling    Lisinopril Tinitus and Swelling    Pregabalin Swelling    Prednisone Palpitations    Azithromycin      Muscles tense up    Levetiracetam Other (See Comments)    Acetaminophen Rash     Feels like needles are sticking him per pt          Physical exam:  Height 6' 1" (1.854 m), weight 100.1 kg (220 lb 12.7 oz).  General: no apparent distress    Gait: + antalgic, trendelenburg gait, + limp, no use of assistive device during exam although typically uses cane which he has with him      R hip:   TTP at iliac crest, not at greater troch and not at anterior hip  Skin: intact, no erythema or swelling - well-healed posterolateral incision  Range of motion: 70 degrees of flexion, 10 degrees internal rotation, 30 degrees " external rotation  Strength: 4/5 with abduction, 4/5 with flexion - pain with resisted flexion  Provocative testing:  + FADIR, - LILIA, - logroll, + SLR  Neurovascular: WWP, Light touch sensation intact      Relevant Results:  Imaging:  Plain x-rays of the R hip and pelvis were obtained today and independently reviewed by me today, 08/05/2025, and demonstrate well positioned and well-fixed cementless DHRUV. No subsidence nor migration. No notable recurrence of HO, with XRs from 2022 showing significant (grade 3 vs 4) HO.        Labs:  Lab Results   Component Value Date    HGB 10.3 (L) 03/23/2025    WBC 5.42 03/23/2025    CREATININE 1.8 (H) 03/22/2025    ALBUMIN 2.7 (L) 03/19/2025    HGBA1C 6.7 (H) 12/21/2022    SEDRATE 82 (H) 03/16/2025    CRP 10.3 (H) 03/22/2025         BMI Readings from Last 1 Encounters:   05/19/25 30.34 kg/m²         This note was generated with the assistance of ambient listening technology. Verbal consent was obtained by the patient and accompanying visitor(s) for the recording of patient appointment to facilitate this note. I attest to having reviewed and edited the generated note for accuracy, though some syntax or spelling errors may persist. Please contact the author of this note for any clarification.                                     [1]   Current Outpatient Medications on File Prior to Visit   Medication Sig Dispense Refill    albuterol 90 mcg/actuation inhaler Inhale 2 puffs into the lungs every 6 (six) hours as needed for Wheezing.       atorvastatin (LIPITOR) 80 MG tablet Take 80 mg by mouth once daily.      calcitRIOL (ROCALTROL) 0.25 MCG Cap Take 0.25 mcg by mouth.      carvediloL (COREG) 25 MG tablet Take 1 tablet (25 mg total) by mouth 2 (two) times daily. (Patient taking differently: Take 25 mg by mouth Daily.) 60 tablet 11    chlorhexidine (PERIDEX) 0.12 % solution RINSE 1 CAPFUL BY MOUTH ONCE DAILY AS NEEDED SWISH FOR 30 SECONDS AFTER AND SPIT OUT      chlorthalidone  (HYGROTEN) 25 MG Tab Take 25 mg by mouth once daily.      dulaglutide (TRULICITY) 4.5 mg/0.5 mL pen injector Inject 4.5 mg into the skin every Saturday.      eplerenone (INSPRA) 25 MG Tab Take 1 tablet by mouth once daily.      ergocalciferol (ERGOCALCIFEROL) 50,000 unit Cap Take 1 capsule (50,000 Units total) by mouth every 7 days.      ferrous sulfate (FEOSOL) 325 mg (65 mg iron) Tab tablet Take 325 mg by mouth.      finasteride (PROSCAR) 5 mg tablet Take 1 tablet (5 mg total) by mouth once daily.      furosemide (LASIX) 40 MG tablet Take 40 mg by mouth once daily.      hydrALAZINE (APRESOLINE) 50 MG tablet Take 1 tablet (50 mg total) by mouth every 8 (eight) hours.      latanoprost 0.005 % ophthalmic solution Place 1 drop into both eyes every evening.      morphine (MSIR) 15 MG tablet Take 1 tablet (15 mg total) by mouth every 4 (four) hours as needed (Severe pain). 25 tablet 0    naloxone (NARCAN) 4 mg/actuation Spry as needed.      oxyCODONE (ROXICODONE) 10 mg Tab immediate release tablet Take 1 tablet (10 mg total) by mouth every 4 (four) hours as needed for Pain. 42 tablet 0    rivaroxaban (XARELTO) 15 mg Tab Take 15 mg by mouth Daily.      valsartan (DIOVAN) 320 MG tablet Take 320 mg by mouth once daily.       No current facility-administered medications on file prior to visit.

## 2025-08-05 ENCOUNTER — HOSPITAL ENCOUNTER (OUTPATIENT)
Dept: RADIOLOGY | Facility: HOSPITAL | Age: 75
Discharge: HOME | End: 2025-08-05
Attending: STUDENT IN AN ORGANIZED HEALTH CARE EDUCATION/TRAINING PROGRAM
Payer: OTHER GOVERNMENT

## 2025-08-05 ENCOUNTER — OFFICE VISIT (OUTPATIENT)
Dept: ORTHOPEDICS | Facility: CLINIC | Age: 75
End: 2025-08-05
Payer: OTHER GOVERNMENT

## 2025-08-05 VITALS — BODY MASS INDEX: 29.27 KG/M2 | WEIGHT: 220.81 LBS | HEIGHT: 73 IN

## 2025-08-05 DIAGNOSIS — G89.29 CHRONIC HIP PAIN AFTER TOTAL REPLACEMENT OF RIGHT HIP JOINT: ICD-10-CM

## 2025-08-05 DIAGNOSIS — R29.898 WEAKNESS OF RIGHT HIP: ICD-10-CM

## 2025-08-05 DIAGNOSIS — M25.551 CHRONIC HIP PAIN AFTER TOTAL REPLACEMENT OF RIGHT HIP JOINT: Primary | ICD-10-CM

## 2025-08-05 DIAGNOSIS — G89.29 CHRONIC HIP PAIN AFTER TOTAL REPLACEMENT OF RIGHT HIP JOINT: Primary | ICD-10-CM

## 2025-08-05 DIAGNOSIS — Z96.641 CHRONIC HIP PAIN AFTER TOTAL REPLACEMENT OF RIGHT HIP JOINT: ICD-10-CM

## 2025-08-05 DIAGNOSIS — Z96.641 CHRONIC HIP PAIN AFTER TOTAL REPLACEMENT OF RIGHT HIP JOINT: Primary | ICD-10-CM

## 2025-08-05 DIAGNOSIS — M25.551 CHRONIC HIP PAIN AFTER TOTAL REPLACEMENT OF RIGHT HIP JOINT: ICD-10-CM

## 2025-08-05 PROCEDURE — 73502 X-RAY EXAM HIP UNI 2-3 VIEWS: CPT | Mod: 26,RT,, | Performed by: RADIOLOGY

## 2025-08-05 PROCEDURE — 99213 OFFICE O/P EST LOW 20 MIN: CPT | Mod: S$PBB,,, | Performed by: STUDENT IN AN ORGANIZED HEALTH CARE EDUCATION/TRAINING PROGRAM

## 2025-08-05 PROCEDURE — 99999 PR PBB SHADOW E&M-EST. PATIENT-LVL III: CPT | Mod: PBBFAC,,, | Performed by: STUDENT IN AN ORGANIZED HEALTH CARE EDUCATION/TRAINING PROGRAM

## 2025-08-05 PROCEDURE — 73502 X-RAY EXAM HIP UNI 2-3 VIEWS: CPT | Mod: TC,RT

## 2025-08-05 PROCEDURE — 99213 OFFICE O/P EST LOW 20 MIN: CPT | Mod: PBBFAC,25 | Performed by: STUDENT IN AN ORGANIZED HEALTH CARE EDUCATION/TRAINING PROGRAM

## 2025-08-07 ENCOUNTER — OFFICE VISIT (OUTPATIENT)
Facility: CLINIC | Age: 75
End: 2025-08-07
Payer: OTHER GOVERNMENT

## 2025-08-07 DIAGNOSIS — E11.42 SENSORY POLYNEUROPATHY DUE TO DIABETES MELLITUS: Primary | ICD-10-CM

## 2025-08-07 PROCEDURE — 99999 PR PBB SHADOW E&M-EST. PATIENT-LVL I: CPT | Mod: PBBFAC,,,

## 2025-08-07 PROCEDURE — 99213 OFFICE O/P EST LOW 20 MIN: CPT | Mod: S$PBB,,,

## 2025-08-07 PROCEDURE — 99211 OFF/OP EST MAY X REQ PHY/QHP: CPT | Mod: PBBFAC

## 2025-08-07 RX ORDER — DULOXETIN HYDROCHLORIDE 30 MG/1
30 CAPSULE, DELAYED RELEASE ORAL DAILY
Qty: 30 CAPSULE | Refills: 11 | Status: SHIPPED | OUTPATIENT
Start: 2025-08-07 | End: 2026-08-07

## 2025-08-12 ENCOUNTER — OFFICE VISIT (OUTPATIENT)
Dept: SPORTS MEDICINE | Facility: CLINIC | Age: 75
End: 2025-08-12
Payer: OTHER GOVERNMENT

## 2025-08-12 VITALS
HEIGHT: 73 IN | WEIGHT: 221.31 LBS | SYSTOLIC BLOOD PRESSURE: 179 MMHG | HEART RATE: 74 BPM | DIASTOLIC BLOOD PRESSURE: 79 MMHG | BODY MASS INDEX: 29.33 KG/M2

## 2025-08-12 DIAGNOSIS — Z96.641 CHRONIC HIP PAIN AFTER TOTAL REPLACEMENT OF RIGHT HIP JOINT: Primary | ICD-10-CM

## 2025-08-12 DIAGNOSIS — G89.29 CHRONIC HIP PAIN AFTER TOTAL REPLACEMENT OF RIGHT HIP JOINT: Primary | ICD-10-CM

## 2025-08-12 DIAGNOSIS — M25.551 CHRONIC HIP PAIN AFTER TOTAL REPLACEMENT OF RIGHT HIP JOINT: Primary | ICD-10-CM

## 2025-08-12 PROCEDURE — 99215 OFFICE O/P EST HI 40 MIN: CPT | Mod: PBBFAC,PN | Performed by: STUDENT IN AN ORGANIZED HEALTH CARE EDUCATION/TRAINING PROGRAM

## 2025-08-12 PROCEDURE — 20611 DRAIN/INJ JOINT/BURSA W/US: CPT | Mod: PBBFAC,PN | Performed by: STUDENT IN AN ORGANIZED HEALTH CARE EDUCATION/TRAINING PROGRAM

## 2025-08-12 PROCEDURE — 99499 UNLISTED E&M SERVICE: CPT | Mod: S$PBB,,, | Performed by: STUDENT IN AN ORGANIZED HEALTH CARE EDUCATION/TRAINING PROGRAM

## 2025-08-12 PROCEDURE — 99999 PR PBB SHADOW E&M-EST. PATIENT-LVL V: CPT | Mod: PBBFAC,,, | Performed by: STUDENT IN AN ORGANIZED HEALTH CARE EDUCATION/TRAINING PROGRAM

## 2025-08-13 ENCOUNTER — TELEPHONE (OUTPATIENT)
Facility: CLINIC | Age: 75
End: 2025-08-13
Payer: OTHER GOVERNMENT

## 2025-08-18 ENCOUNTER — TELEPHONE (OUTPATIENT)
Dept: ORTHOPEDICS | Facility: CLINIC | Age: 75
End: 2025-08-18
Payer: OTHER GOVERNMENT

## 2025-08-18 DIAGNOSIS — M25.551 PAIN IN RIGHT HIP: Primary | ICD-10-CM

## (undated) DEVICE — GAUZE SPONGE 4X4 12PLY

## (undated) DEVICE — ELECTRODE BLADE TEFLON 6

## (undated) DEVICE — TUBE SUCTION KAMVAC MINI 20/BX

## (undated) DEVICE — DRESSING LEUKOPLAST FLEX 1X3IN

## (undated) DEVICE — SYS CLSR DERMABOND PRINEO 22CM

## (undated) DEVICE — BNDG COFLEX FOAM LF2 ST 4X5YD

## (undated) DEVICE — SUT 1 36IN COATED VICRYL UN

## (undated) DEVICE — ELECTRODE REM PLYHSV RETURN 9

## (undated) DEVICE — DRAPE THREE-QTR REINF 53X77IN

## (undated) DEVICE — SUT 2/0 36IN COATED VICRYL

## (undated) DEVICE — DRESSING AQUACEL AG ADV 3.5X12

## (undated) DEVICE — COVER MAYO STND XL 30X57IN

## (undated) DEVICE — MASK FLYTE HOOD PEEL AWAY

## (undated) DEVICE — BLADE SAGITTAL 18 X 1.27 X 90M

## (undated) DEVICE — SEALER AQUAMANTYS 2.3 BIPOLAR

## (undated) DEVICE — KIT TOTAL HIP HPOFH OMC

## (undated) DEVICE — DRESSING AQUACEL AG 3.5X10IN

## (undated) DEVICE — SUT MONOCRYL 3-0 PS-2 UND

## (undated) DEVICE — KIT IRR SUCTION HND PIECE

## (undated) DEVICE — SUT ETHIBOND XTRA 1 OS-6

## (undated) DEVICE — SOL BETADINE 5%